# Patient Record
Sex: FEMALE | Race: WHITE | NOT HISPANIC OR LATINO | Employment: OTHER | URBAN - METROPOLITAN AREA
[De-identification: names, ages, dates, MRNs, and addresses within clinical notes are randomized per-mention and may not be internally consistent; named-entity substitution may affect disease eponyms.]

---

## 2017-02-10 ENCOUNTER — OFFICE VISIT (OUTPATIENT)
Dept: RHEUMATOLOGY | Facility: CLINIC | Age: 47
End: 2017-02-10
Payer: MEDICARE

## 2017-02-10 VITALS
SYSTOLIC BLOOD PRESSURE: 119 MMHG | BODY MASS INDEX: 29.8 KG/M2 | HEART RATE: 88 BPM | HEIGHT: 60 IN | DIASTOLIC BLOOD PRESSURE: 81 MMHG | WEIGHT: 151.81 LBS

## 2017-02-10 DIAGNOSIS — M79.7 FIBROMYALGIA: Primary | Chronic | ICD-10-CM

## 2017-02-10 DIAGNOSIS — Z79.891 LONG TERM CURRENT USE OF OPIATE ANALGESIC: ICD-10-CM

## 2017-02-10 PROCEDURE — 99213 OFFICE O/P EST LOW 20 MIN: CPT | Mod: S$PBB,,, | Performed by: INTERNAL MEDICINE

## 2017-02-10 PROCEDURE — 99999 PR PBB SHADOW E&M-EST. PATIENT-LVL III: CPT | Mod: PBBFAC,,, | Performed by: INTERNAL MEDICINE

## 2017-02-10 PROCEDURE — 99213 OFFICE O/P EST LOW 20 MIN: CPT | Mod: PBBFAC | Performed by: INTERNAL MEDICINE

## 2017-02-10 RX ORDER — MORPHINE SULFATE 60 MG/1
60 TABLET, FILM COATED, EXTENDED RELEASE ORAL 2 TIMES DAILY
Qty: 60 TABLET | Refills: 0 | Status: SHIPPED | OUTPATIENT
Start: 2017-03-10 | End: 2017-04-07 | Stop reason: SDUPTHER

## 2017-02-10 RX ORDER — OXYCODONE HYDROCHLORIDE 10 MG/1
10 TABLET ORAL EVERY 6 HOURS PRN
Qty: 90 TABLET | Refills: 0 | Status: SHIPPED | OUTPATIENT
Start: 2017-02-10 | End: 2017-02-10 | Stop reason: SDUPTHER

## 2017-02-10 RX ORDER — OXYCODONE HYDROCHLORIDE 10 MG/1
10 TABLET ORAL EVERY 6 HOURS PRN
Qty: 90 TABLET | Refills: 0 | Status: SHIPPED | OUTPATIENT
Start: 2017-03-10 | End: 2017-04-07 | Stop reason: SDUPTHER

## 2017-02-10 RX ORDER — MORPHINE SULFATE 60 MG/1
60 TABLET, FILM COATED, EXTENDED RELEASE ORAL 2 TIMES DAILY
Qty: 60 TABLET | Refills: 0 | Status: SHIPPED | OUTPATIENT
Start: 2017-02-10 | End: 2017-02-10 | Stop reason: SDUPTHER

## 2017-02-13 NOTE — PROGRESS NOTES
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 The vasculitis resolve with clearing of the hepatitis C. I continue to follow her for chronic pain syndrome. She remains on chronic narcotic therapy. She has not been calling in for her prescription early. She also has hypertension. She is now living in [place].  She has been taking her blood pressure medicines regularly.  She is scheduled to see a new primary care doctor on Monday.  She does complain of some fatigue.  Her aching is unchanged.  She has had no fever or rashes.  She has had no other new medical problems.    Physical examination was not performed, the entire time was counseling.    Assessment: Chronic pain syndrome    Plans: I refilled her prescription for MS Contin 60 mg and oxycodone 10 mg for the next 2 months.  I will see her in follow-up in 2 months.

## 2017-04-07 ENCOUNTER — LAB VISIT (OUTPATIENT)
Dept: LAB | Facility: HOSPITAL | Age: 47
End: 2017-04-07
Attending: INTERNAL MEDICINE
Payer: MEDICARE

## 2017-04-07 ENCOUNTER — OFFICE VISIT (OUTPATIENT)
Dept: RHEUMATOLOGY | Facility: CLINIC | Age: 47
End: 2017-04-07
Payer: MEDICARE

## 2017-04-07 VITALS
WEIGHT: 141.88 LBS | BODY MASS INDEX: 27.86 KG/M2 | HEART RATE: 91 BPM | SYSTOLIC BLOOD PRESSURE: 147 MMHG | HEIGHT: 60 IN | DIASTOLIC BLOOD PRESSURE: 98 MMHG

## 2017-04-07 DIAGNOSIS — M79.7 FIBROMYALGIA: Chronic | ICD-10-CM

## 2017-04-07 DIAGNOSIS — Z79.891 LONG TERM CURRENT USE OF OPIATE ANALGESIC: ICD-10-CM

## 2017-04-07 DIAGNOSIS — Z86.19 HISTORY OF HEPATITIS C: Primary | ICD-10-CM

## 2017-04-07 DIAGNOSIS — I15.9 SECONDARY HYPERTENSION: ICD-10-CM

## 2017-04-07 DIAGNOSIS — L03.116 CELLULITIS OF LEFT LOWER EXTREMITY: ICD-10-CM

## 2017-04-07 DIAGNOSIS — I10 HYPERTENSION, ESSENTIAL: Chronic | ICD-10-CM

## 2017-04-07 DIAGNOSIS — Z86.19 HISTORY OF HEPATITIS C: ICD-10-CM

## 2017-04-07 DIAGNOSIS — I77.6 VASCULITIS: Chronic | ICD-10-CM

## 2017-04-07 LAB
BILIRUB UR QL STRIP: NEGATIVE
CLARITY UR REFRACT.AUTO: ABNORMAL
COLOR UR AUTO: YELLOW
GLUCOSE UR QL STRIP: NEGATIVE
HGB UR QL STRIP: NEGATIVE
KETONES UR QL STRIP: NEGATIVE
LEUKOCYTE ESTERASE UR QL STRIP: NEGATIVE
NITRITE UR QL STRIP: NEGATIVE
PH UR STRIP: 6 [PH] (ref 5–8)
PROT UR QL STRIP: NEGATIVE
SP GR UR STRIP: 1.02 (ref 1–1.03)
URN SPEC COLLECT METH UR: ABNORMAL
UROBILINOGEN UR STRIP-ACNC: ABNORMAL EU/DL

## 2017-04-07 PROCEDURE — 99214 OFFICE O/P EST MOD 30 MIN: CPT | Mod: S$PBB,,, | Performed by: INTERNAL MEDICINE

## 2017-04-07 PROCEDURE — 99999 PR PBB SHADOW E&M-EST. PATIENT-LVL III: CPT | Mod: PBBFAC,,, | Performed by: INTERNAL MEDICINE

## 2017-04-07 PROCEDURE — 81003 URINALYSIS AUTO W/O SCOPE: CPT

## 2017-04-07 PROCEDURE — 99213 OFFICE O/P EST LOW 20 MIN: CPT | Mod: PBBFAC | Performed by: INTERNAL MEDICINE

## 2017-04-07 RX ORDER — CARVEDILOL 3.12 MG/1
3.12 TABLET ORAL 2 TIMES DAILY
Qty: 60 TABLET | Refills: 1 | Status: ON HOLD | OUTPATIENT
Start: 2017-04-07 | End: 2018-05-25 | Stop reason: HOSPADM

## 2017-04-07 RX ORDER — HYDROCHLOROTHIAZIDE 25 MG/1
25 TABLET ORAL DAILY
Qty: 30 TABLET | Refills: 1 | Status: ON HOLD | OUTPATIENT
Start: 2017-04-07 | End: 2018-05-25 | Stop reason: HOSPADM

## 2017-04-07 RX ORDER — AMITRIPTYLINE HYDROCHLORIDE 150 MG/1
TABLET ORAL
Qty: 30 TABLET | Refills: 1 | Status: SHIPPED | OUTPATIENT
Start: 2017-04-07 | End: 2017-07-20 | Stop reason: SDUPTHER

## 2017-04-07 RX ORDER — CIPROFLOXACIN 500 MG/1
500 TABLET ORAL EVERY 12 HOURS
Qty: 20 TABLET | Refills: 0 | Status: SHIPPED | OUTPATIENT
Start: 2017-04-07 | End: 2017-04-17

## 2017-04-07 RX ORDER — LISINOPRIL 40 MG/1
40 TABLET ORAL DAILY
Qty: 30 TABLET | Refills: 1 | Status: ON HOLD | OUTPATIENT
Start: 2017-04-07 | End: 2018-05-25 | Stop reason: HOSPADM

## 2017-04-07 RX ORDER — GABAPENTIN 600 MG/1
TABLET ORAL
Qty: 120 TABLET | Refills: 6 | Status: SHIPPED | OUTPATIENT
Start: 2017-04-07 | End: 2017-07-20 | Stop reason: SDUPTHER

## 2017-04-07 RX ORDER — FLUOXETINE HYDROCHLORIDE 40 MG/1
40 CAPSULE ORAL DAILY
Qty: 30 CAPSULE | Refills: 1 | Status: ON HOLD | OUTPATIENT
Start: 2017-04-07 | End: 2019-03-20 | Stop reason: HOSPADM

## 2017-04-07 RX ORDER — OXYCODONE HYDROCHLORIDE 10 MG/1
10 TABLET ORAL EVERY 6 HOURS PRN
Qty: 90 TABLET | Refills: 0 | Status: SHIPPED | OUTPATIENT
Start: 2017-04-07 | End: 2017-05-03 | Stop reason: SDUPTHER

## 2017-04-07 RX ORDER — MORPHINE SULFATE 60 MG/1
60 TABLET, FILM COATED, EXTENDED RELEASE ORAL 2 TIMES DAILY
Qty: 60 TABLET | Refills: 0 | Status: SHIPPED | OUTPATIENT
Start: 2017-04-07 | End: 2017-05-03 | Stop reason: SDUPTHER

## 2017-04-07 NOTE — MR AVS SNAPSHOT
[facility] - Rheumatology  [address]  [place] [address]  Phone: [phone]  Fax: [phone]                  Stephanie Trepagnier Barthelemy   2017 1:40 PM   Office Visit    Description:  Female : 1970   Provider:  William Delarosa MD   Department:  [facility] - Rheumatology           Reason for Visit     Fibromyalgia           Diagnoses this Visit        Comments    History of hepatitis C    -  Primary     Hypertension, essential                To Do List           Future Appointments        Provider Department Dept Phone    2017 2:45 PM LAB, SAME DAY [facility] [phone]    2017 3:00 PM LAB, SAME DAY [facility] [phone]    2017 2:00 PM William Delarosa MD [facility] Rheumatology [phone]      Goals (5 Years of Data)     None      Follow-Up and Disposition     Return in about 4 weeks (around 2017).       These Medications        Disp Refills Start End    amitriptyline (ELAVIL) 150 MG Tab 30 tablet 1 2017     TAKE 1 TABLET (150 MG TOTAL) BY MOUTH EVERY EVENING.    Pharmacy: [facility]/pharmacy #[id number] - [place] - [address] [address] Ph #: [phone]       carvedilol (COREG) 3.125 MG tablet 60 tablet 1 2017     Take 1 tablet (3.125 mg total) by mouth 2 (two) times daily. - Oral    Pharmacy: [facility]/pharmacy #[id number] - [place] - [address] [address] Ph #: [phone]       fluoxetine (PROZAC) 40 MG capsule 30 capsule 1 2017    Take 1 capsule (40 mg total) by mouth once daily. - Oral    Pharmacy: [facility]/pharmacy #[id number] [place] - [address] [address] [address] Ph #: [phone]       gabapentin (NEURONTIN) 600 MG tablet 120 tablet 6 2017     1 twice daily, 2 at bedtime    Pharmacy: [facility]/pharmacy #[id number] - [place] - [address] [address] Ph #: [phone]       hydrochlorothiazide (HYDRODIURIL) 25 MG tablet 30 tablet 1 2017     Take 1 tablet (25 mg total) by mouth once daily. - Oral    [id number] [address] [facility] Ph #: [phone]       lisinopril (PRINIVIL,ZESTRIL) 40 MG tablet 30 tablet 1 4/7/2017 4/7/2018    Take 1 tablet (40 mg total) by mouth once daily. - Oral    [facility] [address] [facility] Ph #: [phone]       morphine (MS CONTIN) 60 MG 12 hr tablet 60 tablet 0 4/7/2017 5/7/2017    Take 1 tablet (60 mg total) by mouth 2 (two) times daily. - Oral    [facility] [address] [facility] Ph #: [phone]       oxycodone (ROXICODONE) 10 mg Tab immediate release tablet 90 tablet 0 4/7/2017 5/7/2017    Take 1 tablet (10 mg total) by mouth every 6 (six) hours as needed for Pain. - Oral    [facility] [address] [facility] Ph #: [phone]       ciprofloxacin HCl (CIPRO) 500 MG tablet 20 tablet 0 4/7/2017 4/17/2017    Take 1 tablet (500 mg total) by mouth every 12 (twelve) hours. - Oral    [id number] [address] [facility] Ph #: [phone]         [facility]     [facility] Nurse Care Line - 24/7 Assistance  Unless otherwise directed by your provider, please contact [facility], our nurse care line that is available for 24/7 assistance.     Registered nurses in the [facility] provide: appointment scheduling, clinical advisement, health education, and other advisory services.  Call: [phone] (toll free)               Medications           Message regarding Medications     Verify the changes and/or additions to your medication regime listed below are the same as discussed with your clinician today.  If any of these changes or additions are incorrect, please notify your healthcare provider.        START taking these NEW medications        Refills    ciprofloxacin HCl (CIPRO) 500 MG tablet 0    Sig: Take 1 tablet (500 mg total) by mouth every 12 (twelve) hours.    Class: Print    Route: Oral      CHANGE how  you are taking these medications     Start Taking Instead of    fluoxetine (PROZAC) 40 MG capsule fluoxetine (PROZAC) 40 MG capsule    Dosage:  Take 1 capsule (40 mg total) by mouth once daily. Dosage:  TAKE ONE CAPSULE EVERY DAY    Reason for Change:  Reorder       STOP taking these medications     gabapentin (NEURONTIN) 300 MG capsule Take 1 capsule (300 mg total) by mouth 3 (three) times daily.           Verify that the below list of medications is an accurate representation of the medications you are currently taking.  If none reported, the list may be blank. If incorrect, please contact your healthcare provider. Carry this list with you in case of emergency.           Current Medications     amitriptyline (ELAVIL) 150 MG Tab TAKE 1 TABLET (150 MG TOTAL) BY MOUTH EVERY EVENING.    carvedilol (COREG) 3.125 MG tablet Take 1 tablet (3.125 mg total) by mouth 2 (two) times daily.    ciprofloxacin HCl (CIPRO) 500 MG tablet Take 1 tablet (500 mg total) by mouth every 12 (twelve) hours.    fluoxetine (PROZAC) 40 MG capsule Take 1 capsule (40 mg total) by mouth once daily.    gabapentin (NEURONTIN) 600 MG tablet 1 twice daily, 2 at bedtime    hydrochlorothiazide (HYDRODIURIL) 25 MG tablet Take 1 tablet (25 mg total) by mouth once daily.    lidocaine (LIDODERM) 5 %(700 mg/patch) Place 1 patch onto the skin once daily. Remove & Discard patch within 12 hours    lisinopril (PRINIVIL,ZESTRIL) 40 MG tablet Take 1 tablet (40 mg total) by mouth once daily.    morphine (MS CONTIN) 60 MG 12 hr tablet Take 1 tablet (60 mg total) by mouth 2 (two) times daily.    oxycodone (ROXICODONE) 10 mg Tab immediate release tablet Take 1 tablet (10 mg total) by mouth every 6 (six) hours as needed for Pain.    valacyclovir (VALTREX) 1000 MG tablet Take 1 tablet (1,000 mg total) by mouth 2 (two) times daily.           Clinical Reference Information           Your Vitals Were     BP Pulse Height Weight BMI    147/98 (BP Location: Right arm,  Patient Position: Sitting, BP Method: Automatic) 91 5' (1.524 m) 64.4 kg (141 lb 14.4 oz) 27.71 kg/m2      Blood Pressure          Most Recent Value    BP  (!)  147/98      Allergies as of 4/7/2017     No Known Allergies      Immunizations Administered on Date of Encounter - 4/7/2017     None      Orders Placed During Today's Visit     Future Labs/Procedures Expected by Expires    BIANCA  4/7/2017 4/7/2018    Anti-neutrophilic cytoplasmic antibody  4/7/2017 6/6/2018    C-reactive protein  4/7/2017 6/6/2018    CBC auto differential  4/7/2017 6/6/2018    Comprehensive metabolic panel  4/7/2017 6/6/2018    Cryoglobulin  4/7/2017 6/6/2018    Hepatitis C RNA, quantitative, PCR  4/7/2017 6/6/2018    Sedimentation rate, manual  4/7/2017 6/6/2018    Urinalysis  As directed 5/7/2017      [facility] Sign-Up     Activating your [facility] account is as easy as 1-2-3!     1) Visit [email or web address], select Sign Up Now, enter this activation code and your date of birth, then select Next.  [email or web address]  Expires: 5/22/2017  2:32 PM      2) Create a username and password to use when you visit [facility] in the future and select a security question in case you lose your password and select Next.    3) Enter your e-mail address and click Sign Up!    Additional Information  If you have questions, please e-mail [email or web address] or call [phone] to talk to our [facility] staff. Remember, [facility] is NOT to be used for urgent needs. For medical emergencies, dial 911.         Smoking Cessation     If you would like to quit smoking:   You may be eligible for free services if you are a [place] resident and started smoking cigarettes before September 1, 1988.  Call the Smoking Cessation Trust (SCT) toll free at (850) 617-5498 or (334) 850-7357.   Call 0-800-QUIT-NOW if you do not meet the above criteria.   Contact us via email: [email or web address]   View our website for more information: [email or web address]         Language Assistance Services     ATTENTION: Language assistance services are available, free of charge. Please call [phone].      ATENCIÓN: Si habla ines, tiene a brito disposición servicios gratuitos de asistencia lingüística. Llame al [phone].     CHÚ Ý: N?u b?n nói Ti?ng Vi?t, có các d?ch v? h? tr? ngôn ng? mi?n phí dành cho b?n. G?i s? [phone].         [facility] complies with applicable Federal civil rights laws and does not discriminate on the basis of race, color, national origin, age, disability, or sex.

## 2017-04-12 ENCOUNTER — TELEPHONE (OUTPATIENT)
Dept: RHEUMATOLOGY | Facility: CLINIC | Age: 47
End: 2017-04-12

## 2017-04-12 NOTE — PROGRESS NOTES
History of present illness: 46-year-old female with a history of vasculitis secondary to.  I continue to follow her for chronic pain syndrome.  She also has hypertension.  She states she has been taking her blood pressure medications but her blood pressure still elevated.  She has not established with a primary care doctor.  She has developed some sores on her legs.  This began one month ago.  She admits to picking at the sores.  She has had swelling in the left leg.  She has had some numbness in the leg.  She has had no fevers.  She has no drainage from the sores.  She also has some rash on the face and arms.  She has had oral ulcers.  She has had no cough or shortness of breath.  She has had no abdominal pain she has had no joint swelling.  She remains on gabapentin 3 times daily.  She had run out of her amitriptyline and Prozac.    Physical examination:  Skin: She has multiple small erythematous macules on the face, back, and arm.  She has open sores on the left leg due to excoriations.  There is some surrounding erythema.  ENT: Adequate tears and saliva.  No conjunctival injection or oral ulcers  Chest: Clear to auscultation and percussion  Cardiac: No murmurs, gallops, rubs  Abdomen: No organomegaly or masses.  No tenderness to palpation  Musculoskeletal: Full range of motion of all joints.  No synovitis.    Assessment:  1.  Cellulitis of the leg.  This may be self-inflicted  2.  I do need to rule out a recurrence of her hep C vasculitis  3.  Hypertension  4.  Chronic pain syndrome    Plans:  1.  Laboratory studies are obtained  2.  I placed her on Cipro 500 mg twice daily for 10 days  3.  I refilled her prior medications including her antihypertensive medications  4.  I will see her in follow-up in one month.  Answers for HPI/ROS submitted by the patient on 4/7/2017   fever: No  eye redness: Yes  swollen glands: No  headaches: Yes  shortness of breath: No  chest pain: No  trouble swallowing: No  heartburn:  No  diarrhea: No  constipation: Yes  unexpected weight change: Yes  genital sore: Yes  dysuria: No  During the last 3 days, have you had a skin rash?: Yes  tingling: Yes  Bruises or bleeds easily: Yes  cough: No

## 2017-04-25 RX ORDER — VALACYCLOVIR HYDROCHLORIDE 1 G/1
1000 TABLET, FILM COATED ORAL 2 TIMES DAILY
Qty: 2 TABLET | Refills: 1 | Status: ON HOLD | OUTPATIENT
Start: 2017-04-25 | End: 2018-05-25 | Stop reason: HOSPADM

## 2017-05-03 ENCOUNTER — OFFICE VISIT (OUTPATIENT)
Dept: RHEUMATOLOGY | Facility: CLINIC | Age: 47
End: 2017-05-03
Payer: MEDICARE

## 2017-05-03 VITALS
WEIGHT: 131.5 LBS | SYSTOLIC BLOOD PRESSURE: 178 MMHG | BODY MASS INDEX: 25.82 KG/M2 | HEIGHT: 60 IN | DIASTOLIC BLOOD PRESSURE: 113 MMHG | HEART RATE: 77 BPM

## 2017-05-03 DIAGNOSIS — L28.1 PRURIGO NODULARIS: ICD-10-CM

## 2017-05-03 DIAGNOSIS — M79.7 FIBROMYALGIA: Primary | Chronic | ICD-10-CM

## 2017-05-03 DIAGNOSIS — Z79.891 LONG TERM CURRENT USE OF OPIATE ANALGESIC: ICD-10-CM

## 2017-05-03 PROCEDURE — 99999 PR PBB SHADOW E&M-EST. PATIENT-LVL III: CPT | Mod: PBBFAC,,, | Performed by: INTERNAL MEDICINE

## 2017-05-03 PROCEDURE — 99213 OFFICE O/P EST LOW 20 MIN: CPT | Mod: S$PBB,,, | Performed by: INTERNAL MEDICINE

## 2017-05-03 PROCEDURE — 99213 OFFICE O/P EST LOW 20 MIN: CPT | Mod: PBBFAC | Performed by: INTERNAL MEDICINE

## 2017-05-03 RX ORDER — OXYCODONE HYDROCHLORIDE 10 MG/1
10 TABLET ORAL EVERY 6 HOURS PRN
Qty: 90 TABLET | Refills: 0 | Status: SHIPPED | OUTPATIENT
Start: 2017-05-03 | End: 2017-07-20

## 2017-05-03 RX ORDER — MORPHINE SULFATE 60 MG/1
60 TABLET, FILM COATED, EXTENDED RELEASE ORAL 2 TIMES DAILY
Qty: 60 TABLET | Refills: 0 | Status: SHIPPED | OUTPATIENT
Start: 2017-05-03 | End: 2017-07-20

## 2017-05-03 ASSESSMENT — ROUTINE ASSESSMENT OF PATIENT INDEX DATA (RAPID3)
AM STIFFNESS SCORE: 1, YES
MDHAQ FUNCTION SCORE: .8
TOTAL RAPID3 SCORE: 6.89
PATIENT GLOBAL ASSESSMENT SCORE: 9
PAIN SCORE: 9
FATIGUE SCORE: 9
PSYCHOLOGICAL DISTRESS SCORE: 6.6

## 2017-05-05 NOTE — PROGRESS NOTES
History of present illness: 46-year-old female I saw INITIALLY for vasculitis due to hepatitis C.  She had cleared the hepatitis C virus.  She has mild renal insufficiency and elevated blood pressure.  She has chronic pain.  She is moving back to the area.  She broke up with her boyfriend in [place].  I saw her one month ago because of increased swelling in her legs.  She did have cellulitis which I treated with Cipro.  I was concerned about a recurrence of her vasculitis but her laboratory studies were normal.  The lesion she had seen the be self-inflicted and she is been developing more lesions.  The cellulitis has improved.  She ran out of her pain medication.  She has not been taking her blood pressure medicines regularly.  She remains on amitriptyline and Prozac.    Physical examination:  Skin: She has multiple excoriations on her arms and legs.  None of them appear to be infected.    Assessment: She has no evidence of recurrent vasculitis.    Plans:  1.  I refilled her prescriptions for one more month  2.  I stressed to her the importance of having a primary care doctor  3.  I will see her in follow-up in one month.

## 2017-06-02 ENCOUNTER — HOSPITAL ENCOUNTER (EMERGENCY)
Facility: HOSPITAL | Age: 47
Discharge: PSYCHIATRIC OR SUBSTANCE USE FACILITY | End: 2017-06-03
Attending: FAMILY MEDICINE
Payer: MEDICARE

## 2017-06-02 DIAGNOSIS — F29 PSYCHOSIS, UNSPECIFIED PSYCHOSIS TYPE: Primary | ICD-10-CM

## 2017-06-02 DIAGNOSIS — F19.10 POLYSUBSTANCE ABUSE: ICD-10-CM

## 2017-06-02 LAB
ALBUMIN SERPL BCP-MCNC: 4.2 G/DL
ALP SERPL-CCNC: 98 IU/L
ALT SERPL W/O P-5'-P-CCNC: 25 IU/L
AMPHET+METHAMPHET UR QL: NEGATIVE
ANION GAP SERPL CALC-SCNC: 14 MMOL/L
APAP SERPL-MCNC: <10 UG/ML
AST SERPL-CCNC: 21 IU/L
BACTERIA #/AREA URNS AUTO: NORMAL /HPF
BARBITURATES UR QL SCN>200 NG/ML: NEGATIVE
BASOPHILS # BLD AUTO: 0.02 K/UL
BASOPHILS NFR BLD: 0.3 %
BENZODIAZ UR QL SCN>200 NG/ML: NEGATIVE
BILIRUB SERPL-MCNC: 0.5 MG/DL
BILIRUB UR QL STRIP: NEGATIVE
BUN SERPL-MCNC: 8 MG/DL
BZE UR QL SCN: NORMAL
CALCIUM SERPL-MCNC: 9.8 MG/DL
CANNABINOIDS UR QL SCN: NEGATIVE
CHLORIDE SERPL-SCNC: 103 MMOL/L
CLARITY UR REFRACT.AUTO: CLEAR
CO2 SERPL-SCNC: 28 MMOL/L
COLOR UR AUTO: ABNORMAL
CREAT SERPL-MCNC: 0.93 MG/DL
CREAT UR-MCNC: 48.8 MG/DL
DIFFERENTIAL METHOD: ABNORMAL
EOSINOPHIL # BLD AUTO: 0 K/UL
EOSINOPHIL NFR BLD: 0.5 %
ERYTHROCYTE [DISTWIDTH] IN BLOOD BY AUTOMATED COUNT: 14.7 %
EST. GFR  (AFRICAN AMERICAN): >60 ML/MIN/1.73 M^2
EST. GFR  (NON AFRICAN AMERICAN): >60 ML/MIN/1.73 M^2
ETHANOL SERPL-MCNC: <10 MG/DL
GLUCOSE SERPL-MCNC: 102 MG/DL
GLUCOSE UR QL STRIP: NEGATIVE
HCT VFR BLD AUTO: 46.2 %
HGB BLD-MCNC: 15.3 G/DL
HGB UR QL STRIP: NEGATIVE
KETONES UR QL STRIP: NEGATIVE
LEUKOCYTE ESTERASE UR QL STRIP: ABNORMAL
LYMPHOCYTES # BLD AUTO: 1 K/UL
LYMPHOCYTES NFR BLD: 17.3 %
MCH RBC QN AUTO: 29.4 PG
MCHC RBC AUTO-ENTMCNC: 33.1 %
MCV RBC AUTO: 89 FL
METHADONE UR QL SCN>300 NG/ML: NEGATIVE
MICROSCOPIC COMMENT: NORMAL
MONOCYTES # BLD AUTO: 0.4 K/UL
MONOCYTES NFR BLD: 6.4 %
NEUTROPHILS # BLD AUTO: 4.4 K/UL
NEUTROPHILS NFR BLD: 75.5 %
NITRITE UR QL STRIP: NEGATIVE
OPIATES UR QL SCN: NORMAL
PCP UR QL SCN>25 NG/ML: NEGATIVE
PH UR STRIP: 7 [PH] (ref 5–8)
PLATELET # BLD AUTO: 253 K/UL
PMV BLD AUTO: 11.4 FL
POTASSIUM SERPL-SCNC: 3.3 MMOL/L
PROT SERPL-MCNC: 8.5 G/DL
PROT UR QL STRIP: ABNORMAL
RBC # BLD AUTO: 5.2 M/UL
RBC #/AREA URNS AUTO: 4 /HPF (ref 0–4)
SODIUM SERPL-SCNC: 145 MMOL/L
SP GR UR STRIP: 1 (ref 1–1.03)
SQUAMOUS #/AREA URNS AUTO: 4 /HPF
TOXICOLOGY INFORMATION: NORMAL
URN SPEC COLLECT METH UR: ABNORMAL
UROBILINOGEN UR STRIP-ACNC: NEGATIVE EU/DL
WBC # BLD AUTO: 5.78 K/UL
WBC #/AREA URNS AUTO: 1 /HPF (ref 0–5)

## 2017-06-02 PROCEDURE — 63600175 PHARM REV CODE 636 W HCPCS: Performed by: FAMILY MEDICINE

## 2017-06-02 PROCEDURE — 96372 THER/PROPH/DIAG INJ SC/IM: CPT

## 2017-06-02 PROCEDURE — 80329 ANALGESICS NON-OPIOID 1 OR 2: CPT

## 2017-06-02 PROCEDURE — 85025 COMPLETE CBC W/AUTO DIFF WBC: CPT

## 2017-06-02 PROCEDURE — 81000 URINALYSIS NONAUTO W/SCOPE: CPT

## 2017-06-02 PROCEDURE — G0425 INPT/ED TELECONSULT30: HCPCS | Mod: GT,,, | Performed by: PSYCHIATRY & NEUROLOGY

## 2017-06-02 PROCEDURE — 80307 DRUG TEST PRSMV CHEM ANLYZR: CPT

## 2017-06-02 PROCEDURE — 80053 COMPREHEN METABOLIC PANEL: CPT

## 2017-06-02 PROCEDURE — 93005 ELECTROCARDIOGRAM TRACING: CPT

## 2017-06-02 PROCEDURE — 80320 DRUG SCREEN QUANTALCOHOLS: CPT

## 2017-06-02 PROCEDURE — 99285 EMERGENCY DEPT VISIT HI MDM: CPT | Mod: 25

## 2017-06-02 RX ORDER — LORAZEPAM 2 MG/ML
2 INJECTION INTRAMUSCULAR EVERY 4 HOURS PRN
Status: DISCONTINUED | OUTPATIENT
Start: 2017-06-02 | End: 2017-06-03 | Stop reason: HOSPADM

## 2017-06-02 RX ORDER — DIPHENHYDRAMINE HYDROCHLORIDE 50 MG/ML
50 INJECTION INTRAMUSCULAR; INTRAVENOUS EVERY 4 HOURS PRN
Status: DISCONTINUED | OUTPATIENT
Start: 2017-06-02 | End: 2017-06-03 | Stop reason: HOSPADM

## 2017-06-02 RX ORDER — HALOPERIDOL 5 MG/ML
5 INJECTION INTRAMUSCULAR EVERY 4 HOURS PRN
Status: DISCONTINUED | OUTPATIENT
Start: 2017-06-02 | End: 2017-06-03 | Stop reason: HOSPADM

## 2017-06-02 RX ADMIN — DIPHENHYDRAMINE HYDROCHLORIDE 50 MG: 50 INJECTION, SOLUTION INTRAMUSCULAR; INTRAVENOUS at 09:06

## 2017-06-02 RX ADMIN — HALOPERIDOL LACTATE 5 MG: 5 INJECTION, SOLUTION INTRAMUSCULAR at 09:06

## 2017-06-02 RX ADMIN — LORAZEPAM 2 MG: 2 INJECTION INTRAMUSCULAR; INTRAVENOUS at 09:06

## 2017-06-03 VITALS
HEIGHT: 60 IN | RESPIRATION RATE: 18 BRPM | OXYGEN SATURATION: 97 % | WEIGHT: 120 LBS | SYSTOLIC BLOOD PRESSURE: 125 MMHG | DIASTOLIC BLOOD PRESSURE: 80 MMHG | HEART RATE: 85 BPM | BODY MASS INDEX: 23.56 KG/M2 | TEMPERATURE: 98 F

## 2017-06-03 NOTE — ED NOTES
"Pt became agitated and restless. Sat up at top of bed with raised voice stating "I want my medicine, I want my medicine".  "

## 2017-06-03 NOTE — ED PROVIDER NOTES
Encounter Date: 6/2/2017       History     Chief Complaint   Patient presents with    Psychiatric Evaluation     Sister states that patient has been doing recreational drugs, hearing voices, and having violent outbursts.     Review of patient's allergies indicates:  No Known Allergies  46-year-old female presents with Sr. with chief complaint of psychosis.  Patient reports needs to go into the mental hospital as she is increasingly hearing voices more frequently.  Patient has had thoughts of hurting of killing herself as well as thoughts of harming others.  Sister reports the patient has been taking cocaine for the last 2 days and she has been increasingly more violent.  Does have a history of bipolar disorder as well as schizophrenia. Patient denies any chest pain or shortness of breath.          Past Medical History:   Diagnosis Date    ADHD (attention deficit hyperactivity disorder)     Anemia     Bipolar disorder     History of hepatitis C - s/p clearance of virus (HCV neg 6/2015) 9/26/2014    History of substance abuse     IV heroin - last use 2013 per pt    Hypertension     Opioid overdose 5/10/2016    Vasculitis      Past Surgical History:   Procedure Laterality Date    HYSTERECTOMY  3/16/2011    SALPINGOOPHORECTOMY Right 3/16/2011    TUBAL LIGATION      Vaginal cuff repair  04/22/2011     Family History   Problem Relation Age of Onset    Diabetes Maternal Grandmother     Cancer Maternal Grandmother      Social History   Substance Use Topics    Smoking status: Current Every Day Smoker     Packs/day: 1.00     Years: 26.00    Smokeless tobacco: Not on file    Alcohol use No     Review of Systems   Psychiatric/Behavioral: Positive for agitation, hallucinations and suicidal ideas.   All other systems reviewed and are negative.      Physical Exam     Initial Vitals [06/02/17 2116]   BP Pulse Resp Temp SpO2   (!) 177/112 90 18 98.7 °F (37.1 °C) 97 %     Physical Exam    Nursing note and vitals  reviewed.  Constitutional: She appears well-developed.   HENT:   Head: Normocephalic.   Eyes: Pupils are equal, round, and reactive to light.   Neck: Normal range of motion. Neck supple.   Cardiovascular: Normal rate and normal heart sounds.   Pulmonary/Chest: Breath sounds normal.   Abdominal: Soft.   Neurological: She is alert.   Skin: Skin is warm. Capillary refill takes less than 2 seconds.   Psychiatric: Her affect is inappropriate. She is withdrawn. She expresses impulsivity and inappropriate judgment. She exhibits a depressed mood. She expresses homicidal and suicidal ideation.         ED Course   Procedures  Labs Reviewed   CBC W/ AUTO DIFFERENTIAL - Abnormal; Notable for the following:        Result Value    RDW 14.7 (*)     Gran% 75.5 (*)     Lymph% 17.3 (*)     All other components within normal limits   COMPREHENSIVE METABOLIC PANEL - Abnormal; Notable for the following:     Potassium 3.3 (*)     Total Protein 8.5 (*)     All other components within normal limits   URINALYSIS - Abnormal; Notable for the following:     Protein, UA Trace (*)     Leukocytes, UA 1+ (*)     All other components within normal limits   DRUG SCREEN PANEL, URINE EMERGENCY   ALCOHOL,MEDICAL (ETHANOL)   ACETAMINOPHEN LEVEL   URINALYSIS MICROSCOPIC                           patient medically clear for psychiatric referral     ED Course     Clinical Impression:   The primary encounter diagnosis was Psychosis, unspecified psychosis type. A diagnosis of Polysubstance abuse was also pertinent to this visit.          Rolo Song MD  06/02/17 8613

## 2017-06-03 NOTE — ED NOTES
Pt has been accepted at [facility] in [place] ram[facility] [place]. Accepting MD is Dr. Mcclure, call report to [phone].

## 2017-06-03 NOTE — ED NOTES
Tele psych in progress. Pt remained calm and cooperative. Slow to respond to questions. Flat affect.

## 2017-06-03 NOTE — CONSULTS
"Tele-Consultation to Emergency Department from Psychiatry    Please see previous notes:    Patient agreeable to consultation via telepsychiatry.    Consultation started: 6/2/2017 at 10:30pm  The chief complaint leading to psychiatric consultation is: "I need to get back on my medication."   This consultation was requested by Dr. Rolo Song, the Emergency Department attending physician.  The location of the consulting psychiatrist is [facility].  The patient location is [facility].  The patient arrived at the ED at: not known    Also present with the patient at the time of the consultation: nursing staff    Patient Identification:  Stephanie Trepagnier Barthelemy is a 46 y.o. female.    Patient information was obtained from patient.  Patient presented voluntarily to the Emergency Department by private vehicle.    History of Present Illness:  The patient is a 46 year old female who presents to the emergency room. Of note, this is a notably difficult interview. She is not answering many questions, speaking in a low voice, not engaging and is notably uncooperative. It requires the assistance of nursing staff to help answer questions as the patient wont sit up and won't speak up loud enough to be audible by this interviewer. Also, again, she is notably not engaging in the interview. The patient states that she is in the hospital to get on medication. She states that she needs medication for Bipolar Disorder, ADHD and Fibromyalgia. She then states later that she tried to kill herself by cutting her lower legs. Of note, nursing staff examined her legs and found no cuts to her legs. Nursing staff report that the patient was brought to the ER by her sister after the patient was reportedly displaying "bizarre behavior" following using cocaine, with bizarre behavior supposedly being that she was talking to herself. The sister noted to staff that the patient hadn't been on medications for at least 2 " weeks. When asked to the patient about cocaine usage and the last day/time of usage she admitted to having used cocaine today wasn't able to give information on how much/often she is using cocaine. Interview ultimately had to be terminated as the patient is not cooperative.  Psychiatric History:   Hospitalization: per history presented at time of consult, she has been hospitalized on inpatient unit previously  Medication Trials: yes, however meds are not known as she is not cooperative  Suicide Attempts: per collateral she has made potential suicide attempts/gestures or at least voiced SI in the past   Violence: not known  Depression: unable to assess  Asya: unable to assess  AH's: unable to assess  Delusions: unable to assess    Review of Systems:  Unable to assess as she is uncooperative    Past Medical History:   Past Medical History:   Diagnosis Date    ADHD (attention deficit hyperactivity disorder)     Anemia     Bipolar disorder     History of hepatitis C - s/p clearance of virus (HCV neg 6/2015) 9/26/2014    History of substance abuse     IV heroin - last use 2013 per pt    Hypertension     Opioid overdose 5/10/2016    Vasculitis         Seizures: not known  Head trauma/l.o.c.: not known  Wish to become pregnant[if female of childbearing age]: not known    Allergies: not known  Review of patient's allergies indicates:  No Known Allergies    Medications in ER:   Medications   haloperidol lactate injection 5 mg (5 mg Intramuscular Given by Other 6/2/17 4051)   lorazepam injection 2 mg (2 mg Intramuscular Given by Other 6/2/17 6609)   diphenhydrAMINE injection 50 mg (50 mg Intramuscular Given by Other 6/2/17 0979)       Medications at home: not known    Substance Abuse History:   Alchohol: not known  Drug: cocaine, opiates     Legal History:   Past charges/incarcerations: not known  Pending charges: not known    Family Psychiatric History: not known    Social History:   History of Physical/Sexual  Abuse: not known  Education: not known    Employment/Disability: not known   Financial: not known  Relationship Status/Sexual Orientation: not known   Children: not known   Housing Status: not known  [employer]: not known   History: not known   Recreational Activities: not known  Access to Gun: not known     Current Evaluation:     Constitutional  Vitals:  Vitals:    06/02/17 2116   BP: (!) 177/112   Pulse: 90   Resp: 18   Temp: 98.7 °F (37.1 °C)   TempSrc: Oral   SpO2: 97%   Weight: 54.4 kg (120 lb)   Height: 5' (1.524 m)      General:  unremarkable, age appropriate     Musculoskeletal  Muscle Strength/Tone:   moving arms normally   Gait & Station:   sitting on stretcher     Psychiatric  Level of Consciousness: alert  Orientation: oriented to person, place and time  Grooming: in hospital gown  Psychomotor Behavior: no agitation  Speech: low in volume, poverty of speech  Language: uses words appropriately  Mood: unable to fully assess as she is not cooperative  Affect: blunt  Thought Process: appears to be intact grossly  Associations: unable to fully assess as she is not cooperative  Thought Content: reportedly she voiced SI earlier, unclear if that is current now  Memory: unable to assess  Attention: poor  Fund of Knowledge: unable to assess  Insight: poor  Judgement: poor    Relevant Elements of Neurological Exam: no abnormality of posture noted    Assessment - Diagnosis - Goals:     Diagnosis/Impression: The patient is a 46 year old female who presents to the ER reportedly due to her sister who noticed a change in behavior following cocaine use. Currently, the patient is not cooperative likely secondary to the fact that she is having effects from the cocaine usage. She has already been placed under a PEC, but she is notably intoxicated at this time. It would likely be beneficial, if she has not been placed to a psychiatric hospital tomorrow 6/3/2017, that a second consult be placed to re-evaluate because  after she ryan up more information may be able to be gathered with the possibility of her being able to go home and have outpatient follow up. Unable to determine that now as she is so notably uncooperative with the interview. It is not uncommon for patient's who are intoxicated from substances to have a different presentation after being able to sober up.  If placement to inpatient unit happens, BP would need to be under better control as inpatient psychiatric hospital may have hesitation accepting a patient with a notably elevated BP as the patient's last reading. Would also try to refrain from using any prn agitation medications in order to be able to have the patient be more alert in case a second consult tomorrow needs to be placed such that better history can be gathered.     Rec: 1. Patient has already been placed under a PEC, however if she is not placed tonight to inpatient psychiatric hospital following better control over blood pressure, would consider a second psychiatric consult on 6/3/2017 in order to be able to gather mor information and help determine if the patient can be managed outpatient.   2. Need to address blood pressure   3. No medication recommendations at this time as patient is intoxicated, uncooperative and it is not clear what needs to be medicated  4. Would refrain from using prn medication as much as possible in the event that a second consult on 6/3/2017 is needed  5. If prn medications are needed, would NOT use combination of IM Haldol/Ativan/Benadryl. Upon interview she doesn't appear grossly psychotic and if agitation occurs and it is felt that chemical restraints are warranted, would recommend IM Ativan only     Time with patient: 10 minutes     Laboratory Data:   Labs Reviewed   CBC W/ AUTO DIFFERENTIAL - Abnormal; Notable for the following:        Result Value    RDW 14.7 (*)     Gran% 75.5 (*)     Lymph% 17.3 (*)     All other components within normal limits   COMPREHENSIVE  METABOLIC PANEL - Abnormal; Notable for the following:     Potassium 3.3 (*)     Total Protein 8.5 (*)     All other components within normal limits   URINALYSIS - Abnormal; Notable for the following:     Protein, UA Trace (*)     Leukocytes, UA 1+ (*)     All other components within normal limits   DRUG SCREEN PANEL, URINE EMERGENCY   ALCOHOL,MEDICAL (ETHANOL)   ACETAMINOPHEN LEVEL   URINALYSIS MICROSCOPIC         Consulting clinician was informed of the encounter and consult note.    Consultation ended: 6/2/2017 at 11:09pm

## 2017-06-03 NOTE — ED NOTES
Patient stated to call mother and let her know where she was being transferred to. Mother called and notified.

## 2017-06-03 NOTE — ED NOTES
PEC packet faxed to: [facility], [place], [place], [place], [facility], [place], [place], [facility], [facility], [facility], [facility], [facility] [place], [place], [place], [place], [place], [place] , [place], [place], [facility], [place], [place], [place], [place], [place], [facility], [place], [facility], [facility], [facility], [place], [place].

## 2017-07-15 ENCOUNTER — NURSE TRIAGE (OUTPATIENT)
Dept: ADMINISTRATIVE | Facility: CLINIC | Age: 47
End: 2017-07-15

## 2017-07-15 NOTE — TELEPHONE ENCOUNTER
Reason for Disposition   Unable to complete triage due to phone connection issues    Protocols used: ST NO CONTACT OR DUPLICATE CONTACT CALL-A-AH    Phone picked up - no response on phone x2 at 332pm

## 2017-07-20 ENCOUNTER — OFFICE VISIT (OUTPATIENT)
Dept: RHEUMATOLOGY | Facility: CLINIC | Age: 47
End: 2017-07-20
Payer: MEDICARE

## 2017-07-20 VITALS
DIASTOLIC BLOOD PRESSURE: 82 MMHG | SYSTOLIC BLOOD PRESSURE: 123 MMHG | HEART RATE: 73 BPM | WEIGHT: 129.88 LBS | BODY MASS INDEX: 25.5 KG/M2 | HEIGHT: 60 IN

## 2017-07-20 DIAGNOSIS — Z79.891 LONG TERM CURRENT USE OF OPIATE ANALGESIC: ICD-10-CM

## 2017-07-20 DIAGNOSIS — I15.9 SECONDARY HYPERTENSION: ICD-10-CM

## 2017-07-20 DIAGNOSIS — F19.10 INTRAVENOUS DRUG ABUSE: ICD-10-CM

## 2017-07-20 DIAGNOSIS — M79.7 FIBROMYALGIA: Primary | Chronic | ICD-10-CM

## 2017-07-20 DIAGNOSIS — Z86.19 HISTORY OF HEPATITIS C: ICD-10-CM

## 2017-07-20 PROCEDURE — 99213 OFFICE O/P EST LOW 20 MIN: CPT | Mod: PBBFAC | Performed by: INTERNAL MEDICINE

## 2017-07-20 PROCEDURE — 99999 PR PBB SHADOW E&M-EST. PATIENT-LVL III: CPT | Mod: PBBFAC,,, | Performed by: INTERNAL MEDICINE

## 2017-07-20 PROCEDURE — 99213 OFFICE O/P EST LOW 20 MIN: CPT | Mod: S$PBB,,, | Performed by: INTERNAL MEDICINE

## 2017-07-20 RX ORDER — GABAPENTIN 600 MG/1
1200 TABLET ORAL 3 TIMES DAILY
Qty: 180 TABLET | Refills: 2 | Status: ON HOLD | OUTPATIENT
Start: 2017-07-20 | End: 2018-05-25 | Stop reason: HOSPADM

## 2017-07-20 RX ORDER — OLANZAPINE 20 MG/1
20 TABLET ORAL NIGHTLY
Refills: 0 | Status: ON HOLD | COMMUNITY
Start: 2017-06-15 | End: 2018-05-25 | Stop reason: HOSPADM

## 2017-07-20 RX ORDER — BUSPIRONE HYDROCHLORIDE 10 MG/1
10 TABLET ORAL 2 TIMES DAILY
Refills: 0 | Status: ON HOLD | COMMUNITY
Start: 2017-06-15 | End: 2018-05-25 | Stop reason: HOSPADM

## 2017-07-20 RX ORDER — AMITRIPTYLINE HYDROCHLORIDE 150 MG/1
TABLET ORAL
Qty: 30 TABLET | Refills: 1 | Status: ON HOLD | OUTPATIENT
Start: 2017-07-20 | End: 2018-05-25 | Stop reason: HOSPADM

## 2017-07-20 RX ORDER — SULINDAC 200 MG/1
200 TABLET ORAL 2 TIMES DAILY
Qty: 60 TABLET | Refills: 5 | Status: SHIPPED | OUTPATIENT
Start: 2017-07-20 | End: 2017-08-19

## 2017-07-23 NOTE — PROGRESS NOTES
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  She cleared the hepatitis C virus.  I continue to follow her for chronic pain.  She is been on chronic narcotic therapy.  In June she was hospitalized for psychosis.  She has a history of bipolar disease.  She was hospitalized for 33 weeks.  She is scheduled to follow-up with [facility].  She has not established with a primary care doctor but has been taking her blood pressure medicines.  She has not had her narcotic prescription filled recently.  I am unaware as to whether she got it during her hospitalization.  She apparently had a few pills squirreling away which she continues to take on occasion.  She still complains of similar pain.  She denies other recent medical problems.    Physical examination was not performed, the entire time was counseling.    Assessment:  1.  Bipolar disease  2.  Chronic pain syndrome  3.  Hypertension    Plans: Since she has not had a prescriptions for narcotics filled recently I would prefer to just discontinue narcotic medications.  I did increase her gabapentin to 1200 mg 3 times daily.  I refilled her prescription for amitriptyline.  I placed her on sulindac 200 mg twice daily.  I do need to review her hospital records.  I will see her in follow-up in 3 months.

## 2017-07-27 ENCOUNTER — HOSPITAL ENCOUNTER (EMERGENCY)
Facility: HOSPITAL | Age: 47
Discharge: HOME | End: 2017-07-27
Attending: FAMILY MEDICINE
Payer: MEDICARE

## 2017-07-27 VITALS
HEART RATE: 80 BPM | RESPIRATION RATE: 15 BRPM | HEIGHT: 60 IN | DIASTOLIC BLOOD PRESSURE: 80 MMHG | SYSTOLIC BLOOD PRESSURE: 145 MMHG | WEIGHT: 125 LBS | OXYGEN SATURATION: 98 % | BODY MASS INDEX: 24.54 KG/M2 | TEMPERATURE: 98 F

## 2017-07-27 DIAGNOSIS — R05.9 COUGH: ICD-10-CM

## 2017-07-27 DIAGNOSIS — J01.00 ACUTE NON-RECURRENT MAXILLARY SINUSITIS: ICD-10-CM

## 2017-07-27 DIAGNOSIS — G89.29 OTHER CHRONIC PAIN: Primary | ICD-10-CM

## 2017-07-27 PROCEDURE — 63600175 PHARM REV CODE 636 W HCPCS: Performed by: FAMILY MEDICINE

## 2017-07-27 PROCEDURE — 96372 THER/PROPH/DIAG INJ SC/IM: CPT

## 2017-07-27 PROCEDURE — 99283 EMERGENCY DEPT VISIT LOW MDM: CPT | Mod: 25

## 2017-07-27 RX ORDER — OXYCODONE AND ACETAMINOPHEN 5; 325 MG/1; MG/1
1 TABLET ORAL EVERY 4 HOURS PRN
Qty: 12 TABLET | Refills: 0 | Status: ON HOLD | OUTPATIENT
Start: 2017-07-27 | End: 2019-03-20 | Stop reason: HOSPADM

## 2017-07-27 RX ORDER — AMOXICILLIN AND CLAVULANATE POTASSIUM 875; 125 MG/1; MG/1
1 TABLET, FILM COATED ORAL 2 TIMES DAILY
Qty: 14 TABLET | Refills: 0 | Status: SHIPPED | OUTPATIENT
Start: 2017-07-27 | End: 2018-05-23 | Stop reason: ALTCHOICE

## 2017-07-27 RX ORDER — HYDROCODONE BITARTRATE AND ACETAMINOPHEN 5; 325 MG/1; MG/1
1 TABLET ORAL EVERY 4 HOURS PRN
Qty: 18 TABLET | Refills: 0 | Status: SHIPPED | OUTPATIENT
Start: 2017-07-27 | End: 2017-07-27 | Stop reason: CLARIF

## 2017-07-27 RX ORDER — KETOROLAC TROMETHAMINE 30 MG/ML
60 INJECTION, SOLUTION INTRAMUSCULAR; INTRAVENOUS
Status: COMPLETED | OUTPATIENT
Start: 2017-07-27 | End: 2017-07-27

## 2017-07-27 RX ADMIN — KETOROLAC TROMETHAMINE 60 MG: 60 INJECTION, SOLUTION INTRAMUSCULAR at 08:07

## 2017-07-27 NOTE — ED PROVIDER NOTES
Encounter Date: 7/27/2017       History     Chief Complaint   Patient presents with    Leg Pain     I have leg pain in  both of them. I have fibromyalgia and I saw my doctor about a week ago and my legs started hurting after that. They just took me off morphine/oxycodone. I feel like my legs are swelling. I am nauseated too,      46-year-old female presents with chief complaint of bilateral leg pain.  Patient  has a history of vasculitis and fibromyalgia and is currently having leg pain below her knees.  States this is the same pain she's had multiple times in the past.   recently admitted to the psychiatric hospital at which time saw her rheumatologist rheumatologist DC'd her Percocet which she had been on for quite some time.   recently increased the gabapentin but does not feel like it is helping with her chronic pain.  States that she's also been coughing up some whitish sputum over the course of the last few days.   does have some pressure beneath her eyes but denies any fever or chills.          Review of patient's allergies indicates:  No Known Allergies  Past Medical History:   Diagnosis Date    ADHD (attention deficit hyperactivity disorder)     Anemia     Bipolar disorder     History of hepatitis C - s/p clearance of virus (HCV neg 6/2015) 9/26/2014    History of substance abuse     IV heroin - last use 2013 per pt    Hypertension     Opioid overdose 5/10/2016    Vasculitis      Past Surgical History:   Procedure Laterality Date    HYSTERECTOMY  3/16/2011    SALPINGOOPHORECTOMY Right 3/16/2011    TUBAL LIGATION      Vaginal cuff repair  04/22/2011     Family History   Problem Relation Age of Onset    Diabetes Maternal Grandmother     Cancer Maternal Grandmother      Social History   Substance Use Topics    Smoking status: Current Every Day Smoker     Packs/day: 1.00     Years: 26.00    Smokeless tobacco: Never Used    Alcohol use No     Review of Systems    Constitutional: Negative for chills and fever.   Respiratory: Positive for cough. Negative for shortness of breath.    All other systems reviewed and are negative.      Physical Exam     Initial Vitals [07/27/17 0810]   BP Pulse Resp Temp SpO2   (!) 152/106 (!) 115 20 98.4 °F (36.9 °C) 97 %      MAP       121.33         Physical Exam    Nursing note and vitals reviewed.  Constitutional: She appears well-developed and well-nourished.   HENT:   Head: Normocephalic and atraumatic.   Eyes: Conjunctivae and EOM are normal. Pupils are equal, round, and reactive to light.   Neck: Normal range of motion. Neck supple.   Cardiovascular: Normal rate, regular rhythm and normal heart sounds.   Pulmonary/Chest: Breath sounds normal.   Abdominal: Soft. Bowel sounds are normal.   Musculoskeletal: Normal range of motion. She exhibits no tenderness.   Neurological: She is alert and oriented to person, place, and time.   Skin: Skin is warm. Capillary refill takes less than 2 seconds.   Psychiatric: She has a normal mood and affect. Her behavior is normal.         ED Course   Procedures  Labs Reviewed - No data to display                            ED Course     Clinical Impression:   The primary encounter diagnosis was Other chronic pain. Diagnoses of Cough and Acute non-recurrent maxillary sinusitis were also pertinent to this visit.                           Rolo Song MD  07/27/17 5728

## 2018-02-12 RX ORDER — VALACYCLOVIR HYDROCHLORIDE 1 G/1
TABLET, FILM COATED ORAL
Qty: 60 TABLET | Refills: 0 | OUTPATIENT
Start: 2018-02-12

## 2018-02-14 RX ORDER — VALACYCLOVIR HYDROCHLORIDE 1 G/1
1000 TABLET, FILM COATED ORAL 2 TIMES DAILY
Qty: 2 TABLET | Refills: 1 | OUTPATIENT
Start: 2018-02-14

## 2018-03-01 NOTE — ED NOTES
3/6/2018    Al Sale  1949  [id number]    Discussion and Plan    Patient is seen for discussion of pathology results revealing adenocarcinoma of the prostate  At this time I spent approximately 45 minutes discussing both a diagnosis of prostate cancer and treatment options  Specific treatments such as active surveillance, brachytherapy, external beam radiation therapy, and radical prostatectomy including open and robotic techniques were reviewed  Risks, benefits, and long-term disease outcomes were explained  CT scan shows no evidence of metastatic disease  Patient has opted to proceed with the DaVinci prostatectomy  Operative risks including bleeding, infection, bowel or vascular injury, urinary incontinence, impotence, urethral stricture, disease recurrence, and need for secondary procedures discussed with patient and wife  Informed consent obtained  1  Prostate cancer [facility])  - Case request operating room: PROSTATECTOMY RADICAL W ROBOTICS; Standing  - Case request operating room: PROSTATECTOMY RADICAL W ROBOTICS    Assessment      Patient Active Problem List   Diagnosis    Prostate cancer [facility])       History of Present Illness    Cristy Bui is a 80 y o  male seen today in regards to a history of persistently elevated PSA of 12 2  PSA in 2013 was 2 3  Denies any recent systemic illness  No known family history of prostate cancer  He offers no urinary complaints other than enough air flow with otherwise complete bladder emptying sensation  Denies prior prostatitis, UTI, or hematuria  His primary physician had sent him for an MRI of the prostate which also confirms the presence of a suspicious area in the right peripheral zone  He recently underwent prostate biopsies demonstrating Min 7 cancer  Staging CT shows no evidence of metastatic disease      Urinary Symptom Assessment    AUA SYMPTOM SCORE    Flowsheet Row Most Recent Value   AUA SYMPTOM SCORE   How often have you had a sensation of not Rossana Camilo RN notified to provide more notes regarding pt current status. Reminded to ask MD to medically clear pt so placement for pt is not prolonged.    emptying your bladder completely after you finished urinating? 0   How often have you had to urinate again less than two hours after you finished urinating? 0   How often have you found you stopped and started again several times when you urinate?  0   How often have you found it difficult to postpone urination? 0   How often have you had a weak urinary stream?  0   How often have you had to push or strain to begin urination? 0   How many times did you most typically get up to urinate from the time you went to bed at night until the time you got up in the morning?  0   Quality of Life: If you were to spend the rest of your life with your urinary condition just the way it is now, how would you feel about that?  0   AUA SYMPTOM SCORE  0          Past Medical History  Past Medical History:   Diagnosis Date    Basal cell carcinoma        Past Social History  Past Surgical History:   Procedure Laterality Date    INGUINAL HERNIA REPAIR      PROSTATE BIOPSY  02/01/2018    Dr Yaz Ta        Past Family History  Family History   Problem Relation Age of Onset    No Known Problems Mother     Cancer Brother        Past Social history  Social History     Social History    Marital status: /Civil Union     Spouse name: N/A    Number of children: N/A    Years of education: N/A     Occupational History    Not on file       Social History Main Topics    Smoking status: Never Smoker    Smokeless tobacco: Never Used    Alcohol use Yes      Comment: social     Drug use: No    Sexual activity: Not on file     Other Topics Concern    Not on file     Social History Narrative    No narrative on file       Current Medications  Current Outpatient Prescriptions   Medication Sig Dispense Refill    aspirin (ASPIRIN LOW DOSE) 81 MG tablet Take 1 tablet by mouth daily      calcium citrate-vitamin D (CITRACAL+D) 315-200 MG-UNIT per tablet Take by mouth      clotrimazole-betamethasone (LOTRISONE) 1-0 05 % cream Apply 1 application topically      fexofenadine (ALLEGRA) 180 MG tablet Take 1 tablet by mouth daily      levETIRAcetam (KEPPRA) 1000 MG tablet Take 1 tablet by mouth 2 (two) times a day      lisinopril (ZESTRIL) 5 mg tablet Take 1 tablet by mouth daily      Melatonin 5 MG TABS Take 5 mg by mouth      Pediatric Multivitamins-Fl (MULTIVITAMINS/FL PO) Take 1 capsule by mouth daily      simvastatin (ZOCOR) 40 mg tablet Take 1 tablet by mouth      ciprofloxacin (CIPRO) 500 mg tablet Take 1 tablet by mouth daily       No current facility-administered medications for this visit  Allergies  Allergies   Allergen Reactions    Molds & Smuts      Other reaction(s): Respiratory Distress  Congestion, sore throat, coughing  Past Medical History, Social History, Family History, medications and allergies were reviewed  Review of Systems  Review of Systems   Constitutional: Negative  HENT: Negative  Eyes: Negative  Respiratory: Negative  Cardiovascular: Negative  Gastrointestinal: Negative  Endocrine: Negative  Genitourinary: Negative for difficulty urinating and hematuria  Musculoskeletal: Negative  Skin: Negative  Neurological: Negative  Hematological: Negative  Psychiatric/Behavioral: Negative  Vitals  Vitals:    03/06/18 1020   BP: 122/86   Pulse: 62   Weight: 86 3 kg (190 lb 3 2 oz)   Height: 6' 1" (1 854 m)         Physical Exam    Physical Exam   Constitutional: He is oriented to person, place, and time  He appears well-developed and well-nourished  HENT:   Head: Normocephalic and atraumatic  Eyes: Pupils are equal, round, and reactive to light  Neck: Normal range of motion  Cardiovascular: Normal rate, regular rhythm and normal heart sounds  Pulmonary/Chest: Effort normal and breath sounds normal  No accessory muscle usage  No respiratory distress  Abdominal: Soft  Normal appearance and bowel sounds are normal  There is no tenderness     Genitourinary: Rectum normal and penis normal  No penile tenderness  Genitourinary Comments: Right-sided prostate nodule  Musculoskeletal: Normal range of motion  Neurological: He is alert and oriented to person, place, and time  Skin: Skin is warm, dry and intact  Psychiatric: He has a normal mood and affect  His speech is normal  Cognition and memory are normal    Nursing note and vitals reviewed  Results    No results found for: PSA  Lab Results   Component Value Date    GLUCOSE 96 02/19/2015    CALCIUM 8 7 02/19/2015     02/19/2015    K 4 0 02/19/2015    CO2 29 02/19/2015     02/19/2015    BUN 16 02/19/2015    CREATININE 0 84 02/19/2015     Lab Results   Component Value Date    WBC 3 79 (L) 02/19/2015    HGB 14 6 02/19/2015    HCT 42 7 02/19/2015    MCV 92 02/19/2015     02/19/2015       No results found for this or any previous visit (from the past 1 hour(s)) ]    Case Report   Surgical Pathology Report                         Case: [id number]                                    Authorizing Provider: Kaity Blackman MD             Collected:           02/01/2018 1300               Ordering Location:     [facility]        Received:            02/01/2018 1300                                      [facility]                                                             Pathologist:           Brandy Samuels MD                                                           Specimens:   A) - Prostate, JUAN DANIEL                                                                                   B) - Prostate, LCZ                                                                                   C) - Prostate, RCZ                                                                                   D) - Prostate, RPZ                                                                         Final Diagnosis   A  Prostate, JUAN DANIEL, core needle biopsies:             - Benign prostate glands               - No malignancy is identified, supported on a prostate triple stain, performed on slide A2 with an appropriate control      B  Prostate, LCZ, core needle biopsy:             - Rare atypical prostate glands (less than 5% of the core biopsy), suspicious for prostatic adenocarcinoma, Min score 3 + 3 = 6, Prognostic Grade I              - Focal loss of staining for basal cell markers (p63 and ) is identified, and positive luminal staining for p504s is noted, (prostate triple stain, performed with an appropriate control)      C  Prostate, RCZ, core needle biopsy:             - Prostatic adenocarcinoma, Min score 3 + 4 = 7, Prognostic Grade Grade II, involving approximately 30% of 1 of 2 core biopsies              - Less than 10% [place] grade 4 prostatic adenocarcinoma             - High-grade PIN              - Loss of staining for basal cell markers (p63 and ) is identified, and positive luminal staining for p504s is noted, (prostate triple stain, performed with an appropriate control)      D  Prostate, RPZ, core needle biopsies:             - Prostatic adenocarcinoma, Min score 3 + 4 = 7, Prognostic Grade Group II, involving 2 of 4 core biopsies (80%, 75%)  - Approximately 30% [place] grade 4 prostatic adenocarcinoma is identified on each core               - Prostatic adenocarcinoma, [place] score 3 + 3 = 6, Prognostic Grade Group I, involving less than 5% of 1 of 4 core biopsies              - Loss of staining for basal cell markers (p63 and ) is identified, and positive luminal staining for p504s is noted, (prostate triple stain, performed with an appropriate control)      Note: Unstained slides from block D2 are suggested if additional studies are indicated (e g , Prolaris)      Interpretation performed at , [address]          Electronically signed by Lorin Nunez MD on 2/6/2018 at  2:20 PM      CT ABDOMEN AND PELVIS WITH AND WITHOUT IV CONTRAST     INDICATION:  Recently diagnosed prostate adenocarcinoma      COMPARISON:  None      TECHNIQUE: CT of the kidneys was performed without intravenous contrast   Dynamic postcontrast CT evaluation of the abdomen and pelvis was performed in both nephrographic and delayed phases after the administration of intravenous contrast   Axial,   sagittal, and coronal 2D reformatted images were created from the source data and submitted for interpretation       Radiation dose length product (DLP) for this visit:  941 mGy-cm   This examination, like all CT scans performed in the [facility], was performed utilizing techniques to minimize radiation dose exposure, including the use of iterative   reconstruction and automated exposure control      IV Contrast:  100 mL of iohexol (OMNIPAQUE)  350 Multi-dose  Enteric Contrast:  Enteric contrast was not administered      FINDINGS:     ABDOMEN     LOWER CHEST:  No clinically significant abnormality identified in the visualized lower chest      LIVER/BILIARY TREE:  Unremarkable      GALLBLADDER:  No calcified gallstones  No pericholecystic inflammatory change      SPLEEN:  Unremarkable      PANCREAS:  Unremarkable      KIDNEYS/URETERS: Simple-appearing subcentimeter bilateral renal cysts  No hydronephrosis or perinephric collection      ADRENAL GLANDS:  Unremarkable      STOMACH, BOWEL AND APPENDIX:  Unremarkable      ABDOMINOPELVIC CAVITY:  No ascites  No free intraperitoneal air   No lymphadenopathy      VESSELS:  Unremarkable for patient's age      PELVIS     REPRODUCTIVE ORGANS:  Prostamegaly      URINARY BLADDER: Mild circumferential wall thickening likely on the basis of outlet obstruction given the prostamegaly      APPENDIX: No findings to suggest appendicitis      ABDOMINAL WALL/INGUINAL REGIONS:  Small fat-containing right inguinal hernia      OSSEOUS STRUCTURES:  No acute fracture or destructive osseous lesion      IMPRESSION:     No signs of metastatic disease in this patient with recently diagnosed prostate adenocarcinoma      Circumferential bladder wall thickening likely on the basis of outlet obstruction in this patient with prostamegaly         Workstation performed: [id number]      Imaging     CT abdomen pelvis w wo contrast (Order #[id number]) on 2/21/2018 - Imaging Information   Result History

## 2018-05-23 ENCOUNTER — HOSPITAL ENCOUNTER (INPATIENT)
Facility: HOSPITAL | Age: 48
LOS: 2 days | Discharge: PSYCHIATRIC OR SUBSTANCE USE FACILITY | DRG: 683 | End: 2018-05-25
Attending: EMERGENCY MEDICINE | Admitting: HOSPITALIST
Payer: MEDICARE

## 2018-05-23 DIAGNOSIS — N17.9 AKI (ACUTE KIDNEY INJURY): Primary | ICD-10-CM

## 2018-05-23 DIAGNOSIS — E86.0 DEHYDRATION: ICD-10-CM

## 2018-05-23 DIAGNOSIS — F31.63 BIPOLAR DISORDER, CURRENT EPISODE MIXED, SEVERE, WITHOUT PSYCHOTIC FEATURES: Chronic | ICD-10-CM

## 2018-05-23 DIAGNOSIS — N17.9 ACUTE KIDNEY INJURY: ICD-10-CM

## 2018-05-23 DIAGNOSIS — R53.1 GENERALIZED WEAKNESS: ICD-10-CM

## 2018-05-23 DIAGNOSIS — I95.9 HYPOTENSION: ICD-10-CM

## 2018-05-23 DIAGNOSIS — F90.2 ATTENTION DEFICIT HYPERACTIVITY DISORDER (ADHD), COMBINED TYPE: ICD-10-CM

## 2018-05-23 PROBLEM — E86.9 VOLUME DEPLETION: Status: ACTIVE | Noted: 2018-05-23

## 2018-05-23 PROBLEM — F15.10 METHAMPHETAMINE ABUSE: Status: ACTIVE | Noted: 2018-05-23

## 2018-05-23 LAB
ALBUMIN SERPL BCP-MCNC: 4.2 G/DL
ALP SERPL-CCNC: 110 U/L
ALT SERPL W/O P-5'-P-CCNC: 28 U/L
AMPHET+METHAMPHET UR QL: NORMAL
ANION GAP SERPL CALC-SCNC: 17 MMOL/L
APAP SERPL-MCNC: <10 UG/ML
AST SERPL-CCNC: 25 U/L
BACTERIA #/AREA URNS AUTO: ABNORMAL /HPF
BARBITURATES UR QL SCN>200 NG/ML: NEGATIVE
BASOPHILS # BLD AUTO: 0.03 K/UL
BASOPHILS NFR BLD: 0.4 %
BENZODIAZ UR QL SCN>200 NG/ML: NEGATIVE
BILIRUB SERPL-MCNC: 0.6 MG/DL
BILIRUB UR QL STRIP: NEGATIVE
BUN SERPL-MCNC: 50 MG/DL
BZE UR QL SCN: NORMAL
CALCIUM SERPL-MCNC: 9.3 MG/DL
CANNABINOIDS UR QL SCN: NEGATIVE
CHLORIDE SERPL-SCNC: 103 MMOL/L
CK SERPL-CCNC: 143 U/L
CLARITY UR REFRACT.AUTO: ABNORMAL
CO2 SERPL-SCNC: 22 MMOL/L
COLOR UR AUTO: ABNORMAL
CREAT SERPL-MCNC: 5.17 MG/DL
CREAT UR-MCNC: 285.4 MG/DL
DIFFERENTIAL METHOD: ABNORMAL
EOSINOPHIL # BLD AUTO: 0.1 K/UL
EOSINOPHIL NFR BLD: 1.4 %
ERYTHROCYTE [DISTWIDTH] IN BLOOD BY AUTOMATED COUNT: 15.6 %
EST. GFR  (AFRICAN AMERICAN): 10.7 ML/MIN/1.73 M^2
EST. GFR  (NON AFRICAN AMERICAN): 9.3 ML/MIN/1.73 M^2
ETHANOL SERPL-MCNC: <10 MG/DL
GLUCOSE SERPL-MCNC: 78 MG/DL
GLUCOSE UR QL STRIP: NEGATIVE
GRAN CASTS UR QL COMP ASSIST: 1 /LPF
HCT VFR BLD AUTO: 40.2 %
HGB BLD-MCNC: 13.1 G/DL
HGB UR QL STRIP: NEGATIVE
HYALINE CASTS UR QL AUTO: 8 /LPF
KETONES UR QL STRIP: ABNORMAL
LACTATE SERPL-SCNC: 0.7 MMOL/L
LEUKOCYTE ESTERASE UR QL STRIP: NEGATIVE
LYMPHOCYTES # BLD AUTO: 1.5 K/UL
LYMPHOCYTES NFR BLD: 17.8 %
MCH RBC QN AUTO: 28.7 PG
MCHC RBC AUTO-ENTMCNC: 32.6 G/DL
MCV RBC AUTO: 88 FL
METHADONE UR QL SCN>300 NG/ML: NEGATIVE
MICROSCOPIC COMMENT: ABNORMAL
MONOCYTES # BLD AUTO: 0.7 K/UL
MONOCYTES NFR BLD: 8.2 %
NEUTROPHILS # BLD AUTO: 6.1 K/UL
NEUTROPHILS NFR BLD: 72.1 %
NITRITE UR QL STRIP: NEGATIVE
OPIATES UR QL SCN: NEGATIVE
PCP UR QL SCN>25 NG/ML: NEGATIVE
PH UR STRIP: 5 [PH] (ref 5–8)
PLATELET # BLD AUTO: 202 K/UL
PMV BLD AUTO: 11.1 FL
POTASSIUM SERPL-SCNC: 3.4 MMOL/L
PROT SERPL-MCNC: 7.7 G/DL
PROT UR QL STRIP: ABNORMAL
RBC # BLD AUTO: 4.56 M/UL
RBC #/AREA URNS AUTO: 3 /HPF (ref 0–4)
SODIUM SERPL-SCNC: 142 MMOL/L
SP GR UR STRIP: 1.02 (ref 1–1.03)
TOXICOLOGY INFORMATION: NORMAL
URN SPEC COLLECT METH UR: ABNORMAL
UROBILINOGEN UR STRIP-ACNC: NEGATIVE EU/DL
WAXY CASTS UR QL COMP ASSIST: 2 /LPF
WBC # BLD AUTO: 8.42 K/UL
WBC #/AREA URNS AUTO: 6 /HPF (ref 0–5)

## 2018-05-23 PROCEDURE — G0425 INPT/ED TELECONSULT30: HCPCS | Mod: GT,,, | Performed by: PSYCHIATRY & NEUROLOGY

## 2018-05-23 PROCEDURE — 11000001 HC ACUTE MED/SURG PRIVATE ROOM

## 2018-05-23 PROCEDURE — 83605 ASSAY OF LACTIC ACID: CPT

## 2018-05-23 PROCEDURE — 99285 EMERGENCY DEPT VISIT HI MDM: CPT | Mod: 25

## 2018-05-23 PROCEDURE — 81000 URINALYSIS NONAUTO W/SCOPE: CPT

## 2018-05-23 PROCEDURE — 96360 HYDRATION IV INFUSION INIT: CPT

## 2018-05-23 PROCEDURE — 80329 ANALGESICS NON-OPIOID 1 OR 2: CPT

## 2018-05-23 PROCEDURE — 80307 DRUG TEST PRSMV CHEM ANLYZR: CPT

## 2018-05-23 PROCEDURE — 82550 ASSAY OF CK (CPK): CPT

## 2018-05-23 PROCEDURE — 85025 COMPLETE CBC W/AUTO DIFF WBC: CPT

## 2018-05-23 PROCEDURE — 80053 COMPREHEN METABOLIC PANEL: CPT

## 2018-05-23 PROCEDURE — 25000003 PHARM REV CODE 250: Performed by: PHYSICIAN ASSISTANT

## 2018-05-23 PROCEDURE — 93005 ELECTROCARDIOGRAM TRACING: CPT

## 2018-05-23 PROCEDURE — 96361 HYDRATE IV INFUSION ADD-ON: CPT

## 2018-05-23 PROCEDURE — 93010 ELECTROCARDIOGRAM REPORT: CPT | Mod: ,,, | Performed by: INTERNAL MEDICINE

## 2018-05-23 PROCEDURE — 87040 BLOOD CULTURE FOR BACTERIA: CPT | Mod: 59

## 2018-05-23 PROCEDURE — 80320 DRUG SCREEN QUANTALCOHOLS: CPT

## 2018-05-23 RX ORDER — SODIUM CHLORIDE 9 MG/ML
1000 INJECTION, SOLUTION INTRAVENOUS
Status: COMPLETED | OUTPATIENT
Start: 2018-05-23 | End: 2018-05-23

## 2018-05-23 RX ORDER — SODIUM CHLORIDE 0.9 % (FLUSH) 0.9 %
5 SYRINGE (ML) INJECTION
Status: DISCONTINUED | OUTPATIENT
Start: 2018-05-23 | End: 2018-05-25 | Stop reason: HOSPADM

## 2018-05-23 RX ORDER — ONDANSETRON 8 MG/1
8 TABLET, ORALLY DISINTEGRATING ORAL EVERY 8 HOURS PRN
Status: DISCONTINUED | OUTPATIENT
Start: 2018-05-23 | End: 2018-05-25 | Stop reason: HOSPADM

## 2018-05-23 RX ORDER — ACETAMINOPHEN 325 MG/1
650 TABLET ORAL EVERY 6 HOURS PRN
Status: DISCONTINUED | OUTPATIENT
Start: 2018-05-23 | End: 2018-05-25 | Stop reason: HOSPADM

## 2018-05-23 RX ADMIN — SODIUM CHLORIDE 1000 ML: 0.9 INJECTION, SOLUTION INTRAVENOUS at 06:05

## 2018-05-23 RX ADMIN — SODIUM CHLORIDE 1000 ML: 900 INJECTION, SOLUTION INTRAVENOUS at 05:05

## 2018-05-23 RX ADMIN — SODIUM CHLORIDE 1000 ML: 0.9 INJECTION, SOLUTION INTRAVENOUS at 04:05

## 2018-05-23 NOTE — CONSULTS
"Tele-Consultation to Emergency Department from Psychiatry    Please see previous notes:    Patient agreeable to consultation via telepsychiatry.    Consultation started: 5/23/2018 at  4: 45 PM  The chief complaint leading to psychiatric consultation is: Hearing voices, depressed, agitated and acting erratically"   This consultation was requested by Marcy Hanna PA-C, the Emergency Department attending physician.  The location of the consulting psychiatrist is Home.  The patient location is [facility].  The patient arrived at the ED at:  unknown    Also present with the patient at the time of the consultation: ER Nurse    Patient Identification:  Stephanie Trepagnier Barthelemy is a 47 y.o. female.    Patient information was obtained from patient, past medical records and  ER staff.  Patient presented involuntarily to the Emergency Department EMS    History of Present Illness:    This is a 47 y o C F who was brought into ER by EMS with c/o hearing voices,labile mood , and manic behavior.    Upon evaluation: She reports feeling " alright " and then says" I have to go and see my grand-father and tell him something , I think he is going die , I will be regretted if I don't go and talk to him, the voices were telling me he is going to die , I don't know what it is , I think I had a seizure, I was feeling like short of breath and soaked with sweat , may be " God " was telling me to go and see my grand- father before it's too late." Admits to feeling depressed and says " I have been really depressed and tired lately, says I do everything to please everybody , I cannot talk to my sister what I am going through, will be losing our house , I have to protect our house. my BF doesn't care about it". I work hard to please everybody to the point I get exhausted. I think I am going through menopause."  She admits to using some crystal meth and cocaine 3 nights ago and having a spiritual experience.Says " I think " "something spiritual happened other day". Was talking fast ,at times hard to interrupt.  Says she went to [facility] for 45 days ,says that's was not my fault and they took my house away  when I was in halfway.  Says she has been diagnosed with ADHD and Depression and takes meds for that , says she has been taking them regularly . Takes Prozac, doesn't remember the names of other meds. Says I have taken Adderall  and Klonopin in the past.  Denies to SI or HI ,and VH , says she is not sleeping or eating well.     Review of patient's allergies indicates:  No Known Allergies       Past Medical History:   Diagnosis Date    ADHD (attention deficit hyperactivity disorder)      Anemia      Psychiatric History:   Hospitalization: Yes, last year  Medication Trials: Yes  Suicide Attempts: no  Violence: No  Depression: Yes  Asya: Yes  AH's: Yes  Delusions: Yes    Review of Systems:  Negative except Pain    Past Medical History:   Past Medical History:   Diagnosis Date    ADHD (attention deficit hyperactivity disorder)     Anemia     Bipolar disorder     History of hepatitis C - s/p clearance of virus (HCV neg 6/2015) 9/26/2014    History of substance abuse     IV heroin - last use 2013 per pt    Hypertension     Opioid overdose 5/10/2016    Vasculitis         Seizures: No  Head trauma/l.o.c.: No  Wish to become pregnant[if female of childbearing age]: No    Allergies: NKA  Review of patient's allergies indicates:  No Known Allergies    Medications in ER:   Medications   sodium chloride 0.9% bolus 1,000 mL (0 mLs Intravenous Stopped 5/23/18 1707)   0.9%  NaCl infusion (1,000 mLs Intravenous New Bag 5/23/18 1700)       Medications at home: Prozac and some other meds    Substance Abuse History:   Alchohol: Denies  Drug: Yes Meth , was dependent on opiates in the past     Legal History:   Past charges/incarcerations: Yes  Pending charges: No    Family Psychiatric History: Unknown    Social History:   History of Physical/Sexual " "Abuse: NA  Education: HS    Employment/Disability: Disabled   Financial: Ok  Relationship Status/Sexual Orientation: Has a BF   Children: Two , son and daughter   Housing Status: Lives with her son and BF  Episcopalian: NA   History: No   Recreational Activities: Lost interest  Access to Gun: Denies     Current Evaluation:     Constitutional  Vitals:  Vitals:    05/23/18 1603 05/23/18 1606 05/23/18 1610 05/23/18 1632   BP: (!) 77/41 (!) 77/52 (!) 81/49 (!) 83/53   Pulse: 86  80 77   Resp: 20  16 (!) 23   Temp: 97.7 °F (36.5 °C)      TempSrc: Oral      SpO2: 96%  97% 97%   Weight: 59 kg (130 lb)      Height: 5' 4" (1.626 m)       05/23/18 1647 05/23/18 1702   BP: (!) 85/50 (!) 87/53   Pulse: 77 78   Resp: 19 (!) 24   Temp:     TempSrc:     SpO2: 98% 97%   Weight:     Height:        General:  unremarkable, age appropriate     Musculoskeletal  Muscle Strength/Tone:   moving arms normally   Gait & Station:   sitting on stretcher     Psychiatric  Level of Consciousness: alert  Orientation: oriented to person, place and time  Grooming: in hospital gown  Psychomotor Behavior: no agitation but looked anxious and confused  Speech: Incr in rate, rhythm and normal volume  Language: uses words appropriately  Mood: " depressed"  Affect: Restricted  Thought Process: Disorg  Associations: Loose  Thought Content: + AH, no VH, no SI or HI, ?paranoia  Memory:  Some Impairment  Attention: intact to interview  Fund of Knowledge: appears adequate  Insight: Poor  Judgement: Impaired    Relevant Elements of Neurological Exam: no abnormality of posture noted    Assessment - Diagnosis - Goals:     Diagnosis/Impression: MDD REC vs Bipolar I d/o MRE Mixed with Psychosis,ADHD    Rec: PEC due to GD as patient is unable to verbalize any clear plan for self-care, needs to be hospitalized in Inpatient Psych Unit for stabilization.    Time with patient: 20 minutes    More than 50% of the time was spent counseling/coordinating care    Laboratory " Data:   Labs Reviewed   CBC W/ AUTO DIFFERENTIAL - Abnormal; Notable for the following:        Result Value    RDW 15.6 (*)     Lymph% 17.8 (*)     All other components within normal limits   COMPREHENSIVE METABOLIC PANEL - Abnormal; Notable for the following:     Potassium 3.4 (*)     CO2 22 (*)     BUN, Bld 50 (*)     Creatinine 5.17 (*)     Anion Gap 17 (*)     eGFR if  10.7 (*)     eGFR if non  9.3 (*)     All other components within normal limits   ALCOHOL,MEDICAL (ETHANOL)   ACETAMINOPHEN LEVEL   URINALYSIS   DRUG SCREEN PANEL, URINE EMERGENCY   ALCOHOL,MEDICAL (ETHANOL)   ACETAMINOPHEN LEVEL     Thanks Marcy Hanna PA-C for the consult.    Consulting clinician was informed of the encounter and consult note.    Consultation ended: 5/23/2018 at 5: 30 PM

## 2018-05-23 NOTE — ED PROVIDER NOTES
"Encounter Date: 5/23/2018       History     Chief Complaint   Patient presents with    Psychiatric Evaluation     Pt to ED via EMS with report of pt having "hallucinations" , hearing voices, "manic behavior", pt states "I think the other day I had a seizure, I remember it."  EMS reports pt home with hot temperature inside and pt was sweating on their arrival.       Pt is a 46yo F presenting to ED today brought in by EMS with c/o "hallucinations, hearing voices, and manic behavior" per EMS, per the family. Pt admits to "doing some crystal meth and cocaine 3 nights ago and having a spiritual experience." Pt reports at the time she was also SOB, currently resolved. Pt currently denies any SI, HI, AH or VH. Pt reports her AC is out at her home currently and she is "feeling hot and the upper part of my stomach hurts." Pt denies any N/V/D or fevers. Pt denies any etoh use. Pt admits to smoking 1ppd tobacco. Pt denies any other physical complaints at this time.      The history is provided by the patient and the EMS personnel.     Review of patient's allergies indicates:  No Known Allergies  Past Medical History:   Diagnosis Date    ADHD (attention deficit hyperactivity disorder)     Anemia     Bipolar disorder     History of hepatitis C - s/p clearance of virus (HCV neg 6/2015) 9/26/2014    History of substance abuse     IV heroin - last use 2013 per pt    Hypertension     Opioid overdose 5/10/2016    Vasculitis      Past Surgical History:   Procedure Laterality Date    HYSTERECTOMY  3/16/2011    SALPINGOOPHORECTOMY Right 3/16/2011    TUBAL LIGATION      Vaginal cuff repair  04/22/2011     Family History   Problem Relation Age of Onset    Diabetes Maternal Grandmother     Cancer Maternal Grandmother      Social History   Substance Use Topics    Smoking status: Current Every Day Smoker     Packs/day: 1.00     Years: 26.00    Smokeless tobacco: Never Used    Alcohol use No     Review of Systems "   Constitutional: Negative for chills, diaphoresis, fatigue and fever.   HENT: Negative for congestion, sinus pain, sore throat and trouble swallowing.    Eyes: Negative for pain and visual disturbance.   Respiratory: Negative for cough, chest tightness, shortness of breath and wheezing.    Cardiovascular: Negative for chest pain, palpitations and leg swelling.   Gastrointestinal: Negative for abdominal pain, diarrhea, nausea and vomiting.   Endocrine: Negative.    Genitourinary: Negative.    Musculoskeletal: Negative for back pain, myalgias and neck pain.   Skin: Negative for rash and wound.   Allergic/Immunologic: Negative.    Neurological: Negative for dizziness, tremors, weakness, light-headedness, numbness and headaches.   Hematological: Negative.    Psychiatric/Behavioral: Positive for behavioral problems and hallucinations. Negative for suicidal ideas.       Physical Exam     Initial Vitals [05/23/18 1603]   BP Pulse Resp Temp SpO2   (!) 77/41 86 20 97.7 °F (36.5 °C) 96 %      MAP       53         Physical Exam    Nursing note and vitals reviewed.  Constitutional: She appears well-developed and well-nourished. She is not diaphoretic.   Pt is a 46yo F laying in bed appearing slightly fatigued, although in NAD, nontoxic, AAOx4, calm and cooperative, breathing comfortably on room air.   HENT:   Head: Normocephalic and atraumatic.   Right Ear: External ear normal.   Left Ear: External ear normal.   Nose: Nose normal.   Mouth/Throat: Uvula is midline and oropharynx is clear and moist. Abnormal dentition. No oropharyngeal exudate.   Eyes: EOM are normal. Pupils are equal, round, and reactive to light.   Neck: Normal range of motion. Neck supple.   Cardiovascular: Normal rate, regular rhythm, normal heart sounds and intact distal pulses.   Hypotensive   Pulmonary/Chest: Effort normal and breath sounds normal. No accessory muscle usage. No tachypnea. No respiratory distress. She has no decreased breath sounds. She  has no wheezes. She has no rhonchi. She has no rales. She exhibits no tenderness.   Abdominal: Soft. Normal appearance and bowel sounds are normal. She exhibits no distension and no mass. There is no tenderness. There is no rigidity, no rebound, no guarding and no CVA tenderness.   Musculoskeletal: Normal range of motion. She exhibits no edema or tenderness.   Neurological: She is alert and oriented to person, place, and time. She has normal strength. She displays no tremor. No sensory deficit. She exhibits normal muscle tone. She displays no seizure activity. Coordination and gait normal. GCS eye subscore is 4. GCS verbal subscore is 5. GCS motor subscore is 6.   Skin: Skin is warm and dry. Capillary refill takes less than 2 seconds. No rash noted. No erythema.   Psychiatric: She has a normal mood and affect. Her speech is normal and behavior is normal. She is not actively hallucinating. She expresses no homicidal and no suicidal ideation.   Pt calm and cooperative. No apparent hallucinations or SI/HI present currently.         ED Course   Procedures  Labs Reviewed   CBC W/ AUTO DIFFERENTIAL - Abnormal; Notable for the following:        Result Value    RDW 15.6 (*)     Lymph% 17.8 (*)     All other components within normal limits   COMPREHENSIVE METABOLIC PANEL - Abnormal; Notable for the following:     Potassium 3.4 (*)     CO2 22 (*)     BUN, Bld 50 (*)     Creatinine 5.17 (*)     Anion Gap 17 (*)     eGFR if  10.7 (*)     eGFR if non  9.3 (*)     All other components within normal limits   ALCOHOL,MEDICAL (ETHANOL)   ACETAMINOPHEN LEVEL   URINALYSIS   DRUG SCREEN PANEL, URINE EMERGENCY   ALCOHOL,MEDICAL (ETHANOL)   ACETAMINOPHEN LEVEL     EKG Readings: (Independently Interpreted)   ECG done at 16:44, NSR, rate  75, , , no stemi present.          Medical Decision Making:   Initial Assessment:   Pt is a 48yo F presenting to ED today brought in by EMS with c/o  ""hallucinations, hearing voices, and manic behavior" per EMS, per the family. Pt admits to "doing some crystal meth and cocaine 3 nights ago and having a spiritual experience." Pt reports at the time she was also SOB, currently resolved. Pt currently denies any SI, HI, AH or VH. Pt reports her AC is out at her home currently and she is "feeling hot and the upper part of my stomach hurts." Pt denies any N/V/D or fevers. Pt denies any etoh use. Pt admits to smoking 1ppd tobacco. Pt denies any other physical complaints at this time.  Differential Diagnosis:   Psych eval  Clinical Tests:   Lab Tests: Ordered and Reviewed  Radiological Study: Ordered and Reviewed  Medical Tests: Ordered and Reviewed  Other:   I have discussed this case with another health care provider.       <> Summary of the Discussion: The patient will be admitted to [facility] for observation, rehydration, and reevaluation.                      Clinical Impression:   The primary encounter diagnosis was ARABELLA (acute kidney injury). Diagnoses of Generalized weakness, Dehydration, Hypotension, and Acute kidney injury were also pertinent to this visit.    Disposition:   Disposition: Admitted  Condition: Shubham Mccollum MD  05/23/18 1946    "

## 2018-05-23 NOTE — ED NOTES
Red slip on rubber shoes, pair of socks, white towel, blue jeans, shirt, bra. Crown royal bag of medication bottles.... Lisinopril 40mg, hydroxyzine alyse 50mg, sulindac 200mg, carvedilol 3.125mg, carvedilol 3.125mg, amitriptyline HCL 150mg, buspirone HCL 10mg, hydrochlorothiazide 25mg, fluoxetine hcl, 40mg.

## 2018-05-23 NOTE — ED NOTES
[facility] reports Dr Brooks will call back for tele-psych consult.

## 2018-05-24 PROBLEM — F14.10 COCAINE ABUSE: Status: ACTIVE | Noted: 2018-05-24

## 2018-05-24 LAB
ALBUMIN SERPL BCP-MCNC: 3 G/DL
ALBUMIN SERPL BCP-MCNC: 3.1 G/DL
ALP SERPL-CCNC: 91 U/L
ALT SERPL W/O P-5'-P-CCNC: 15 U/L
ANION GAP SERPL CALC-SCNC: 7 MMOL/L
ANION GAP SERPL CALC-SCNC: 8 MMOL/L
AST SERPL-CCNC: 18 U/L
BILIRUB SERPL-MCNC: 0.2 MG/DL
BUN SERPL-MCNC: 28 MG/DL
BUN SERPL-MCNC: 39 MG/DL
CALCIUM SERPL-MCNC: 8.5 MG/DL
CALCIUM SERPL-MCNC: 8.6 MG/DL
CHLORIDE SERPL-SCNC: 109 MMOL/L
CHLORIDE SERPL-SCNC: 109 MMOL/L
CO2 SERPL-SCNC: 23 MMOL/L
CO2 SERPL-SCNC: 25 MMOL/L
CREAT SERPL-MCNC: 1.6 MG/DL
CREAT SERPL-MCNC: 2.5 MG/DL
EST. GFR  (AFRICAN AMERICAN): 26 ML/MIN/1.73 M^2
EST. GFR  (AFRICAN AMERICAN): 44 ML/MIN/1.73 M^2
EST. GFR  (NON AFRICAN AMERICAN): 22 ML/MIN/1.73 M^2
EST. GFR  (NON AFRICAN AMERICAN): 38 ML/MIN/1.73 M^2
GLUCOSE SERPL-MCNC: 101 MG/DL
GLUCOSE SERPL-MCNC: 148 MG/DL
INR PPP: 1
LACTATE SERPL-SCNC: 0.8 MMOL/L
PHOSPHATE SERPL-MCNC: 3.5 MG/DL
POTASSIUM SERPL-SCNC: 3.5 MMOL/L
POTASSIUM SERPL-SCNC: 4.1 MMOL/L
PROT SERPL-MCNC: 6.4 G/DL
PROTHROMBIN TIME: 10.1 SEC
SODIUM SERPL-SCNC: 140 MMOL/L
SODIUM SERPL-SCNC: 141 MMOL/L

## 2018-05-24 PROCEDURE — 25000003 PHARM REV CODE 250: Performed by: NURSE PRACTITIONER

## 2018-05-24 PROCEDURE — 83605 ASSAY OF LACTIC ACID: CPT

## 2018-05-24 PROCEDURE — 11000001 HC ACUTE MED/SURG PRIVATE ROOM

## 2018-05-24 PROCEDURE — 25000003 PHARM REV CODE 250: Performed by: HOSPITALIST

## 2018-05-24 PROCEDURE — 63600175 PHARM REV CODE 636 W HCPCS: Performed by: HOSPITALIST

## 2018-05-24 PROCEDURE — 85610 PROTHROMBIN TIME: CPT

## 2018-05-24 PROCEDURE — 94761 N-INVAS EAR/PLS OXIMETRY MLT: CPT

## 2018-05-24 PROCEDURE — 80053 COMPREHEN METABOLIC PANEL: CPT

## 2018-05-24 PROCEDURE — 80069 RENAL FUNCTION PANEL: CPT

## 2018-05-24 PROCEDURE — 25000003 PHARM REV CODE 250: Performed by: PSYCHIATRY & NEUROLOGY

## 2018-05-24 PROCEDURE — 36415 COLL VENOUS BLD VENIPUNCTURE: CPT

## 2018-05-24 RX ORDER — CARBAMAZEPINE 100 MG/1
200 TABLET, CHEWABLE ORAL 2 TIMES DAILY WITH MEALS
Status: DISCONTINUED | OUTPATIENT
Start: 2018-05-24 | End: 2018-05-25 | Stop reason: HOSPADM

## 2018-05-24 RX ORDER — PALIPERIDONE 3 MG/1
3 TABLET, EXTENDED RELEASE ORAL DAILY
Status: DISCONTINUED | OUTPATIENT
Start: 2018-05-24 | End: 2018-05-25 | Stop reason: HOSPADM

## 2018-05-24 RX ORDER — HYDROXYZINE HYDROCHLORIDE 50 MG/1
50 TABLET, FILM COATED ORAL DAILY
Status: ON HOLD | COMMUNITY
End: 2018-05-25 | Stop reason: HOSPADM

## 2018-05-24 RX ORDER — SODIUM CHLORIDE 9 MG/ML
1000 INJECTION, SOLUTION INTRAVENOUS CONTINUOUS
Status: ACTIVE | OUTPATIENT
Start: 2018-05-24 | End: 2018-05-24

## 2018-05-24 RX ORDER — SULINDAC 200 MG/1
200 TABLET ORAL 2 TIMES DAILY
Status: ON HOLD | COMMUNITY
End: 2018-05-25 | Stop reason: HOSPADM

## 2018-05-24 RX ORDER — AMOXICILLIN 250 MG
2 CAPSULE ORAL 2 TIMES DAILY PRN
Status: DISCONTINUED | OUTPATIENT
Start: 2018-05-24 | End: 2018-05-25 | Stop reason: HOSPADM

## 2018-05-24 RX ORDER — RAMELTEON 8 MG/1
8 TABLET ORAL NIGHTLY PRN
Status: DISCONTINUED | OUTPATIENT
Start: 2018-05-24 | End: 2018-05-25 | Stop reason: HOSPADM

## 2018-05-24 RX ORDER — MORPHINE SULFATE 4 MG/ML
2 INJECTION, SOLUTION INTRAMUSCULAR; INTRAVENOUS EVERY 4 HOURS PRN
Status: DISCONTINUED | OUTPATIENT
Start: 2018-05-24 | End: 2018-05-25 | Stop reason: HOSPADM

## 2018-05-24 RX ORDER — FLUOXETINE HYDROCHLORIDE 20 MG/1
40 CAPSULE ORAL DAILY
Status: DISCONTINUED | OUTPATIENT
Start: 2018-05-25 | End: 2018-05-25 | Stop reason: HOSPADM

## 2018-05-24 RX ORDER — SODIUM CHLORIDE 9 MG/ML
1000 INJECTION, SOLUTION INTRAVENOUS CONTINUOUS
Status: DISCONTINUED | OUTPATIENT
Start: 2018-05-24 | End: 2018-05-24

## 2018-05-24 RX ADMIN — MORPHINE SULFATE 2 MG: 4 INJECTION INTRAVENOUS at 02:05

## 2018-05-24 RX ADMIN — PALIPERIDONE 3 MG: 3 TABLET, EXTENDED RELEASE ORAL at 07:05

## 2018-05-24 RX ADMIN — CARBAMAZEPINE 200 MG: 100 TABLET, CHEWABLE ORAL at 07:05

## 2018-05-24 RX ADMIN — RAMELTEON 8 MG: 8 TABLET, FILM COATED ORAL at 09:05

## 2018-05-24 RX ADMIN — STANDARDIZED SENNA CONCENTRATE AND DOCUSATE SODIUM 2 TABLET: 8.6; 5 TABLET, FILM COATED ORAL at 09:05

## 2018-05-24 RX ADMIN — SODIUM CHLORIDE 1000 ML: 0.9 INJECTION, SOLUTION INTRAVENOUS at 01:05

## 2018-05-24 RX ADMIN — SODIUM CHLORIDE 1000 ML: 0.9 INJECTION, SOLUTION INTRAVENOUS at 02:05

## 2018-05-24 NOTE — SUBJECTIVE & OBJECTIVE
Past Medical History:   Diagnosis Date    ADHD (attention deficit hyperactivity disorder)     Anemia     Bipolar disorder     History of hepatitis C - s/p clearance of virus (HCV neg 6/2015) 9/26/2014    History of substance abuse     IV heroin - last use 2013 per pt    Hypertension     Opioid overdose 5/10/2016    Vasculitis        Past Surgical History:   Procedure Laterality Date    HYSTERECTOMY  3/16/2011    SALPINGOOPHORECTOMY Right 3/16/2011    TUBAL LIGATION      Vaginal cuff repair  04/22/2011       Review of patient's allergies indicates:  No Known Allergies    No current facility-administered medications on file prior to encounter.      Current Outpatient Prescriptions on File Prior to Encounter   Medication Sig    amitriptyline (ELAVIL) 150 MG Tab TAKE 1 TABLET (150 MG TOTAL) BY MOUTH EVERY EVENING.    busPIRone (BUSPAR) 10 MG tablet Take 10 mg by mouth 2 (two) times daily.    carvedilol (COREG) 3.125 MG tablet Take 1 tablet (3.125 mg total) by mouth 2 (two) times daily.    fluoxetine (PROZAC) 40 MG capsule Take 1 capsule (40 mg total) by mouth once daily.    gabapentin (NEURONTIN) 600 MG tablet Take 2 tablets (1,200 mg total) by mouth 3 (three) times daily.    hydrochlorothiazide (HYDRODIURIL) 25 MG tablet Take 1 tablet (25 mg total) by mouth once daily.    lidocaine (LIDODERM) 5 %(700 mg/patch) Place 1 patch onto the skin once daily. Remove & Discard patch within 12 hours    lisinopril (PRINIVIL,ZESTRIL) 40 MG tablet Take 1 tablet (40 mg total) by mouth once daily.    olanzapine (ZYPREXA) 20 MG tablet Take 20 mg by mouth every evening.    oxycodone-acetaminophen (PERCOCET) 5-325 mg per tablet Take 1 tablet by mouth every 4 (four) hours as needed for Pain.    valacyclovir (VALTREX) 1000 MG tablet Take 1 tablet (1,000 mg total) by mouth 2 (two) times daily.     Family History     Problem Relation (Age of Onset)    Cancer Maternal Grandmother    Diabetes Maternal Grandmother         Social History Main Topics    Smoking status: Current Every Day Smoker     Packs/day: 1.00     Years: 26.00    Smokeless tobacco: Never Used    Alcohol use No    Drug use: Yes     Types: Heroin, Cocaine, Methamphetamines      Comment: herroine/ cocaine last used 3 months ago    Sexual activity: Yes     Partners: Male     Review of Systems   Reason unable to perform ROS: still half asleep.   HENT: Negative for trouble swallowing and voice change.    Respiratory: Negative for cough and shortness of breath.    Gastrointestinal: Negative for nausea and vomiting.     Objective:     Vital Signs (Most Recent):  Temp: 97.9 °F (36.6 °C) (05/23/18 2328)  Pulse: 80 (05/24/18 0000)  Resp: 16 (05/23/18 2328)  BP: (!) 108/58 (05/23/18 2328)  SpO2: 97 % (05/23/18 2328) Vital Signs (24h Range):  Temp:  [97.6 °F (36.4 °C)-98.1 °F (36.7 °C)] 97.9 °F (36.6 °C)  Pulse:  [76-86] 80  Resp:  [16-31] 16  SpO2:  [96 %-100 %] 97 %  BP: ()/(41-61) 108/58     Weight: 64.1 kg (141 lb 5 oz)  Body mass index is 27.6 kg/m².    Physical Exam   Constitutional: She is oriented to person, place, and time. She appears well-developed. She appears lethargic. She is cooperative.   HENT:   Head: Normocephalic and atraumatic.   Eyes: Conjunctivae are normal. No scleral icterus.   Neck: Neck supple. No tracheal deviation present.   Cardiovascular: Normal rate, regular rhythm and normal heart sounds.    Pulmonary/Chest: Effort normal and breath sounds normal. No respiratory distress.   Abdominal: Soft. She exhibits no distension. There is no tenderness. There is no guarding.   Musculoskeletal: She exhibits no edema or tenderness.   Neurological: She is oriented to person, place, and time. She appears lethargic.   Skin: Skin is warm and dry.   Psychiatric: She has a normal mood and affect.   Nursing note and vitals reviewed.          Significant Labs: All pertinent labs within the past 24 hours have been reviewed.    Significant Imaging: I have  reviewed all pertinent imaging results/findings within the past 24 hours.   X-Ray Chest 1 View 5/23/18: FINDINGS:  The cardiomediastinal structures are stable.  Lungs are clear.  There are no aggressive appearing osseous lesions.

## 2018-05-24 NOTE — NURSING
Pt's medicines were inventoried by Bri DAVID and  Jj.  contacted [facility] to place meds in hospital safe.  Personal items left in room consists of a shirt, bra, flip flops, a pair of jeans.  There was no phone or any other contraband found in personal belongings.      -Vadim arrived vis cab/uber and wanted to spend the night.  Dr Cooley wrote nursing communication to allow  to sleep in room as long as he had no personal items in room with him and pt.     Tele  NSR, 70 to 80, no alarms    Bed in lowest position, wheels locked, call light with in reach, Bed alarm set, non skid sock and ID band worn.

## 2018-05-24 NOTE — H&P
"[facility] Hospital Medicine  History & Physical    Patient Name: Stephanie Trepagnier Barthelemy  MRN: [id number]  Admission Date: 5/23/2018  Attending Physician: True Cramer MD  Primary Care Provider: Primary Doctor No         Patient information was obtained from patient, spouse/SO, past medical records and ER records.     Subjective:     Principal Problem:Acute kidney injury    Chief Complaint:   Chief Complaint   Patient presents with    Psychiatric Evaluation     Pt to ED via EMS with report of pt having "hallucinations" , hearing voices, "manic behavior", pt states "I think the other day I had a seizure, I remember it."  EMS reports pt home with hot temperature inside and pt was sweating on their arrival.          HPI: Stephanie Trepagnier Barthelemy is a 47 y.o. 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  She lives in [place], [place].  Her rheumatologist is Dr. William Delarosa.   She was taken to [facility] on 5/23/18 by EMS with complaint of hearing voices, labile mood, and manic behavior.  She had taken crystal methamphetamine and cocaine 3 night prior.  Her blood pressure was 77/41.  Labs showed a BUN of 50 an creatinine 5.17.  Urinalysis showed 1+ protein, 8 hyaline casts/lpf, 1 granular cast/lpf, and 2 waxy casts/lpf.  Urine toxicology was positive for cocaine and amphetamines, and negative for opiates.  She takes hydrochlorothiazide, lisinopril, and carvedilol for her hypertension and had been taking her medications.  She was urinating normally prior to presentation.  She was given 3 liters of normal saline.  She was " admitted to [facility].    Upon evaluation, she was awakened from a deep sleep and not able to recall all her medications.    Past Medical History:   Diagnosis Date    ADHD (attention deficit hyperactivity disorder)     Anemia     Bipolar disorder     History of hepatitis C - s/p clearance of virus (HCV neg 6/2015) 9/26/2014    History of substance abuse     IV heroin - last use 2013 per pt    Hypertension     Opioid overdose 5/10/2016    Vasculitis        Past Surgical History:   Procedure Laterality Date    HYSTERECTOMY  3/16/2011    SALPINGOOPHORECTOMY Right 3/16/2011    TUBAL LIGATION      Vaginal cuff repair  04/22/2011       Review of patient's allergies indicates:  No Known Allergies    No current facility-administered medications on file prior to encounter.      Current Outpatient Prescriptions on File Prior to Encounter   Medication Sig    amitriptyline (ELAVIL) 150 MG Tab TAKE 1 TABLET (150 MG TOTAL) BY MOUTH EVERY EVENING.    busPIRone (BUSPAR) 10 MG tablet Take 10 mg by mouth 2 (two) times daily.    carvedilol (COREG) 3.125 MG tablet Take 1 tablet (3.125 mg total) by mouth 2 (two) times daily.    fluoxetine (PROZAC) 40 MG capsule Take 1 capsule (40 mg total) by mouth once daily.    gabapentin (NEURONTIN) 600 MG tablet Take 2 tablets (1,200 mg total) by mouth 3 (three) times daily.    hydrochlorothiazide (HYDRODIURIL) 25 MG tablet Take 1 tablet (25 mg total) by mouth once daily.    lidocaine (LIDODERM) 5 %(700 mg/patch) Place 1 patch onto the skin once daily. Remove & Discard patch within 12 hours    lisinopril (PRINIVIL,ZESTRIL) 40 MG tablet Take 1 tablet (40 mg total) by mouth once daily.    olanzapine (ZYPREXA) 20 MG tablet Take 20 mg by mouth every evening.    oxycodone-acetaminophen (PERCOCET) 5-325 mg per tablet Take 1 tablet by mouth every 4 (four) hours as needed for Pain.    valacyclovir (VALTREX) 1000 MG tablet Take 1  tablet (1,000 mg total) by mouth 2 (two) times daily.     Family History     Problem Relation (Age of Onset)    Cancer Maternal Grandmother    Diabetes Maternal Grandmother        Social History Main Topics    Smoking status: Current Every Day Smoker     Packs/day: 1.00     Years: 26.00    Smokeless tobacco: Never Used    Alcohol use No    Drug use: Yes     Types: Heroin, Cocaine, Methamphetamines      Comment: herroine/ cocaine last used 3 months ago    Sexual activity: Yes     Partners: Male     Review of Systems   Reason unable to perform ROS: still half asleep.   HENT: Negative for trouble swallowing and voice change.    Respiratory: Negative for cough and shortness of breath.    Gastrointestinal: Negative for nausea and vomiting.     Objective:     Vital Signs (Most Recent):  Temp: 97.9 °F (36.6 °C) (05/23/18 2328)  Pulse: 80 (05/24/18 0000)  Resp: 16 (05/23/18 2328)  BP: (!) 108/58 (05/23/18 2328)  SpO2: 97 % (05/23/18 2328) Vital Signs (24h Range):  Temp:  [97.6 °F (36.4 °C)-98.1 °F (36.7 °C)] 97.9 °F (36.6 °C)  Pulse:  [76-86] 80  Resp:  [16-31] 16  SpO2:  [96 %-100 %] 97 %  BP: ()/(41-61) 108/58     Weight: 64.1 kg (141 lb 5 oz)  Body mass index is 27.6 kg/m².    Physical Exam   Constitutional: She is oriented to person, place, and time. She appears well-developed. She appears lethargic. She is cooperative.   HENT:   Head: Normocephalic and atraumatic.   Eyes: Conjunctivae are normal. No scleral icterus.   Neck: Neck supple. No tracheal deviation present.   Cardiovascular: Normal rate, regular rhythm and normal heart sounds.    Pulmonary/Chest: Effort normal and breath sounds normal. No respiratory distress.   Abdominal: Soft. She exhibits no distension. There is no tenderness. There is no guarding.   Musculoskeletal: She exhibits no edema or tenderness.   Neurological: She is oriented to person, place, and time. She appears lethargic.   Skin: Skin is warm and dry.   Psychiatric: She has a normal  mood and affect.   Nursing note and vitals reviewed.          Significant Labs: All pertinent labs within the past 24 hours have been reviewed.    Significant Imaging: I have reviewed all pertinent imaging results/findings within the past 24 hours.   X-Ray Chest 1 View 5/23/18: FINDINGS:  The cardiomediastinal structures are stable.  Lungs are clear.  There are no aggressive appearing osseous lesions.    Assessment/Plan:     * Acute kidney injury    Volume depletion  Methamphetamine abuse  Cocaine abuse  No rhabdomyolysis.  Hold antihypertensive medications.  Also prescribed sulindac.  Not sure if she was taking NSAIDs but possible, due to her chronic pain.  Give IV saline at 150 mL/hr.  If renal function is improving, continue holding antihypertensive medications and clear her medically for psychiatric treatment.            Bipolar disorder    Prescribed fluoxetine, buspirone, olanzapine.  Unclear what her current medications are.  Consult Psychiatry.          Fibromyalgia    Insomnia  Prescribed gabapentin and amitriptyline.  Unclear what her current medications are.          Essential hypertension    Takes hydrochlorothiazide, lisinopril, and carvedilol.  Hold all.            VTE Risk Mitigation         Ordered     IP VTE HIGH RISK PATIENT  Once      05/23/18 8613     Place MARCIA hose  Until discontinued      05/23/18 2328             Phillip Cooley MD  Department of Hospital Medicine   [facility]

## 2018-05-24 NOTE — ASSESSMENT & PLAN NOTE
Volume depletion  Methamphetamine abuse  Cocaine abuse  No rhabdomyolysis.  Hold antihypertensive medications.  Also prescribed sulindac.  Not sure if she was taking NSAIDs but possible, due to her chronic pain.  Give IV saline at 150 mL/hr.  If renal function is improving, continue holding antihypertensive medications and clear her medically for psychiatric treatment.

## 2018-05-24 NOTE — HPI
Stephanie Trepagnier Barthelemy is a 47 y.o. 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  She lives in [place], [place].  Her rheumatologist is Dr. William Delarosa.   She was taken to [facility] on 5/23/18 by EMS with complaint of hearing voices, labile mood, and manic behavior.  She had taken crystal methamphetamine and cocaine 3 night prior.  Her blood pressure was 77/41.  Labs showed a BUN of 50 an creatinine 5.17.  Urinalysis showed 1+ protein, 8 hyaline casts/lpf, 1 granular cast/lpf, and 2 waxy casts/lpf.  Urine toxicology was positive for cocaine and amphetamines, and negative for opiates.  She takes hydrochlorothiazide, lisinopril, and carvedilol for her hypertension and had been taking her medications.  She was urinating normally prior to presentation.  She was given 3 liters of normal saline.  She was admitted to [facility].    Upon evaluation, she was awakened from a deep sleep and not able to recall all her medications.

## 2018-05-24 NOTE — ED NOTES
Patient to be admitted to [place]. Patient made aware. Physician spoke to admitting physician.

## 2018-05-24 NOTE — PLAN OF CARE
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  She had taken crystal methamphetamine and cocaine 3 night prior.Urine toxicology was positive for cocaine and amphetamines, and negative for opiates.        Pt reports she lives with her S.O (Nadeem Patel) and her children (15 and 19 years old) @ [address] [address] ( Address on facesheet is her father's address for mailing ) .Reports her mother is caring for her 15 year old while she is in hospital. Pt informed Tn she last used drugs 2 weeks ago (cocaine and crystal meth) and also stated she has been to rehab in past and needs help again.     Psych consult pending.  Tn to follow up on recommendations.     Unable to schedule PCP follow up as Tn was informed they do not take appts as it is a walk in clinic.       05/24/18 1001   Discharge Assessment   Assessment Type Discharge Planning Assessment   Confirmed/corrected address and phone number on facesheet? Yes   Assessment information obtained from? Patient   Expected Length of Stay (days) 2   Communicated expected length of stay with patient/caregiver yes   Prior to hospitilization cognitive status: Alert/Oriented   Prior to hospitalization functional status: Independent   Current cognitive status: Alert/Oriented   Current Functional Status: Independent   Lives With significant other;child(clifton), dependent  (15 year old and 19 year old)   Able to Return to Prior Arrangements yes   Is patient able to  care for self after discharge? Yes   Who are your caregiver(s) and their phone number(s)? Kiya Waite (mother) [phone]   Patient's perception of discharge disposition home or selfcare   Readmission Within The Last 30 Days no previous admission in last 30 days   Patient currently being followed by outpatient case management? No   Patient currently receives any other outside agency services? No   Equipment Currently Used at Home none   Do you have any problems affording any of your prescribed medications? No   Is the patient taking medications as prescribed? yes   Does the patient have transportation home? Yes  (S.O.)   Transportation Available family or friend will provide;car   Does the patient receive services at the [facility]? No   Discharge Plan A Psychiatric hospital   Discharge Plan B Home with family   Patient/Family In Agreement With Plan yes

## 2018-05-24 NOTE — NURSING
Pt is complaining about pain on the RUQ of the abdomen informed Doctor. Plan of care reviewed. Pt is PEC has a one on one sitter. Safety measures maintained. Bed is in the lowest position and locked. Call bell is within reach. Instructed pt to call for assistance. Pt verbalized understanding. Will continue to monitor.

## 2018-05-24 NOTE — PLAN OF CARE
Feeling better today, but is complaining of RUQ/flank pain. The pain comes and goes and is not consistent in relation to food or other factors. LFTs are WNL. Renal function is improving. Will get RUQ US in the AM to assess the gallbladder. Stop IV fluids. Under CEC now. Psychiatry to see. Will likely be medically cleared in AM.     True Cramer MD, [facility]  Staff Hospitalist  Pager: [phone]  Spectralink: [phone]

## 2018-05-24 NOTE — ASSESSMENT & PLAN NOTE
Prescribed fluoxetine, buspirone, olanzapine.  Unclear what her current medications are.  Consult Psychiatry.

## 2018-05-24 NOTE — CONSULTS
Will transfer this patient to [facility] when medically stable.  Will initiate Invega and Tegretol Bipolar and psychotic sympotams

## 2018-05-24 NOTE — ED NOTES
Physician aware of recent hypotenive BP reading of 64/42. Patient asymptomatic and receiving IV hydration. No further interventions ordered at this time. Will continue to monitor. Patient lying on side at time. Re-read appears more accurate to trending BP's. Last reading 102/54 was taken with patient on back and sitting up

## 2018-05-25 VITALS
DIASTOLIC BLOOD PRESSURE: 75 MMHG | TEMPERATURE: 98 F | RESPIRATION RATE: 20 BRPM | SYSTOLIC BLOOD PRESSURE: 134 MMHG | OXYGEN SATURATION: 97 % | HEIGHT: 60 IN | HEART RATE: 70 BPM | WEIGHT: 141.31 LBS | BODY MASS INDEX: 27.74 KG/M2

## 2018-05-25 PROBLEM — E87.6 HYPOKALEMIA: Status: ACTIVE | Noted: 2018-05-25

## 2018-05-25 PROBLEM — N17.9 ACUTE KIDNEY INJURY: Status: RESOLVED | Noted: 2018-05-23 | Resolved: 2018-05-25

## 2018-05-25 PROBLEM — E86.9 VOLUME DEPLETION: Status: RESOLVED | Noted: 2018-05-23 | Resolved: 2018-05-25

## 2018-05-25 LAB
ANION GAP SERPL CALC-SCNC: 6 MMOL/L
BUN SERPL-MCNC: 19 MG/DL
CALCIUM SERPL-MCNC: 9.2 MG/DL
CHLORIDE SERPL-SCNC: 105 MMOL/L
CO2 SERPL-SCNC: 28 MMOL/L
CREAT SERPL-MCNC: 1 MG/DL
EST. GFR  (AFRICAN AMERICAN): >60 ML/MIN/1.73 M^2
EST. GFR  (NON AFRICAN AMERICAN): >60 ML/MIN/1.73 M^2
GLUCOSE SERPL-MCNC: 90 MG/DL
POTASSIUM SERPL-SCNC: 3.3 MMOL/L
SODIUM SERPL-SCNC: 139 MMOL/L

## 2018-05-25 PROCEDURE — 94761 N-INVAS EAR/PLS OXIMETRY MLT: CPT

## 2018-05-25 PROCEDURE — 25000003 PHARM REV CODE 250: Performed by: PSYCHIATRY & NEUROLOGY

## 2018-05-25 PROCEDURE — 80048 BASIC METABOLIC PNL TOTAL CA: CPT

## 2018-05-25 PROCEDURE — 36415 COLL VENOUS BLD VENIPUNCTURE: CPT

## 2018-05-25 PROCEDURE — 25000003 PHARM REV CODE 250: Performed by: HOSPITALIST

## 2018-05-25 RX ORDER — PALIPERIDONE 3 MG/1
3 TABLET, EXTENDED RELEASE ORAL DAILY
Start: 2018-05-25 | End: 2018-12-21

## 2018-05-25 RX ORDER — ACETAMINOPHEN 325 MG/1
650 TABLET ORAL EVERY 6 HOURS PRN
Refills: 0 | Status: ON HOLD | COMMUNITY
Start: 2018-05-25 | End: 2019-03-20 | Stop reason: HOSPADM

## 2018-05-25 RX ORDER — AMOXICILLIN 250 MG
2 CAPSULE ORAL 2 TIMES DAILY PRN
Status: ON HOLD | COMMUNITY
Start: 2018-05-25 | End: 2019-04-20 | Stop reason: HOSPADM

## 2018-05-25 RX ORDER — POTASSIUM CHLORIDE 20 MEQ/1
40 TABLET, EXTENDED RELEASE ORAL ONCE
Status: COMPLETED | OUTPATIENT
Start: 2018-05-25 | End: 2018-05-25

## 2018-05-25 RX ORDER — CARBAMAZEPINE 100 MG/1
200 TABLET, CHEWABLE ORAL 2 TIMES DAILY WITH MEALS
Status: ON HOLD
Start: 2018-05-25 | End: 2019-03-20 | Stop reason: HOSPADM

## 2018-05-25 RX ADMIN — POTASSIUM CHLORIDE 40 MEQ: 20 TABLET, EXTENDED RELEASE ORAL at 09:05

## 2018-05-25 RX ADMIN — CARBAMAZEPINE 200 MG: 100 TABLET, CHEWABLE ORAL at 09:05

## 2018-05-25 RX ADMIN — PALIPERIDONE 3 MG: 3 TABLET, EXTENDED RELEASE ORAL at 10:05

## 2018-05-25 RX ADMIN — FLUOXETINE 40 MG: 20 CAPSULE ORAL at 09:05

## 2018-05-25 NOTE — PLAN OF CARE
[facility]    FACILITY TRANSFER ORDERS      Patient Name: Stephanie Trepagnier Barthelemy  YOB: 1970    PCP: Pepe Elizabeth MD   PCP Address: [address] [place] [id number]  PCP Phone Number: [phone]  PCP Fax: [phone]    Encounter Date: 05/25/2018    Admit to: [facility]    Vital Signs:  Routine    Diagnoses:   Active Hospital Problems    Diagnosis  POA    Hypokalemia [E87.6]  No    Cocaine abuse [F14.10]  Yes    Methamphetamine abuse [F15.10]  Yes    Bipolar disorder [F31.9]  Yes     Chronic    Insomnia [G47.00]  Yes     Chronic    Fibromyalgia [M79.7]  Yes     Chronic    Essential hypertension [I10]  Yes     Chronic      Resolved Hospital Problems    Diagnosis Date Resolved POA    *Acute kidney injury [N17.9] 05/25/2018 Yes    Volume depletion [E86.9] 05/25/2018 Yes       Allergies:Review of patient's allergies indicates:  No Known Allergies      Discharge Procedure Orders  Diet Adult Regular   Order Specific Question Answer Comments   Additional restrictions: Low Chol/Sat Fat      Vital signs per facility protocol     Intake and output per facility protocol     Notify Physician   Order Specific Question Answer Comments   Temperature (F) greater than 101    Systolic Blood Pressure SBP greater than or equal to 160    Systolic Blood Pressure SBP less than or equal to 90    Diastolic Blood Pressure DBP greater than or equal to 110    Diastolic Blood Pressure DBP less than or equal to 60    Pulse greater than or equal to 120    Pulse less than or equal to 50    Respirations Rate RR greater than or equal to 30    Respirations Rate RR less than or equal to 6    Urine output less than 60 over 2 hours   SPO2% less than 90      Full code       Activities: Activity as tolerated    Medications: Review discharge medications with patient and family and provide education.      Current Discharge Medication List      START taking these medications    Details    acetaminophen (TYLENOL) 325 MG tablet Take 2 tablets (650 mg total) by mouth every 6 (six) hours as needed for Pain.  Refills: 0      carBAMazepine (TEGRETOL) 100 mg chewable tablet Take 2 tablets (200 mg total) by mouth 2 (two) times daily with meals.      paliperidone (INVEGA) 3 MG TR24 Take 1 tablet (3 mg total) by mouth once daily.      senna-docusate 8.6-50 mg (PERICOLACE) 8.6-50 mg per tablet Take 2 tablets by mouth 2 (two) times daily as needed for Constipation.         CONTINUE these medications which have NOT CHANGED    Details   fluoxetine (PROZAC) 40 MG capsule Take 1 capsule (40 mg total) by mouth once daily.  Qty: 30 capsule, Refills: 1      oxycodone-acetaminophen (PERCOCET) 5-325 mg per tablet Take 1 tablet by mouth every 4 (four) hours as needed for Pain.  Qty: 12 tablet, Refills: 0         STOP taking these medications       hydrOXYzine (ATARAX) 50 MG tablet Comments:   Reason for Stopping:         sulindac (CLINORIL) 200 MG Tab Comments:   Reason for Stopping:         amitriptyline (ELAVIL) 150 MG Tab Comments:   Reason for Stopping:         busPIRone (BUSPAR) 10 MG tablet Comments:   Reason for Stopping:         carvedilol (COREG) 3.125 MG tablet Comments:   Reason for Stopping:         gabapentin (NEURONTIN) 600 MG tablet Comments:   Reason for Stopping:         hydrochlorothiazide (HYDRODIURIL) 25 MG tablet Comments:   Reason for Stopping:         lidocaine (LIDODERM) 5 %(700 mg/patch) Comments:   Reason for Stopping:         lisinopril (PRINIVIL,ZESTRIL) 40 MG tablet Comments:   Reason for Stopping:         olanzapine (ZYPREXA) 20 MG tablet Comments:   Reason for Stopping:         valacyclovir (VALTREX) 1000 MG tablet Comments:   Reason for Stopping:                  True Cramer MD  05/25/2018
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
## 2018-05-25 NOTE — NURSING
Report given to JOANN Rodriguez at [facility]. Transportation set up to arrive within the hour. IV removed, no bleeding noted. Tele monitor removed and returned. Discharge instructions reviewed with patient. Patient verbalizes clear understanding of all materials.

## 2018-05-25 NOTE — PLAN OF CARE
Problem: Patient Care Overview  Goal: Plan of Care Review  Outcome: Ongoing (interventions implemented as appropriate)  Plan of care reviewed with patient. Patient verbalized complete understanding. Fall precautions maintained. Bed in lowest position, locked, call light within reach and bed alarm is on. Side rails up x's 2 with slip resistant socks on. Patient given prn med for insomnia. Patient placed npo at midnight. Nurse instructed patient to notify staff for any assistance and the patient verbalized complete understanding. Patient on telemetry throughout shift with no ectopy noted. Will continue to monitor.

## 2018-05-25 NOTE — DISCHARGE INSTRUCTIONS
Kidney Injury, Acute, Discharge Instructions for (English) View Edit Remove  Carbamazepine tablets (English) View Edit Remove  Paliperidone oral tablets, extend release (English) View Edit Remove

## 2018-05-25 NOTE — NURSING
Patient discharged with security and [employer]. All patient belongings given to [employer] employee Lyla. Patient belongings included one bag of clothing and one bag with 9 prescription bottles. Patient wearing paper scrubs and have no belongings in her possession.

## 2018-05-25 NOTE — PLAN OF CARE
Update 11:20 - TN given ok by Jaz to arrange transport and call report to [facility], TN notified [place] [phone] ok to call report now.  TN arranging [employer] PEC/[facility] Transport Trip# [id number] at this time.  TN notified patient and rahul Fierro who is room with patient.    TN sent packet to [facility], patient is ready for DC medically, Jaz at [facility] stated there is a female bed and will let me know when to arrange for transport/report.       05/25/18 1012   Final Note   Assessment Type Final Discharge Note   Discharge Disposition Psych   Hospital Follow Up  Appt(s) scheduled? No   Right Care Referral Info   Post Acute Recommendation Other

## 2018-05-26 NOTE — HOSPITAL COURSE
Started on IV fluids and her renal function returned to baseline quickly. Her mental status improved as well. She was seen by Dr. Reyes who started her on Invega and Tegretol. She complained of RUQ/flank pain. US did not show any gallstones or biliary/hepatic issues. 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 She will transfer to [facility] for continued care.

## 2018-05-26 NOTE — DISCHARGE SUMMARY
[facility] Hospital Medicine  Discharge Summary      Patient Name: Stephanie Trepagnier Barthelemy  MRN: [id number]  Admission Date: 5/23/2018  Hospital Length of Stay: 2 days  Discharge Date and Time: 5/25/2018 12:23 PM  Attending Physician: True Cramer MD   Discharging Provider: True Cramer MD  Primary Care Provider: Pepe Elizabeth MD      HPI:   Stephanie Trepagnier Barthelemy is a 47 y.o. 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  She lives in [place], [place].  Her rheumatologist is Dr. William Delarosa.   She was taken to [facility] on 5/23/18 by EMS with complaint of hearing voices, labile mood, and manic behavior.  She had taken crystal methamphetamine and cocaine 3 night prior.  Her blood pressure was 77/41.  Labs showed a BUN of 50 an creatinine 5.17.  Urinalysis showed 1+ protein, 8 hyaline casts/lpf, 1 granular cast/lpf, and 2 waxy casts/lpf.  Urine toxicology was positive for cocaine and amphetamines, and negative for opiates.  She takes hydrochlorothiazide, lisinopril, and carvedilol for her hypertension and had been taking her medications.  She was urinating normally prior to presentation.  She was given 3 liters of normal saline.  [facility].    Upon evaluation, she was awakened from a deep sleep and not able to recall all her medications.    * No surgery found *      Hospital Course:   Started on IV fluids and her renal function returned to baseline quickly. Her mental status improved as well. She was seen by Dr. Reyes who started her on  Invega and Tegretol. She complained of RUQ/flank pain. US did not show any gallstones or biliary/hepatic issues. 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 She will transfer to [facility] for continued care.      Consults:   Consults         Status Ordering Provider     Inpatient consult to Telemedicine - Psych  Once     Provider:  Shirley Brooks MD    Completed TERESE ZAMUDIO          No new Assessment & Plan notes have been filed under this hospital service since the last note was generated.  Service: Hospital Medicine    Final Active Diagnoses:    Diagnosis Date Noted POA    Hypokalemia [E87.6] 05/25/2018 No    Cocaine abuse [F14.10] 05/24/2018 Yes    Methamphetamine abuse [F15.10] 05/23/2018 Yes    Bipolar disorder [F31.9] 11/21/2014 Yes     Chronic    Insomnia [G47.00] 11/21/2014 Yes     Chronic    Fibromyalgia [M79.7] 11/10/2014 Yes     Chronic    Essential hypertension [I10] 09/24/2014 Yes     Chronic      Problems Resolved During this Admission:    Diagnosis Date Noted Date Resolved POA    PRINCIPAL PROBLEM:  Acute kidney injury [N17.9] 05/23/2018 05/25/2018 Yes    Volume depletion [E86.9] 05/23/2018 05/25/2018 Yes       Discharged Condition: good    Disposition: Psychiatric Hospital wit*    Follow Up:  Follow-up Information     Pepe Elizabeth MD. Schedule an appointment as soon as possible for a visit in 4 weeks.    Specialty:  Family Medicine  Contact information:  [address] [place] [id number] [phone]                 Patient Instructions:     Diet Adult Regular   Order Specific Question Answer Comments   Additional restrictions: Low Chol/Sat Fat      Vital signs per facility protocol     Intake and output per facility protocol     Notify Physician   Order Specific Question Answer Comments   Temperature (F) greater than 101    Systolic Blood Pressure SBP greater than or equal to 160    Systolic Blood Pressure SBP less  than or equal to 90    Diastolic Blood Pressure DBP greater than or equal to 110    Diastolic Blood Pressure DBP less than or equal to 60    Pulse greater than or equal to 120    Pulse less than or equal to 50    Respirations Rate RR greater than or equal to 30    Respirations Rate RR less than or equal to 6    Urine output less than 60 over 2 hours   SPO2% less than 90      Full code         Significant Diagnostic Studies: Labs:   CMP   Recent Labs  Lab 05/24/18  0447 05/24/18  1451 05/25/18  0807    140 139   K 3.5 4.1 3.3*    109 105   CO2 25 23 28    148* 90   BUN 39* 28* 19   CREATININE 2.5* 1.6* 1.0   CALCIUM 8.5* 8.6* 9.2   PROT  --  6.4  --    ALBUMIN 3.0* 3.1*  --    BILITOT  --  0.2  --    ALKPHOS  --  91  --    AST  --  18  --    ALT  --  15  --    ANIONGAP 7* 8 6*   ESTGFRAFRICA 26* 44* >60   EGFRNONAA 22* 38* >60    and CBC No results for input(s): WBC, HGB, HCT, PLT in the last 48 hours.  Radiology:   Imaging Results          X-Ray Chest 1 View (Final result)  Result time 05/23/18 19:12:18    Final result by Maryann Dowell MD (05/23/18 19:12:18)                 Impression:      No significant cardiopulmonary abnormalities.      Electronically signed by: Maryann Dowell MD  Date:    05/23/2018  Time:    19:12             Narrative:    EXAMINATION:  XR CHEST 1 VIEW    CLINICAL HISTORY:  Hypotension, unspecified    TECHNIQUE:  Single frontal view of the chest was performed.    COMPARISON:  July 2017    FINDINGS:  The cardiomediastinal structures are stable.  Lungs are clear.  There are no aggressive appearing osseous lesions.                                  Pending Diagnostic Studies:     None         Medications:  Reconciled Home Medications:      Medication List      START taking these medications    acetaminophen 325 MG tablet  Commonly known as:  TYLENOL  Take 2 tablets (650 mg total) by mouth every 6 (six) hours as needed for Pain.     carBAMazepine 100 mg chewable  tablet  Commonly known as:  TEGRETOL  Take 2 tablets (200 mg total) by mouth 2 (two) times daily with meals.     paliperidone 3 MG Tr24  Commonly known as:  INVEGA  Take 1 tablet (3 mg total) by mouth once daily.     senna-docusate 8.6-50 mg 8.6-50 mg per tablet  Commonly known as:  PERICOLACE  Take 2 tablets by mouth 2 (two) times daily as needed for Constipation.        CONTINUE taking these medications    FLUoxetine 40 MG capsule  Commonly known as:  PROZAC  Take 1 capsule (40 mg total) by mouth once daily.     oxyCODONE-acetaminophen 5-325 mg per tablet  Commonly known as:  PERCOCET  Take 1 tablet by mouth every 4 (four) hours as needed for Pain.        STOP taking these medications    amitriptyline 150 MG Tab  Commonly known as:  ELAVIL     busPIRone 10 MG tablet  Commonly known as:  BUSPAR     carvedilol 3.125 MG tablet  Commonly known as:  COREG     gabapentin 600 MG tablet  Commonly known as:  NEURONTIN     hydroCHLOROthiazide 25 MG tablet  Commonly known as:  HYDRODIURIL     hydrOXYzine 50 MG tablet  Commonly known as:  ATARAX     lidocaine 5 %  Commonly known as:  LIDODERM     lisinopril 40 MG tablet  Commonly known as:  PRINIVIL,ZESTRIL     OLANZapine 20 MG tablet  Commonly known as:  ZyPREXA     sulindac 200 MG Tab  Commonly known as:  CLINORIL     valACYclovir 1000 MG tablet  Commonly known as:  VALTREX            Indwelling Lines/Drains at time of discharge:   Lines/Drains/Airways          No matching active lines, drains, or airways          Time spent on the discharge of patient: 40 minutes  Patient was seen and examined on the date of discharge and determined to be suitable for discharge.         True Cramer MD  Department of Hospital Medicine  [facility]

## 2018-05-28 LAB
BACTERIA BLD CULT: NORMAL
BACTERIA BLD CULT: NORMAL

## 2018-07-28 ENCOUNTER — HOSPITAL ENCOUNTER (EMERGENCY)
Facility: HOSPITAL | Age: 48
Discharge: PSYCHIATRIC OR SUBSTANCE USE FACILITY | End: 2018-07-29
Attending: EMERGENCY MEDICINE
Payer: MEDICARE

## 2018-07-28 DIAGNOSIS — R45.851 SUICIDAL IDEATION: Primary | ICD-10-CM

## 2018-07-28 DIAGNOSIS — F31.63 BIPOLAR DISORDER, CURRENT EPISODE MIXED, SEVERE, WITHOUT PSYCHOTIC FEATURES: Chronic | ICD-10-CM

## 2018-07-28 DIAGNOSIS — F15.20 METHAMPHETAMINE USE DISORDER, SEVERE: ICD-10-CM

## 2018-07-28 LAB
ALBUMIN SERPL BCP-MCNC: 4.4 G/DL
ALP SERPL-CCNC: 121 U/L
ALT SERPL W/O P-5'-P-CCNC: 23 U/L
AMPHET+METHAMPHET UR QL: NORMAL
ANION GAP SERPL CALC-SCNC: 15 MMOL/L
APAP SERPL-MCNC: <10 UG/ML
AST SERPL-CCNC: 28 U/L
B-HCG UR QL: NEGATIVE
BARBITURATES UR QL SCN>200 NG/ML: NEGATIVE
BASOPHILS # BLD AUTO: 0.02 K/UL
BASOPHILS NFR BLD: 0.2 %
BENZODIAZ UR QL SCN>200 NG/ML: NEGATIVE
BILIRUB SERPL-MCNC: 0.5 MG/DL
BILIRUB UR QL STRIP: NEGATIVE
BUN SERPL-MCNC: 12 MG/DL
BZE UR QL SCN: NEGATIVE
CALCIUM SERPL-MCNC: 10.1 MG/DL
CANNABINOIDS UR QL SCN: NEGATIVE
CHLORIDE SERPL-SCNC: 97 MMOL/L
CLARITY UR REFRACT.AUTO: CLEAR
CO2 SERPL-SCNC: 23 MMOL/L
COLOR UR AUTO: NORMAL
CREAT SERPL-MCNC: 1.15 MG/DL
CREAT UR-MCNC: 59.1 MG/DL
DIFFERENTIAL METHOD: ABNORMAL
EOSINOPHIL # BLD AUTO: 0.2 K/UL
EOSINOPHIL NFR BLD: 2.3 %
ERYTHROCYTE [DISTWIDTH] IN BLOOD BY AUTOMATED COUNT: 14.1 %
EST. GFR  (AFRICAN AMERICAN): >60 ML/MIN/1.73 M^2
EST. GFR  (NON AFRICAN AMERICAN): 57.2 ML/MIN/1.73 M^2
ETHANOL SERPL-MCNC: <10 MG/DL
GLUCOSE SERPL-MCNC: 143 MG/DL
GLUCOSE UR QL STRIP: NEGATIVE
HCT VFR BLD AUTO: 42.1 %
HGB BLD-MCNC: 14.2 G/DL
HGB UR QL STRIP: NEGATIVE
KETONES UR QL STRIP: NEGATIVE
LEUKOCYTE ESTERASE UR QL STRIP: NEGATIVE
LYMPHOCYTES # BLD AUTO: 1.7 K/UL
LYMPHOCYTES NFR BLD: 16.7 %
MCH RBC QN AUTO: 29.4 PG
MCHC RBC AUTO-ENTMCNC: 33.7 G/DL
MCV RBC AUTO: 87 FL
METHADONE UR QL SCN>300 NG/ML: NEGATIVE
MONOCYTES # BLD AUTO: 0.7 K/UL
MONOCYTES NFR BLD: 6.9 %
NEUTROPHILS # BLD AUTO: 7.4 K/UL
NEUTROPHILS NFR BLD: 73.8 %
NITRITE UR QL STRIP: NEGATIVE
OPIATES UR QL SCN: NEGATIVE
PCP UR QL SCN>25 NG/ML: NEGATIVE
PH UR STRIP: 8 [PH] (ref 5–8)
PLATELET # BLD AUTO: 251 K/UL
PMV BLD AUTO: 11.3 FL
POTASSIUM SERPL-SCNC: 3 MMOL/L
PROT SERPL-MCNC: 8.4 G/DL
PROT UR QL STRIP: NEGATIVE
RBC # BLD AUTO: 4.83 M/UL
SALICYLATES SERPL-MCNC: <5 MG/DL
SODIUM SERPL-SCNC: 135 MMOL/L
SP GR UR STRIP: 1.01 (ref 1–1.03)
TOXICOLOGY INFORMATION: NORMAL
URN SPEC COLLECT METH UR: NORMAL
UROBILINOGEN UR STRIP-ACNC: NEGATIVE EU/DL
WBC # BLD AUTO: 9.98 K/UL

## 2018-07-28 PROCEDURE — 99285 EMERGENCY DEPT VISIT HI MDM: CPT

## 2018-07-28 PROCEDURE — G0425 INPT/ED TELECONSULT30: HCPCS | Mod: GT,,, | Performed by: PSYCHIATRY & NEUROLOGY

## 2018-07-28 PROCEDURE — 80307 DRUG TEST PRSMV CHEM ANLYZR: CPT

## 2018-07-28 PROCEDURE — 85025 COMPLETE CBC W/AUTO DIFF WBC: CPT

## 2018-07-28 PROCEDURE — 81003 URINALYSIS AUTO W/O SCOPE: CPT | Mod: 59

## 2018-07-28 PROCEDURE — 80053 COMPREHEN METABOLIC PANEL: CPT

## 2018-07-28 PROCEDURE — 80329 ANALGESICS NON-OPIOID 1 OR 2: CPT

## 2018-07-28 PROCEDURE — 80320 DRUG SCREEN QUANTALCOHOLS: CPT

## 2018-07-28 PROCEDURE — 81025 URINE PREGNANCY TEST: CPT

## 2018-07-28 NOTE — ED NOTES
"Pt states snorted "a 30 bag" worth of crystal meth today.  Pt noted shaking in stretcher, eyes open looking at staff, states "I' have seizures" , pt then starts with rapid respirations, instructed pt to stop shaking and to take slow deep breaths, in through nose and out through mouth, pt states "I do have seizures if it's not a seizure then what is it?"       "

## 2018-07-28 NOTE — ED NOTES
"Pt telling staff, "I just wanted to see my ."  Then pt starts stating "I just want to kill myself, I don't want to live like this, I want to go to detox, I want to kill myself."  MD notified.   "

## 2018-07-28 NOTE — ED NOTES
"Pt continually stating she "wants detox treatment", "wants to kill herself", and she "needs to see her  on Tuesday".  Explained to the pt we are trying to help her.  "

## 2018-07-28 NOTE — ED NOTES
Pt's belongings placed in a bag and secured in the white cabinet:  1 pair black eyeglasses  1 cigarette lighter  1 belly ring  1 white bra  1 pr black tennis shoes  1 pr blue jeans  1 yellow tshirt  1 black/white purse

## 2018-07-28 NOTE — ED NOTES
Pt to room 11.  Pt transported via EMS.  Pt was living with a friend due to losing her home and states she was kicked out.  Neighbor reported pt knocking on her door.  Pt picked up by local police dept and transported by Jolly to ED.  Pt states she snorted methamphetamine 30' prior to arrival.  Pt stated numerous times she is SI.  Pt is AAO x 3.

## 2018-07-29 VITALS
OXYGEN SATURATION: 98 % | BODY MASS INDEX: 27.48 KG/M2 | HEART RATE: 95 BPM | RESPIRATION RATE: 20 BRPM | SYSTOLIC BLOOD PRESSURE: 135 MMHG | HEIGHT: 60 IN | WEIGHT: 140 LBS | TEMPERATURE: 97 F | DIASTOLIC BLOOD PRESSURE: 85 MMHG

## 2018-07-29 PROCEDURE — 25000003 PHARM REV CODE 250: Performed by: EMERGENCY MEDICINE

## 2018-07-29 RX ORDER — TRAZODONE HYDROCHLORIDE 50 MG/1
50 TABLET ORAL
Status: COMPLETED | OUTPATIENT
Start: 2018-07-29 | End: 2018-07-29

## 2018-07-29 RX ADMIN — TRAZODONE HYDROCHLORIDE 50 MG: 50 TABLET ORAL at 12:07

## 2018-07-29 NOTE — ED NOTES
Second call to [facility] @ [phone], spoke to Angely & she states that Dr. Mccollum has entered a medical clearance. We will actively seek placement.

## 2018-07-29 NOTE — ED NOTES
Called Terence Skaggs [phone].  Left message for a return call to give pt report.  Also called [phone] (the number provided by the Transfer Center) and left a message for a return call.

## 2018-07-29 NOTE — ED NOTES
Pt awake and alert.  Pt given medication to help her sleep.  Pt calm and cooperative at this time.
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
## 2018-07-29 NOTE — ED NOTES
Admit packet faxed to the following facilities:   [facility], [facility], [facility], [facility], [facility], [facility], [facility], [facility], [facility], [facility], [facility], [facility], [facility], [facility], [facility], [facility] and  [facility].

## 2018-07-29 NOTE — ED NOTES
Admit packet faxed to the following facilities: [facility], [facility], [facility], and [facility].

## 2018-07-29 NOTE — ED NOTES
Report received from Nery FERNÁNDEZ RN. Patient resting quietly. In no acute distress. Calm at this time.

## 2018-07-29 NOTE — ED PROVIDER NOTES
Encounter Date: 7/28/2018       History     Chief Complaint   Patient presents with    Addiction Problem    Anxiety     The history is provided by the patient.   Mental Health Problem   The primary symptoms include dysphoric mood. The current episode started today. This is a recurrent problem.   The onset of the illness is precipitated by stressful event, emotional stress and drug abuse. The degree of incapacity that she is experiencing as a consequence of her illness is moderate. Sequelae of the illness include an inability to work, harmed interpersonal relations, an inability to care for self and homelessness. Additional symptoms of the illness include anhedonia, agitation, feelings of worthlessness, distractible and poor judgment. She admits to suicidal ideas. She does not have a plan to commit suicide. She contemplates harming herself. She has not already injured self. She does not contemplate injuring another person. She has not already  injured another person. Risk factors that are present for mental illness include a history of mental illness.     Review of patient's allergies indicates:  No Known Allergies  Past Medical History:   Diagnosis Date    ADHD (attention deficit hyperactivity disorder)     Anemia     Bipolar disorder     History of hepatitis C - s/p clearance of virus (HCV neg 6/2015) 9/26/2014    History of substance abuse     IV heroin - last use 2013 per pt    Hypertension     Opioid overdose 5/10/2016    Vasculitis      Past Surgical History:   Procedure Laterality Date    HYSTERECTOMY  3/16/2011    SALPINGOOPHORECTOMY Right 3/16/2011    TUBAL LIGATION      Vaginal cuff repair  04/22/2011     Family History   Problem Relation Age of Onset    Diabetes Maternal Grandmother     Cancer Maternal Grandmother      Social History   Substance Use Topics    Smoking status: Current Every Day Smoker     Packs/day: 1.00     Years: 26.00    Smokeless tobacco: Never Used    Alcohol use No      Review of Systems   Psychiatric/Behavioral: Positive for agitation and dysphoric mood.   All other systems reviewed and are negative.      Physical Exam     Initial Vitals [07/28/18 1814]   BP Pulse Resp Temp SpO2   (!) 127/98 (!) 120 20 97 °F (36.1 °C) 99 %      MAP       --         Physical Exam    Nursing note and vitals reviewed.  Constitutional: She appears well-developed and well-nourished.   HENT:   Head: Normocephalic and atraumatic.   Eyes: Conjunctivae and EOM are normal.   Neck: Normal range of motion. Neck supple.   Cardiovascular: Regular rhythm and normal heart sounds.   Pulmonary/Chest: Breath sounds normal. She has no wheezes. She has no rhonchi. She has no rales.   Abdominal: Soft. There is no tenderness. There is no rebound and no guarding.   Musculoskeletal: Normal range of motion.   Neurological: She is alert and oriented to person, place, and time.   Skin: Skin is warm and dry. Capillary refill takes less than 2 seconds.   Psychiatric: Her behavior is normal. Judgment normal. Her affect is labile and inappropriate. Her speech is tangential. Thought content is delusional. Thought content is not paranoid. Cognition and memory are normal. She expresses suicidal ideation. She expresses no homicidal ideation. She expresses no suicidal plans and no homicidal plans.         ED Course   Procedures  Labs Reviewed   CBC W/ AUTO DIFFERENTIAL - Abnormal; Notable for the following:        Result Value    Gran% 73.8 (*)     Lymph% 16.7 (*)     All other components within normal limits   COMPREHENSIVE METABOLIC PANEL - Abnormal; Notable for the following:     Sodium 135 (*)     Potassium 3.0 (*)     Glucose 143 (*)     eGFR if non  57.2 (*)     All other components within normal limits   SALICYLATE LEVEL - Abnormal; Notable for the following:     Salicylate Lvl <5.0 (*)     All other components within normal limits   URINALYSIS   DRUG SCREEN PANEL, URINE EMERGENCY   ALCOHOL,MEDICAL  (ETHANOL)   ACETAMINOPHEN LEVEL   PREGNANCY TEST, URINE RAPID          Imaging Results    None          Medical Decision Making:   Clinical Tests:   Lab Tests: Ordered and Reviewed  ED Management:  Because the patient is having suicidal ideations I will execute a PEC.    Additional MDM:   Psych: A Physician Emergency Certificate (PEC) was done in the ED for: Suicidal and Gravely Disabled. The patient has been medically cleared. Outcome: The patient was approved for transfer to another facility.                    Clinical Impression:   The primary encounter diagnosis was Suicidal ideation. Diagnoses of Bipolar disorder, current episode mixed, severe, without psychotic features and Methamphetamine use disorder, severe were also pertinent to this visit.      Disposition:   Disposition: Transferred  Condition: Stable                        Cordelia Mccollum MD  07/28/18 5080       Cordelia Mccollum MD  07/28/18 2255

## 2018-07-29 NOTE — ED NOTES
Patient accepted at [facility] by Dr. Sellers arranged by Anika. Report to be called to [phone].

## 2018-12-20 ENCOUNTER — TELEPHONE (OUTPATIENT)
Dept: FAMILY MEDICINE | Facility: CLINIC | Age: 48
End: 2018-12-20

## 2018-12-20 NOTE — TELEPHONE ENCOUNTER
Hansa Coughlin Staff   Caller: Ronni /[phone] (Today, 11:49 AM)             Patient wants you to a see his girlfriend today and she has medicaid insurance. She has anxiety attacks. Ronni Samson who lives right down the street. His sister is Cary Samson          I spoke with the pt boyfriend    The pt is not sure if medicare or medicaid is primary  I advised him that we do not except new medicaid primary patients.  He will find out what is primary and rtn call    I advised dr contreras does not have any thing available today  I did advise pt to rtn call soon bc no appt has been promised to him and I only have one available with dr tong  At this point

## 2018-12-21 ENCOUNTER — OFFICE VISIT (OUTPATIENT)
Dept: FAMILY MEDICINE | Facility: CLINIC | Age: 48
End: 2018-12-21
Payer: COMMERCIAL

## 2018-12-21 VITALS
HEART RATE: 98 BPM | DIASTOLIC BLOOD PRESSURE: 100 MMHG | TEMPERATURE: 98 F | HEIGHT: 60 IN | BODY MASS INDEX: 28.09 KG/M2 | OXYGEN SATURATION: 97 % | SYSTOLIC BLOOD PRESSURE: 140 MMHG | WEIGHT: 143.06 LBS

## 2018-12-21 DIAGNOSIS — F31.12 BIPOLAR 1 DISORDER WITH MODERATE MANIA: ICD-10-CM

## 2018-12-21 DIAGNOSIS — I10 BENIGN HYPERTENSION: ICD-10-CM

## 2018-12-21 DIAGNOSIS — E78.5 HYPERLIPIDEMIA, UNSPECIFIED HYPERLIPIDEMIA TYPE: ICD-10-CM

## 2018-12-21 DIAGNOSIS — Z12.39 BREAST CANCER SCREENING: ICD-10-CM

## 2018-12-21 DIAGNOSIS — F31.63 BIPOLAR DISORDER, CURRENT EPISODE MIXED, SEVERE, WITHOUT PSYCHOTIC FEATURES: Primary | Chronic | ICD-10-CM

## 2018-12-21 PROCEDURE — 99203 OFFICE O/P NEW LOW 30 MIN: CPT | Mod: S$GLB,,, | Performed by: FAMILY MEDICINE

## 2018-12-21 RX ORDER — HYDROXYZINE HYDROCHLORIDE 25 MG/1
25 TABLET, FILM COATED ORAL 3 TIMES DAILY
Qty: 90 TABLET | Refills: 0 | Status: SHIPPED | OUTPATIENT
Start: 2018-12-21 | End: 2019-01-19 | Stop reason: SDUPTHER

## 2018-12-21 RX ORDER — ATORVASTATIN CALCIUM 10 MG/1
10 TABLET, FILM COATED ORAL DAILY
Qty: 90 TABLET | Refills: 3 | Status: ON HOLD
Start: 2018-12-21 | End: 2019-03-20 | Stop reason: HOSPADM

## 2018-12-21 RX ORDER — LORAZEPAM 0.5 MG/1
0.5 TABLET ORAL EVERY 6 HOURS PRN
Status: ON HOLD
Start: 2018-12-21 | End: 2019-03-20 | Stop reason: HOSPADM

## 2018-12-21 RX ORDER — PHENOL/SODIUM PHENOLATE
20 AEROSOL, SPRAY (ML) MUCOUS MEMBRANE DAILY
Qty: 90 EACH | Refills: 3 | Status: ON HOLD
Start: 2018-12-21 | End: 2019-08-15 | Stop reason: HOSPADM

## 2018-12-21 RX ORDER — PALIPERIDONE 9 MG/1
9 TABLET, EXTENDED RELEASE ORAL DAILY
Qty: 30 TABLET | Refills: 0 | Status: ON HOLD | OUTPATIENT
Start: 2018-12-21 | End: 2019-03-14

## 2018-12-21 RX ORDER — HYDROCHLOROTHIAZIDE 25 MG/1
25 TABLET ORAL DAILY
Qty: 30 TABLET | Refills: 3 | Status: ON HOLD
Start: 2018-12-21 | End: 2019-03-20 | Stop reason: HOSPADM

## 2018-12-21 RX ORDER — BENZTROPINE MESYLATE 0.5 MG/1
0.5 TABLET ORAL 2 TIMES DAILY
Qty: 60 TABLET | Refills: 0 | Status: ON HOLD
Start: 2018-12-21 | End: 2019-03-20 | Stop reason: HOSPADM

## 2018-12-21 RX ORDER — CARVEDILOL 3.12 MG/1
3.12 TABLET ORAL 2 TIMES DAILY WITH MEALS
Qty: 60 TABLET | Refills: 3 | Status: ON HOLD
Start: 2018-12-21 | End: 2019-03-20 | Stop reason: HOSPADM
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
## 2019-01-19 RX ORDER — HYDROXYZINE HYDROCHLORIDE 25 MG/1
TABLET, FILM COATED ORAL
Qty: 90 TABLET | Refills: 0 | Status: SHIPPED | OUTPATIENT
Start: 2019-01-19 | End: 2019-01-24 | Stop reason: SDUPTHER

## 2019-01-24 ENCOUNTER — HOSPITAL ENCOUNTER (EMERGENCY)
Facility: HOSPITAL | Age: 49
Discharge: HOME | End: 2019-01-24
Attending: FAMILY MEDICINE
Payer: COMMERCIAL

## 2019-01-24 VITALS
TEMPERATURE: 98 F | WEIGHT: 140 LBS | HEIGHT: 60 IN | HEART RATE: 87 BPM | DIASTOLIC BLOOD PRESSURE: 117 MMHG | RESPIRATION RATE: 22 BRPM | SYSTOLIC BLOOD PRESSURE: 182 MMHG | OXYGEN SATURATION: 100 % | BODY MASS INDEX: 27.48 KG/M2

## 2019-01-24 DIAGNOSIS — F43.0 ANXIETY AS ACUTE REACTION TO EXCEPTIONAL STRESS: Primary | ICD-10-CM

## 2019-01-24 DIAGNOSIS — F41.1 ANXIETY AS ACUTE REACTION TO EXCEPTIONAL STRESS: Primary | ICD-10-CM

## 2019-01-24 PROCEDURE — 99283 EMERGENCY DEPT VISIT LOW MDM: CPT | Mod: ER

## 2019-01-24 RX ORDER — HYDROXYZINE HYDROCHLORIDE 25 MG/1
50 TABLET, FILM COATED ORAL EVERY 6 HOURS PRN
Qty: 30 TABLET | Refills: 0 | Status: ON HOLD | OUTPATIENT
Start: 2019-01-24 | End: 2019-03-20 | Stop reason: HOSPADM

## 2019-01-25 NOTE — ED PROVIDER NOTES
"    This SmartLink is deprecated. Use AVSMEDLIST instead to display the medication list for a patient. eMERGENCY dEPARTMENT eNCOUnter    CHIEF COMPLAINT    Chief Complaint   Patient presents with    Panic Attack     pt reports anxiety attack due to finding out that her 16 year old daughter is pregnant and may need to come live with her and her boyfriend last night. Pt reports "we were trying to figure it out today and I had a panic attack." +Hx of anxiety.       HPI    Stephanie T Barthelemy is a 48 y.o. female who presents to the ED with panic attack.  States she just found out last night her 16-year-old daughter is pregnant.  Her daughter lives with her mother but is now wanting to move in with her and her boyfriend.  She states that her house is too small and they are just not set up to take care of a new baby.  She further states that she is under stress because she needs to get her teeth fixed before February before her insurance changes.  She further states she getting ready to go into a rehab facility because her  has encouraged her to do that.  States she is on probation because she had Xanax and her purse without a prescription.  States she does have a history of anxiety. States she has just recently started having panic attacks.    CURRENT MEDICATIONS    No current facility-administered medications on file prior to encounter.      Current Outpatient Medications on File Prior to Encounter   Medication Sig Dispense Refill    acetaminophen (TYLENOL) 325 MG tablet Take 2 tablets (650 mg total) by mouth every 6 (six) hours as needed for Pain.  0    atorvastatin (LIPITOR) 10 MG tablet Take 1 tablet (10 mg total) by mouth once daily. 90 tablet 3    benztropine (COGENTIN) 0.5 MG tablet Take 1 tablet (0.5 mg total) by mouth 2 (two) times daily. 60 tablet 0    carBAMazepine (TEGRETOL) 100 mg chewable tablet Take 2 tablets (200 mg total) by mouth 2 (two) times daily with meals.      carvedilol " (COREG) 3.125 MG tablet Take 1 tablet (3.125 mg total) by mouth 2 (two) times daily with meals. 60 tablet 3    fluoxetine (PROZAC) 40 MG capsule Take 1 capsule (40 mg total) by mouth once daily. 30 capsule 1    hydroCHLOROthiazide (HYDRODIURIL) 25 MG tablet Take 1 tablet (25 mg total) by mouth once daily. 30 tablet 3    LORazepam (ATIVAN) 0.5 MG tablet Take 1 tablet (0.5 mg total) by mouth every 6 (six) hours as needed for Anxiety.      omeprazole 20 mg TbEC Take 20 mg by mouth once daily. 90 each 3    oxycodone-acetaminophen (PERCOCET) 5-325 mg per tablet Take 1 tablet by mouth every 4 (four) hours as needed for Pain. 12 tablet 0    paliperidone (INVEGA) 9 MG TR24 Take 1 tablet (9 mg total) by mouth once daily. 30 tablet 0    senna-docusate 8.6-50 mg (PERICOLACE) 8.6-50 mg per tablet Take 2 tablets by mouth 2 (two) times daily as needed for Constipation.      [DISCONTINUED] hydrOXYzine HCl (ATARAX) 25 MG tablet TAKE 1 TABLET BY MOUTH THREE TIMES A DAY 90 tablet 0         ALLERGIES    Review of patient's allergies indicates:  No Known Allergies    PAST MEDICAL HISTORY  Past Medical History:   Diagnosis Date    ADHD (attention deficit hyperactivity disorder)     Anemia     Anxiety     Bipolar disorder     History of hepatitis C - s/p clearance of virus (HCV neg 6/2015) 9/26/2014    History of substance abuse     IV heroin - last use 2013 per pt    Hypertension     Opioid overdose 5/10/2016    Vasculitis        SURGICAL HISTORY    Past Surgical History:   Procedure Laterality Date    BIOPSY, MASS, SPINE, LUMBAR N/A 12/10/2012    Performed by Hood Hussein MD at [facility] OR [facility]    HYSTERECTOMY  3/16/2011    SALPINGOOPHORECTOMY Right 3/16/2011    TUBAL LIGATION      Vaginal cuff repair  04/22/2011       SOCIAL HISTORY    Social History     Socioeconomic History    Marital status: Single     Spouse name: None    Number of children: None    Years of education: None    Highest education level:  None   Social Needs    Financial resource strain: None    Food insecurity - worry: None    Food insecurity - inability: None    Transportation needs - medical: None    Transportation needs - non-medical: None   Occupational History    None   Tobacco Use    Smoking status: Current Every Day Smoker     Packs/day: 1.00     Years: 26.00     Pack years: 26.00    Smokeless tobacco: Never Used   Substance and Sexual Activity    Alcohol use: No    Drug use: Yes     Types: Heroin, Cocaine, Methamphetamines     Comment: herroine/ cocaine last used 3 months ago    Sexual activity: Yes     Partners: Male   Other Topics Concern    None   Social History Narrative    None       FAMILY HISTORY    Family History   Problem Relation Age of Onset    Diabetes Maternal Grandmother     Cancer Maternal Grandmother        REVIEW OF SYSTEMS   ROS  Constitutional:  No fever, chills, weight loss or weakness.   Eyes:  No  Photophobia, blurred vision or discharge.   HENT:  No ear pain, nasal congestion or sore throat..  Respiratory:  No cough, reports shortness of breath, no wheezing.   Cardiovascular:  No chest pain,  palpitations or swelling.   GI:  No abdominal pain, nausea, vomiting, or diarrhea.  : No dysuria, frequency   Musculoskeletal:  No back pain or neck pain.   Skin:  No reported rashes or infected lesions.   Neurologic:  No reported headache, focal weakness or sensory changes.   Psychiatric:  No reported depression, suicidal ideation or homicidal ideation.  Reports anxiety due to exceptional stress.  All Systems otherwise negative except as noted in the History of Present Illness.        PHYSICAL EXAM    Reviewed Triage Note  VITAL SIGNS: BP (!) 182/117   Pulse 87   Temp 98 °F (36.7 °C) (Oral)   Resp (!) 22   Ht 5' (1.524 m)   Wt 63.5 kg (140 lb)   SpO2 100%   BMI 27.34 kg/m²    Vitals:    01/24/19 1814   BP: (!) 182/117   Pulse: 87   Resp: (!) 22   Temp: 98 °F (36.7 °C)       Physical Exam  Nursing Notes and  Vital Signs Reviewed  Constitutional:  Well-developed, well-nourished, afebrile nontoxic-appearing 48-year-old female in NAD.  HENT:  Normocephalic, atraumatic. Bilateral external EACs normal,  Nose normal, no rhinorrhea. Mouth mucus membranes P & M.   Eyes:  PERRL EOMI. Conjunctiva normal without discharge.   Neck: Normal range of motion. No midline tenderness or vertebral step-off. No stridor. No meningismus. No lymphadenopathy.   Respiratory:  Normal breath sounds bilaterally.  No respiratory distress, retractions, or conversational dyspnea. No wheezing. No rhonchi. No rales.   Cardiovascular:  Normal heart rate. Normal rhythm. No pitting lower extremity edema.   GI:  Abdomen soft, non-distended, non-tender. Normal bowel sounds.  Musculoskeletal:  No gross deformity or limited range of motion of all major joints.  Integument:  Warm and dry. No rash. No petechiae  Neurologic:   Alert and Interactive. MAEW. Gait steady.   Psychiatric:  Extremely anxious, ringing hands talking very fast.        LABS  Pertinent labs reviewed. (See chart for details)           RADIOLOGY    Imaging Results    None         PROCEDURES    Procedures      EKG         ED COURSE & MEDICAL DECISION MAKING    Pertinent & Imaging studies reviewed. (See chart for details and specific orders.)  48-year-old female with panic attack.  History of anxiety. No wheezing or difficulty breathing.  No signs of distress. No homicidal suicidal ideation.  Prescription for hydroxyzine.  Advised follow up PCP.  Return if worsening or concerns.    Medications - No data to display        FINAL IMPRESSION    1. Anxiety as acute reaction to exceptional stress        Differential Diagnosis:  Suicidal ideation, homicidal ideation, acute psychosis.    Patient advised to follow-up with PCP for re-check and BP re-check.                   Sondra Ackerman NP  01/24/19 1911

## 2019-03-13 ENCOUNTER — HOSPITAL ENCOUNTER (EMERGENCY)
Facility: HOSPITAL | Age: 49
Discharge: PSYCHIATRIC OR SUBSTANCE USE FACILITY | End: 2019-03-14
Attending: FAMILY MEDICINE
Payer: MEDICARE

## 2019-03-13 DIAGNOSIS — R45.89 SUICIDAL BEHAVIOR WITHOUT ATTEMPTED SELF-INJURY: ICD-10-CM

## 2019-03-13 DIAGNOSIS — I10 ESSENTIAL HYPERTENSION: ICD-10-CM

## 2019-03-13 DIAGNOSIS — F31.63 BIPOLAR DISORDER, CURRENT EPISODE MIXED, SEVERE, WITHOUT PSYCHOTIC FEATURES: Primary | Chronic | ICD-10-CM

## 2019-03-13 LAB
ALBUMIN SERPL BCP-MCNC: 4.3 G/DL
ALP SERPL-CCNC: 109 U/L
ALT SERPL W/O P-5'-P-CCNC: 17 U/L
AMPHET+METHAMPHET UR QL: ABNORMAL
ANION GAP SERPL CALC-SCNC: 7 MMOL/L
APAP SERPL-MCNC: <10 UG/ML
AST SERPL-CCNC: 29 U/L
B-HCG UR QL: NEGATIVE
BACTERIA #/AREA URNS AUTO: NORMAL /HPF
BARBITURATES UR QL SCN>200 NG/ML: NEGATIVE
BASOPHILS # BLD AUTO: 0.03 K/UL
BASOPHILS NFR BLD: 0.4 %
BENZODIAZ UR QL SCN>200 NG/ML: ABNORMAL
BILIRUB SERPL-MCNC: 0.3 MG/DL
BILIRUB UR QL STRIP: NEGATIVE
BUN SERPL-MCNC: 14 MG/DL
BZE UR QL SCN: NEGATIVE
CALCIUM SERPL-MCNC: 9.7 MG/DL
CANNABINOIDS UR QL SCN: ABNORMAL
CAOX CRY UR QL COMP ASSIST: NORMAL
CHLORIDE SERPL-SCNC: 102 MMOL/L
CLARITY UR REFRACT.AUTO: ABNORMAL
CO2 SERPL-SCNC: 29 MMOL/L
COLOR UR AUTO: ABNORMAL
CREAT SERPL-MCNC: 1.08 MG/DL
CREAT UR-MCNC: >346.5 MG/DL
DIFFERENTIAL METHOD: ABNORMAL
EOSINOPHIL # BLD AUTO: 0.6 K/UL
EOSINOPHIL NFR BLD: 7.8 %
ERYTHROCYTE [DISTWIDTH] IN BLOOD BY AUTOMATED COUNT: 13.7 %
EST. GFR  (AFRICAN AMERICAN): >60 ML/MIN/1.73 M^2
EST. GFR  (NON AFRICAN AMERICAN): >60 ML/MIN/1.73 M^2
ETHANOL SERPL-MCNC: <10 MG/DL
GLUCOSE SERPL-MCNC: 114 MG/DL
GLUCOSE UR QL STRIP: NEGATIVE
HCT VFR BLD AUTO: 43.3 %
HGB BLD-MCNC: 13.8 G/DL
HGB UR QL STRIP: NEGATIVE
HYALINE CASTS UR QL AUTO: 1 /LPF
KETONES UR QL STRIP: ABNORMAL
LEUKOCYTE ESTERASE UR QL STRIP: ABNORMAL
LYMPHOCYTES # BLD AUTO: 1.3 K/UL
LYMPHOCYTES NFR BLD: 16.2 %
MCH RBC QN AUTO: 29.2 PG
MCHC RBC AUTO-ENTMCNC: 31.9 G/DL
MCV RBC AUTO: 92 FL
METHADONE UR QL SCN>300 NG/ML: NEGATIVE
MICROSCOPIC COMMENT: NORMAL
MONOCYTES # BLD AUTO: 0.7 K/UL
MONOCYTES NFR BLD: 9 %
NEUTROPHILS # BLD AUTO: 5.4 K/UL
NEUTROPHILS NFR BLD: 66.5 %
NITRITE UR QL STRIP: NEGATIVE
OPIATES UR QL SCN: NEGATIVE
PCP UR QL SCN>25 NG/ML: NEGATIVE
PH UR STRIP: 5 [PH] (ref 5–8)
PLATELET # BLD AUTO: 198 K/UL
PMV BLD AUTO: 11.4 FL
POTASSIUM SERPL-SCNC: 3.2 MMOL/L
PROT SERPL-MCNC: 7.7 G/DL
PROT UR QL STRIP: ABNORMAL
RBC # BLD AUTO: 4.72 M/UL
RBC #/AREA URNS AUTO: 0 /HPF (ref 0–4)
SODIUM SERPL-SCNC: 138 MMOL/L
SP GR UR STRIP: 1.02 (ref 1–1.03)
TOXICOLOGY INFORMATION: ABNORMAL
URN SPEC COLLECT METH UR: ABNORMAL
UROBILINOGEN UR STRIP-ACNC: 1 EU/DL
WBC # BLD AUTO: 8.11 K/UL
WBC #/AREA URNS AUTO: 2 /HPF (ref 0–5)

## 2019-03-13 PROCEDURE — 85025 COMPLETE CBC W/AUTO DIFF WBC: CPT | Mod: ER

## 2019-03-13 PROCEDURE — 99284 EMERGENCY DEPT VISIT MOD MDM: CPT | Mod: 25,ER

## 2019-03-13 PROCEDURE — 96372 THER/PROPH/DIAG INJ SC/IM: CPT | Mod: ER

## 2019-03-13 PROCEDURE — 63600175 PHARM REV CODE 636 W HCPCS: Mod: ER | Performed by: FAMILY MEDICINE

## 2019-03-13 PROCEDURE — 81025 URINE PREGNANCY TEST: CPT | Mod: ER

## 2019-03-13 PROCEDURE — 80329 ANALGESICS NON-OPIOID 1 OR 2: CPT | Mod: ER

## 2019-03-13 PROCEDURE — 80053 COMPREHEN METABOLIC PANEL: CPT | Mod: ER

## 2019-03-13 PROCEDURE — 80320 DRUG SCREEN QUANTALCOHOLS: CPT | Mod: ER

## 2019-03-13 PROCEDURE — 25000003 PHARM REV CODE 250: Mod: ER | Performed by: FAMILY MEDICINE

## 2019-03-13 PROCEDURE — 81000 URINALYSIS NONAUTO W/SCOPE: CPT | Mod: 59,ER

## 2019-03-13 PROCEDURE — 80307 DRUG TEST PRSMV CHEM ANLYZR: CPT | Mod: ER

## 2019-03-13 RX ORDER — POTASSIUM CHLORIDE 20 MEQ/1
40 TABLET, EXTENDED RELEASE ORAL
Status: COMPLETED | OUTPATIENT
Start: 2019-03-13 | End: 2019-03-13

## 2019-03-13 RX ORDER — CLONIDINE HYDROCHLORIDE 0.1 MG/1
0.1 TABLET ORAL
Status: COMPLETED | OUTPATIENT
Start: 2019-03-13 | End: 2019-03-13

## 2019-03-13 RX ORDER — DIPHENHYDRAMINE HCL 50 MG
50 CAPSULE ORAL
Status: COMPLETED | OUTPATIENT
Start: 2019-03-13 | End: 2019-03-13

## 2019-03-13 RX ORDER — ZIPRASIDONE MESYLATE 20 MG/ML
20 INJECTION, POWDER, LYOPHILIZED, FOR SOLUTION INTRAMUSCULAR
Status: COMPLETED | OUTPATIENT
Start: 2019-03-13 | End: 2019-03-13

## 2019-03-13 RX ADMIN — CLONIDINE HYDROCHLORIDE 0.1 MG: 0.1 TABLET ORAL at 08:03

## 2019-03-13 RX ADMIN — POTASSIUM CHLORIDE 40 MEQ: 20 TABLET, EXTENDED RELEASE ORAL at 05:03

## 2019-03-13 RX ADMIN — ZIPRASIDONE MESYLATE 20 MG: 20 INJECTION, POWDER, LYOPHILIZED, FOR SOLUTION INTRAMUSCULAR at 11:03

## 2019-03-13 RX ADMIN — DIPHENHYDRAMINE HYDROCHLORIDE 50 MG: 50 CAPSULE ORAL at 06:03
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
## 2019-03-13 NOTE — PSYCH
Pt refused to cooperate with telepsych psychiatric consult.  Pt looking down rocking herself; not responding to questions.  Contacted attending physician who reports that she has been paranoid and possible responding to internal stimuli.   Recommended PEC and inpatient placement.  Please re consult  If patient becomes cooperative.

## 2019-03-14 VITALS
SYSTOLIC BLOOD PRESSURE: 124 MMHG | RESPIRATION RATE: 20 BRPM | HEIGHT: 60 IN | TEMPERATURE: 99 F | HEART RATE: 95 BPM | DIASTOLIC BLOOD PRESSURE: 75 MMHG | BODY MASS INDEX: 25.52 KG/M2 | OXYGEN SATURATION: 97 % | WEIGHT: 130 LBS

## 2019-03-14 PROBLEM — F32.A DEPRESSION: Status: ACTIVE | Noted: 2019-03-14

## 2019-03-14 PROCEDURE — 63600175 PHARM REV CODE 636 W HCPCS: Mod: ER | Performed by: FAMILY MEDICINE

## 2019-03-14 RX ORDER — HYDRALAZINE HYDROCHLORIDE 20 MG/ML
10 INJECTION INTRAMUSCULAR; INTRAVENOUS
Status: COMPLETED | OUTPATIENT
Start: 2019-03-14 | End: 2019-03-14

## 2019-03-14 RX ADMIN — HYDRALAZINE HYDROCHLORIDE 10 MG: 20 INJECTION INTRAMUSCULAR; INTRAVENOUS at 01:03

## 2019-03-20 PROBLEM — F15.950 AMPHETAMINE AND PSYCHOSTIMULANT-INDUCED PSYCHOSIS WITH DELUSIONS: Status: ACTIVE | Noted: 2019-03-20

## 2019-04-02 ENCOUNTER — HOSPITAL ENCOUNTER (EMERGENCY)
Facility: HOSPITAL | Age: 49
Discharge: PSYCHIATRIC OR SUBSTANCE USE FACILITY | End: 2019-04-03
Attending: EMERGENCY MEDICINE
Payer: MEDICARE

## 2019-04-02 DIAGNOSIS — F31.62 BIPOLAR 1 DISORDER, MIXED, MODERATE: ICD-10-CM

## 2019-04-02 DIAGNOSIS — F12.10 CANNABIS ABUSE: ICD-10-CM

## 2019-04-02 DIAGNOSIS — F15.10 METHAMPHETAMINE ABUSE: ICD-10-CM

## 2019-04-02 DIAGNOSIS — N39.0 URINARY TRACT INFECTION WITHOUT HEMATURIA, SITE UNSPECIFIED: ICD-10-CM

## 2019-04-02 DIAGNOSIS — E87.6 HYPOKALEMIA: ICD-10-CM

## 2019-04-02 DIAGNOSIS — F19.10 SUBSTANCE ABUSE: ICD-10-CM

## 2019-04-02 DIAGNOSIS — F14.10 COCAINE ABUSE: ICD-10-CM

## 2019-04-02 DIAGNOSIS — F33.3 SEVERE EPISODE OF RECURRENT MAJOR DEPRESSIVE DISORDER, WITH PSYCHOTIC FEATURES: Primary | ICD-10-CM

## 2019-04-02 PROCEDURE — 99283 EMERGENCY DEPT VISIT LOW MDM: CPT | Mod: ER

## 2019-04-03 VITALS
DIASTOLIC BLOOD PRESSURE: 102 MMHG | BODY MASS INDEX: 25.52 KG/M2 | HEART RATE: 94 BPM | HEIGHT: 60 IN | OXYGEN SATURATION: 99 % | TEMPERATURE: 98 F | WEIGHT: 130 LBS | RESPIRATION RATE: 20 BRPM | SYSTOLIC BLOOD PRESSURE: 182 MMHG

## 2019-04-03 PROBLEM — G40.909 SEIZURE DISORDER: Status: ACTIVE | Noted: 2019-04-03

## 2019-04-03 PROBLEM — F19.10 SUBSTANCE ABUSE: Status: ACTIVE | Noted: 2019-04-03

## 2019-04-03 PROBLEM — N39.0 UTI (URINARY TRACT INFECTION): Status: ACTIVE | Noted: 2019-04-03

## 2019-04-03 PROBLEM — F32.9 MAJOR DEPRESSION: Status: ACTIVE | Noted: 2019-04-03

## 2019-04-03 LAB
ALBUMIN SERPL BCP-MCNC: 4.2 G/DL (ref 3.5–5.2)
ALP SERPL-CCNC: 112 U/L (ref 38–126)
ALT SERPL W/O P-5'-P-CCNC: 15 U/L (ref 10–44)
AMPHET+METHAMPHET UR QL: NORMAL
ANION GAP SERPL CALC-SCNC: 6 MMOL/L (ref 8–16)
APAP SERPL-MCNC: <10 UG/ML (ref 10–20)
AST SERPL-CCNC: 24 U/L (ref 15–46)
BACTERIA #/AREA URNS AUTO: ABNORMAL /HPF
BARBITURATES UR QL SCN>200 NG/ML: NEGATIVE
BASOPHILS # BLD AUTO: 0.03 K/UL (ref 0–0.2)
BASOPHILS NFR BLD: 0.4 % (ref 0–1.9)
BENZODIAZ UR QL SCN>200 NG/ML: NEGATIVE
BILIRUB SERPL-MCNC: 0.3 MG/DL (ref 0.1–1)
BILIRUB UR QL STRIP: NEGATIVE
BUN SERPL-MCNC: 9 MG/DL (ref 7–17)
BZE UR QL SCN: NEGATIVE
CALCIUM SERPL-MCNC: 9.8 MG/DL (ref 8.7–10.5)
CANNABINOIDS UR QL SCN: NORMAL
CHLORIDE SERPL-SCNC: 104 MMOL/L (ref 95–110)
CLARITY UR REFRACT.AUTO: ABNORMAL
CO2 SERPL-SCNC: 28 MMOL/L (ref 23–29)
COLOR UR AUTO: ABNORMAL
CREAT SERPL-MCNC: 0.94 MG/DL (ref 0.5–1.4)
CREAT UR-MCNC: 86.2 MG/DL (ref 15–325)
DIFFERENTIAL METHOD: ABNORMAL
EOSINOPHIL # BLD AUTO: 0.3 K/UL (ref 0–0.5)
EOSINOPHIL NFR BLD: 4 % (ref 0–8)
ERYTHROCYTE [DISTWIDTH] IN BLOOD BY AUTOMATED COUNT: 13.8 % (ref 11.5–14.5)
EST. GFR  (AFRICAN AMERICAN): >60 ML/MIN/1.73 M^2
EST. GFR  (NON AFRICAN AMERICAN): >60 ML/MIN/1.73 M^2
ETHANOL SERPL-MCNC: <10 MG/DL
GLUCOSE SERPL-MCNC: 94 MG/DL (ref 70–110)
GLUCOSE UR QL STRIP: NEGATIVE
HCT VFR BLD AUTO: 42.3 % (ref 37–48.5)
HGB BLD-MCNC: 13.4 G/DL (ref 12–16)
HGB UR QL STRIP: ABNORMAL
HYALINE CASTS UR QL AUTO: 0 /LPF
KETONES UR QL STRIP: NEGATIVE
LEUKOCYTE ESTERASE UR QL STRIP: ABNORMAL
LYMPHOCYTES # BLD AUTO: 1.7 K/UL (ref 1–4.8)
LYMPHOCYTES NFR BLD: 22.8 % (ref 18–48)
MCH RBC QN AUTO: 29.5 PG (ref 27–31)
MCHC RBC AUTO-ENTMCNC: 31.7 G/DL (ref 32–36)
MCV RBC AUTO: 93 FL (ref 82–98)
METHADONE UR QL SCN>300 NG/ML: NEGATIVE
MICROSCOPIC COMMENT: ABNORMAL
MONOCYTES # BLD AUTO: 0.6 K/UL (ref 0.3–1)
MONOCYTES NFR BLD: 8.7 % (ref 4–15)
NEUTROPHILS # BLD AUTO: 4.7 K/UL (ref 1.8–7.7)
NEUTROPHILS NFR BLD: 64 % (ref 38–73)
NITRITE UR QL STRIP: POSITIVE
OPIATES UR QL SCN: NEGATIVE
PCP UR QL SCN>25 NG/ML: NEGATIVE
PH UR STRIP: 7 [PH] (ref 5–8)
PLATELET # BLD AUTO: 180 K/UL (ref 150–350)
PMV BLD AUTO: 11.8 FL (ref 9.2–12.9)
POTASSIUM SERPL-SCNC: 3 MMOL/L (ref 3.5–5.1)
PROT SERPL-MCNC: 7.5 G/DL (ref 6–8.4)
PROT UR QL STRIP: ABNORMAL
RBC # BLD AUTO: 4.55 M/UL (ref 4–5.4)
RBC #/AREA URNS AUTO: 3 /HPF (ref 0–4)
SODIUM SERPL-SCNC: 138 MMOL/L (ref 136–145)
SP GR UR STRIP: 1.01 (ref 1–1.03)
SQUAMOUS #/AREA URNS AUTO: ABNORMAL /HPF
TOXICOLOGY INFORMATION: NORMAL
URN SPEC COLLECT METH UR: ABNORMAL
UROBILINOGEN UR STRIP-ACNC: NEGATIVE EU/DL
WBC # BLD AUTO: 7.33 K/UL (ref 3.9–12.7)
WBC #/AREA URNS AUTO: 7 /HPF (ref 0–5)

## 2019-04-03 PROCEDURE — 80320 DRUG SCREEN QUANTALCOHOLS: CPT | Mod: ER

## 2019-04-03 PROCEDURE — 85025 COMPLETE CBC W/AUTO DIFF WBC: CPT | Mod: ER

## 2019-04-03 PROCEDURE — G0425 INPT/ED TELECONSULT30: HCPCS | Mod: GT,,, | Performed by: PSYCHIATRY & NEUROLOGY

## 2019-04-03 PROCEDURE — G0425 PR INPT TELEHEALTH CONSULT 30M: ICD-10-PCS | Mod: GT,,, | Performed by: PSYCHIATRY & NEUROLOGY

## 2019-04-03 PROCEDURE — 25000003 PHARM REV CODE 250: Mod: ER | Performed by: EMERGENCY MEDICINE

## 2019-04-03 PROCEDURE — 80307 DRUG TEST PRSMV CHEM ANLYZR: CPT | Mod: ER

## 2019-04-03 PROCEDURE — 80329 ANALGESICS NON-OPIOID 1 OR 2: CPT | Mod: ER

## 2019-04-03 PROCEDURE — 80053 COMPREHEN METABOLIC PANEL: CPT | Mod: ER

## 2019-04-03 PROCEDURE — 81000 URINALYSIS NONAUTO W/SCOPE: CPT | Mod: 59,ER

## 2019-04-03 RX ORDER — POTASSIUM CHLORIDE 20 MEQ/1
40 TABLET, EXTENDED RELEASE ORAL
Status: COMPLETED | OUTPATIENT
Start: 2019-04-03 | End: 2019-04-03

## 2019-04-03 RX ORDER — CIPROFLOXACIN 500 MG/1
500 TABLET ORAL
Status: COMPLETED | OUTPATIENT
Start: 2019-04-03 | End: 2019-04-03

## 2019-04-03 RX ADMIN — POTASSIUM CHLORIDE 40 MEQ: 20 TABLET, EXTENDED RELEASE ORAL at 04:04

## 2019-04-03 RX ADMIN — CIPROFLOXACIN HYDROCHLORIDE 500 MG: 500 TABLET, FILM COATED ORAL at 04:04

## 2019-04-03 NOTE — ED NOTES
Called in Psychiatric Telemedicine to [facility].    morphine, zofran

## 2019-04-03 NOTE — ED NOTES
Per Dr. Brooks, pt okay to be discharged home due to denial of SI/HI. Recommendation given by psychiatrist  for zyprexa 10 mg PO and outpatient follow up. Dr. Jones to be notified.

## 2019-04-03 NOTE — ED NOTES
Faxed the corrected PEC to [facility] at [facility], patient has a bed under Dr. Grimes. ED will need to call [facility] to give report and talk with intake, no further action need by [facility].

## 2019-04-03 NOTE — ED PROVIDER NOTES
Encounter Date: 4/2/2019       History     Chief Complaint   Patient presents with    Mental Health Problem     pt hears voices and delusional thoughts, worse tonight.       04/02/2019  11:43 PM    Chief Complaint:  Depressed and wants to kill self.    The patient is a 48 y.o. female presenting with depression and suicidal thoughts.  Patient also complaining of hearing voices and seeing things are in therapy.  Patient reports she is worse tonight.  Patient has had these problems in the past.  Patient also abuses amphetamines.      Patient has a past medical history of ADHD (attention deficit hyperactivity disorder), Anemia, Anxiety, Bipolar disorder, History of hepatitis C - s/p clearance of virus (HCV neg 6/2015) (9/26/2014), History of substance abuse, Hypertension, Opioid overdose (5/10/2016), and Vasculitis.  Patient has a past surgical history that includes Tubal ligation; Salpingoophorectomy (Right, 3/16/2011); Hysterectomy (3/16/2011); and Vaginal cuff repair (04/22/2011).        Review of patient's allergies indicates:  No Known Allergies  Past Medical History:   Diagnosis Date    ADHD (attention deficit hyperactivity disorder)     Anemia     Anxiety     Bipolar disorder     History of hepatitis C - s/p clearance of virus (HCV neg 6/2015) 9/26/2014    History of substance abuse     IV heroin - last use 2013 per pt    Hypertension     Opioid overdose 5/10/2016    Vasculitis      Past Surgical History:   Procedure Laterality Date    BIOPSY, MASS, SPINE, LUMBAR N/A 12/10/2012    Performed by [facility]    HYSTERECTOMY  3/16/2011    SALPINGOOPHORECTOMY Right 3/16/2011    TUBAL LIGATION      Vaginal cuff repair  04/22/2011     Family History   Problem Relation Age of Onset    Diabetes Maternal Grandmother     Cancer Maternal Grandmother      Social History     Tobacco Use    Smoking status: Current Every Day Smoker     Packs/day: 1.00     Years: 26.00     Pack years:  26.00    Smokeless tobacco: Never Used   Substance Use Topics    Alcohol use: No    Drug use: Yes     Types: Heroin, Cocaine, Methamphetamines, Marijuana     Comment: herroine/ cocaine last used 3 months ago     Review of Systems   All other systems reviewed and are negative.      Physical Exam     Initial Vitals   BP Pulse Resp Temp SpO2   04/02/19 2341 04/02/19 2339 04/02/19 2339 04/02/19 2339 04/02/19 2339   (!) 167/91 90 20 98.2 °F (36.8 °C) 100 %      MAP       --                Physical Exam    Nursing note and vitals reviewed.  Constitutional: She appears well-developed. She appears distressed.   Patient in moderate distress and crying.  Patient reports being very depressed   HENT:   Head: Normocephalic and atraumatic.   Mouth/Throat: Oropharynx is clear and moist.   Eyes: Conjunctivae and EOM are normal. Pupils are equal, round, and reactive to light.   Neck: Normal range of motion. Neck supple.   Cardiovascular: Normal rate, regular rhythm and normal heart sounds.   Pulmonary/Chest: Breath sounds normal.   Abdominal: Soft. Bowel sounds are normal.   Musculoskeletal: Normal range of motion.   Neurological: She is alert. GCS score is 15. GCS eye subscore is 4. GCS verbal subscore is 5. GCS motor subscore is 6.   Skin: Skin is warm. Capillary refill takes 2 to 3 seconds.   Psychiatric:   Cries on exam.  Positive depression         ED Course   Procedures  Labs Reviewed - No data to display       Imaging Results    None             Additional MDM:   Psych: Evaluation: Physician Emergency Certificate (PEC) was done prior to arrival in the ED. A Physician Emergency Certificate (PEC) was done in the ED for: Suicidal and Gravely Disabled. The patient has been medically cleared.                patient is medically clear for psychiatric transfer     Clinical Impression:       ICD-10-CM ICD-9-CM   1. Severe episode of recurrent major depressive disorder, with psychotic features F33.3 296.34   2. Bipolar 1 disorder,  mixed, moderate F31.62 296.62   3. Cocaine abuse F14.10 305.60   4. Methamphetamine abuse F15.10 305.70   5. Cannabis abuse F12.10 305.20   6. Substance abuse F19.10 305.90   7. Hypokalemia E87.6 276.8   8. Urinary tract infection without hematuria, site unspecified N39.0 599.0                                Deion Jones MD  04/03/19 0404

## 2019-04-03 NOTE — CONSULTS
"Tele-Consultation to Emergency Department from Psychiatry    Please see previous notes:    Patient agreeable to consultation via telepsychiatry.    Consultation started: 4/3/2019 at 1 AM  The chief complaint leading to psychiatric consultation is: " Hearing voices "  This consultation was requested by Deion Bray MD the Emergency Department attending physician.  The location of the consulting psychiatrist is  [place].  The patient location is  [facility] [place] .  The patient arrived at the ED at: 12:30 AM    Also present with the patient at the time of the consultation:  ER Nurse    Patient Identification:  Stephanie T Barthelemy is a 48 y.o. female.    Patient information was obtained from patient, past medical records and  ER Phsician.  Patient presented voluntarily to the Emergency Department by private vehicle.    History of Present Illness:  This is a 48 years Old CF with a possible history of Bipolar D/O and ADD and extensive drug abuse including Meth, Cocaine, Heroin,and THC who brought self into ER for hearing voices . Says  I am not doing well bec of voices, I hear voices all the time and I want these voices go away, has been using drugs extensively , used Meth today , admits to feeling depressed and anxious but denies to SI, intent or any active plans . Says my anxiety and depression have gotten worse lately bec my BF loves my sister more than me and it hurts badly.I am always jealous to my sister. Denies to HI or Paranoia . I know he wanted to help me , I want to quit doing drugs but I don't know where I can get help. They wanted to get me arrested , I was going crazy, I didn't know what to do so I came here .  I will go to skilled nursing if you discharge me. Recently discharged from Inpatient unit , says she has been taking her meds regularly.     Psychiatric History:   Hospitalization: Yes  Medication Trials: Yes  Suicide Attempts: no  Violence: No  Depression: Yes  Asya: Yes  AH's: Yes  Delusions: " "Yes    Review of Systems:  Negative except Pain    Past Medical History:   Past Medical History:   Diagnosis Date    ADHD (attention deficit hyperactivity disorder)     Anemia     Anxiety     Bipolar disorder     History of hepatitis C - s/p clearance of virus (HCV neg 6/2015) 9/26/2014    History of substance abuse     IV heroin - last use 2013 per pt    Hypertension     Opioid overdose 5/10/2016    Vasculitis         Seizures: Denies  Head trauma/l.o.c.: Denies  Wish to become pregnant[if female of childbearing age]: NA    Allergies: Review of patient's allergies indicates:  No Known Allergies    Medications in ER: Medications - No data to display    Medications at home: Prozac ,Wellbutrin,Tregetrol, and Vistaril     Substance Abuse History:   Alchohol: Denies  Drug: Yes Meth , Cocaine , Heroin and THC     Legal History:   Past charges/incarcerations: Yes  Pending charges: No     Family Psychiatric History: Unknown     Social History:   History of Physical/Sexual Abuse: NA  Education: HS    Employment/Disability: Disabled   Financial: Ok  Relationship Status/Sexual Orientation: Has a BF   Children: Two , son and daughter   Housing Status: Lives with her son and BF  Mormonism: NA   History: No   Recreational Activities: Lost interest  Access to Gun: Denies     Current Evaluation:     Constitutional  Vitals:  Vitals:    04/02/19 2339 04/02/19 2341   BP:  (!) 167/91   Pulse: 90    Resp: 20    Temp: 98.2 °F (36.8 °C)    TempSrc: Oral    SpO2: 100%    Weight:  59 kg (130 lb)   Height:  5' (1.524 m)      General:  unremarkable, age appropriate     Musculoskeletal  Muscle Strength/Tone:   moving arms normally   Gait & Station:   sitting on stretcher     Psychiatric  Level of Consciousness: alert  Orientation: oriented to person, place and time  Grooming: in hospital gown  Psychomotor Behavior: no agitation   Speech: Incr in rate, rhythm and normal volume  Language: uses words appropriately  Mood: " " "depressed"  Affect: Restricted  Thought Process: Circumstantial  Associations: Intact  Thought Content: + AH, no VH, no SI or HI, ?paranoia  Memory:  Inatct  Attention: intact to interview  Fund of Knowledge: appears adequate  Insight: Poor  Judgement: Impaired    Relevant Elements of Neurological Exam: no abnormality of posture noted    Assessment - Diagnosis - Goals:     Diagnosis/Impression: Bipolar I d/o , Stimulant Use disorder ,Cannbis Use Disorder, Opiate Use Disorder    Rec: No PEC at this time as she is not threat to self or others  , start her on Zyprexa 10 mg po QHS for psychosis, cont. Other meds , can be discharged home in care of her BF or shelter , strongly recommend to enroll self into Inpatient Drug Rehab program , f/u with her psychiatrist in 2 weeks.     Time with patient: 20 minutes    More than 50% of the time was spent counseling/coordinating care    Laboratory Data:   Labs Reviewed   CBC W/ AUTO DIFFERENTIAL - Abnormal; Notable for the following components:       Result Value    MCHC 31.7 (*)     All other components within normal limits   COMPREHENSIVE METABOLIC PANEL - Abnormal; Notable for the following components:    Potassium 3.0 (*)     Anion Gap 6 (*)     All other components within normal limits   URINALYSIS, REFLEX TO URINE CULTURE - Abnormal; Notable for the following components:    Appearance, UA Hazy (*)     Protein, UA 1+ (*)     Occult Blood UA Trace (*)     Nitrite, UA Positive (*)     Leukocytes, UA 2+ (*)     All other components within normal limits    Narrative:     Preferred Collection Type->Urine, Clean Catch   URINALYSIS MICROSCOPIC - Abnormal; Notable for the following components:    WBC, UA 7 (*)     Bacteria, UA Many (*)     All other components within normal limits    Narrative:     Preferred Collection Type->Urine, Clean Catch   DRUG SCREEN PANEL, URINE EMERGENCY    Narrative:     Preferred Collection Type->Urine, Clean Catch   ALCOHOL,MEDICAL (ETHANOL) "   ACETAMINOPHEN LEVEL     Thanks Dr.Charles Bray for the consult.    Consulting clinician was informed of the encounter and consult note.    Consultation ended: 4/3/2019 3: 50 AM

## 2019-04-20 PROBLEM — F15.959 STIMULANT-INDUCED PSYCHOTIC DISORDER: Status: ACTIVE | Noted: 2019-04-20

## 2019-07-25 ENCOUNTER — HOSPITAL ENCOUNTER (EMERGENCY)
Facility: HOSPITAL | Age: 49
Discharge: PSYCHIATRIC OR SUBSTANCE USE FACILITY | End: 2019-07-25
Attending: EMERGENCY MEDICINE
Payer: MEDICARE

## 2019-07-25 VITALS
HEIGHT: 60 IN | HEART RATE: 84 BPM | TEMPERATURE: 99 F | RESPIRATION RATE: 18 BRPM | OXYGEN SATURATION: 99 % | BODY MASS INDEX: 23.83 KG/M2 | DIASTOLIC BLOOD PRESSURE: 78 MMHG | WEIGHT: 121.38 LBS | SYSTOLIC BLOOD PRESSURE: 109 MMHG

## 2019-07-25 DIAGNOSIS — F23 ACUTE PSYCHOSIS: Primary | ICD-10-CM

## 2019-07-25 PROBLEM — F29 PSYCHOSIS: Status: ACTIVE | Noted: 2019-07-25

## 2019-07-25 LAB
ALBUMIN SERPL BCP-MCNC: 4.1 G/DL (ref 3.5–5.2)
ALP SERPL-CCNC: 125 U/L (ref 38–126)
ALT SERPL W/O P-5'-P-CCNC: 18 U/L (ref 10–44)
AMORPH CRY UR QL COMP ASSIST: ABNORMAL
AMPHET+METHAMPHET UR QL: NORMAL
ANION GAP SERPL CALC-SCNC: 7 MMOL/L (ref 8–16)
APAP SERPL-MCNC: <10 UG/ML (ref 10–20)
AST SERPL-CCNC: 23 U/L (ref 15–46)
BACTERIA #/AREA URNS AUTO: ABNORMAL /HPF
BARBITURATES UR QL SCN>200 NG/ML: NEGATIVE
BASOPHILS # BLD AUTO: 0.03 K/UL (ref 0–0.2)
BASOPHILS NFR BLD: 0.7 % (ref 0–1.9)
BENZODIAZ UR QL SCN>200 NG/ML: NEGATIVE
BILIRUB SERPL-MCNC: 0.4 MG/DL (ref 0.1–1)
BILIRUB UR QL STRIP: NEGATIVE
BUN SERPL-MCNC: 11 MG/DL (ref 7–17)
BZE UR QL SCN: NEGATIVE
CALCIUM SERPL-MCNC: 9.8 MG/DL (ref 8.7–10.5)
CANNABINOIDS UR QL SCN: NORMAL
CHLORIDE SERPL-SCNC: 100 MMOL/L (ref 95–110)
CLARITY UR REFRACT.AUTO: CLEAR
CO2 SERPL-SCNC: 33 MMOL/L (ref 23–29)
COLOR UR AUTO: ABNORMAL
CREAT SERPL-MCNC: 0.97 MG/DL (ref 0.5–1.4)
CREAT UR-MCNC: 306.4 MG/DL (ref 15–325)
DIFFERENTIAL METHOD: ABNORMAL
EOSINOPHIL # BLD AUTO: 0.4 K/UL (ref 0–0.5)
EOSINOPHIL NFR BLD: 8.6 % (ref 0–8)
ERYTHROCYTE [DISTWIDTH] IN BLOOD BY AUTOMATED COUNT: 13.2 % (ref 11.5–14.5)
EST. GFR  (AFRICAN AMERICAN): >60 ML/MIN/1.73 M^2
EST. GFR  (NON AFRICAN AMERICAN): >60 ML/MIN/1.73 M^2
ETHANOL SERPL-MCNC: <10 MG/DL
GLUCOSE SERPL-MCNC: 102 MG/DL (ref 70–110)
GLUCOSE UR QL STRIP: NEGATIVE
HCT VFR BLD AUTO: 38.3 % (ref 37–48.5)
HGB BLD-MCNC: 12.4 G/DL (ref 12–16)
HGB UR QL STRIP: NEGATIVE
HYALINE CASTS UR QL AUTO: 0 /LPF
KETONES UR QL STRIP: NEGATIVE
LEUKOCYTE ESTERASE UR QL STRIP: ABNORMAL
LYMPHOCYTES # BLD AUTO: 1.2 K/UL (ref 1–4.8)
LYMPHOCYTES NFR BLD: 28.1 % (ref 18–48)
MCH RBC QN AUTO: 30.2 PG (ref 27–31)
MCHC RBC AUTO-ENTMCNC: 32.4 G/DL (ref 32–36)
MCV RBC AUTO: 93 FL (ref 82–98)
METHADONE UR QL SCN>300 NG/ML: NEGATIVE
MICROSCOPIC COMMENT: ABNORMAL
MONOCYTES # BLD AUTO: 0.5 K/UL (ref 0.3–1)
MONOCYTES NFR BLD: 11.2 % (ref 4–15)
NEUTROPHILS # BLD AUTO: 2.2 K/UL (ref 1.8–7.7)
NEUTROPHILS NFR BLD: 51.4 % (ref 38–73)
NITRITE UR QL STRIP: NEGATIVE
OPIATES UR QL SCN: NEGATIVE
PCP UR QL SCN>25 NG/ML: NEGATIVE
PH UR STRIP: 6 [PH] (ref 5–8)
PLATELET # BLD AUTO: 150 K/UL (ref 150–350)
PMV BLD AUTO: 10.9 FL (ref 9.2–12.9)
POTASSIUM SERPL-SCNC: 3.1 MMOL/L (ref 3.5–5.1)
PROT SERPL-MCNC: 7.7 G/DL (ref 6–8.4)
PROT UR QL STRIP: ABNORMAL
RBC # BLD AUTO: 4.1 M/UL (ref 4–5.4)
RBC #/AREA URNS AUTO: 3 /HPF (ref 0–4)
SODIUM SERPL-SCNC: 140 MMOL/L (ref 136–145)
SP GR UR STRIP: 1.02 (ref 1–1.03)
SQUAMOUS #/AREA URNS AUTO: ABNORMAL /HPF
TOXICOLOGY INFORMATION: NORMAL
URN SPEC COLLECT METH UR: ABNORMAL
UROBILINOGEN UR STRIP-ACNC: NEGATIVE EU/DL
VALPROATE SERPL-MCNC: <10 UG/ML (ref 50–100)
WBC # BLD AUTO: 4.3 K/UL (ref 3.9–12.7)
WBC #/AREA URNS AUTO: 7 /HPF (ref 0–5)

## 2019-07-25 PROCEDURE — 85025 COMPLETE CBC W/AUTO DIFF WBC: CPT | Mod: ER

## 2019-07-25 PROCEDURE — 81000 URINALYSIS NONAUTO W/SCOPE: CPT | Mod: ER,59

## 2019-07-25 PROCEDURE — 80329 ANALGESICS NON-OPIOID 1 OR 2: CPT | Mod: ER

## 2019-07-25 PROCEDURE — 81003 URINALYSIS AUTO W/O SCOPE: CPT | Mod: ER

## 2019-07-25 PROCEDURE — 25000003 PHARM REV CODE 250: Mod: ER | Performed by: EMERGENCY MEDICINE

## 2019-07-25 PROCEDURE — 25000003 PHARM REV CODE 250: Mod: ER | Performed by: SURGERY

## 2019-07-25 PROCEDURE — 99285 EMERGENCY DEPT VISIT HI MDM: CPT | Mod: ER

## 2019-07-25 PROCEDURE — 80164 ASSAY DIPROPYLACETIC ACD TOT: CPT | Mod: ER

## 2019-07-25 PROCEDURE — 80307 DRUG TEST PRSMV CHEM ANLYZR: CPT | Mod: ER

## 2019-07-25 PROCEDURE — 80320 DRUG SCREEN QUANTALCOHOLS: CPT | Mod: ER

## 2019-07-25 PROCEDURE — 80053 COMPREHEN METABOLIC PANEL: CPT | Mod: ER

## 2019-07-25 RX ORDER — CARBAMAZEPINE 200 MG/1
200 TABLET, EXTENDED RELEASE ORAL
Status: COMPLETED | OUTPATIENT
Start: 2019-07-25 | End: 2019-07-25

## 2019-07-25 RX ORDER — POTASSIUM CHLORIDE 20 MEQ/1
40 TABLET, EXTENDED RELEASE ORAL
Status: COMPLETED | OUTPATIENT
Start: 2019-07-25 | End: 2019-07-25

## 2019-07-25 RX ORDER — CEPHALEXIN 500 MG/1
500 CAPSULE ORAL EVERY 8 HOURS
Qty: 15 CAPSULE | Refills: 0 | Status: ON HOLD | OUTPATIENT
Start: 2019-07-25 | End: 2019-07-31 | Stop reason: HOSPADM

## 2019-07-25 RX ORDER — RISPERIDONE 1 MG/1
1 TABLET ORAL
Status: COMPLETED | OUTPATIENT
Start: 2019-07-25 | End: 2019-07-25

## 2019-07-25 RX ORDER — CEPHALEXIN 500 MG/1
CAPSULE ORAL
Status: DISCONTINUED
Start: 2019-07-25 | End: 2019-07-25 | Stop reason: HOSPADM

## 2019-07-25 RX ORDER — CARBAMAZEPINE 200 MG/1
200 TABLET, EXTENDED RELEASE ORAL 2 TIMES DAILY
Status: DISCONTINUED | OUTPATIENT
Start: 2019-07-25 | End: 2019-07-25 | Stop reason: HOSPADM

## 2019-07-25 RX ORDER — LISINOPRIL 20 MG/1
20 TABLET ORAL
Status: COMPLETED | OUTPATIENT
Start: 2019-07-25 | End: 2019-07-25

## 2019-07-25 RX ORDER — CARBAMAZEPINE 200 MG/1
200 TABLET, EXTENDED RELEASE ORAL 2 TIMES DAILY
Status: DISCONTINUED | OUTPATIENT
Start: 2019-07-25 | End: 2019-07-25

## 2019-07-25 RX ORDER — CEPHALEXIN 500 MG/1
500 CAPSULE ORAL
Status: COMPLETED | OUTPATIENT
Start: 2019-07-25 | End: 2019-07-25

## 2019-07-25 RX ORDER — HYDROCHLOROTHIAZIDE 25 MG/1
25 TABLET ORAL
Status: COMPLETED | OUTPATIENT
Start: 2019-07-25 | End: 2019-07-25

## 2019-07-25 RX ADMIN — CEPHALEXIN 500 MG: 500 CAPSULE ORAL at 08:07

## 2019-07-25 RX ADMIN — LISINOPRIL 20 MG: 20 TABLET ORAL at 06:07

## 2019-07-25 RX ADMIN — HYDROCHLOROTHIAZIDE 25 MG: 25 TABLET ORAL at 06:07

## 2019-07-25 RX ADMIN — CARBAMAZEPINE 200 MG: 200 TABLET, EXTENDED RELEASE ORAL at 06:07

## 2019-07-25 RX ADMIN — POTASSIUM CHLORIDE 40 MEQ: 20 TABLET, EXTENDED RELEASE ORAL at 06:07

## 2019-07-25 RX ADMIN — RISPERIDONE 1 MG: 1 TABLET ORAL at 06:07

## 2019-07-25 NOTE — ED PROVIDER NOTES
Encounter Date: 7/25/2019       History     Chief Complaint   Patient presents with    Psychiatric Evaluation     Patient arrived to the ED via EMS, EMS reports that patient was digging in a trash on the side of the road and called the police. EMS reports that patient has a hx of drug abuse and an extensive psych history. EMS also reports that patient is having a conversation with someone that is not there. Patient denies SI or HI.     The history is provided by the patient and the EMS personnel.   Mental Health Problem   The primary symptoms include hallucinations and bizarre behavior. This is a recurrent problem.   The onset of the illness is precipitated by alcohol abuse and drug abuse. The degree of incapacity that she is experiencing as a consequence of her illness is moderate. Sequelae of the illness include an inability to work, harmed interpersonal relations, homelessness and an inability to care for self. Additional symptoms of the illness include insomnia, agitation, feelings of worthlessness, flight of ideas, distractible and poor judgment. She does not admit to suicidal ideas. She does not have a plan to commit suicide. She does not contemplate harming herself. She has not already injured self. She does not contemplate injuring another person. She has not already  injured another person. Risk factors that are present for mental illness include a history of mental illness.     Review of patient's allergies indicates:  No Known Allergies  Past Medical History:   Diagnosis Date    ADHD (attention deficit hyperactivity disorder)     Anemia     Anxiety     Bipolar disorder     History of hepatitis C - s/p clearance of virus (HCV neg 6/2015) 9/26/2014    History of psychiatric hospitalization     History of substance abuse     IV heroin - last use 2013 per pt    Hx of psychiatric care     Hypertension     Opioid overdose 5/10/2016    Psychiatric problem     Seizure disorder 4/3/2019    Therapy      Vasculitis      Past Surgical History:   Procedure Laterality Date    BIOPSY, MASS, SPINE, LUMBAR N/A 12/10/2012    [facility]    HYSTERECTOMY  3/16/2011    SALPINGOOPHORECTOMY Right 3/16/2011    TUBAL LIGATION      Vaginal cuff repair  04/22/2011     Family History   Problem Relation Age of Onset    Diabetes Maternal Grandmother     Cancer Maternal Grandmother      Social History     Tobacco Use    Smoking status: Current Every Day Smoker     Packs/day: 1.00     Years: 30.00     Pack years: 30.00     Start date: 4/15/1993    Smokeless tobacco: Never Used   Substance Use Topics    Alcohol use: No    Drug use: Yes     Types: Heroin, Cocaine, Methamphetamines, Marijuana     Comment: herroine/ cocaine last used 3 months ago     Review of Systems   Psychiatric/Behavioral: Positive for agitation, behavioral problems, confusion and hallucinations. The patient has insomnia.    All other systems reviewed and are negative.      Physical Exam     Initial Vitals [07/25/19 0418]   BP Pulse Resp Temp SpO2   (!) 148/106 83 18 97.5 °F (36.4 °C) 98 %      MAP       --         Physical Exam    Nursing note and vitals reviewed.  Constitutional: She appears well-developed and well-nourished.   HENT:   Head: Normocephalic and atraumatic.   Eyes: Conjunctivae and EOM are normal.   Neck: Normal range of motion. Neck supple.   Cardiovascular: Normal rate, regular rhythm and normal heart sounds.   Pulmonary/Chest: Breath sounds normal. No respiratory distress. She has no wheezes. She has no rhonchi. She has no rales.   Abdominal: Soft. She exhibits no distension. There is no tenderness. There is no rebound and no guarding.   Musculoskeletal: Normal range of motion.   Neurological: She is alert and oriented to person, place, and time. GCS score is 15. GCS eye subscore is 4. GCS verbal subscore is 5. GCS motor subscore is 6.   Skin: Skin is warm and dry. Capillary refill takes less than  2 seconds.   Psychiatric: Her affect is angry, labile and inappropriate. Her speech is delayed and tangential. She is agitated, aggressive, withdrawn and actively hallucinating. Thought content is paranoid and delusional. She expresses impulsivity and inappropriate judgment. She exhibits a depressed mood. She is noncommunicative. She is inattentive.         ED Course   Procedures  Labs Reviewed   CBC W/ AUTO DIFFERENTIAL - Abnormal; Notable for the following components:       Result Value    Eosinophil% 8.6 (*)     All other components within normal limits   COMPREHENSIVE METABOLIC PANEL - Abnormal; Notable for the following components:    Potassium 3.1 (*)     CO2 33 (*)     Anion Gap 7 (*)     All other components within normal limits   URINALYSIS, REFLEX TO URINE CULTURE - Abnormal; Notable for the following components:    Protein, UA 1+ (*)     Leukocytes, UA 1+ (*)     All other components within normal limits    Narrative:     Preferred Collection Type->Urine, Clean Catch   VALPROIC ACID - Abnormal; Notable for the following components:    Valproic Acid Level <10.0 (*)     All other components within normal limits   URINALYSIS MICROSCOPIC - Abnormal; Notable for the following components:    WBC, UA 7 (*)     Bacteria Few (*)     All other components within normal limits    Narrative:     Preferred Collection Type->Urine, Clean Catch   DRUG SCREEN PANEL, URINE EMERGENCY    Narrative:     Preferred Collection Type->Urine, Clean Catch   ALCOHOL,MEDICAL (ETHANOL)   ACETAMINOPHEN LEVEL          Imaging Results    None          Medical Decision Making:   ED Management:  Patient has low potassium of 3.1 and mild UTI which can be addressed in the ED.  She is medically cleared for psychiatric placement                      Clinical Impression:       ICD-10-CM ICD-9-CM   1. Acute psychosis F23 298.9                                MICHAEL Alexandre III, MD  07/25/19 2720

## 2019-07-25 NOTE — ED NOTES
"Patient Personal Belongings:    1 pack of menthol [place] cigarettes  [employer] mastercard Debit card  Gray colored ring that has "[employer]" engraved  2 Bic lighters  Brown ring with clear stones  Belly button ring round with clear stones  Brown necklace with black beads   Reading glasses  One pair of socks  One pair of jeans  One black bra  One black tank top  One pink t-shirt  One pair of shoes  One black hair tie    All personal belongings put in a patient belongings bag and placed in white cabinet.  "

## 2019-07-25 NOTE — ED NOTES
Patient aox4, sitting on stretcher in high fowlers position, in no acute distress, sitter outside of patient's door in view of patient. Will continue to monitor.

## 2019-07-25 NOTE — ED NOTES
"Spouse (Ronni Cramer [phone]), contacted nurse to give information regarding pt. Mr. Cramer states, "She [Ms. Barthelemy] was released from retirement on the 9th and was not given her medication while incarcerated. She didn't have medication until 5 days ago when she saw he [mental health] doctor and all her stuff [medication] was filled.". Pt has been on medication for 5 days but he reports, "they have not started working yet". He also states when not on medication pt commonly hears voices and talks back but is not a danger to herself or others.  reports pt exited home while he was in the shower and was on the scene with the pt when the police and ems arrived and was willing to take pt into his care.  states the EMS workers were going to let her leave but noticed her talking to someone who was not there and wanted her evaluated. He understands this is "not normal behavior" but states, "I understand she looks crazy but she's back on her medicine and will be okay again once it starts to work." Spouse offered to bring medication to ER. He was informed of need to medically assess pt by ED physician and will be contacted with additional information.   "

## 2019-07-25 NOTE — ED NOTES
Patient sitting in high fowlers position on stretcher, patient is tearful and is talking to herself. Patient does not make eye contact.

## 2019-08-07 ENCOUNTER — HOSPITAL ENCOUNTER (EMERGENCY)
Facility: HOSPITAL | Age: 49
Discharge: PSYCHIATRIC OR SUBSTANCE USE FACILITY | End: 2019-08-07
Attending: SURGERY
Payer: MEDICARE

## 2019-08-07 VITALS
SYSTOLIC BLOOD PRESSURE: 109 MMHG | OXYGEN SATURATION: 97 % | HEIGHT: 60 IN | TEMPERATURE: 99 F | HEART RATE: 87 BPM | BODY MASS INDEX: 27.29 KG/M2 | RESPIRATION RATE: 16 BRPM | DIASTOLIC BLOOD PRESSURE: 75 MMHG | WEIGHT: 139 LBS

## 2019-08-07 DIAGNOSIS — N30.01 ACUTE CYSTITIS WITH HEMATURIA: Primary | ICD-10-CM

## 2019-08-07 DIAGNOSIS — F23 ACUTE PSYCHOSIS: ICD-10-CM

## 2019-08-07 PROBLEM — F29 PSYCHOSIS: Status: ACTIVE | Noted: 2019-08-07

## 2019-08-07 LAB
ALBUMIN SERPL BCP-MCNC: 4.3 G/DL (ref 3.5–5.2)
ALP SERPL-CCNC: 116 U/L (ref 38–126)
ALT SERPL W/O P-5'-P-CCNC: 19 U/L (ref 10–44)
AMPHET+METHAMPHET UR QL: NORMAL
ANION GAP SERPL CALC-SCNC: 7 MMOL/L (ref 8–16)
APAP SERPL-MCNC: <10 UG/ML (ref 10–20)
AST SERPL-CCNC: 29 U/L (ref 15–46)
BACTERIA #/AREA URNS AUTO: ABNORMAL /HPF
BARBITURATES UR QL SCN>200 NG/ML: NEGATIVE
BASOPHILS # BLD AUTO: 0.03 K/UL (ref 0–0.2)
BASOPHILS NFR BLD: 0.4 % (ref 0–1.9)
BENZODIAZ UR QL SCN>200 NG/ML: NEGATIVE
BILIRUB SERPL-MCNC: 0.5 MG/DL (ref 0.1–1)
BILIRUB UR QL STRIP: NEGATIVE
BUN SERPL-MCNC: 31 MG/DL (ref 7–17)
BZE UR QL SCN: NEGATIVE
CALCIUM SERPL-MCNC: 10.4 MG/DL (ref 8.7–10.5)
CANNABINOIDS UR QL SCN: NORMAL
CHLORIDE SERPL-SCNC: 104 MMOL/L (ref 95–110)
CLARITY UR REFRACT.AUTO: ABNORMAL
CO2 SERPL-SCNC: 29 MMOL/L (ref 23–29)
COLOR UR AUTO: YELLOW
CREAT SERPL-MCNC: 1.14 MG/DL (ref 0.5–1.4)
CREAT UR-MCNC: 148.4 MG/DL (ref 15–325)
DIFFERENTIAL METHOD: NORMAL
EOSINOPHIL # BLD AUTO: 0.3 K/UL (ref 0–0.5)
EOSINOPHIL NFR BLD: 4 % (ref 0–8)
ERYTHROCYTE [DISTWIDTH] IN BLOOD BY AUTOMATED COUNT: 13.4 % (ref 11.5–14.5)
EST. GFR  (AFRICAN AMERICAN): >60 ML/MIN/1.73 M^2
EST. GFR  (NON AFRICAN AMERICAN): 57.4 ML/MIN/1.73 M^2
ETHANOL SERPL-MCNC: <10 MG/DL
GLUCOSE SERPL-MCNC: 100 MG/DL (ref 70–110)
GLUCOSE UR QL STRIP: NEGATIVE
HCT VFR BLD AUTO: 41 % (ref 37–48.5)
HGB BLD-MCNC: 13.1 G/DL (ref 12–16)
HGB UR QL STRIP: ABNORMAL
HYALINE CASTS UR QL AUTO: 0 /LPF
KETONES UR QL STRIP: NEGATIVE
LEUKOCYTE ESTERASE UR QL STRIP: ABNORMAL
LYMPHOCYTES # BLD AUTO: 1.6 K/UL (ref 1–4.8)
LYMPHOCYTES NFR BLD: 19.7 % (ref 18–48)
MCH RBC QN AUTO: 30.2 PG (ref 27–31)
MCHC RBC AUTO-ENTMCNC: 32 G/DL (ref 32–36)
MCV RBC AUTO: 95 FL (ref 82–98)
METHADONE UR QL SCN>300 NG/ML: NEGATIVE
MICROSCOPIC COMMENT: ABNORMAL
MONOCYTES # BLD AUTO: 0.6 K/UL (ref 0.3–1)
MONOCYTES NFR BLD: 8 % (ref 4–15)
NEUTROPHILS # BLD AUTO: 5.4 K/UL (ref 1.8–7.7)
NEUTROPHILS NFR BLD: 67.9 % (ref 38–73)
NITRITE UR QL STRIP: NEGATIVE
OPIATES UR QL SCN: NEGATIVE
PCP UR QL SCN>25 NG/ML: NEGATIVE
PH UR STRIP: 7 [PH] (ref 5–8)
PLATELET # BLD AUTO: 203 K/UL (ref 150–350)
PMV BLD AUTO: 11.1 FL (ref 9.2–12.9)
POTASSIUM SERPL-SCNC: 3.7 MMOL/L (ref 3.5–5.1)
PROT SERPL-MCNC: 7.8 G/DL (ref 6–8.4)
PROT UR QL STRIP: ABNORMAL
RBC # BLD AUTO: 4.34 M/UL (ref 4–5.4)
RBC #/AREA URNS AUTO: 4 /HPF (ref 0–4)
SODIUM SERPL-SCNC: 140 MMOL/L (ref 136–145)
SP GR UR STRIP: 1.01 (ref 1–1.03)
TOXICOLOGY INFORMATION: NORMAL
URN SPEC COLLECT METH UR: ABNORMAL
UROBILINOGEN UR STRIP-ACNC: NEGATIVE EU/DL
WBC # BLD AUTO: 8.02 K/UL (ref 3.9–12.7)
WBC #/AREA URNS AUTO: >100 /HPF (ref 0–5)
WBC CLUMPS UR QL AUTO: ABNORMAL

## 2019-08-07 PROCEDURE — 99284 EMERGENCY DEPT VISIT MOD MDM: CPT | Mod: 25,ER

## 2019-08-07 PROCEDURE — 87086 URINE CULTURE/COLONY COUNT: CPT | Mod: ER

## 2019-08-07 PROCEDURE — G0425 PR INPT TELEHEALTH CONSULT 30M: ICD-10-PCS | Mod: GT,,, | Performed by: NURSE PRACTITIONER

## 2019-08-07 PROCEDURE — 96372 THER/PROPH/DIAG INJ SC/IM: CPT | Mod: ER

## 2019-08-07 PROCEDURE — 80320 DRUG SCREEN QUANTALCOHOLS: CPT | Mod: ER

## 2019-08-07 PROCEDURE — 80307 DRUG TEST PRSMV CHEM ANLYZR: CPT | Mod: ER

## 2019-08-07 PROCEDURE — 81000 URINALYSIS NONAUTO W/SCOPE: CPT | Mod: 59,ER

## 2019-08-07 PROCEDURE — 85025 COMPLETE CBC W/AUTO DIFF WBC: CPT | Mod: ER

## 2019-08-07 PROCEDURE — 25000003 PHARM REV CODE 250: Mod: ER | Performed by: SURGERY

## 2019-08-07 PROCEDURE — G0425 INPT/ED TELECONSULT30: HCPCS | Mod: GT,,, | Performed by: NURSE PRACTITIONER

## 2019-08-07 PROCEDURE — 63600175 PHARM REV CODE 636 W HCPCS: Mod: ER | Performed by: SURGERY

## 2019-08-07 PROCEDURE — 80053 COMPREHEN METABOLIC PANEL: CPT | Mod: ER

## 2019-08-07 PROCEDURE — 80329 ANALGESICS NON-OPIOID 1 OR 2: CPT | Mod: ER

## 2019-08-07 RX ORDER — CEFTRIAXONE 1 G/1
1 INJECTION, POWDER, FOR SOLUTION INTRAMUSCULAR; INTRAVENOUS
Status: COMPLETED | OUTPATIENT
Start: 2019-08-07 | End: 2019-08-07

## 2019-08-07 RX ORDER — LORAZEPAM 1 MG/1
2 TABLET ORAL
Status: COMPLETED | OUTPATIENT
Start: 2019-08-07 | End: 2019-08-07

## 2019-08-07 RX ORDER — CARBAMAZEPINE 200 MG/1
200 TABLET, EXTENDED RELEASE ORAL 2 TIMES DAILY
Status: DISCONTINUED | OUTPATIENT
Start: 2019-08-07 | End: 2019-08-07 | Stop reason: HOSPADM

## 2019-08-07 RX ORDER — CARBAMAZEPINE 200 MG/1
200 TABLET ORAL 2 TIMES DAILY
Qty: 60 TABLET | Refills: 0 | Status: ON HOLD | OUTPATIENT
Start: 2019-08-07 | End: 2019-08-15 | Stop reason: SDUPTHER

## 2019-08-07 RX ORDER — NITROFURANTOIN 25; 75 MG/1; MG/1
100 CAPSULE ORAL 2 TIMES DAILY
Qty: 14 CAPSULE | Refills: 0 | Status: ON HOLD | OUTPATIENT
Start: 2019-08-07 | End: 2019-08-15 | Stop reason: HOSPADM

## 2019-08-07 RX ADMIN — LORAZEPAM 2 MG: 1 TABLET ORAL at 11:08

## 2019-08-07 RX ADMIN — CEFTRIAXONE SODIUM 1 G: 1 INJECTION, POWDER, FOR SOLUTION INTRAMUSCULAR; INTRAVENOUS at 04:08

## 2019-08-07 RX ADMIN — CARBAMAZEPINE 200 MG: 200 TABLET, EXTENDED RELEASE ORAL at 01:08

## 2019-08-07 NOTE — CONSULTS
"[facility]  Psychiatry  Telepsychiatry Consult Note    Please see previous notes:    Patient agreeable to consultation via telepsychiatry.    Tele-Consultation from Psychiatry started: 8/7/2019 at 1015  The chief complaint leading to psychiatric consultation is: psychosis  This consultation was requested by Dr. Alexandre, the Emergency Department attending physician.  The location of the consulting psychiatrist is [address] [place] [address].  The patient location is  [facility] EMERGENCY DEPARTMENT   The patient arrived at the ED at: 0951  Also present with the patient at the time of the consultation: alone    Patient Identification:   Stephanie T Barthelemy is a 48 y.o. female.    Patient information was obtained from patient and past medical records.  Patient presented involuntarily to the Emergency Department by ambulance where the patient received see Ambulance Run Sheet prior to arrival.    Inpatient consult to Psychiatry  Consult performed by: Erich Guevara III, NP  Consult ordered by: MICHAEL Alexandre III, MD  Reason for consult: psychosis  Assessment/Recommendations: Once medically cleared seek involuntary inpatient psychiatric admission for stabilization of acute psychiatric symptoms.  PEC because pt is gravely disabled.     Psych Meds: continue scheduled meds    PRN Zyprexa 5 mg q 6h PO/IM for non redirectable agitation; do not combine or administer within one hour of giving a benzodiazepine.      Labs: Obtain drug screen        Subjective:     History of Present Illness:    This is a 48 year old female  with past hx of Bipolar D/O and ADD and extensive drug abuse including Meth, Cocaine, Heroin,and THC who brought self into ER by EMS due to bizarre behavior. Pt was wandering streets while talking to herself.  Pt's thought processes appear paranoid and disorganized during interview.  Pt focused on somatic complaints and requesting to talk to her rheumatologist.  Pt stated, "I'm not " "crazy and don't need to talk to you".  Pt appear agitated by questions and is poor historian.  Refused consent to contact family for collateral history.  Labs and drug screen pending. Last inpatient hospitalization was at [facility] on 7/25/19 for psychosis.      Psychiatric History:   Previous Psychiatric Hospitalizations: Yes , multiple, most recent at [facility]  Previous Medication Trials: Yes , see past records  Previous Suicide Attempts: no   History of Violence: denies  History of Depression: yes per hx  History of Asya: yes  History of Auditory/Visual Hallucination yes  History of Delusions: yes  Outpatient psychiatrist (current & past): noncompliant    Substance Abuse History:  Tobacco:Yes  Alcohol: denies  Illicit Substances:Yes, cociane, meth, heroin, THC    Legal History: Past charges/incarcerations: Yes     Family Psychiatric History: unkown    Social History:  History of Physical/Sexual Abuse: NA  Education: HS    Employment/Disability: Disabled   Financial: Ok  Relationship Status/Sexual Orientation: Has a BF   Children: Two , son and daughter   Housing Status: Lives with her son and   Restorationism: NA   History: No   Recreational Activities: Lost interest  Access to Gun: Denies     Psychiatric Mental Status Exam:  Arousal: alert  Sensorium/Orientation: oriented to person, place  Behavior/Cooperation: reluctant to participate, uncooperative   Speech: normal tone, normal rate, normal pitch, normal volume  Language: grossly intact  Mood: " irritable "   Affect: irritable and angry  Thought Process: tangential, illogical  Thought Content: paranoid  Auditory hallucinations: NO  Visual hallucinations: NO  Paranoia: YES:      Suicidal ideation: NO  Homicidal ideation: NO  Attention/Concentration: distracted  Memory: unable to complete  Insight: poor awareness of illness  Judgment: impaired due to psychosis      Past Medical History:   Past Medical History:   Diagnosis Date    ADHD (attention " deficit hyperactivity disorder)     Anemia     Anxiety     Bipolar disorder     History of hepatitis C - s/p clearance of virus (HCV neg 6/2015) 9/26/2014    History of psychiatric hospitalization     History of substance abuse     IV heroin - last use 2013 per pt    Hx of psychiatric care     Hypertension     Opioid overdose 5/10/2016    Psychiatric problem     Seizure disorder 4/3/2019    Therapy     Vasculitis       Laboratory Data:   Labs Reviewed   COMPREHENSIVE METABOLIC PANEL - Abnormal; Notable for the following components:       Result Value    BUN, Bld 31 (*)     Anion Gap 7 (*)     eGFR if non  57.4 (*)     All other components within normal limits   CBC W/ AUTO DIFFERENTIAL   ALCOHOL,MEDICAL (ETHANOL)   ACETAMINOPHEN LEVEL   URINALYSIS, REFLEX TO URINE CULTURE   DRUG SCREEN PANEL, URINE EMERGENCY       Neurological History:  Seizures: No  Head trauma: No    Allergies:   Review of patient's allergies indicates:  No Known Allergies    Medications in ER: Medications - No data to display    Medications at home:   · Zyprexa 10 mg po q hs  · Tegretol 200 mg po BID  · Neurontin 300 mg po TID  · Vistaril 50 mg po q hs  · Lipitor 10 mg po daily  · Lisinopril 20 mg po daily  · HCTZ 25 mg po daily    No new subjective & objective note has been filed under this hospital service since the last note was generated.      Assessment - Diagnosis - Goals:     Diagnosis/Impression: Acute psychosis  Hx of Bipolar 1 disorder  Hx of polysubstance abuse    Rec: Once medically cleared seek involuntary inpatient psychiatric admission for stabilization of acute psychiatric symptoms.  PEC because pt is gravely disabled.     Psych Meds: continue scheduled meds    PRN Zyprexa 5 mg q 6h PO/IM for non redirectable agitation; do not combine or administer within one hour of giving a benzodiazepine.      Labs: Obtain drug screen    Time with patient: 30 minutes      More than 50% of the time was spent  counseling/coordinating care    Consulting clinician was informed of the encounter and consult note.    Consultation ended: 8/7/2019 at 1100    Erich Guevara III, NP   Psychiatry  [facility]

## 2019-08-07 NOTE — ED TRIAGE NOTES
Pt presents to ED via EMS after found in parking lot rambling and talking to herself. States she is trying to walk to a town over to see her grandfather. Pt states she lives with her fiance but does not say where. She is constantly talking about her leg pain and how she cannot walk because it hurts so bad. She does not answer questions appropriately and is tearful and agitated when asking her. Denies suicidal ideations. Pt is disheveled, unclean/unkept. Pt recently here on PEC and transferred to physiatric facility. States she did not get any of her medications filled and does not tell me which ones.

## 2019-08-07 NOTE — ED PROVIDER NOTES
"Encounter Date: 8/7/2019       History     Chief Complaint   Patient presents with    Psychiatric Evaluation     pt found in random parking lot, ramblding and talking to her "grandmother" who was not there. Pt recently discharged from [facility] but states she couldnt get her medicines. states she was walking to [place]     Patient was seen in the ED on 07/25 after being picked up on the street after wandering around going through the garbage and was committed to a psychiatric hospital.  She was discharged on August 1st.  She is noncompliant with medication .  Today  she was picked up on the street by EMS after wandering around talking to nobody.  She is delusional.  She has a history of seizure disorder substance abuse psychiatric hospitalizations bipolar disease ADHD anxiety hep C    The history is provided by the patient.     Review of patient's allergies indicates:  No Known Allergies  Past Medical History:   Diagnosis Date    ADHD (attention deficit hyperactivity disorder)     Anemia     Anxiety     Bipolar disorder     History of hepatitis C - s/p clearance of virus (HCV neg 6/2015) 9/26/2014    History of psychiatric hospitalization     History of substance abuse     IV heroin - last use 2013 per pt    Hx of psychiatric care     Hypertension     Opioid overdose 5/10/2016    Psychiatric problem     Seizure disorder 4/3/2019    Therapy     Vasculitis      Past Surgical History:   Procedure Laterality Date    BIOPSY, MASS, SPINE, LUMBAR N/A 12/10/2012    Performed by Hood [facility] at [facility] OR [address] [place] [address]    HYSTERECTOMY  3/16/2011    SALPINGOOPHORECTOMY Right 3/16/2011    TUBAL LIGATION      Vaginal cuff repair  04/22/2011     Family History   Problem Relation Age of Onset    Diabetes Maternal Grandmother     Cancer Maternal Grandmother      Social History     Tobacco Use    Smoking status: Current Every Day Smoker     Packs/day: 1.00     Years: 30.00     Pack years: 30.00     Start date: " 4/15/1993    Smokeless tobacco: Never Used   Substance Use Topics    Alcohol use: No    Drug use: Yes     Types: Heroin, Cocaine, Methamphetamines, Marijuana     Comment: herroine/ cocaine last used 3 months ago     Review of Systems   Constitutional: Negative.    HENT: Negative.    Eyes: Negative.    Respiratory: Negative.    Cardiovascular: Negative.    Gastrointestinal: Negative.    Endocrine: Negative.    Genitourinary: Negative.    Musculoskeletal: Positive for myalgias.   Skin: Negative.    Allergic/Immunologic: Negative.    Neurological: Negative.    Hematological: Negative.    Psychiatric/Behavioral: Negative.        Physical Exam     Initial Vitals [08/07/19 0959]   BP Pulse Resp Temp SpO2   128/83 91 18 97.6 °F (36.4 °C) 95 %      MAP       --         Physical Exam    Nursing note and vitals reviewed.  Constitutional: She appears well-developed and well-nourished.   Neck: Normal range of motion.   Cardiovascular: Normal rate and intact distal pulses.   No evidence of any arterial ischemia or venous insufficiency of lower extremities   Pulmonary/Chest: Breath sounds normal.   Abdominal: Soft.   Neurological: She is alert and oriented to person, place, and time. GCS score is 15. GCS eye subscore is 4. GCS verbal subscore is 5. GCS motor subscore is 6.   Skin: Skin is warm and dry.   Psychiatric: Her affect is inappropriate. Her speech is tangential. She is agitated and actively hallucinating. Thought content is delusional. Cognition and memory are impaired. She expresses impulsivity. She is inattentive.         ED Course   Procedures  Labs Reviewed   COMPREHENSIVE METABOLIC PANEL - Abnormal; Notable for the following components:       Result Value    BUN, Bld 31 (*)     Anion Gap 7 (*)     eGFR if non  57.4 (*)     All other components within normal limits   URINALYSIS, REFLEX TO URINE CULTURE - Abnormal; Notable for the following components:    Appearance, UA Cloudy (*)     Protein, UA 1+  (*)     Occult Blood UA 2+ (*)     Leukocytes, UA 3+ (*)     All other components within normal limits    Narrative:     Preferred Collection Type->Urine, Clean Catch   URINALYSIS MICROSCOPIC - Abnormal; Notable for the following components:    WBC, UA >100 (*)     WBC Clumps, UA Many (*)     Bacteria Moderate (*)     All other components within normal limits    Narrative:     Preferred Collection Type->Urine, Clean Catch   CULTURE, URINE   CBC W/ AUTO DIFFERENTIAL   DRUG SCREEN PANEL, URINE EMERGENCY    Narrative:     Preferred Collection Type->Urine, Clean Catch   ALCOHOL,MEDICAL (ETHANOL)   ACETAMINOPHEN LEVEL          Imaging Results    None          Medical Decision Making:   Initial Assessment:   Acute psychosis.  History of substance abuse  ED Management:  Recommend PEC.  The psychiatrist on-call also recommends PEC.  Patient medically cleared for placement                      Clinical Impression:       ICD-10-CM ICD-9-CM   1. Acute cystitis with hematuria N30.01 595.0   2. Acute psychosis F23 298.9                                MICHAEL Alexandre III, MD  08/07/19 1439       MICHAEL Alexandre III, MD  08/07/19 0261

## 2019-08-07 NOTE — ED NOTES
"Pt encouraged to produce urine sample. States "I dont have to go right now" Pt continues to talk to herself and keeps saying "I dont need a mental institute"  "

## 2019-08-08 LAB
BACTERIA UR CULT: NORMAL
BACTERIA UR CULT: NORMAL

## 2019-10-15 ENCOUNTER — HOSPITAL ENCOUNTER (EMERGENCY)
Facility: HOSPITAL | Age: 49
Discharge: PSYCHIATRIC OR SUBSTANCE USE FACILITY | End: 2019-10-15
Attending: FAMILY MEDICINE
Payer: MEDICARE

## 2019-10-15 VITALS
TEMPERATURE: 99 F | RESPIRATION RATE: 20 BRPM | HEART RATE: 106 BPM | DIASTOLIC BLOOD PRESSURE: 89 MMHG | OXYGEN SATURATION: 97 % | SYSTOLIC BLOOD PRESSURE: 159 MMHG | WEIGHT: 135 LBS | BODY MASS INDEX: 26.37 KG/M2

## 2019-10-15 DIAGNOSIS — E87.6 HYPOKALEMIA: ICD-10-CM

## 2019-10-15 DIAGNOSIS — F29 PSYCHOSIS, UNSPECIFIED PSYCHOSIS TYPE: Primary | ICD-10-CM

## 2019-10-15 DIAGNOSIS — F19.950 SUBSTANCE-INDUCED PSYCHOTIC DISORDER WITH DELUSIONS: ICD-10-CM

## 2019-10-15 DIAGNOSIS — N39.0 URINARY TRACT INFECTION WITHOUT HEMATURIA, SITE UNSPECIFIED: ICD-10-CM

## 2019-10-15 DIAGNOSIS — F15.20 METHAMPHETAMINE ADDICTION: ICD-10-CM

## 2019-10-15 PROBLEM — F31.9 BIPOLAR 1 DISORDER, DEPRESSED: Status: ACTIVE | Noted: 2019-10-15

## 2019-10-15 LAB
ALBUMIN SERPL BCP-MCNC: 3.7 G/DL (ref 3.5–5.2)
ALP SERPL-CCNC: 105 U/L (ref 38–126)
ALT SERPL W/O P-5'-P-CCNC: 20 U/L (ref 10–44)
AMPHET+METHAMPHET UR QL: NORMAL
ANION GAP SERPL CALC-SCNC: 10 MMOL/L (ref 8–16)
ANION GAP SERPL CALC-SCNC: 3 MMOL/L (ref 8–16)
APAP SERPL-MCNC: <10 UG/ML (ref 10–20)
AST SERPL-CCNC: 28 U/L (ref 15–46)
B-HCG UR QL: NEGATIVE
BACTERIA #/AREA URNS AUTO: ABNORMAL /HPF
BARBITURATES UR QL SCN>200 NG/ML: NEGATIVE
BASOPHILS # BLD AUTO: 0.01 K/UL (ref 0–0.2)
BASOPHILS NFR BLD: 0.2 % (ref 0–1.9)
BENZODIAZ UR QL SCN>200 NG/ML: NEGATIVE
BILIRUB SERPL-MCNC: 0.4 MG/DL (ref 0.1–1)
BILIRUB UR QL STRIP: NEGATIVE
BUN SERPL-MCNC: 11 MG/DL (ref 7–17)
BUN SERPL-MCNC: 14 MG/DL (ref 7–17)
BZE UR QL SCN: NEGATIVE
CALCIUM SERPL-MCNC: 8.8 MG/DL (ref 8.7–10.5)
CALCIUM SERPL-MCNC: 9.4 MG/DL (ref 8.7–10.5)
CANNABINOIDS UR QL SCN: NORMAL
CHLORIDE SERPL-SCNC: 103 MMOL/L (ref 95–110)
CHLORIDE SERPL-SCNC: 106 MMOL/L (ref 95–110)
CLARITY UR REFRACT.AUTO: ABNORMAL
CO2 SERPL-SCNC: 29 MMOL/L (ref 23–29)
CO2 SERPL-SCNC: 30 MMOL/L (ref 23–29)
COLOR UR AUTO: YELLOW
CREAT SERPL-MCNC: 0.92 MG/DL (ref 0.5–1.4)
CREAT SERPL-MCNC: 0.92 MG/DL (ref 0.5–1.4)
CREAT UR-MCNC: 220.9 MG/DL (ref 15–325)
DIFFERENTIAL METHOD: ABNORMAL
EOSINOPHIL # BLD AUTO: 0.2 K/UL (ref 0–0.5)
EOSINOPHIL NFR BLD: 3.5 % (ref 0–8)
ERYTHROCYTE [DISTWIDTH] IN BLOOD BY AUTOMATED COUNT: 14.2 % (ref 11.5–14.5)
EST. GFR  (AFRICAN AMERICAN): >60 ML/MIN/1.73 M^2
EST. GFR  (AFRICAN AMERICAN): >60 ML/MIN/1.73 M^2
EST. GFR  (NON AFRICAN AMERICAN): >60 ML/MIN/1.73 M^2
EST. GFR  (NON AFRICAN AMERICAN): >60 ML/MIN/1.73 M^2
ETHANOL SERPL-MCNC: <10 MG/DL
GLUCOSE SERPL-MCNC: 125 MG/DL (ref 70–110)
GLUCOSE SERPL-MCNC: 131 MG/DL (ref 70–110)
GLUCOSE UR QL STRIP: NEGATIVE
HCT VFR BLD AUTO: 38.3 % (ref 37–48.5)
HGB BLD-MCNC: 12.3 G/DL (ref 12–16)
HGB UR QL STRIP: ABNORMAL
HYALINE CASTS UR QL AUTO: 0 /LPF
KETONES UR QL STRIP: ABNORMAL
LEUKOCYTE ESTERASE UR QL STRIP: ABNORMAL
LYMPHOCYTES # BLD AUTO: 0.7 K/UL (ref 1–4.8)
LYMPHOCYTES NFR BLD: 15.1 % (ref 18–48)
MCH RBC QN AUTO: 29.9 PG (ref 27–31)
MCHC RBC AUTO-ENTMCNC: 32.1 G/DL (ref 32–36)
MCV RBC AUTO: 93 FL (ref 82–98)
METHADONE UR QL SCN>300 NG/ML: NEGATIVE
MICROSCOPIC COMMENT: ABNORMAL
MONOCYTES # BLD AUTO: 0.6 K/UL (ref 0.3–1)
MONOCYTES NFR BLD: 14.2 % (ref 4–15)
NEUTROPHILS # BLD AUTO: 2.9 K/UL (ref 1.8–7.7)
NEUTROPHILS NFR BLD: 67 % (ref 38–73)
NITRITE UR QL STRIP: POSITIVE
OPIATES UR QL SCN: NEGATIVE
PCP UR QL SCN>25 NG/ML: NEGATIVE
PH UR STRIP: 7 [PH] (ref 5–8)
PLATELET # BLD AUTO: 156 K/UL (ref 150–350)
PMV BLD AUTO: 11 FL (ref 9.2–12.9)
POTASSIUM SERPL-SCNC: 2.4 MMOL/L (ref 3.5–5.1)
POTASSIUM SERPL-SCNC: 3.6 MMOL/L (ref 3.5–5.1)
PROT SERPL-MCNC: 6.8 G/DL (ref 6–8.4)
PROT UR QL STRIP: ABNORMAL
RBC # BLD AUTO: 4.12 M/UL (ref 4–5.4)
RBC #/AREA URNS AUTO: 10 /HPF (ref 0–4)
SODIUM SERPL-SCNC: 139 MMOL/L (ref 136–145)
SODIUM SERPL-SCNC: 142 MMOL/L (ref 136–145)
SP GR UR STRIP: 1.01 (ref 1–1.03)
TOXICOLOGY INFORMATION: NORMAL
URN SPEC COLLECT METH UR: ABNORMAL
UROBILINOGEN UR STRIP-ACNC: NEGATIVE EU/DL
WBC # BLD AUTO: 4.3 K/UL (ref 3.9–12.7)
WBC #/AREA URNS AUTO: 60 /HPF (ref 0–5)

## 2019-10-15 PROCEDURE — 80053 COMPREHEN METABOLIC PANEL: CPT | Mod: ER

## 2019-10-15 PROCEDURE — 81000 URINALYSIS NONAUTO W/SCOPE: CPT | Mod: 59,ER

## 2019-10-15 PROCEDURE — 87186 SC STD MICRODIL/AGAR DIL: CPT | Mod: ER

## 2019-10-15 PROCEDURE — 80307 DRUG TEST PRSMV CHEM ANLYZR: CPT | Mod: ER

## 2019-10-15 PROCEDURE — 87086 URINE CULTURE/COLONY COUNT: CPT | Mod: ER

## 2019-10-15 PROCEDURE — 85025 COMPLETE CBC W/AUTO DIFF WBC: CPT | Mod: ER

## 2019-10-15 PROCEDURE — 80320 DRUG SCREEN QUANTALCOHOLS: CPT | Mod: ER

## 2019-10-15 PROCEDURE — 25000003 PHARM REV CODE 250: Mod: ER | Performed by: FAMILY MEDICINE

## 2019-10-15 PROCEDURE — 81025 URINE PREGNANCY TEST: CPT | Mod: ER

## 2019-10-15 PROCEDURE — G0425 PR INPT TELEHEALTH CONSULT 30M: ICD-10-PCS | Mod: GT,,, | Performed by: NURSE PRACTITIONER

## 2019-10-15 PROCEDURE — 96365 THER/PROPH/DIAG IV INF INIT: CPT | Mod: ER

## 2019-10-15 PROCEDURE — 80329 ANALGESICS NON-OPIOID 1 OR 2: CPT | Mod: ER

## 2019-10-15 PROCEDURE — 87077 CULTURE AEROBIC IDENTIFY: CPT | Mod: ER

## 2019-10-15 PROCEDURE — 96366 THER/PROPH/DIAG IV INF ADDON: CPT | Mod: ER

## 2019-10-15 PROCEDURE — 80048 BASIC METABOLIC PNL TOTAL CA: CPT | Mod: ER

## 2019-10-15 PROCEDURE — 63600175 PHARM REV CODE 636 W HCPCS: Mod: ER | Performed by: FAMILY MEDICINE

## 2019-10-15 PROCEDURE — 96372 THER/PROPH/DIAG INJ SC/IM: CPT | Mod: ER,59

## 2019-10-15 PROCEDURE — 87088 URINE BACTERIA CULTURE: CPT | Mod: ER

## 2019-10-15 PROCEDURE — G0425 INPT/ED TELECONSULT30: HCPCS | Mod: GT,,, | Performed by: NURSE PRACTITIONER

## 2019-10-15 PROCEDURE — 99284 EMERGENCY DEPT VISIT MOD MDM: CPT | Mod: 25,ER

## 2019-10-15 RX ORDER — POTASSIUM CHLORIDE 29.8 MG/ML
40 INJECTION INTRAVENOUS ONCE
Status: DISCONTINUED | OUTPATIENT
Start: 2019-10-15 | End: 2019-10-15

## 2019-10-15 RX ORDER — POTASSIUM CHLORIDE 20 MEQ/15ML
40 SOLUTION ORAL
Status: DISCONTINUED | OUTPATIENT
Start: 2019-10-15 | End: 2019-10-15

## 2019-10-15 RX ORDER — POTASSIUM CHLORIDE 20 MEQ/1
20 TABLET, EXTENDED RELEASE ORAL
Status: DISCONTINUED | OUTPATIENT
Start: 2019-10-15 | End: 2019-10-15

## 2019-10-15 RX ORDER — CEFTRIAXONE 1 G/1
1 INJECTION, POWDER, FOR SOLUTION INTRAMUSCULAR; INTRAVENOUS
Status: COMPLETED | OUTPATIENT
Start: 2019-10-15 | End: 2019-10-15

## 2019-10-15 RX ORDER — POTASSIUM CHLORIDE 20 MEQ/1
40 TABLET, EXTENDED RELEASE ORAL
Status: COMPLETED | OUTPATIENT
Start: 2019-10-15 | End: 2019-10-15

## 2019-10-15 RX ORDER — POTASSIUM CHLORIDE 7.45 MG/ML
40 INJECTION INTRAVENOUS
Status: COMPLETED | OUTPATIENT
Start: 2019-10-15 | End: 2019-10-15

## 2019-10-15 RX ADMIN — POTASSIUM CHLORIDE 10 MEQ: 7.46 INJECTION, SOLUTION INTRAVENOUS at 12:10

## 2019-10-15 RX ADMIN — POTASSIUM CHLORIDE 40 MEQ: 1500 TABLET, EXTENDED RELEASE ORAL at 03:10

## 2019-10-15 RX ADMIN — POTASSIUM CHLORIDE 10 MEQ: 7.46 INJECTION, SOLUTION INTRAVENOUS at 02:10

## 2019-10-15 RX ADMIN — SODIUM CHLORIDE 1000 ML: 0.9 INJECTION, SOLUTION INTRAVENOUS at 10:10

## 2019-10-15 RX ADMIN — POTASSIUM CHLORIDE 10 MEQ: 7.46 INJECTION, SOLUTION INTRAVENOUS at 11:10

## 2019-10-15 RX ADMIN — POTASSIUM CHLORIDE 10 MEQ: 7.46 INJECTION, SOLUTION INTRAVENOUS at 03:10

## 2019-10-15 RX ADMIN — CEFTRIAXONE SODIUM 1 G: 1 INJECTION, POWDER, FOR SOLUTION INTRAMUSCULAR; INTRAVENOUS at 02:10

## 2019-10-15 NOTE — ED NOTES
Pt asked to speak to the nurse, she is requesting to speak to Dr. Smith. Informed her that I am not aware of who that is, she asked is she was at [facility], I reoriented pt to where she is currently, and she said okay.

## 2019-10-15 NOTE — ED NOTES
Patient belongings:    2 silver rings  1 jossue gold ring  1 turtle bractlet  1 heart necklace    Security  1 $20 bill  1 [employer] bank card (name on card Kiya Waite)  1 signature  money aguirre (gold)       Clothing    1 pair of purple sock  Nike slippers  Pink glitter tennis shoes  Tan bra  Maroon muscle shirt  Khaki pants

## 2019-10-15 NOTE — ED PROVIDER NOTES
Encounter Date: 10/15/2019       History     Chief Complaint   Patient presents with    Hallucinations     Pt to ED per EMS with c/o visual and auditory hallucinations, reports + methamphetine use this am.  States pt has not been sleeping in past 3 days per report.  Pt noted with eyes closed on arrival.  EMS reports pt denies HI/SI     48-year-old female brought to ER by EMS.  Patient has been doing meth for last 3 days and did not sleep.  She has been hyperactive with visual and auditory hallucinations.  Previous history of multiple drug abuse.  Patient has been drowsy and falling asleep on arrival to ED.    The history is provided by the EMS personnel.     Review of patient's allergies indicates:  No Known Allergies  Past Medical History:   Diagnosis Date    ADHD (attention deficit hyperactivity disorder)     Anemia     Anxiety     Bipolar disorder     History of hepatitis C - s/p clearance of virus (HCV neg 6/2015) 9/26/2014    History of psychiatric hospitalization     History of substance abuse     IV heroin - last use 2013 per pt    Hx of psychiatric care     Hypertension     Opioid overdose 5/10/2016    Psychiatric problem     Seizure disorder 4/3/2019    Therapy     Vasculitis      Past Surgical History:   Procedure Laterality Date    HYSTERECTOMY  3/16/2011    SALPINGOOPHORECTOMY Right 3/16/2011    TUBAL LIGATION      Vaginal cuff repair  04/22/2011     Family History   Problem Relation Age of Onset    Diabetes Maternal Grandmother     Cancer Maternal Grandmother      Social History     Tobacco Use    Smoking status: Current Every Day Smoker     Packs/day: 1.00     Years: 30.00     Pack years: 30.00     Start date: 4/15/1993    Smokeless tobacco: Never Used   Substance Use Topics    Alcohol use: No    Drug use: Yes     Types: Heroin, Cocaine, Methamphetamines, Marijuana     Comment: herroine/ cocaine last used 3 months ago     Review of Systems   Unable to perform ROS: Mental status  change       Physical Exam     Initial Vitals [10/15/19 0900]   BP Pulse Resp Temp SpO2   132/73 95 18 98.5 °F (36.9 °C) 100 %      MAP       --         Physical Exam    Nursing note and vitals reviewed.  Constitutional: Vital signs are normal. She appears well-developed and well-nourished. She appears lethargic. She is sleeping. No distress.   HENT:   Head: Normocephalic and atraumatic.   Nose: Nose normal.   Mouth/Throat: Oropharynx is clear and moist.   Eyes: Conjunctivae and lids are normal.   Neck: Trachea normal, normal range of motion and full passive range of motion without pain. Neck supple. Normal range of motion present. No neck rigidity.   Cardiovascular: Normal rate, regular rhythm, S1 normal, S2 normal, normal heart sounds, intact distal pulses and normal pulses.   Pulmonary/Chest: Breath sounds normal. No respiratory distress. She has no wheezes. She has no rhonchi. She has no rales. She exhibits no tenderness.   Abdominal: Soft. Normal appearance and bowel sounds are normal. She exhibits no distension. There is no tenderness.   Musculoskeletal: Normal range of motion. She exhibits no edema.   Lymphadenopathy:     She has no cervical adenopathy.   Neurological: She has normal strength and normal reflexes. She appears lethargic. No cranial nerve deficit or sensory deficit. GCS eye subscore is 4. GCS verbal subscore is 5. GCS motor subscore is 6.   Skin: Skin is warm and intact. Capillary refill takes less than 2 seconds. No abrasion, no bruising and no rash noted.   Psychiatric: Her speech is normal. She is not actively hallucinating. Cognition and memory are normal. She is attentive.         ED Course   Procedures  Labs Reviewed   CBC W/ AUTO DIFFERENTIAL   COMPREHENSIVE METABOLIC PANEL   URINALYSIS, REFLEX TO URINE CULTURE   DRUG SCREEN PANEL, URINE EMERGENCY   ALCOHOL,MEDICAL (ETHANOL)   ACETAMINOPHEN LEVEL          Imaging Results    None          Medical Decision Making:   Initial Assessment:    48-year-old with multiple drug abuse and per night behavior is gravely disabled.  Patient did not sleep for the last 3 days and suddenly slipped in the ER.  Differential Diagnosis:   Bipolar, multiple drug abuse, suicidal, gravely disabled,  Paranoid, delusional,  Clinical Tests:   Lab Tests: Ordered and Reviewed  Radiological Study: Ordered and Reviewed  Medical Tests: Ordered and Reviewed  ED Management:  CT of head is negative for acute events.  Mild UTI treated with Rocephin  Hypokalemia started on potassium IV infusion as patient is drowsy and sleepy unable to tolerate anything p.o..  At 3:30 p.m. Patient more awake.  Oral potassium ordered and tele psychiatry consult.  Tele psychiatric consult recommended for PEC.  Patient is be PEC'd- for psychiatric inpatient treatment.  Medically cleared for inpatient admission.                      Clinical Impression:       ICD-10-CM ICD-9-CM   1. Psychosis, unspecified psychosis type F29 298.9   2. Substance-induced psychotic disorder with delusions F19.950 292.11   3. Methamphetamine addiction F15.20 304.40   4. Hypokalemia E87.6 276.8   5. Urinary tract infection without hematuria, site unspecified N39.0 599.0         Disposition:   Disposition: Transferred  Condition: Serious                        Max Foy MD  10/17/19 0156

## 2019-10-15 NOTE — ED NOTES
[facility] called stated pt has been accepted to [facility], informed C that pt is pending a K Level and [facility] wants an updated level prior to accepting.

## 2019-10-15 NOTE — ED NOTES
Patient changed into blue paper scrubs, in and out cath performed with the assistance of sitter and tech.

## 2019-10-15 NOTE — ED NOTES
Friend called to check on patient ([phone])--wanted to let us know she took two adderall, ambien, and prozac this am

## 2019-10-15 NOTE — ED NOTES
Pt is awake and talking, she finished her microwave meal. Potassium continues to infuse with NS. Sitter at bedside. Will continue to monitor.

## 2019-10-15 NOTE — ED NOTES
Javier from [facility] called stated as soon as we get an upated K level call her back result and she do not see a problem with accepting her.
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
## 2019-10-16 PROBLEM — S41.112A LACERATION OF LEFT UPPER EXTREMITY: Status: ACTIVE | Noted: 2019-10-16

## 2019-10-16 PROBLEM — E78.5 HLD (HYPERLIPIDEMIA): Status: ACTIVE | Noted: 2019-10-16

## 2019-10-16 NOTE — ED NOTES
Pt is awake and have scartted thoughts, stated she spoke to her  on the phone and he is suppose to come visit her before she leaves for [facility].

## 2019-10-17 LAB — BACTERIA UR CULT: ABNORMAL

## 2019-11-19 ENCOUNTER — HOSPITAL ENCOUNTER (EMERGENCY)
Facility: HOSPITAL | Age: 49
Discharge: PSYCHIATRIC OR SUBSTANCE USE FACILITY | End: 2019-11-20
Attending: EMERGENCY MEDICINE
Payer: MEDICARE

## 2019-11-19 DIAGNOSIS — R45.88 NON-SUICIDAL SELF HARM: ICD-10-CM

## 2019-11-19 DIAGNOSIS — F23 ACUTE PSYCHOSIS: Primary | ICD-10-CM

## 2019-11-19 LAB
ALBUMIN SERPL BCP-MCNC: 4.3 G/DL (ref 3.5–5.2)
ALP SERPL-CCNC: 138 U/L (ref 38–126)
ALT SERPL W/O P-5'-P-CCNC: 17 U/L (ref 10–44)
ANION GAP SERPL CALC-SCNC: 10 MMOL/L (ref 8–16)
APAP SERPL-MCNC: <10 UG/ML (ref 10–20)
AST SERPL-CCNC: 25 U/L (ref 15–46)
B-HCG UR QL: NEGATIVE
BACTERIA #/AREA URNS AUTO: ABNORMAL /HPF
BASOPHILS # BLD AUTO: 0.02 K/UL (ref 0–0.2)
BASOPHILS NFR BLD: 0.3 % (ref 0–1.9)
BILIRUB SERPL-MCNC: 0.4 MG/DL (ref 0.1–1)
BILIRUB UR QL STRIP: NEGATIVE
BUN SERPL-MCNC: 14 MG/DL (ref 7–17)
CALCIUM SERPL-MCNC: 10 MG/DL (ref 8.7–10.5)
CHLORIDE SERPL-SCNC: 105 MMOL/L (ref 95–110)
CLARITY UR REFRACT.AUTO: ABNORMAL
CO2 SERPL-SCNC: 27 MMOL/L (ref 23–29)
COLOR UR AUTO: ABNORMAL
CREAT SERPL-MCNC: 0.9 MG/DL (ref 0.5–1.4)
DIFFERENTIAL METHOD: ABNORMAL
EOSINOPHIL # BLD AUTO: 0.3 K/UL (ref 0–0.5)
EOSINOPHIL NFR BLD: 5 % (ref 0–8)
ERYTHROCYTE [DISTWIDTH] IN BLOOD BY AUTOMATED COUNT: 14 % (ref 11.5–14.5)
EST. GFR  (AFRICAN AMERICAN): >60 ML/MIN/1.73 M^2
EST. GFR  (NON AFRICAN AMERICAN): >60 ML/MIN/1.73 M^2
ETHANOL SERPL-MCNC: <10 MG/DL
GLUCOSE SERPL-MCNC: 88 MG/DL (ref 70–110)
GLUCOSE UR QL STRIP: NEGATIVE
HCT VFR BLD AUTO: 42.3 % (ref 37–48.5)
HGB BLD-MCNC: 13 G/DL (ref 12–16)
HGB UR QL STRIP: ABNORMAL
HYALINE CASTS UR QL AUTO: 0 /LPF
KETONES UR QL STRIP: NEGATIVE
LEUKOCYTE ESTERASE UR QL STRIP: ABNORMAL
LYMPHOCYTES # BLD AUTO: 1.6 K/UL (ref 1–4.8)
LYMPHOCYTES NFR BLD: 25.2 % (ref 18–48)
MCH RBC QN AUTO: 29.8 PG (ref 27–31)
MCHC RBC AUTO-ENTMCNC: 30.7 G/DL (ref 32–36)
MCV RBC AUTO: 97 FL (ref 82–98)
MICROSCOPIC COMMENT: ABNORMAL
MONOCYTES # BLD AUTO: 0.5 K/UL (ref 0.3–1)
MONOCYTES NFR BLD: 8.4 % (ref 4–15)
NEUTROPHILS # BLD AUTO: 3.9 K/UL (ref 1.8–7.7)
NEUTROPHILS NFR BLD: 61.1 % (ref 38–73)
NITRITE UR QL STRIP: NEGATIVE
PH UR STRIP: 6 [PH] (ref 5–8)
PLATELET # BLD AUTO: 161 K/UL (ref 150–350)
PMV BLD AUTO: 11.5 FL (ref 9.2–12.9)
POTASSIUM SERPL-SCNC: 3.6 MMOL/L (ref 3.5–5.1)
PROT SERPL-MCNC: 7.8 G/DL (ref 6–8.4)
PROT UR QL STRIP: ABNORMAL
RBC # BLD AUTO: 4.36 M/UL (ref 4–5.4)
RBC #/AREA URNS AUTO: 3 /HPF (ref 0–4)
SODIUM SERPL-SCNC: 142 MMOL/L (ref 136–145)
SP GR UR STRIP: 1.01 (ref 1–1.03)
URN SPEC COLLECT METH UR: ABNORMAL
UROBILINOGEN UR STRIP-ACNC: NEGATIVE EU/DL
WBC # BLD AUTO: 6.42 K/UL (ref 3.9–12.7)
WBC #/AREA URNS AUTO: 70 /HPF (ref 0–5)
WBC CLUMPS UR QL AUTO: ABNORMAL

## 2019-11-19 PROCEDURE — 80329 ANALGESICS NON-OPIOID 1 OR 2: CPT | Mod: ER

## 2019-11-19 PROCEDURE — 87086 URINE CULTURE/COLONY COUNT: CPT | Mod: ER

## 2019-11-19 PROCEDURE — 80320 DRUG SCREEN QUANTALCOHOLS: CPT | Mod: ER

## 2019-11-19 PROCEDURE — 80307 DRUG TEST PRSMV CHEM ANLYZR: CPT | Mod: ER

## 2019-11-19 PROCEDURE — 90715 TDAP VACCINE 7 YRS/> IM: CPT | Mod: ER | Performed by: EMERGENCY MEDICINE

## 2019-11-19 PROCEDURE — 81025 URINE PREGNANCY TEST: CPT | Mod: ER

## 2019-11-19 PROCEDURE — 99284 EMERGENCY DEPT VISIT MOD MDM: CPT | Mod: 25,ER

## 2019-11-19 PROCEDURE — 25000003 PHARM REV CODE 250: Mod: ER | Performed by: EMERGENCY MEDICINE

## 2019-11-19 PROCEDURE — 85025 COMPLETE CBC W/AUTO DIFF WBC: CPT | Mod: ER

## 2019-11-19 PROCEDURE — 90471 IMMUNIZATION ADMIN: CPT | Mod: ER | Performed by: EMERGENCY MEDICINE

## 2019-11-19 PROCEDURE — 87088 URINE BACTERIA CULTURE: CPT | Mod: ER

## 2019-11-19 PROCEDURE — 81000 URINALYSIS NONAUTO W/SCOPE: CPT | Mod: 59,ER

## 2019-11-19 PROCEDURE — 80053 COMPREHEN METABOLIC PANEL: CPT | Mod: ER

## 2019-11-19 PROCEDURE — 99285 EMERGENCY DEPT VISIT HI MDM: CPT | Mod: 25,ER

## 2019-11-19 PROCEDURE — 63600175 PHARM REV CODE 636 W HCPCS: Mod: ER | Performed by: EMERGENCY MEDICINE

## 2019-11-19 PROCEDURE — 12002 RPR S/N/AX/GEN/TRNK2.6-7.5CM: CPT | Mod: ER

## 2019-11-19 RX ORDER — NITROFURANTOIN 25; 75 MG/1; MG/1
100 CAPSULE ORAL
Status: COMPLETED | OUTPATIENT
Start: 2019-11-20 | End: 2019-11-20

## 2019-11-19 RX ORDER — LIDOCAINE HYDROCHLORIDE 10 MG/ML
10 INJECTION, SOLUTION EPIDURAL; INFILTRATION; INTRACAUDAL; PERINEURAL
Status: COMPLETED | OUTPATIENT
Start: 2019-11-19 | End: 2019-11-19

## 2019-11-19 RX ADMIN — LIDOCAINE HYDROCHLORIDE 100 MG: 10 INJECTION, SOLUTION EPIDURAL; INFILTRATION; INTRACAUDAL; PERINEURAL at 09:11

## 2019-11-19 RX ADMIN — CLOSTRIDIUM TETANI TOXOID ANTIGEN (FORMALDEHYDE INACTIVATED), CORYNEBACTERIUM DIPHTHERIAE TOXOID ANTIGEN (FORMALDEHYDE INACTIVATED), BORDETELLA PERTUSSIS TOXOID ANTIGEN (GLUTARALDEHYDE INACTIVATED), BORDETELLA PERTUSSIS FILAMENTOUS HEMAGGLUTININ ANTIGEN (FORMALDEHYDE INACTIVATED), BORDETELLA PERTUSSIS PERTACTIN ANTIGEN, AND BORDETELLA PERTUSSIS FIMBRIAE 2/3 ANTIGEN 0.5 ML: 5; 2; 2.5; 5; 3; 5 INJECTION, SUSPENSION INTRAMUSCULAR at 09:11

## 2019-11-20 VITALS
RESPIRATION RATE: 18 BRPM | BODY MASS INDEX: 25.3 KG/M2 | DIASTOLIC BLOOD PRESSURE: 80 MMHG | HEART RATE: 84 BPM | HEIGHT: 61 IN | SYSTOLIC BLOOD PRESSURE: 125 MMHG | TEMPERATURE: 98 F | OXYGEN SATURATION: 99 % | WEIGHT: 134 LBS

## 2019-11-20 LAB
AMPHET+METHAMPHET UR QL: NORMAL
BARBITURATES UR QL SCN>200 NG/ML: NEGATIVE
BENZODIAZ UR QL SCN>200 NG/ML: NEGATIVE
BZE UR QL SCN: NEGATIVE
CANNABINOIDS UR QL SCN: NEGATIVE
CREAT UR-MCNC: 81.5 MG/DL (ref 15–325)
METHADONE UR QL SCN>300 NG/ML: NEGATIVE
OPIATES UR QL SCN: NEGATIVE
PCP UR QL SCN>25 NG/ML: NEGATIVE
TOXICOLOGY INFORMATION: NORMAL

## 2019-11-20 PROCEDURE — 25000003 PHARM REV CODE 250: Mod: ER | Performed by: EMERGENCY MEDICINE

## 2019-11-20 RX ADMIN — NITROFURANTOIN MONOHYDRATE/MACROCRYSTALLINE 100 MG: 25; 75 CAPSULE ORAL at 12:11

## 2019-11-20 NOTE — ED NOTES
Pt changed into paper scrubs, sitting in full view of nurses station with curtain open for patient safety.  Pt belongings catalogued as listed below.  Pt not cooperative with answering questions but otherwise calm and quiet, NAD noted, respirations even/unlabored.  Pt has one wound to left forearm that is approx. 4 cm long with several other hesitation marks, no active bleeding noted.  Dr. Mccollum at bedside to evaluate patient.

## 2019-11-20 NOTE — ED NOTES
Patient lying on stretcher with eyes closed, breathing even and non-labored, siderails up x2, bed in lowest/locked position.  at bedside in view of patient. Will continue to monitor.

## 2019-11-20 NOTE — ED NOTES
Patient lying on stretcher with eyes closed, breathing even and non-labored, side rails up x2, bed in lowest/locked position.  at bedside. Will continue to monitor.

## 2019-11-20 NOTE — ED PROVIDER NOTES
Encounter Date: 11/19/2019       History     Chief Complaint   Patient presents with    Psychiatric Evaluation     Pt [facility] with suicide attempts, has laceration to left forearm and several other hesitation marks.  Pt uncooperative in triage, will not answer any questions other than to say that she has methamphetamine induced psychosis and needs to have her medications re-evaluated     The history is provided by the patient.   Mental Health Problem   The primary symptoms include dysphoric mood and bizarre behavior. The current episode started today. This is a recurrent problem.   The onset of the illness is precipitated by drug abuse. The degree of incapacity that she is experiencing as a consequence of her illness is moderate. Sequelae of the illness include an inability to work, harmed interpersonal relations and an inability to care for self. Additional symptoms of the illness include anhedonia, agitation, psychomotor retardation, feelings of worthlessness and poor judgment. She does not admit to suicidal ideas. She does not have a plan to commit suicide. She does not contemplate harming herself. She has not already injured self. She does not contemplate injuring another person. She has not already  injured another person. Risk factors that are present for mental illness include a history of mental illness.     Review of patient's allergies indicates:  No Known Allergies  Past Medical History:   Diagnosis Date    ADHD (attention deficit hyperactivity disorder)     Anemia     Anxiety     Bipolar disorder     History of hepatitis C - s/p clearance of virus (HCV neg 6/2015) 9/26/2014    History of psychiatric hospitalization     History of substance abuse     IV heroin - last use 2013 per pt    Hx of psychiatric care     Hypertension     Opioid overdose 5/10/2016    Psychiatric problem     Seizure disorder 4/3/2019    Therapy     Vasculitis      Past Surgical History:   Procedure Laterality  Date    HYSTERECTOMY  3/16/2011    SALPINGOOPHORECTOMY Right 3/16/2011    TUBAL LIGATION      Vaginal cuff repair  04/22/2011     Family History   Problem Relation Age of Onset    Diabetes Maternal Grandmother     Cancer Maternal Grandmother      Social History     Tobacco Use    Smoking status: Current Every Day Smoker     Packs/day: 1.00     Years: 30.00     Pack years: 30.00     Start date: 4/15/1993    Smokeless tobacco: Never Used   Substance Use Topics    Alcohol use: No    Drug use: Yes     Types: Heroin, Cocaine, Methamphetamines, Marijuana     Comment: herroine/ cocaine last used 3 months ago     Review of Systems   Psychiatric/Behavioral: Positive for agitation, behavioral problems, dysphoric mood and self-injury.       Physical Exam     Initial Vitals [11/19/19 2049]   BP Pulse Resp Temp SpO2   (!) 159/93 90 20 97.6 °F (36.4 °C) 100 %      MAP       --         Physical Exam    Nursing note and vitals reviewed.  Constitutional: She appears well-developed and well-nourished.   HENT:   Head: Normocephalic and atraumatic.   Eyes: Conjunctivae and EOM are normal.   Neck: Normal range of motion. Neck supple.   Cardiovascular: Normal rate, regular rhythm and normal heart sounds.   Pulmonary/Chest: Breath sounds normal. No respiratory distress. She has no wheezes. She has no rhonchi. She has no rales.   Abdominal: Soft. She exhibits no distension. There is no tenderness. There is no rebound and no guarding.   Musculoskeletal: Normal range of motion.   Neurological: She is alert and oriented to person, place, and time. GCS score is 15. GCS eye subscore is 4. GCS verbal subscore is 5. GCS motor subscore is 6.   Skin: Skin is warm and dry. Capillary refill takes less than 2 seconds.   Psychiatric: Her affect is angry and inappropriate. Her speech is delayed. She is agitated, slowed and withdrawn. Thought content is paranoid. She expresses impulsivity and inappropriate judgment. She exhibits a depressed  mood. She is noncommunicative. She is inattentive.         ED Course   Lac Repair  Date/Time: 11/19/2019 9:38 PM  Performed by: Cordelia Mccollum MD  Authorized by: Cordelia Mcclolum MD   Consent Done: Emergent Situation  Body area: upper extremity  Laceration length: 4.2 cm  Foreign bodies: no foreign bodies  Tendon involvement: none  Nerve involvement: none  Vascular damage: no  Anesthesia: local infiltration    Anesthesia:  Local Anesthetic: lidocaine 1% without epinephrine  Anesthetic total: 6 mL  Patient sedated: no  Preparation: Patient was prepped and draped in the usual sterile fashion.  Irrigation solution: saline  Amount of cleaning: standard  Debridement: none  Degree of undermining: none  Skin closure: 4-0 nylon  Technique: running  Approximation: close  Approximation difficulty: simple  Dressing: adhesive bandage and antibiotic ointment  Patient tolerance: Patient tolerated the procedure well with no immediate complications        Labs Reviewed   CBC W/ AUTO DIFFERENTIAL - Abnormal; Notable for the following components:       Result Value    Mean Corpuscular Hemoglobin Conc 30.7 (*)     All other components within normal limits   COMPREHENSIVE METABOLIC PANEL - Abnormal; Notable for the following components:    Alkaline Phosphatase 138 (*)     All other components within normal limits   ALCOHOL,MEDICAL (ETHANOL)   ACETAMINOPHEN LEVEL   URINALYSIS, REFLEX TO URINE CULTURE   DRUG SCREEN PANEL, URINE EMERGENCY   PREGNANCY TEST, URINE RAPID          Imaging Results    None             Additional MDM:   Psych: A Physician Emergency Certificate (PEC) was done in the ED for: Gravely Disabled. The patient has been medically cleared. Outcome: The patient was approved for transfer to another facility.                               Clinical Impression:       ICD-10-CM ICD-9-CM   1. Acute psychosis F23 298.9   2. Non-suicidal self harm R45.89 V49.89                             Cordelia Mccollum MD  11/20/19 0040       Cordelia PANG  MD Chun  11/20/19 0041

## 2019-11-20 NOTE — ED NOTES
Patient has been accepted to [facility] by Dr. Lou, the phone number for report is [phone].

## 2019-11-20 NOTE — ED NOTES
Pt belongings are as follows:    1 pair plastic black rimmed glasses  1 red and green plastic bracelet  1 black hair tie  1 plastic and elastic purple and green bracelet  1 silver colored ring with clear colored stoned  1 pair silver earrings with clear stones  1 jossue colored ring with clear stones  1 silvered colored ring with pink stone  1 silver colored necklace with clear stones  1 pair black high top sneakers  1 pair white socks (on patient)  1 pair pink colored underwear (on patient)  1 pink bra (on patient)  1 black colored long style jog bra  1 gray sweater  1 pair gray and white jog pants  1 silver colored key chain with silver medallion and 1 silver key    Prescription Bottles:  1 bottle of Gabapentin 300 mg capsules  2 bottles of Gabapentin 400 mg capsules  2 bottles of Lisinopril 20 mg tablets  2 bottles of Carbamazepine 100 mg tablets  1 bottle of Olanzapine 15 mg tablets  1 bottle of Olanzapine 10 mg tablets  1 bottle of Clonidine 0.1 mg tablets   1 bottle of Venlafaxine 150 mg capsules  1 bottle of Mirtazapine 30 mg tablets  1 bottle of Trazodone 50 mg tablets  1 tube of Mupirocin Ointment    1 black bag  1 ID badge/wallet with $3.00 cash inside, 2 [place] [employer] Cards, [employer] debit Mastercard  1 LG cell phone  2 cell phone chargers  2 lighters  1 small flathead screwdriver  Various papers inside black bag

## 2019-11-20 NOTE — ED NOTES
Patient provided with a Power-iesha, jello cup, pudding cup, Lean Cuisine Meal and eric crackers.

## 2019-11-20 NOTE — ED NOTES
Patient lying on stretcher with eyes closed, breathing even and non-labored, patient in no acute distress, siderails up x2, bed locked and in lowest position. , Florencio at bedside with patient. Will continue to monitor.

## 2019-11-22 LAB — BACTERIA UR CULT: ABNORMAL

## 2020-01-20 ENCOUNTER — HOSPITAL ENCOUNTER (EMERGENCY)
Facility: HOSPITAL | Age: 50
Discharge: HOME | End: 2020-01-20
Attending: EMERGENCY MEDICINE
Payer: MEDICARE

## 2020-01-20 VITALS
TEMPERATURE: 98 F | HEIGHT: 60 IN | SYSTOLIC BLOOD PRESSURE: 130 MMHG | WEIGHT: 132 LBS | DIASTOLIC BLOOD PRESSURE: 86 MMHG | RESPIRATION RATE: 22 BRPM | HEART RATE: 75 BPM | OXYGEN SATURATION: 98 % | BODY MASS INDEX: 25.91 KG/M2

## 2020-01-20 DIAGNOSIS — F41.0 ANXIETY ATTACK: Primary | ICD-10-CM

## 2020-01-20 PROCEDURE — 99283 EMERGENCY DEPT VISIT LOW MDM: CPT | Mod: ER

## 2020-01-20 PROCEDURE — 25000003 PHARM REV CODE 250: Mod: ER | Performed by: EMERGENCY MEDICINE

## 2020-01-20 RX ORDER — FLUOXETINE HYDROCHLORIDE 20 MG/1
20 CAPSULE ORAL DAILY
Status: ON HOLD | COMMUNITY
End: 2020-04-01 | Stop reason: HOSPADM

## 2020-01-20 RX ORDER — LORAZEPAM 1 MG/1
1 TABLET ORAL
Status: COMPLETED | OUTPATIENT
Start: 2020-01-20 | End: 2020-01-20

## 2020-01-20 RX ADMIN — LORAZEPAM 1 MG: 1 TABLET ORAL at 12:01

## 2020-01-20 NOTE — ED NOTES
Patient resting quietly with eyes closed in bed at this this time. Vitals stable. Respirations even and unlabored. Patient no longer shaking legs. Will continue to monitor patient.

## 2020-01-21 NOTE — ED PROVIDER NOTES
Encounter Date: 1/20/2020       History     Chief Complaint   Patient presents with    Anxiety     PT started having panic attack 1 hr PTA. Denies chest pain. Unable to stop shaking legs and complaints of back pain after panic attack started. Patient actively crying and shaking legs at this time time. Denies SOB.      Patient currently presents with concern regarding a suspected anxiety reaction.  This began earlier this evening.  Patient describes an overwhelming sense of nervousness and restlessness.  She has a history of anxiety as well as BPD in addition to a history of polysubstance abuse.  Patient is taking Zyprexa, Prozac, and Remeron.  Patient denies suicidal thoughts or recent substance abuse.        Review of patient's allergies indicates:  No Known Allergies  Past Medical History:   Diagnosis Date    ADHD (attention deficit hyperactivity disorder)     Anemia     Anxiety     Bipolar disorder     History of hepatitis C - s/p clearance of virus (HCV neg 6/2015) 9/26/2014    History of psychiatric hospitalization     History of substance abuse     IV heroin - last use 2013 per pt    Hx of psychiatric care     Hypertension     Opioid overdose 5/10/2016    Psychiatric problem     Seizure disorder 4/3/2019    Therapy     Vasculitis      Past Surgical History:   Procedure Laterality Date    HYSTERECTOMY  3/16/2011    SALPINGOOPHORECTOMY Right 3/16/2011    TUBAL LIGATION      Vaginal cuff repair  04/22/2011     Family History   Problem Relation Age of Onset    Diabetes Maternal Grandmother     Cancer Maternal Grandmother      Social History     Tobacco Use    Smoking status: Current Every Day Smoker     Packs/day: 1.00     Years: 30.00     Pack years: 30.00     Start date: 4/15/1993    Smokeless tobacco: Never Used   Substance Use Topics    Alcohol use: No    Drug use: Yes     Types: Heroin, Cocaine, Methamphetamines, Marijuana     Comment: herroine/ cocaine last used 3 months ago      Review of Systems   Constitutional: Negative for chills and fever.   HENT: Negative for congestion and rhinorrhea.    Respiratory: Negative for cough, chest tightness, shortness of breath and wheezing.    Cardiovascular: Negative for chest pain, palpitations and leg swelling.   Gastrointestinal: Negative for abdominal pain, constipation, diarrhea, nausea and vomiting.   Genitourinary: Negative for dysuria, frequency, urgency, vaginal bleeding and vaginal discharge.   Skin: Negative for color change and rash.   Allergic/Immunologic: Negative for immunocompromised state.   Neurological: Negative for dizziness, weakness and numbness.   Hematological: Negative for adenopathy. Does not bruise/bleed easily.   Psychiatric/Behavioral: Negative for confusion, hallucinations and suicidal ideas. The patient is nervous/anxious.    All other systems reviewed and are negative.      Physical Exam     Initial Vitals [01/20/20 0009]   BP Pulse Resp Temp SpO2   (!) 160/116 (!) 114 (!) 22 97.6 °F (36.4 °C) 99 %      MAP       --         Vitals:    01/20/20 0009 01/20/20 0131   BP: (!) 160/116 130/86   Pulse: (!) 114 75   Resp: (!) 22    Temp: 97.6 °F (36.4 °C)    TempSrc: Oral    SpO2: 99% 98%   Weight: 59.9 kg (132 lb)    Height: 5' (1.524 m)          Physical Exam    Nursing note and vitals reviewed.  Constitutional: She appears well-developed and well-nourished. She is not diaphoretic. No distress.   HENT:   Head: Normocephalic and atraumatic.   Right Ear: External ear normal.   Left Ear: External ear normal.   Nose: Nose normal.   Mouth/Throat: Oropharynx is clear and moist.   Eyes: Conjunctivae and EOM are normal. Pupils are equal, round, and reactive to light. No scleral icterus.   Neck: Neck supple. No tracheal deviation present. No JVD present.   Cardiovascular: Normal rate, regular rhythm, normal heart sounds and intact distal pulses. Exam reveals no gallop and no friction rub.    No murmur heard.  Pulmonary/Chest: Breath  sounds normal. No respiratory distress. She has no wheezes. She has no rhonchi. She has no rales.   Abdominal: Soft. Bowel sounds are normal. She exhibits no distension. There is no tenderness.   Musculoskeletal: Normal range of motion. She exhibits no edema.   Neurological: She is alert and oriented to person, place, and time. She has normal strength. No cranial nerve deficit. GCS score is 15. GCS eye subscore is 4. GCS verbal subscore is 5. GCS motor subscore is 6.   Skin: Skin is warm and dry. No rash noted.   Psychiatric: Judgment normal. Her mood appears anxious. Her speech is rapid and/or pressured. She is not actively hallucinating. Cognition and memory are normal. She expresses no homicidal and no suicidal ideation. She is attentive.       ED Course   Procedures  Labs Reviewed - No data to display       Imaging Results    None          Medical Decision Making:   ED Management:  All findings were reviewed with the patient/family in detail.  On reassessment she is markedly improved and resting comfortably.  I see no indication of an emergent process beyond that addressed during our encounter but have duly counseled the patient/family regarding the need for prompt follow-up as well as the indications that should prompt immediate return to the emergency room should new or worrisome developments occur.  The patient has additionally been provided with printed information regarding diagnosis as well as instructions regarding follow up and any medications intended to manage the patient's aforementioned conditions.  The patient/family communicates understanding of all this information and all remaining questions and concerns were addressed at this time.                                       Clinical Impression:       ICD-10-CM ICD-9-CM   1. Anxiety attack F41.0 300.01                             Elmer Brower MD  01/21/20 0909

## 2020-03-25 ENCOUNTER — HOSPITAL ENCOUNTER (EMERGENCY)
Facility: HOSPITAL | Age: 50
Discharge: PSYCHIATRIC OR SUBSTANCE USE FACILITY | End: 2020-03-26
Attending: EMERGENCY MEDICINE
Payer: MEDICARE

## 2020-03-25 DIAGNOSIS — R45.851 SUICIDAL IDEATION: Primary | ICD-10-CM

## 2020-03-25 DIAGNOSIS — R94.31 PROLONGED Q-T INTERVAL ON ECG: ICD-10-CM

## 2020-03-25 DIAGNOSIS — F19.11 HISTORY OF SUBSTANCE ABUSE: ICD-10-CM

## 2020-03-25 DIAGNOSIS — Z00.8 ENCOUNTER FOR PSYCHOLOGICAL EVALUATION: ICD-10-CM

## 2020-03-25 DIAGNOSIS — F31.9 BIPOLAR AFFECTIVE DISORDER, REMISSION STATUS UNSPECIFIED: ICD-10-CM

## 2020-03-25 LAB
ALBUMIN SERPL BCP-MCNC: 4.1 G/DL (ref 3.5–5.2)
ALP SERPL-CCNC: 104 U/L (ref 38–126)
ALT SERPL W/O P-5'-P-CCNC: 11 U/L (ref 10–44)
AMPHET+METHAMPHET UR QL: NORMAL
ANION GAP SERPL CALC-SCNC: 7 MMOL/L (ref 8–16)
APAP SERPL-MCNC: <10 UG/ML (ref 10–20)
AST SERPL-CCNC: 17 U/L (ref 15–46)
B-HCG UR QL: NEGATIVE
BARBITURATES UR QL SCN>200 NG/ML: NEGATIVE
BASOPHILS # BLD AUTO: 0.04 K/UL (ref 0–0.2)
BASOPHILS NFR BLD: 0.6 % (ref 0–1.9)
BENZODIAZ UR QL SCN>200 NG/ML: NEGATIVE
BILIRUB SERPL-MCNC: 0.3 MG/DL (ref 0.1–1)
BUN SERPL-MCNC: 14 MG/DL (ref 7–17)
BZE UR QL SCN: NEGATIVE
CALCIUM SERPL-MCNC: 9.4 MG/DL (ref 8.7–10.5)
CANNABINOIDS UR QL SCN: NEGATIVE
CHLORIDE SERPL-SCNC: 105 MMOL/L (ref 95–110)
CO2 SERPL-SCNC: 28 MMOL/L (ref 23–29)
CREAT SERPL-MCNC: 1.11 MG/DL (ref 0.5–1.4)
CREAT UR-MCNC: 139.4 MG/DL (ref 15–325)
DIFFERENTIAL METHOD: ABNORMAL
EOSINOPHIL # BLD AUTO: 0.2 K/UL (ref 0–0.5)
EOSINOPHIL NFR BLD: 2.7 % (ref 0–8)
ERYTHROCYTE [DISTWIDTH] IN BLOOD BY AUTOMATED COUNT: 13.8 % (ref 11.5–14.5)
EST. GFR  (AFRICAN AMERICAN): >60 ML/MIN/1.73 M^2
EST. GFR  (NON AFRICAN AMERICAN): 58.9 ML/MIN/1.73 M^2
ETHANOL SERPL-MCNC: <10 MG/DL
GLUCOSE SERPL-MCNC: 101 MG/DL (ref 70–110)
HCT VFR BLD AUTO: 42.5 % (ref 37–48.5)
HGB BLD-MCNC: 13.5 G/DL (ref 12–16)
IMM GRANULOCYTES # BLD AUTO: 0.01 K/UL (ref 0–0.04)
IMM GRANULOCYTES NFR BLD AUTO: 0.2 % (ref 0–0.5)
LYMPHOCYTES # BLD AUTO: 2.1 K/UL (ref 1–4.8)
LYMPHOCYTES NFR BLD: 31.2 % (ref 18–48)
MCH RBC QN AUTO: 28.9 PG (ref 27–31)
MCHC RBC AUTO-ENTMCNC: 31.8 G/DL (ref 32–36)
MCV RBC AUTO: 91 FL (ref 82–98)
METHADONE UR QL SCN>300 NG/ML: NEGATIVE
MONOCYTES # BLD AUTO: 0.6 K/UL (ref 0.3–1)
MONOCYTES NFR BLD: 8.4 % (ref 4–15)
NEUTROPHILS # BLD AUTO: 3.8 K/UL (ref 1.8–7.7)
NEUTROPHILS NFR BLD: 56.9 % (ref 38–73)
NRBC BLD-RTO: 0 /100 WBC
OPIATES UR QL SCN: NEGATIVE
PCP UR QL SCN>25 NG/ML: NEGATIVE
PLATELET # BLD AUTO: 202 K/UL (ref 150–350)
PMV BLD AUTO: 10.8 FL (ref 9.2–12.9)
POTASSIUM SERPL-SCNC: 3 MMOL/L (ref 3.5–5.1)
PROT SERPL-MCNC: 7.3 G/DL (ref 6–8.4)
RBC # BLD AUTO: 4.67 M/UL (ref 4–5.4)
SALICYLATES SERPL-MCNC: <5 MG/DL (ref 15–30)
SODIUM SERPL-SCNC: 140 MMOL/L (ref 136–145)
TOXICOLOGY INFORMATION: NORMAL
TSH SERPL DL<=0.005 MIU/L-ACNC: 1.86 UIU/ML (ref 0.4–4)
WBC # BLD AUTO: 6.64 K/UL (ref 3.9–12.7)

## 2020-03-25 PROCEDURE — 93005 ELECTROCARDIOGRAM TRACING: CPT | Mod: ER

## 2020-03-25 PROCEDURE — 80307 DRUG TEST PRSMV CHEM ANLYZR: CPT | Mod: ER

## 2020-03-25 PROCEDURE — 80053 COMPREHEN METABOLIC PANEL: CPT | Mod: ER

## 2020-03-25 PROCEDURE — 80320 DRUG SCREEN QUANTALCOHOLS: CPT | Mod: ER

## 2020-03-25 PROCEDURE — 81025 URINE PREGNANCY TEST: CPT | Mod: ER

## 2020-03-25 PROCEDURE — 25000003 PHARM REV CODE 250: Mod: ER | Performed by: EMERGENCY MEDICINE

## 2020-03-25 PROCEDURE — 80329 ANALGESICS NON-OPIOID 1 OR 2: CPT | Mod: ER

## 2020-03-25 PROCEDURE — 93010 EKG 12-LEAD: ICD-10-PCS | Mod: ,,, | Performed by: INTERNAL MEDICINE

## 2020-03-25 PROCEDURE — 93010 ELECTROCARDIOGRAM REPORT: CPT | Mod: ,,, | Performed by: INTERNAL MEDICINE

## 2020-03-25 PROCEDURE — 85025 COMPLETE CBC W/AUTO DIFF WBC: CPT | Mod: ER

## 2020-03-25 PROCEDURE — 84443 ASSAY THYROID STIM HORMONE: CPT | Mod: ER

## 2020-03-25 PROCEDURE — 99285 EMERGENCY DEPT VISIT HI MDM: CPT | Mod: 25,ER

## 2020-03-25 RX ORDER — CLONIDINE HYDROCHLORIDE 0.1 MG/1
0.1 TABLET ORAL
Status: COMPLETED | OUTPATIENT
Start: 2020-03-25 | End: 2020-03-25

## 2020-03-25 RX ORDER — LISINOPRIL 20 MG/1
20 TABLET ORAL
Status: COMPLETED | OUTPATIENT
Start: 2020-03-25 | End: 2020-03-25

## 2020-03-25 RX ORDER — POTASSIUM CHLORIDE 20 MEQ/1
40 TABLET, EXTENDED RELEASE ORAL
Status: COMPLETED | OUTPATIENT
Start: 2020-03-25 | End: 2020-03-25

## 2020-03-25 RX ADMIN — CLONIDINE HYDROCHLORIDE 0.1 MG: 0.1 TABLET ORAL at 10:03

## 2020-03-25 RX ADMIN — POTASSIUM CHLORIDE 40 MEQ: 1500 TABLET, EXTENDED RELEASE ORAL at 11:03

## 2020-03-25 RX ADMIN — LISINOPRIL 20 MG: 20 TABLET ORAL at 10:03

## 2020-03-26 VITALS
HEART RATE: 78 BPM | OXYGEN SATURATION: 97 % | HEIGHT: 60 IN | TEMPERATURE: 98 F | SYSTOLIC BLOOD PRESSURE: 148 MMHG | DIASTOLIC BLOOD PRESSURE: 96 MMHG | BODY MASS INDEX: 24.54 KG/M2 | WEIGHT: 125 LBS | RESPIRATION RATE: 16 BRPM

## 2020-03-26 NOTE — ED NOTES
Pt us lying on stretcher c respirations even, unlabored c NADN.   Security at bedside for 1:1 observation

## 2020-03-26 NOTE — ED NOTES
PT BELONGINGS INCLUDE:    -1 PAIR GREY SHOES  -1 BLACK/WHITE BRA  -1 PAIR WHITE SOCKS  -1 PAIR BLACK PANTS  -1 BLACK SHIRT  -1 BAG OF JEWELRY  -1 TAN BEIGE  -1 MAKEUP BAG  -GREEN HEADPHONES/BLACK WIRE  -1 BIBLE  -DEODORANT  -BODY SPRAY  -BRUSH  -1 MEDICINE BAG WITH: OLANZAPINE, HYDROXYZINE, ATOMOXETINE  -1 PACK CIGARETTES    PT WAS SEARCHED BY SECURITY. PT PACED IN PAPER SCRUBS. BELONGINGS PLACED IN BAG AND STORED IN CABINET

## 2020-03-26 NOTE — ED NOTES
"Per Fernanda, "Patient accepted by Neelam at [facility] ([address] [place] [address]) for the service of Dr. Vogel. Report to be called to [phone].  [facility] will arrange transport."  "

## 2020-03-26 NOTE — ED PROVIDER NOTES
"Encounter Date: 3/25/2020       History     Chief Complaint   Patient presents with    Psychiatric Evaluation     48 y/o F to er with c/o "I want to kill myself. I have been depressed. I have been out of my medications since I was discharged from [facility] 1 month ago. I just want to get help."     Patient currently presents via POV with concern regarding suicidal ideation.  This was noted to onset over the past few days though this is a chronically recurring problem related to BPD with depression.  Patient does have a history of prior suicide attempt by OD.  Patient has previously been placed in inpatient psychiatric care.  Patient reports a history of substance abuse.  Patient denies a history of alcohol abuse.  Auditory persecutory hallucinations are not reported.  Patient has not been reported to be violent.          Review of patient's allergies indicates:  No Known Allergies  Past Medical History:   Diagnosis Date    ADHD (attention deficit hyperactivity disorder)     Anemia     Anxiety     Bipolar disorder     History of hepatitis C - s/p clearance of virus (HCV neg 6/2015) 9/26/2014    History of psychiatric hospitalization     History of substance abuse     IV heroin - last use 2013 per pt    Hx of psychiatric care     Hypertension     Opioid overdose 5/10/2016    Psychiatric problem     Seizure disorder 4/3/2019    Therapy     Vasculitis      Past Surgical History:   Procedure Laterality Date    HYSTERECTOMY  3/16/2011    SALPINGOOPHORECTOMY Right 3/16/2011    TUBAL LIGATION      Vaginal cuff repair  04/22/2011     Family History   Problem Relation Age of Onset    Diabetes Maternal Grandmother     Cancer Maternal Grandmother      Social History     Tobacco Use    Smoking status: Current Every Day Smoker     Packs/day: 1.00     Years: 30.00     Pack years: 30.00     Start date: 4/15/1993    Smokeless tobacco: Never Used   Substance Use Topics    Alcohol use: No    Drug use: Yes     " Types: Heroin, Cocaine, Methamphetamines, Marijuana     Comment: herroine/ cocaine last used 3 months ago     Review of Systems   Constitutional: Negative for chills and fever.   HENT: Negative for congestion and rhinorrhea.    Respiratory: Negative for cough, chest tightness, shortness of breath and wheezing.    Cardiovascular: Negative for chest pain, palpitations and leg swelling.   Gastrointestinal: Negative for abdominal pain, constipation, diarrhea, nausea and vomiting.   Genitourinary: Negative for dysuria, frequency, urgency, vaginal bleeding and vaginal discharge.   Skin: Negative for color change and rash.   Allergic/Immunologic: Negative for immunocompromised state.   Neurological: Negative for dizziness, weakness and numbness.   Hematological: Negative for adenopathy. Does not bruise/bleed easily.   Psychiatric/Behavioral: Positive for dysphoric mood, hallucinations, sleep disturbance and suicidal ideas. The patient is nervous/anxious.    All other systems reviewed and are negative.    Physical Exam     Initial Vitals [03/25/20 2131]   BP Pulse Resp Temp SpO2   (!) 185/121 108 18 98.7 °F (37.1 °C) 98 %      MAP       --           Physical Exam    Nursing note and vitals reviewed.  Constitutional: She appears well-developed and well-nourished. She is not diaphoretic. No distress.   HENT:   Head: Normocephalic and atraumatic.   Right Ear: External ear normal.   Left Ear: External ear normal.   Nose: Nose normal.   Mouth/Throat: Oropharynx is clear and moist.   Eyes: Conjunctivae and EOM are normal. Pupils are equal, round, and reactive to light. No scleral icterus.   Neck: Neck supple. No tracheal deviation present. No JVD present.   Cardiovascular: Normal rate, regular rhythm, normal heart sounds and intact distal pulses. Exam reveals no gallop and no friction rub.    No murmur heard.  Pulmonary/Chest: Breath sounds normal. No respiratory distress. She has no wheezes. She has no rhonchi. She has no rales.    Abdominal: Soft. Bowel sounds are normal. She exhibits no distension. There is no tenderness.   Musculoskeletal: Normal range of motion. She exhibits no edema.   Neurological: She is alert and oriented to person, place, and time. She has normal strength. No cranial nerve deficit or sensory deficit.   Skin: Skin is warm and dry. No rash noted.   Psychiatric: Her mood appears anxious. Her speech is delayed. She is withdrawn. She is not actively hallucinating. Cognition and memory are normal. She expresses impulsivity. She exhibits a depressed mood. She expresses suicidal ideation. She expresses no homicidal ideation. She is attentive.         ED Course   Procedures  Labs Reviewed   CBC W/ AUTO DIFFERENTIAL - Abnormal; Notable for the following components:       Result Value    Mean Corpuscular Hemoglobin Conc 31.8 (*)     All other components within normal limits   COMPREHENSIVE METABOLIC PANEL - Abnormal; Notable for the following components:    Potassium 3.0 (*)     Anion Gap 7 (*)     eGFR if non  58.9 (*)     All other components within normal limits   SALICYLATE LEVEL - Abnormal; Notable for the following components:    Salicylate Lvl <5.0 (*)     All other components within normal limits   ACETAMINOPHEN LEVEL   DRUG SCREEN PANEL, URINE EMERGENCY   ALCOHOL,MEDICAL (ETHANOL)   TSH   PREGNANCY TEST, URINE RAPID     EKG Readings: (Independently Interpreted)   Initial Reading: No STEMI. Rhythm: Normal Sinus Rhythm. Heart Rate: 90. Ectopy: No Ectopy. Conduction: Normal. Axis: Normal. Other Findings: Prolonged QT Interval.       Imaging Results    None          Medical Decision Making:   ED Management:  All historical, clinical, and laboratory findings were reviewed with the patient/family in detail along with the indications for transfer to an inpatient psychiatric services as we do not have those services available at this facility.  All remaining questions and concerns were addressed at that time  and the patient/family communicates understanding and agrees to proceed accordingly.      At this point the patient has been medically stabilized for transfer to inpatient psychiatry.   Elmer Brower MD                                   Clinical Impression:       ICD-10-CM ICD-9-CM   1. Suicidal ideation R45.851 V62.84   2. Encounter for psychological evaluation Z00.8 V72.85   3. Bipolar affective disorder, remission status unspecified F31.9 296.80   4. History of substance abuse F19.11 305.93             ED Disposition Condition    Transfer to Psych Facility         ED Prescriptions     None        Follow-up Information    None                                    Elmer Brwoer MD  03/26/20 0009       Elmer Brower MD  03/26/20 0009

## 2020-03-26 NOTE — ED NOTES
Report received from KAVEH Escobar RN.   Assumed care of pt.   Pt us lying on stretcher c respirations even, unlabored c NADN.

## 2020-04-14 ENCOUNTER — HOSPITAL ENCOUNTER (EMERGENCY)
Facility: HOSPITAL | Age: 50
Discharge: PSYCHIATRIC OR SUBSTANCE USE FACILITY | End: 2020-04-14
Attending: EMERGENCY MEDICINE
Payer: MEDICARE

## 2020-04-14 VITALS
TEMPERATURE: 97 F | BODY MASS INDEX: 21.6 KG/M2 | HEART RATE: 83 BPM | SYSTOLIC BLOOD PRESSURE: 119 MMHG | DIASTOLIC BLOOD PRESSURE: 79 MMHG | RESPIRATION RATE: 20 BRPM | HEIGHT: 60 IN | OXYGEN SATURATION: 98 % | WEIGHT: 110 LBS

## 2020-04-14 DIAGNOSIS — F31.9 BIPOLAR AFFECTIVE DISORDER, REMISSION STATUS UNSPECIFIED: ICD-10-CM

## 2020-04-14 DIAGNOSIS — F19.10 SUBSTANCE ABUSE: ICD-10-CM

## 2020-04-14 DIAGNOSIS — Z00.8 MEDICAL CLEARANCE FOR PSYCHIATRIC ADMISSION: ICD-10-CM

## 2020-04-14 DIAGNOSIS — R45.1 AGITATION: Primary | ICD-10-CM

## 2020-04-14 LAB
ALBUMIN SERPL BCP-MCNC: 4.2 G/DL (ref 3.5–5.2)
ALP SERPL-CCNC: 103 U/L (ref 38–126)
ALT SERPL W/O P-5'-P-CCNC: 17 U/L (ref 10–44)
AMPHET+METHAMPHET UR QL: NORMAL
ANION GAP SERPL CALC-SCNC: 10 MMOL/L (ref 8–16)
AST SERPL-CCNC: 23 U/L (ref 15–46)
B-HCG UR QL: NEGATIVE
BACTERIA #/AREA URNS AUTO: ABNORMAL /HPF
BARBITURATES UR QL SCN>200 NG/ML: NEGATIVE
BASOPHILS # BLD AUTO: 0.02 K/UL (ref 0–0.2)
BASOPHILS NFR BLD: 0.3 % (ref 0–1.9)
BENZODIAZ UR QL SCN>200 NG/ML: NEGATIVE
BILIRUB SERPL-MCNC: 0.2 MG/DL (ref 0.1–1)
BILIRUB UR QL STRIP: NEGATIVE
BUN SERPL-MCNC: 27 MG/DL (ref 7–17)
BZE UR QL SCN: NEGATIVE
CALCIUM SERPL-MCNC: 10 MG/DL (ref 8.7–10.5)
CANNABINOIDS UR QL SCN: NEGATIVE
CHLORIDE SERPL-SCNC: 106 MMOL/L (ref 95–110)
CLARITY UR REFRACT.AUTO: ABNORMAL
CO2 SERPL-SCNC: 25 MMOL/L (ref 23–29)
COLOR UR AUTO: ABNORMAL
CREAT SERPL-MCNC: 1 MG/DL (ref 0.5–1.4)
CREAT UR-MCNC: 311.2 MG/DL (ref 15–325)
DIFFERENTIAL METHOD: ABNORMAL
EOSINOPHIL # BLD AUTO: 0.4 K/UL (ref 0–0.5)
EOSINOPHIL NFR BLD: 5.6 % (ref 0–8)
ERYTHROCYTE [DISTWIDTH] IN BLOOD BY AUTOMATED COUNT: 13.7 % (ref 11.5–14.5)
EST. GFR  (AFRICAN AMERICAN): >60 ML/MIN/1.73 M^2
EST. GFR  (NON AFRICAN AMERICAN): >60 ML/MIN/1.73 M^2
ETHANOL SERPL-MCNC: <10 MG/DL
GLUCOSE SERPL-MCNC: 105 MG/DL (ref 70–110)
GLUCOSE UR QL STRIP: NEGATIVE
HCT VFR BLD AUTO: 42.6 % (ref 37–48.5)
HGB BLD-MCNC: 13.4 G/DL (ref 12–16)
HGB UR QL STRIP: NEGATIVE
HYALINE CASTS UR QL AUTO: 0 /LPF
IMM GRANULOCYTES # BLD AUTO: 0.02 K/UL (ref 0–0.04)
IMM GRANULOCYTES NFR BLD AUTO: 0.3 % (ref 0–0.5)
KETONES UR QL STRIP: ABNORMAL
LEUKOCYTE ESTERASE UR QL STRIP: ABNORMAL
LYMPHOCYTES # BLD AUTO: 0.9 K/UL (ref 1–4.8)
LYMPHOCYTES NFR BLD: 14.6 % (ref 18–48)
MCH RBC QN AUTO: 29 PG (ref 27–31)
MCHC RBC AUTO-ENTMCNC: 31.5 G/DL (ref 32–36)
MCV RBC AUTO: 92 FL (ref 82–98)
METHADONE UR QL SCN>300 NG/ML: NEGATIVE
MICROSCOPIC COMMENT: ABNORMAL
MONOCYTES # BLD AUTO: 0.5 K/UL (ref 0.3–1)
MONOCYTES NFR BLD: 7.1 % (ref 4–15)
NEUTROPHILS # BLD AUTO: 4.7 K/UL (ref 1.8–7.7)
NEUTROPHILS NFR BLD: 72.1 % (ref 38–73)
NITRITE UR QL STRIP: NEGATIVE
NRBC BLD-RTO: 0 /100 WBC
OPIATES UR QL SCN: NEGATIVE
PCP UR QL SCN>25 NG/ML: NEGATIVE
PH UR STRIP: 5 [PH] (ref 5–8)
PLATELET # BLD AUTO: 201 K/UL (ref 150–350)
PMV BLD AUTO: 11.8 FL (ref 9.2–12.9)
POTASSIUM SERPL-SCNC: 3.2 MMOL/L (ref 3.5–5.1)
PROT SERPL-MCNC: 7.6 G/DL (ref 6–8.4)
PROT UR QL STRIP: ABNORMAL
RBC # BLD AUTO: 4.62 M/UL (ref 4–5.4)
RBC #/AREA URNS AUTO: 0 /HPF (ref 0–4)
SODIUM SERPL-SCNC: 141 MMOL/L (ref 136–145)
SP GR UR STRIP: 1.02 (ref 1–1.03)
TOXICOLOGY INFORMATION: NORMAL
URN SPEC COLLECT METH UR: ABNORMAL
UROBILINOGEN UR STRIP-ACNC: 1 EU/DL
WBC # BLD AUTO: 6.46 K/UL (ref 3.9–12.7)
WBC #/AREA URNS AUTO: 1 /HPF (ref 0–5)

## 2020-04-14 PROCEDURE — 80053 COMPREHEN METABOLIC PANEL: CPT | Mod: ER

## 2020-04-14 PROCEDURE — 81025 URINE PREGNANCY TEST: CPT | Mod: ER

## 2020-04-14 PROCEDURE — 80307 DRUG TEST PRSMV CHEM ANLYZR: CPT | Mod: ER

## 2020-04-14 PROCEDURE — 99285 EMERGENCY DEPT VISIT HI MDM: CPT | Mod: 25,ER

## 2020-04-14 PROCEDURE — 93005 ELECTROCARDIOGRAM TRACING: CPT | Mod: ER

## 2020-04-14 PROCEDURE — 85025 COMPLETE CBC W/AUTO DIFF WBC: CPT | Mod: ER

## 2020-04-14 PROCEDURE — 25000003 PHARM REV CODE 250: Mod: ER | Performed by: EMERGENCY MEDICINE

## 2020-04-14 PROCEDURE — 93010 ELECTROCARDIOGRAM REPORT: CPT | Mod: ,,, | Performed by: INTERNAL MEDICINE

## 2020-04-14 PROCEDURE — 81000 URINALYSIS NONAUTO W/SCOPE: CPT | Mod: ER,59

## 2020-04-14 PROCEDURE — 93010 EKG 12-LEAD: ICD-10-PCS | Mod: ,,, | Performed by: INTERNAL MEDICINE

## 2020-04-14 PROCEDURE — 80320 DRUG SCREEN QUANTALCOHOLS: CPT | Mod: ER

## 2020-04-14 RX ORDER — POTASSIUM CHLORIDE 20 MEQ/1
40 TABLET, EXTENDED RELEASE ORAL
Status: COMPLETED | OUTPATIENT
Start: 2020-04-14 | End: 2020-04-14

## 2020-04-14 RX ADMIN — POTASSIUM CHLORIDE 40 MEQ: 1500 TABLET, EXTENDED RELEASE ORAL at 06:04
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
## 2020-04-14 NOTE — ED NOTES
JOANN Mahan  spoke with Gabe at  [facility]. Pt to be transferred to facility. Awaiting transport.

## 2020-04-14 NOTE — ED NOTES
Pt medication bottles:  Olanzapine 5mg   Bupropion HCL XL 150mg   Olanzapine 5mg   Fluoxetine 40mg   Gabapentin 300mg   Lisinopril 40mg   Hydroxyzine Neelam 25mg   Atomoxetine 40mg  1 pair of glasses

## 2020-04-15 PROBLEM — R82.90 ABNORMAL URINALYSIS: Status: ACTIVE | Noted: 2020-04-15

## 2020-04-15 NOTE — ED NOTES
"Per Jennifer, "  : Jennifer Martínez   Patient accepted by Gabe at [facility] ([address] [facility], [place]) for the service of Dr. Murray.     Report to be called to [phone]."       "

## 2020-04-15 NOTE — ED NOTES
Assumed care of pt.    Pt lying quietly watching tv. Respirations even, unlabored c SYLVIA.   Waiting on SPD

## 2020-07-04 ENCOUNTER — HOSPITAL ENCOUNTER (EMERGENCY)
Facility: HOSPITAL | Age: 50
Discharge: PSYCHIATRIC OR SUBSTANCE USE FACILITY | End: 2020-07-04
Attending: EMERGENCY MEDICINE
Payer: COMMERCIAL

## 2020-07-04 VITALS
TEMPERATURE: 98 F | OXYGEN SATURATION: 100 % | RESPIRATION RATE: 20 BRPM | SYSTOLIC BLOOD PRESSURE: 153 MMHG | DIASTOLIC BLOOD PRESSURE: 82 MMHG | HEART RATE: 82 BPM

## 2020-07-04 DIAGNOSIS — F23 ACUTE PSYCHOSIS: Primary | ICD-10-CM

## 2020-07-04 DIAGNOSIS — F15.10 METHAMPHETAMINE ABUSE: ICD-10-CM

## 2020-07-04 DIAGNOSIS — F31.2 BIPOLAR AFFECTIVE DISORDER, CURRENTLY MANIC, SEVERE, WITH PSYCHOTIC FEATURES: ICD-10-CM

## 2020-07-04 PROBLEM — Z13.9 ENCOUNTER FOR MEDICAL SCREENING EXAMINATION: Status: ACTIVE | Noted: 2020-07-04

## 2020-07-04 LAB
ALBUMIN SERPL BCP-MCNC: 3.7 G/DL (ref 3.5–5.2)
ALP SERPL-CCNC: 99 U/L (ref 38–126)
ALT SERPL W/O P-5'-P-CCNC: 16 U/L (ref 10–44)
AMPHET+METHAMPHET UR QL: NORMAL
ANION GAP SERPL CALC-SCNC: 5 MMOL/L (ref 8–16)
APAP SERPL-MCNC: <10 UG/ML (ref 10–20)
AST SERPL-CCNC: 29 U/L (ref 15–46)
B-HCG UR QL: NEGATIVE
BARBITURATES UR QL SCN>200 NG/ML: NEGATIVE
BASOPHILS # BLD AUTO: 0.04 K/UL (ref 0–0.2)
BASOPHILS NFR BLD: 0.8 % (ref 0–1.9)
BENZODIAZ UR QL SCN>200 NG/ML: NEGATIVE
BILIRUB SERPL-MCNC: 0.3 MG/DL (ref 0.1–1)
BUN SERPL-MCNC: 12 MG/DL (ref 7–17)
BZE UR QL SCN: NEGATIVE
CALCIUM SERPL-MCNC: 9.3 MG/DL (ref 8.7–10.5)
CANNABINOIDS UR QL SCN: NORMAL
CHLORIDE SERPL-SCNC: 109 MMOL/L (ref 95–110)
CO2 SERPL-SCNC: 31 MMOL/L (ref 23–29)
CREAT SERPL-MCNC: 0.87 MG/DL (ref 0.5–1.4)
CREAT UR-MCNC: 52.8 MG/DL (ref 15–325)
DIFFERENTIAL METHOD: ABNORMAL
EOSINOPHIL # BLD AUTO: 0.4 K/UL (ref 0–0.5)
EOSINOPHIL NFR BLD: 8.3 % (ref 0–8)
ERYTHROCYTE [DISTWIDTH] IN BLOOD BY AUTOMATED COUNT: 13.5 % (ref 11.5–14.5)
EST. GFR  (AFRICAN AMERICAN): >60 ML/MIN/1.73 M^2
EST. GFR  (NON AFRICAN AMERICAN): >60 ML/MIN/1.73 M^2
ETHANOL SERPL-MCNC: <10 MG/DL
GLUCOSE SERPL-MCNC: 109 MG/DL (ref 70–110)
HCT VFR BLD AUTO: 38.4 % (ref 37–48.5)
HGB BLD-MCNC: 12 G/DL (ref 12–16)
IMM GRANULOCYTES # BLD AUTO: 0.01 K/UL (ref 0–0.04)
IMM GRANULOCYTES NFR BLD AUTO: 0.2 % (ref 0–0.5)
LYMPHOCYTES # BLD AUTO: 1 K/UL (ref 1–4.8)
LYMPHOCYTES NFR BLD: 20.3 % (ref 18–48)
MCH RBC QN AUTO: 29.1 PG (ref 27–31)
MCHC RBC AUTO-ENTMCNC: 31.3 G/DL (ref 32–36)
MCV RBC AUTO: 93 FL (ref 82–98)
METHADONE UR QL SCN>300 NG/ML: NEGATIVE
MONOCYTES # BLD AUTO: 0.6 K/UL (ref 0.3–1)
MONOCYTES NFR BLD: 11.2 % (ref 4–15)
NEUTROPHILS # BLD AUTO: 3 K/UL (ref 1.8–7.7)
NEUTROPHILS NFR BLD: 59.2 % (ref 38–73)
NRBC BLD-RTO: 0 /100 WBC
OPIATES UR QL SCN: NEGATIVE
PCP UR QL SCN>25 NG/ML: NEGATIVE
PLATELET # BLD AUTO: 190 K/UL (ref 150–350)
PMV BLD AUTO: 10.7 FL (ref 9.2–12.9)
POTASSIUM SERPL-SCNC: 3.2 MMOL/L (ref 3.5–5.1)
PROT SERPL-MCNC: 6.8 G/DL (ref 6–8.4)
RBC # BLD AUTO: 4.12 M/UL (ref 4–5.4)
SALICYLATES SERPL-MCNC: <5 MG/DL (ref 15–30)
SARS-COV-2 RDRP RESP QL NAA+PROBE: NEGATIVE
SODIUM SERPL-SCNC: 145 MMOL/L (ref 136–145)
TOXICOLOGY INFORMATION: NORMAL
TSH SERPL DL<=0.005 MIU/L-ACNC: 0.88 UIU/ML (ref 0.4–4)
WBC # BLD AUTO: 5.08 K/UL (ref 3.9–12.7)

## 2020-07-04 PROCEDURE — 80329 ANALGESICS NON-OPIOID 1 OR 2: CPT | Mod: ER

## 2020-07-04 PROCEDURE — U0002 COVID-19 LAB TEST NON-CDC: HCPCS | Mod: ER

## 2020-07-04 PROCEDURE — 25000003 PHARM REV CODE 250: Mod: ER | Performed by: EMERGENCY MEDICINE

## 2020-07-04 PROCEDURE — 93005 ELECTROCARDIOGRAM TRACING: CPT | Mod: ER

## 2020-07-04 PROCEDURE — 81025 URINE PREGNANCY TEST: CPT | Mod: ER

## 2020-07-04 PROCEDURE — 85025 COMPLETE CBC W/AUTO DIFF WBC: CPT | Mod: ER

## 2020-07-04 PROCEDURE — S0166 INJ OLANZAPINE 2.5MG: HCPCS | Mod: ER | Performed by: EMERGENCY MEDICINE

## 2020-07-04 PROCEDURE — 84443 ASSAY THYROID STIM HORMONE: CPT | Mod: ER

## 2020-07-04 PROCEDURE — 96372 THER/PROPH/DIAG INJ SC/IM: CPT | Mod: ER

## 2020-07-04 PROCEDURE — 80307 DRUG TEST PRSMV CHEM ANLYZR: CPT | Mod: ER

## 2020-07-04 PROCEDURE — 93010 ELECTROCARDIOGRAM REPORT: CPT | Mod: ,,, | Performed by: INTERNAL MEDICINE

## 2020-07-04 PROCEDURE — 93010 EKG 12-LEAD: ICD-10-PCS | Mod: ,,, | Performed by: INTERNAL MEDICINE

## 2020-07-04 PROCEDURE — 80053 COMPREHEN METABOLIC PANEL: CPT | Mod: ER

## 2020-07-04 PROCEDURE — 80320 DRUG SCREEN QUANTALCOHOLS: CPT | Mod: ER

## 2020-07-04 PROCEDURE — 99285 EMERGENCY DEPT VISIT HI MDM: CPT | Mod: 25,ER

## 2020-07-04 RX ORDER — OLANZAPINE 10 MG/2ML
10 INJECTION, POWDER, FOR SOLUTION INTRAMUSCULAR
Status: COMPLETED | OUTPATIENT
Start: 2020-07-04 | End: 2020-07-04

## 2020-07-04 RX ADMIN — OLANZAPINE 10 MG: 10 INJECTION, POWDER, LYOPHILIZED, FOR SOLUTION INTRAMUSCULAR at 02:07

## 2020-07-04 NOTE — ED NOTES
Personal Belongings:  - White stone gem ring set in silver color metal  - Turquoise stone bracelet set in bronze metal  - Yellow and silver bracelet   - 2 neclaces on leather ropes  - 2 hairbands  - Beige shorts  - Pink shirt  - gray tennis shoes  - black cap  - Brown purse  - Open pack Ant cigarettes  - Blue prayer book  - Blue box  - Folded black wallet  - Pieces of paper     All personal belongings placed in 2 personal belongings bags  Belongings wanded by  and reviewed also  Placed in PEC cabinet

## 2020-07-04 NOTE — ED NOTES
"Speaking with Merlene  Pt accepted at [facility].  "Information is in chart" - but informed to hold calling report at this time.   Facility cannot take report until COVID test is resulted.   Intake Merlene will let me know when I can call report.   "

## 2020-07-04 NOTE — ED NOTES
Per Regina, report can be called to facility and transport is being called. Primary nurse notified.

## 2020-07-04 NOTE — ED NOTES
Report called to JOANN Mcgrath at [facility].  Questions and Concerns addressed.  Explained that we are awaiting transports.  Receiving RN verbalized understanding.  Notified I will call of there is anything to update.
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
## 2020-07-04 NOTE — ED NOTES
Notified [facility] has a negative COVID test.

## 2020-07-04 NOTE — ED NOTES
Pt able to verbalize that she understands she is in a hospital but pt has no recollections as to why she is here or how she got here.   Pt does not remember her significant other calling or why.  Updated pt on why I was reported she was here. Pt did not admit or deny her compliance with home medications.  Pt turned over and closed eyes.  Will continue to monitor.

## 2020-07-04 NOTE — ED NOTES
Pt was ambulatory to bathroom without assistance. Pt able to perform her personal ADL.  Pt ambulatory back to room when completed.  Pt MAEW and with NADN at this time.  Stretcher is low and locked; SR are raised x 2.   Instruct pt to call with problems, needs, or concerns. Explained to pt all she needs to do is notify sitter.  Pt verbalized understanding.  Will continue to monitor.
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
## 2020-07-10 PROBLEM — N39.0 UTI (URINARY TRACT INFECTION): Status: RESOLVED | Noted: 2019-04-03 | Resolved: 2020-07-10

## 2020-07-10 PROBLEM — F29 PSYCHOSIS: Status: RESOLVED | Noted: 2019-08-07 | Resolved: 2020-07-10

## 2020-07-10 PROBLEM — F32.9 MAJOR DEPRESSION: Status: RESOLVED | Noted: 2019-04-03 | Resolved: 2020-07-10

## 2020-07-10 PROBLEM — F15.950 AMPHETAMINE AND PSYCHOSTIMULANT-INDUCED PSYCHOSIS WITH DELUSIONS: Status: RESOLVED | Noted: 2019-03-20 | Resolved: 2020-07-10

## 2020-07-10 PROBLEM — F19.950 SUBSTANCE-INDUCED PSYCHOTIC DISORDER WITH DELUSIONS: Status: RESOLVED | Noted: 2019-04-20 | Resolved: 2020-07-10

## 2020-07-10 PROBLEM — F23 ACUTE PSYCHOSIS: Status: RESOLVED | Noted: 2019-07-25 | Resolved: 2020-07-10

## 2020-08-06 ENCOUNTER — HOSPITAL ENCOUNTER (EMERGENCY)
Facility: HOSPITAL | Age: 50
Discharge: PSYCHIATRIC OR SUBSTANCE USE FACILITY | End: 2020-08-06
Attending: EMERGENCY MEDICINE
Payer: COMMERCIAL

## 2020-08-06 VITALS
HEART RATE: 78 BPM | DIASTOLIC BLOOD PRESSURE: 97 MMHG | WEIGHT: 123 LBS | OXYGEN SATURATION: 100 % | RESPIRATION RATE: 18 BRPM | TEMPERATURE: 99 F | HEIGHT: 60 IN | SYSTOLIC BLOOD PRESSURE: 143 MMHG | BODY MASS INDEX: 24.15 KG/M2

## 2020-08-06 DIAGNOSIS — Z00.8 MEDICAL CLEARANCE FOR PSYCHIATRIC ADMISSION: ICD-10-CM

## 2020-08-06 DIAGNOSIS — I10 ESSENTIAL HYPERTENSION: ICD-10-CM

## 2020-08-06 DIAGNOSIS — F29 PSYCHOSIS, UNSPECIFIED PSYCHOSIS TYPE: Primary | ICD-10-CM

## 2020-08-06 LAB
ALBUMIN SERPL BCP-MCNC: 3.9 G/DL (ref 3.5–5.2)
ALP SERPL-CCNC: 110 U/L (ref 38–126)
ALT SERPL W/O P-5'-P-CCNC: 12 U/L (ref 10–44)
AMPHET+METHAMPHET UR QL: NORMAL
ANION GAP SERPL CALC-SCNC: 5 MMOL/L (ref 8–16)
APAP SERPL-MCNC: <10 UG/ML (ref 10–20)
AST SERPL-CCNC: 21 U/L (ref 15–46)
B-HCG UR QL: NEGATIVE
BACTERIA #/AREA URNS AUTO: NORMAL /HPF
BARBITURATES UR QL SCN>200 NG/ML: NEGATIVE
BASOPHILS # BLD AUTO: 0.02 K/UL (ref 0–0.2)
BASOPHILS NFR BLD: 0.4 % (ref 0–1.9)
BENZODIAZ UR QL SCN>200 NG/ML: NEGATIVE
BILIRUB SERPL-MCNC: 0.4 MG/DL (ref 0.1–1)
BILIRUB UR QL STRIP: NEGATIVE
BUN SERPL-MCNC: 12 MG/DL (ref 7–17)
BZE UR QL SCN: NEGATIVE
CALCIUM SERPL-MCNC: 9.7 MG/DL (ref 8.7–10.5)
CANNABINOIDS UR QL SCN: NORMAL
CAOX CRY UR QL COMP ASSIST: NORMAL
CHLORIDE SERPL-SCNC: 107 MMOL/L (ref 95–110)
CK SERPL-CCNC: 40 U/L (ref 55–170)
CLARITY UR REFRACT.AUTO: CLEAR
CO2 SERPL-SCNC: 30 MMOL/L (ref 23–29)
COLOR UR AUTO: ABNORMAL
CREAT SERPL-MCNC: 1.02 MG/DL (ref 0.5–1.4)
CREAT UR-MCNC: 250.5 MG/DL (ref 15–325)
DIFFERENTIAL METHOD: ABNORMAL
EOSINOPHIL # BLD AUTO: 0.2 K/UL (ref 0–0.5)
EOSINOPHIL NFR BLD: 4 % (ref 0–8)
ERYTHROCYTE [DISTWIDTH] IN BLOOD BY AUTOMATED COUNT: 12.8 % (ref 11.5–14.5)
EST. GFR  (AFRICAN AMERICAN): >60 ML/MIN/1.73 M^2
EST. GFR  (NON AFRICAN AMERICAN): >60 ML/MIN/1.73 M^2
ETHANOL SERPL-MCNC: <10 MG/DL
GLUCOSE SERPL-MCNC: 100 MG/DL (ref 70–110)
GLUCOSE UR QL STRIP: NEGATIVE
HCT VFR BLD AUTO: 38.5 % (ref 37–48.5)
HGB BLD-MCNC: 12.2 G/DL (ref 12–16)
HGB UR QL STRIP: NEGATIVE
IMM GRANULOCYTES # BLD AUTO: 0.01 K/UL (ref 0–0.04)
IMM GRANULOCYTES NFR BLD AUTO: 0.2 % (ref 0–0.5)
KETONES UR QL STRIP: ABNORMAL
LEUKOCYTE ESTERASE UR QL STRIP: ABNORMAL
LYMPHOCYTES # BLD AUTO: 1.1 K/UL (ref 1–4.8)
LYMPHOCYTES NFR BLD: 23.8 % (ref 18–48)
MCH RBC QN AUTO: 29.1 PG (ref 27–31)
MCHC RBC AUTO-ENTMCNC: 31.7 G/DL (ref 32–36)
MCV RBC AUTO: 92 FL (ref 82–98)
METHADONE UR QL SCN>300 NG/ML: NEGATIVE
MICROSCOPIC COMMENT: NORMAL
MONOCYTES # BLD AUTO: 0.4 K/UL (ref 0.3–1)
MONOCYTES NFR BLD: 8.2 % (ref 4–15)
NEUTROPHILS # BLD AUTO: 3 K/UL (ref 1.8–7.7)
NEUTROPHILS NFR BLD: 63.4 % (ref 38–73)
NITRITE UR QL STRIP: NEGATIVE
NRBC BLD-RTO: 0 /100 WBC
OPIATES UR QL SCN: NEGATIVE
PCP UR QL SCN>25 NG/ML: NEGATIVE
PH UR STRIP: 6 [PH] (ref 5–8)
PLATELET # BLD AUTO: 198 K/UL (ref 150–350)
PMV BLD AUTO: 10.8 FL (ref 9.2–12.9)
POTASSIUM SERPL-SCNC: 3.3 MMOL/L (ref 3.5–5.1)
PROT SERPL-MCNC: 7.5 G/DL (ref 6–8.4)
PROT UR QL STRIP: ABNORMAL
RBC # BLD AUTO: 4.19 M/UL (ref 4–5.4)
SALICYLATES SERPL-MCNC: <5 MG/DL (ref 15–30)
SARS-COV-2 RDRP RESP QL NAA+PROBE: NEGATIVE
SODIUM SERPL-SCNC: 142 MMOL/L (ref 136–145)
SP GR UR STRIP: 1.02 (ref 1–1.03)
TOXICOLOGY INFORMATION: NORMAL
URN SPEC COLLECT METH UR: ABNORMAL
UROBILINOGEN UR STRIP-ACNC: NEGATIVE EU/DL
WBC # BLD AUTO: 4.74 K/UL (ref 3.9–12.7)
WBC #/AREA URNS AUTO: 4 /HPF (ref 0–5)

## 2020-08-06 PROCEDURE — 93010 EKG 12-LEAD: ICD-10-PCS | Mod: ,,, | Performed by: INTERNAL MEDICINE

## 2020-08-06 PROCEDURE — 82550 ASSAY OF CK (CPK): CPT | Mod: ER

## 2020-08-06 PROCEDURE — 81000 URINALYSIS NONAUTO W/SCOPE: CPT | Mod: ER,59

## 2020-08-06 PROCEDURE — 85025 COMPLETE CBC W/AUTO DIFF WBC: CPT | Mod: ER

## 2020-08-06 PROCEDURE — 25000003 PHARM REV CODE 250: Mod: ER | Performed by: EMERGENCY MEDICINE

## 2020-08-06 PROCEDURE — 80329 ANALGESICS NON-OPIOID 1 OR 2: CPT | Mod: ER

## 2020-08-06 PROCEDURE — 80053 COMPREHEN METABOLIC PANEL: CPT | Mod: ER

## 2020-08-06 PROCEDURE — 93010 ELECTROCARDIOGRAM REPORT: CPT | Mod: ,,, | Performed by: INTERNAL MEDICINE

## 2020-08-06 PROCEDURE — 93005 ELECTROCARDIOGRAM TRACING: CPT | Mod: ER

## 2020-08-06 PROCEDURE — U0002 COVID-19 LAB TEST NON-CDC: HCPCS | Mod: ER

## 2020-08-06 PROCEDURE — 80320 DRUG SCREEN QUANTALCOHOLS: CPT | Mod: ER

## 2020-08-06 PROCEDURE — 81025 URINE PREGNANCY TEST: CPT | Mod: ER

## 2020-08-06 PROCEDURE — 80307 DRUG TEST PRSMV CHEM ANLYZR: CPT | Mod: ER

## 2020-08-06 PROCEDURE — 99285 EMERGENCY DEPT VISIT HI MDM: CPT | Mod: 25,ER

## 2020-08-06 RX ORDER — OLANZAPINE 5 MG/1
5 TABLET, ORALLY DISINTEGRATING ORAL
Status: COMPLETED | OUTPATIENT
Start: 2020-08-06 | End: 2020-08-06

## 2020-08-06 RX ORDER — LABETALOL HYDROCHLORIDE 5 MG/ML
20 INJECTION, SOLUTION INTRAVENOUS
Status: DISCONTINUED | OUTPATIENT
Start: 2020-08-06 | End: 2020-08-06

## 2020-08-06 RX ORDER — LISINOPRIL 20 MG/1
40 TABLET ORAL
Status: DISCONTINUED | OUTPATIENT
Start: 2020-08-06 | End: 2020-08-06

## 2020-08-06 RX ORDER — AMLODIPINE BESYLATE 5 MG/1
10 TABLET ORAL
Status: DISCONTINUED | OUTPATIENT
Start: 2020-08-06 | End: 2020-08-06

## 2020-08-06 RX ADMIN — OLANZAPINE 5 MG: 5 TABLET, ORALLY DISINTEGRATING ORAL at 09:08

## 2020-08-06 NOTE — ED NOTES
Ronni Samson (boyfriend)- [phone]  Pt states it is ok to given information.     Zee Bernabe RN  08/06/20 9680

## 2020-08-06 NOTE — ED PROVIDER NOTES
"Encounter Date: 8/6/2020       History     Chief Complaint   Patient presents with    Psychiatric Evaluation     Pt with c/o feeling like she is having a psychotic episode. Pt unable to describe symptoms she is experiencing. Pt reports snorting meth recently. Pt states " I need to go to [facility] or [facility]."      Stephanie T Barthelemy is a 49 y.o. female who  has a past medical history of ADHD (attention deficit hyperactivity disorder), Anemia, Anxiety, Bipolar disorder, History of hepatitis C - s/p clearance of virus (HCV neg 6/2015) (9/26/2014), History of psychiatric hospitalization, History of substance abuse, psychiatric care, Hypertension, Opioid overdose (5/10/2016), Psychiatric problem, Seizure disorder (4/3/2019), Substance abuse, Therapy, and Vasculitis.    She presents today for psychiatric evaluation. She reports using methamphetamine and "feeling like I am having a psychotic episode".  She is noted to be talking to herself on exam.  She denies suicidal homicidal ideation.  She denies new pain numbness weakness vomiting or diarrhea.  She reports her hearing voices that are are "outside my head".  She denies visual hallucinations.        Review of patient's allergies indicates:  No Known Allergies  Past Medical History:   Diagnosis Date    ADHD (attention deficit hyperactivity disorder)     Anemia     Anxiety     Bipolar disorder     History of hepatitis C - s/p clearance of virus (HCV neg 6/2015) 9/26/2014    History of psychiatric hospitalization     History of substance abuse     IV heroin - last use 2013 per pt    Hx of psychiatric care     Hypertension     Opioid overdose 5/10/2016    Psychiatric problem     Seizure disorder 4/3/2019    Substance abuse     Therapy     Vasculitis      Past Surgical History:   Procedure Laterality Date    HYSTERECTOMY  3/16/2011    SALPINGOOPHORECTOMY Right 3/16/2011    TUBAL LIGATION      Vaginal cuff repair  04/22/2011     Family History "   Problem Relation Age of Onset    Diabetes Maternal Grandmother     Cancer Maternal Grandmother      Social History     Tobacco Use    Smoking status: Current Every Day Smoker     Packs/day: 1.00     Years: 30.00     Pack years: 30.00     Start date: 4/15/1993    Smokeless tobacco: Never Used   Substance Use Topics    Alcohol use: No    Drug use: Yes     Types: Heroin, Cocaine, Methamphetamines, Marijuana     Comment: used Meth yesterday     Review of Systems   Constitutional: Negative for chills and fever.   HENT: Negative for sore throat.    Respiratory: Negative for cough and shortness of breath.    Cardiovascular: Negative for chest pain.   Gastrointestinal: Negative for nausea and vomiting.   Genitourinary: Negative for dysuria, frequency and urgency.   Musculoskeletal: Negative for back pain, neck pain and neck stiffness.   Skin: Negative for rash and wound.   Neurological: Negative for syncope and weakness.   Hematological: Does not bruise/bleed easily.   Psychiatric/Behavioral: Positive for agitation and hallucinations. Negative for behavioral problems and confusion. The patient is nervous/anxious.        Physical Exam     Initial Vitals [08/06/20 1706]   BP Pulse Resp Temp SpO2   (!) 153/96 (!) 112 18 98.4 °F (36.9 °C) 97 %      MAP       --         Physical Exam    Constitutional:  Non-toxic appearance. No distress.   Appears older than stated age  Disorganized   HENT:   Head: Normocephalic and atraumatic.   Poor dentition no evidence of acute infection   Eyes: Conjunctivae and EOM are normal. Pupils are equal, round, and reactive to light. Right eye exhibits no nystagmus. Left eye exhibits no nystagmus.   Neck: Neck supple.   Cardiovascular: Regular rhythm, S1 normal and S2 normal.   No murmur heard.  Pulmonary/Chest: Breath sounds normal. No respiratory distress. She has no wheezes. She has no rales.   Abdominal: Soft. She exhibits no distension. There is no abdominal tenderness.    Musculoskeletal:      Comments: No deformity, moving all extremities.   Neurological: She is alert. No cranial nerve deficit.   Skin: Skin is warm. No rash noted.   Psychiatric:   Patient talking to herself, clearly responding to internal stimuli, rocking back and forth and agitated.         ED Course   Procedures  Labs Reviewed   CBC W/ AUTO DIFFERENTIAL - Abnormal; Notable for the following components:       Result Value    Mean Corpuscular Hemoglobin Conc 31.7 (*)     All other components within normal limits   COMPREHENSIVE METABOLIC PANEL - Abnormal; Notable for the following components:    Potassium 3.3 (*)     CO2 30 (*)     Anion Gap 5 (*)     All other components within normal limits   URINALYSIS, REFLEX TO URINE CULTURE - Abnormal; Notable for the following components:    Protein, UA Trace (*)     Ketones, UA 1+ (*)     Leukocytes, UA 1+ (*)     All other components within normal limits    Narrative:     Specimen Source->Urine   SALICYLATE LEVEL - Abnormal; Notable for the following components:    Salicylate Lvl <5.0 (*)     All other components within normal limits   CK - Abnormal; Notable for the following components:    CPK 40 (*)     All other components within normal limits   DRUG SCREEN PANEL, URINE EMERGENCY    Narrative:     Specimen Source->Urine   ALCOHOL,MEDICAL (ETHANOL)   ACETAMINOPHEN LEVEL   SARS-COV-2 RNA AMPLIFICATION, QUAL   PREGNANCY TEST, URINE RAPID    Narrative:     Specimen Source->Urine   URINALYSIS MICROSCOPIC    Narrative:     Specimen Source->Urine     EKG Readings: (Independently Interpreted)   Initial Reading: No STEMI. Rhythm: Sinus Tachycardia. Ectopy: No Ectopy.   107       Imaging Results    None          Medical Decision Making:   Differential Diagnosis:   Differential Diagnosis includes, but is not limited to:  Decompensated psychiatric disease (schizophrenia, bipolar disorder, major depression), excited delirium, medication noncompliance, substance abuse/withdrawal,  intentional drug overdose, medication toxicity, APAP/ASA overdose, acute stress reaction, personality disorder, malingering, metabolic derangement\                     ED Course as of Aug 07 0948   Thu Aug 06, 2020   1823 Patient with history of methamphetamine use and psychosis presented today with responding to internal stimuli and bizarre behavior.  I feel she is having auditory hallucinations at this time period and needs pec criteria for grave disability.  Will obtain labs for medical clearance and reassess.  Care signed out to oncoming physician with disposition planned for transfer when medically cleared.    [RN]   2048 Patient medically clear for psychiatric transfer    [MB]      ED Course User Index  [MB] Reji Laird MD  [RN] Sean Aparicio Jr., MD                Clinical Impression:       ICD-10-CM ICD-9-CM   1. Medical clearance for psychiatric admission  Z00.8 V70.8       Portions of this note were dictated using voice recognition software and may contain dictation related errors in spelling/grammar/syntax not found on text review                             Sean Aparicio Jr., MD  08/06/20 1824       Sean Aparicio Jr., MD  08/06/20 1826       Sean Aparicio Jr., MD  08/07/20 0949

## 2020-08-07 PROBLEM — F29 PSYCHOSIS: Status: ACTIVE | Noted: 2020-08-07

## 2020-08-07 NOTE — ED NOTES
Belongings placed in bag and stored.    Pants  Shoes  Socks  Shirt  Bra    $61 and debit cards secured with Security.     Zee Bernabe RN  08/06/20 3145

## 2020-08-07 NOTE — ED NOTES
Received a call from Erika at the transfer center. Pt has been accepted by Dr. Lemons at [facility].      Dot Ovalles RN  08/06/20 6680

## 2020-08-07 NOTE — ED NOTES
Pt is PEC'd. PEC given to registration to be scanned into the chart.      Dot Ovalles RN  08/06/20 191

## 2020-08-07 NOTE — ED NOTES
Pt provided with meal. Pt sitting up eating.   Risk sitter at bedside for pt safety.      Lisette Sales RN  08/06/20 0828

## 2020-08-07 NOTE — ED NOTES
Pt ambulated to the restroom to obtain a urine specimen. Gait is steady.      Dot Ovalles RN  08/06/20 1947

## 2020-08-07 NOTE — ED NOTES
Pts macie updated on pt placement at [facility]      Lisette Sales RN  08/06/20 6372

## 2020-08-07 NOTE — ED NOTES
[facility], [address] arrived to transport patient to [facility].      Dot Ovalles RN  08/06/20 6492

## 2020-08-07 NOTE — ED NOTES
Pt lying on stretcher with eyes closed, breathing even and non-labored, pt in no acute distress. Pt easily arousable. 1:1 sitter outside of patient's door in view of patient. Will continue to monitor.      Dot Ovalles RN  08/06/20 5246

## 2020-08-07 NOTE — ED NOTES
Pt is lying on stretcher with eyes closed, breathing is even and non-labored. 1:1 sitter outside of patient's door in view of patient. Will continue to monitor.      Dot Ovalles RN  08/06/20 1965

## 2020-08-07 NOTE — ED NOTES
Notified Dr. Laird of patient's recent pressure of 162/101. No new orders received at this time.      Dot Ovalles RN  08/06/20 4582

## 2020-08-07 NOTE — ED NOTES
Report received from JOANN Koch. Assumed care of this patient.      Dot Ovalles RN  08/06/20 7793

## 2020-08-14 PROBLEM — F29 PSYCHOSIS: Status: RESOLVED | Noted: 2020-08-07 | Resolved: 2020-08-14

## 2020-08-31 NOTE — ED NOTES
8/31/20 @ 1415  Attempted to call pt, busy signal on phone number listed.  Call to pt emergency contact, Kiya Waite, notified that pt personal belongings were left at ED, states will have someone come to  bag of pt belongings when they are available.

## 2020-09-14 ENCOUNTER — HOSPITAL ENCOUNTER (EMERGENCY)
Facility: HOSPITAL | Age: 50
Discharge: PSYCHIATRIC OR SUBSTANCE USE FACILITY | End: 2020-09-14
Attending: EMERGENCY MEDICINE
Payer: COMMERCIAL

## 2020-09-14 VITALS
HEART RATE: 69 BPM | HEIGHT: 62 IN | WEIGHT: 120 LBS | DIASTOLIC BLOOD PRESSURE: 67 MMHG | TEMPERATURE: 98 F | RESPIRATION RATE: 20 BRPM | OXYGEN SATURATION: 98 % | BODY MASS INDEX: 22.08 KG/M2 | SYSTOLIC BLOOD PRESSURE: 159 MMHG

## 2020-09-14 DIAGNOSIS — F19.10 POLYSUBSTANCE ABUSE: ICD-10-CM

## 2020-09-14 DIAGNOSIS — F29 PSYCHOSIS, UNSPECIFIED PSYCHOSIS TYPE: Primary | ICD-10-CM

## 2020-09-14 DIAGNOSIS — Z00.8 MEDICAL CLEARANCE FOR PSYCHIATRIC ADMISSION: ICD-10-CM

## 2020-09-14 LAB
ALBUMIN SERPL BCP-MCNC: 3.6 G/DL (ref 3.5–5.2)
ALP SERPL-CCNC: 125 U/L (ref 55–135)
ALT SERPL W/O P-5'-P-CCNC: 18 U/L (ref 10–44)
AMORPH CRY URNS QL MICRO: ABNORMAL
AMPHET+METHAMPHET UR QL: NORMAL
ANION GAP SERPL CALC-SCNC: 6 MMOL/L (ref 8–16)
APAP SERPL-MCNC: <3 UG/ML (ref 10–20)
AST SERPL-CCNC: 26 U/L (ref 10–40)
B-HCG UR QL: NEGATIVE
BACTERIA #/AREA URNS HPF: ABNORMAL /HPF
BARBITURATES UR QL SCN>200 NG/ML: NEGATIVE
BASOPHILS # BLD AUTO: 0.03 K/UL (ref 0–0.2)
BASOPHILS NFR BLD: 0.5 % (ref 0–1.9)
BENZODIAZ UR QL SCN>200 NG/ML: NEGATIVE
BILIRUB SERPL-MCNC: 0.4 MG/DL (ref 0.1–1)
BILIRUB UR QL STRIP: NEGATIVE
BUN SERPL-MCNC: 12 MG/DL (ref 6–20)
BZE UR QL SCN: NEGATIVE
CALCIUM SERPL-MCNC: 9.3 MG/DL (ref 8.7–10.5)
CANNABINOIDS UR QL SCN: NEGATIVE
CHLORIDE SERPL-SCNC: 109 MMOL/L (ref 95–110)
CLARITY UR: ABNORMAL
CO2 SERPL-SCNC: 31 MMOL/L (ref 23–29)
COLOR UR: YELLOW
CREAT SERPL-MCNC: 1 MG/DL (ref 0.5–1.4)
CREAT UR-MCNC: 27.6 MG/DL (ref 15–325)
DIFFERENTIAL METHOD: ABNORMAL
EOSINOPHIL # BLD AUTO: 0.3 K/UL (ref 0–0.5)
EOSINOPHIL NFR BLD: 4.7 % (ref 0–8)
ERYTHROCYTE [DISTWIDTH] IN BLOOD BY AUTOMATED COUNT: 13.9 % (ref 11.5–14.5)
EST. GFR  (AFRICAN AMERICAN): >60 ML/MIN/1.73 M^2
EST. GFR  (NON AFRICAN AMERICAN): >60 ML/MIN/1.73 M^2
ETHANOL SERPL-MCNC: <10 MG/DL
GLUCOSE SERPL-MCNC: 95 MG/DL (ref 70–110)
GLUCOSE UR QL STRIP: NEGATIVE
HCT VFR BLD AUTO: 37.2 % (ref 37–48.5)
HGB BLD-MCNC: 11.8 G/DL (ref 12–16)
HGB UR QL STRIP: NEGATIVE
IMM GRANULOCYTES # BLD AUTO: 0.01 K/UL (ref 0–0.04)
IMM GRANULOCYTES NFR BLD AUTO: 0.2 % (ref 0–0.5)
KETONES UR QL STRIP: ABNORMAL
LEUKOCYTE ESTERASE UR QL STRIP: ABNORMAL
LYMPHOCYTES # BLD AUTO: 0.7 K/UL (ref 1–4.8)
LYMPHOCYTES NFR BLD: 11.6 % (ref 18–48)
MCH RBC QN AUTO: 29.1 PG (ref 27–31)
MCHC RBC AUTO-ENTMCNC: 31.7 G/DL (ref 32–36)
MCV RBC AUTO: 92 FL (ref 82–98)
METHADONE UR QL SCN>300 NG/ML: NEGATIVE
MICROSCOPIC COMMENT: ABNORMAL
MONOCYTES # BLD AUTO: 0.5 K/UL (ref 0.3–1)
MONOCYTES NFR BLD: 8.2 % (ref 4–15)
NEUTROPHILS # BLD AUTO: 4.8 K/UL (ref 1.8–7.7)
NEUTROPHILS NFR BLD: 74.8 % (ref 38–73)
NITRITE UR QL STRIP: NEGATIVE
NRBC BLD-RTO: 0 /100 WBC
OPIATES UR QL SCN: NEGATIVE
PCP UR QL SCN>25 NG/ML: NEGATIVE
PH UR STRIP: 8 [PH] (ref 5–8)
PLATELET # BLD AUTO: 150 K/UL (ref 150–350)
PMV BLD AUTO: 11.8 FL (ref 9.2–12.9)
POTASSIUM SERPL-SCNC: 3.2 MMOL/L (ref 3.5–5.1)
PROT SERPL-MCNC: 7 G/DL (ref 6–8.4)
PROT UR QL STRIP: NEGATIVE
RBC # BLD AUTO: 4.06 M/UL (ref 4–5.4)
RBC #/AREA URNS HPF: 1 /HPF (ref 0–4)
SARS-COV-2 RDRP RESP QL NAA+PROBE: NEGATIVE
SODIUM SERPL-SCNC: 146 MMOL/L (ref 136–145)
SP GR UR STRIP: 1.02 (ref 1–1.03)
SQUAMOUS #/AREA URNS HPF: 1 /HPF
TOXICOLOGY INFORMATION: NORMAL
TSH SERPL DL<=0.005 MIU/L-ACNC: 0.7 UIU/ML (ref 0.4–4)
URN SPEC COLLECT METH UR: ABNORMAL
UROBILINOGEN UR STRIP-ACNC: NEGATIVE EU/DL
WBC # BLD AUTO: 6.36 K/UL (ref 3.9–12.7)
WBC #/AREA URNS HPF: 2 /HPF (ref 0–5)

## 2020-09-14 PROCEDURE — 80329 ANALGESICS NON-OPIOID 1 OR 2: CPT

## 2020-09-14 PROCEDURE — G0425 INPT/ED TELECONSULT30: HCPCS | Mod: 95,,, | Performed by: PSYCHIATRY & NEUROLOGY

## 2020-09-14 PROCEDURE — 25000003 PHARM REV CODE 250: Performed by: EMERGENCY MEDICINE

## 2020-09-14 PROCEDURE — 99285 EMERGENCY DEPT VISIT HI MDM: CPT | Mod: 25

## 2020-09-14 PROCEDURE — 81025 URINE PREGNANCY TEST: CPT

## 2020-09-14 PROCEDURE — 63600175 PHARM REV CODE 636 W HCPCS: Performed by: EMERGENCY MEDICINE

## 2020-09-14 PROCEDURE — 80307 DRUG TEST PRSMV CHEM ANLYZR: CPT

## 2020-09-14 PROCEDURE — U0002 COVID-19 LAB TEST NON-CDC: HCPCS

## 2020-09-14 PROCEDURE — 80320 DRUG SCREEN QUANTALCOHOLS: CPT

## 2020-09-14 PROCEDURE — 80053 COMPREHEN METABOLIC PANEL: CPT

## 2020-09-14 PROCEDURE — 85025 COMPLETE CBC W/AUTO DIFF WBC: CPT

## 2020-09-14 PROCEDURE — 84443 ASSAY THYROID STIM HORMONE: CPT

## 2020-09-14 PROCEDURE — G0425 PR INPT TELEHEALTH CONSULT 30M: ICD-10-PCS | Mod: 95,,, | Performed by: PSYCHIATRY & NEUROLOGY

## 2020-09-14 PROCEDURE — 96372 THER/PROPH/DIAG INJ SC/IM: CPT

## 2020-09-14 PROCEDURE — 81000 URINALYSIS NONAUTO W/SCOPE: CPT | Mod: 59

## 2020-09-14 RX ORDER — HYDRALAZINE HYDROCHLORIDE 25 MG/1
25 TABLET, FILM COATED ORAL
Status: COMPLETED | OUTPATIENT
Start: 2020-09-14 | End: 2020-09-14

## 2020-09-14 RX ORDER — METOPROLOL TARTRATE 25 MG/1
25 TABLET, FILM COATED ORAL
Status: COMPLETED | OUTPATIENT
Start: 2020-09-14 | End: 2020-09-14

## 2020-09-14 RX ORDER — LISINOPRIL 10 MG/1
10 TABLET ORAL
Status: COMPLETED | OUTPATIENT
Start: 2020-09-14 | End: 2020-09-14

## 2020-09-14 RX ORDER — AMLODIPINE BESYLATE 5 MG/1
5 TABLET ORAL
Status: COMPLETED | OUTPATIENT
Start: 2020-09-14 | End: 2020-09-14

## 2020-09-14 RX ADMIN — LISINOPRIL 10 MG: 10 TABLET ORAL at 07:09

## 2020-09-14 RX ADMIN — AMLODIPINE BESYLATE 5 MG: 5 TABLET ORAL at 07:09

## 2020-09-14 RX ADMIN — LORAZEPAM 1 MG: 2 INJECTION INTRAMUSCULAR; INTRAVENOUS at 03:09

## 2020-09-14 RX ADMIN — HYDRALAZINE HYDROCHLORIDE 25 MG: 25 TABLET, FILM COATED ORAL at 07:09

## 2020-09-14 RX ADMIN — METOPROLOL TARTRATE 25 MG: 25 TABLET ORAL at 03:09
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
## 2020-09-14 NOTE — ED NOTES
Present to ED via EMS with bizarre behavior. EMS reports pt entered someone else home to take a shower and began walking around banging on door. Pt rambling with flight of ideas. Denies SI. No acute distress.

## 2020-09-15 NOTE — ED NOTES
Pt transferred to behavioral facility via SPD. SPD  given 1 bag of pt belongings, original PEC and informed pt is a flight risk.
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
## 2020-09-15 NOTE — ED NOTES
Pt currently in bed, respirations even and unlabored. Pt is asleep. NAD noted. Risk sitter at bedside.

## 2020-09-25 ENCOUNTER — HOSPITAL ENCOUNTER (EMERGENCY)
Facility: HOSPITAL | Age: 50
Discharge: HOME | End: 2020-09-25
Attending: EMERGENCY MEDICINE
Payer: COMMERCIAL

## 2020-09-25 VITALS
SYSTOLIC BLOOD PRESSURE: 175 MMHG | HEART RATE: 100 BPM | BODY MASS INDEX: 21.9 KG/M2 | HEIGHT: 62 IN | TEMPERATURE: 98 F | OXYGEN SATURATION: 98 % | DIASTOLIC BLOOD PRESSURE: 104 MMHG | RESPIRATION RATE: 16 BRPM | WEIGHT: 119 LBS

## 2020-09-25 DIAGNOSIS — R03.0 ELEVATED BLOOD PRESSURE READING: ICD-10-CM

## 2020-09-25 DIAGNOSIS — M79.671 RIGHT FOOT PAIN: Primary | ICD-10-CM

## 2020-09-25 PROCEDURE — 25000003 PHARM REV CODE 250: Mod: ER | Performed by: EMERGENCY MEDICINE

## 2020-09-25 PROCEDURE — 99283 EMERGENCY DEPT VISIT LOW MDM: CPT | Mod: 25,ER

## 2020-09-25 RX ORDER — IBUPROFEN 600 MG/1
600 TABLET ORAL
Status: COMPLETED | OUTPATIENT
Start: 2020-09-25 | End: 2020-09-25

## 2020-09-25 RX ADMIN — IBUPROFEN 600 MG: 600 TABLET, FILM COATED ORAL at 10:09

## 2020-09-26 NOTE — DISCHARGE INSTRUCTIONS
For pain:  Take Tylenol 650 mg every 4-6 hours. Also take either Aleve 220 mg every 12 hours OR ibuprofen 600-800 mg every 6 hours.    Your blood pressure was elevated today. Long-term uncontrolled high blood pressure can be very dangerous and lead to serious illnesses such as heart disease and stroke.  It is imperative that you arrange follow-up with your primary care physician because adjustments of your medications might be needed to get your blood pressure under control.
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
## 2020-09-26 NOTE — ED NOTES
Re-check done on patient's BP. /104.   Pt reports that she takes Lisinopril for BP.  Pt states she is compliant with her medication and took it this morning.

## 2020-10-01 ENCOUNTER — HOSPITAL ENCOUNTER (EMERGENCY)
Facility: HOSPITAL | Age: 50
Discharge: HOME | End: 2020-10-01
Attending: EMERGENCY MEDICINE
Payer: COMMERCIAL

## 2020-10-01 VITALS
RESPIRATION RATE: 18 BRPM | TEMPERATURE: 98 F | OXYGEN SATURATION: 99 % | SYSTOLIC BLOOD PRESSURE: 180 MMHG | HEART RATE: 98 BPM | HEIGHT: 62 IN | BODY MASS INDEX: 21.9 KG/M2 | DIASTOLIC BLOOD PRESSURE: 99 MMHG | WEIGHT: 119 LBS

## 2020-10-01 DIAGNOSIS — I10 HYPERTENSION, UNSPECIFIED TYPE: ICD-10-CM

## 2020-10-01 DIAGNOSIS — S61.209A AVULSION OF SKIN OF FINGER, INITIAL ENCOUNTER: Primary | ICD-10-CM

## 2020-10-01 PROCEDURE — 25000003 PHARM REV CODE 250: Mod: ER | Performed by: EMERGENCY MEDICINE

## 2020-10-01 PROCEDURE — 99283 EMERGENCY DEPT VISIT LOW MDM: CPT | Mod: ER

## 2020-10-01 RX ORDER — AMLODIPINE BESYLATE 5 MG/1
5 TABLET ORAL
Status: COMPLETED | OUTPATIENT
Start: 2020-10-01 | End: 2020-10-01

## 2020-10-01 RX ORDER — LISINOPRIL 20 MG/1
40 TABLET ORAL
Status: COMPLETED | OUTPATIENT
Start: 2020-10-01 | End: 2020-10-01

## 2020-10-01 RX ADMIN — LISINOPRIL 40 MG: 20 TABLET ORAL at 01:10

## 2020-10-01 RX ADMIN — AMLODIPINE BESYLATE 5 MG: 5 TABLET ORAL at 01:10

## 2020-10-01 NOTE — ED PROVIDER NOTES
Chief Complaint: finger bleeding due to hitting a window     History of Present Illness:    Stephanie T Barthelemy 49 y.o. with a  has a past medical history of ADHD (attention deficit hyperactivity disorder), Anemia, Anxiety, Bipolar disorder, History of hepatitis C - s/p clearance of virus (HCV neg 6/2015) (9/26/2014), History of psychiatric hospitalization, History of substance abuse, psychiatric care, Hypertension, Opioid overdose (5/10/2016), Psychiatric problem, Seizure disorder (4/3/2019), Substance abuse, Therapy, and Vasculitis. who presents to the emergency department today with a complaint of finger bleeding due to hitting a window. She reports that she hit a window today to get out of her house. She then had bleeding to the finger. She has no pain anywhere else. She has a psychiatric history. She reports that she just discharged from [facility] and has follow up scheduled with outpatient psychiatry. She is not feeling suicidal, homicidal at this time. She does report not getting much sleep lately due to current stressors.     ROS    Constitutional: No fever, no chills.  Eyes: No discharge. No pain.  HENT: No nasal drainage. No ear ache. No sore throat.  Cardiovascular: No chest pain, no palpitations.  Respiratory: No cough, no shortness of breath.  Gastrointestinal: No abdominal pain, no vomiting. No diarrhea.  Genitourinary: No hematuria, dysuria, urgency.  Musculoskeletal: No back pain.   Skin: No rashes, no lesions.  Neurological: No headache, no focal weakness.    Otherwise remaining ROS negative     The history is provided by the patient      ALLERGIES REVIEWED  MEDICATIONS REVIEWED  PMH/PSH/SOC/FH REVIEWED       Past Medical History:   Diagnosis Date    ADHD (attention deficit hyperactivity disorder)     Anemia     Anxiety     Bipolar disorder     History of hepatitis C - s/p clearance of virus (HCV neg 6/2015) 9/26/2014    History of psychiatric hospitalization     History of  "substance abuse     IV heroin - last use 2013 per pt    Hx of psychiatric care     Hypertension     Opioid overdose 5/10/2016    Psychiatric problem     Seizure disorder 4/3/2019    Substance abuse     Therapy     Vasculitis          Past Surgical History:   Procedure Laterality Date    HYSTERECTOMY  3/16/2011    SALPINGOOPHORECTOMY Right 3/16/2011    TUBAL LIGATION      Vaginal cuff repair  04/22/2011         Social History     Tobacco Use    Smoking status: Current Every Day Smoker     Packs/day: 1.00     Years: 30.00     Pack years: 30.00     Start date: 4/15/1993    Smokeless tobacco: Never Used   Substance Use Topics    Alcohol use: No    Drug use: Yes     Types: Heroin, Cocaine, Methamphetamines, Marijuana     Comment: used Meth yesterday       Family History   Problem Relation Age of Onset    Diabetes Maternal Grandmother     Cancer Maternal Grandmother        Nursing/Ancillary staff note reviewed.  VS reviewed         Physical Exam     BP (!) 180/99 (BP Location: Left arm, Patient Position: Sitting)   Pulse 98   Temp 98.1 °F (36.7 °C) (Oral)   Resp 18   Ht 5' 2" (1.575 m)   Wt 54 kg (119 lb)   LMP 10/09/2019 (Within Weeks)   SpO2 99%   Breastfeeding No   BMI 21.77 kg/m²     Physical Exam    General Appearance: The patient is alert, has no immediate need for airway protection and no signs of toxicity. No acute distress. Lying in bed but able to sit up without difficulty.   HEENT: Eyes: Pupils equal and round no pallor or injection. Extra ocular movements intact. No drainage.       Mouth: Mucous membranes are moist. Oropharynx clear.   Neck:Neck is supple non-tender. No lymphadenopathy. No stridor.   Respiratory: There are no retractions, lungs are clear to auscultation. No wheezing, no crackles. Chest wall nontender to palpation.   Cardiovascular: Regular rate and rhythm. No murmurs, rubs or gallops.  Gastrointestinal:  Abdomen is soft and non-tender, no masses, bowel sounds normal. " No guarding, no rebound.  No pulsatile mass.   Neurological: Alert and oriented x 4. CN II-XII grossly intact. No focal weakness. Strength intact 5/5 bilaterally in upper and lower extremities.   Skin: Warm and dry, no rashes. There is a small avulsion of skin to to the dorsal aspect L middle finger. No active bleeding.   Musculoskeletal: Extremities are non-tender, non-swollen and have full range of motion. Back NTTP along the midline.     DIFFERENTIAL DIAGNOSIS: After history and physical exam a differential diagnosis was considered, but was not limited to, skins avulsion, laceration, fracture, dislocation        ED Course                 Medical Decision Making:   History:   I obtained history from:  The patient  Old Medical Records: I decided to obtain old medical records.   Reviewed and summarized the old medical record and it showed multiple visits to various EDs for psychiatric issues. Visit 9/23/20 for foot pain. She has known HTN which is often elevated     ED Management:   Stephanie T Barthelemy 49 y.o. female who presents to the ED today for evaluation after hitting a window. She has a small skin avulsion. No decreased ROM. No need for repair of the very small skin avulsion this will heal on its own. She has a psychiatric history but at this time is denying SI, HI. She does not appear to be gravely disabled here, she was just discharged from a psychiatric facility. She has appropriate outpt followup. She is appropriate for outpy follow up. I have discussed wound care with her appropriate wound care. The pt is comfortable with this plan and comfortable going home at this time. After taking into careful account the historical factors and physical exam findings of the patient's presentation today, in conjunction with the empirical and objective data obtained on ED workup, no acute emergent medical condition requiring admission has been identified. The patient appears to be low risk for an emergent medical  condition and I feel it is safe and appropriate at this time for the patient to be discharged to follow-up as detailed in their discharge instructions for reevaluation and possible continued outpatient workup and management. Regardless, an unremarkable evaluation in the ED does not preclude the development or presence of a serious or life threatening condition. As such, patient was instructed to return immediately for any worsening or change in current symptoms. Precautions for return discussed at length.  Discharge and follow-up instructions discussed with the patient who expressed understanding and willingness to comply with my recommendations.    Voice recognition software utilized in this note.              Impression      The primary encounter diagnosis was Avulsion of skin of finger, initial encounter. A diagnosis of Hypertension, unspecified type was also pertinent to this visit.                  Follow-up Information     Schedule an appointment as soon as possible for a visit  with Pepe Elizabeth MD.    Specialty: Family Medicine  Contact information:  [address] [place] [id number] [phone]                            Karol Narvaez MD  10/02/20 [id number]

## 2020-10-01 NOTE — DISCHARGE INSTRUCTIONS
Keep the area clean and dry.  Follow-up primary care physician in 2 days for wound recheck.  Watch for signs of infection including any worsening redness, pus, warmth, or fever.  If you notice these things please follow up with your primary care physician immediately return to emergency department.  Take medications as prescribed.  Refer to the additional materials provided for further information including when to return to the emergency department.

## 2020-10-02 PROBLEM — I16.1 HYPERTENSIVE EMERGENCY: Status: ACTIVE | Noted: 2020-10-02

## 2020-10-03 PROBLEM — I16.1 HYPERTENSIVE EMERGENCY: Status: RESOLVED | Noted: 2020-10-02 | Resolved: 2020-10-03

## 2020-10-03 PROBLEM — F29 PSYCHOSIS: Status: ACTIVE | Noted: 2020-10-03

## 2020-10-07 PROBLEM — F29 PSYCHOSIS: Status: RESOLVED | Noted: 2020-10-03 | Resolved: 2020-10-07

## 2020-10-14 ENCOUNTER — HOSPITAL ENCOUNTER (EMERGENCY)
Facility: HOSPITAL | Age: 50
Discharge: PSYCHIATRIC OR SUBSTANCE USE FACILITY | End: 2020-10-14
Attending: EMERGENCY MEDICINE
Payer: COMMERCIAL

## 2020-10-14 VITALS
WEIGHT: 119 LBS | RESPIRATION RATE: 18 BRPM | DIASTOLIC BLOOD PRESSURE: 83 MMHG | BODY MASS INDEX: 21.77 KG/M2 | SYSTOLIC BLOOD PRESSURE: 135 MMHG | OXYGEN SATURATION: 99 % | TEMPERATURE: 98 F | HEART RATE: 72 BPM

## 2020-10-14 DIAGNOSIS — Z00.8 MEDICAL CLEARANCE FOR PSYCHIATRIC ADMISSION: ICD-10-CM

## 2020-10-14 DIAGNOSIS — F19.10 POLYSUBSTANCE ABUSE: ICD-10-CM

## 2020-10-14 DIAGNOSIS — Z86.59 HISTORY OF BIPOLAR DISORDER: ICD-10-CM

## 2020-10-14 DIAGNOSIS — F23 ACUTE PSYCHOSIS: Primary | ICD-10-CM

## 2020-10-14 PROBLEM — F29 PSYCHOSIS: Status: ACTIVE | Noted: 2020-10-14

## 2020-10-14 LAB
ALBUMIN SERPL BCP-MCNC: 3.7 G/DL (ref 3.5–5.2)
ALP SERPL-CCNC: 114 U/L (ref 55–135)
ALT SERPL W/O P-5'-P-CCNC: 13 U/L (ref 10–44)
AMPHET+METHAMPHET UR QL: NORMAL
ANION GAP SERPL CALC-SCNC: 10 MMOL/L (ref 8–16)
APAP SERPL-MCNC: <3 UG/ML (ref 10–20)
AST SERPL-CCNC: 20 U/L (ref 10–40)
B-HCG UR QL: NEGATIVE
BARBITURATES UR QL SCN>200 NG/ML: NEGATIVE
BASOPHILS # BLD AUTO: 0.02 K/UL (ref 0–0.2)
BASOPHILS NFR BLD: 0.4 % (ref 0–1.9)
BENZODIAZ UR QL SCN>200 NG/ML: NEGATIVE
BILIRUB SERPL-MCNC: 0.3 MG/DL (ref 0.1–1)
BILIRUB UR QL STRIP: NEGATIVE
BUN SERPL-MCNC: 9 MG/DL (ref 6–20)
BZE UR QL SCN: NEGATIVE
CALCIUM SERPL-MCNC: 9.6 MG/DL (ref 8.7–10.5)
CANNABINOIDS UR QL SCN: NEGATIVE
CHLORIDE SERPL-SCNC: 104 MMOL/L (ref 95–110)
CLARITY UR: ABNORMAL
CO2 SERPL-SCNC: 27 MMOL/L (ref 23–29)
COLOR UR: YELLOW
CREAT SERPL-MCNC: 0.9 MG/DL (ref 0.5–1.4)
CREAT UR-MCNC: 173 MG/DL (ref 15–325)
CTP QC/QA: YES
DIFFERENTIAL METHOD: ABNORMAL
EOSINOPHIL # BLD AUTO: 0.1 K/UL (ref 0–0.5)
EOSINOPHIL NFR BLD: 2.4 % (ref 0–8)
ERYTHROCYTE [DISTWIDTH] IN BLOOD BY AUTOMATED COUNT: 13.6 % (ref 11.5–14.5)
EST. GFR  (AFRICAN AMERICAN): >60 ML/MIN/1.73 M^2
EST. GFR  (NON AFRICAN AMERICAN): >60 ML/MIN/1.73 M^2
ETHANOL SERPL-MCNC: <10 MG/DL
GLUCOSE SERPL-MCNC: 99 MG/DL (ref 70–110)
GLUCOSE UR QL STRIP: NEGATIVE
HCT VFR BLD AUTO: 40.9 % (ref 37–48.5)
HGB BLD-MCNC: 13.1 G/DL (ref 12–16)
HGB UR QL STRIP: NEGATIVE
IMM GRANULOCYTES # BLD AUTO: 0.01 K/UL (ref 0–0.04)
IMM GRANULOCYTES NFR BLD AUTO: 0.2 % (ref 0–0.5)
KETONES UR QL STRIP: NEGATIVE
LEUKOCYTE ESTERASE UR QL STRIP: NEGATIVE
LYMPHOCYTES # BLD AUTO: 0.8 K/UL (ref 1–4.8)
LYMPHOCYTES NFR BLD: 16.7 % (ref 18–48)
MCH RBC QN AUTO: 28.7 PG (ref 27–31)
MCHC RBC AUTO-ENTMCNC: 32 G/DL (ref 32–36)
MCV RBC AUTO: 90 FL (ref 82–98)
METHADONE UR QL SCN>300 NG/ML: NEGATIVE
MONOCYTES # BLD AUTO: 0.3 K/UL (ref 0.3–1)
MONOCYTES NFR BLD: 7.4 % (ref 4–15)
NEUTROPHILS # BLD AUTO: 3.4 K/UL (ref 1.8–7.7)
NEUTROPHILS NFR BLD: 72.9 % (ref 38–73)
NITRITE UR QL STRIP: NEGATIVE
NRBC BLD-RTO: 0 /100 WBC
OPIATES UR QL SCN: NEGATIVE
PCP UR QL SCN>25 NG/ML: NEGATIVE
PH UR STRIP: 7 [PH] (ref 5–8)
PLATELET # BLD AUTO: 172 K/UL (ref 150–350)
PMV BLD AUTO: 11.2 FL (ref 9.2–12.9)
POTASSIUM SERPL-SCNC: 3.3 MMOL/L (ref 3.5–5.1)
PROT SERPL-MCNC: 7.4 G/DL (ref 6–8.4)
PROT UR QL STRIP: ABNORMAL
RBC # BLD AUTO: 4.56 M/UL (ref 4–5.4)
SARS-COV-2 RDRP RESP QL NAA+PROBE: NEGATIVE
SODIUM SERPL-SCNC: 141 MMOL/L (ref 136–145)
SP GR UR STRIP: 1.01 (ref 1–1.03)
TOXICOLOGY INFORMATION: NORMAL
TSH SERPL DL<=0.005 MIU/L-ACNC: 1.35 UIU/ML (ref 0.4–4)
URN SPEC COLLECT METH UR: ABNORMAL
UROBILINOGEN UR STRIP-ACNC: NEGATIVE EU/DL
WBC # BLD AUTO: 4.6 K/UL (ref 3.9–12.7)

## 2020-10-14 PROCEDURE — U0002 COVID-19 LAB TEST NON-CDC: HCPCS

## 2020-10-14 PROCEDURE — 99285 EMERGENCY DEPT VISIT HI MDM: CPT | Mod: 25

## 2020-10-14 PROCEDURE — 80307 DRUG TEST PRSMV CHEM ANLYZR: CPT

## 2020-10-14 PROCEDURE — 84443 ASSAY THYROID STIM HORMONE: CPT

## 2020-10-14 PROCEDURE — 81025 URINE PREGNANCY TEST: CPT | Performed by: EMERGENCY MEDICINE

## 2020-10-14 PROCEDURE — 80320 DRUG SCREEN QUANTALCOHOLS: CPT

## 2020-10-14 PROCEDURE — 81003 URINALYSIS AUTO W/O SCOPE: CPT | Mod: 59

## 2020-10-14 PROCEDURE — 96372 THER/PROPH/DIAG INJ SC/IM: CPT

## 2020-10-14 PROCEDURE — 80053 COMPREHEN METABOLIC PANEL: CPT

## 2020-10-14 PROCEDURE — 63600175 PHARM REV CODE 636 W HCPCS: Performed by: EMERGENCY MEDICINE

## 2020-10-14 PROCEDURE — 85025 COMPLETE CBC W/AUTO DIFF WBC: CPT

## 2020-10-14 PROCEDURE — 93005 ELECTROCARDIOGRAM TRACING: CPT

## 2020-10-14 PROCEDURE — 80329 ANALGESICS NON-OPIOID 1 OR 2: CPT

## 2020-10-14 RX ORDER — DIPHENHYDRAMINE HYDROCHLORIDE 50 MG/ML
50 INJECTION INTRAMUSCULAR; INTRAVENOUS
Status: COMPLETED | OUTPATIENT
Start: 2020-10-14 | End: 2020-10-14

## 2020-10-14 RX ORDER — HALOPERIDOL 5 MG/ML
5 INJECTION INTRAMUSCULAR
Status: COMPLETED | OUTPATIENT
Start: 2020-10-14 | End: 2020-10-14

## 2020-10-14 RX ADMIN — DIPHENHYDRAMINE HYDROCHLORIDE 50 MG: 50 INJECTION, SOLUTION INTRAMUSCULAR; INTRAVENOUS at 09:10

## 2020-10-14 RX ADMIN — HALOPERIDOL LACTATE 5 MG: 5 INJECTION, SOLUTION INTRAMUSCULAR at 09:10

## 2020-10-14 RX ADMIN — LORAZEPAM 2 MG: 2 INJECTION INTRAMUSCULAR; INTRAVENOUS at 09:10

## 2020-10-14 NOTE — ED NOTES
Pt resting comfortably in bed, ED tech at bedside. Chest rise and fall noted, pt in no acute distress.
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
## 2020-11-09 PROBLEM — Z13.9 ENCOUNTER FOR MEDICAL SCREENING EXAMINATION: Status: RESOLVED | Noted: 2020-07-04 | Resolved: 2020-11-09

## 2020-11-16 ENCOUNTER — HOSPITAL ENCOUNTER (EMERGENCY)
Facility: HOSPITAL | Age: 50
Discharge: HOME | End: 2020-11-16
Attending: EMERGENCY MEDICINE
Payer: COMMERCIAL

## 2020-11-16 VITALS
HEART RATE: 78 BPM | WEIGHT: 125 LBS | TEMPERATURE: 98 F | SYSTOLIC BLOOD PRESSURE: 177 MMHG | BODY MASS INDEX: 24.54 KG/M2 | RESPIRATION RATE: 16 BRPM | HEIGHT: 60 IN | OXYGEN SATURATION: 98 % | DIASTOLIC BLOOD PRESSURE: 99 MMHG

## 2020-11-16 DIAGNOSIS — F32.A DEPRESSION, UNSPECIFIED DEPRESSION TYPE: Primary | ICD-10-CM

## 2020-11-16 LAB
ALBUMIN SERPL BCP-MCNC: 3.6 G/DL (ref 3.5–5.2)
ALP SERPL-CCNC: 134 U/L (ref 55–135)
ALT SERPL W/O P-5'-P-CCNC: 16 U/L (ref 10–44)
AMPHET+METHAMPHET UR QL: NORMAL
ANION GAP SERPL CALC-SCNC: 10 MMOL/L (ref 8–16)
APAP SERPL-MCNC: <3 UG/ML (ref 10–20)
AST SERPL-CCNC: 20 U/L (ref 10–40)
BACTERIA #/AREA URNS HPF: ABNORMAL /HPF
BARBITURATES UR QL SCN>200 NG/ML: NEGATIVE
BASOPHILS # BLD AUTO: 0.05 K/UL (ref 0–0.2)
BASOPHILS NFR BLD: 0.5 % (ref 0–1.9)
BENZODIAZ UR QL SCN>200 NG/ML: NEGATIVE
BILIRUB SERPL-MCNC: 0.3 MG/DL (ref 0.1–1)
BILIRUB UR QL STRIP: NEGATIVE
BUN SERPL-MCNC: 12 MG/DL (ref 6–20)
BZE UR QL SCN: NEGATIVE
CALCIUM SERPL-MCNC: 9.6 MG/DL (ref 8.7–10.5)
CANNABINOIDS UR QL SCN: NEGATIVE
CHLORIDE SERPL-SCNC: 107 MMOL/L (ref 95–110)
CLARITY UR: ABNORMAL
CO2 SERPL-SCNC: 27 MMOL/L (ref 23–29)
COLOR UR: YELLOW
CREAT SERPL-MCNC: 0.9 MG/DL (ref 0.5–1.4)
CREAT UR-MCNC: 24.6 MG/DL (ref 15–325)
DIFFERENTIAL METHOD: ABNORMAL
EOSINOPHIL # BLD AUTO: 0.6 K/UL (ref 0–0.5)
EOSINOPHIL NFR BLD: 5.8 % (ref 0–8)
ERYTHROCYTE [DISTWIDTH] IN BLOOD BY AUTOMATED COUNT: 14.4 % (ref 11.5–14.5)
EST. GFR  (AFRICAN AMERICAN): >60 ML/MIN/1.73 M^2
EST. GFR  (NON AFRICAN AMERICAN): >60 ML/MIN/1.73 M^2
ETHANOL SERPL-MCNC: <10 MG/DL
GLUCOSE SERPL-MCNC: 110 MG/DL (ref 70–110)
GLUCOSE UR QL STRIP: NEGATIVE
HCT VFR BLD AUTO: 39 % (ref 37–48.5)
HGB BLD-MCNC: 12.2 G/DL (ref 12–16)
HGB UR QL STRIP: NEGATIVE
IMM GRANULOCYTES # BLD AUTO: 0.03 K/UL (ref 0–0.04)
IMM GRANULOCYTES NFR BLD AUTO: 0.3 % (ref 0–0.5)
KETONES UR QL STRIP: NEGATIVE
LEUKOCYTE ESTERASE UR QL STRIP: ABNORMAL
LYMPHOCYTES # BLD AUTO: 1.3 K/UL (ref 1–4.8)
LYMPHOCYTES NFR BLD: 13.6 % (ref 18–48)
MCH RBC QN AUTO: 29 PG (ref 27–31)
MCHC RBC AUTO-ENTMCNC: 31.3 G/DL (ref 32–36)
MCV RBC AUTO: 93 FL (ref 82–98)
METHADONE UR QL SCN>300 NG/ML: NEGATIVE
MICROSCOPIC COMMENT: ABNORMAL
MONOCYTES # BLD AUTO: 0.5 K/UL (ref 0.3–1)
MONOCYTES NFR BLD: 5.1 % (ref 4–15)
NEUTROPHILS # BLD AUTO: 7.1 K/UL (ref 1.8–7.7)
NEUTROPHILS NFR BLD: 74.7 % (ref 38–73)
NITRITE UR QL STRIP: NEGATIVE
NRBC BLD-RTO: 0 /100 WBC
OPIATES UR QL SCN: NEGATIVE
PCP UR QL SCN>25 NG/ML: NEGATIVE
PH UR STRIP: 5 [PH] (ref 5–8)
PLATELET # BLD AUTO: 190 K/UL (ref 150–350)
PMV BLD AUTO: 11 FL (ref 9.2–12.9)
POTASSIUM SERPL-SCNC: 3.8 MMOL/L (ref 3.5–5.1)
PROT SERPL-MCNC: 7.3 G/DL (ref 6–8.4)
PROT UR QL STRIP: NEGATIVE
RBC # BLD AUTO: 4.21 M/UL (ref 4–5.4)
SARS-COV-2 RDRP RESP QL NAA+PROBE: NEGATIVE
SODIUM SERPL-SCNC: 144 MMOL/L (ref 136–145)
SP GR UR STRIP: 1.01 (ref 1–1.03)
SQUAMOUS #/AREA URNS HPF: 26 /HPF
TOXICOLOGY INFORMATION: NORMAL
TSH SERPL DL<=0.005 MIU/L-ACNC: 0.65 UIU/ML (ref 0.4–4)
URN SPEC COLLECT METH UR: ABNORMAL
UROBILINOGEN UR STRIP-ACNC: NEGATIVE EU/DL
WBC # BLD AUTO: 9.54 K/UL (ref 3.9–12.7)
WBC #/AREA URNS HPF: 6 /HPF (ref 0–5)

## 2020-11-16 PROCEDURE — 85025 COMPLETE CBC W/AUTO DIFF WBC: CPT

## 2020-11-16 PROCEDURE — 90833 PR PSYCHOTHERAPY W/PATIENT W/E&M, 30 MIN (ADD ON): ICD-10-PCS | Mod: ,,, | Performed by: PSYCHIATRY & NEUROLOGY

## 2020-11-16 PROCEDURE — 80329 ANALGESICS NON-OPIOID 1 OR 2: CPT

## 2020-11-16 PROCEDURE — 99285 EMERGENCY DEPT VISIT HI MDM: CPT

## 2020-11-16 PROCEDURE — 80307 DRUG TEST PRSMV CHEM ANLYZR: CPT

## 2020-11-16 PROCEDURE — U0002 COVID-19 LAB TEST NON-CDC: HCPCS

## 2020-11-16 PROCEDURE — 84443 ASSAY THYROID STIM HORMONE: CPT

## 2020-11-16 PROCEDURE — 80320 DRUG SCREEN QUANTALCOHOLS: CPT

## 2020-11-16 PROCEDURE — 80053 COMPREHEN METABOLIC PANEL: CPT

## 2020-11-16 PROCEDURE — 99215 PR OFFICE/OUTPT VISIT, EST, LEVL V, 40-54 MIN: ICD-10-PCS | Mod: ,,, | Performed by: PSYCHIATRY & NEUROLOGY

## 2020-11-16 PROCEDURE — 81000 URINALYSIS NONAUTO W/SCOPE: CPT | Mod: 59

## 2020-11-16 PROCEDURE — 90833 PSYTX W PT W E/M 30 MIN: CPT | Mod: ,,, | Performed by: PSYCHIATRY & NEUROLOGY

## 2020-11-16 PROCEDURE — 25000003 PHARM REV CODE 250: Performed by: EMERGENCY MEDICINE

## 2020-11-16 PROCEDURE — 99215 OFFICE O/P EST HI 40 MIN: CPT | Mod: ,,, | Performed by: PSYCHIATRY & NEUROLOGY

## 2020-11-16 RX ORDER — AMLODIPINE BESYLATE 5 MG/1
5 TABLET ORAL
Status: COMPLETED | OUTPATIENT
Start: 2020-11-16 | End: 2020-11-16

## 2020-11-16 RX ADMIN — AMLODIPINE BESYLATE 5 MG: 5 TABLET ORAL at 10:11

## 2020-11-16 NOTE — ED NOTES
Pt in no apparent distress. Pt requests to go to [facility]. Pt reports no SI/HI. Pt states she wants to get some help. Pt endorses hx of psych. 1 to 1 sitter maintained. Will continue to monitor.

## 2020-11-16 NOTE — ED NOTES
Pt leaving facility with transport company. Pt in no apparent distress. Pt calm and cooperative. All belongings given to transport. Transport has pt PEC form.

## 2020-11-16 NOTE — ED NOTES
Review of patient's allergies indicates:  No Known Allergies     Patient has verified the spelling of the name and  on armband.   APPEARANCE: Patient is alert, calm, oriented x 4. Pt very withdrawn at this time. Pt avoiding eye contact.   SKIN: Skin is normal for race, warm, and dry. Normal skin turgor and mucous membranes moist.  CARDIAC: Normal rate and rhythm, no murmur heard.   RESPIRATORY:Normal rate and effort. Breath sounds clear bilaterally throughout chest. Respirations are equal and unlabored.    GASTRO: Bowel sounds normal, abdomen is soft, no tenderness, and no abdominal distention.  MUSCLE: Full ROM. No bony tenderness or soft tissue tenderness. No obvious deformity.  PERIPHERAL VASCULAR: peripheral pulses present. Normal cap refill. No edema. Warm to touch.  NEURO: 5/5 strength major flexors/extensors bilaterally. Sensory intact to light touch bilaterally. [place] coma scale: eyes open spontaneously-4, oriented & converses-5, obeys commands-6. No neurological abnormalities.   MENTAL STATUS: awake, alert and aware of environment. Pt denies any SI/HI.  : Voids without complication

## 2020-11-16 NOTE — ED NOTES
Security at the bedside for patient search. Two bags of belongs that include shirt, sweater, bra, leggings, shoes, socks, medication bag, purse, jewelry, and 4 dollars have been tied and placed in secured patient closet.

## 2020-11-16 NOTE — ED PROVIDER NOTES
"Encounter Date: 11/16/2020    SCRIBE #1 NOTE: I, Fermin Mratinez, am scribing for, and in the presence of, Karol Narvaez MD.     I, Dr. Karol Narvaez MD, personally performed the services described in this documentation. All medical record entries made by the scribe were at my direction and in my presence.  I have reviewed the chart and agree that the record reflects my personal performance and is accurate and complete. Karol Narvaez MD.    History     Chief Complaint   Patient presents with    Psychiatric Evaluation     Patient presents to the ED with reports of being "really depressed and down in the dumps". Patient is requesting "to go to [facility] for psych care. Denies any suicidal or homicidal ideations. Denies any auditory or visual hallucinations.      CHIEF COMPLAINT: Medical clearance    HISTORY OF PRESENT ILLNESS: Stephanie T Barthelemy who is a 49 y.o. presents to the emergency department today with complaint of medical clearance for admission to [facility]. Patient says she wants to go to [facility] to get her medications adjusted as lately she has been having anxiety attacks and feeling more depressed. She says she has been compliant with most of her medications but is not sure she can live normally without adjusting her medications. She denies any AVH, SI, or HI. Patient is followed by Psychiatry, Dr. Reyes.    ALLERGIES REVIEWED  MEDICATIONS REVIEWED  PMH/PSH/SOC/FH REVIEWED     The history is provided by the patient.    Nursing/Ancillary staff note reviewed.      The history is provided by the patient.     Review of patient's allergies indicates:  No Known Allergies  Past Medical History:   Diagnosis Date    ADHD (attention deficit hyperactivity disorder)     Anemia     Anxiety     Bipolar disorder     History of hepatitis C - s/p clearance of virus (HCV neg 6/2015) 9/26/2014    History of psychiatric hospitalization     History of substance abuse     IV heroin - last use 2013 " per pt    Hx of psychiatric care     Hypertension     Opioid overdose 5/10/2016    Psychiatric problem     Seizure disorder 4/3/2019    Substance abuse     Therapy     Vasculitis      Past Surgical History:   Procedure Laterality Date    HYSTERECTOMY  3/16/2011    SALPINGOOPHORECTOMY Right 3/16/2011    TUBAL LIGATION      Vaginal cuff repair  04/22/2011     Family History   Problem Relation Age of Onset    Diabetes Maternal Grandmother     Cancer Maternal Grandmother      Social History     Tobacco Use    Smoking status: Current Every Day Smoker     Packs/day: 1.00     Years: 30.00     Pack years: 30.00     Start date: 4/15/1993    Smokeless tobacco: Never Used   Substance Use Topics    Alcohol use: No    Drug use: Yes     Types: Heroin, Cocaine, Methamphetamines, Marijuana     Comment: used Meth yesterday     Review of Systems   Constitutional: Negative for activity change, appetite change, chills, diaphoresis and fever.   HENT: Negative for congestion, drooling, ear pain, mouth sores, rhinorrhea, sinus pain, sore throat and trouble swallowing.    Eyes: Negative for pain and discharge.   Respiratory: Negative for cough, chest tightness, shortness of breath, wheezing and stridor.    Cardiovascular: Negative for chest pain, palpitations and leg swelling.   Gastrointestinal: Negative for abdominal distention, abdominal pain, blood in stool, constipation, diarrhea, nausea and vomiting.   Genitourinary: Negative for difficulty urinating, dysuria, flank pain, frequency, hematuria and urgency.   Musculoskeletal: Negative for arthralgias, back pain and myalgias.   Skin: Negative for pallor, rash and wound.   Neurological: Negative for dizziness, syncope, weakness, light-headedness and numbness.   Psychiatric/Behavioral: Negative for hallucinations. The patient is nervous/anxious.         + increased depression   All other systems reviewed and are negative.      Physical Exam     Initial Vitals [11/16/20  0623]   BP Pulse Resp Temp SpO2   (!) 181/116 83 20 97.7 °F (36.5 °C) 100 %      MAP       --         Physical Exam    Nursing note and vitals reviewed.  Constitutional: She appears well-developed and well-nourished.   HENT:   Head: Normocephalic and atraumatic.   Right Ear: External ear normal.   Left Ear: External ear normal.   Nose: Nose normal.   Mouth/Throat: Mucous membranes are dry.   Eyes: Conjunctivae and EOM are normal. Pupils are equal, round, and reactive to light. No scleral icterus.   Neck: Normal range of motion. Neck supple. No JVD present.   Cardiovascular: Normal rate, regular rhythm, normal heart sounds and intact distal pulses. Exam reveals no gallop and no friction rub.    No murmur heard.  Pulmonary/Chest: Breath sounds normal. No stridor. No respiratory distress. She has no wheezes. She exhibits no tenderness.   Abdominal: Soft. Bowel sounds are normal. She exhibits no distension and no mass. There is no abdominal tenderness. There is no rebound and no guarding.   Musculoskeletal: Normal range of motion. No tenderness or edema.      Comments: Back is nontender to palpation.    Neurological: She is alert and oriented to person, place, and time. She has normal strength. No cranial nerve deficit.   Skin: Skin is warm and dry. Capillary refill takes less than 2 seconds. No rash noted. No pallor.   Psychiatric: Her speech is delayed. She is slowed. Thought content is not paranoid and not delusional. She exhibits a depressed mood. She expresses no homicidal and no suicidal ideation. She expresses no suicidal plans and no homicidal plans.         ED Course   Procedures       I independently reviewed the labs and it showed the following abnormalities:    Labs Reviewed   CBC W/ AUTO DIFFERENTIAL - Abnormal; Notable for the following components:       Result Value    MCHC 31.3 (*)     Eos # 0.6 (*)     Gran % 74.7 (*)     Lymph % 13.6 (*)     All other components within normal limits   URINALYSIS,  REFLEX TO URINE CULTURE - Abnormal; Notable for the following components:    Appearance, UA Hazy (*)     Leukocytes, UA 1+ (*)     All other components within normal limits    Narrative:     Specimen Source->Urine   ACETAMINOPHEN LEVEL - Abnormal; Notable for the following components:    Acetaminophen (Tylenol), Serum <3.0 (*)     All other components within normal limits   URINALYSIS MICROSCOPIC - Abnormal; Notable for the following components:    WBC, UA 6 (*)     Bacteria Few (*)     All other components within normal limits    Narrative:     Specimen Source->Urine   COMPREHENSIVE METABOLIC PANEL   TSH   DRUG SCREEN PANEL, URINE EMERGENCY    Narrative:     Specimen Source->Urine   ALCOHOL,MEDICAL (ETHANOL)   SARS-COV-2 RNA AMPLIFICATION, QUAL              Medical Decision Making:   History:   Old Medical Records: I decided to obtain old medical records.  Old Records Summarized: other records.       <> Summary of Records: Pt has been seen multiple times for depression, acute psychosis, drug addiction.   Initial Assessment:   This is a 50 yo female who presents to the ED for help with her depression and panic attacks, feels like they are interfereing with her ADLs, denies to me SI/HI but did mention SI to triage. She appears gravely disabled and without help will be unable to perform her ADLs and decompensate further, will medically clear her and place her under [facility].   Differential Diagnosis:   Differential Diagnosis includes, but is not limited to:  Decompensated psychiatric disease (schizophrenia, bipolar disorder, major depression), excited delirium, medication noncompliance, substance abuse/withdrawal, intentional drug overdose, medication toxicity, APAP/ASA overdose, acute stress reaction, personality disorder, malingering, metabolic derangement    Clinical Tests:   Lab Tests: Reviewed and Ordered  ED Management:  This is a 50 yo female with an extensive psyc and drug history who presents to the ED with  decompensation, increasing depression and panic attacks while taking her meds appropriately. She is medically clear for psyc placement at this time.                    ED Course as of Nov 16 0832   Mon Nov 16, 2020   0756 Patient is comfortable.    [JA]   0805 However with 25 Squam epithelial cells, likely a bad catch, pt has no urinary symptoms. NO need to treat    Leukocytes, UA(!): 1+ [JA]   0832 Ate breakfast. She is comfortable.     [JA]      ED Course User Index  [JA] Karol Narvaez MD       Medically cleared for psychiatry placement: 11/16/2020  8:07 AM                Clinical Impression:     ICD-10-CM ICD-9-CM   1. Depression, unspecified depression type  F32.9 311                          ED Disposition Condition    Transfer to Psych Facility         ED Prescriptions     None        Follow-up Information    None                                      Karol Narvaez MD  11/16/20 0832

## 2020-11-16 NOTE — ED NOTES
Pt became very tearful and reporting that she wants to go home. Pt refusing to get back in bed. Security at bedside helping staff direct patient back to bed.

## 2020-11-16 NOTE — ED NOTES
Per [facility], pt has been accepted [facility]. [facility] will set up transport.

## 2020-11-16 NOTE — ED NOTES
Report given to Roc[facility] at [facility]. She reports to give pt her bp medications prior to transport.
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
## 2020-11-16 NOTE — ED TRIAGE NOTES
"Patient presents to the ED with reports of being "really depressed and down in the dumps". Patient is requesting "to go to [facility] for psych care. Denies any suicidal or homicidal ideations. Denies any auditory or visual hallucinations.     Review of patient's allergies indicates:  No Known Allergies     Patient has verified the spelling of the name and  on armband.   APPEARANCE: Patient is alert, oriented x 4, and appears anxious.  SKIN: Skin is normal for race, warm, and dry. Normal skin turgor and mucous membranes moist.  CARDIAC: Normal rate and no murmur heard.   RESPIRATORY:Normal rate and effort. Breath sounds clear bilaterally throughout chest. Respirations are equal and unlabored.    GASTRO: Bowel sounds normal, abdomen is soft, no tenderness, and no abdominal distention.  MUSCLE: Full ROM. No bony tenderness or soft tissue tenderness. No obvious deformity.  MENTAL STATUS: awake, alert and aware of environment. +Depressed.   "

## 2020-12-11 ENCOUNTER — HOSPITAL ENCOUNTER (EMERGENCY)
Facility: HOSPITAL | Age: 50
Discharge: PSYCHIATRIC OR SUBSTANCE USE FACILITY | End: 2020-12-11
Attending: EMERGENCY MEDICINE
Payer: COMMERCIAL

## 2020-12-11 VITALS
OXYGEN SATURATION: 97 % | SYSTOLIC BLOOD PRESSURE: 139 MMHG | TEMPERATURE: 99 F | HEART RATE: 97 BPM | RESPIRATION RATE: 20 BRPM | DIASTOLIC BLOOD PRESSURE: 73 MMHG

## 2020-12-11 DIAGNOSIS — Z00.8 MEDICAL CLEARANCE FOR PSYCHIATRIC ADMISSION: ICD-10-CM

## 2020-12-11 LAB
ALBUMIN SERPL BCP-MCNC: 4 G/DL (ref 3.5–5.2)
ALP SERPL-CCNC: 124 U/L (ref 38–126)
ALT SERPL W/O P-5'-P-CCNC: 13 U/L (ref 10–44)
AMPHET+METHAMPHET UR QL: NORMAL
ANION GAP SERPL CALC-SCNC: 6 MMOL/L (ref 8–16)
APAP SERPL-MCNC: <10 UG/ML (ref 10–20)
AST SERPL-CCNC: 27 U/L (ref 15–46)
BARBITURATES UR QL SCN>200 NG/ML: NEGATIVE
BASOPHILS # BLD AUTO: 0.04 K/UL (ref 0–0.2)
BASOPHILS NFR BLD: 0.4 % (ref 0–1.9)
BENZODIAZ UR QL SCN>200 NG/ML: NEGATIVE
BILIRUB SERPL-MCNC: 0.6 MG/DL (ref 0.1–1)
BILIRUB UR QL STRIP: NEGATIVE
BZE UR QL SCN: NEGATIVE
CALCIUM SERPL-MCNC: 9.4 MG/DL (ref 8.7–10.5)
CANNABINOIDS UR QL SCN: NEGATIVE
CHLORIDE SERPL-SCNC: 106 MMOL/L (ref 95–110)
CLARITY UR REFRACT.AUTO: CLEAR
CO2 SERPL-SCNC: 30 MMOL/L (ref 23–29)
COLOR UR AUTO: YELLOW
CREAT SERPL-MCNC: 0.81 MG/DL (ref 0.5–1.4)
CREAT UR-MCNC: 50.6 MG/DL (ref 15–325)
DIFFERENTIAL METHOD: ABNORMAL
EOSINOPHIL # BLD AUTO: 0.3 K/UL (ref 0–0.5)
EOSINOPHIL NFR BLD: 2.5 % (ref 0–8)
ERYTHROCYTE [DISTWIDTH] IN BLOOD BY AUTOMATED COUNT: 13.8 % (ref 11.5–14.5)
EST. GFR  (AFRICAN AMERICAN): >60 ML/MIN/1.73 M^2
EST. GFR  (NON AFRICAN AMERICAN): >60 ML/MIN/1.73 M^2
ETHANOL SERPL-MCNC: <10 MG/DL
GLUCOSE SERPL-MCNC: 109 MG/DL (ref 70–110)
GLUCOSE UR QL STRIP: NEGATIVE
HCT VFR BLD AUTO: 39.3 % (ref 37–48.5)
HGB BLD-MCNC: 12.5 G/DL (ref 12–16)
HGB UR QL STRIP: NEGATIVE
IMM GRANULOCYTES # BLD AUTO: 0.02 K/UL (ref 0–0.04)
IMM GRANULOCYTES NFR BLD AUTO: 0.2 % (ref 0–0.5)
KETONES UR QL STRIP: ABNORMAL
LEUKOCYTE ESTERASE UR QL STRIP: NEGATIVE
LYMPHOCYTES # BLD AUTO: 1.3 K/UL (ref 1–4.8)
LYMPHOCYTES NFR BLD: 12.7 % (ref 18–48)
MCH RBC QN AUTO: 29.1 PG (ref 27–31)
MCHC RBC AUTO-ENTMCNC: 31.8 G/DL (ref 32–36)
MCV RBC AUTO: 91 FL (ref 82–98)
METHADONE UR QL SCN>300 NG/ML: NEGATIVE
MONOCYTES # BLD AUTO: 0.8 K/UL (ref 0.3–1)
MONOCYTES NFR BLD: 7.9 % (ref 4–15)
NEUTROPHILS # BLD AUTO: 7.5 K/UL (ref 1.8–7.7)
NEUTROPHILS NFR BLD: 76.3 % (ref 38–73)
NITRITE UR QL STRIP: NEGATIVE
NRBC BLD-RTO: 0 /100 WBC
OPIATES UR QL SCN: NEGATIVE
PCP UR QL SCN>25 NG/ML: NEGATIVE
PH UR STRIP: 7 [PH] (ref 5–8)
PLATELET # BLD AUTO: 224 K/UL (ref 150–350)
PMV BLD AUTO: 10.2 FL (ref 9.2–12.9)
POTASSIUM SERPL-SCNC: 3.2 MMOL/L (ref 3.5–5.1)
PROT SERPL-MCNC: 7.5 G/DL (ref 6–8.4)
PROT UR QL STRIP: NEGATIVE
RBC # BLD AUTO: 4.3 M/UL (ref 4–5.4)
SALICYLATES SERPL-MCNC: <5 MG/DL (ref 15–30)
SARS-COV-2 RDRP RESP QL NAA+PROBE: NEGATIVE
SODIUM SERPL-SCNC: 142 MMOL/L (ref 136–145)
SP GR UR STRIP: 1.01 (ref 1–1.03)
TOXICOLOGY INFORMATION: NORMAL
URN SPEC COLLECT METH UR: ABNORMAL
UROBILINOGEN UR STRIP-ACNC: NEGATIVE EU/DL
UUN UR-MCNC: 13 MG/DL (ref 7–17)
WBC # BLD AUTO: 9.87 K/UL (ref 3.9–12.7)

## 2020-12-11 PROCEDURE — 63600175 PHARM REV CODE 636 W HCPCS: Mod: ER | Performed by: EMERGENCY MEDICINE

## 2020-12-11 PROCEDURE — 85025 COMPLETE CBC W/AUTO DIFF WBC: CPT | Mod: ER

## 2020-12-11 PROCEDURE — 80329 ANALGESICS NON-OPIOID 1 OR 2: CPT | Mod: ER

## 2020-12-11 PROCEDURE — 80307 DRUG TEST PRSMV CHEM ANLYZR: CPT | Mod: ER

## 2020-12-11 PROCEDURE — 25000003 PHARM REV CODE 250: Mod: ER | Performed by: EMERGENCY MEDICINE

## 2020-12-11 PROCEDURE — 81003 URINALYSIS AUTO W/O SCOPE: CPT | Mod: ER,59

## 2020-12-11 PROCEDURE — 93010 EKG 12-LEAD: ICD-10-PCS | Mod: ,,, | Performed by: INTERNAL MEDICINE

## 2020-12-11 PROCEDURE — 96372 THER/PROPH/DIAG INJ SC/IM: CPT | Mod: ER

## 2020-12-11 PROCEDURE — 93005 ELECTROCARDIOGRAM TRACING: CPT | Mod: ER

## 2020-12-11 PROCEDURE — 93010 ELECTROCARDIOGRAM REPORT: CPT | Mod: ,,, | Performed by: INTERNAL MEDICINE

## 2020-12-11 PROCEDURE — 99285 EMERGENCY DEPT VISIT HI MDM: CPT | Mod: 25,ER

## 2020-12-11 PROCEDURE — 80320 DRUG SCREEN QUANTALCOHOLS: CPT | Mod: ER

## 2020-12-11 PROCEDURE — U0002 COVID-19 LAB TEST NON-CDC: HCPCS | Mod: ER

## 2020-12-11 PROCEDURE — 80053 COMPREHEN METABOLIC PANEL: CPT | Mod: ER

## 2020-12-11 RX ORDER — QUETIAPINE FUMARATE 25 MG/1
50 TABLET, FILM COATED ORAL
Status: COMPLETED | OUTPATIENT
Start: 2020-12-11 | End: 2020-12-11

## 2020-12-11 RX ORDER — DIPHENHYDRAMINE HYDROCHLORIDE 50 MG/ML
50 INJECTION INTRAMUSCULAR; INTRAVENOUS
Status: COMPLETED | OUTPATIENT
Start: 2020-12-11 | End: 2020-12-11

## 2020-12-11 RX ORDER — LISINOPRIL 10 MG/1
10 TABLET ORAL DAILY
Status: ON HOLD | COMMUNITY
End: 2020-12-28 | Stop reason: HOSPADM

## 2020-12-11 RX ADMIN — LORAZEPAM 2 MG: 2 INJECTION INTRAMUSCULAR; INTRAVENOUS at 05:12

## 2020-12-11 RX ADMIN — DIPHENHYDRAMINE HYDROCHLORIDE 50 MG: 50 INJECTION, SOLUTION INTRAMUSCULAR; INTRAVENOUS at 05:12

## 2020-12-11 RX ADMIN — POTASSIUM BICARBONATE 50 MEQ: 978 TABLET, EFFERVESCENT ORAL at 06:12

## 2020-12-11 RX ADMIN — QUETIAPINE FUMARATE 50 MG: 25 TABLET ORAL at 05:12

## 2020-12-11 NOTE — ED NOTES
Received call from CPT that pt was accepted to return to [facility]. CPT to arrange transport.

## 2020-12-11 NOTE — ED NOTES
Pt assisted to bathroom by ED sitter to obtain urine for testing. Pt voided, but did not urinate in the cup. Made MD aware. Will attempt to obtain urine at a later time.

## 2020-12-11 NOTE — PROGRESS NOTES
I accepted handoff from Dr. Aparicio for f/u of pending labs. Labs benign, patient medically clear for transfer to the accepting psychiatric facility.

## 2020-12-11 NOTE — ED PROVIDER NOTES
"COVID Statement  The [place] of Health and Human Services and [place] Wiley, Governor of the [facility], have declared a State of Public Health Emergency due to the spread of a novel coronavirus and disease (COVID-19).  There is no currently accepted treatment except conservative measures and respiratory support if appropriate.  This has lead to significant resource capacity and potential delays in care.    Encounter Date: 12/11/2020       History     Chief Complaint   Patient presents with    Addiction Problem     "I'm trying to get into [facility]. I need to get into rehab." +Meth use. Denies SI/HI - states that she needs treatment for drug use.     Patient reports feeling depressed and requesting rehabilitation for methamphetamine use. She denies pain headache fever cough or numbness today. She states she has been feeling bad after getting into arguments with her , most recently today She reports this is not interfering with her activities of daily living. She denies SI/HI or or auditory or visual hallucinations. She reports taking her psychiatric medications. She was admitted at [facility] [place], [facility] by [facility] last month.         Review of patient's allergies indicates:  No Known Allergies  Past Medical History:   Diagnosis Date    ADHD (attention deficit hyperactivity disorder)     Anemia     Anxiety     Bipolar disorder     History of hepatitis C - s/p clearance of virus (HCV neg 6/2015) 9/26/2014    History of psychiatric hospitalization     History of substance abuse     IV heroin - last use 2013 per pt    Hx of psychiatric care     Hypertension     Opioid overdose 5/10/2016    Psychiatric problem     Seizure disorder 4/3/2019    Substance abuse     Therapy     Vasculitis      Past Surgical History:   Procedure Laterality Date    HYSTERECTOMY  3/16/2011    SALPINGOOPHORECTOMY Right 3/16/2011    TUBAL LIGATION      Vaginal cuff repair  04/22/2011     Family History   Problem Relation Age " of Onset    Diabetes Maternal Grandmother     Cancer Maternal Grandmother      Social History     Tobacco Use    Smoking status: Current Every Day Smoker     Packs/day: 1.00     Years: 30.00     Pack years: 30.00     Start date: 4/15/1993    Smokeless tobacco: Never Used   Substance Use Topics    Alcohol use: No    Drug use: Yes     Types: Heroin, Cocaine, Methamphetamines, Marijuana     Comment: used Meth yesterday     Review of Systems   Constitutional: Negative for chills and fever.   HENT: Negative for sore throat.    Respiratory: Negative for cough and shortness of breath.    Cardiovascular: Negative for chest pain.   Gastrointestinal: Negative for nausea and vomiting.   Genitourinary: Negative for dysuria, frequency and urgency.   Musculoskeletal: Negative for back pain, neck pain and neck stiffness.   Skin: Negative for rash and wound.   Neurological: Negative for syncope and weakness.   Hematological: Does not bruise/bleed easily.   Psychiatric/Behavioral: Positive for dysphoric mood. Negative for agitation, behavioral problems and confusion.       Physical Exam     Initial Vitals [12/11/20 0407]   BP Pulse Resp Temp SpO2   (!) 166/100 101 20 98.6 °F (37 °C) 100 %      MAP       --         Physical Exam    Constitutional:  Non-toxic appearance. No distress.   HENT:   Head: Normocephalic and atraumatic.   Eyes: Conjunctivae and EOM are normal. Pupils are equal, round, and reactive to light. Right eye exhibits no nystagmus. Left eye exhibits no nystagmus.   Neck: Neck supple.   Cardiovascular: Regular rhythm, S1 normal and S2 normal.   No murmur heard.  Pulmonary/Chest: Breath sounds normal. No respiratory distress. She has no wheezes. She has no rales.   Abdominal: Soft. She exhibits no distension. There is no abdominal tenderness.   Neurological: She is alert. No cranial nerve deficit or sensory deficit.   ambulatory   Skin: Skin is warm. No rash noted.   Psychiatric: Her speech is not delayed and not  slurred. She is not slowed and not withdrawn. She does not exhibit a depressed mood.         ED Course   Procedures  Labs Reviewed   CBC W/ AUTO DIFFERENTIAL - Abnormal; Notable for the following components:       Result Value    MCHC 31.8 (*)     Gran % 76.3 (*)     Lymph % 12.7 (*)     All other components within normal limits   COMPREHENSIVE METABOLIC PANEL - Abnormal; Notable for the following components:    Potassium 3.2 (*)     CO2 30 (*)     Anion Gap 6 (*)     All other components within normal limits   URINALYSIS, REFLEX TO URINE CULTURE - Abnormal; Notable for the following components:    Ketones, UA 1+ (*)     All other components within normal limits    Narrative:     Specimen Source->Urine   SALICYLATE LEVEL - Abnormal; Notable for the following components:    Salicylate Lvl <5.0 (*)     All other components within normal limits   DRUG SCREEN PANEL, URINE EMERGENCY    Narrative:     Specimen Source->Urine   ALCOHOL,MEDICAL (ETHANOL)   ACETAMINOPHEN LEVEL   SARS-COV-2 RNA AMPLIFICATION, QUAL     EKG Readings: (Independently Interpreted)   Initial Reading: No STEMI. Rhythm: Sinus Arrhythmia. Heart Rate: 81. Ectopy: No Ectopy.     ECG Results          EKG 12-lead (Final result)  Result time 12/11/20 15:28:59    Final result by Interface, Lab In [facility] (12/11/20 15:28:59)                 Narrative:    Test Reason : Z00.8,    Vent. Rate : 081 BPM     Atrial Rate : 081 BPM     P-R Int : 146 ms          QRS Dur : 098 ms      QT Int : 392 ms       P-R-T Axes : 075 072 068 degrees     QTc Int : 455 ms    Normal sinus rhythm with sinus arrhythmia  Minimal voltage criteria for LVH, may be normal variant  Borderline Abnormal ECG  When compared with ECG of 14-OCT-2020 10:15,  No significant change was found  Confirmed by Mae Wagner MD ([id number]) on 12/11/2020 3:28:44 PM    Referred By: [email or web address]   SELF           Confirmed By:Mae Wagner MD                            Imaging Results    None          Medical Decision  Making:   Differential Diagnosis:   Differential Diagnosis includes, but is not limited to:  Decompensated psychiatric disease (schizophrenia, bipolar disorder, major depression), excited delirium, medication noncompliance, substance abuse/withdrawal, intentional drug overdose, medication toxicity, APAP/ASA overdose, acute stress reaction, personality disorder, malingering, metabolic derangement                     ED Course as of Dec 12 1108   Fri Dec 11, 2020   0439 Patient responding to internal stimuli, carrying full on conversation with herself as if someone else is in the room. Reports people have placed items in her bag to incriminate her. Will PEC for grave disability and medically clear.    [RN]   0558 Patient signed out to Dr. Turner at 0600 with UA/UDS pending for medical clearance.     [RN]      ED Course User Index  [RN] Sean Aparicio Jr., MD            Clinical Impression:     ICD-10-CM ICD-9-CM   1. Medical clearance for psychiatric admission  Z00.8 V70.8                          ED Disposition Condition    Transfer to Psych Facility         ED Prescriptions     None        Follow-up Information    None                                      Sean Aparicio Jr., MD  12/12/20 1103

## 2020-12-11 NOTE — ED NOTES
Eileen's transport here for transfer to [facility] 2 personnel arrived with Syd.

## 2020-12-11 NOTE — ED NOTES
Pt eyes closed resting comfortably. Resp even nonlabored. Sitter at doorway observing pt. Security Randolph aware pt is PEC'd. Will continue to monitor pt.

## 2020-12-11 NOTE — ED NOTES
Pt AAOx3, resp even nonlabored, cooperative. Pt aware transport here to bring her to [facility] in [facility].Pt states she does not want anyone contacted regarding her transfer to [facility]. Pt had 2 belongings bags given to [facility]'s personnel.

## 2021-01-27 ENCOUNTER — HOSPITAL ENCOUNTER (EMERGENCY)
Facility: HOSPITAL | Age: 51
Discharge: PSYCHIATRIC OR SUBSTANCE USE FACILITY | End: 2021-01-28
Attending: EMERGENCY MEDICINE
Payer: COMMERCIAL

## 2021-01-27 DIAGNOSIS — R46.2 BIZARRE BEHAVIOR: ICD-10-CM

## 2021-01-27 DIAGNOSIS — Z00.8 ENCOUNTER FOR PSYCHOLOGICAL EVALUATION: Primary | ICD-10-CM

## 2021-01-27 DIAGNOSIS — F15.10 METHAMPHETAMINE ABUSE: ICD-10-CM

## 2021-01-27 DIAGNOSIS — F31.12 BIPOLAR AFFECTIVE DISORDER, CURRENTLY MANIC, MODERATE: ICD-10-CM

## 2021-01-27 LAB
ALBUMIN SERPL BCP-MCNC: 4.3 G/DL (ref 3.5–5.2)
ALP SERPL-CCNC: 106 U/L (ref 38–126)
ALT SERPL W/O P-5'-P-CCNC: 17 U/L (ref 10–44)
ANION GAP SERPL CALC-SCNC: 8 MMOL/L (ref 8–16)
APAP SERPL-MCNC: <10 UG/ML (ref 10–20)
AST SERPL-CCNC: 25 U/L (ref 15–46)
BASOPHILS # BLD AUTO: 0.03 K/UL (ref 0–0.2)
BASOPHILS NFR BLD: 0.6 % (ref 0–1.9)
BILIRUB SERPL-MCNC: 0.4 MG/DL (ref 0.1–1)
CALCIUM SERPL-MCNC: 10 MG/DL (ref 8.7–10.5)
CHLORIDE SERPL-SCNC: 104 MMOL/L (ref 95–110)
CO2 SERPL-SCNC: 32 MMOL/L (ref 23–29)
CREAT SERPL-MCNC: 0.86 MG/DL (ref 0.5–1.4)
DIFFERENTIAL METHOD: ABNORMAL
EOSINOPHIL # BLD AUTO: 0.2 K/UL (ref 0–0.5)
EOSINOPHIL NFR BLD: 3.5 % (ref 0–8)
ERYTHROCYTE [DISTWIDTH] IN BLOOD BY AUTOMATED COUNT: 13.3 % (ref 11.5–14.5)
EST. GFR  (AFRICAN AMERICAN): >60 ML/MIN/1.73 M^2
EST. GFR  (NON AFRICAN AMERICAN): >60 ML/MIN/1.73 M^2
ETHANOL SERPL-MCNC: <10 MG/DL
GLUCOSE SERPL-MCNC: 117 MG/DL (ref 70–110)
HCT VFR BLD AUTO: 43.1 % (ref 37–48.5)
HGB BLD-MCNC: 13.6 G/DL (ref 12–16)
IMM GRANULOCYTES # BLD AUTO: 0.01 K/UL (ref 0–0.04)
IMM GRANULOCYTES NFR BLD AUTO: 0.2 % (ref 0–0.5)
LYMPHOCYTES # BLD AUTO: 1.2 K/UL (ref 1–4.8)
LYMPHOCYTES NFR BLD: 23.3 % (ref 18–48)
MCH RBC QN AUTO: 29.2 PG (ref 27–31)
MCHC RBC AUTO-ENTMCNC: 31.6 G/DL (ref 32–36)
MCV RBC AUTO: 93 FL (ref 82–98)
MONOCYTES # BLD AUTO: 0.5 K/UL (ref 0.3–1)
MONOCYTES NFR BLD: 9.4 % (ref 4–15)
NEUTROPHILS # BLD AUTO: 3.3 K/UL (ref 1.8–7.7)
NEUTROPHILS NFR BLD: 63 % (ref 38–73)
NRBC BLD-RTO: 0 /100 WBC
PLATELET # BLD AUTO: 180 K/UL (ref 150–350)
PMV BLD AUTO: 11.4 FL (ref 9.2–12.9)
POTASSIUM SERPL-SCNC: 3.2 MMOL/L (ref 3.5–5.1)
PROT SERPL-MCNC: 8.1 G/DL (ref 6–8.4)
RBC # BLD AUTO: 4.66 M/UL (ref 4–5.4)
SALICYLATES SERPL-MCNC: <5 MG/DL (ref 15–30)
SARS-COV-2 RDRP RESP QL NAA+PROBE: NEGATIVE
SODIUM SERPL-SCNC: 144 MMOL/L (ref 136–145)
UUN UR-MCNC: 10 MG/DL (ref 7–17)
WBC # BLD AUTO: 5.2 K/UL (ref 3.9–12.7)

## 2021-01-27 PROCEDURE — 85025 COMPLETE CBC W/AUTO DIFF WBC: CPT | Mod: ER

## 2021-01-27 PROCEDURE — 81025 URINE PREGNANCY TEST: CPT | Mod: ER

## 2021-01-27 PROCEDURE — 82077 ASSAY SPEC XCP UR&BREATH IA: CPT | Mod: ER

## 2021-01-27 PROCEDURE — 80179 DRUG ASSAY SALICYLATE: CPT | Mod: ER

## 2021-01-27 PROCEDURE — 80307 DRUG TEST PRSMV CHEM ANLYZR: CPT | Mod: ER

## 2021-01-27 PROCEDURE — 93010 EKG 12-LEAD: ICD-10-PCS | Mod: ,,, | Performed by: INTERNAL MEDICINE

## 2021-01-27 PROCEDURE — 80053 COMPREHEN METABOLIC PANEL: CPT | Mod: ER

## 2021-01-27 PROCEDURE — 93005 ELECTROCARDIOGRAM TRACING: CPT | Mod: ER

## 2021-01-27 PROCEDURE — 99285 EMERGENCY DEPT VISIT HI MDM: CPT | Mod: 25,ER

## 2021-01-27 PROCEDURE — 80143 DRUG ASSAY ACETAMINOPHEN: CPT | Mod: ER

## 2021-01-27 PROCEDURE — 84443 ASSAY THYROID STIM HORMONE: CPT | Mod: ER

## 2021-01-27 PROCEDURE — U0002 COVID-19 LAB TEST NON-CDC: HCPCS | Mod: ER

## 2021-01-27 PROCEDURE — 93010 ELECTROCARDIOGRAM REPORT: CPT | Mod: ,,, | Performed by: INTERNAL MEDICINE

## 2021-01-28 VITALS
TEMPERATURE: 98 F | HEART RATE: 95 BPM | HEIGHT: 64 IN | OXYGEN SATURATION: 98 % | SYSTOLIC BLOOD PRESSURE: 124 MMHG | DIASTOLIC BLOOD PRESSURE: 82 MMHG | RESPIRATION RATE: 20 BRPM | BODY MASS INDEX: 20.49 KG/M2 | WEIGHT: 120 LBS

## 2021-01-28 LAB
AMPHET+METHAMPHET UR QL: NORMAL
B-HCG UR QL: NEGATIVE
BARBITURATES UR QL SCN>200 NG/ML: NEGATIVE
BENZODIAZ UR QL SCN>200 NG/ML: NEGATIVE
BZE UR QL SCN: NEGATIVE
CANNABINOIDS UR QL SCN: NEGATIVE
CREAT UR-MCNC: 25.1 MG/DL (ref 15–325)
METHADONE UR QL SCN>300 NG/ML: NEGATIVE
OPIATES UR QL SCN: NEGATIVE
PCP UR QL SCN>25 NG/ML: NEGATIVE
TOXICOLOGY INFORMATION: NORMAL
TSH SERPL DL<=0.005 MIU/L-ACNC: 1.65 UIU/ML (ref 0.4–4)

## 2021-02-11 ENCOUNTER — HOSPITAL ENCOUNTER (EMERGENCY)
Facility: HOSPITAL | Age: 51
Discharge: PSYCHIATRIC OR SUBSTANCE USE FACILITY | End: 2021-02-11
Attending: FAMILY MEDICINE
Payer: COMMERCIAL

## 2021-02-11 VITALS
BODY MASS INDEX: 22.2 KG/M2 | RESPIRATION RATE: 20 BRPM | DIASTOLIC BLOOD PRESSURE: 102 MMHG | HEIGHT: 64 IN | SYSTOLIC BLOOD PRESSURE: 152 MMHG | OXYGEN SATURATION: 98 % | WEIGHT: 130 LBS | HEART RATE: 89 BPM | TEMPERATURE: 99 F

## 2021-02-11 DIAGNOSIS — R44.3 HALLUCINATIONS: ICD-10-CM

## 2021-02-11 DIAGNOSIS — F22 DELUSIONS: ICD-10-CM

## 2021-02-11 DIAGNOSIS — F23 ACUTE PSYCHOSIS: Primary | ICD-10-CM

## 2021-02-11 LAB
ALBUMIN SERPL BCP-MCNC: 3.8 G/DL (ref 3.5–5.2)
ALP SERPL-CCNC: 91 U/L (ref 38–126)
ALT SERPL W/O P-5'-P-CCNC: 19 U/L (ref 10–44)
AMPHET+METHAMPHET UR QL: NORMAL
ANION GAP SERPL CALC-SCNC: 7 MMOL/L (ref 8–16)
APAP SERPL-MCNC: <10 UG/ML (ref 10–20)
AST SERPL-CCNC: 29 U/L (ref 15–46)
BACTERIA #/AREA URNS AUTO: NORMAL /HPF
BARBITURATES UR QL SCN>200 NG/ML: NEGATIVE
BASOPHILS # BLD AUTO: 0.03 K/UL (ref 0–0.2)
BASOPHILS NFR BLD: 0.6 % (ref 0–1.9)
BENZODIAZ UR QL SCN>200 NG/ML: NEGATIVE
BILIRUB SERPL-MCNC: 0.5 MG/DL (ref 0.1–1)
BILIRUB UR QL STRIP: NEGATIVE
BZE UR QL SCN: NEGATIVE
CALCIUM SERPL-MCNC: 9.5 MG/DL (ref 8.7–10.5)
CANNABINOIDS UR QL SCN: NEGATIVE
CHLORIDE SERPL-SCNC: 106 MMOL/L (ref 95–110)
CLARITY UR REFRACT.AUTO: CLEAR
CO2 SERPL-SCNC: 29 MMOL/L (ref 23–29)
COLOR UR AUTO: YELLOW
CREAT SERPL-MCNC: 0.76 MG/DL (ref 0.5–1.4)
CREAT UR-MCNC: 39.1 MG/DL (ref 15–325)
DIFFERENTIAL METHOD: ABNORMAL
EOSINOPHIL # BLD AUTO: 0.2 K/UL (ref 0–0.5)
EOSINOPHIL NFR BLD: 4.3 % (ref 0–8)
ERYTHROCYTE [DISTWIDTH] IN BLOOD BY AUTOMATED COUNT: 13.2 % (ref 11.5–14.5)
EST. GFR  (AFRICAN AMERICAN): >60 ML/MIN/1.73 M^2
EST. GFR  (NON AFRICAN AMERICAN): >60 ML/MIN/1.73 M^2
ETHANOL SERPL-MCNC: <10 MG/DL
GLUCOSE SERPL-MCNC: 99 MG/DL (ref 70–110)
GLUCOSE UR QL STRIP: NEGATIVE
HCT VFR BLD AUTO: 36.5 % (ref 37–48.5)
HGB BLD-MCNC: 11.7 G/DL (ref 12–16)
HGB UR QL STRIP: NEGATIVE
IMM GRANULOCYTES # BLD AUTO: 0.01 K/UL (ref 0–0.04)
IMM GRANULOCYTES NFR BLD AUTO: 0.2 % (ref 0–0.5)
KETONES UR QL STRIP: NEGATIVE
LEUKOCYTE ESTERASE UR QL STRIP: ABNORMAL
LYMPHOCYTES # BLD AUTO: 1 K/UL (ref 1–4.8)
LYMPHOCYTES NFR BLD: 19.2 % (ref 18–48)
MCH RBC QN AUTO: 29.3 PG (ref 27–31)
MCHC RBC AUTO-ENTMCNC: 32.1 G/DL (ref 32–36)
MCV RBC AUTO: 91 FL (ref 82–98)
METHADONE UR QL SCN>300 NG/ML: NEGATIVE
MICROSCOPIC COMMENT: NORMAL
MONOCYTES # BLD AUTO: 0.5 K/UL (ref 0.3–1)
MONOCYTES NFR BLD: 9.1 % (ref 4–15)
NEUTROPHILS # BLD AUTO: 3.4 K/UL (ref 1.8–7.7)
NEUTROPHILS NFR BLD: 66.6 % (ref 38–73)
NITRITE UR QL STRIP: NEGATIVE
NRBC BLD-RTO: 0 /100 WBC
OPIATES UR QL SCN: NEGATIVE
PCP UR QL SCN>25 NG/ML: NEGATIVE
PH UR STRIP: 7 [PH] (ref 5–8)
PLATELET # BLD AUTO: 175 K/UL (ref 150–350)
PMV BLD AUTO: 11.2 FL (ref 9.2–12.9)
POTASSIUM SERPL-SCNC: 3.6 MMOL/L (ref 3.5–5.1)
PROT SERPL-MCNC: 7 G/DL (ref 6–8.4)
PROT UR QL STRIP: NEGATIVE
RBC # BLD AUTO: 4 M/UL (ref 4–5.4)
RBC #/AREA URNS AUTO: 0 /HPF (ref 0–4)
SARS-COV-2 RDRP RESP QL NAA+PROBE: NEGATIVE
SODIUM SERPL-SCNC: 142 MMOL/L (ref 136–145)
SP GR UR STRIP: 1.01 (ref 1–1.03)
TOXICOLOGY INFORMATION: NORMAL
URN SPEC COLLECT METH UR: ABNORMAL
UROBILINOGEN UR STRIP-ACNC: NEGATIVE EU/DL
UUN UR-MCNC: 12 MG/DL (ref 7–17)
WBC # BLD AUTO: 5.06 K/UL (ref 3.9–12.7)
WBC #/AREA URNS AUTO: 1 /HPF (ref 0–5)
WBC CLUMPS UR QL AUTO: NORMAL

## 2021-02-11 PROCEDURE — 99285 EMERGENCY DEPT VISIT HI MDM: CPT | Mod: ER

## 2021-02-11 PROCEDURE — 80143 DRUG ASSAY ACETAMINOPHEN: CPT | Mod: ER

## 2021-02-11 PROCEDURE — 80053 COMPREHEN METABOLIC PANEL: CPT | Mod: ER

## 2021-02-11 PROCEDURE — 80307 DRUG TEST PRSMV CHEM ANLYZR: CPT | Mod: ER

## 2021-02-11 PROCEDURE — 82077 ASSAY SPEC XCP UR&BREATH IA: CPT | Mod: ER

## 2021-02-11 PROCEDURE — 25000003 PHARM REV CODE 250: Mod: ER | Performed by: FAMILY MEDICINE

## 2021-02-11 PROCEDURE — U0002 COVID-19 LAB TEST NON-CDC: HCPCS | Mod: ER

## 2021-02-11 PROCEDURE — 81000 URINALYSIS NONAUTO W/SCOPE: CPT | Mod: ER,59

## 2021-02-11 PROCEDURE — 85025 COMPLETE CBC W/AUTO DIFF WBC: CPT | Mod: ER

## 2021-02-11 RX ORDER — LORAZEPAM 1 MG/1
1 TABLET ORAL
Status: COMPLETED | OUTPATIENT
Start: 2021-02-11 | End: 2021-02-11

## 2021-02-11 RX ADMIN — LORAZEPAM 1 MG: 1 TABLET ORAL at 01:02

## 2021-03-03 ENCOUNTER — HOSPITAL ENCOUNTER (EMERGENCY)
Facility: HOSPITAL | Age: 51
Discharge: PSYCHIATRIC OR SUBSTANCE USE FACILITY | End: 2021-03-04
Attending: EMERGENCY MEDICINE
Payer: COMMERCIAL

## 2021-03-03 DIAGNOSIS — F29 PSYCHOSIS, UNSPECIFIED PSYCHOSIS TYPE: Primary | ICD-10-CM

## 2021-03-03 LAB
ALBUMIN SERPL BCP-MCNC: 4.5 G/DL (ref 3.5–5.2)
ALP SERPL-CCNC: 111 U/L (ref 38–126)
ALT SERPL W/O P-5'-P-CCNC: 18 U/L (ref 10–44)
AMPHET+METHAMPHET UR QL: NORMAL
ANION GAP SERPL CALC-SCNC: 9 MMOL/L (ref 8–16)
APAP SERPL-MCNC: <10 UG/ML (ref 10–20)
AST SERPL-CCNC: 28 U/L (ref 15–46)
BARBITURATES UR QL SCN>200 NG/ML: NEGATIVE
BASOPHILS # BLD AUTO: 0.03 K/UL (ref 0–0.2)
BASOPHILS NFR BLD: 0.5 % (ref 0–1.9)
BENZODIAZ UR QL SCN>200 NG/ML: NEGATIVE
BILIRUB SERPL-MCNC: 0.5 MG/DL (ref 0.1–1)
BILIRUB UR QL STRIP: NEGATIVE
BZE UR QL SCN: NEGATIVE
CALCIUM SERPL-MCNC: 10.2 MG/DL (ref 8.7–10.5)
CANNABINOIDS UR QL SCN: NEGATIVE
CHLORIDE SERPL-SCNC: 104 MMOL/L (ref 95–110)
CLARITY UR REFRACT.AUTO: CLEAR
CO2 SERPL-SCNC: 30 MMOL/L (ref 23–29)
COLOR UR AUTO: YELLOW
CREAT SERPL-MCNC: 0.78 MG/DL (ref 0.5–1.4)
CREAT UR-MCNC: 87.7 MG/DL (ref 15–325)
DIFFERENTIAL METHOD: ABNORMAL
EOSINOPHIL # BLD AUTO: 0.1 K/UL (ref 0–0.5)
EOSINOPHIL NFR BLD: 2 % (ref 0–8)
ERYTHROCYTE [DISTWIDTH] IN BLOOD BY AUTOMATED COUNT: 13.3 % (ref 11.5–14.5)
EST. GFR  (AFRICAN AMERICAN): >60 ML/MIN/1.73 M^2
EST. GFR  (NON AFRICAN AMERICAN): >60 ML/MIN/1.73 M^2
ETHANOL SERPL-MCNC: <10 MG/DL
EXTRA BLUE TOP RAINBOW: NORMAL
EXTRA RED TOP RAINBOW: NORMAL
GLUCOSE SERPL-MCNC: 95 MG/DL (ref 70–110)
GLUCOSE UR QL STRIP: NEGATIVE
HCT VFR BLD AUTO: 41.1 % (ref 37–48.5)
HGB BLD-MCNC: 13.1 G/DL (ref 12–16)
HGB UR QL STRIP: ABNORMAL
IMM GRANULOCYTES # BLD AUTO: 0.01 K/UL (ref 0–0.04)
IMM GRANULOCYTES NFR BLD AUTO: 0.2 % (ref 0–0.5)
KETONES UR QL STRIP: ABNORMAL
LEUKOCYTE ESTERASE UR QL STRIP: NEGATIVE
LYMPHOCYTES # BLD AUTO: 1.3 K/UL (ref 1–4.8)
LYMPHOCYTES NFR BLD: 23.9 % (ref 18–48)
MCH RBC QN AUTO: 28.9 PG (ref 27–31)
MCHC RBC AUTO-ENTMCNC: 31.9 G/DL (ref 32–36)
MCV RBC AUTO: 91 FL (ref 82–98)
METHADONE UR QL SCN>300 NG/ML: NEGATIVE
MONOCYTES # BLD AUTO: 0.5 K/UL (ref 0.3–1)
MONOCYTES NFR BLD: 8.7 % (ref 4–15)
NEUTROPHILS # BLD AUTO: 3.6 K/UL (ref 1.8–7.7)
NEUTROPHILS NFR BLD: 64.7 % (ref 38–73)
NITRITE UR QL STRIP: NEGATIVE
NRBC BLD-RTO: 0 /100 WBC
OPIATES UR QL SCN: NEGATIVE
PCP UR QL SCN>25 NG/ML: NEGATIVE
PH UR STRIP: 7 [PH] (ref 5–8)
PLATELET # BLD AUTO: 219 K/UL (ref 150–350)
PMV BLD AUTO: 10.7 FL (ref 9.2–12.9)
POTASSIUM SERPL-SCNC: 3.3 MMOL/L (ref 3.5–5.1)
PROT SERPL-MCNC: 8 G/DL (ref 6–8.4)
PROT UR QL STRIP: ABNORMAL
RBC # BLD AUTO: 4.53 M/UL (ref 4–5.4)
SARS-COV-2 RDRP RESP QL NAA+PROBE: NEGATIVE
SODIUM SERPL-SCNC: 143 MMOL/L (ref 136–145)
SP GR UR STRIP: 1.01 (ref 1–1.03)
TOXICOLOGY INFORMATION: NORMAL
URN SPEC COLLECT METH UR: ABNORMAL
UROBILINOGEN UR STRIP-ACNC: NEGATIVE EU/DL
UUN UR-MCNC: 14 MG/DL (ref 7–17)
WBC # BLD AUTO: 5.53 K/UL (ref 3.9–12.7)

## 2021-03-03 PROCEDURE — 99285 EMERGENCY DEPT VISIT HI MDM: CPT | Mod: 25,ER

## 2021-03-03 PROCEDURE — 96372 THER/PROPH/DIAG INJ SC/IM: CPT | Mod: 59,ER

## 2021-03-03 PROCEDURE — 80053 COMPREHEN METABOLIC PANEL: CPT | Mod: ER | Performed by: EMERGENCY MEDICINE

## 2021-03-03 PROCEDURE — 63600175 PHARM REV CODE 636 W HCPCS: Mod: ER | Performed by: EMERGENCY MEDICINE

## 2021-03-03 PROCEDURE — U0002 COVID-19 LAB TEST NON-CDC: HCPCS | Mod: ER | Performed by: EMERGENCY MEDICINE

## 2021-03-03 PROCEDURE — 85025 COMPLETE CBC W/AUTO DIFF WBC: CPT | Mod: ER | Performed by: EMERGENCY MEDICINE

## 2021-03-03 PROCEDURE — 80307 DRUG TEST PRSMV CHEM ANLYZR: CPT | Mod: ER | Performed by: EMERGENCY MEDICINE

## 2021-03-03 PROCEDURE — 80143 DRUG ASSAY ACETAMINOPHEN: CPT | Mod: ER | Performed by: EMERGENCY MEDICINE

## 2021-03-03 PROCEDURE — 82077 ASSAY SPEC XCP UR&BREATH IA: CPT | Mod: ER | Performed by: EMERGENCY MEDICINE

## 2021-03-03 PROCEDURE — 81003 URINALYSIS AUTO W/O SCOPE: CPT | Mod: 59,ER | Performed by: EMERGENCY MEDICINE

## 2021-03-03 RX ORDER — ZIPRASIDONE MESYLATE 20 MG/ML
20 INJECTION, POWDER, LYOPHILIZED, FOR SOLUTION INTRAMUSCULAR
Status: COMPLETED | OUTPATIENT
Start: 2021-03-03 | End: 2021-03-03

## 2021-03-03 RX ADMIN — ZIPRASIDONE MESYLATE 20 MG: 20 INJECTION, POWDER, LYOPHILIZED, FOR SOLUTION INTRAMUSCULAR at 04:03

## 2021-03-04 VITALS
RESPIRATION RATE: 20 BRPM | HEIGHT: 62 IN | OXYGEN SATURATION: 98 % | BODY MASS INDEX: 22.08 KG/M2 | SYSTOLIC BLOOD PRESSURE: 131 MMHG | TEMPERATURE: 98 F | HEART RATE: 86 BPM | DIASTOLIC BLOOD PRESSURE: 81 MMHG | WEIGHT: 120 LBS

## 2021-04-02 ENCOUNTER — NURSE TRIAGE (OUTPATIENT)
Dept: ADMINISTRATIVE | Facility: CLINIC | Age: 51
End: 2021-04-02

## 2021-04-09 ENCOUNTER — OUTSIDE PLACE OF SERVICE (OUTPATIENT)
Dept: CARDIOLOGY | Facility: CLINIC | Age: 51
End: 2021-04-09
Payer: COMMERCIAL

## 2021-04-09 PROCEDURE — 93010 PR ELECTROCARDIOGRAM REPORT: ICD-10-PCS | Mod: ,,, | Performed by: INTERNAL MEDICINE

## 2021-04-09 PROCEDURE — 93010 ELECTROCARDIOGRAM REPORT: CPT | Mod: ,,, | Performed by: INTERNAL MEDICINE

## 2021-05-10 ENCOUNTER — PATIENT OUTREACH (OUTPATIENT)
Dept: ADMINISTRATIVE | Facility: CLINIC | Age: 51
End: 2021-05-10

## 2021-06-01 ENCOUNTER — HOSPITAL ENCOUNTER (EMERGENCY)
Facility: HOSPITAL | Age: 51
Discharge: HOME | End: 2021-06-01
Attending: EMERGENCY MEDICINE
Payer: COMMERCIAL

## 2021-06-01 VITALS
DIASTOLIC BLOOD PRESSURE: 95 MMHG | OXYGEN SATURATION: 100 % | SYSTOLIC BLOOD PRESSURE: 136 MMHG | HEART RATE: 82 BPM | TEMPERATURE: 98 F | WEIGHT: 120 LBS | HEIGHT: 60 IN | BODY MASS INDEX: 23.56 KG/M2 | RESPIRATION RATE: 16 BRPM

## 2021-06-01 DIAGNOSIS — F15.10 AMPHETAMINE ABUSE: Primary | ICD-10-CM

## 2021-06-01 DIAGNOSIS — F19.951 DRUG INDUCED HALLUCINATIONS: ICD-10-CM

## 2021-06-01 LAB
AMPHET+METHAMPHET UR QL: NORMAL
BARBITURATES UR QL SCN>200 NG/ML: NEGATIVE
BENZODIAZ UR QL SCN>200 NG/ML: NEGATIVE
BZE UR QL SCN: NEGATIVE
CANNABINOIDS UR QL SCN: NORMAL
CREAT UR-MCNC: 88.3 MG/DL (ref 15–325)
METHADONE UR QL SCN>300 NG/ML: NEGATIVE
OPIATES UR QL SCN: NEGATIVE
PCP UR QL SCN>25 NG/ML: NEGATIVE
TOXICOLOGY INFORMATION: NORMAL

## 2021-06-01 PROCEDURE — 99283 EMERGENCY DEPT VISIT LOW MDM: CPT | Mod: 25,ER

## 2021-06-01 PROCEDURE — 25000003 PHARM REV CODE 250: Mod: ER | Performed by: EMERGENCY MEDICINE

## 2021-06-01 PROCEDURE — 80307 DRUG TEST PRSMV CHEM ANLYZR: CPT | Mod: ER | Performed by: EMERGENCY MEDICINE

## 2021-06-01 RX ORDER — OLANZAPINE 5 MG/1
10 TABLET, ORALLY DISINTEGRATING ORAL
Status: COMPLETED | OUTPATIENT
Start: 2021-06-01 | End: 2021-06-01

## 2021-06-01 RX ADMIN — OLANZAPINE 10 MG: 5 TABLET, ORALLY DISINTEGRATING ORAL at 09:06

## 2021-08-29 ENCOUNTER — HOSPITAL ENCOUNTER (OUTPATIENT)
Facility: HOSPITAL | Age: 51
Discharge: PSYCHIATRIC OR SUBSTANCE USE FACILITY | DRG: 885 | End: 2021-09-04
Attending: EMERGENCY MEDICINE | Admitting: EMERGENCY MEDICINE
Payer: COMMERCIAL

## 2021-08-29 DIAGNOSIS — F48.9 MENTAL AND BEHAVIORAL PROBLEM: ICD-10-CM

## 2021-08-29 DIAGNOSIS — F69 MENTAL AND BEHAVIORAL PROBLEM: ICD-10-CM

## 2021-08-29 DIAGNOSIS — U07.1 COVID-19 VIRUS INFECTION: ICD-10-CM

## 2021-08-29 PROCEDURE — 27000207 HC ISOLATION

## 2021-08-29 PROCEDURE — 11000001 HC ACUTE MED/SURG PRIVATE ROOM

## 2021-08-29 PROCEDURE — G0378 HOSPITAL OBSERVATION PER HR: HCPCS

## 2021-08-29 PROCEDURE — G0379 DIRECT REFER HOSPITAL OBSERV: HCPCS

## 2021-08-29 PROCEDURE — 20000000 HC ICU ROOM

## 2021-08-29 PROCEDURE — 25000003 PHARM REV CODE 250

## 2021-08-29 RX ORDER — LOPERAMIDE HYDROCHLORIDE 2 MG/1
2 CAPSULE ORAL
Status: DISCONTINUED | OUTPATIENT
Start: 2021-08-29 | End: 2021-09-04 | Stop reason: HOSPADM

## 2021-08-29 RX ORDER — QUETIAPINE FUMARATE 25 MG/1
50 TABLET, FILM COATED ORAL NIGHTLY
Status: DISCONTINUED | OUTPATIENT
Start: 2021-08-29 | End: 2021-08-29

## 2021-08-29 RX ORDER — OLANZAPINE 10 MG/2ML
10 INJECTION, POWDER, FOR SOLUTION INTRAMUSCULAR EVERY 8 HOURS PRN
Status: DISCONTINUED | OUTPATIENT
Start: 2021-08-29 | End: 2021-09-04 | Stop reason: HOSPADM

## 2021-08-29 RX ORDER — HYDROXYZINE PAMOATE 25 MG/1
50 CAPSULE ORAL EVERY 6 HOURS PRN
Status: DISCONTINUED | OUTPATIENT
Start: 2021-08-29 | End: 2021-09-04 | Stop reason: HOSPADM

## 2021-08-29 RX ORDER — CLONAZEPAM 0.5 MG/1
1 TABLET ORAL 2 TIMES DAILY
Status: DISCONTINUED | OUTPATIENT
Start: 2021-08-29 | End: 2021-08-30

## 2021-08-29 RX ORDER — MUPIROCIN 20 MG/G
OINTMENT TOPICAL 2 TIMES DAILY
Status: ACTIVE | OUTPATIENT
Start: 2021-08-29 | End: 2021-09-01

## 2021-08-29 RX ORDER — OLANZAPINE 2.5 MG/1
10 TABLET ORAL EVERY 8 HOURS PRN
Status: DISCONTINUED | OUTPATIENT
Start: 2021-08-29 | End: 2021-08-29 | Stop reason: SDUPTHER

## 2021-08-29 RX ORDER — OLANZAPINE 10 MG/1
10 TABLET ORAL EVERY 8 HOURS PRN
Status: DISCONTINUED | OUTPATIENT
Start: 2021-08-29 | End: 2021-09-04 | Stop reason: HOSPADM

## 2021-08-29 RX ORDER — ASCORBIC ACID 500 MG
500 TABLET ORAL DAILY
Status: DISCONTINUED | OUTPATIENT
Start: 2021-08-30 | End: 2021-09-04 | Stop reason: HOSPADM

## 2021-08-29 RX ORDER — OXCARBAZEPINE 150 MG/1
150 TABLET, FILM COATED ORAL 4 TIMES DAILY
Status: DISCONTINUED | OUTPATIENT
Start: 2021-08-29 | End: 2021-08-30

## 2021-08-29 RX ORDER — TALC
6 POWDER (GRAM) TOPICAL NIGHTLY PRN
Status: DISCONTINUED | OUTPATIENT
Start: 2021-08-29 | End: 2021-09-04 | Stop reason: HOSPADM

## 2021-08-29 RX ORDER — DOCUSATE SODIUM 100 MG/1
100 CAPSULE, LIQUID FILLED ORAL DAILY PRN
Status: DISCONTINUED | OUTPATIENT
Start: 2021-08-29 | End: 2021-09-04 | Stop reason: HOSPADM

## 2021-08-29 RX ORDER — LISINOPRIL 10 MG/1
10 TABLET ORAL DAILY
Status: DISCONTINUED | OUTPATIENT
Start: 2021-08-30 | End: 2021-09-04 | Stop reason: HOSPADM

## 2021-08-29 RX ORDER — QUETIAPINE FUMARATE 100 MG/1
100 TABLET, FILM COATED ORAL NIGHTLY
Status: DISCONTINUED | OUTPATIENT
Start: 2021-08-29 | End: 2021-08-30

## 2021-08-29 RX ORDER — IBUPROFEN 600 MG/1
600 TABLET ORAL EVERY 6 HOURS PRN
Status: DISCONTINUED | OUTPATIENT
Start: 2021-08-29 | End: 2021-09-04 | Stop reason: HOSPADM

## 2021-08-29 RX ORDER — IBUPROFEN 200 MG
1 TABLET ORAL DAILY
Status: DISCONTINUED | OUTPATIENT
Start: 2021-08-30 | End: 2021-09-04 | Stop reason: HOSPADM

## 2021-08-29 RX ORDER — AMLODIPINE BESYLATE 5 MG/1
5 TABLET ORAL DAILY
Status: DISCONTINUED | OUTPATIENT
Start: 2021-08-30 | End: 2021-09-04 | Stop reason: HOSPADM

## 2021-08-29 RX ORDER — CHOLECALCIFEROL (VITAMIN D3) 10 MCG
800 TABLET ORAL DAILY
Status: DISCONTINUED | OUTPATIENT
Start: 2021-08-30 | End: 2021-09-04 | Stop reason: HOSPADM

## 2021-08-29 RX ORDER — ZINC SULFATE 50(220)MG
220 CAPSULE ORAL DAILY
Status: DISCONTINUED | OUTPATIENT
Start: 2021-08-30 | End: 2021-09-04 | Stop reason: HOSPADM

## 2021-08-29 RX ORDER — MAG HYDROX/ALUMINUM HYD/SIMETH 200-200-20
30 SUSPENSION, ORAL (FINAL DOSE FORM) ORAL EVERY 6 HOURS PRN
Status: DISCONTINUED | OUTPATIENT
Start: 2021-08-29 | End: 2021-09-04 | Stop reason: HOSPADM

## 2021-08-29 RX ADMIN — HYDROXYZINE PAMOATE 50 MG: 25 CAPSULE ORAL at 03:08

## 2021-08-29 RX ADMIN — OXCARBAZEPINE 150 MG: 150 TABLET, FILM COATED ORAL at 06:08

## 2021-08-30 PROCEDURE — 96374 THER/PROPH/DIAG INJ IV PUSH: CPT | Performed by: EMERGENCY MEDICINE

## 2021-08-30 PROCEDURE — 25000003 PHARM REV CODE 250: Performed by: EMERGENCY MEDICINE

## 2021-08-30 PROCEDURE — S4991 NICOTINE PATCH NONLEGEND: HCPCS

## 2021-08-30 PROCEDURE — 11000001 HC ACUTE MED/SURG PRIVATE ROOM

## 2021-08-30 PROCEDURE — 96376 TX/PRO/DX INJ SAME DRUG ADON: CPT | Performed by: EMERGENCY MEDICINE

## 2021-08-30 PROCEDURE — 25000003 PHARM REV CODE 250

## 2021-08-30 PROCEDURE — 63600175 PHARM REV CODE 636 W HCPCS: Performed by: EMERGENCY MEDICINE

## 2021-08-30 PROCEDURE — G0378 HOSPITAL OBSERVATION PER HR: HCPCS

## 2021-08-30 PROCEDURE — 99233 SBSQ HOSP IP/OBS HIGH 50: CPT | Mod: GC,,,

## 2021-08-30 PROCEDURE — 99233 PR SUBSEQUENT HOSPITAL CARE,LEVL III: ICD-10-PCS | Mod: GC,,,

## 2021-08-30 RX ORDER — CLONAZEPAM 0.5 MG/1
0.5 TABLET ORAL 2 TIMES DAILY
Status: DISCONTINUED | OUTPATIENT
Start: 2021-08-30 | End: 2021-08-31

## 2021-08-30 RX ORDER — FOLIC ACID 1 MG/1
1 TABLET ORAL DAILY
Status: DISCONTINUED | OUTPATIENT
Start: 2021-08-30 | End: 2021-09-04 | Stop reason: HOSPADM

## 2021-08-30 RX ORDER — QUETIAPINE FUMARATE 100 MG/1
200 TABLET, FILM COATED ORAL NIGHTLY
Status: DISCONTINUED | OUTPATIENT
Start: 2021-08-30 | End: 2021-09-04 | Stop reason: HOSPADM

## 2021-08-30 RX ORDER — OXCARBAZEPINE 150 MG/1
300 TABLET, FILM COATED ORAL 3 TIMES DAILY
Status: DISCONTINUED | OUTPATIENT
Start: 2021-08-30 | End: 2021-09-01

## 2021-08-30 RX ORDER — THIAMINE HYDROCHLORIDE 100 MG/ML
500 INJECTION, SOLUTION INTRAMUSCULAR; INTRAVENOUS 3 TIMES DAILY
Status: DISCONTINUED | OUTPATIENT
Start: 2021-08-30 | End: 2021-08-30

## 2021-08-30 RX ADMIN — CLONAZEPAM 0.5 MG: 0.5 TABLET ORAL at 09:08

## 2021-08-30 RX ADMIN — OXCARBAZEPINE 150 MG: 150 TABLET, FILM COATED ORAL at 08:08

## 2021-08-30 RX ADMIN — ZINC SULFATE 220 MG (50 MG) CAPSULE 220 MG: CAPSULE at 08:08

## 2021-08-30 RX ADMIN — AMLODIPINE BESYLATE 5 MG: 5 TABLET ORAL at 08:08

## 2021-08-30 RX ADMIN — FOLIC ACID 1 MG: 1 TABLET ORAL at 09:08

## 2021-08-30 RX ADMIN — Medication 800 UNITS: at 08:08

## 2021-08-30 RX ADMIN — HYDROXYZINE PAMOATE 50 MG: 25 CAPSULE ORAL at 09:08

## 2021-08-30 RX ADMIN — LISINOPRIL 10 MG: 10 TABLET ORAL at 08:08

## 2021-08-30 RX ADMIN — MUPIROCIN: 20 OINTMENT TOPICAL at 08:08

## 2021-08-30 RX ADMIN — OXYCODONE HYDROCHLORIDE AND ACETAMINOPHEN 500 MG: 500 TABLET ORAL at 08:08

## 2021-08-30 RX ADMIN — OXCARBAZEPINE 300 MG: 150 TABLET, FILM COATED ORAL at 03:08

## 2021-08-30 RX ADMIN — CLONAZEPAM 1 MG: 0.5 TABLET ORAL at 08:08

## 2021-08-30 RX ADMIN — THIAMINE HYDROCHLORIDE 500 MG: 100 INJECTION, SOLUTION INTRAMUSCULAR; INTRAVENOUS at 09:08

## 2021-08-30 RX ADMIN — OXCARBAZEPINE 300 MG: 150 TABLET, FILM COATED ORAL at 09:08

## 2021-08-30 RX ADMIN — QUETIAPINE FUMARATE 200 MG: 100 TABLET ORAL at 09:08

## 2021-08-30 RX ADMIN — Medication 1 PATCH: at 08:08

## 2021-08-30 RX ADMIN — MUPIROCIN: 20 OINTMENT TOPICAL at 09:08

## 2021-08-30 RX ADMIN — THIAMINE HYDROCHLORIDE 500 MG: 100 INJECTION, SOLUTION INTRAMUSCULAR; INTRAVENOUS at 11:08

## 2021-08-31 PROCEDURE — 96365 THER/PROPH/DIAG IV INF INIT: CPT | Performed by: EMERGENCY MEDICINE

## 2021-08-31 PROCEDURE — G0378 HOSPITAL OBSERVATION PER HR: HCPCS

## 2021-08-31 PROCEDURE — 99233 PR SUBSEQUENT HOSPITAL CARE,LEVL III: ICD-10-PCS | Mod: GC,,,

## 2021-08-31 PROCEDURE — 99233 SBSQ HOSP IP/OBS HIGH 50: CPT | Mod: GC,,,

## 2021-08-31 PROCEDURE — 25000003 PHARM REV CODE 250

## 2021-08-31 PROCEDURE — 25000003 PHARM REV CODE 250: Performed by: EMERGENCY MEDICINE

## 2021-08-31 PROCEDURE — S4991 NICOTINE PATCH NONLEGEND: HCPCS

## 2021-08-31 PROCEDURE — 11000001 HC ACUTE MED/SURG PRIVATE ROOM

## 2021-08-31 PROCEDURE — 63600175 PHARM REV CODE 636 W HCPCS: Performed by: EMERGENCY MEDICINE

## 2021-08-31 RX ORDER — CLONAZEPAM 0.5 MG/1
0.5 TABLET ORAL 2 TIMES DAILY
Status: COMPLETED | OUTPATIENT
Start: 2021-08-31 | End: 2021-09-01

## 2021-08-31 RX ADMIN — FOLIC ACID 1 MG: 1 TABLET ORAL at 09:08

## 2021-08-31 RX ADMIN — OXYCODONE HYDROCHLORIDE AND ACETAMINOPHEN 500 MG: 500 TABLET ORAL at 09:08

## 2021-08-31 RX ADMIN — MUPIROCIN: 20 OINTMENT TOPICAL at 08:08

## 2021-08-31 RX ADMIN — QUETIAPINE FUMARATE 200 MG: 100 TABLET ORAL at 08:08

## 2021-08-31 RX ADMIN — THIAMINE HYDROCHLORIDE 500 MG: 100 INJECTION, SOLUTION INTRAMUSCULAR; INTRAVENOUS at 09:08

## 2021-08-31 RX ADMIN — THIAMINE HYDROCHLORIDE 500 MG: 100 INJECTION, SOLUTION INTRAMUSCULAR; INTRAVENOUS at 10:08

## 2021-08-31 RX ADMIN — OXCARBAZEPINE 300 MG: 150 TABLET, FILM COATED ORAL at 02:08

## 2021-08-31 RX ADMIN — MUPIROCIN: 20 OINTMENT TOPICAL at 09:08

## 2021-08-31 RX ADMIN — CLONAZEPAM 0.5 MG: 0.5 TABLET ORAL at 09:08

## 2021-08-31 RX ADMIN — CLONAZEPAM 0.5 MG: 0.5 TABLET ORAL at 08:08

## 2021-08-31 RX ADMIN — OLANZAPINE 10 MG: 10 TABLET, FILM COATED ORAL at 08:08

## 2021-08-31 RX ADMIN — AMLODIPINE BESYLATE 5 MG: 5 TABLET ORAL at 09:08

## 2021-08-31 RX ADMIN — ZINC SULFATE 220 MG (50 MG) CAPSULE 220 MG: CAPSULE at 09:08

## 2021-08-31 RX ADMIN — Medication 1 PATCH: at 09:08

## 2021-08-31 RX ADMIN — LISINOPRIL 10 MG: 10 TABLET ORAL at 09:08

## 2021-08-31 RX ADMIN — HYDROXYZINE PAMOATE 50 MG: 25 CAPSULE ORAL at 08:08

## 2021-08-31 RX ADMIN — Medication 6 MG: at 08:08

## 2021-08-31 RX ADMIN — THIAMINE HYDROCHLORIDE 500 MG: 100 INJECTION, SOLUTION INTRAMUSCULAR; INTRAVENOUS at 03:08

## 2021-08-31 RX ADMIN — OXCARBAZEPINE 300 MG: 150 TABLET, FILM COATED ORAL at 09:08

## 2021-08-31 RX ADMIN — OXCARBAZEPINE 300 MG: 150 TABLET, FILM COATED ORAL at 08:08

## 2021-08-31 RX ADMIN — IBUPROFEN 600 MG: 600 TABLET ORAL at 08:08

## 2021-08-31 RX ADMIN — Medication 800 UNITS: at 09:08

## 2021-09-01 LAB
CHOLEST SERPL-MCNC: 190 MG/DL (ref 120–199)
CHOLEST/HDLC SERPL: 5 {RATIO} (ref 2–5)
HDLC SERPL-MCNC: 38 MG/DL (ref 40–75)
HDLC SERPL: 20 % (ref 20–50)
LDLC SERPL CALC-MCNC: 128 MG/DL (ref 63–159)
NONHDLC SERPL-MCNC: 152 MG/DL
RPR SER QL: NORMAL
TRIGL SERPL-MCNC: 120 MG/DL (ref 30–150)

## 2021-09-01 PROCEDURE — 11000001 HC ACUTE MED/SURG PRIVATE ROOM

## 2021-09-01 PROCEDURE — 96366 THER/PROPH/DIAG IV INF ADDON: CPT | Performed by: EMERGENCY MEDICINE

## 2021-09-01 PROCEDURE — S4991 NICOTINE PATCH NONLEGEND: HCPCS

## 2021-09-01 PROCEDURE — G0378 HOSPITAL OBSERVATION PER HR: HCPCS

## 2021-09-01 PROCEDURE — 99233 SBSQ HOSP IP/OBS HIGH 50: CPT | Mod: GC,,,

## 2021-09-01 PROCEDURE — 80061 LIPID PANEL: CPT

## 2021-09-01 PROCEDURE — 99233 PR SUBSEQUENT HOSPITAL CARE,LEVL III: ICD-10-PCS | Mod: GC,,,

## 2021-09-01 PROCEDURE — 87389 HIV-1 AG W/HIV-1&-2 AB AG IA: CPT

## 2021-09-01 PROCEDURE — 63600175 PHARM REV CODE 636 W HCPCS: Performed by: EMERGENCY MEDICINE

## 2021-09-01 PROCEDURE — 36415 COLL VENOUS BLD VENIPUNCTURE: CPT

## 2021-09-01 PROCEDURE — 25000003 PHARM REV CODE 250

## 2021-09-01 PROCEDURE — 86592 SYPHILIS TEST NON-TREP QUAL: CPT

## 2021-09-01 PROCEDURE — 25000003 PHARM REV CODE 250: Performed by: EMERGENCY MEDICINE

## 2021-09-01 PROCEDURE — 80074 ACUTE HEPATITIS PANEL: CPT

## 2021-09-01 RX ORDER — OXCARBAZEPINE 150 MG/1
600 TABLET, FILM COATED ORAL 2 TIMES DAILY
Status: DISCONTINUED | OUTPATIENT
Start: 2021-09-01 | End: 2021-09-02

## 2021-09-01 RX ADMIN — OLANZAPINE 10 MG: 10 TABLET, FILM COATED ORAL at 08:09

## 2021-09-01 RX ADMIN — THIAMINE HYDROCHLORIDE 500 MG: 100 INJECTION, SOLUTION INTRAMUSCULAR; INTRAVENOUS at 10:09

## 2021-09-01 RX ADMIN — FOLIC ACID 1 MG: 1 TABLET ORAL at 09:09

## 2021-09-01 RX ADMIN — CLONAZEPAM 0.5 MG: 0.5 TABLET ORAL at 09:09

## 2021-09-01 RX ADMIN — HYDROXYZINE PAMOATE 50 MG: 25 CAPSULE ORAL at 05:09

## 2021-09-01 RX ADMIN — AMLODIPINE BESYLATE 5 MG: 5 TABLET ORAL at 09:09

## 2021-09-01 RX ADMIN — Medication 800 UNITS: at 09:09

## 2021-09-01 RX ADMIN — MUPIROCIN: 20 OINTMENT TOPICAL at 09:09

## 2021-09-01 RX ADMIN — Medication 1 PATCH: at 09:09

## 2021-09-01 RX ADMIN — ZINC SULFATE 220 MG (50 MG) CAPSULE 220 MG: CAPSULE at 09:09

## 2021-09-01 RX ADMIN — THIAMINE HYDROCHLORIDE 500 MG: 100 INJECTION, SOLUTION INTRAMUSCULAR; INTRAVENOUS at 03:09

## 2021-09-01 RX ADMIN — LISINOPRIL 10 MG: 10 TABLET ORAL at 09:09

## 2021-09-01 RX ADMIN — OXYCODONE HYDROCHLORIDE AND ACETAMINOPHEN 500 MG: 500 TABLET ORAL at 09:09

## 2021-09-01 RX ADMIN — QUETIAPINE FUMARATE 200 MG: 100 TABLET ORAL at 08:09

## 2021-09-01 RX ADMIN — OXCARBAZEPINE 300 MG: 150 TABLET, FILM COATED ORAL at 09:09

## 2021-09-01 RX ADMIN — Medication 6 MG: at 08:09

## 2021-09-01 RX ADMIN — OXCARBAZEPINE 600 MG: 150 TABLET, FILM COATED ORAL at 08:09

## 2021-09-02 LAB
ALBUMIN SERPL BCP-MCNC: 2.8 G/DL (ref 3.5–5.2)
ALP SERPL-CCNC: 102 U/L (ref 55–135)
ALT SERPL W/O P-5'-P-CCNC: 14 U/L (ref 10–44)
ANION GAP SERPL CALC-SCNC: 8 MMOL/L (ref 8–16)
AST SERPL-CCNC: 13 U/L (ref 10–40)
BILIRUB SERPL-MCNC: 0.2 MG/DL (ref 0.1–1)
BUN SERPL-MCNC: 24 MG/DL (ref 6–20)
CALCIUM SERPL-MCNC: 9.4 MG/DL (ref 8.7–10.5)
CHLORIDE SERPL-SCNC: 104 MMOL/L (ref 95–110)
CO2 SERPL-SCNC: 27 MMOL/L (ref 23–29)
CREAT SERPL-MCNC: 1 MG/DL (ref 0.5–1.4)
EST. GFR  (AFRICAN AMERICAN): >60 ML/MIN/1.73 M^2
EST. GFR  (NON AFRICAN AMERICAN): >60 ML/MIN/1.73 M^2
GLUCOSE SERPL-MCNC: 98 MG/DL (ref 70–110)
HAV IGM SERPL QL IA: NEGATIVE
HBV CORE IGM SERPL QL IA: NEGATIVE
HBV SURFACE AG SERPL QL IA: NEGATIVE
HCV AB SERPL QL IA: POSITIVE
HIV 1+2 AB+HIV1 P24 AG SERPL QL IA: NEGATIVE
POTASSIUM SERPL-SCNC: 4.1 MMOL/L (ref 3.5–5.1)
PROT SERPL-MCNC: 7 G/DL (ref 6–8.4)
SODIUM SERPL-SCNC: 139 MMOL/L (ref 136–145)

## 2021-09-02 PROCEDURE — 25000003 PHARM REV CODE 250

## 2021-09-02 PROCEDURE — 63600175 PHARM REV CODE 636 W HCPCS: Performed by: EMERGENCY MEDICINE

## 2021-09-02 PROCEDURE — 99233 SBSQ HOSP IP/OBS HIGH 50: CPT | Mod: GC,,,

## 2021-09-02 PROCEDURE — 96366 THER/PROPH/DIAG IV INF ADDON: CPT | Performed by: EMERGENCY MEDICINE

## 2021-09-02 PROCEDURE — 36415 COLL VENOUS BLD VENIPUNCTURE: CPT

## 2021-09-02 PROCEDURE — G0378 HOSPITAL OBSERVATION PER HR: HCPCS

## 2021-09-02 PROCEDURE — 96376 TX/PRO/DX INJ SAME DRUG ADON: CPT | Performed by: EMERGENCY MEDICINE

## 2021-09-02 PROCEDURE — 25000003 PHARM REV CODE 250: Performed by: EMERGENCY MEDICINE

## 2021-09-02 PROCEDURE — 11000001 HC ACUTE MED/SURG PRIVATE ROOM

## 2021-09-02 PROCEDURE — 99233 PR SUBSEQUENT HOSPITAL CARE,LEVL III: ICD-10-PCS | Mod: GC,,,

## 2021-09-02 PROCEDURE — 80053 COMPREHEN METABOLIC PANEL: CPT

## 2021-09-02 RX ORDER — OXCARBAZEPINE 150 MG/1
600 TABLET, FILM COATED ORAL 2 TIMES DAILY
Status: COMPLETED | OUTPATIENT
Start: 2021-09-02 | End: 2021-09-02

## 2021-09-02 RX ORDER — THIAMINE HCL 100 MG
100 TABLET ORAL DAILY
Status: DISCONTINUED | OUTPATIENT
Start: 2021-09-02 | End: 2021-09-02

## 2021-09-02 RX ORDER — OXCARBAZEPINE 150 MG/1
1200 TABLET, FILM COATED ORAL NIGHTLY
Status: DISCONTINUED | OUTPATIENT
Start: 2021-09-03 | End: 2021-09-04 | Stop reason: HOSPADM

## 2021-09-02 RX ADMIN — ZINC SULFATE 220 MG (50 MG) CAPSULE 220 MG: CAPSULE at 08:09

## 2021-09-02 RX ADMIN — Medication 800 UNITS: at 08:09

## 2021-09-02 RX ADMIN — FOLIC ACID 1 MG: 1 TABLET ORAL at 08:09

## 2021-09-02 RX ADMIN — Medication 6 MG: at 08:09

## 2021-09-02 RX ADMIN — OXCARBAZEPINE 600 MG: 150 TABLET, FILM COATED ORAL at 08:09

## 2021-09-02 RX ADMIN — HYDROXYZINE PAMOATE 50 MG: 25 CAPSULE ORAL at 08:09

## 2021-09-02 RX ADMIN — LISINOPRIL 10 MG: 10 TABLET ORAL at 08:09

## 2021-09-02 RX ADMIN — AMLODIPINE BESYLATE 5 MG: 5 TABLET ORAL at 08:09

## 2021-09-02 RX ADMIN — OXYCODONE HYDROCHLORIDE AND ACETAMINOPHEN 500 MG: 500 TABLET ORAL at 08:09

## 2021-09-02 RX ADMIN — THIAMINE HYDROCHLORIDE 500 MG: 100 INJECTION, SOLUTION INTRAMUSCULAR; INTRAVENOUS at 09:09

## 2021-09-02 RX ADMIN — QUETIAPINE FUMARATE 200 MG: 100 TABLET ORAL at 08:09

## 2021-09-03 PROCEDURE — 25000003 PHARM REV CODE 250: Performed by: PSYCHIATRY & NEUROLOGY

## 2021-09-03 PROCEDURE — G0378 HOSPITAL OBSERVATION PER HR: HCPCS

## 2021-09-03 PROCEDURE — 99233 PR SUBSEQUENT HOSPITAL CARE,LEVL III: ICD-10-PCS | Mod: ,,, | Performed by: PSYCHIATRY & NEUROLOGY

## 2021-09-03 PROCEDURE — 99233 SBSQ HOSP IP/OBS HIGH 50: CPT | Mod: ,,, | Performed by: PSYCHIATRY & NEUROLOGY

## 2021-09-03 PROCEDURE — 11000001 HC ACUTE MED/SURG PRIVATE ROOM

## 2021-09-03 PROCEDURE — 25000003 PHARM REV CODE 250

## 2021-09-03 PROCEDURE — 90833 PSYTX W PT W E/M 30 MIN: CPT | Mod: ,,, | Performed by: PSYCHIATRY & NEUROLOGY

## 2021-09-03 PROCEDURE — 90833 PR PSYCHOTHERAPY W/PATIENT W/E&M, 30 MIN (ADD ON): ICD-10-PCS | Mod: ,,, | Performed by: PSYCHIATRY & NEUROLOGY

## 2021-09-03 RX ORDER — CHOLECALCIFEROL (VITAMIN D3) 10 MCG
800 TABLET ORAL DAILY
Status: CANCELLED | OUTPATIENT
Start: 2021-09-04

## 2021-09-03 RX ORDER — FOLIC ACID 1 MG/1
1 TABLET ORAL DAILY
Status: CANCELLED | OUTPATIENT
Start: 2021-09-04

## 2021-09-03 RX ORDER — QUETIAPINE FUMARATE 100 MG/1
200 TABLET, FILM COATED ORAL NIGHTLY
Status: CANCELLED | OUTPATIENT
Start: 2021-09-04

## 2021-09-03 RX ORDER — OLANZAPINE 10 MG/1
10 TABLET ORAL EVERY 8 HOURS PRN
Status: CANCELLED | OUTPATIENT
Start: 2021-09-03

## 2021-09-03 RX ORDER — ASCORBIC ACID 500 MG
500 TABLET ORAL DAILY
Status: CANCELLED | OUTPATIENT
Start: 2021-09-04

## 2021-09-03 RX ORDER — IBUPROFEN 600 MG/1
600 TABLET ORAL EVERY 6 HOURS PRN
Status: CANCELLED | OUTPATIENT
Start: 2021-09-03

## 2021-09-03 RX ORDER — LISINOPRIL 10 MG/1
10 TABLET ORAL DAILY
Status: CANCELLED | OUTPATIENT
Start: 2021-09-04

## 2021-09-03 RX ORDER — IBUPROFEN 200 MG
1 TABLET ORAL DAILY
Status: CANCELLED | OUTPATIENT
Start: 2021-09-04

## 2021-09-03 RX ORDER — AMLODIPINE BESYLATE 5 MG/1
5 TABLET ORAL DAILY
Status: CANCELLED | OUTPATIENT
Start: 2021-09-04

## 2021-09-03 RX ORDER — TALC
6 POWDER (GRAM) TOPICAL NIGHTLY PRN
Status: CANCELLED | OUTPATIENT
Start: 2021-09-03

## 2021-09-03 RX ORDER — OXCARBAZEPINE 300 MG/1
1200 TABLET, FILM COATED ORAL NIGHTLY
Status: CANCELLED | OUTPATIENT
Start: 2021-09-04

## 2021-09-03 RX ORDER — HYDROXYZINE PAMOATE 25 MG/1
50 CAPSULE ORAL EVERY 6 HOURS PRN
Status: CANCELLED | OUTPATIENT
Start: 2021-09-03

## 2021-09-03 RX ORDER — ZINC SULFATE 50(220)MG
220 CAPSULE ORAL DAILY
Status: CANCELLED | OUTPATIENT
Start: 2021-09-04

## 2021-09-03 RX ORDER — OLANZAPINE 10 MG/2ML
10 INJECTION, POWDER, FOR SOLUTION INTRAMUSCULAR EVERY 8 HOURS PRN
Status: CANCELLED | OUTPATIENT
Start: 2021-09-03

## 2021-09-03 RX ORDER — DOCUSATE SODIUM 100 MG/1
100 CAPSULE, LIQUID FILLED ORAL DAILY PRN
Status: CANCELLED | OUTPATIENT
Start: 2021-09-03

## 2021-09-03 RX ORDER — MAG HYDROX/ALUMINUM HYD/SIMETH 200-200-20
30 SUSPENSION, ORAL (FINAL DOSE FORM) ORAL EVERY 6 HOURS PRN
Status: CANCELLED | OUTPATIENT
Start: 2021-09-03

## 2021-09-03 RX ORDER — LOPERAMIDE HYDROCHLORIDE 2 MG/1
2 CAPSULE ORAL
Status: CANCELLED | OUTPATIENT
Start: 2021-09-03

## 2021-09-03 RX ORDER — CHLORPROMAZINE HYDROCHLORIDE 25 MG/1
50 TABLET, FILM COATED ORAL ONCE
Status: COMPLETED | OUTPATIENT
Start: 2021-09-03 | End: 2021-09-03

## 2021-09-03 RX ADMIN — DOCUSATE SODIUM 100 MG: 100 CAPSULE, LIQUID FILLED ORAL at 08:09

## 2021-09-03 RX ADMIN — AMLODIPINE BESYLATE 5 MG: 5 TABLET ORAL at 08:09

## 2021-09-03 RX ADMIN — Medication 800 UNITS: at 08:09

## 2021-09-03 RX ADMIN — OXCARBAZEPINE 1200 MG: 150 TABLET, FILM COATED ORAL at 08:09

## 2021-09-03 RX ADMIN — CHLORPROMAZINE HYDROCHLORIDE 50 MG: 25 TABLET, SUGAR COATED ORAL at 03:09

## 2021-09-03 RX ADMIN — Medication 6 MG: at 08:09

## 2021-09-03 RX ADMIN — QUETIAPINE FUMARATE 200 MG: 100 TABLET ORAL at 08:09

## 2021-09-03 RX ADMIN — HYDROXYZINE PAMOATE 50 MG: 25 CAPSULE ORAL at 08:09

## 2021-09-03 RX ADMIN — FOLIC ACID 1 MG: 1 TABLET ORAL at 08:09

## 2021-09-03 RX ADMIN — ZINC SULFATE 220 MG (50 MG) CAPSULE 220 MG: CAPSULE at 08:09

## 2021-09-03 RX ADMIN — LISINOPRIL 10 MG: 10 TABLET ORAL at 08:09

## 2021-09-03 RX ADMIN — OXYCODONE HYDROCHLORIDE AND ACETAMINOPHEN 500 MG: 500 TABLET ORAL at 08:09

## 2021-09-04 VITALS
RESPIRATION RATE: 16 BRPM | HEART RATE: 97 BPM | SYSTOLIC BLOOD PRESSURE: 113 MMHG | DIASTOLIC BLOOD PRESSURE: 69 MMHG | HEIGHT: 60 IN | TEMPERATURE: 98 F | WEIGHT: 119.94 LBS | BODY MASS INDEX: 23.55 KG/M2 | OXYGEN SATURATION: 97 %

## 2021-09-04 PROCEDURE — 25000003 PHARM REV CODE 250: Performed by: PSYCHIATRY & NEUROLOGY

## 2021-09-04 PROCEDURE — G0378 HOSPITAL OBSERVATION PER HR: HCPCS

## 2021-09-04 PROCEDURE — 25000003 PHARM REV CODE 250

## 2021-09-04 RX ORDER — LORAZEPAM 1 MG/1
2 TABLET ORAL ONCE
Status: COMPLETED | OUTPATIENT
Start: 2021-09-04 | End: 2021-09-04

## 2021-09-04 RX ADMIN — ZINC SULFATE 220 MG (50 MG) CAPSULE 220 MG: CAPSULE at 08:09

## 2021-09-04 RX ADMIN — AMLODIPINE BESYLATE 5 MG: 5 TABLET ORAL at 08:09

## 2021-09-04 RX ADMIN — Medication 800 UNITS: at 08:09

## 2021-09-04 RX ADMIN — OXYCODONE HYDROCHLORIDE AND ACETAMINOPHEN 500 MG: 500 TABLET ORAL at 08:09

## 2021-09-04 RX ADMIN — FOLIC ACID 1 MG: 1 TABLET ORAL at 08:09

## 2021-09-04 RX ADMIN — HYDROXYZINE PAMOATE 50 MG: 25 CAPSULE ORAL at 08:09

## 2021-09-04 RX ADMIN — LORAZEPAM 2 MG: 1 TABLET ORAL at 01:09

## 2021-09-04 RX ADMIN — LISINOPRIL 10 MG: 10 TABLET ORAL at 08:09

## 2022-01-28 ENCOUNTER — HOSPITAL ENCOUNTER (OUTPATIENT)
Dept: TELEMEDICINE | Facility: OTHER | Age: 52
Discharge: HOME | End: 2022-01-28
Payer: COMMERCIAL

## 2022-01-28 PROCEDURE — G0425 PR INPT TELEHEALTH CONSULT 30M: ICD-10-PCS | Mod: GT,,, | Performed by: PSYCHIATRY & NEUROLOGY

## 2022-01-28 PROCEDURE — G0425 INPT/ED TELECONSULT30: HCPCS | Mod: GT,,, | Performed by: PSYCHIATRY & NEUROLOGY

## 2022-01-28 NOTE — CONSULTS
"[facility]  Psychiatry  Telepsychiatry Consult Note    Please see previous notes:    Patient agreeable to consultation via telepsychiatry.    Tele-Consultation from Psychiatry started: 1/28/2022 at 11:58 AM  The chief complaint leading to psychiatric consultation is: depression  This consultation was requested by Dr Lopez, the Emergency Department attending physician.  The location of the consulting psychiatrist is [place].  The patient location is [facility] ED [facility] TRANSF[facility]   The patient arrived at the ED at: 1/28/22    Also present with the patient at the time of the consultation: mother with pt's permission    Patient Identification:   Stephanie T Barthelemy is a 51 y.o. female.    Patient information was obtained from patient, parent and past medical records.  Patient presented voluntarily to the Emergency Department by private vehicle.    Consults  Subjective:     History of Present Illness:  Pt is a 52 y/o female with PMH as below and past psychiatric h/o Methamphetamine Use Disorder, severe, Unspecified Mood and Psychotic disorders, Bipolar disorder, Depression with Psychotic Features; Exacerbated by Substance (Psychostimulant) Abuse, Unspecified Neurocognitive disorder (uncelar if strictly from chronic substance abuse), anxiety per chart who presents to ED for depression. Chart reviewed, extensive psychiatric hx related to substance abuse, current encounter outside EMR-no notes or labs available for review. Pt says "I do have bipolar, schizophrenia, ADHD" and has been looking for "years" to get back on the same medication regimen she feels works for her "Vyvanse, adderall, lisinopril, HCTZ, prozac, trileptal, klonopin." Says she has been feeling " really depressed, down on myself" and wants to start working as an , says she just took a licensing exam online. Asked about SI says "sometimes" no intent or plan, no HI, asked about AVH says she sometimes " "talks to herself. Mother has also noted pt talking to herself, no other safety concerns voiced at this time. Depression ROS + decreased sleep. Also endorses anxiety. Bipolar ROS negative. Last meth use yesterday per ED MD, pt says uses crystal meth regularly but "not all the time". Expressed interest in NA meetings, mother prefers residential treatment as she says pt does not have stable housing. Pt reports she has medicare now so may have more resources available to her. Last inpatient psych tx was September 2021, no outpatient treatment, ran out of meds. No other complaints at this time. Interested in resuming Seroquel and Trileptal, were meds upon most recent inpt discharge.     Per chart review with updates where applicable:  Psychiatric History:   Previous Psychiatric Hospitalizations: yes many, typically 2/2 amphetamine induced psychosis; most recent September 2021  Previous Medication Trials: yes  Previous Suicide Attempts: yes, distantly  History of Violence: no  History of Depression: yes   History of Asya: yes  History of Auditory/Visual Hallucination: yes  History of Delusions: yes  Outpatient psychiatrist (current & past): Yes past    Substance Abuse History:  Tobacco:Yes  Alcohol: No  Illicit Substances:Yes THC, crystal meth  Detox/Rehab: Yes    Legal History: Past charges/incarcerations: Yes     Family Psychiatric History: unknown    Social History:  Developmental/Childhood:unknown  *Education:High School Diploma  Employment Status/Finances:disabled   Relationship Status/Sexual Orientation:   Children: 0  Housing Status: living with friend    history:  NO  Access to gun: NO  Pentecostal:not assessed  Recreational activities:aesthetics    Psychiatric Mental Status Exam:  Arousal: alert  Sensorium/Orientation: oriented to grossly intact  Behavior/Cooperation: normal, cooperative   Speech: normal tone, normal rate, normal pitch, normal volume  Language: grossly intact  Mood: " depressed " "   Affect: constricted  Thought Process: normal and logical  Thought Content:   Auditory hallucinations: NO  Visual hallucinations: NO  Paranoia: NO  Delusions:  NO  Suicidal ideation: NO  Homicidal ideation: NO  Attention/Concentration:  intact  Memory:    Recent:  Intact   Remote: Intact  Fund of Knowledge: Vocabulary appropriate    Abstract reasoning: similarities were abstract  Insight: has awareness of illness  Judgment: behavior is adequate to circumstances      Past Medical History:   Past Medical History:   Diagnosis Date    ADHD (attention deficit hyperactivity disorder)     Anemia     Anxiety     Bipolar disorder     History of hepatitis C - s/p clearance of virus (HCV neg 6/2015) 9/26/2014    History of psychiatric hospitalization     History of substance abuse     IV heroin - last use 2013 per pt    Hx of psychiatric care     Hypertension     Opioid overdose 5/10/2016    Psychiatric problem     Psychosis     Seizure disorder 4/3/2019    Sleep difficulties     Substance abuse     Therapy     Vasculitis       Laboratory Data: Labs Reviewed - No data to display    Neurological History:  Seizures: Yes  Head trauma: No    Allergies:   Review of patient's allergies indicates:  No Known Allergies    Medications in ER: Medications - No data to display    Medications at home: none    Assessment - Diagnosis - Goals:       IMPRESSION:   Unspecified Mood Disorder  Methamphetamine Use Disorder    RECOMMENDATIONS:     DISPOSITION: Once medically cleared;    Pt may be discharged home with next of kin with outpt psychiatric follow up/ rehab. ED please provide resources & they were instructed to f/u within 1-2 weeks. Discussed safety concerns and precautions. Also informed pt to return to ED for any worsening of psychiatric symptoms or any SI/HI/AVH.    PSYCHIATRIC MEDICATIONS  Scheduled-resume Trileptal for mood stabilization, seizures: start 600 mg qHS x1 week then increase to 1200 mg qHS  · Resume  Seroquel: start 50 mg qHS x 3 days then increase to 100 mg qHS    LEGAL   Pt currently does not meet PEC criteria nor benefit from from involuntary inpatient psychiatric admission.      OTHER   Follow up recommendations listed below      Time with patient, mother, chart: 45      More than 50% of the time was spent counseling/coordinating care    Consulting clinician was informed of the encounter and consult note.    Consultation ended: 1/28/2022 at 12:40 PM      Abimbola Saez MD   Psychiatry  [facility]  --------------------------------------------resources following (note above not to be shared with pt/family due to risk) -------------------------                                                                                                                                                                                                                                                                                                        ------------------------------------------        MENTAL HEALTH/ADDICTIVE DISORDERS  REFERRAL RECOMMENDATIONS    I. AA ([phone]) [email or web address] or NA ([phone])    II. [facility] [phone], option 2    III. Other Places for Outpatient Addictive Disorders and Mental Health Treatment in [facility]:    [facility] [phone]  [facility] ([place], [place], [place]; accepts Medicaid, commercial insurance) [phone]    [facility] ([place]) [phone], [address] [phone]; Crisis [phone] - Call for options A-E:    ? [facility] [place], [address] [place] [address]    ? [facility], [address] [place] [address]    ? [facility] [place], [address] [place] [address],    ? [facility]  [facility], 2nd floor [address] [place] [address]    ? [facility] [place], [address] , [place] [address]    ? [facility], [facility] [address], [place] [address]    [facility], [address] [place] [address], [phone]    [facility], [place] [phone]    [facility] (Mental & General), [address] [facility], [phone]    [facility] (Mental & General Health, not only HIV+, 3 [place] locations) [phone]    [facility], [facility] [facility], [address], [phone]     [facility], [address] [place] [address], [phone], [phone]    Behavioral Health Group (Methadone Maintenance)    ? [facility] [place] [address], [phone]    ? Arianna Jones [place], [place] [id number] [phone]    IV. Addiction and Mental Health Treatment in Other [facility]:    [facility], [address] [facility] [place], [phone]    [facility], [phone], [address] [facility], [address], [phone]    [facility]. [address] [facility], [phone]    [facility] [place], [facility] [facility], [phone]    [facility] [place]    ? [facility], [address] [facility], [phone] ([facility])    ? [facility], [address] [facility], [phone] ([facility])    ? [facility], [address], [place], [phone] ([place], [place], and [facility])    ? [facility], [address], [place], [phone] ([facility])    [facility] Hotline [phone], [phone]    ? [facility] [place], [address] [facility]    ? [facility], [address], [place]    ? [facility], [address], [place]    ? [facility], [address] [facility] [place]    ? [facility], [address], [place]    [facility], [address], [place] [phone]    [facility], [address] [facility] [place] [phone]    [facility], [address] [facility] [phone]    [facility], [address] [facility], [phone]    V. Residential Addictive Disorders Treatment (call every day until you get in):    [facility] [address] [facility], [phone]    [facility] (men only) [address] [facility], [phone]    [facility], [address] [place] [address], mens program [phone], womens program [phone]    [facility], [address] [facility], [phone]    [facility] (Female only) [address] [facility], [phone]    [facility] (IOP, residential, low cost, [place]Caid) [address], [place], [phone]    [facility] (Men only, [phone]), [address] [facility], [place]    [facility] (Pregnant/women with or without children, [phone])    [facility] (Women only), [address] [facility], [phone]    [facility] (men only)[facility] [phone]    VI. Inpatient Rehabs (out of area)    [facility], [place], [place], [phone]     [facility], [phone]    [facility] [place], [phone]    [place], [place] ([phone])    [facility] ([place]) [phone]    VII. In case of suicidal thinking, return to ED and/or call the COPE LINE [phone] / [phone]

## 2022-05-04 ENCOUNTER — HOSPITAL ENCOUNTER (EMERGENCY)
Facility: HOSPITAL | Age: 52
Discharge: PSYCHIATRIC OR SUBSTANCE USE FACILITY | End: 2022-05-04
Attending: EMERGENCY MEDICINE
Payer: MEDICARE

## 2022-05-04 VITALS
OXYGEN SATURATION: 97 % | SYSTOLIC BLOOD PRESSURE: 144 MMHG | DIASTOLIC BLOOD PRESSURE: 103 MMHG | HEART RATE: 82 BPM | TEMPERATURE: 98 F | RESPIRATION RATE: 18 BRPM

## 2022-05-04 DIAGNOSIS — F29 PSYCHOSIS, UNSPECIFIED PSYCHOSIS TYPE: Primary | ICD-10-CM

## 2022-05-04 LAB
ALBUMIN SERPL BCP-MCNC: 3.5 G/DL (ref 3.5–5.2)
ALP SERPL-CCNC: 106 U/L (ref 55–135)
ALT SERPL W/O P-5'-P-CCNC: 52 U/L (ref 10–44)
AMPHET+METHAMPHET UR QL: ABNORMAL
ANION GAP SERPL CALC-SCNC: 12 MMOL/L (ref 8–16)
APAP SERPL-MCNC: <3 UG/ML (ref 10–20)
AST SERPL-CCNC: 54 U/L (ref 10–40)
BARBITURATES UR QL SCN>200 NG/ML: NEGATIVE
BASOPHILS # BLD AUTO: 0.04 K/UL (ref 0–0.2)
BASOPHILS NFR BLD: 0.7 % (ref 0–1.9)
BENZODIAZ UR QL SCN>200 NG/ML: NEGATIVE
BILIRUB SERPL-MCNC: 0.5 MG/DL (ref 0.1–1)
BILIRUB UR QL STRIP: NEGATIVE
BUN SERPL-MCNC: 15 MG/DL (ref 6–20)
BZE UR QL SCN: NEGATIVE
CALCIUM SERPL-MCNC: 9.4 MG/DL (ref 8.7–10.5)
CANNABINOIDS UR QL SCN: NEGATIVE
CHLORIDE SERPL-SCNC: 110 MMOL/L (ref 95–110)
CLARITY UR: CLEAR
CO2 SERPL-SCNC: 23 MMOL/L (ref 23–29)
COLOR UR: YELLOW
CREAT SERPL-MCNC: 0.8 MG/DL (ref 0.5–1.4)
CREAT UR-MCNC: 55.5 MG/DL (ref 15–325)
DIFFERENTIAL METHOD: ABNORMAL
EOSINOPHIL # BLD AUTO: 0.2 K/UL (ref 0–0.5)
EOSINOPHIL NFR BLD: 3 % (ref 0–8)
ERYTHROCYTE [DISTWIDTH] IN BLOOD BY AUTOMATED COUNT: 14.5 % (ref 11.5–14.5)
EST. GFR  (AFRICAN AMERICAN): >60 ML/MIN/1.73 M^2
EST. GFR  (NON AFRICAN AMERICAN): >60 ML/MIN/1.73 M^2
ETHANOL SERPL-MCNC: <10 MG/DL
GLUCOSE SERPL-MCNC: 87 MG/DL (ref 70–110)
GLUCOSE UR QL STRIP: NEGATIVE
HCT VFR BLD AUTO: 44.5 % (ref 37–48.5)
HGB BLD-MCNC: 13.7 G/DL (ref 12–16)
HGB UR QL STRIP: NEGATIVE
IMM GRANULOCYTES # BLD AUTO: 0.01 K/UL (ref 0–0.04)
IMM GRANULOCYTES NFR BLD AUTO: 0.2 % (ref 0–0.5)
KETONES UR QL STRIP: NEGATIVE
LEUKOCYTE ESTERASE UR QL STRIP: ABNORMAL
LYMPHOCYTES # BLD AUTO: 1.6 K/UL (ref 1–4.8)
LYMPHOCYTES NFR BLD: 27.9 % (ref 18–48)
MCH RBC QN AUTO: 28.7 PG (ref 27–31)
MCHC RBC AUTO-ENTMCNC: 30.8 G/DL (ref 32–36)
MCV RBC AUTO: 93 FL (ref 82–98)
METHADONE UR QL SCN>300 NG/ML: NEGATIVE
MICROSCOPIC COMMENT: NORMAL
MONOCYTES # BLD AUTO: 0.4 K/UL (ref 0.3–1)
MONOCYTES NFR BLD: 7.2 % (ref 4–15)
NEUTROPHILS # BLD AUTO: 3.5 K/UL (ref 1.8–7.7)
NEUTROPHILS NFR BLD: 61 % (ref 38–73)
NITRITE UR QL STRIP: NEGATIVE
NRBC BLD-RTO: 0 /100 WBC
OPIATES UR QL SCN: NEGATIVE
PCP UR QL SCN>25 NG/ML: NEGATIVE
PH UR STRIP: 6 [PH] (ref 5–8)
PLATELET # BLD AUTO: 177 K/UL (ref 150–450)
PMV BLD AUTO: 11.2 FL (ref 9.2–12.9)
POTASSIUM SERPL-SCNC: 3.3 MMOL/L (ref 3.5–5.1)
PROT SERPL-MCNC: 7 G/DL (ref 6–8.4)
PROT UR QL STRIP: NEGATIVE
RBC # BLD AUTO: 4.78 M/UL (ref 4–5.4)
SARS-COV-2 RDRP RESP QL NAA+PROBE: NEGATIVE
SODIUM SERPL-SCNC: 145 MMOL/L (ref 136–145)
SP GR UR STRIP: 1.01 (ref 1–1.03)
SQUAMOUS #/AREA URNS HPF: 1 /HPF
TOXICOLOGY INFORMATION: ABNORMAL
TSH SERPL DL<=0.005 MIU/L-ACNC: 1.98 UIU/ML (ref 0.4–4)
URN SPEC COLLECT METH UR: ABNORMAL
UROBILINOGEN UR STRIP-ACNC: NEGATIVE EU/DL
WBC # BLD AUTO: 5.69 K/UL (ref 3.9–12.7)
WBC #/AREA URNS HPF: 2 /HPF (ref 0–5)

## 2022-05-04 PROCEDURE — 80053 COMPREHEN METABOLIC PANEL: CPT | Performed by: EMERGENCY MEDICINE

## 2022-05-04 PROCEDURE — U0002 COVID-19 LAB TEST NON-CDC: HCPCS | Performed by: EMERGENCY MEDICINE

## 2022-05-04 PROCEDURE — 82077 ASSAY SPEC XCP UR&BREATH IA: CPT | Performed by: EMERGENCY MEDICINE

## 2022-05-04 PROCEDURE — 80143 DRUG ASSAY ACETAMINOPHEN: CPT | Performed by: EMERGENCY MEDICINE

## 2022-05-04 PROCEDURE — 85025 COMPLETE CBC W/AUTO DIFF WBC: CPT | Performed by: EMERGENCY MEDICINE

## 2022-05-04 PROCEDURE — 84443 ASSAY THYROID STIM HORMONE: CPT | Performed by: EMERGENCY MEDICINE

## 2022-05-04 PROCEDURE — 99285 EMERGENCY DEPT VISIT HI MDM: CPT | Mod: 25

## 2022-05-04 PROCEDURE — 80307 DRUG TEST PRSMV CHEM ANLYZR: CPT | Performed by: EMERGENCY MEDICINE

## 2022-05-04 PROCEDURE — 81000 URINALYSIS NONAUTO W/SCOPE: CPT | Mod: 59 | Performed by: EMERGENCY MEDICINE

## 2022-05-04 PROCEDURE — 25000003 PHARM REV CODE 250: Performed by: EMERGENCY MEDICINE

## 2022-05-04 RX ORDER — HYDRALAZINE HYDROCHLORIDE 20 MG/ML
10 INJECTION INTRAMUSCULAR; INTRAVENOUS
Status: DISCONTINUED | OUTPATIENT
Start: 2022-05-04 | End: 2022-05-04 | Stop reason: HOSPADM

## 2022-05-04 RX ADMIN — POTASSIUM BICARBONATE 25 MEQ: 978 TABLET, EFFERVESCENT ORAL at 04:05

## 2022-05-04 NOTE — ED PROVIDER NOTES
Encounter Date: 5/4/2022       History     Chief Complaint   Patient presents with    Altered Mental Status     EMT states pt's family member brought pt to [facility] to be admitted for hallucinations.  EMT states [facility] called ambulance to have pt transported here because pt was lethargic and blood pressure was high.  Also states pt has hx of substance of abuse.  EMT gave pt Narcan, but states it did not help. Pt lethargic. Awakes with verbal stimuli.     51-year-old female with history of substance abuse including recent methamphetamine use, psychiatric disorders including bipolar, anxiety, hypertension presents for altered mental status.  Family noted erratic bizarre behavior and took the patient to a psychiatric facility which requested the patient be transferred to the ED 1st for medical clearance.  Patient is somnolent on arrival and does not participate in interview or exam.    The history is provided by the EMS personnel and medical records.     Review of patient's allergies indicates:  No Known Allergies  Past Medical History:   Diagnosis Date    ADHD (attention deficit hyperactivity disorder)     Anemia     Anxiety     Bipolar disorder     History of hepatitis C - s/p clearance of virus (HCV neg 6/2015) 9/26/2014    History of psychiatric hospitalization     History of substance abuse     IV heroin - last use 2013 per pt    Hx of psychiatric care     Hypertension     Opioid overdose 5/10/2016    Psychiatric problem     Psychosis     Seizure disorder 4/3/2019    Sleep difficulties     Substance abuse     Therapy     Vasculitis      Past Surgical History:   Procedure Laterality Date    HYSTERECTOMY  3/16/2011    SALPINGOOPHORECTOMY Right 3/16/2011    TUBAL LIGATION      Vaginal cuff repair  04/22/2011     Family History   Problem Relation Age of Onset    Diabetes Maternal Grandmother     Cancer Maternal Grandmother      Social History     Tobacco Use    Smoking status: Current  Every Day Smoker     Packs/day: 1.00     Years: 30.00     Pack years: 30.00     Start date: 4/15/1993    Smokeless tobacco: Never Used   Substance Use Topics    Alcohol use: No    Drug use: Yes     Types: Heroin, Cocaine, Methamphetamines, Marijuana     Review of Systems   Unable to perform ROS: Mental status change       Physical Exam     Initial Vitals [05/04/22 0047]   BP Pulse Resp Temp SpO2   (!) 170/122 84 18 98.7 °F (37.1 °C) 95 %      MAP       --         Physical Exam    Nursing note and vitals reviewed.  Constitutional: She appears well-developed and well-nourished. No distress.   HENT:   Head: Normocephalic and atraumatic.   Eyes: Conjunctivae and EOM are normal. Pupils are equal, round, and reactive to light.   Neck: Neck supple.   Normal range of motion.  Cardiovascular: Normal rate, regular rhythm and intact distal pulses.   Pulmonary/Chest: Breath sounds normal. No stridor. No respiratory distress.   Abdominal: Abdomen is soft. She exhibits no distension. There is no abdominal tenderness.   Musculoskeletal:         General: Normal range of motion.      Cervical back: Normal range of motion and neck supple.      Comments: Moving all extremities with grossly equal strength     Neurological:   CN 2-12 appear grossly intact.  Patient is arousable to verbal and physical stimuli however her speech is incoherent and she does not participate further   Skin: Skin is warm and dry.   Psychiatric: She has a normal mood and affect.         ED Course   Procedures  Labs Reviewed   CBC W/ AUTO DIFFERENTIAL - Abnormal; Notable for the following components:       Result Value    MCHC 30.8 (*)     All other components within normal limits   COMPREHENSIVE METABOLIC PANEL - Abnormal; Notable for the following components:    Potassium 3.3 (*)     AST 54 (*)     ALT 52 (*)     All other components within normal limits   URINALYSIS, REFLEX TO URINE CULTURE - Abnormal; Notable for the following components:    Leukocytes,  UA 1+ (*)     All other components within normal limits    Narrative:     Specimen Source->Urine   ACETAMINOPHEN LEVEL - Abnormal; Notable for the following components:    Acetaminophen (Tylenol), Serum <3.0 (*)     All other components within normal limits   TSH   ALCOHOL,MEDICAL (ETHANOL)   SARS-COV-2 RNA AMPLIFICATION, QUAL   URINALYSIS MICROSCOPIC    Narrative:     Specimen Source->Urine   DRUG SCREEN PANEL, URINE EMERGENCY          Imaging Results          CT Head Without Contrast (Final result)  Result time 05/04/22 02:51:36    Final result by Clarissa Gonzales MD (05/04/22 02:51:36)                 Impression:      No CT evidence of acute intracranial abnormality. Clinical correlation and further evaluation as warranted.      Electronically signed by: Clarissa Gonzales MD  Date:    05/04/2022  Time:    02:51             Narrative:    EXAMINATION:  CT HEAD WITHOUT CONTRAST    CLINICAL HISTORY:  Mental status change, unknown cause;    TECHNIQUE:  Low dose axial images were obtained through the head.  Coronal and sagittal reformations were also performed. Contrast was not administered.    COMPARISON:  Head CT 10/15/2019    FINDINGS:  There is no acute intracranial hemorrhage, hydrocephalus, midline shift or mass effect. Gray-white matter differentiation appears maintained. The basal cisterns are patent. The mastoid air cells and paranasal sinuses are clear of acute process. The visualized bones of the calvarium demonstrate no acute osseous abnormality.                              X-Rays:   Independently Interpreted Readings:   Other Readings:  Imaging Results          CT Head Without Contrast (Final result)  Result time 05/04/22 02:51:36    Final result by Clarissa Gonzales MD (05/04/22 02:51:36)                 Impression:      No CT evidence of acute intracranial abnormality. Clinical correlation and   further evaluation as warranted.      Electronically signed by: Clarissa Gonzales MD  Date:    05/04/2022  Time:    02:51              Narrative:    EXAMINATION:  CT HEAD WITHOUT CONTRAST    CLINICAL HISTORY:  Mental status change, unknown cause;    TECHNIQUE:  Low dose axial images were obtained through the head.  Coronal and   sagittal reformations were also performed. Contrast was not administered.    COMPARISON:  Head CT 10/15/2019    FINDINGS:  There is no acute intracranial hemorrhage, hydrocephalus, midline shift or   mass effect. Gray-white matter differentiation appears maintained. The   basal cisterns are patent. The mastoid air cells and paranasal sinuses are   clear of acute process. The visualized bones of the calvarium demonstrate   no acute osseous abnormality.                              Medications   hydrALAZINE injection 10 mg (10 mg Intravenous Not Given 5/4/22 0207)   potassium bicarbonate disintegrating tablet 25 mEq (25 mEq Oral Given 5/4/22 2400)     Medical Decision Making:   History:   Old Medical Records: I decided to obtain old medical records.  Initial Assessment:   Altered mental status, hypertension otherwise normal vitals, patient protecting airway, in no distress  Differential Diagnosis:   Psychosis, substance abuse, encephalopathy, delirium  Clinical Tests:   Lab Tests: Ordered and Reviewed  The following lab test(s) were unremarkable: CBC, CMP and Urinalysis  Medical Tests: Ordered and Reviewed          [unfilled]       Medically cleared for psychiatry placement: 5/4/2022  4:55 AM    Clinical Impression:   Final diagnoses:  [F29] Psychosis, unspecified psychosis type (Primary)          ED Disposition Condition    Transfer to Psych Facility         ED Prescriptions     None        Follow-up Information    None          Jose L Balderas DO  05/04/22 8942

## 2022-07-21 ENCOUNTER — HOSPITAL ENCOUNTER (INPATIENT)
Facility: HOSPITAL | Age: 52
LOS: 4 days | Discharge: HOME HEALTH | DRG: 291 | End: 2022-07-26
Attending: EMERGENCY MEDICINE | Admitting: HOSPITALIST
Payer: MEDICARE

## 2022-07-21 DIAGNOSIS — J90 PLEURAL EFFUSION: ICD-10-CM

## 2022-07-21 DIAGNOSIS — R06.02 SOB (SHORTNESS OF BREATH): ICD-10-CM

## 2022-07-21 DIAGNOSIS — I50.9 ACUTE HEART FAILURE: ICD-10-CM

## 2022-07-21 DIAGNOSIS — F19.951: ICD-10-CM

## 2022-07-21 DIAGNOSIS — I50.43 ACUTE ON CHRONIC COMBINED SYSTOLIC AND DIASTOLIC HEART FAILURE: ICD-10-CM

## 2022-07-21 DIAGNOSIS — R07.9 CHEST PAIN: ICD-10-CM

## 2022-07-21 DIAGNOSIS — I50.9 NEW ONSET OF CONGESTIVE HEART FAILURE: Primary | ICD-10-CM

## 2022-07-21 DIAGNOSIS — J18.9 PNEUMONIA OF LEFT LOWER LOBE DUE TO INFECTIOUS ORGANISM: ICD-10-CM

## 2022-07-21 LAB
ALBUMIN SERPL BCP-MCNC: 3.6 G/DL (ref 3.5–5.2)
ALP SERPL-CCNC: 168 U/L (ref 38–126)
ALT SERPL W/O P-5'-P-CCNC: 136 U/L (ref 10–44)
ANION GAP SERPL CALC-SCNC: 8 MMOL/L (ref 8–16)
AST SERPL-CCNC: 156 U/L (ref 15–46)
BACTERIA #/AREA URNS AUTO: NORMAL /HPF
BASOPHILS # BLD AUTO: 0.08 K/UL (ref 0–0.2)
BASOPHILS NFR BLD: 0.7 % (ref 0–1.9)
BILIRUB SERPL-MCNC: 0.7 MG/DL (ref 0.1–1)
BILIRUB UR QL STRIP: ABNORMAL
CALCIUM SERPL-MCNC: 9.1 MG/DL (ref 8.7–10.5)
CHLORIDE SERPL-SCNC: 103 MMOL/L (ref 95–110)
CK SERPL-CCNC: 131 U/L (ref 55–170)
CLARITY UR REFRACT.AUTO: CLEAR
CO2 SERPL-SCNC: 28 MMOL/L (ref 23–29)
COLOR UR AUTO: YELLOW
CREAT SERPL-MCNC: 0.86 MG/DL (ref 0.5–1.4)
DIFFERENTIAL METHOD: ABNORMAL
EOSINOPHIL # BLD AUTO: 0.1 K/UL (ref 0–0.5)
EOSINOPHIL NFR BLD: 0.9 % (ref 0–8)
ERYTHROCYTE [DISTWIDTH] IN BLOOD BY AUTOMATED COUNT: 15.2 % (ref 11.5–14.5)
EST. GFR  (AFRICAN AMERICAN): >60 ML/MIN/1.73 M^2
EST. GFR  (NON AFRICAN AMERICAN): >60 ML/MIN/1.73 M^2
GLUCOSE SERPL-MCNC: 116 MG/DL (ref 70–110)
GLUCOSE UR QL STRIP: NEGATIVE
HCT VFR BLD AUTO: 42.1 % (ref 37–48.5)
HGB BLD-MCNC: 13 G/DL (ref 12–16)
HGB UR QL STRIP: ABNORMAL
HYALINE CASTS UR QL AUTO: 0 /LPF
IMM GRANULOCYTES # BLD AUTO: 0.05 K/UL (ref 0–0.04)
IMM GRANULOCYTES NFR BLD AUTO: 0.5 % (ref 0–0.5)
INFLUENZA A, MOLECULAR: NEGATIVE
INFLUENZA B, MOLECULAR: NEGATIVE
KETONES UR QL STRIP: NEGATIVE
LEUKOCYTE ESTERASE UR QL STRIP: NEGATIVE
LYMPHOCYTES # BLD AUTO: 2.8 K/UL (ref 1–4.8)
LYMPHOCYTES NFR BLD: 25.8 % (ref 18–48)
MAGNESIUM SERPL-MCNC: 2 MG/DL (ref 1.6–2.6)
MCH RBC QN AUTO: 27.6 PG (ref 27–31)
MCHC RBC AUTO-ENTMCNC: 30.9 G/DL (ref 32–36)
MCV RBC AUTO: 89 FL (ref 82–98)
MICROSCOPIC COMMENT: NORMAL
MONOCYTES # BLD AUTO: 0.9 K/UL (ref 0.3–1)
MONOCYTES NFR BLD: 8.5 % (ref 4–15)
NEUTROPHILS # BLD AUTO: 7 K/UL (ref 1.8–7.7)
NEUTROPHILS NFR BLD: 63.6 % (ref 38–73)
NITRITE UR QL STRIP: NEGATIVE
NRBC BLD-RTO: 0 /100 WBC
NT-PROBNP SERPL-MCNC: ABNORMAL PG/ML (ref 5–450)
PH UR STRIP: 6 [PH] (ref 5–8)
PLATELET # BLD AUTO: 356 K/UL (ref 150–450)
PMV BLD AUTO: 11.9 FL (ref 9.2–12.9)
POTASSIUM SERPL-SCNC: 3.7 MMOL/L (ref 3.5–5.1)
PROT SERPL-MCNC: 7 G/DL (ref 6–8.4)
PROT UR QL STRIP: ABNORMAL
RBC # BLD AUTO: 4.71 M/UL (ref 4–5.4)
RBC #/AREA URNS AUTO: 0 /HPF (ref 0–4)
SARS-COV-2 RDRP RESP QL NAA+PROBE: NEGATIVE
SODIUM SERPL-SCNC: 139 MMOL/L (ref 136–145)
SP GR UR STRIP: >=1.03 (ref 1–1.03)
SPECIMEN SOURCE: NORMAL
TROPONIN I SERPL-MCNC: 0.08 NG/ML (ref 0.01–0.03)
TSH SERPL DL<=0.005 MIU/L-ACNC: 1.99 UIU/ML (ref 0.4–4)
URN SPEC COLLECT METH UR: ABNORMAL
UROBILINOGEN UR STRIP-ACNC: 1 EU/DL
UUN UR-MCNC: 17 MG/DL (ref 7–17)
WBC # BLD AUTO: 11.02 K/UL (ref 3.9–12.7)
WBC #/AREA URNS AUTO: 0 /HPF (ref 0–5)

## 2022-07-21 PROCEDURE — 87502 INFLUENZA DNA AMP PROBE: CPT | Mod: ER | Performed by: EMERGENCY MEDICINE

## 2022-07-21 PROCEDURE — 81000 URINALYSIS NONAUTO W/SCOPE: CPT | Mod: 59,ER | Performed by: EMERGENCY MEDICINE

## 2022-07-21 PROCEDURE — 84484 ASSAY OF TROPONIN QUANT: CPT | Mod: ER | Performed by: EMERGENCY MEDICINE

## 2022-07-21 PROCEDURE — 99292 CRITICAL CARE ADDL 30 MIN: CPT | Mod: 25,ER

## 2022-07-21 PROCEDURE — 84443 ASSAY THYROID STIM HORMONE: CPT | Mod: ER | Performed by: EMERGENCY MEDICINE

## 2022-07-21 PROCEDURE — 25000003 PHARM REV CODE 250: Mod: ER | Performed by: EMERGENCY MEDICINE

## 2022-07-21 PROCEDURE — 93005 ELECTROCARDIOGRAM TRACING: CPT | Mod: ER

## 2022-07-21 PROCEDURE — 93010 EKG 12-LEAD: ICD-10-PCS | Mod: ,,, | Performed by: INTERNAL MEDICINE

## 2022-07-21 PROCEDURE — 96374 THER/PROPH/DIAG INJ IV PUSH: CPT | Mod: ER

## 2022-07-21 PROCEDURE — 96361 HYDRATE IV INFUSION ADD-ON: CPT | Mod: ER

## 2022-07-21 PROCEDURE — 99900035 HC TECH TIME PER 15 MIN (STAT): Mod: ER

## 2022-07-21 PROCEDURE — 80307 DRUG TEST PRSMV CHEM ANLYZR: CPT | Mod: ER | Performed by: EMERGENCY MEDICINE

## 2022-07-21 PROCEDURE — 94760 N-INVAS EAR/PLS OXIMETRY 1: CPT | Mod: ER

## 2022-07-21 PROCEDURE — 93010 ELECTROCARDIOGRAM REPORT: CPT | Mod: 76,,, | Performed by: INTERNAL MEDICINE

## 2022-07-21 PROCEDURE — 27000221 HC OXYGEN, UP TO 24 HOURS: Mod: ER

## 2022-07-21 PROCEDURE — U0002 COVID-19 LAB TEST NON-CDC: HCPCS | Mod: ER | Performed by: EMERGENCY MEDICINE

## 2022-07-21 PROCEDURE — 99291 CRITICAL CARE FIRST HOUR: CPT | Mod: 25,ER

## 2022-07-21 PROCEDURE — 63600175 PHARM REV CODE 636 W HCPCS: Mod: ER | Performed by: EMERGENCY MEDICINE

## 2022-07-21 PROCEDURE — 83735 ASSAY OF MAGNESIUM: CPT | Mod: ER | Performed by: EMERGENCY MEDICINE

## 2022-07-21 PROCEDURE — 80053 COMPREHEN METABOLIC PANEL: CPT | Mod: ER | Performed by: EMERGENCY MEDICINE

## 2022-07-21 PROCEDURE — 85025 COMPLETE CBC W/AUTO DIFF WBC: CPT | Mod: ER | Performed by: EMERGENCY MEDICINE

## 2022-07-21 PROCEDURE — 83880 ASSAY OF NATRIURETIC PEPTIDE: CPT | Mod: ER | Performed by: EMERGENCY MEDICINE

## 2022-07-21 PROCEDURE — 82550 ASSAY OF CK (CPK): CPT | Mod: ER | Performed by: EMERGENCY MEDICINE

## 2022-07-21 RX ORDER — PROCHLORPERAZINE EDISYLATE 5 MG/ML
10 INJECTION INTRAMUSCULAR; INTRAVENOUS
Status: COMPLETED | OUTPATIENT
Start: 2022-07-21 | End: 2022-07-21

## 2022-07-21 RX ORDER — ASPIRIN 325 MG
325 TABLET, DELAYED RELEASE (ENTERIC COATED) ORAL
Status: COMPLETED | OUTPATIENT
Start: 2022-07-21 | End: 2022-07-21

## 2022-07-21 RX ADMIN — SODIUM CHLORIDE 1000 ML: 0.9 INJECTION, SOLUTION INTRAVENOUS at 10:07

## 2022-07-21 RX ADMIN — ASPIRIN 325 MG: 325 TABLET, COATED ORAL at 10:07

## 2022-07-21 RX ADMIN — PROCHLORPERAZINE EDISYLATE 10 MG: 5 INJECTION INTRAMUSCULAR; INTRAVENOUS at 10:07

## 2022-07-21 RX ADMIN — NITROGLYCERIN 1 INCH: 20 OINTMENT TOPICAL at 11:07

## 2022-07-22 ENCOUNTER — CLINICAL SUPPORT (OUTPATIENT)
Dept: SMOKING CESSATION | Facility: CLINIC | Age: 52
End: 2022-07-22
Payer: MEDICAID

## 2022-07-22 DIAGNOSIS — F17.210 CIGARETTE SMOKER: Primary | ICD-10-CM

## 2022-07-22 PROBLEM — R41.82 AMS (ALTERED MENTAL STATUS): Status: ACTIVE | Noted: 2022-07-22

## 2022-07-22 PROBLEM — J96.01 ACUTE RESPIRATORY FAILURE WITH HYPOXIA: Status: ACTIVE | Noted: 2022-07-22

## 2022-07-22 PROBLEM — I50.9 ACUTE HEART FAILURE: Status: ACTIVE | Noted: 2022-07-22

## 2022-07-22 PROBLEM — R79.89 ELEVATED TROPONIN: Status: ACTIVE | Noted: 2022-07-22

## 2022-07-22 LAB
ALLENS TEST: ABNORMAL
AMMONIA PLAS-SCNC: 33 UMOL/L (ref 10–50)
AMPHET+METHAMPHET UR QL: NEGATIVE
AMPHET+METHAMPHET UR QL: NORMAL
ANION GAP SERPL CALC-SCNC: 13 MMOL/L (ref 8–16)
AORTIC ROOT ANNULUS: 2.52 CM
AV INDEX (PROSTH): 0.74
AV MEAN GRADIENT: 3 MMHG
AV PEAK GRADIENT: 7 MMHG
AV VALVE AREA: 2.32 CM2
AV VELOCITY RATIO: 0.75
BARBITURATES UR QL SCN>200 NG/ML: NEGATIVE
BARBITURATES UR QL SCN>200 NG/ML: NEGATIVE
BASOPHILS # BLD AUTO: 0.04 K/UL (ref 0–0.2)
BASOPHILS NFR BLD: 0.4 % (ref 0–1.9)
BENZODIAZ UR QL SCN>200 NG/ML: NEGATIVE
BENZODIAZ UR QL SCN>200 NG/ML: NEGATIVE
BNP SERPL-MCNC: >4900 PG/ML (ref 0–99)
BSA FOR ECHO PROCEDURE: 1.56 M2
BUN SERPL-MCNC: 16 MG/DL (ref 6–20)
BZE UR QL SCN: NEGATIVE
BZE UR QL SCN: NEGATIVE
CALCIUM SERPL-MCNC: 9 MG/DL (ref 8.7–10.5)
CANNABINOIDS UR QL SCN: NEGATIVE
CANNABINOIDS UR QL SCN: NEGATIVE
CHLORIDE SERPL-SCNC: 105 MMOL/L (ref 95–110)
CHOLEST SERPL-MCNC: 162 MG/DL (ref 120–199)
CHOLEST/HDLC SERPL: 7.4 {RATIO} (ref 2–5)
CO2 SERPL-SCNC: 26 MMOL/L (ref 23–29)
CREAT SERPL-MCNC: 1 MG/DL (ref 0.5–1.4)
CREAT UR-MCNC: 188.2 MG/DL (ref 15–325)
CREAT UR-MCNC: <5 MG/DL (ref 15–325)
CV ECHO LV RWT: 0.47 CM
DELSYS: ABNORMAL
DIFFERENTIAL METHOD: ABNORMAL
DOP CALC AO PEAK VEL: 1.29 M/S
DOP CALC AO VTI: 18.21 CM
DOP CALC LVOT AREA: 3.1 CM2
DOP CALC LVOT DIAMETER: 2 CM
DOP CALC LVOT PEAK VEL: 0.97 M/S
DOP CALC LVOT STROKE VOLUME: 42.23 CM3
DOP CALC MV VTI: 21.66 CM
DOP CALCLVOT PEAK VEL VTI: 13.45 CM
E WAVE DECELERATION TIME: 85.51 MSEC
E/A RATIO: 2.73
ECHO LV POSTERIOR WALL: 1.23 CM (ref 0.6–1.1)
EJECTION FRACTION: 20 %
EOSINOPHIL # BLD AUTO: 0 K/UL (ref 0–0.5)
EOSINOPHIL NFR BLD: 0.1 % (ref 0–8)
ERYTHROCYTE [DISTWIDTH] IN BLOOD BY AUTOMATED COUNT: 15.9 % (ref 11.5–14.5)
EST. GFR  (AFRICAN AMERICAN): >60 ML/MIN/1.73 M^2
EST. GFR  (NON AFRICAN AMERICAN): >60 ML/MIN/1.73 M^2
ESTIMATED AVG GLUCOSE: 128 MG/DL (ref 68–131)
FRACTIONAL SHORTENING: 8 % (ref 28–44)
GLUCOSE SERPL-MCNC: 126 MG/DL (ref 70–110)
HBA1C MFR BLD: 6.1 % (ref 4–5.6)
HCO3 UR-SCNC: 29.5 MMOL/L (ref 24–28)
HCT VFR BLD AUTO: 41.4 % (ref 37–48.5)
HDLC SERPL-MCNC: 22 MG/DL (ref 40–75)
HDLC SERPL: 13.6 % (ref 20–50)
HGB BLD-MCNC: 13.2 G/DL (ref 12–16)
IMM GRANULOCYTES # BLD AUTO: 0.04 K/UL (ref 0–0.04)
IMM GRANULOCYTES NFR BLD AUTO: 0.4 % (ref 0–0.5)
INTERVENTRICULAR SEPTUM: 1.32 CM (ref 0.6–1.1)
LA MAJOR: 6.65 CM
LA WIDTH: 4.59 CM
LDLC SERPL CALC-MCNC: 114.2 MG/DL (ref 63–159)
LEFT ATRIUM SIZE: 4.24 CM
LEFT ATRIUM VOLUME INDEX MOD: 53.8 ML/M2
LEFT ATRIUM VOLUME MOD: 82.28 CM3
LEFT INTERNAL DIMENSION IN SYSTOLE: 4.8 CM (ref 2.1–4)
LEFT VENTRICLE DIASTOLIC VOLUME INDEX: 85.31 ML/M2
LEFT VENTRICLE DIASTOLIC VOLUME: 130.52 ML
LEFT VENTRICLE MASS INDEX: 178 G/M2
LEFT VENTRICLE SYSTOLIC VOLUME INDEX: 70.4 ML/M2
LEFT VENTRICLE SYSTOLIC VOLUME: 107.66 ML
LEFT VENTRICULAR INTERNAL DIMENSION IN DIASTOLE: 5.22 CM (ref 3.5–6)
LEFT VENTRICULAR MASS: 272.55 G
LYMPHOCYTES # BLD AUTO: 2 K/UL (ref 1–4.8)
LYMPHOCYTES NFR BLD: 21.5 % (ref 18–48)
MAGNESIUM SERPL-MCNC: 1.7 MG/DL (ref 1.6–2.6)
MCH RBC QN AUTO: 27.8 PG (ref 27–31)
MCHC RBC AUTO-ENTMCNC: 31.9 G/DL (ref 32–36)
MCV RBC AUTO: 87 FL (ref 82–98)
METHADONE UR QL SCN>300 NG/ML: NEGATIVE
METHADONE UR QL SCN>300 NG/ML: NEGATIVE
MONOCYTES # BLD AUTO: 0.9 K/UL (ref 0.3–1)
MONOCYTES NFR BLD: 9.2 % (ref 4–15)
MV MEAN GRADIENT: 1 MMHG
MV PEAK A VEL: 0.48 M/S
MV PEAK E VEL: 1.31 M/S
MV PEAK GRADIENT: 7 MMHG
MV STENOSIS PRESSURE HALF TIME: 29.44 MS
MV VALVE AREA BY CONTINUITY EQUATION: 1.95 CM2
MV VALVE AREA P 1/2 METHOD: 7.47 CM2
NEUTROPHILS # BLD AUTO: 6.4 K/UL (ref 1.8–7.7)
NEUTROPHILS NFR BLD: 68.4 % (ref 38–73)
NONHDLC SERPL-MCNC: 140 MG/DL
NRBC BLD-RTO: 0 /100 WBC
OPIATES UR QL SCN: NEGATIVE
OPIATES UR QL SCN: NEGATIVE
PCO2 BLDA: 49.1 MMHG (ref 35–45)
PCP UR QL SCN>25 NG/ML: NEGATIVE
PCP UR QL SCN>25 NG/ML: NEGATIVE
PH SMN: 7.39 [PH] (ref 7.35–7.45)
PISA MRMAX VEL: 0.06 M/S
PISA TR MAX VEL: 2.49 M/S
PLATELET # BLD AUTO: 323 K/UL (ref 150–450)
PMV BLD AUTO: 11.9 FL (ref 9.2–12.9)
PO2 BLDA: 87 MMHG (ref 80–100)
POC BE: 5 MMOL/L
POC SATURATED O2: 96 % (ref 95–100)
POC TCO2: 31 MMOL/L (ref 23–27)
POCT GLUCOSE: 115 MG/DL (ref 70–110)
POTASSIUM SERPL-SCNC: 3.6 MMOL/L (ref 3.5–5.1)
PV PEAK VELOCITY: 1.25 CM/S
RA MAJOR: 4.59 CM
RA PRESSURE: 8 MMHG
RA WIDTH: 3.48 CM
RBC # BLD AUTO: 4.74 M/UL (ref 4–5.4)
RIGHT VENTRICULAR END-DIASTOLIC DIMENSION: 4.1 CM
SAMPLE: ABNORMAL
SITE: ABNORMAL
SODIUM SERPL-SCNC: 144 MMOL/L (ref 136–145)
TOXICOLOGY INFORMATION: ABNORMAL
TOXICOLOGY INFORMATION: NORMAL
TR MAX PG: 25 MMHG
TRIGL SERPL-MCNC: 129 MG/DL (ref 30–150)
TROPONIN I SERPL DL<=0.01 NG/ML-MCNC: 0.08 NG/ML (ref 0–0.03)
TV REST PULMONARY ARTERY PRESSURE: 33 MMHG
WBC # BLD AUTO: 9.37 K/UL (ref 3.9–12.7)

## 2022-07-22 PROCEDURE — 83880 ASSAY OF NATRIURETIC PEPTIDE: CPT | Performed by: NURSE PRACTITIONER

## 2022-07-22 PROCEDURE — 36415 COLL VENOUS BLD VENIPUNCTURE: CPT | Performed by: NURSE PRACTITIONER

## 2022-07-22 PROCEDURE — 80048 BASIC METABOLIC PNL TOTAL CA: CPT | Performed by: NURSE PRACTITIONER

## 2022-07-22 PROCEDURE — 99406 PT REFUSED TOBACCO CESSATION: ICD-10-PCS | Mod: ,,,

## 2022-07-22 PROCEDURE — 99406 BEHAV CHNG SMOKING 3-10 MIN: CPT | Mod: ,,,

## 2022-07-22 PROCEDURE — 36415 COLL VENOUS BLD VENIPUNCTURE: CPT | Performed by: HOSPITALIST

## 2022-07-22 PROCEDURE — 83036 HEMOGLOBIN GLYCOSYLATED A1C: CPT | Performed by: NURSE PRACTITIONER

## 2022-07-22 PROCEDURE — 99900035 HC TECH TIME PER 15 MIN (STAT)

## 2022-07-22 PROCEDURE — 25000003 PHARM REV CODE 250: Performed by: NURSE PRACTITIONER

## 2022-07-22 PROCEDURE — 85025 COMPLETE CBC W/AUTO DIFF WBC: CPT | Performed by: NURSE PRACTITIONER

## 2022-07-22 PROCEDURE — 99223 1ST HOSP IP/OBS HIGH 75: CPT | Mod: 25,,, | Performed by: NURSE PRACTITIONER

## 2022-07-22 PROCEDURE — 80307 DRUG TEST PRSMV CHEM ANLYZR: CPT | Performed by: NURSE PRACTITIONER

## 2022-07-22 PROCEDURE — 84484 ASSAY OF TROPONIN QUANT: CPT | Performed by: NURSE PRACTITIONER

## 2022-07-22 PROCEDURE — 63600175 PHARM REV CODE 636 W HCPCS

## 2022-07-22 PROCEDURE — 25500020 PHARM REV CODE 255: Mod: ER | Performed by: EMERGENCY MEDICINE

## 2022-07-22 PROCEDURE — 11000001 HC ACUTE MED/SURG PRIVATE ROOM

## 2022-07-22 PROCEDURE — 80061 LIPID PANEL: CPT | Performed by: NURSE PRACTITIONER

## 2022-07-22 PROCEDURE — 94761 N-INVAS EAR/PLS OXIMETRY MLT: CPT

## 2022-07-22 PROCEDURE — 63600175 PHARM REV CODE 636 W HCPCS: Mod: ER | Performed by: EMERGENCY MEDICINE

## 2022-07-22 PROCEDURE — 27000221 HC OXYGEN, UP TO 24 HOURS

## 2022-07-22 PROCEDURE — 83735 ASSAY OF MAGNESIUM: CPT | Performed by: NURSE PRACTITIONER

## 2022-07-22 PROCEDURE — 25000003 PHARM REV CODE 250: Mod: ER | Performed by: EMERGENCY MEDICINE

## 2022-07-22 PROCEDURE — 96375 TX/PRO/DX INJ NEW DRUG ADDON: CPT | Mod: ER

## 2022-07-22 PROCEDURE — 99223 PR INITIAL HOSPITAL CARE,LEVL III: ICD-10-PCS | Mod: 25,,, | Performed by: NURSE PRACTITIONER

## 2022-07-22 PROCEDURE — 36600 WITHDRAWAL OF ARTERIAL BLOOD: CPT

## 2022-07-22 PROCEDURE — 82140 ASSAY OF AMMONIA: CPT | Performed by: HOSPITALIST

## 2022-07-22 PROCEDURE — 63600175 PHARM REV CODE 636 W HCPCS: Performed by: NURSE PRACTITIONER

## 2022-07-22 RX ORDER — GLUCAGON 1 MG
1 KIT INJECTION
Status: DISCONTINUED | OUTPATIENT
Start: 2022-07-22 | End: 2022-07-26 | Stop reason: HOSPADM

## 2022-07-22 RX ORDER — NALOXONE HCL 0.4 MG/ML
VIAL (ML) INJECTION
Status: COMPLETED
Start: 2022-07-22 | End: 2022-07-22

## 2022-07-22 RX ORDER — AMLODIPINE BESYLATE 5 MG/1
10 TABLET ORAL DAILY
Status: DISCONTINUED | OUTPATIENT
Start: 2022-07-22 | End: 2022-07-24

## 2022-07-22 RX ORDER — TALC
6 POWDER (GRAM) TOPICAL NIGHTLY PRN
Status: DISCONTINUED | OUTPATIENT
Start: 2022-07-22 | End: 2022-07-26 | Stop reason: HOSPADM

## 2022-07-22 RX ORDER — SODIUM CHLORIDE 0.9 % (FLUSH) 0.9 %
10 SYRINGE (ML) INJECTION
Status: DISCONTINUED | OUTPATIENT
Start: 2022-07-22 | End: 2022-07-26 | Stop reason: HOSPADM

## 2022-07-22 RX ORDER — ENOXAPARIN SODIUM 100 MG/ML
40 INJECTION SUBCUTANEOUS EVERY 24 HOURS
Status: DISCONTINUED | OUTPATIENT
Start: 2022-07-22 | End: 2022-07-26 | Stop reason: HOSPADM

## 2022-07-22 RX ORDER — SODIUM CHLORIDE 0.9 % (FLUSH) 0.9 %
3 SYRINGE (ML) INJECTION EVERY 12 HOURS PRN
Status: DISCONTINUED | OUTPATIENT
Start: 2022-07-22 | End: 2022-07-26 | Stop reason: HOSPADM

## 2022-07-22 RX ORDER — NALOXONE HCL 0.4 MG/ML
0.4 VIAL (ML) INJECTION ONCE
Status: COMPLETED | OUTPATIENT
Start: 2022-07-22 | End: 2022-07-22

## 2022-07-22 RX ORDER — ONDANSETRON 2 MG/ML
4 INJECTION INTRAMUSCULAR; INTRAVENOUS EVERY 8 HOURS PRN
Status: DISCONTINUED | OUTPATIENT
Start: 2022-07-22 | End: 2022-07-26 | Stop reason: HOSPADM

## 2022-07-22 RX ORDER — FUROSEMIDE 10 MG/ML
40 INJECTION INTRAMUSCULAR; INTRAVENOUS
Status: DISCONTINUED | OUTPATIENT
Start: 2022-07-22 | End: 2022-07-23

## 2022-07-22 RX ORDER — NALOXONE HCL 0.4 MG/ML
0.02 VIAL (ML) INJECTION
Status: DISCONTINUED | OUTPATIENT
Start: 2022-07-22 | End: 2022-07-26 | Stop reason: HOSPADM

## 2022-07-22 RX ORDER — IBUPROFEN 200 MG
24 TABLET ORAL
Status: DISCONTINUED | OUTPATIENT
Start: 2022-07-22 | End: 2022-07-26 | Stop reason: HOSPADM

## 2022-07-22 RX ORDER — FUROSEMIDE 10 MG/ML
40 INJECTION INTRAMUSCULAR; INTRAVENOUS
Status: COMPLETED | OUTPATIENT
Start: 2022-07-22 | End: 2022-07-22

## 2022-07-22 RX ORDER — METOPROLOL TARTRATE 1 MG/ML
5 INJECTION, SOLUTION INTRAVENOUS
Status: COMPLETED | OUTPATIENT
Start: 2022-07-22 | End: 2022-07-22

## 2022-07-22 RX ORDER — RISPERIDONE 1 MG/1
1 TABLET ORAL 2 TIMES DAILY
Status: ON HOLD | COMMUNITY
Start: 2022-05-19 | End: 2022-08-11 | Stop reason: HOSPADM

## 2022-07-22 RX ORDER — IBUPROFEN 200 MG
16 TABLET ORAL
Status: DISCONTINUED | OUTPATIENT
Start: 2022-07-22 | End: 2022-07-26 | Stop reason: HOSPADM

## 2022-07-22 RX ORDER — ACETAMINOPHEN 325 MG/1
650 TABLET ORAL EVERY 4 HOURS PRN
Status: DISCONTINUED | OUTPATIENT
Start: 2022-07-22 | End: 2022-07-26 | Stop reason: HOSPADM

## 2022-07-22 RX ADMIN — FUROSEMIDE 40 MG: 10 INJECTION, SOLUTION INTRAMUSCULAR; INTRAVENOUS at 06:07

## 2022-07-22 RX ADMIN — Medication 0.4 MG: at 05:07

## 2022-07-22 RX ADMIN — FUROSEMIDE 40 MG: 10 INJECTION, SOLUTION INTRAMUSCULAR; INTRAVENOUS at 05:07

## 2022-07-22 RX ADMIN — PIPERACILLIN AND TAZOBACTAM 4.5 G: 4; .5 INJECTION, POWDER, LYOPHILIZED, FOR SOLUTION INTRAVENOUS; PARENTERAL at 03:07

## 2022-07-22 RX ADMIN — NALXONE HYDROCHLORIDE 0.4 MG: 0.4 INJECTION INTRAMUSCULAR; INTRAVENOUS; SUBCUTANEOUS at 05:07

## 2022-07-22 RX ADMIN — IOHEXOL 100 ML: 350 INJECTION, SOLUTION INTRAVENOUS at 12:07

## 2022-07-22 RX ADMIN — PIPERACILLIN AND TAZOBACTAM 4.5 G: 4; .5 INJECTION, POWDER, LYOPHILIZED, FOR SOLUTION INTRAVENOUS; PARENTERAL at 12:07

## 2022-07-22 RX ADMIN — NALXONE HYDROCHLORIDE 0.02 MG: 0.4 INJECTION INTRAMUSCULAR; INTRAVENOUS; SUBCUTANEOUS at 06:07

## 2022-07-22 RX ADMIN — METOROPROLOL TARTRATE 5 MG: 5 INJECTION, SOLUTION INTRAVENOUS at 03:07

## 2022-07-22 RX ADMIN — FUROSEMIDE 40 MG: 10 INJECTION, SOLUTION INTRAMUSCULAR; INTRAVENOUS at 02:07

## 2022-07-22 RX ADMIN — PIPERACILLIN AND TAZOBACTAM 4.5 G: 4; .5 INJECTION, POWDER, LYOPHILIZED, FOR SOLUTION INTRAVENOUS; PARENTERAL at 06:07

## 2022-07-22 RX ADMIN — ENOXAPARIN SODIUM 40 MG: 100 INJECTION SUBCUTANEOUS at 04:07

## 2022-07-22 NOTE — SUBJECTIVE & OBJECTIVE
Past Medical History:   Diagnosis Date    ADHD (attention deficit hyperactivity disorder)     Anemia     Anxiety     Bipolar disorder     History of hepatitis C - s/p clearance of virus (HCV neg 6/2015) 9/26/2014    History of psychiatric hospitalization     History of substance abuse     IV heroin - last use 2013 per pt    Hx of psychiatric care     Hypertension     Opioid overdose 5/10/2016    Psychiatric problem     Psychosis     Seizure disorder 4/3/2019    Sleep difficulties     Substance abuse     Therapy     Vasculitis        Past Surgical History:   Procedure Laterality Date    HYSTERECTOMY  3/16/2011    SALPINGOOPHORECTOMY Right 3/16/2011    TUBAL LIGATION      Vaginal cuff repair  04/22/2011       Review of patient's allergies indicates:  No Known Allergies    No current facility-administered medications on file prior to encounter.     Current Outpatient Medications on File Prior to Encounter   Medication Sig    amLODIPine (NORVASC) 5 MG tablet Take 1 tablet (5 mg total) by mouth once daily.    benztropine (COGENTIN) 1 MG tablet Take 1 tablet (1 mg total) by mouth 2 (two) times daily.    FLUoxetine 20 MG capsule Take 1 capsule (20 mg total) by mouth once daily.    gabapentin (NEURONTIN) 300 MG capsule Take 1 capsule (300 mg total) by mouth 3 (three) times daily.    lisinopriL 10 MG tablet Take 1 tablet (10 mg total) by mouth once daily.    OXcarbazepine (TRILEPTAL) 600 MG Tab Take 2 tablets (1,200 mg total) by mouth every evening.    propranoloL (INDERAL) 10 MG tablet Take 1 tablet (10 mg total) by mouth 3 (three) times daily.    QUEtiapine (SEROQUEL) 100 MG Tab Take 1 tablet (100 mg total) by mouth every evening.    risperiDONE (RISPERDAL) 1 MG tablet Take 1 mg by mouth 2 (two) times daily.     Family History       Problem Relation (Age of Onset)    Cancer Maternal Grandmother    Diabetes Maternal Grandmother          Tobacco Use    Smoking status: Current Every Day Smoker     Packs/day: 1.00     Years:  30.00     Pack years: 30.00     Start date: 4/15/1993    Smokeless tobacco: Never Used   Substance and Sexual Activity    Alcohol use: No    Drug use: Yes     Types: Heroin, Cocaine, Methamphetamines, Marijuana    Sexual activity: Not Currently     Partners: Male, Female     Birth control/protection: Other-see comments     Comment: hysterectomy     Review of Systems   Unable to perform ROS: Patient unresponsive   Objective:     Vital Signs (Most Recent):  Temp: 97.7 °F (36.5 °C) (07/22/22 0531)  Pulse: 107 (07/22/22 0531)  Resp: 19 (07/22/22 0531)  BP: (!) 153/107 (07/22/22 0531)  SpO2: 100 % (07/22/22 0531)   Vital Signs (24h Range):  Temp:  [97.7 °F (36.5 °C)-97.9 °F (36.6 °C)] 97.7 °F (36.5 °C)  Pulse:  [] 107  Resp:  [5-44] 19  SpO2:  [85 %-100 %] 100 %  BP: (141-184)/() 153/107     Weight: 52.6 kg (116 lb)  Body mass index is 22.65 kg/m².    Physical Exam  Vitals and nursing note reviewed.   Constitutional:       Appearance: She is ill-appearing and toxic-appearing.   HENT:      Head: Normocephalic and atraumatic.      Nose: Nose normal.      Mouth/Throat:      Mouth: Mucous membranes are dry.   Eyes:      Pupils: Pupils are equal, round, and reactive to light.   Cardiovascular:      Rate and Rhythm: Regular rhythm. Tachycardia present.      Pulses: Normal pulses.      Heart sounds: Murmur heard.   Pulmonary:      Effort: Pulmonary effort is normal.      Breath sounds: Rales present.   Abdominal:      General: Bowel sounds are normal.      Palpations: Abdomen is soft.   Musculoskeletal:         General: Normal range of motion.      Cervical back: Normal range of motion.      Right lower leg: Edema present.      Left lower leg: Edema present.   Skin:     General: Skin is warm and dry.   Neurological:      Motor: Weakness present.      Comments: unresponsive   Psychiatric:      Comments: Unable to assess         CRANIAL NERVES     CN III, IV, VI   Pupils are equal, round, and reactive to light.      Significant Labs: All pertinent labs within the past 24 hours have been reviewed.    Significant Imaging: I have reviewed all pertinent imaging results/findings within the past 24 hours.

## 2022-07-22 NOTE — PROGRESS NOTES
Smoking cessation education note: Pt arousable but w/ altered mental state and unable to converse at this time. Smoking cessation education deferred until patient condition improves.

## 2022-07-22 NOTE — HPI
51 year old has PMHx of HTN, pneumonia, substance abuse (meth abuse), bipolar disorder, acute/resp HF, AMS, hep C 2014, seizure, tobacco use, opioid overdose with complaints of SOB/VARGAS x 2 days. Reports being out of BP medications. Patient lethargic since admission, was given Narcan x3. Chest x-ray with cardiomegaly and perihilar edema suggestive of CHF. CT mild L lower lob infiltrate. EKG , LVH, normal ST segments. Last CLARA 2014 with EF 60-65%, mild MR. Cardiology consulted for ADHF.

## 2022-07-22 NOTE — ED NOTES
Entered pts room to administer medications. Pt is standing next to bed, PIV removed by patient, and urine on the floor

## 2022-07-22 NOTE — ASSESSMENT & PLAN NOTE
In setting of acute heart failure and drug abuse  Denies chest pain  -echo pending  -cardiology consult  -trend troponin

## 2022-07-22 NOTE — PLAN OF CARE
SW met with pt at bedside to complete assessment. Pt is unable to be aroused easily. Pt remains in bed with eyes closed, minimally responsive. SW will complete assessment questions with emergency contact, pt mother Kiya.   Per pt mother, pt lives at [address] [place] [address]. Pt resides with family to include father and sister. Pt was taken to ED by sister for SOB. Kiya reports pt to be able to complete ADL's on own. Pt is reported to have a walker for DME due to not being able to walk long distances on own.        07/22/22 0667   Discharge Planning   Assessment Type Discharge Planning Brief Assessment   Support Systems Family members;Parent   Equipment Currently Used at Home walker, standard   Patient/Family Anticipates Transition to home   Patient/Family Anticipated Services at Transition none   DME Needed Upon Discharge  other (see comments)   Discharge Plan A Home with family     SW to schedule follow ups according to recs.     BIBIANA Perez Case Management  [phone]

## 2022-07-22 NOTE — ASSESSMENT & PLAN NOTE
Noncompliant with BP management  -start amlodipine 10mg daily  -cardiology consulted  -add BB based on echo results (hx of cocaine abuse)

## 2022-07-22 NOTE — ASSESSMENT & PLAN NOTE
Patient is obtunded on arrival, minimally responsive to sternal rub  -narcan x 1 now  -ABG pending  -accucheck  -vitals stable

## 2022-07-22 NOTE — CONSULTS
[facility] - Telemetry  Cardiology  Consult Note    Patient Name: Stephanie T Barthelemy  MRN: [id number]  Admission Date: 7/21/2022  Hospital Length of Stay: 0 days  Code Status: Full Code   Attending Provider: Almaz Lucio MD   Consulting Provider: Ashley Jama NP  Primary Care Physician: Elsie Horne DO  Principal Problem:Acute heart failure    Patient information was obtained from ER records.     [facility]  Consult performed by: Ashley Jama NP  Consult ordered by: Ольга Martin NP        Subjective:     Chief Complaint:  SOB x 2 days, out of BP meds     HPI:   51 year old has PMHx of HTN, pneumonia, substance abuse (meth abuse), bipolar disorder, acute/resp HF, AMS, hep C 2014, seizure, tobacco use, opioid overdose with complaints of SOB/VARGAS x 2 days. Reports being out of BP medications. Patient lethargic since admission, was given Narcan x3. Chest x-ray with cardiomegaly and perihilar edema suggestive of CHF. CT mild L lower lob infiltrate. EKG , LVH, normal ST segments. Last CLARA 2014 with EF 60-65%, mild MR. Cardiology consulted for ADHF.      Past Medical History:   Diagnosis Date    ADHD (attention deficit hyperactivity disorder)     Anemia     Anxiety     Bipolar disorder     History of hepatitis C - s/p clearance of virus (HCV neg 6/2015) 9/26/2014    History of psychiatric hospitalization     History of substance abuse     IV heroin - last use 2013 per pt    Hx of psychiatric care     Hypertension     Opioid overdose 5/10/2016    Psychiatric problem     Psychosis     Seizure disorder 4/3/2019    Sleep difficulties     Substance abuse     Therapy     Vasculitis        Past Surgical History:   Procedure Laterality Date    HYSTERECTOMY  3/16/2011    SALPINGOOPHORECTOMY Right 3/16/2011    TUBAL LIGATION      Vaginal cuff repair  04/22/2011       Review of patient's allergies indicates:  No Known Allergies    No current  facility-administered medications on file prior to encounter.     Current Outpatient Medications on File Prior to Encounter   Medication Sig    amLODIPine (NORVASC) 5 MG tablet Take 1 tablet (5 mg total) by mouth once daily.    benztropine (COGENTIN) 1 MG tablet Take 1 tablet (1 mg total) by mouth 2 (two) times daily.    FLUoxetine 20 MG capsule Take 1 capsule (20 mg total) by mouth once daily.    gabapentin (NEURONTIN) 300 MG capsule Take 1 capsule (300 mg total) by mouth 3 (three) times daily.    lisinopriL 10 MG tablet Take 1 tablet (10 mg total) by mouth once daily.    OXcarbazepine (TRILEPTAL) 600 MG Tab Take 2 tablets (1,200 mg total) by mouth every evening.    propranoloL (INDERAL) 10 MG tablet Take 1 tablet (10 mg total) by mouth 3 (three) times daily.    QUEtiapine (SEROQUEL) 100 MG Tab Take 1 tablet (100 mg total) by mouth every evening.    risperiDONE (RISPERDAL) 1 MG tablet Take 1 mg by mouth 2 (two) times daily.     Family History       Problem Relation (Age of Onset)    Cancer Maternal Grandmother    Diabetes Maternal Grandmother          Tobacco Use    Smoking status: Current Every Day Smoker     Packs/day: 1.00     Years: 30.00     Pack years: 30.00     Start date: 4/15/1993    Smokeless tobacco: Never Used   Substance and Sexual Activity    Alcohol use: No    Drug use: Yes     Types: Heroin, Cocaine, Methamphetamines, Marijuana    Sexual activity: Not Currently     Partners: Male, Female     Birth control/protection: Other-see comments     Comment: hysterectomy     Review of Systems   Unable to perform ROS: Psychiatric disorder   Objective:     Vital Signs (Most Recent):  Temp: 96.8 °F (36 °C) (07/22/22 1104)  Pulse: 107 (07/22/22 1104)  Resp: 18 (07/22/22 1104)  BP: 135/86 (07/22/22 1104)  SpO2: 100 % (07/22/22 1104) Vital Signs (24h Range):  Temp:  [96.8 °F (36 °C)-97.9 °F (36.6 °C)] 96.8 °F (36 °C)  Pulse:  [] 107  Resp:  [5-44] 18  SpO2:  [3 %-100 %] 100 %  BP:  (135-184)/() 135/86     Weight: 57.2 kg (126 lb)  Body mass index is 24.61 kg/m².    SpO2: 100 %  O2 Device (Oxygen Therapy): nasal cannula      Intake/Output Summary (Last 24 hours) at 7/22/2022 1205  Last data filed at 7/22/2022 0900  Gross per 24 hour   Intake 1100 ml   Output 2400 ml   Net -1300 ml       Lines/Drains/Airways       Peripheral Intravenous Line  Duration                  Peripheral IV - Single Lumen 07/22/22 0215 20 G Right Antecubital <1 day                    Physical Exam  Constitutional:       Comments: lethargic   HENT:      Head: Normocephalic.   Cardiovascular:      Rate and Rhythm: Normal rate and regular rhythm.      Pulses: Normal pulses.      Heart sounds: Normal heart sounds.   Pulmonary:      Effort: Pulmonary effort is normal.   Skin:     General: Skin is warm and dry.   Neurological:      Mental Status: She is alert.       Significant Labs: CMP   Recent Labs   Lab 07/21/22 2200 07/22/22 0718    144   K 3.7 3.6    105   CO2 28 26   * 126*   BUN 17 16   CREATININE 0.86 1.0   CALCIUM 9.1 9.0   PROT 7.0  --    ALBUMIN 3.6  --    BILITOT 0.7  --    ALKPHOS 168*  --    *  --    *  --    ANIONGAP 8 13   ESTGFRAFRICA >60.0 >60   EGFRNONAA >60.0 >60   , INR No results for input(s): INR, PROTIME in the last 48 hours., Lipid Panel   Recent Labs   Lab 07/22/22  0718   CHOL 162   HDL 22*   LDLCALC 114.2   TRIG 129   CHOLHDL 13.6*   , and Troponin   Recent Labs   Lab 07/21/22 2200 07/22/22 0718   TROPONINI 0.079* 0.080*       Significant Imaging: Echocardiogram: Transthoracic echo (TTE) complete ([facility] Only):   Results for orders placed or performed during the hospital encounter of 07/21/22   Echo   Result Value Ref Range    BSA 1.56 m2    TDI SEPTAL 0.08 m/s    LV LATERAL E/E' RATIO 14.56 m/s    LV SEPTAL E/E' RATIO 16.38 m/s    LA WIDTH 4.59 cm    TDI LATERAL 0.09 m/s    PV PEAK VELOCITY 1.25 cm/s    LVIDd 5.22 3.5 - 6.0 cm    IVS 1.32 (A) 0.6 - 1.1 cm     Posterior Wall 1.23 (A) 0.6 - 1.1 cm    Ao root annulus 2.52 cm    LVIDs 4.80 (A) 2.1 - 4.0 cm    FS 8 28 - 44 %    LV mass 272.55 g    LA size 4.24 cm    RVDD 2.55 cm    TAPSE 1.95 cm    Left Ventricle Relative Wall Thickness 0.47 cm    AV mean gradient 3 mmHg    AV valve area 2.09 cm2    AV Velocity Ratio 0.75     AV index (prosthetic) 0.74     MV mean gradient 1 mmHg    MV valve area p 1/2 method 7.47 cm2    MV valve area by continuity eq 1.76 cm2    E/A ratio 2.73     Mean e' 0.09 m/s    E wave deceleration time 85.51 msec    LVOT diameter 1.90 cm    LVOT area 2.8 cm2    LVOT peak curt 0.97 m/s    LVOT peak VTI 13.45 cm    Ao peak curt 1.29 m/s    Ao VTI 18.21 cm    Mr max curt 0.06 m/s    LVOT stroke volume 38.12 cm3    AV peak gradient 7 mmHg    MV peak gradient 7 mmHg    E/E' ratio 15.41 m/s    MV Peak E Curt 1.31 m/s    TR Max Curt 5.42 m/s    MV VTI 21.66 cm    MV stenosis pressure 1/2 time 29.44 ms    MV Peak A Curt 0.48 m/s    LV Systolic Volume 107.66 mL    LV Systolic Volume Index 70.4 mL/m2    LV Diastolic Volume 130.52 mL    LV Diastolic Volume Index 85.31 mL/m2    LV Mass Index 178 g/m2    RA Major Axis 4.59 cm    Left Atrium Major Axis 6.65 cm    Triscuspid Valve Regurgitation Peak Gradient 118 mmHg    LA Volume Index (Mod) 53.8 mL/m2    LA volume (mod) 82.28 cm3    RA Width 3.48 cm     Assessment and Plan:     * Acute heart failure  ProBNP 39919  EKG ST, LVH, normal ST segments  Chest x ray with cardiomegaly + perihilar edema suggestive of CHF  CLARA from 2014 with EF 60-65% , mild MR; repeat pending   Given acute decompensation, withdrawal HTN medications, and substance abuse, suspicious for some degree of DD, worsened valvular disease and/or decreased pump function to be expected on repeat echo  Continue Lasix IV 40mg BID; will monitor for SOB improvement when patient is stable; lethargic   K+ 3.7; replace as needed for goal >4  Resume lisinopril as needed for BP; creatinine stable       Elevated  troponin  Trop 0.079, repeat 0.080  EKG ST, LVH, normal ST segments  No CP  Chest x ray with cardiomegaly + perihilar edema suggestive of CHF  CT with mild left lower lob infiltrate  CLARA from 2014 with EF 60-65% , mild MR; repeat pending  Given the above, suspect mild increase trop demand related     Essential hypertension  SBP 130s currently  Continue amlodipine   Takes lisinopril at home; resume as needed  Creatinine stable           VTE Risk Mitigation (From admission, onward)         Ordered     enoxaparin injection 40 mg  Daily         07/22/22 0527     IP VTE HIGH RISK PATIENT  Once         07/22/22 0527     Place sequential compression device  Until discontinued         07/22/22 0527                Thank you for your consult. I will follow-up with patient. Please contact us if you have any additional questions.    Ashley Jama, NP  Cardiology   [facility] - Telemetry

## 2022-07-22 NOTE — SUBJECTIVE & OBJECTIVE
Past Medical History:   Diagnosis Date    ADHD (attention deficit hyperactivity disorder)     Anemia     Anxiety     Bipolar disorder     History of hepatitis C - s/p clearance of virus (HCV neg 6/2015) 9/26/2014    History of psychiatric hospitalization     History of substance abuse     IV heroin - last use 2013 per pt    Hx of psychiatric care     Hypertension     Opioid overdose 5/10/2016    Psychiatric problem     Psychosis     Seizure disorder 4/3/2019    Sleep difficulties     Substance abuse     Therapy     Vasculitis        Past Surgical History:   Procedure Laterality Date    HYSTERECTOMY  3/16/2011    SALPINGOOPHORECTOMY Right 3/16/2011    TUBAL LIGATION      Vaginal cuff repair  04/22/2011       Review of patient's allergies indicates:  No Known Allergies    No current facility-administered medications on file prior to encounter.     Current Outpatient Medications on File Prior to Encounter   Medication Sig    amLODIPine (NORVASC) 5 MG tablet Take 1 tablet (5 mg total) by mouth once daily.    benztropine (COGENTIN) 1 MG tablet Take 1 tablet (1 mg total) by mouth 2 (two) times daily.    FLUoxetine 20 MG capsule Take 1 capsule (20 mg total) by mouth once daily.    gabapentin (NEURONTIN) 300 MG capsule Take 1 capsule (300 mg total) by mouth 3 (three) times daily.    lisinopriL 10 MG tablet Take 1 tablet (10 mg total) by mouth once daily.    OXcarbazepine (TRILEPTAL) 600 MG Tab Take 2 tablets (1,200 mg total) by mouth every evening.    propranoloL (INDERAL) 10 MG tablet Take 1 tablet (10 mg total) by mouth 3 (three) times daily.    QUEtiapine (SEROQUEL) 100 MG Tab Take 1 tablet (100 mg total) by mouth every evening.    risperiDONE (RISPERDAL) 1 MG tablet Take 1 mg by mouth 2 (two) times daily.     Family History       Problem Relation (Age of Onset)    Cancer Maternal Grandmother    Diabetes Maternal Grandmother          Tobacco Use    Smoking status: Current Every Day Smoker     Packs/day: 1.00     Years:  30.00     Pack years: 30.00     Start date: 4/15/1993    Smokeless tobacco: Never Used   Substance and Sexual Activity    Alcohol use: No    Drug use: Yes     Types: Heroin, Cocaine, Methamphetamines, Marijuana    Sexual activity: Not Currently     Partners: Male, Female     Birth control/protection: Other-see comments     Comment: hysterectomy     Review of Systems   Unable to perform ROS: Psychiatric disorder   Objective:     Vital Signs (Most Recent):  Temp: 96.8 °F (36 °C) (07/22/22 1104)  Pulse: 107 (07/22/22 1104)  Resp: 18 (07/22/22 1104)  BP: 135/86 (07/22/22 1104)  SpO2: 100 % (07/22/22 1104) Vital Signs (24h Range):  Temp:  [96.8 °F (36 °C)-97.9 °F (36.6 °C)] 96.8 °F (36 °C)  Pulse:  [] 107  Resp:  [5-44] 18  SpO2:  [3 %-100 %] 100 %  BP: (135-184)/() 135/86     Weight: 57.2 kg (126 lb)  Body mass index is 24.61 kg/m².    SpO2: 100 %  O2 Device (Oxygen Therapy): nasal cannula      Intake/Output Summary (Last 24 hours) at 7/22/2022 1205  Last data filed at 7/22/2022 0900  Gross per 24 hour   Intake 1100 ml   Output 2400 ml   Net -1300 ml       Lines/Drains/Airways       Peripheral Intravenous Line  Duration                  Peripheral IV - Single Lumen 07/22/22 0215 20 G Right Antecubital <1 day                    Physical Exam  Constitutional:       Comments: lethargic   HENT:      Head: Normocephalic.   Cardiovascular:      Rate and Rhythm: Normal rate and regular rhythm.      Pulses: Normal pulses.      Heart sounds: Normal heart sounds.   Pulmonary:      Effort: Pulmonary effort is normal.   Skin:     General: Skin is warm and dry.   Neurological:      Mental Status: She is alert.       Significant Labs: CMP   Recent Labs   Lab 07/21/22  2200 07/22/22  0718    144   K 3.7 3.6    105   CO2 28 26   * 126*   BUN 17 16   CREATININE 0.86 1.0   CALCIUM 9.1 9.0   PROT 7.0  --    ALBUMIN 3.6  --    BILITOT 0.7  --    ALKPHOS 168*  --    *  --    *  --    ANIONGAP 8  13   ESTGFRAFRICA >60.0 >60   EGFRNONAA >60.0 >60   , INR No results for input(s): INR, PROTIME in the last 48 hours., Lipid Panel   Recent Labs   Lab 07/22/22  0718   CHOL 162   HDL 22*   LDLCALC 114.2   TRIG 129   CHOLHDL 13.6*   , and Troponin   Recent Labs   Lab 07/21/22  2200 07/22/22  0718   TROPONINI 0.079* 0.080*       Significant Imaging: Echocardiogram: Transthoracic echo (TTE) complete ([facility] Only):   Results for orders placed or performed during the hospital encounter of 07/21/22   Echo   Result Value Ref Range    BSA 1.56 m2    TDI SEPTAL 0.08 m/s    LV LATERAL E/E' RATIO 14.56 m/s    LV SEPTAL E/E' RATIO 16.38 m/s    LA WIDTH 4.59 cm    TDI LATERAL 0.09 m/s    PV PEAK VELOCITY 1.25 cm/s    LVIDd 5.22 3.5 - 6.0 cm    IVS 1.32 (A) 0.6 - 1.1 cm    Posterior Wall 1.23 (A) 0.6 - 1.1 cm    Ao root annulus 2.52 cm    LVIDs 4.80 (A) 2.1 - 4.0 cm    FS 8 28 - 44 %    LV mass 272.55 g    LA size 4.24 cm    RVDD 2.55 cm    TAPSE 1.95 cm    Left Ventricle Relative Wall Thickness 0.47 cm    AV mean gradient 3 mmHg    AV valve area 2.09 cm2    AV Velocity Ratio 0.75     AV index (prosthetic) 0.74     MV mean gradient 1 mmHg    MV valve area p 1/2 method 7.47 cm2    MV valve area by continuity eq 1.76 cm2    E/A ratio 2.73     Mean e' 0.09 m/s    E wave deceleration time 85.51 msec    LVOT diameter 1.90 cm    LVOT area 2.8 cm2    LVOT peak curt 0.97 m/s    LVOT peak VTI 13.45 cm    Ao peak curt 1.29 m/s    Ao VTI 18.21 cm    Mr max curt 0.06 m/s    LVOT stroke volume 38.12 cm3    AV peak gradient 7 mmHg    MV peak gradient 7 mmHg    E/E' ratio 15.41 m/s    MV Peak E Curt 1.31 m/s    TR Max Curt 5.42 m/s    MV VTI 21.66 cm    MV stenosis pressure 1/2 time 29.44 ms    MV Peak A Curt 0.48 m/s    LV Systolic Volume 107.66 mL    LV Systolic Volume Index 70.4 mL/m2    LV Diastolic Volume 130.52 mL    LV Diastolic Volume Index 85.31 mL/m2    LV Mass Index 178 g/m2    RA Major Axis 4.59 cm    Left Atrium Major Axis 6.65 cm     Triscuspid Valve Regurgitation Peak Gradient 118 mmHg    LA Volume Index (Mod) 53.8 mL/m2    LA volume (mod) 82.28 cm3    RA Width 3.48 cm
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
## 2022-07-22 NOTE — CONSULTS
"RD consulted, "For education on fluid and salt restriction."     Spoke with RN Jessica, pt minimally responsive. RD tired to speak with pt x2, pt mumbled, did not open eyes or show signs of attention. RD left handouts with key information highlighted at bedside. Will follow up next week.     Per H&P note: Pt with PMHx of drug abuse, tobacco use, psychiatric disorder, bipolar disorder, Hepatitis C, HTN, opioid overdose, seizure disorder. She presented with SOB x 2 days, worse with exertion. Also reports fatigue, orthopnea, and cough."       Please Re-consult as needed.    Thank you.   "
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
## 2022-07-22 NOTE — PROGRESS NOTES
[facility]           Pharmacy        Current Drug Shortage     Due to national backorder and [facility] is critically low on inventory of Dextrose 50% (D50) Syringes and Vials, pharmacy has automatically switched from D50% to D10% IVPB at the equivalent dose until resolution of the shortage per P&T approved protocol.               Jamia Hussein, PharmD  [phone]

## 2022-07-22 NOTE — ED NOTES
Patient climbing out of bed and pulling leads off. Uncooperative with care. Having to talk patient into getting back into bed. Informed her that she cannot walk around because she is a fall risk.

## 2022-07-22 NOTE — ASSESSMENT & PLAN NOTE
Hx of meth, cocaine, marijuana, heroin with IV drug use and Hep C  Unresponsive on arrival  Unclear if she took anything  -narcan x 1 dose  -consult psych as needed

## 2022-07-22 NOTE — ASSESSMENT & PLAN NOTE
Trop 0.079, repeat 0.080  EKG ST, LVH, normal ST segments  No CP  Chest x ray with cardiomegaly + perihilar edema suggestive of CHF  CT with mild left lower lob infiltrate  CLARA from 2014 with EF 60-65% , mild MR; repeat pending  Given the above, suspect mild increase trop demand related

## 2022-07-22 NOTE — ASSESSMENT & PLAN NOTE
ProBNP 37638  EKG ST, LVH, normal ST segments  Chest x ray with cardiomegaly + perihilar edema suggestive of CHF  CLARA from 2014 with EF 60-65% , mild MR; repeat pending   Given acute decompensation, withdrawal HTN medications, and substance abuse, suspicious for some degree of DD, worsened valvular disease and/or decreased pump function to be expected on repeat echo  Continue Lasix IV 40mg BID; will monitor for SOB improvement when patient is stable; lethargic   K+ 3.7; replace as needed for goal >4  Resume lisinopril as needed for BP; creatinine stable

## 2022-07-22 NOTE — ASSESSMENT & PLAN NOTE
Likely 2/2 uncontrolled hypertension and drug use   -cont lasix 40mg IV BID  -consult cardiology  -echo pending

## 2022-07-22 NOTE — ASSESSMENT & PLAN NOTE
-patient is unresponsive on assessment, will need to discuss home meds when able  -consult psych as needed

## 2022-07-22 NOTE — ASSESSMENT & PLAN NOTE
In setting of acute heart failure with volume overload  -cont supplemental O2 as needed, wean as able  -no PE on CTA  -ABG pending

## 2022-07-22 NOTE — ED NOTES
Pt cleaned of incontinence. Clean gown and new sheets provided  Diaper placed on pt.   New 20g PIV placed to R AC. Pt tolerated well

## 2022-07-22 NOTE — ASSESSMENT & PLAN NOTE
SBP 130s currently  Continue amlodipine   Takes lisinopril at home; resume as needed  Creatinine stable

## 2022-07-23 PROBLEM — I50.43 ACUTE ON CHRONIC COMBINED SYSTOLIC AND DIASTOLIC HEART FAILURE: Status: ACTIVE | Noted: 2022-07-22

## 2022-07-23 PROBLEM — G93.41 ENCEPHALOPATHY, METABOLIC: Status: ACTIVE | Noted: 2022-07-22

## 2022-07-23 LAB
ANION GAP SERPL CALC-SCNC: 14 MMOL/L (ref 8–16)
BASOPHILS # BLD AUTO: 0.05 K/UL (ref 0–0.2)
BASOPHILS NFR BLD: 0.6 % (ref 0–1.9)
BUN SERPL-MCNC: 18 MG/DL (ref 6–20)
CALCIUM SERPL-MCNC: 8.9 MG/DL (ref 8.7–10.5)
CHLORIDE SERPL-SCNC: 99 MMOL/L (ref 95–110)
CO2 SERPL-SCNC: 35 MMOL/L (ref 23–29)
CREAT SERPL-MCNC: 1.3 MG/DL (ref 0.5–1.4)
DIFFERENTIAL METHOD: ABNORMAL
EOSINOPHIL # BLD AUTO: 0.1 K/UL (ref 0–0.5)
EOSINOPHIL NFR BLD: 1.4 % (ref 0–8)
ERYTHROCYTE [DISTWIDTH] IN BLOOD BY AUTOMATED COUNT: 15.7 % (ref 11.5–14.5)
EST. GFR  (AFRICAN AMERICAN): 55 ML/MIN/1.73 M^2
EST. GFR  (NON AFRICAN AMERICAN): 48 ML/MIN/1.73 M^2
GLUCOSE SERPL-MCNC: 101 MG/DL (ref 70–110)
HCT VFR BLD AUTO: 40.5 % (ref 37–48.5)
HGB BLD-MCNC: 12.7 G/DL (ref 12–16)
IMM GRANULOCYTES # BLD AUTO: 0.02 K/UL (ref 0–0.04)
IMM GRANULOCYTES NFR BLD AUTO: 0.2 % (ref 0–0.5)
LYMPHOCYTES # BLD AUTO: 1.3 K/UL (ref 1–4.8)
LYMPHOCYTES NFR BLD: 15.1 % (ref 18–48)
MAGNESIUM SERPL-MCNC: 1.7 MG/DL (ref 1.6–2.6)
MCH RBC QN AUTO: 27.5 PG (ref 27–31)
MCHC RBC AUTO-ENTMCNC: 31.4 G/DL (ref 32–36)
MCV RBC AUTO: 88 FL (ref 82–98)
MONOCYTES # BLD AUTO: 0.7 K/UL (ref 0.3–1)
MONOCYTES NFR BLD: 8.6 % (ref 4–15)
NEUTROPHILS # BLD AUTO: 6.2 K/UL (ref 1.8–7.7)
NEUTROPHILS NFR BLD: 74.1 % (ref 38–73)
NRBC BLD-RTO: 0 /100 WBC
PLATELET # BLD AUTO: 244 K/UL (ref 150–450)
PMV BLD AUTO: 11.7 FL (ref 9.2–12.9)
POCT GLUCOSE: 121 MG/DL (ref 70–110)
POTASSIUM SERPL-SCNC: 3 MMOL/L (ref 3.5–5.1)
RBC # BLD AUTO: 4.61 M/UL (ref 4–5.4)
SODIUM SERPL-SCNC: 148 MMOL/L (ref 136–145)
WBC # BLD AUTO: 8.34 K/UL (ref 3.9–12.7)

## 2022-07-23 PROCEDURE — 63600175 PHARM REV CODE 636 W HCPCS: Performed by: HOSPITALIST

## 2022-07-23 PROCEDURE — 27000221 HC OXYGEN, UP TO 24 HOURS

## 2022-07-23 PROCEDURE — 36415 COLL VENOUS BLD VENIPUNCTURE: CPT | Performed by: NURSE PRACTITIONER

## 2022-07-23 PROCEDURE — 99233 SBSQ HOSP IP/OBS HIGH 50: CPT | Mod: ,,, | Performed by: INTERNAL MEDICINE

## 2022-07-23 PROCEDURE — 63600175 PHARM REV CODE 636 W HCPCS: Performed by: NURSE PRACTITIONER

## 2022-07-23 PROCEDURE — 83735 ASSAY OF MAGNESIUM: CPT | Performed by: NURSE PRACTITIONER

## 2022-07-23 PROCEDURE — 94761 N-INVAS EAR/PLS OXIMETRY MLT: CPT

## 2022-07-23 PROCEDURE — 99900035 HC TECH TIME PER 15 MIN (STAT)

## 2022-07-23 PROCEDURE — 25000003 PHARM REV CODE 250: Performed by: HOSPITALIST

## 2022-07-23 PROCEDURE — 80048 BASIC METABOLIC PNL TOTAL CA: CPT | Performed by: NURSE PRACTITIONER

## 2022-07-23 PROCEDURE — 85025 COMPLETE CBC W/AUTO DIFF WBC: CPT | Performed by: NURSE PRACTITIONER

## 2022-07-23 PROCEDURE — 25000003 PHARM REV CODE 250: Performed by: NURSE PRACTITIONER

## 2022-07-23 PROCEDURE — 99233 PR SUBSEQUENT HOSPITAL CARE,LEVL III: ICD-10-PCS | Mod: ,,, | Performed by: INTERNAL MEDICINE

## 2022-07-23 PROCEDURE — 11000001 HC ACUTE MED/SURG PRIVATE ROOM

## 2022-07-23 RX ORDER — MUPIROCIN 20 MG/G
OINTMENT TOPICAL 2 TIMES DAILY
Status: DISCONTINUED | OUTPATIENT
Start: 2022-07-23 | End: 2022-07-26 | Stop reason: HOSPADM

## 2022-07-23 RX ORDER — MAGNESIUM SULFATE 1 G/100ML
1 INJECTION INTRAVENOUS ONCE
Status: COMPLETED | OUTPATIENT
Start: 2022-07-23 | End: 2022-07-23

## 2022-07-23 RX ORDER — FUROSEMIDE 10 MG/ML
40 INJECTION INTRAMUSCULAR; INTRAVENOUS DAILY
Status: DISCONTINUED | OUTPATIENT
Start: 2022-07-24 | End: 2022-07-24

## 2022-07-23 RX ADMIN — MUPIROCIN: 20 OINTMENT TOPICAL at 09:07

## 2022-07-23 RX ADMIN — AMLODIPINE BESYLATE 10 MG: 5 TABLET ORAL at 09:07

## 2022-07-23 RX ADMIN — PIPERACILLIN AND TAZOBACTAM 4.5 G: 4; .5 INJECTION, POWDER, LYOPHILIZED, FOR SOLUTION INTRAVENOUS; PARENTERAL at 02:07

## 2022-07-23 RX ADMIN — MUPIROCIN: 20 OINTMENT TOPICAL at 12:07

## 2022-07-23 RX ADMIN — POTASSIUM BICARBONATE 40 MEQ: 391 TABLET, EFFERVESCENT ORAL at 12:07

## 2022-07-23 RX ADMIN — MAGNESIUM SULFATE 1 G: 1 INJECTION INTRAVENOUS at 01:07

## 2022-07-23 RX ADMIN — PIPERACILLIN AND TAZOBACTAM 4.5 G: 4; .5 INJECTION, POWDER, LYOPHILIZED, FOR SOLUTION INTRAVENOUS; PARENTERAL at 09:07

## 2022-07-23 RX ADMIN — ENOXAPARIN SODIUM 40 MG: 100 INJECTION SUBCUTANEOUS at 05:07

## 2022-07-23 RX ADMIN — POTASSIUM BICARBONATE 40 MEQ: 391 TABLET, EFFERVESCENT ORAL at 02:07

## 2022-07-23 RX ADMIN — FUROSEMIDE 40 MG: 10 INJECTION, SOLUTION INTRAMUSCULAR; INTRAVENOUS at 05:07

## 2022-07-23 RX ADMIN — PIPERACILLIN AND TAZOBACTAM 4.5 G: 4; .5 INJECTION, POWDER, LYOPHILIZED, FOR SOLUTION INTRAVENOUS; PARENTERAL at 06:07

## 2022-07-23 NOTE — PLAN OF CARE
"Safety maintained, bed alarm on and call bell in reach. Telemetry monitoring in progress NSR noted HR 98 at present.No respiratory distress noted. On O2L/NC SpO2 98%. Awake, agitated,complaining of thirst. No diet order noted. Message sent to JAISON Lozada NP via secure chat. Awaiting response.Bilateral soft wrist restraints on. Circulation checks wnl. Plan of care reviewed. No evidence of learning. Oriented to name and place. States "I am in [facility]" but does not know where.denies any pain or discomfort. Will continue to monitor Q 2 hr. Continue current plan of care.  "

## 2022-07-23 NOTE — PROGRESS NOTES
[facility] Medicine  Progress Note    Patient Name: Stephanie T Barthelemy  MRN: [id number]  Patient Class: IP- Inpatient   Admission Date: 7/21/2022  Length of Stay: 1 days  Attending Physician: Almaz Lucio MD  Primary Care Provider: Elsie Horne DO        Subjective:     Principal Problem:Acute on chronic combined systolic and diastolic heart failure        HPI:  Patient is a 50yo female with a pmh of drug abuse, tobacco use, psychiatric disorder, bipolar disorder, Hepatitis C, HTN, opioid overdose, seizure disorder. She presented with SOB x 2 days, worse with exertion. Also reports fatigue, orthopnea, and cough. Denies chest pain. No fevers. In the ED, she was found to have pulmonary edema and cardiomegaly on chest [place] 92390. Troponin 0.079. Chest CTA with no PE but with LLL infiltrate. She was given IV fluids, zosyn, lasix, ASA, and NGT paste. Urine was not measured due to patient urinating on floor.       Overview/Hospital Course:  Ms. Barthelemy presented with shortness of breath and confusion.  Admitted with acute respiratory failure due to volume overload secondary to new onset heart failure.  BNP greater than 4900 and exam with pulmonary edema and peripheral edema.  Long history of substance abuse, and altered mental status also due to likely substance abuse.  Workup with 2D echo done which showed EF of 20%, global hypokinesis and grade 3 diastolic dysfunction.  Cardiology consulted.  Diuresis in progress with improvement of volume status.      Interval History:  The patient is mentation is slowly improving, but did require continued restraints overnight due to intermittent agitation.  She currently reports that she feels thirsty, breathing easier.  She did state she took some meth few days ago.    Review of Systems   Constitutional:  Negative for chills and fever.   Respiratory:  Positive for shortness of breath.    Cardiovascular:  Positive for leg swelling.  Negative for chest pain.   Psychiatric/Behavioral:  Positive for confusion.    Objective:     Vital Signs (Most Recent):  Temp: 96.8 °F (36 °C) (07/23/22 0739)  Pulse: 106 (07/23/22 0824)  Resp: 17 (07/23/22 0739)  BP: 139/89 (07/23/22 0931)  SpO2: 100 % (07/23/22 0824) Vital Signs (24h Range):  Temp:  [96.3 °F (35.7 °C)-98 °F (36.7 °C)] 96.8 °F (36 °C)  Pulse:  [] 106  Resp:  [17-20] 17  SpO2:  [96 %-100 %] 100 %  BP: (131-139)/(77-91) 139/89     Weight: 57.2 kg (126 lb)  Body mass index is 24.61 kg/m².    Intake/Output Summary (Last 24 hours) at 7/23/2022 1112  Last data filed at 7/23/2022 0900  Gross per 24 hour   Intake 0 ml   Output 4150 ml   Net -4150 ml      Physical Exam  Vitals and nursing note reviewed.   Constitutional:       Appearance: She is ill-appearing. She is not toxic-appearing.      Comments: Appears older than stated age    HENT:      Head: Normocephalic and atraumatic.      Nose: Nose normal.      Mouth/Throat:      Mouth: Mucous membranes are dry.   Eyes:      Extraocular Movements: Extraocular movements intact.      Conjunctiva/sclera: Conjunctivae normal.   Cardiovascular:      Rate and Rhythm: Regular rhythm. Tachycardia present.      Pulses: Normal pulses.      Heart sounds: Murmur heard.   Pulmonary:      Effort: Pulmonary effort is normal. No respiratory distress.      Comments: Decreased breath sounds at bases along with crackles  Abdominal:      General: Bowel sounds are normal. There is no distension.      Palpations: Abdomen is soft.      Tenderness: There is no abdominal tenderness. There is no guarding or rebound.   Musculoskeletal:         General: Normal range of motion.      Cervical back: Normal range of motion.      Right lower leg: Edema present.      Left lower leg: Edema present.      Comments: 1+ edema to lower extremities   Skin:     General: Skin is warm and dry.      Capillary Refill: Capillary refill takes less than 2 seconds.   Neurological:      Mental Status:  She is alert.      Motor: Weakness present.      Comments: Awake and alert, oriented x2, able to maintain attention and answer simple questions and follow simple commands    Psychiatric:         Mood and Affect: Affect is labile.         Speech: Speech is delayed.         Behavior: Behavior is slowed.       Significant Labs: All pertinent labs within the past 24 hours have been reviewed.    Significant Imaging: I have reviewed all pertinent imaging results/findings within the past 24 hours.      Assessment/Plan:      * Acute on chronic combined systolic and diastolic heart failure  - Patient with known uncontrolled hypertension with noncompliance with medications and polysubstance abuse  - New onset CHF with BNP and physical exam consistent with volume overload  - 2D echo showed EF of 20% with global hypokinesis, and grade 3 diastolic dysfunction  - Diuresis in progress with IV Lasix 40 mg IV q.12  - Monitoring with strict I&Os and daily weights  - Cardiology following  - Currently-5.4 L does far  - Will deescalate diuresis to 40 mg IV daily    Acute respiratory failure with hypoxia  Volume overload  Pulmonary edema  Aspiration pneumonia  - In setting of acute heart failure with volume overload  - No PE on CTA, but with pulmonary edema and right lower lobe infiltrate  - Continue IV diuresis as discussed above  - Continue empiric Zosyn for pneumonia  - Will consider deescalation of antibiotics in the next 1-2 days  - Continue supplemental O2 as needed, wean as tolerated    Encephalopathy, metabolic  - Patient was obtunded on arrival, minimally responsive to sternal rub  - s/p narcan x 3 with no change, U tox negative  - Head CT unremarkable and she had a nonfocal exam  - Altered mentation secondary to likely hypoxia with concomitant drug abuse  - Patient does report taking meth prior to admission  - Mental status improving    Elevated troponin  - In setting of acute heart failure and drug abuse  - Denied chest  pain  - Troponins trended flat and she had no acute EKG changes  - Not consistent with ACS  - Cardiology following and workup with 2D echo done as discussed above  - Continue monitor on telemetry    Bipolar 1 disorder, depressed  - Patient was unresponsive on assessment  - Will discuss home meds when able    Substance abuse  - H/o meth, cocaine, marijuana, heroin with IV drug use and Hep C  - Patient reports recent meth use just prior to hospitalization  - Will further discuss substance abuse more detail after patient is able to maintain attention for longer periods of time and acute issues addressed    Hypokalemia  Hypernatremia  - Will repeat potassium level  - Decrease IV Lasix as discussed above  - Monitor repeat labs    Pneumonia  - Continue Zosyn for now    Essential hypertension  - Noncompliant with BP management  - Started amlodipine 10mg daily  - Cardiology consulted  - Will consider adding beta-blocker after discussion with Cardiology (hx of cocaine abuse)       VTE Risk Mitigation (From admission, onward)         Ordered     enoxaparin injection 40 mg  Daily         07/22/22 0527     IP VTE HIGH RISK PATIENT  Once         07/22/22 0527     Place sequential compression device  Until discontinued         07/22/22 0527                      Almaz Lucio MD  Department of Hospital Medicine   [facility] - Telemetry

## 2022-07-23 NOTE — SUBJECTIVE & OBJECTIVE
Interval History:  The patient is mentation is slowly improving, but did require continued restraints overnight due to intermittent agitation.  She currently reports that she feels thirsty, breathing easier.  She did state she took some meth few days ago.    Review of Systems   Constitutional:  Negative for chills and fever.   Respiratory:  Positive for shortness of breath.    Cardiovascular:  Positive for leg swelling. Negative for chest pain.   Psychiatric/Behavioral:  Positive for confusion.    Objective:     Vital Signs (Most Recent):  Temp: 96.8 °F (36 °C) (07/23/22 0739)  Pulse: 106 (07/23/22 0824)  Resp: 17 (07/23/22 0739)  BP: 139/89 (07/23/22 0931)  SpO2: 100 % (07/23/22 0824) Vital Signs (24h Range):  Temp:  [96.3 °F (35.7 °C)-98 °F (36.7 °C)] 96.8 °F (36 °C)  Pulse:  [] 106  Resp:  [17-20] 17  SpO2:  [96 %-100 %] 100 %  BP: (131-139)/(77-91) 139/89     Weight: 57.2 kg (126 lb)  Body mass index is 24.61 kg/m².    Intake/Output Summary (Last 24 hours) at 7/23/2022 1112  Last data filed at 7/23/2022 0900  Gross per 24 hour   Intake 0 ml   Output 4150 ml   Net -4150 ml      Physical Exam  Vitals and nursing note reviewed.   Constitutional:       Appearance: She is ill-appearing. She is not toxic-appearing.      Comments: Appears older than stated age    HENT:      Head: Normocephalic and atraumatic.      Nose: Nose normal.      Mouth/Throat:      Mouth: Mucous membranes are dry.   Eyes:      Extraocular Movements: Extraocular movements intact.      Conjunctiva/sclera: Conjunctivae normal.   Cardiovascular:      Rate and Rhythm: Regular rhythm. Tachycardia present.      Pulses: Normal pulses.      Heart sounds: Murmur heard.   Pulmonary:      Effort: Pulmonary effort is normal. No respiratory distress.      Comments: Decreased breath sounds at bases along with crackles  Abdominal:      General: Bowel sounds are normal. There is no distension.      Palpations: Abdomen is soft.      Tenderness: There is no  abdominal tenderness. There is no guarding or rebound.   Musculoskeletal:         General: Normal range of motion.      Cervical back: Normal range of motion.      Right lower leg: Edema present.      Left lower leg: Edema present.      Comments: 1+ edema to lower extremities   Skin:     General: Skin is warm and dry.      Capillary Refill: Capillary refill takes less than 2 seconds.   Neurological:      Mental Status: She is alert.      Motor: Weakness present.      Comments: Awake and alert, oriented x2, able to maintain attention and answer simple questions and follow simple commands    Psychiatric:         Mood and Affect: Affect is labile.         Speech: Speech is delayed.         Behavior: Behavior is slowed.       Significant Labs: All pertinent labs within the past 24 hours have been reviewed.    Significant Imaging: I have reviewed all pertinent imaging results/findings within the past 24 hours.

## 2022-07-23 NOTE — PROGRESS NOTES
[facility] - Telemetry  Cardiology  Progress Note    Patient Name: Stephanie T Barthelemy  MRN: [id number]  Admission Date: 7/21/2022  Hospital Length of Stay: 1 days  Code Status: Full Code   Attending Physician: Almaz Lucio MD   Primary Care Physician: Elsie Horne DO  Expected Discharge Date:   Principal Problem:Acute on chronic combined systolic and diastolic heart failure    Subjective:     Hospital Course:   7/23/22: Denies CP, SOB. Noted to be drowsy and agitated by nursing staff.        Review of Systems   Cardiovascular:  Negative for chest pain.   Respiratory:  Negative for shortness of breath.    Hematologic/Lymphatic: Negative for bleeding problem.   Objective:     Vital Signs (Most Recent):  Temp: 96.3 °F (35.7 °C) (07/23/22 1216)  Pulse: 99 (07/23/22 1229)  Resp: 18 (07/23/22 1216)  BP: 133/89 (07/23/22 1216)  SpO2: 100 % (07/23/22 1229) Vital Signs (24h Range):  Temp:  [96.3 °F (35.7 °C)-98 °F (36.7 °C)] 96.3 °F (35.7 °C)  Pulse:  [] 99  Resp:  [17-20] 18  SpO2:  [96 %-100 %] 100 %  BP: (131-139)/(77-91) 133/89     Weight: 57.2 kg (126 lb)  Body mass index is 24.61 kg/m².     SpO2: 100 %  O2 Device (Oxygen Therapy): nasal cannula      Intake/Output Summary (Last 24 hours) at 7/23/2022 1357  Last data filed at 7/23/2022 1332  Gross per 24 hour   Intake 300 ml   Output 4950 ml   Net -4650 ml       Lines/Drains/Airways       Peripheral Intravenous Line  Duration                  Peripheral IV - Single Lumen 07/22/22 0215 20 G Right Antecubital 1 day                    Physical Exam  Constitutional:       General: She is not in acute distress.     Appearance: She is well-developed. She is not diaphoretic.   HENT:      Head: Normocephalic.   Neck:      Vascular: No JVD.   Cardiovascular:      Rate and Rhythm: Normal rate and regular rhythm.      Heart sounds: No murmur heard.    No friction rub. No gallop.   Pulmonary:      Effort: Pulmonary effort is normal. No respiratory distress.      Breath  sounds: Normal breath sounds.   Abdominal:      Palpations: Abdomen is soft.      Tenderness: There is no abdominal tenderness.   Musculoskeletal:         General: No swelling.      Cervical back: Normal range of motion.   Skin:     General: Skin is warm.   Neurological:      Mental Status: She is alert.   Psychiatric:         Mood and Affect: Mood normal.       Significant Labs: CMP   Recent Labs   Lab 07/21/22 2200 07/22/22 0718 07/23/22  0338    144 148*   K 3.7 3.6 3.0*    105 99   CO2 28 26 35*   * 126* 101   BUN 17 16 18   CREATININE 0.86 1.0 1.3   CALCIUM 9.1 9.0 8.9   PROT 7.0  --   --    ALBUMIN 3.6  --   --    BILITOT 0.7  --   --    ALKPHOS 168*  --   --    *  --   --    *  --   --    ANIONGAP 8 13 14   ESTGFRAFRICA >60.0 >60 55*   EGFRNONAA >60.0 >60 48*   , CBC   Recent Labs   Lab 07/21/22 2200 07/22/22 0718 07/23/22  0338   WBC 11.02 9.37 8.34   HGB 13.0 13.2 12.7   HCT 42.1 41.4 40.5    323 244   , and Troponin   Recent Labs   Lab 07/21/22 2200 07/22/22 0718   TROPONINI 0.079* 0.080*         Assessment and Plan:     Acute on chronic combined systolic and diastolic heart failure  Secondary to medication non-compliance  Echo July 2022  · The left ventricle is mildly enlarged with concentric hypertrophy and severely decreased systolic function.  · The estimated ejection fraction is 20%.  · There is severe left ventricular global hypokinesis.  · Grade III left ventricular diastolic dysfunction.  · Mild right ventricular enlargement with mildly reduced right ventricular systolic function.  · Severe left atrial enlargement.  · Moderate aortic regurgitation.  · Severe mitral regurgitation.  · Mild tricuspid regurgitation.  · Moderate pulmonic regurgitation.  · Intermediate central venous pressure (8 mmHg).  · The estimated PA systolic pressure is 33 mmHg.  · Trivial pericardial effusion.     Furosemide IV 40mg  Maintain K>4, Mg>2  Recommend metoprolol succinate  and losartan or ACEi  Ischemic evaluation as OP  Concern re medication non-compliance     Elevated troponin  Trop 0.079, repeat 0.080  EKG ST, LVH, normal ST segments  No CP  Flat- likely demand ischemia     Essential hypertension  Amlodipine  Metoprolol succinate and lisinopril/losartan as noted above      VTE Risk Mitigation (From admission, onward)         Ordered     enoxaparin injection 40 mg  Daily         07/22/22 0527     IP VTE HIGH RISK PATIENT  Once         07/22/22 0527     Place sequential compression device  Until discontinued         07/22/22 0527                Mae Wagner MD  Cardiology  [facility] - Telemetry

## 2022-07-23 NOTE — ASSESSMENT & PLAN NOTE
Hypernatremia  - Will repeat potassium level  - Decrease IV Lasix as discussed above  - Monitor repeat labs

## 2022-07-23 NOTE — ASSESSMENT & PLAN NOTE
Volume overload  Pulmonary edema  Aspiration pneumonia  - In setting of acute heart failure with volume overload  - No PE on CTA, but with pulmonary edema and right lower lobe infiltrate  - Continue IV diuresis as discussed above  - Continue empiric Zosyn for pneumonia  - Will consider deescalation of antibiotics in the next 1-2 days  - Continue supplemental O2 as needed, wean as tolerated

## 2022-07-23 NOTE — HOSPITAL COURSE
7/23/2022 Denies CP, SOB. Noted to be drowsy and agitated by nursing staff.  7/25/2022 Admitted for ADHF with aggressive diuresis. Transitioned to oral Lasix with 1.6L out overnight negative 7.6L since admission. HR and BP stable. On GMDT

## 2022-07-23 NOTE — HOSPITAL COURSE
Ms. Barthelemy presented with shortness of breath and confusion.  Admitted with acute respiratory failure due to volume overload secondary to new onset heart failure.  BNP greater than 4900 and exam with pulmonary edema and peripheral edema.  Long history of substance abuse, and altered mental status also due to likely substance abuse.  Workup with 2D echo done which showed EF of 20%, global hypokinesis and grade 3 diastolic dysfunction.  Cardiology consulted.  Diuresis done and patient euvolumic and changed to PO lasix. Psych consulted for substance abuse and fluctuating alertness.

## 2022-07-23 NOTE — ASSESSMENT & PLAN NOTE
- Noncompliant with BP management  - Started amlodipine 10mg daily  - Cardiology consulted  - Will consider adding beta-blocker after discussion with Cardiology (hx of cocaine abuse)

## 2022-07-23 NOTE — ASSESSMENT & PLAN NOTE
- H/o meth, cocaine, marijuana, heroin with IV drug use and Hep C  - Patient reports recent meth use just prior to hospitalization  - Will further discuss substance abuse more detail after patient is able to maintain attention for longer periods of time and acute issues addressed

## 2022-07-23 NOTE — SUBJECTIVE & OBJECTIVE
Review of Systems   Cardiovascular:  Negative for chest pain.   Respiratory:  Negative for shortness of breath.    Hematologic/Lymphatic: Negative for bleeding problem.   Objective:     Vital Signs (Most Recent):  Temp: 96.3 °F (35.7 °C) (07/23/22 1216)  Pulse: 99 (07/23/22 1229)  Resp: 18 (07/23/22 1216)  BP: 133/89 (07/23/22 1216)  SpO2: 100 % (07/23/22 1229) Vital Signs (24h Range):  Temp:  [96.3 °F (35.7 °C)-98 °F (36.7 °C)] 96.3 °F (35.7 °C)  Pulse:  [] 99  Resp:  [17-20] 18  SpO2:  [96 %-100 %] 100 %  BP: (131-139)/(77-91) 133/89     Weight: 57.2 kg (126 lb)  Body mass index is 24.61 kg/m².     SpO2: 100 %  O2 Device (Oxygen Therapy): nasal cannula      Intake/Output Summary (Last 24 hours) at 7/23/2022 1357  Last data filed at 7/23/2022 1332  Gross per 24 hour   Intake 300 ml   Output 4950 ml   Net -4650 ml       Lines/Drains/Airways       Peripheral Intravenous Line  Duration                  Peripheral IV - Single Lumen 07/22/22 0215 20 G Right Antecubital 1 day                    Physical Exam  Constitutional:       General: She is not in acute distress.     Appearance: She is well-developed. She is not diaphoretic.   HENT:      Head: Normocephalic.   Neck:      Vascular: No JVD.   Cardiovascular:      Rate and Rhythm: Normal rate and regular rhythm.      Heart sounds: No murmur heard.    No friction rub. No gallop.   Pulmonary:      Effort: Pulmonary effort is normal. No respiratory distress.      Breath sounds: Normal breath sounds.   Abdominal:      Palpations: Abdomen is soft.      Tenderness: There is no abdominal tenderness.   Musculoskeletal:         General: No swelling.      Cervical back: Normal range of motion.   Skin:     General: Skin is warm.   Neurological:      Mental Status: She is alert.   Psychiatric:         Mood and Affect: Mood normal.       Significant Labs: CMP   Recent Labs   Lab 07/21/22  2200 07/22/22  0718 07/23/22  0338    144 148*   K 3.7 3.6 3.0*    105 99    CO2 28 26 35*   * 126* 101   BUN 17 16 18   CREATININE 0.86 1.0 1.3   CALCIUM 9.1 9.0 8.9   PROT 7.0  --   --    ALBUMIN 3.6  --   --    BILITOT 0.7  --   --    ALKPHOS 168*  --   --    *  --   --    *  --   --    ANIONGAP 8 13 14   ESTGFRAFRICA >60.0 >60 55*   EGFRNONAA >60.0 >60 48*   , CBC   Recent Labs   Lab 07/21/22 2200 07/22/22 0718 07/23/22  0338   WBC 11.02 9.37 8.34   HGB 13.0 13.2 12.7   HCT 42.1 41.4 40.5    323 244   , and Troponin   Recent Labs   Lab 07/21/22 2200 07/22/22 0718   TROPONINI 0.079* 0.080*

## 2022-07-23 NOTE — ASSESSMENT & PLAN NOTE
- Patient was obtunded on arrival, minimally responsive to sternal rub  - s/p narcan x 3 with no change, U tox negative  - Head CT unremarkable and she had a nonfocal exam  - Altered mentation secondary to likely hypoxia with concomitant drug abuse  - Patient does report taking meth prior to admission  - Mental status improving

## 2022-07-23 NOTE — ASSESSMENT & PLAN NOTE
- Patient with known uncontrolled hypertension with noncompliance with medications and polysubstance abuse  - New onset CHF with BNP and physical exam consistent with volume overload  - 2D echo showed EF of 20% with global hypokinesis, and grade 3 diastolic dysfunction  - Diuresis in progress with IV Lasix 40 mg IV q.12  - Monitoring with strict I&Os and daily weights  - Cardiology following  - Currently-5.4 L does far  - Will deescalate diuresis to 40 mg IV daily

## 2022-07-23 NOTE — ASSESSMENT & PLAN NOTE
- In setting of acute heart failure and drug abuse  - Denied chest pain  - Troponins trended flat and she had no acute EKG changes  - Not consistent with ACS  - Cardiology following and workup with 2D echo done as discussed above  - Continue monitor on telemetry

## 2022-07-24 LAB
ANION GAP SERPL CALC-SCNC: 11 MMOL/L (ref 8–16)
BASOPHILS # BLD AUTO: 0.05 K/UL (ref 0–0.2)
BASOPHILS NFR BLD: 0.6 % (ref 0–1.9)
BUN SERPL-MCNC: 17 MG/DL (ref 6–20)
CALCIUM SERPL-MCNC: 9 MG/DL (ref 8.7–10.5)
CHLORIDE SERPL-SCNC: 95 MMOL/L (ref 95–110)
CO2 SERPL-SCNC: 34 MMOL/L (ref 23–29)
CREAT SERPL-MCNC: 1.2 MG/DL (ref 0.5–1.4)
DIFFERENTIAL METHOD: ABNORMAL
EOSINOPHIL # BLD AUTO: 0.3 K/UL (ref 0–0.5)
EOSINOPHIL NFR BLD: 4.3 % (ref 0–8)
ERYTHROCYTE [DISTWIDTH] IN BLOOD BY AUTOMATED COUNT: 14.9 % (ref 11.5–14.5)
EST. GFR  (AFRICAN AMERICAN): >60 ML/MIN/1.73 M^2
EST. GFR  (NON AFRICAN AMERICAN): 53 ML/MIN/1.73 M^2
GLUCOSE SERPL-MCNC: 114 MG/DL (ref 70–110)
HCT VFR BLD AUTO: 41.7 % (ref 37–48.5)
HGB BLD-MCNC: 12.9 G/DL (ref 12–16)
IMM GRANULOCYTES # BLD AUTO: 0.02 K/UL (ref 0–0.04)
IMM GRANULOCYTES NFR BLD AUTO: 0.3 % (ref 0–0.5)
LYMPHOCYTES # BLD AUTO: 1.2 K/UL (ref 1–4.8)
LYMPHOCYTES NFR BLD: 15.2 % (ref 18–48)
MAGNESIUM SERPL-MCNC: 2.1 MG/DL (ref 1.6–2.6)
MCH RBC QN AUTO: 27.2 PG (ref 27–31)
MCHC RBC AUTO-ENTMCNC: 30.9 G/DL (ref 32–36)
MCV RBC AUTO: 88 FL (ref 82–98)
MONOCYTES # BLD AUTO: 0.8 K/UL (ref 0.3–1)
MONOCYTES NFR BLD: 10.3 % (ref 4–15)
NEUTROPHILS # BLD AUTO: 5.4 K/UL (ref 1.8–7.7)
NEUTROPHILS NFR BLD: 69.3 % (ref 38–73)
NRBC BLD-RTO: 0 /100 WBC
PLATELET # BLD AUTO: 239 K/UL (ref 150–450)
PMV BLD AUTO: 11.4 FL (ref 9.2–12.9)
POTASSIUM SERPL-SCNC: 3.4 MMOL/L (ref 3.5–5.1)
RBC # BLD AUTO: 4.75 M/UL (ref 4–5.4)
SODIUM SERPL-SCNC: 140 MMOL/L (ref 136–145)
WBC # BLD AUTO: 7.83 K/UL (ref 3.9–12.7)

## 2022-07-24 PROCEDURE — 63600175 PHARM REV CODE 636 W HCPCS: Performed by: NURSE PRACTITIONER

## 2022-07-24 PROCEDURE — 36415 COLL VENOUS BLD VENIPUNCTURE: CPT | Performed by: NURSE PRACTITIONER

## 2022-07-24 PROCEDURE — 85025 COMPLETE CBC W/AUTO DIFF WBC: CPT | Performed by: NURSE PRACTITIONER

## 2022-07-24 PROCEDURE — 99900035 HC TECH TIME PER 15 MIN (STAT)

## 2022-07-24 PROCEDURE — 83735 ASSAY OF MAGNESIUM: CPT | Performed by: NURSE PRACTITIONER

## 2022-07-24 PROCEDURE — 94761 N-INVAS EAR/PLS OXIMETRY MLT: CPT

## 2022-07-24 PROCEDURE — 25000003 PHARM REV CODE 250: Performed by: HOSPITALIST

## 2022-07-24 PROCEDURE — 63600175 PHARM REV CODE 636 W HCPCS: Performed by: HOSPITALIST

## 2022-07-24 PROCEDURE — 80048 BASIC METABOLIC PNL TOTAL CA: CPT | Performed by: NURSE PRACTITIONER

## 2022-07-24 PROCEDURE — 11000001 HC ACUTE MED/SURG PRIVATE ROOM

## 2022-07-24 PROCEDURE — 25000003 PHARM REV CODE 250: Performed by: NURSE PRACTITIONER

## 2022-07-24 RX ORDER — AMOXICILLIN AND CLAVULANATE POTASSIUM 875; 125 MG/1; MG/1
1 TABLET, FILM COATED ORAL EVERY 12 HOURS
Status: DISCONTINUED | OUTPATIENT
Start: 2022-07-24 | End: 2022-07-26 | Stop reason: HOSPADM

## 2022-07-24 RX ORDER — AMLODIPINE BESYLATE 2.5 MG/1
2.5 TABLET ORAL DAILY
Status: DISCONTINUED | OUTPATIENT
Start: 2022-07-24 | End: 2022-07-26

## 2022-07-24 RX ORDER — FUROSEMIDE 40 MG/1
40 TABLET ORAL DAILY
Status: DISCONTINUED | OUTPATIENT
Start: 2022-07-24 | End: 2022-07-26 | Stop reason: HOSPADM

## 2022-07-24 RX ORDER — LISINOPRIL 5 MG/1
10 TABLET ORAL DAILY
Status: DISCONTINUED | OUTPATIENT
Start: 2022-07-24 | End: 2022-07-26 | Stop reason: HOSPADM

## 2022-07-24 RX ADMIN — FUROSEMIDE 40 MG: 10 INJECTION, SOLUTION INTRAMUSCULAR; INTRAVENOUS at 09:07

## 2022-07-24 RX ADMIN — AMOXICILLIN AND CLAVULANATE POTASSIUM 1 TABLET: 875; 125 TABLET, FILM COATED ORAL at 09:07

## 2022-07-24 RX ADMIN — MUPIROCIN: 20 OINTMENT TOPICAL at 08:07

## 2022-07-24 RX ADMIN — AMOXICILLIN AND CLAVULANATE POTASSIUM 1 TABLET: 875; 125 TABLET, FILM COATED ORAL at 08:07

## 2022-07-24 RX ADMIN — POTASSIUM BICARBONATE 40 MEQ: 391 TABLET, EFFERVESCENT ORAL at 09:07

## 2022-07-24 RX ADMIN — METOPROLOL SUCCINATE 12.5 MG: 25 TABLET, EXTENDED RELEASE ORAL at 09:07

## 2022-07-24 RX ADMIN — ENOXAPARIN SODIUM 40 MG: 100 INJECTION SUBCUTANEOUS at 04:07

## 2022-07-24 RX ADMIN — AMLODIPINE BESYLATE 2.5 MG: 2.5 TABLET ORAL at 09:07

## 2022-07-24 RX ADMIN — MUPIROCIN: 20 OINTMENT TOPICAL at 09:07

## 2022-07-24 RX ADMIN — PIPERACILLIN AND TAZOBACTAM 4.5 G: 4; .5 INJECTION, POWDER, LYOPHILIZED, FOR SOLUTION INTRAVENOUS; PARENTERAL at 02:07

## 2022-07-24 RX ADMIN — LISINOPRIL 10 MG: 5 TABLET ORAL at 09:07

## 2022-07-24 NOTE — ASSESSMENT & PLAN NOTE
- Patient with known uncontrolled hypertension with noncompliance with medications and polysubstance abuse  - New onset CHF with BNP and physical exam consistent with volume overload  - 2D echo showed EF of 20% with global hypokinesis, and grade 3 diastolic dysfunction  - Diuresis was progress with IV Lasix 40 mg IV q.12  - Monitoring with strict I&Os and daily weights  - Cardiology following  - Currently-5.4 L does far, but last 24 hours of I/Os not recorded  - Deescalated diuresis to 40 mg IV daily on 7/23/2022  - Transition to PO lasix 40 mg daily today  - Restart lisinopril and add metoprolol, and decrease amlodipine dose today

## 2022-07-24 NOTE — ASSESSMENT & PLAN NOTE
- Noncompliant with BP management  - Started amlodipine 10mg daily  - Cardiology consulted  - Decreased amlodipine, added lisinopril, metoprolol today

## 2022-07-24 NOTE — ASSESSMENT & PLAN NOTE
Hypernatremia  - Will repeat potassium level  - Decreased diuresis as discussed above and Na improved  - Monitor repeat labs

## 2022-07-24 NOTE — ASSESSMENT & PLAN NOTE
Volume overload  Pulmonary edema  Aspiration pneumonia  - In setting of acute heart failure with volume overload  - No PE on CTA, but with pulmonary edema and right lower lobe infiltrate  - Continue diuresis as discussed above  - On empiric Zosyn for pneumonia  - Deescalation of antibiotics to PO Augmentin today  - Weaned off O2 to RA

## 2022-07-24 NOTE — PROGRESS NOTES
[facility] Medicine  Progress Note    Patient Name: Stephanie T Barthelemy  MRN: [id number]  Patient Class: IP- Inpatient   Admission Date: 7/21/2022  Length of Stay: 2 days  Attending Physician: Almaz Lucio MD  Primary Care Provider: Elsie Horne DO        Subjective:     Principal Problem:Acute on chronic combined systolic and diastolic heart failure        HPI:  Patient is a 50yo female with a pmh of drug abuse, tobacco use, psychiatric disorder, bipolar disorder, Hepatitis C, HTN, opioid overdose, seizure disorder. She presented with SOB x 2 days, worse with exertion. Also reports fatigue, orthopnea, and cough. Denies chest pain. No fevers. In the ED, she was found to have pulmonary edema and cardiomegaly on chest [place]RAY [place] 04[address]. Troponin 0.079. Chest CTA with no PE but with LLL infiltrate. She was given IV fluids, zosyn, lasix, ASA, and NGT paste. Urine was not measured due to patient urinating on floor.       Overview/Hospital Course:  Ms. Barthelemy presented with shortness of breath and confusion.  Admitted with acute respiratory failure due to volume overload secondary to new onset heart failure.  BNP greater than 4900 and exam with pulmonary edema and peripheral edema.  Long history of substance abuse, and altered mental status also due to likely substance abuse.  Workup with 2D echo done which showed EF of 20%, global hypokinesis and grade 3 diastolic dysfunction.  Cardiology consulted.  Diuresis in progress with improvement of volume status.      Interval History:  No acute events, still in restraints due to patient tried to get up. She denies SOB and swelling is better. She reportedly took meth few days ago. We discussed her extensive  home regimen of Congentin, Prozac, Neurontin, Trileptal, Seroquel, risperidone for which she takes intermittently given she does not like the way they make her feel.  Requesting Ativan or Klonopin to help with her  nerves/anxiety.    Review of Systems   Constitutional:  Negative for chills and fever.   Respiratory:  Negative for shortness of breath.    Cardiovascular:  Positive for leg swelling. Negative for chest pain.   Objective:     Vital Signs (Most Recent):  Temp: 97.5 °F (36.4 °C) (07/24/22 1128)  Pulse: 93 (07/24/22 1128)  Resp: 18 (07/24/22 1128)  BP: 111/77 (07/24/22 1128)  SpO2: 96 % (07/24/22 1128) Vital Signs (24h Range):  Temp:  [96.3 °F (35.7 °C)-98 °F (36.7 °C)] 97.5 °F (36.4 °C)  Pulse:  [87-99] 93  Resp:  [17-18] 18  SpO2:  [92 %-100 %] 96 %  BP: (110-133)/(67-89) 111/77     Weight: 57.2 kg (126 lb)  Body mass index is 24.61 kg/m².    Intake/Output Summary (Last 24 hours) at 7/24/2022 1131  Last data filed at 7/24/2022 0400  Gross per 24 hour   Intake 810 ml   Output 2220 ml   Net -1410 ml        Physical Exam  Vitals and nursing note reviewed.   Constitutional:       Appearance: She is ill-appearing. She is not toxic-appearing.      Comments: Appears older than stated age    HENT:      Head: Normocephalic and atraumatic.      Nose: Nose normal.      Mouth/Throat:      Mouth: Mucous membranes are dry.   Eyes:      Extraocular Movements: Extraocular movements intact.      Conjunctiva/sclera: Conjunctivae normal.   Cardiovascular:      Rate and Rhythm: Normal rate and regular rhythm.      Pulses: Normal pulses.      Heart sounds: Murmur heard.   Pulmonary:      Effort: Pulmonary effort is normal. No respiratory distress.      Comments: Decreased breath sounds at bases but much improved  Abdominal:      General: Bowel sounds are normal. There is no distension.      Palpations: Abdomen is soft.      Tenderness: There is no abdominal tenderness. There is no guarding or rebound.   Musculoskeletal:         General: Normal range of motion.      Cervical back: Normal range of motion.      Comments: Trace edema to lower extremities   Skin:     General: Skin is warm and dry.      Capillary Refill: Capillary refill takes  less than 2 seconds.   Neurological:      Mental Status: She is alert.      Motor: Weakness present.      Comments: Awake and alert, oriented x 2 (almost to year and month but not quite), able to maintain attention and answer simple questions and follow simple commands   Psychiatric:         Attention and Perception: Attention normal.         Mood and Affect: Affect is labile.         Speech: Speech is delayed.         Behavior: Behavior is slowed.       Significant Labs: All pertinent labs within the past 24 hours have been reviewed.    Significant Imaging: I have reviewed all pertinent imaging results/findings within the past 24 hours.      Assessment/Plan:      * Acute on chronic combined systolic and diastolic heart failure  - Patient with known uncontrolled hypertension with noncompliance with medications and polysubstance abuse  - New onset CHF with BNP and physical exam consistent with volume overload  - 2D echo showed EF of 20% with global hypokinesis, and grade 3 diastolic dysfunction  - Diuresis was progress with IV Lasix 40 mg IV q.12  - Monitoring with strict I&Os and daily weights  - Cardiology following  - Currently-5.4 L does far, but last 24 hours of I/Os not recorded  - Deescalated diuresis to 40 mg IV daily on 7/23/2022  - Transition to PO lasix 40 mg daily today  - Restart lisinopril and add metoprolol, and decrease amlodipine dose today    Acute respiratory failure with hypoxia  Volume overload  Pulmonary edema  Aspiration pneumonia  - In setting of acute heart failure with volume overload  - No PE on CTA, but with pulmonary edema and right lower lobe infiltrate  - Continue diuresis as discussed above  - On empiric Zosyn for pneumonia  - Deescalation of antibiotics to PO Augmentin today  - Weaned off O2 to RA    Encephalopathy, metabolic  - Patient was obtunded on arrival, minimally responsive to sternal rub  - s/p narcan x 3 with no change, U tox negative  - Head CT unremarkable and she had a  nonfocal exam  - Altered mentation secondary to likely hypoxia with concomitant drug abuse and polypharmacy  - Patient reported taking meth prior to admission and reviewed all of her psychiatric medications which she has been taking intermittently according to her and patient requesting ativan and klonopin specifically  - Mental status improving and likely almost at baseline  - Will consult Psych     Elevated troponin  - In setting of acute heart failure and drug abuse  - Denied chest pain  - Troponins trended flat and she had no acute EKG changes  - Not consistent with ACS  - Cardiology following and workup with 2D echo done as discussed above  - Continue monitor on telemetry    Bipolar 1 disorder, depressed  - Patient was unresponsive on assessment  - Discussed home meds with the patient, as discussed above    Substance abuse  - H/o meth, cocaine, marijuana, heroin with IV drug use and Hep C  - Patient reports recent meth use just prior to hospitalization  - Will further discuss substance abuse more detail after patient is able to maintain attention for longer periods of time and acute issues addressed    Hypokalemia  Hypernatremia  - Will repeat potassium level  - Decreased diuresis as discussed above and Na improved  - Monitor repeat labs    Pneumonia  - Stop Zosyn and transition to Augmentin    Essential hypertension  - Noncompliant with BP management  - Started amlodipine 10mg daily  - Cardiology consulted  - Decreased amlodipine, added lisinopril, metoprolol today      VTE Risk Mitigation (From admission, onward)         Ordered     enoxaparin injection 40 mg  Daily         07/22/22 0527     IP VTE HIGH RISK PATIENT  Once         07/22/22 0527     Place sequential compression device  Until discontinued         07/22/22 0527                      Almaz Lucio MD  Department of Hospital Medicine   [place] - Telemetry

## 2022-07-24 NOTE — SUBJECTIVE & OBJECTIVE
Interval History:  No acute events, still in restraints due to patient tried to get up. She denies SOB and swelling is better. She reportedly took meth few days ago. We discussed her extensive  home regimen of Congentin, Prozac, Neurontin, Trileptal, Seroquel, risperidone for which she takes intermittently given she does not like the way they make her feel.  Requesting Ativan or Klonopin to help with her nerves/anxiety.    Review of Systems   Constitutional:  Negative for chills and fever.   Respiratory:  Negative for shortness of breath.    Cardiovascular:  Positive for leg swelling. Negative for chest pain.   Objective:     Vital Signs (Most Recent):  Temp: 97.5 °F (36.4 °C) (07/24/22 1128)  Pulse: 93 (07/24/22 1128)  Resp: 18 (07/24/22 1128)  BP: 111/77 (07/24/22 1128)  SpO2: 96 % (07/24/22 1128) Vital Signs (24h Range):  Temp:  [96.3 °F (35.7 °C)-98 °F (36.7 °C)] 97.5 °F (36.4 °C)  Pulse:  [87-99] 93  Resp:  [17-18] 18  SpO2:  [92 %-100 %] 96 %  BP: (110-133)/(67-89) 111/77     Weight: 57.2 kg (126 lb)  Body mass index is 24.61 kg/m².    Intake/Output Summary (Last 24 hours) at 7/24/2022 1131  Last data filed at 7/24/2022 0400  Gross per 24 hour   Intake 810 ml   Output 2220 ml   Net -1410 ml        Physical Exam  Vitals and nursing note reviewed.   Constitutional:       Appearance: She is ill-appearing. She is not toxic-appearing.      Comments: Appears older than stated age    HENT:      Head: Normocephalic and atraumatic.      Nose: Nose normal.      Mouth/Throat:      Mouth: Mucous membranes are dry.   Eyes:      Extraocular Movements: Extraocular movements intact.      Conjunctiva/sclera: Conjunctivae normal.   Cardiovascular:      Rate and Rhythm: Normal rate and regular rhythm.      Pulses: Normal pulses.      Heart sounds: Murmur heard.   Pulmonary:      Effort: Pulmonary effort is normal. No respiratory distress.      Comments: Decreased breath sounds at bases but much improved  Abdominal:      General:  Bowel sounds are normal. There is no distension.      Palpations: Abdomen is soft.      Tenderness: There is no abdominal tenderness. There is no guarding or rebound.   Musculoskeletal:         General: Normal range of motion.      Cervical back: Normal range of motion.      Comments: Trace edema to lower extremities   Skin:     General: Skin is warm and dry.      Capillary Refill: Capillary refill takes less than 2 seconds.   Neurological:      Mental Status: She is alert.      Motor: Weakness present.      Comments: Awake and alert, oriented x 2 (almost to year and month but not quite), able to maintain attention and answer simple questions and follow simple commands   Psychiatric:         Attention and Perception: Attention normal.         Mood and Affect: Affect is labile.         Speech: Speech is delayed.         Behavior: Behavior is slowed.       Significant Labs: All pertinent labs within the past 24 hours have been reviewed.    Significant Imaging: I have reviewed all pertinent imaging results/findings within the past 24 hours.

## 2022-07-24 NOTE — ASSESSMENT & PLAN NOTE
- Patient was obtunded on arrival, minimally responsive to sternal rub  - s/p narcan x 3 with no change, U tox negative  - Head CT unremarkable and she had a nonfocal exam  - Altered mentation secondary to likely hypoxia with concomitant drug abuse and polypharmacy  - Patient reported taking meth prior to admission and reviewed all of her psychiatric medications which she has been taking intermittently according to her and patient requesting ativan and klonopin specifically  - Mental status improving and likely almost at baseline  - Will consult Psych

## 2022-07-24 NOTE — ASSESSMENT & PLAN NOTE
- Patient was unresponsive on assessment  - Discussed home meds with the patient, as discussed above

## 2022-07-25 ENCOUNTER — PATIENT OUTREACH (OUTPATIENT)
Dept: ADMINISTRATIVE | Facility: OTHER | Age: 52
End: 2022-07-25
Payer: MEDICARE

## 2022-07-25 ENCOUNTER — CLINICAL SUPPORT (OUTPATIENT)
Dept: SMOKING CESSATION | Facility: CLINIC | Age: 52
End: 2022-07-25
Payer: MEDICAID

## 2022-07-25 DIAGNOSIS — F17.210 CIGARETTE SMOKER: Primary | ICD-10-CM

## 2022-07-25 LAB
AMPHET+METHAMPHET UR QL: NEGATIVE
ANION GAP SERPL CALC-SCNC: 11 MMOL/L (ref 8–16)
BARBITURATES UR QL SCN>200 NG/ML: NEGATIVE
BENZODIAZ UR QL SCN>200 NG/ML: NEGATIVE
BUN SERPL-MCNC: 21 MG/DL (ref 6–20)
BZE UR QL SCN: NEGATIVE
CALCIUM SERPL-MCNC: 9.3 MG/DL (ref 8.7–10.5)
CANNABINOIDS UR QL SCN: NEGATIVE
CHLORIDE SERPL-SCNC: 97 MMOL/L (ref 95–110)
CO2 SERPL-SCNC: 31 MMOL/L (ref 23–29)
CREAT SERPL-MCNC: 1 MG/DL (ref 0.5–1.4)
CREAT UR-MCNC: 43 MG/DL (ref 15–325)
EST. GFR  (AFRICAN AMERICAN): >60 ML/MIN/1.73 M^2
EST. GFR  (NON AFRICAN AMERICAN): >60 ML/MIN/1.73 M^2
GLUCOSE SERPL-MCNC: 84 MG/DL (ref 70–110)
MAGNESIUM SERPL-MCNC: 2.4 MG/DL (ref 1.6–2.6)
METHADONE UR QL SCN>300 NG/ML: NEGATIVE
OPIATES UR QL SCN: NEGATIVE
PCP UR QL SCN>25 NG/ML: NEGATIVE
PHOSPHATE SERPL-MCNC: 3.4 MG/DL (ref 2.7–4.5)
POTASSIUM SERPL-SCNC: 3.9 MMOL/L (ref 3.5–5.1)
SODIUM SERPL-SCNC: 139 MMOL/L (ref 136–145)
TOXICOLOGY INFORMATION: NORMAL

## 2022-07-25 PROCEDURE — 97161 PT EVAL LOW COMPLEX 20 MIN: CPT

## 2022-07-25 PROCEDURE — 25000003 PHARM REV CODE 250: Performed by: HOSPITALIST

## 2022-07-25 PROCEDURE — 80307 DRUG TEST PRSMV CHEM ANLYZR: CPT | Performed by: HOSPITALIST

## 2022-07-25 PROCEDURE — 99223 1ST HOSP IP/OBS HIGH 75: CPT | Mod: ,,, | Performed by: PSYCHIATRY & NEUROLOGY

## 2022-07-25 PROCEDURE — 83735 ASSAY OF MAGNESIUM: CPT | Performed by: HOSPITALIST

## 2022-07-25 PROCEDURE — 99406 PT REFUSED TOBACCO CESSATION: ICD-10-PCS | Mod: ,,,

## 2022-07-25 PROCEDURE — 90833 PSYTX W PT W E/M 30 MIN: CPT | Mod: ,,, | Performed by: PSYCHIATRY & NEUROLOGY

## 2022-07-25 PROCEDURE — 99406 BEHAV CHNG SMOKING 3-10 MIN: CPT | Mod: ,,,

## 2022-07-25 PROCEDURE — 36415 COLL VENOUS BLD VENIPUNCTURE: CPT | Performed by: HOSPITALIST

## 2022-07-25 PROCEDURE — 80048 BASIC METABOLIC PNL TOTAL CA: CPT | Performed by: HOSPITALIST

## 2022-07-25 PROCEDURE — 84100 ASSAY OF PHOSPHORUS: CPT | Performed by: HOSPITALIST

## 2022-07-25 PROCEDURE — 97165 OT EVAL LOW COMPLEX 30 MIN: CPT

## 2022-07-25 PROCEDURE — 97116 GAIT TRAINING THERAPY: CPT

## 2022-07-25 PROCEDURE — 99900035 HC TECH TIME PER 15 MIN (STAT)

## 2022-07-25 PROCEDURE — 99233 SBSQ HOSP IP/OBS HIGH 50: CPT | Mod: ,,, | Performed by: NURSE PRACTITIONER

## 2022-07-25 PROCEDURE — 99223 PR INITIAL HOSPITAL CARE,LEVL III: ICD-10-PCS | Mod: ,,, | Performed by: PSYCHIATRY & NEUROLOGY

## 2022-07-25 PROCEDURE — 97535 SELF CARE MNGMENT TRAINING: CPT

## 2022-07-25 PROCEDURE — 63600175 PHARM REV CODE 636 W HCPCS: Performed by: NURSE PRACTITIONER

## 2022-07-25 PROCEDURE — 99233 SBSQ HOSP IP/OBS HIGH 50: CPT | Mod: ,,, | Performed by: INTERNAL MEDICINE

## 2022-07-25 PROCEDURE — 99233 PR SUBSEQUENT HOSPITAL CARE,LEVL III: ICD-10-PCS | Mod: ,,, | Performed by: NURSE PRACTITIONER

## 2022-07-25 PROCEDURE — 90833 PR PSYCHOTHERAPY W/PATIENT W/E&M, 30 MIN (ADD ON): ICD-10-PCS | Mod: ,,, | Performed by: PSYCHIATRY & NEUROLOGY

## 2022-07-25 PROCEDURE — 11000001 HC ACUTE MED/SURG PRIVATE ROOM

## 2022-07-25 PROCEDURE — 99233 PR SUBSEQUENT HOSPITAL CARE,LEVL III: ICD-10-PCS | Mod: ,,, | Performed by: INTERNAL MEDICINE

## 2022-07-25 RX ORDER — HYDROXYZINE PAMOATE 25 MG/1
25 CAPSULE ORAL EVERY 8 HOURS PRN
Status: DISCONTINUED | OUTPATIENT
Start: 2022-07-25 | End: 2022-07-26 | Stop reason: HOSPADM

## 2022-07-25 RX ORDER — OXCARBAZEPINE 150 MG/1
300 TABLET, FILM COATED ORAL 2 TIMES DAILY
Status: DISCONTINUED | OUTPATIENT
Start: 2022-07-25 | End: 2022-07-26 | Stop reason: HOSPADM

## 2022-07-25 RX ADMIN — METOPROLOL SUCCINATE 12.5 MG: 25 TABLET, EXTENDED RELEASE ORAL at 08:07

## 2022-07-25 RX ADMIN — MUPIROCIN: 20 OINTMENT TOPICAL at 08:07

## 2022-07-25 RX ADMIN — AMLODIPINE BESYLATE 2.5 MG: 2.5 TABLET ORAL at 08:07

## 2022-07-25 RX ADMIN — AMOXICILLIN AND CLAVULANATE POTASSIUM 1 TABLET: 875; 125 TABLET, FILM COATED ORAL at 08:07

## 2022-07-25 RX ADMIN — LISINOPRIL 10 MG: 5 TABLET ORAL at 08:07

## 2022-07-25 RX ADMIN — ENOXAPARIN SODIUM 40 MG: 100 INJECTION SUBCUTANEOUS at 04:07

## 2022-07-25 RX ADMIN — FUROSEMIDE 40 MG: 40 TABLET ORAL at 08:07

## 2022-07-25 NOTE — PROGRESS NOTES
[place] - Telemetry  Cardiology  Progress Note    Patient Name: Stephanie T Barthelemy  MRN: [id number]  Admission Date: 7/21/2022  Hospital Length of Stay: 3 days  Code Status: Full Code   Attending Physician: Almaz Lucio MD   Primary Care Physician: Elsie Horne DO  Expected Discharge Date:   Principal Problem:Acute on chronic combined systolic and diastolic heart failure    Subjective:     Hospital Course:   7/23/2022 Denies CP, SOB. Noted to be drowsy and agitated by nursing staff.  7/25/2022 Admitted for ADHF with aggressive diuresis. Transitioned to oral Lasix with 1.6L out overnight negative 7.6L since admission. HR and BP stable. On GMDT          Review of Systems   Constitutional: Negative for chills, decreased appetite, diaphoresis and fever.   Cardiovascular:  Positive for dyspnea on exertion. Negative for chest pain, claudication, cyanosis, irregular heartbeat, leg swelling, near-syncope, orthopnea, palpitations, paroxysmal nocturnal dyspnea and syncope.   Respiratory:  Negative for cough, hemoptysis, shortness of breath and wheezing.    Gastrointestinal:  Negative for bloating, abdominal pain, constipation, diarrhea, melena, nausea and vomiting.   Neurological:  Negative for dizziness and weakness.   Objective:     Vital Signs (Most Recent):  Temp: 96.2 °F (35.7 °C) (07/25/22 1201)  Pulse: 86 (07/25/22 1201)  Resp: 14 (07/25/22 1201)  BP: 108/76 (07/25/22 1201)  SpO2: 99 % (07/25/22 1201) Vital Signs (24h Range):  Temp:  [96 °F (35.6 °C)-96.9 °F (36.1 °C)] 96.2 °F (35.7 °C)  Pulse:  [76-89] 86  Resp:  [] 14  SpO2:  [94 %-99 %] 99 %  BP: (103-122)/(68-83) 108/76     Weight: 57.2 kg (126 lb)  Body mass index is 24.61 kg/m².     SpO2: 99 %  O2 Device (Oxygen Therapy): room air      Intake/Output Summary (Last 24 hours) at 7/25/2022 1356  Last data filed at 7/24/2022 1714  Gross per 24 hour   Intake 360 ml   Output 600 ml   Net -240 ml       Lines/Drains/Airways       Peripheral Intravenous Line   Duration                  Peripheral IV - Single Lumen 07/22/22 0215 20 G Right Antecubital 3 days                    Physical Exam  Constitutional:       General: She is not in acute distress.     Appearance: She is well-developed.   Cardiovascular:      Rate and Rhythm: Normal rate and regular rhythm.      Heart sounds: No murmur heard.    No gallop.   Pulmonary:      Effort: Pulmonary effort is normal. No respiratory distress.      Breath sounds: Normal breath sounds. No wheezing.   Abdominal:      General: Bowel sounds are normal. There is no distension.      Palpations: Abdomen is soft.      Tenderness: There is no abdominal tenderness.   Skin:     General: Skin is warm and dry.   Neurological:      Mental Status: She is alert and oriented to person, place, and time.       Significant Labs: BMP:   Recent Labs   Lab 07/24/22  0453 07/25/22  0701   * 84    139   K 3.4* 3.9   CL 95 97   CO2 34* 31*   BUN 17 21*   CREATININE 1.2 1.0   CALCIUM 9.0 9.3   MG 2.1 2.4   , CBC   Recent Labs   Lab 07/24/22 0453   WBC 7.83   HGB 12.9   HCT 41.7          Significant Imaging: Echocardiogram: Transthoracic echo (TTE) complete ([facility] Only):   Results for orders placed or performed during the hospital encounter of 07/21/22   Echo   Result Value Ref Range    BSA 1.56 m2    LA WIDTH 4.59 cm    PV PEAK VELOCITY 1.25 cm/s    LVIDd 5.22 3.5 - 6.0 cm    IVS 1.32 (A) 0.6 - 1.1 cm    Posterior Wall 1.23 (A) 0.6 - 1.1 cm    Ao root annulus 2.52 cm    LVIDs 4.80 (A) 2.1 - 4.0 cm    FS 8 28 - 44 %    LV mass 272.55 g    LA size 4.24 cm    RVDD 4.10 cm    Left Ventricle Relative Wall Thickness 0.47 cm    AV mean gradient 3 mmHg    AV valve area 2.32 cm2    AV Velocity Ratio 0.75     AV index (prosthetic) 0.74     MV mean gradient 1 mmHg    MV valve area p 1/2 method 7.47 cm2    MV valve area by continuity eq 1.95 cm2    E/A ratio 2.73     E wave deceleration time 85.51 msec    LVOT diameter 2.00 cm    LVOT area 3.1 cm2     LVOT peak curt 0.97 m/s    LVOT peak VTI 13.45 cm    Ao peak curt 1.29 m/s    Ao VTI 18.21 cm    Mr max curt 0.06 m/s    LVOT stroke volume 42.23 cm3    AV peak gradient 7 mmHg    MV peak gradient 7 mmHg    MV Peak E Curt 1.31 m/s    TR Max Curt 2.49 m/s    MV VTI 21.66 cm    MV stenosis pressure 1/2 time 29.44 ms    MV Peak A Curt 0.48 m/s    LV Systolic Volume 107.66 mL    LV Systolic Volume Index 70.4 mL/m2    LV Diastolic Volume 130.52 mL    LV Diastolic Volume Index 85.31 mL/m2    LV Mass Index 178 g/m2    RA Major Axis 4.59 cm    Left Atrium Major Axis 6.65 cm    Triscuspid Valve Regurgitation Peak Gradient 25 mmHg    LA Volume Index (Mod) 53.8 mL/m2    LA volume (mod) 82.28 cm3    RA Width 3.48 cm    Right Atrial Pressure (from IVC) 8 mmHg    EF 20 %    TV rest pulmonary artery pressure 33 mmHg    Narrative    · The left ventricle is mildly enlarged with concentric hypertrophy and   severely decreased systolic function.  · The estimated ejection fraction is 20%.  · There is severe left ventricular global hypokinesis.  · Grade III left ventricular diastolic dysfunction.  · Mild right ventricular enlargement with mildly reduced right ventricular   systolic function.  · Severe left atrial enlargement.  · Moderate aortic regurgitation.  · Severe mitral regurgitation.  · Mild tricuspid regurgitation.  · Moderate pulmonic regurgitation.  · Intermediate central venous pressure (8 mmHg).  · The estimated PA systolic pressure is 33 mmHg.  · Trivial pericardial effusion.        Assessment and Plan:     Brief HPI: Seen this morning on AM rounds with Dr. Wagner while resting in bed. Denies any chest pain or SOB. Discussed POC as detailed below- not certain full grasping of POC despite repeated attempts.       Acute on chronic combined systolic and diastolic heart failure  - admitted with ProBNP 32542; CXR  suggestive of CHF  - TTE this admission with EF 20%  - aggressively diuresed with IV Lasix; transitioned to oral Lasix  with 1.6L yesterday; negative 7.6L since admission  - appears better compensated; continue current regimen; decompensation felt to be related to combination of medication non compliance and recreational drug use  - needs follow up as an outpatient   - Ischemic work-up as OP     Elevated troponin  - troponin 0.079 with repeat 0.080  - aggressively diuresed with IV Lasix  - elevation likely related to demand etiology      Hypokalemia  - K+ 3.4  - replace with KDur for goal K+ >4.0     Essential hypertension  - SBP 100s-120s overnight   - continue Lisinopril, Toprol XL  - Consider DC amlodipine       VTE Risk Mitigation (From admission, onward)         Ordered     enoxaparin injection 40 mg  Daily         07/22/22 0527     IP VTE HIGH RISK PATIENT  Once         07/22/22 0527     Place sequential compression device  Until discontinued         07/22/22 0527                MICHAEL Chowdhury, ANP  Cardiology  [place] - Telemetry    I have seen the patient with Nurse Practitioner Barbara Waller. I have personally interviewed and examined the patient at bedside and agree with her assessment and plan as written above in the note.

## 2022-07-25 NOTE — ASSESSMENT & PLAN NOTE
- admitted with ProBNP 66996; CXR  suggestive of CHF  - TTE this admission with EF 20%  - aggressively diuresed with IV Lasix; transitioned to oral Lasix with 1.6L yesterday; negative 7.6L since admission  - appears better compensated; continue current regimen; decompensation felt to be related to combination of medication non compliance and recreational drug use  - needs follow up as an outpatient

## 2022-07-25 NOTE — ASSESSMENT & PLAN NOTE
- Stopped Zosyn and transitioned to Augmentin  - Respiratory status stable on RA   - Plan for 7 days of Augmentin

## 2022-07-25 NOTE — ASSESSMENT & PLAN NOTE
- Patient was unresponsive on assessment  - Discussed home meds with the patient, as discussed above  - Await Psych input

## 2022-07-25 NOTE — ASSESSMENT & PLAN NOTE
Volume overload  Pulmonary edema  Aspiration pneumonia  - In setting of acute heart failure with volume overload  - No PE on CTA, but with pulmonary edema and right lower lobe infiltrate  - Continue diuresis as discussed above  - On empiric Zosyn for pneumonia  - Deescalation of antibiotics to PO Augmentin on 7/24/2022, plan for 7 day course  - Weaned off O2 to RA and ambulatory O2 assessment done today with no need for O2  - Resolved

## 2022-07-25 NOTE — MEDICAL/APP STUDENT
"PSYCHIATRY INPATIENT CONSULT NOTE      7/25/2022 9:26 AM   Stephanie T Barthelemy   1970   [id number]           DATE OF ADMISSION: 7/21/2022  8:55 PM    SITE: [facility]    CURRENT LEGAL STATUS: Patient does not currently meet PEC/CEC criteria due to not currently being an imminent threat to self/others and not being gravely disabled 2/2 mental illness at this time.       HISTORY    Chief Complaint / Reason for Psychiatry Consult: Polysubstance abuse, Bipolar I Disorder      HPI   Stephanie T Barthelemy is a 51 y.o. female with a past medical history of heart failure, hep C, seizure disorder, and HTN and a past psychiatric history of polysubstance abuse, tobacco use, opioid overdose, unspecified psychosis, and bipolar disorder  currently being treated by their inpatient primary treatment team for a principal problem of SOB, fatigue, orthopnea and cough due to HF exacerbation.  Psychiatry was originally consulted as noted above.  The patient was seen and examined.  The chart was reviewed.  On examination today, the patient was a little groggy, but eventually answered questions vaguely. Patient became irritable at the end of the interview and demanded I leave and can come back to finish later. Patient became irritable when asked social history questions. Patient was well orientated to why she arrived at the  Hospital. When asked about why she needed to be in restraints, she said she wasn't sure. I asked if she was angry or confused and she said " I don't know probably". Patient states her mood has been up and down and now she is a bit anxious. Patient denies feeling confused now. When asked about current drug use, patient reported to me she just uses weed sometimes, but when asked by Dr. Hughes she admits to meth as of a few days ago. Patient states she uses meth to function at a normal level and would like to go back on adderall/vyvanse. Patient states she has difficulty staying asleep and seroquel has worked in " the past.  Patient asked if she'd have to be hooked up to an IV for the rest of her life. Patient  denies any current/recent passive/active SI/HI.  Patient denies any adverse effects to their current medication regimen or Trileptal and Seroquel. Patient states she does not want to be on Risperidone anymore because of the dystonia it causes, and states the Cogentin does not work fast enough to relieve symptoms.  Regarding current medical/physical complaints, she endorses SOB and anxiety.  Patient denies any other medical complaints at this time.  NAD was observed during the examination.        Collateral:        Psychiatric Review Of Systems - Currently, the patient is endorsing and/or denying the following:    Symptoms of Depression: diminished mood or loss of interest/anhedonia; irritability, diminished energy, change in sleep, change in appetite, diminished concentration or cognition or indecisiveness, PMA/R, excessive guilt or hopelessness or worthlessness, suicidal ideations    Sleep: initiation, maintenance, early morning awakening with inability to return to sleep    Suicidal/Homicidal ideations: active/passive ideations, organized plans, future intentions    Symptoms of psychosis: hallucinations, delusions, disorganized thinking, disorganized behavior or abnormal motor behavior, or negative symptoms (diminshed emotional expression, avolition, anhedonia, alogia, asociality     Symptoms of frederic or hypomania: elevated, expansive, or irritable mood with increased energy or activity; with inflated self-esteem or grandiosity, decreased need for sleep, increased rate of speech, FOI or racing thoughts, distractibility, increased goal directed activity or PMA, risky/disinhibited behavior    Symptoms of IVAN: excessive anxiety/worry/fear, more days than not, about numerous issues, difficult to control, with restlessness, fatigue, poor concentration, irritability, muscle tension, sleep disturbance; causes functionally  impairing distress     Symptoms of Panic Disorder: recurrent panic attacks, precipitated or un-precipitated, source of worry and/or behavioral changes secondary; with or without agoraphobia    Symptoms of PTSD: h/o trauma; re-experiencing/intrusive symptoms, avoidant behavior, negative alterations in cognition or mood, or hyperarousal symptoms; with or without dissociative symptoms     Symptoms of OCD: obsessions or compulsions     Symptoms of Eating Disorders: anorexia, bulimia or binging    Substance Use: intoxication, withdrawal, tolerance, used in larger amounts or duration than intended, unsuccessful attempts to limit or quit, increased time engaging in or seeking out, cravings or strong desire to use, failure to fulfill obligations, negative consequences in social/interpersonal/occupational,/recreational areas, use in dangerous situations, medical or psychological consequences           ROS  General ROS: negative for - chills, fatigue, fever or night sweats  Ophthalmic ROS: negative for - blurry vision, double vision or eye pain  ENT ROS: negative for - sinus pain, headaches, sore throat or visual changes  Allergy and Immunology ROS: negative for - hives, itchy/watery eyes or nasal congestion  Hematological and Lymphatic ROS: negative for - bleeding problems, bruising, jaundice or pallor  Endocrine ROS: negative for - galactorrhea, hot flashes, mood swings, palpitations or temperature intolerance  Respiratory ROS: negative for - cough, hemoptysis, shortness of breath, tachypnea or wheezing  Cardiovascular ROS: Pos for negative for - chest pain, dyspnea on exertion, loss of consciousness, palpitations, rapid heart rate or shortness of breath  Gastrointestinal ROS: negative for - appetite loss, nausea, abdominal pain, blood in stools, change in bowel habits, constipation or diarrhea  Genito-Urinary ROS: negative for - incontinence, nocturia or pelvic pain  Musculoskeletal ROS: negative for - joint stiffness,  joint swelling, joint pain or muscle pain   Neurological ROS: negative for - behavioral changes, confusion, dizziness, memory loss, numbness/tingling or seizures  Dermatological ROS: negative for dry skin, hair changes, pruritus or rash  Psychiatric ROS: see detailed psychiatric ROS above in history section       Past Psychiatric History:  Previous Medication Trials: yes , risperidone- dystonia, seroquel, trileptal   Previous Psychiatric Hospitalizations: yes   Previous Suicide Attempts: no   History of Violence: unknown  Outpatient Psychiatrist: no, recommended to see one in [place]    Social History: (unable to obtain parts due to agitation and request for end of interview)  Marital Status: unknown   Children: unknown   Employment Status/Info: on disability  Education: unknown  Special Ed: unknown  : no  Holiness: unknown  Housing Status: yes, lives w/ dad  Hobbies/Leisure time: unknown  History of phys/sexual abuse: unknown  Access to gun: no    Family Psychiatric History: unknown    Substance Abuse History:  Recreational Drugs: heroin, marijuana and methamphetamines, denies IV drug use, snort meth  Use of Alcohol: denied  Rehab History:yes, completed program, doesn't think she needs to go back  Tobacco Use:yes, 1 pack per day, interested in cessation  Use of Caffeine: denies use  Use of OTC: unkown  Legal consequences of chemical use: unknown, patient agitated when asked about legal history    Legal History:  Past Charges/Incarcerations:unknown,    Pending charges:unknown     Psychosocial Stressors: financial, health and drug and alcohol.   Functioning Relationships: good relationship with parents  Strengths AND Liabilities  Strength: Patient has positive support network., Liability: Patient is defensive., Liability: Patient has poor health., Liability: Patient has poor judgment, Liability: Patient has possible cognitive impairment.      PAST MEDICAL & SURGICAL HISTORY   Past Medical History:    Diagnosis Date    ADHD (attention deficit hyperactivity disorder)     Anemia     Anxiety     Bipolar disorder     History of hepatitis C - s/p clearance of virus (HCV neg 6/2015) 9/26/2014    History of psychiatric hospitalization     History of substance abuse     IV heroin - last use 2013 per pt    Hx of psychiatric care     Hypertension     Opioid overdose 5/10/2016    Psychiatric problem     Psychosis     Seizure disorder 4/3/2019    Sleep difficulties     Substance abuse     Therapy     Vasculitis      Past Surgical History:   Procedure Laterality Date    HYSTERECTOMY  3/16/2011    SALPINGOOPHORECTOMY Right 3/16/2011    TUBAL LIGATION      Vaginal cuff repair  04/22/2011       NEUROLOGIC HISTORY  Seizures: yes, on trileptal  Head trauma: unknown     FAMILY HISTORY   Family History   Problem Relation Age of Onset    Diabetes Maternal Grandmother     Cancer Maternal Grandmother        ALLERGIES   Review of patient's allergies indicates:  No Known Allergies    CURRENT MEDICATION REGIMEN   Home Meds:   Prior to Admission medications    Medication Sig Start Date End Date Taking? Authorizing Provider   amLODIPine (NORVASC) 5 MG tablet Take 1 tablet (5 mg total) by mouth once daily. 9/8/21 9/8/22  Petty Ibanez MD   benztropine (COGENTIN) 1 MG tablet Take 1 tablet (1 mg total) by mouth 2 (two) times daily. 5/19/22 6/18/22  Ayad Lemons MD   FLUoxetine 20 MG capsule Take 1 capsule (20 mg total) by mouth once daily. 5/19/22 6/18/22  Ayad Lemons MD   gabapentin (NEURONTIN) 300 MG capsule Take 1 capsule (300 mg total) by mouth 3 (three) times daily. 5/19/22 6/18/22  Ayad Lemons MD   lisinopriL 10 MG tablet Take 1 tablet (10 mg total) by mouth once daily. 9/8/21 9/8/22  Petty Ibanez MD   OXcarbazepine (TRILEPTAL) 600 MG Tab Take 2 tablets (1,200 mg total) by mouth every evening. 9/8/21 9/8/22  Petty Ibanez MD   propranoloL (INDERAL) 10 MG tablet Take 1 tablet (10 mg total) by  mouth 3 (three) times daily. 5/19/22 6/18/22  Ayad Lemons MD   QUEtiapine (SEROQUEL) 100 MG Tab Take 1 tablet (100 mg total) by mouth every evening. 5/19/22 5/19/23  Ayad Lemons MD   risperiDONE (RISPERDAL) 1 MG tablet Take 1 mg by mouth 2 (two) times daily. 5/19/22   Historical Provider       OTC Meds: ***    Scheduled Meds:    amLODIPine  2.5 mg Oral Daily    amoxicillin-clavulanate 875-125mg  1 tablet Oral Q12H    enoxaparin  40 mg Subcutaneous Daily    furosemide  40 mg Oral Daily    lisinopriL  10 mg Oral Daily    metoprolol succinate  12.5 mg Oral Daily    mupirocin   Nasal BID      PRN Meds: acetaminophen, dextrose 10%, dextrose 10%, glucagon (human recombinant), glucose, glucose, melatonin, naloxone, ondansetron, sodium chloride 0.9%, sodium chloride 0.9%   Psychotherapeutics (From admission, onward)            None          LABORATORY DATA   Recent Results (from the past 72 hour(s))   Echo    Collection Time: 07/22/22 10:07 AM   Result Value Ref Range    BSA 1.56 m2    LA WIDTH 4.59 cm    PV PEAK VELOCITY 1.25 cm/s    LVIDd 5.22 3.5 - 6.0 cm    IVS 1.32 (A) 0.6 - 1.1 cm    Posterior Wall 1.23 (A) 0.6 - 1.1 cm    Ao root annulus 2.52 cm    LVIDs 4.80 (A) 2.1 - 4.0 cm    FS 8 28 - 44 %    LV mass 272.55 g    LA size 4.24 cm    RVDD 4.10 cm    Left Ventricle Relative Wall Thickness 0.47 cm    AV mean gradient 3 mmHg    AV valve area 2.32 cm2    AV Velocity Ratio 0.75     AV index (prosthetic) 0.74     MV mean gradient 1 mmHg    MV valve area p 1/2 method 7.47 cm2    MV valve area by continuity eq 1.95 cm2    E/A ratio 2.73     E wave deceleration time 85.51 msec    LVOT diameter 2.00 cm    LVOT area 3.1 cm2    LVOT peak curt 0.97 m/s    LVOT peak VTI 13.45 cm    Ao peak curt 1.29 m/s    Ao VTI 18.21 cm    Mr max curt 0.06 m/s    LVOT stroke volume 42.23 cm3    AV peak gradient 7 mmHg    MV peak gradient 7 mmHg    MV Peak E Curt 1.31 m/s    TR Max Curt 2.49 m/s    MV VTI 21.66 cm    MV stenosis  pressure 1/2 time 29.44 ms    MV Peak A Curt 0.48 m/s    LV Systolic Volume 107.66 mL    LV Systolic Volume Index 70.4 mL/m2    LV Diastolic Volume 130.52 mL    LV Diastolic Volume Index 85.31 mL/m2    LV Mass Index 178 g/m2    RA Major Axis 4.59 cm    Left Atrium Major Axis 6.65 cm    Triscuspid Valve Regurgitation Peak Gradient 25 mmHg    LA Volume Index (Mod) 53.8 mL/m2    LA volume (mod) 82.28 cm3    RA Width 3.48 cm    Right Atrial Pressure (from IVC) 8 mmHg    EF 20 %    TV rest pulmonary artery pressure 33 mmHg   Ammonia    Collection Time: 07/22/22 10:53 AM   Result Value Ref Range    Ammonia 33 10 - 50 umol/L   Basic Metabolic Panel (BMP)    Collection Time: 07/23/22  3:38 AM   Result Value Ref Range    Sodium 148 (H) 136 - 145 mmol/L    Potassium 3.0 (L) 3.5 - 5.1 mmol/L    Chloride 99 95 - 110 mmol/L    CO2 35 (H) 23 - 29 mmol/L    Glucose 101 70 - 110 mg/dL    BUN 18 6 - 20 mg/dL    Creatinine 1.3 0.5 - 1.4 mg/dL    Calcium 8.9 8.7 - 10.5 mg/dL    Anion Gap 14 8 - 16 mmol/L    eGFR if African American 55 (A) >60 mL/min/1.73 m^2    eGFR if non African American 48 (A) >60 mL/min/1.73 m^2   CBC with Automated Differential    Collection Time: 07/23/22  3:38 AM   Result Value Ref Range    WBC 8.34 3.90 - 12.70 K/uL    RBC 4.61 4.00 - 5.40 M/uL    Hemoglobin 12.7 12.0 - 16.0 g/dL    Hematocrit 40.5 37.0 - 48.5 %    MCV 88 82 - 98 fL    MCH 27.5 27.0 - 31.0 pg    MCHC 31.4 (L) 32.0 - 36.0 g/dL    RDW 15.7 (H) 11.5 - 14.5 %    Platelets 244 150 - 450 K/uL    MPV 11.7 9.2 - 12.9 fL    Immature Granulocytes 0.2 0.0 - 0.5 %    Gran # (ANC) 6.2 1.8 - 7.7 K/uL    Immature Grans (Abs) 0.02 0.00 - 0.04 K/uL    Lymph # 1.3 1.0 - 4.8 K/uL    Mono # 0.7 0.3 - 1.0 K/uL    Eos # 0.1 0.0 - 0.5 K/uL    Baso # 0.05 0.00 - 0.20 K/uL    nRBC 0 0 /100 WBC    Gran % 74.1 (H) 38.0 - 73.0 %    Lymph % 15.1 (L) 18.0 - 48.0 %    Mono % 8.6 4.0 - 15.0 %    Eosinophil % 1.4 0.0 - 8.0 %    Basophil % 0.6 0.0 - 1.9 %    Differential Method  Automated    Magnesium    Collection Time: 07/23/22  3:38 AM   Result Value Ref Range    Magnesium 1.7 1.6 - 2.6 mg/dL   POCT glucose    Collection Time: 07/23/22  8:05 PM   Result Value Ref Range    POCT Glucose 121 (H) 70 - 110 mg/dL   Basic Metabolic Panel (BMP)    Collection Time: 07/24/22  4:53 AM   Result Value Ref Range    Sodium 140 136 - 145 mmol/L    Potassium 3.4 (L) 3.5 - 5.1 mmol/L    Chloride 95 95 - 110 mmol/L    CO2 34 (H) 23 - 29 mmol/L    Glucose 114 (H) 70 - 110 mg/dL    BUN 17 6 - 20 mg/dL    Creatinine 1.2 0.5 - 1.4 mg/dL    Calcium 9.0 8.7 - 10.5 mg/dL    Anion Gap 11 8 - 16 mmol/L    eGFR if African American >60 >60 mL/min/1.73 m^2    eGFR if non African American 53 (A) >60 mL/min/1.73 m^2   CBC with Automated Differential    Collection Time: 07/24/22  4:53 AM   Result Value Ref Range    WBC 7.83 3.90 - 12.70 K/uL    RBC 4.75 4.00 - 5.40 M/uL    Hemoglobin 12.9 12.0 - 16.0 g/dL    Hematocrit 41.7 37.0 - 48.5 %    MCV 88 82 - 98 fL    MCH 27.2 27.0 - 31.0 pg    MCHC 30.9 (L) 32.0 - 36.0 g/dL    RDW 14.9 (H) 11.5 - 14.5 %    Platelets 239 150 - 450 K/uL    MPV 11.4 9.2 - 12.9 fL    Immature Granulocytes 0.3 0.0 - 0.5 %    Gran # (ANC) 5.4 1.8 - 7.7 K/uL    Immature Grans (Abs) 0.02 0.00 - 0.04 K/uL    Lymph # 1.2 1.0 - 4.8 K/uL    Mono # 0.8 0.3 - 1.0 K/uL    Eos # 0.3 0.0 - 0.5 K/uL    Baso # 0.05 0.00 - 0.20 K/uL    nRBC 0 0 /100 WBC    Gran % 69.3 38.0 - 73.0 %    Lymph % 15.2 (L) 18.0 - 48.0 %    Mono % 10.3 4.0 - 15.0 %    Eosinophil % 4.3 0.0 - 8.0 %    Basophil % 0.6 0.0 - 1.9 %    Differential Method Automated    Magnesium    Collection Time: 07/24/22  4:53 AM   Result Value Ref Range    Magnesium 2.1 1.6 - 2.6 mg/dL   Magnesium    Collection Time: 07/25/22  7:01 AM   Result Value Ref Range    Magnesium 2.4 1.6 - 2.6 mg/dL   Basic Metabolic Panel    Collection Time: 07/25/22  7:01 AM   Result Value Ref Range    Sodium 139 136 - 145 mmol/L    Potassium 3.9 3.5 - 5.1 mmol/L    Chloride 97  95 - 110 mmol/L    CO2 31 (H) 23 - 29 mmol/L    Glucose 84 70 - 110 mg/dL    BUN 21 (H) 6 - 20 mg/dL    Creatinine 1.0 0.5 - 1.4 mg/dL    Calcium 9.3 8.7 - 10.5 mg/dL    Anion Gap 11 8 - 16 mmol/L    eGFR if African American >60 >60 mL/min/1.73 m^2    eGFR if non African American >60 >60 mL/min/1.73 m^2   Phosphorus    Collection Time: 07/25/22  7:01 AM   Result Value Ref Range    Phosphorus 3.4 2.7 - 4.5 mg/dL      Lab Results   Component Value Date    VALPROATE <10.0 (L) 07/25/2019    CBMZ 2.1 (L) 04/15/2019         EXAMINATION    VITALS   Vitals:    07/25/22 0042 07/25/22 0400 07/25/22 0512 07/25/22 0743   BP:   120/82 112/83   Patient Position:   Lying Lying   Pulse:  76 77 81   Resp:   18 15   Temp:   96.1 °F (35.6 °C) 96.9 °F (36.1 °C)   TempSrc:   Axillary Axillary   SpO2: 95%  96% 96%   Weight:       Height:            CONSTITUTIONAL  General Appearance: NAD, unremarkable, age appropriate, unremarkable, age appropriate, older than stated age, disheveled, thin & gaunt looking    MUSCULOSKELETAL  Muscle Strength and Tone: WNL  Abnormal Involuntary Movements: none observed   Gait and Station: unable to assess    PSYCHIATRIC   Behavior/Cooperation:  uncooperative, psychomotor agitation, eye contact normal  Speech:  normal tone, normal rate, normal pitch, normal volume, spontaneous, patient mumbled, but did not have dentures in  Language: grossly intact with spontaneous speech  Mood: anxious, depressed, irritable  Affect:  congruent with mood and appropriate to situation/content Normal  Associations:  intact; no FÉLIX  Thought Process: normal and logical, goal-directed  Thought Content: normal, no suicidality, no homicidality, delusions, or paranoia  Sensorium:  Awake/Delirium/Somnolence  Alert and Oriented: to grossly intact, person, place, situation, time/date, day of week, month of year  Memory: 3/3 immediate, ***/3 at 5 minutes - unable to test due to irritability   Recent:  Limited; able to report some recent  events w/ prompting   Remote:  Impaired / Limited / Intact; Named 0/4 past presidents   Attention/concentration: unable to test due to irritability  Similarities:  unable to test due to irritability  Abstract reasoning:   unable to test due to irritability  Insight: Poor  Judgment:  Poor    CAM ICU Delirium Assessment -  NEGATIVE      Is the patient aware of the biomedical complications associated with substance abuse and mental illness? yes    Does the patient have an Advance Directive for Mental Health treatment? no  (If yes, inform patient to bring copy.)      MEDICAL DECISION MAKING    ASSESSMENT      Polysubstance Abuse  Bipolar 1- depressed episode        RECOMMENDATIONS       - patient does not currently meet PEC/CEC criteria due to not being an imminent threat to self/others and not being gravely disabled 2/2 mental illness.     Bipolar 1- depressed episode  Oxcarbazepine 600mg - 2 tablets (1200mg) PO every evening   Quetiapine 200Mg tab PO every evening     Tobacco Use Disorder  Patient showed interest in smoking cessation  Patient educated on availability of smoking cessation nurse     Stimulant Use Disorder- Methamphetamine Type  Patient's last methamphetamine use was a few days ago   Patient has completed a rehab program in the past  Patient does not believe she needs to go back to rehab or IOP  Patient asked to be back on amphetamines  Patient recommended to see at minimum an outpatient psychiatrist when she's out of the hospital       - Please have CM/SW assist patient with outpatient mental health f/u for s/p discharge from this facility      - Patient was instructed to call 911 and return to the nearest ED if he/she begins feeling suicidal, homicidal, or gravely disabled      - Thank you for this consult ; will continue to follow patient         Time spent with patient and/or on unit managing/coordinating patient's care (excluding the time spent on psychotherapy): 70 minutes      More than 50% of the  time was spent counseling/coordinating care      STAFF:  Linda Brady, MS3  [facility]  7/25/2022

## 2022-07-25 NOTE — PROGRESS NOTES
Smoking cessation education follow-up: Pt slightly more lucid but still dozing off frequently during session. Will follow up at a later time.

## 2022-07-25 NOTE — PLAN OF CARE
Problem: Occupational Therapy  Goal: Occupational Therapy Goal  Description: Goals to be met by: 8/25/22      Patient will increase functional independence with ADLs by performing:    LE Dressing with Modified [place].  Grooming while standing with Modified [place].  Toileting from toilet with Modified [place] for hygiene and clothing management.   Supine to sit with Modified [place].  Step transfer with Modified [place]  Toilet transfer to toilet with Modified [place].  Upper extremity exercise program x10 reps per handout, with independence.    Outcome: Ongoing, Progressing     Pt would benefit from cont OT services in order to maximize functional independence. Recommendations ongoing at this time. Pt's limiting factor appears to be her mentation/cognition. Decreased command following 2/2 pt being anxious. Impaired balance also noted. Will progress pt as able. Pt will most likely require 24/7 supervision /assist.

## 2022-07-25 NOTE — PT/OT/SLP EVAL
"Occupational Therapy   Evaluation/Treatment     Name: Stephanie T Barthelemy  MRN: [id number]  Admitting Diagnosis:  Acute on chronic combined systolic and diastolic heart failure  Recent Surgery: * No surgery found *      Recommendations:     Discharge Recommendations:  (TBD, but will most likely require 24/7 supervision 2/2 impaired cognition)  Discharge Equipment Recommendations:  shower chair  Barriers to discharge:  None    Assessment:     Stephanie T Barthelemy is a 51 y.o. female with a medical diagnosis of Acute on chronic combined systolic and diastolic heart failure.  She presents with The primary encounter diagnosis was New onset of congestive heart failure. Diagnoses of SOB (shortness of breath), Pneumonia of left lower lobe due to infectious organism, Pleural effusion, Acute heart failure, Chest pain, and Acute on chronic combined systolic and diastolic heart failure were also pertinent to this visit.  . Performance deficits affecting function: weakness, gait instability, impaired balance, impaired endurance, impaired self care skills, impaired functional mobility, impaired cognition, decreased coordination, edema, decreased safety awareness.      Pt would benefit from cont OT services in order to maximize functional independence. Recommendations ongoing at this time. Pt's limiting factor appears to be her mentation/cognition. Decreased command following 2/2 pt being anxious. Impaired balance also noted. Will progress pt as able. Pt will most likely require 24/7 supervision /assist.     Rehab Prognosis: Good; patient would benefit from acute skilled OT services to address these deficits and reach maximum level of function.       Plan:     Patient to be seen 3 x/week to address the above listed problems via self-care/home management, therapeutic activities, therapeutic exercises  · Plan of Care Expires: 08/25/22  · Plan of Care Reviewed with: patient    Subjective     Chief Complaint: pt states, " I'm going " "a little crazy trying to figure everything out." Perseverating saying " nothing like this has ever happened before"   Patient/Family Comments/goals: none stated     Occupational Profile:  Living Environment: with father and sister in H, no steps to enter, t/s combo  Previous level of function: independent; does not use DME; does not drive; does not work; performs IADLs   Equipment Used at Home:  none  Assistance upon Discharge: reports father able to assist     Pain/Comfort:  · Pain Rating 1: 0/10    Patients cultural, spiritual, L[employer] day conflicts given the current situation:      Objective:     Communicated with: deon prior to session.   General Precautions: Standard, fall   Orthopedic Precautions:N/A   Braces: N/A      Occupational Performance:    Bed Mobility:    · Patient completed Scooting/Bridging with contact guard assistance  · Patient completed Supine to Sit with contact guard assistance  · Patient completed Sit to Supine with contact guard assistance    Functional Mobility/Transfers:  · Patient completed Sit <> Stand Transfer with contact guard assistance and minimum assistance  with  no assistive device   · Patient completed Toilet Transfer Step Transfer technique with minimum assistance with  no AD  · Functional Mobility: CGA- Min A for LOB     Activities of Daily Living:  · Grooming: contact guard assistance standing at sink   · Toileting: minimum assistance and moderate assistance clothing management     Cognitive/Visual Perceptual:  Pt with poor command following 2/2 anxiety   Perseverating over certain things while up and moving   Moving prior to therapist's instructions       Physical Exam:  Balance:    -       WFL seated; fair/fair - standing   Postural examination/scapula alignment:    -       Rounded shoulders  Edema:  Mild BLEs  Upper Extremity Range of Motion:   BUE WFL based on observed function   Upper Extremity Strength:  BUE WFL for pt's needs based on observed function     [facility] 6 " "Click ADL:  AMPAC Total Score: 15    Treatment & Education:  Pt perseverating over" nothing like this has ever happened before" ; and requesting to delay axs until she "can figure things out"   Requires maximal re-direction to axs at times and attempts to move prior to instructions   Pt initially with impaired balance when ambulating to bathroom and requires assist 2/2 LOB; cues for safety awareness when t/f to toilet   Assist required for clothing management during toileting   Stood at sink for G&H axs  Functional mobility in room and hallway; improvement noted in balance when in hallway   Education:    Patient left supine with all lines intact, call button in reach, bed alarm on and nsg notified    GOALS:   Multidisciplinary Problems     Occupational Therapy Goals        Problem: Occupational Therapy    Goal Priority Disciplines Outcome Interventions   Occupational Therapy Goal     OT, PT/OT Ongoing, Progressing    Description: Goals to be met by: 8/25/22      Patient will increase functional independence with ADLs by performing:    LE Dressing with Modified Joiner.  Grooming while standing with Modified Joiner.  Toileting from toilet with Modified Joiner for hygiene and clothing management.   Supine to sit with Modified Joiner.  Step transfer with Modified Joiner  Toilet transfer to toilet with Modified Joiner.  Upper extremity exercise program x10 reps per handout, with independence.                     History:     Past Medical History:   Diagnosis Date    ADHD (attention deficit hyperactivity disorder)     Anemia     Anxiety     Bipolar disorder     History of hepatitis C - s/p clearance of virus (HCV neg 6/2015) 9/26/2014    History of psychiatric hospitalization     History of substance abuse     IV heroin - last use 2013 per pt    Hx of psychiatric care     Hypertension     Opioid overdose 5/10/2016    Psychiatric problem     Psychosis     Seizure disorder " 4/3/2019    Sleep difficulties     Substance abuse     Therapy     Vasculitis        Past Surgical History:   Procedure Laterality Date    HYSTERECTOMY  3/16/2011    SALPINGOOPHORECTOMY Right 3/16/2011    TUBAL LIGATION      Vaginal cuff repair  04/22/2011       Time Tracking:     OT Date of Treatment: 07/25/22  OT Start Time: 1127  OT Stop Time: 1149  OT Total Time (min): 22 min    Billable Minutes:Evaluation 10  Self Care/Home Management 12    7/25/2022

## 2022-07-25 NOTE — ASSESSMENT & PLAN NOTE
- troponin 0.079 with repeat 0.080  - aggressively diuresed with IV Lasix  - elevation related to demand etiology

## 2022-07-25 NOTE — PROGRESS NOTES
[facility] Medicine  Progress Note    Patient Name: Stephanie T Barthelemy  MRN: [id number]  Patient Class: IP- Inpatient   Admission Date: 7/21/2022  Length of Stay: 3 days  Attending Physician: Almaz Lucio MD  Primary Care Provider: Elsie Horne DO        Subjective:     Principal Problem:Acute on chronic combined systolic and diastolic heart failure        HPI:  Patient is a 50yo female with a pmh of drug abuse, tobacco use, psychiatric disorder, bipolar disorder, Hepatitis C, HTN, opioid overdose, seizure disorder. She presented with SOB x 2 days, worse with exertion. Also reports fatigue, orthopnea, and cough. Denies chest pain. No fevers. In the ED, she was found to have pulmonary edema and cardiomegaly [place] chest [place] 6[address]. Troponin 0.079. Chest CTA with no PE but with LLL infiltrate. She was given IV fluids, zosyn, lasix, ASA, and NGT paste. Urine was not measured due to patient urinating on floor.       Overview/Hospital Course:  Ms. Barthelemy presented with shortness of breath and confusion.  Admitted with acute respiratory failure due to volume overload secondary to new onset heart failure.  BNP greater than 4900 and exam with pulmonary edema and peripheral edema.  Long history of substance abuse, and altered mental status also due to likely substance abuse.  Workup with 2D echo done which showed EF of 20%, global hypokinesis and grade 3 diastolic dysfunction.  Cardiology consulted.  Diuresis done and patient euvolumic and changed to PO lasix. Psych consulted for substance abuse and fluctuating alertness.       Interval History:  No acute events, she denies SOB and swelling is better. Reports feeling anxious, sleepy and tired. She has not gotten up out of bed. She was more alert in the morning, but now very sleepy.    Review of Systems   Constitutional:  Negative for chills and fever.   Respiratory:  Negative for shortness of breath.    Cardiovascular:  Negative  for chest pain and leg swelling.   Neurological:  Positive for weakness.   Objective:     Vital Signs (Most Recent):  Temp: 96.2 °F (35.7 °C) (07/25/22 1201)  Pulse: 86 (07/25/22 1201)  Resp: 14 (07/25/22 1201)  BP: 108/76 (07/25/22 1201)  SpO2: 99 % (07/25/22 1201) Vital Signs (24h Range):  Temp:  [96 °F (35.6 °C)-96.9 °F (36.1 °C)] 96.2 °F (35.7 °C)  Pulse:  [76-89] 86  Resp:  [] 14  SpO2:  [94 %-99 %] 99 %  BP: (103-122)/(68-83) 108/76     Weight: 57.2 kg (126 lb)  Body mass index is 24.61 kg/m².    Intake/Output Summary (Last 24 hours) at 7/25/2022 1454  Last data filed at 7/24/2022 1714  Gross per 24 hour   Intake 360 ml   Output 600 ml   Net -240 ml        Physical Exam  Vitals and nursing note reviewed.   Constitutional:       Appearance: She is ill-appearing. She is not toxic-appearing.      Comments: Appears older than stated age    HENT:      Head: Normocephalic and atraumatic.      Nose: Nose normal.   Eyes:      Extraocular Movements: Extraocular movements intact.      Conjunctiva/sclera: Conjunctivae normal.   Cardiovascular:      Rate and Rhythm: Normal rate and regular rhythm.      Pulses: Normal pulses.      Heart sounds: Murmur heard.   Pulmonary:      Effort: Pulmonary effort is normal. No respiratory distress.      Comments: Decreased breath sounds at bases now resolved  Abdominal:      General: Bowel sounds are normal. There is no distension.      Palpations: Abdomen is soft.      Tenderness: There is no abdominal tenderness. There is no guarding or rebound.   Musculoskeletal:         General: Normal range of motion.      Cervical back: Normal range of motion.      Comments: Trace edema to lower extremities   Skin:     General: Skin is warm and dry.      Capillary Refill: Capillary refill takes less than 2 seconds.   Neurological:      Mental Status: She is alert.      Motor: Weakness present.      Comments: Awake and alert, oriented x 3, able to maintain attention and answer simple  questions and follow simple commands this morning, and now more lethargic by the afternoon   Psychiatric:         Attention and Perception: Attention normal.         Mood and Affect: Affect is labile.         Speech: Speech is delayed.         Behavior: Behavior is slowed.       Significant Labs: All pertinent labs within the past 24 hours have been reviewed.    Significant Imaging: I have reviewed all pertinent imaging results/findings within the past 24 hours.      Assessment/Plan:      * Acute on chronic combined systolic and diastolic heart failure  - Patient with known uncontrolled hypertension with noncompliance with medications and polysubstance abuse  - New onset CHF with BNP and physical exam consistent with volume overload  - 2D echo showed EF of 20% with global hypokinesis, and grade 3 diastolic dysfunction  - Diuresis was progress with IV Lasix 40 mg IV q.12  - Monitoring with strict I&Os and daily weights  - Cardiology following  - Currently-5.4 L does far, but last 24 hours of I/Os not recorded  - Deescalated diuresis to 40 mg IV daily on 7/23/2022  - Transition to PO lasix 40 mg daily on 7/24/2022  - Restaredt lisinopril and added metoprolol, and decrease amlodipine dose on 7/24/2022  - Blood pressure well controlled and she is euvolumic  - Cardiology recommends outpatient ischemic evaluation    Acute respiratory failure with hypoxia  Volume overload  Pulmonary edema  Aspiration pneumonia  - In setting of acute heart failure with volume overload  - No PE on CTA, but with pulmonary edema and right lower lobe infiltrate  - Continue diuresis as discussed above  - On empiric Zosyn for pneumonia  - Deescalation of antibiotics to PO Augmentin on 7/24/2022, plan for 7 day course  - Weaned off O2 to RA and ambulatory O2 assessment done today with no need for O2  - Resolved     Encephalopathy, metabolic  - Patient was obtunded on arrival, minimally responsive to sternal rub  - s/p narcan x 3 with no change, U  tox negative  - Head CT unremarkable and she had a nonfocal exam  - Altered mentation secondary to likely hypoxia with concomitant drug abuse and polypharmacy  - Patient reported taking meth prior to admission and reviewed all of her psychiatric medications which she has been taking intermittently according to her and patient requesting ativan and klonopin specifically  - Mental status was improved this morning and likely almost at baseline; but by this afternoon patient more difficult to maintain attention, suspect possible ingestion of outside substance  - Will repeat Utox now  - Consulted Psych and she was seen, await their recommendations    Elevated troponin  - In setting of acute heart failure and drug abuse  - Denied chest pain  - Troponins trended flat and she had no acute EKG changes  - Not consistent with ACS  - Cardiology following and workup with 2D echo done as discussed above  - Continue monitor on telemetry  - Outpatient ischemic evaluation and follow up with Cardiology    Bipolar 1 disorder, depressed  - Patient was unresponsive on assessment  - Discussed home meds with the patient, as discussed above  - Await Psych input    Substance abuse  - H/o meth, cocaine, marijuana, heroin with IV drug use and Hep C  - Patient reports recent meth use just prior to hospitalization  - Will further discuss substance abuse more detail after patient is able to maintain attention for longer periods of time and acute issues addressed  - Psych consulted    Hypokalemia  Hypernatremia  - Corrected to normal  - Monitor with repeat labs    Physical deconditioning  - PT and OT consulted  - Patient unsteady and needs more time to work with them as discussed with therapy  - Will follow up on their recommendations    Pneumonia  - Stopped Zosyn and transitioned to Augmentin  - Respiratory status stable on RA   - Plan for 7 days of Augmentin    Essential hypertension  - Noncompliant with BP management  - Started amlodipine 10mg  daily  - Cardiology consulted  - Decreased amlodipine, added lisinopril, metoprolol on 7/24/2022  - Blood pressure well controlled on this regimen      VTE Risk Mitigation (From admission, onward)         Ordered     enoxaparin injection 40 mg  Daily         07/22/22 0527     IP VTE HIGH RISK PATIENT  Once         07/22/22 0527     Place sequential compression device  Until discontinued         07/22/22 0527                      Almaz Lucio MD  Department of Hospital Medicine   [place] - Telemetry

## 2022-07-25 NOTE — ASSESSMENT & PLAN NOTE
- In setting of acute heart failure and drug abuse  - Denied chest pain  - Troponins trended flat and she had no acute EKG changes  - Not consistent with ACS  - Cardiology following and workup with 2D echo done as discussed above  - Continue monitor on telemetry  - Outpatient ischemic evaluation and follow up with Cardiology

## 2022-07-25 NOTE — ASSESSMENT & PLAN NOTE
- Noncompliant with BP management  - Started amlodipine 10mg daily  - Cardiology consulted  - Decreased amlodipine, added lisinopril, metoprolol on 7/24/2022  - Blood pressure well controlled on this regimen

## 2022-07-25 NOTE — PROGRESS NOTES
07/25/2022 @ 11:24 AM- RSW attempted to meet with patient to complete SDOH and liaison assessment. RSW unable to complete initial visit due to pt sleeping and not responding to RSW attempt to wake pt up. RSW will follow up with patient at a later time.    07/26/2022 @ 11:45AM - RSW attempted to meet with patient for initial visit. RSW unable to meet with pt due to pt busy at this time. RSW will follow up with patient at a later time.      07/27/2022 @ 9:40 AM- Patient discharged from hospital before RSW was able to complete initial visit.  1. Discharge date: 7/26/2022  2. Discharge telephone number/address: [phone] / [phone]  3. Follow up Provider: Chad Guaman MD  4. Follow up Appointments: 7/29/2022 @ 10:00am  5. Home Health Agency and telephone number: [facility] [place]  6. DME ordered and name of : n/a  7. Assigned OPCM RN/SW: n/a  8. Report sent to follow up team (PCP/OPCM) via inbasket message: n/a  9. Community Resources arranged including agency name & contact information: n/a      FANY Johnston

## 2022-07-25 NOTE — ASSESSMENT & PLAN NOTE
- PT and OT consulted  - Patient unsteady and needs more time to work with them as discussed with therapy  - Will follow up on their recommendations

## 2022-07-25 NOTE — NURSING

## 2022-07-25 NOTE — ASSESSMENT & PLAN NOTE
- Patient was obtunded on arrival, minimally responsive to sternal rub  - s/p narcan x 3 with no change, U tox negative  - Head CT unremarkable and she had a nonfocal exam  - Altered mentation secondary to likely hypoxia with concomitant drug abuse and polypharmacy  - Patient reported taking meth prior to admission and reviewed all of her psychiatric medications which she has been taking intermittently according to her and patient requesting ativan and klonopin specifically  - Mental status was improved this morning and likely almost at baseline; but by this afternoon patient more difficult to maintain attention, suspect possible ingestion of outside substance  - Will repeat Utox now  - Consulted Psych and she was seen, await their recommendations
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
## 2022-07-25 NOTE — PROGRESS NOTES
[facility] - Telemetry  Cardiology  Progress Note    Patient Name: Stephanie T Barthelemy  MRN: [id number]  Admission Date: 7/21/2022  Hospital Length of Stay: 3 days  Code Status: Full Code   Attending Physician: Almaz Lucio MD   Primary Care Physician: Elsie Horne DO  Expected Discharge Date:   Principal Problem:Acute on chronic combined systolic and diastolic heart failure    Subjective:     Hospital Course:   7/23/2022 Denies CP, SOB. Noted to be drowsy and agitated by nursing staff.  7/25/2022 Admitted for ADHF with aggressive diuresis. Transitioned to oral Lasix with 1.6L out overnight negative 7.6L since admission. HR and BP stable. On GMDT          Review of Systems   Constitutional: Negative for chills, decreased appetite, diaphoresis and fever.   Cardiovascular:  Positive for dyspnea on exertion. Negative for chest pain, claudication, cyanosis, irregular heartbeat, leg swelling, near-syncope, orthopnea, palpitations, paroxysmal nocturnal dyspnea and syncope.   Respiratory:  Negative for cough, hemoptysis, shortness of breath and wheezing.    Gastrointestinal:  Negative for bloating, abdominal pain, constipation, diarrhea, melena, nausea and vomiting.   Neurological:  Negative for dizziness and weakness.   Objective:     Vital Signs (Most Recent):  Temp: 96.9 °F (36.1 °C) (07/25/22 0743)  Pulse: 81 (07/25/22 0743)  Resp: 15 (07/25/22 0743)  BP: 112/83 (07/25/22 0743)  SpO2: 96 % (07/25/22 0743) Vital Signs (24h Range):  Temp:  [96 °F (35.6 °C)-97.5 °F (36.4 °C)] 96.9 °F (36.1 °C)  Pulse:  [76-93] 81  Resp:  [] 15  SpO2:  [94 %-96 %] 96 %  BP: (103-122)/(68-83) 112/83     Weight: 57.2 kg (126 lb)  Body mass index is 24.61 kg/m².     SpO2: 96 %  O2 Device (Oxygen Therapy): room air      Intake/Output Summary (Last 24 hours) at 7/25/2022 0953  Last data filed at 7/24/2022 1714  Gross per 24 hour   Intake 600 ml   Output 1600 ml   Net -1000 ml       Lines/Drains/Airways       Peripheral Intravenous  Line  Duration                  Peripheral IV - Single Lumen 07/22/22 0215 20 G Right Antecubital 3 days                    Physical Exam  Constitutional:       General: She is not in acute distress.     Appearance: She is well-developed.   Cardiovascular:      Rate and Rhythm: Normal rate and regular rhythm.      Heart sounds: No murmur heard.    No gallop.   Pulmonary:      Effort: Pulmonary effort is normal. No respiratory distress.      Breath sounds: Normal breath sounds. No wheezing.   Abdominal:      General: Bowel sounds are normal. There is no distension.      Palpations: Abdomen is soft.      Tenderness: There is no abdominal tenderness.   Skin:     General: Skin is warm and dry.   Neurological:      Mental Status: She is alert and oriented to person, place, and time.       Significant Labs: BMP:   Recent Labs   Lab 07/24/22  0453 07/25/22  0701   * 84    139   K 3.4* 3.9   CL 95 97   CO2 34* 31*   BUN 17 21*   CREATININE 1.2 1.0   CALCIUM 9.0 9.3   MG 2.1 2.4   , CBC   Recent Labs   Lab 07/24/22  0453   WBC 7.83   HGB 12.9   HCT 41.7          Significant Imaging: Echocardiogram: Transthoracic echo (TTE) complete (Cupid Only):   Results for orders placed or performed during the hospital encounter of 07/21/22   Echo   Result Value Ref Range    BSA 1.56 m2    LA WIDTH 4.59 cm    PV PEAK VELOCITY 1.25 cm/s    LVIDd 5.22 3.5 - 6.0 cm    IVS 1.32 (A) 0.6 - 1.1 cm    Posterior Wall 1.23 (A) 0.6 - 1.1 cm    Ao root annulus 2.52 cm    LVIDs 4.80 (A) 2.1 - 4.0 cm    FS 8 28 - 44 %    LV mass 272.55 g    LA size 4.24 cm    RVDD 4.10 cm    Left Ventricle Relative Wall Thickness 0.47 cm    AV mean gradient 3 mmHg    AV valve area 2.32 cm2    AV Velocity Ratio 0.75     AV index (prosthetic) 0.74     MV mean gradient 1 mmHg    MV valve area p 1/2 method 7.47 cm2    MV valve area by continuity eq 1.95 cm2    E/A ratio 2.73     E wave deceleration time 85.51 msec    LVOT diameter 2.00 cm    LVOT area  3.1 cm2    LVOT peak curt 0.97 m/s    LVOT peak VTI 13.45 cm    Ao peak curt 1.29 m/s    Ao VTI 18.21 cm    Mr max curt 0.06 m/s    LVOT stroke volume 42.23 cm3    AV peak gradient 7 mmHg    MV peak gradient 7 mmHg    MV Peak E Curt 1.31 m/s    TR Max Curt 2.49 m/s    MV VTI 21.66 cm    MV stenosis pressure 1/2 time 29.44 ms    MV Peak A Curt 0.48 m/s    LV Systolic Volume 107.66 mL    LV Systolic Volume Index 70.4 mL/m2    LV Diastolic Volume 130.52 mL    LV Diastolic Volume Index 85.31 mL/m2    LV Mass Index 178 g/m2    RA Major Axis 4.59 cm    Left Atrium Major Axis 6.65 cm    Triscuspid Valve Regurgitation Peak Gradient 25 mmHg    LA Volume Index (Mod) 53.8 mL/m2    LA volume (mod) 82.28 cm3    RA Width 3.48 cm    Right Atrial Pressure (from IVC) 8 mmHg    EF 20 %    TV rest pulmonary artery pressure 33 mmHg    Narrative    · The left ventricle is mildly enlarged with concentric hypertrophy and   severely decreased systolic function.  · The estimated ejection fraction is 20%.  · There is severe left ventricular global hypokinesis.  · Grade III left ventricular diastolic dysfunction.  · Mild right ventricular enlargement with mildly reduced right ventricular   systolic function.  · Severe left atrial enlargement.  · Moderate aortic regurgitation.  · Severe mitral regurgitation.  · Mild tricuspid regurgitation.  · Moderate pulmonic regurgitation.  · Intermediate central venous pressure (8 mmHg).  · The estimated PA systolic pressure is 33 mmHg.  · Trivial pericardial effusion.        Assessment and Plan:     Brief HPI: Seen this morning on AM rounds with Dr. Wagner while resting in bed. Denies any chest pain or SOB. Discussed POC as detailed below- not certain full grasping of POC despite repeated attempts.      Acute on chronic combined systolic and diastolic heart failure  - admitted with ProBNP 30126; CXR  suggestive of CHF  - TTE this admission with EF 20%  - aggressively diuresed with IV Lasix; transitioned to oral  Lasix with 1.6L yesterday; negative 7.6L since admission  - appears better compensated; continue current regimen; decompensation felt to be related to combination of medication non compliance and recreational drug use  - needs follow up as an outpatient   - Ischemic work-up as OP    Elevated troponin  - troponin 0.079 with repeat 0.080  - aggressively diuresed with IV Lasix  - elevation likely related to demand etiology     Hypokalemia  - K+ 3.4  - replace with KDur for goal K+ >4.0    Essential hypertension  - SBP 100s-120s overnight   - continue Lisinopril, Toprol XL  - Consider DC amlodipine       VTE Risk Mitigation (From admission, onward)         Ordered     enoxaparin injection 40 mg  Daily         07/22/22 0527     IP VTE HIGH RISK PATIENT  Once         07/22/22 0527     Place sequential compression device  Until discontinued         07/22/22 0527                MICHAEL Chowdhury, ANP  Cardiology  [facility] - Telemetry    I have seen the patient with Nurse Practitioner Barbara Waller. I have personally interviewed and examined the patient at bedside and agree with her assessment and plan as written above in the note.

## 2022-07-25 NOTE — PLAN OF CARE
SW met with pt at bedside. Pt was visibly sleep. SW wrote contact information for any future questions or concerns.  NGUYỄN will continue to follow pt throughout her transitions of care and assist with any dc needs.        07/25/22 1244   Post-Acute Status   Post-Acute Authorization Other

## 2022-07-25 NOTE — PT/OT/SLP EVAL
"Physical Therapy Evaluation    Patient Name:  Stephanie T Barthelemy   MRN:  [id number]    Recommendations:     Discharge Recommendations:  other (see comments) (TBD - likely 24/7 supervision due to impaired cognition)   Discharge Equipment Recommendations: shower chair (AD for gait TBD pending progress)   Barriers to discharge: impaired cognition, fall risk    Assessment:     Stephanie T Barthelemy is a 51 y.o. female admitted with a medical diagnosis of Acute on chronic combined systolic and diastolic heart failure.  She presents with the following impairments/functional limitations:  weakness, gait instability, impaired balance, impaired endurance, impaired self care skills, impaired functional mobility, impaired cognition, decreased coordination, edema, decreased safety awareness . Pt ambulated ~220 ft with no AD and with CGA-Jesus Alberto 2/2 impaired balance. No overt LOB noted. Pt is limited by her impaired cognition, increased confusion, and impaired insight into current situation. Pt stating she is "just trying to figure things out" and "nothing like this has ever happened before." D/c recs TBD pending progress - pt will likely require 24/7 supervision/assist upon d/c home.     Rehab Prognosis: Good; patient would benefit from acute skilled PT services to address these deficits and reach maximum level of function.    Recent Surgery: * No surgery found *      Plan:     During this hospitalization, patient to be seen 3 x/week to address the identified rehab impairments via gait training, therapeutic activities, therapeutic exercises, neuromuscular re-education and progress toward the following goals:    · Plan of Care Expires:  08/25/22    Subjective     Chief Complaint: Confusion   Patient/Family Comments/goals: "I'm going a little crazy trying to figure everything out." Perseverating saying " nothing like this has ever happened before"   Pain/Comfort:  · Pain Rating 1: 0/10    Patients cultural, spiritual, Buddhism " conflicts given the current situation: no    Living Environment:  Pt lives with her father and sister in [facility], no steps to enter, t/s combo  Prior to admission, patients level of function was Independent with no AD needed, does not drive, does not work.  Equipment used at home: none.  DME owned (not currently used): none.  Upon discharge, patient will have assistance from father.    Objective:     Communicated with nsg prior to session.  Patient found HOB elevated with peripheral IV, telemetry  upon PT entry to room.    General Precautions: Standard, fall   Orthopedic Precautions:N/A   Braces: N/A  Respiratory Status: Room air    Exams:  · Cognitive Exam:  Patient with poor command following, increased anxiety, decreased safety awareness, and impulsive   · Postural Exam:  Patient presented with the following abnormalities:    · -       Rounded shoulders  · Sensation: denies numbness/tingling   · Skin Integrity/Edema:      · -       Skin integrity: Visible skin intact  · -       Edema: Mild BLE  · B LE ROM/MMT WFL based on observation of functional mobility    Functional Mobility:  · Bed Mobility:     · Scooting: contact guard assistance  · Supine to Sit: contact guard assistance  · Sit to Supine: contact guard assistance  · Transfers:     · Sit to Stand:  contact guard assistance and minimum assistance with no AD  · Toilet Transfer: minimum assistance with  no AD  using  Step Transfer  · Gait: Pt ambulated ~220 ft with no AD and with CGA-Jesus Alberto 2/2 impaired balance. No overt LOB noted. Pt with increased erica/speed and decreased safety with occasional trunk away and minor LOB needing Jesus Alberto to correct; VC's to decrease speed     Therapeutic Activities and Exercises:   Pt educated on role of PT/POC and overall safety.  Pt ambulated to restroom for toilet t/f. OT assisting with toilet t/f. G&H performed at sink with OT  Pt ambulated in garcia as reported above.  Pt with increased confusion throughout session.  SpO2 >96%  throughout session.  Pt returned supine.     AM-PAC 6 CLICK MOBILITY  Total Score:18     Patient left HOB elevated with all lines intact, call button in reach, bed alarm on and nsg notified.    GOALS:   Multidisciplinary Problems     Physical Therapy Goals        Problem: Physical Therapy    Goal Priority Disciplines Outcome Goal Variances Interventions   Physical Therapy Goal     PT, PT/OT Ongoing, Progressing     Description: Goals to be met by: 22     Patient will increase functional independence with mobility by performin. Bed to chair transfer with Modified [place] with or without an AD.  2. Gait  x 350 feet with Supervision with or without an AD.                     History:     Past Medical History:   Diagnosis Date    ADHD (attention deficit hyperactivity disorder)     Anemia     Anxiety     Bipolar disorder     History of hepatitis C - s/p clearance of virus (HCV neg 2015) 2014    History of psychiatric hospitalization     History of substance abuse     IV heroin - last use  per pt    Hx of psychiatric care     Hypertension     Opioid overdose 5/10/2016    Psychiatric problem     Psychosis     Seizure disorder 4/3/2019    Sleep difficulties     Substance abuse     Therapy     Vasculitis        Past Surgical History:   Procedure Laterality Date    HYSTERECTOMY  3/16/2011    SALPINGOOPHORECTOMY Right 3/16/2011    TUBAL LIGATION      Vaginal cuff repair  2011       Time Tracking:     PT Received On: 22  PT Start Time: 1124     PT Stop Time: 1149  PT Total Time (min): 25 min     Billable Minutes: Evaluation 10 and Gait Training 10 (cotx with OT)      2022

## 2022-07-25 NOTE — PROGRESS NOTES
Attempted to follow up with patient on education for fluid and salt restricted diet. Pt in deep sleep at visit. Will continue to follow up.

## 2022-07-25 NOTE — PLAN OF CARE
"  Problem: Physical Therapy  Goal: Physical Therapy Goal  Description: Goals to be met by: 22     Patient will increase functional independence with mobility by performin. Bed to chair transfer with Modified [place] with or without an AD.  2. Gait  x 350 feet with Supervision with or without an AD.    Outcome: Ongoing, Progressing     PT Eval completed, note to follow. Pt ambulated ~220 ft with no AD and with CGA-Jesus Alberto 2/2 impaired balance. No overt LOB noted. Pt is limited by her impaired cognition, increased confusion, and impaired insight into current situation. Pt stating she is "just trying to figure things out" and "nothing like this has ever happened before." D/c recs TBD pending progress - pt will likely require 24/7 supervision/assist upon d/c home.   "

## 2022-07-25 NOTE — SUBJECTIVE & OBJECTIVE
Interval History:  No acute events, she denies SOB and swelling is better. Reports feeling anxious, sleepy and tired. She has not gotten up out of bed. She was more alert in the morning, but now very sleepy.    Review of Systems   Constitutional:  Negative for chills and fever.   Respiratory:  Negative for shortness of breath.    Cardiovascular:  Negative for chest pain and leg swelling.   Neurological:  Positive for weakness.   Objective:     Vital Signs (Most Recent):  Temp: 96.2 °F (35.7 °C) (07/25/22 1201)  Pulse: 86 (07/25/22 1201)  Resp: 14 (07/25/22 1201)  BP: 108/76 (07/25/22 1201)  SpO2: 99 % (07/25/22 1201) Vital Signs (24h Range):  Temp:  [96 °F (35.6 °C)-96.9 °F (36.1 °C)] 96.2 °F (35.7 °C)  Pulse:  [76-89] 86  Resp:  [] 14  SpO2:  [94 %-99 %] 99 %  BP: (103-122)/(68-83) 108/76     Weight: 57.2 kg (126 lb)  Body mass index is 24.61 kg/m².    Intake/Output Summary (Last 24 hours) at 7/25/2022 1454  Last data filed at 7/24/2022 1714  Gross per 24 hour   Intake 360 ml   Output 600 ml   Net -240 ml        Physical Exam  Vitals and nursing note reviewed.   Constitutional:       Appearance: She is ill-appearing. She is not toxic-appearing.      Comments: Appears older than stated age    HENT:      Head: Normocephalic and atraumatic.      Nose: Nose normal.   Eyes:      Extraocular Movements: Extraocular movements intact.      Conjunctiva/sclera: Conjunctivae normal.   Cardiovascular:      Rate and Rhythm: Normal rate and regular rhythm.      Pulses: Normal pulses.      Heart sounds: Murmur heard.   Pulmonary:      Effort: Pulmonary effort is normal. No respiratory distress.      Comments: Decreased breath sounds at bases now resolved  Abdominal:      General: Bowel sounds are normal. There is no distension.      Palpations: Abdomen is soft.      Tenderness: There is no abdominal tenderness. There is no guarding or rebound.   Musculoskeletal:         General: Normal range of motion.      Cervical back:  Normal range of motion.      Comments: Trace edema to lower extremities   Skin:     General: Skin is warm and dry.      Capillary Refill: Capillary refill takes less than 2 seconds.   Neurological:      Mental Status: She is alert.      Motor: Weakness present.      Comments: Awake and alert, oriented x 3, able to maintain attention and answer simple questions and follow simple commands this morning, and now more lethargic by the afternoon   Psychiatric:         Attention and Perception: Attention normal.         Mood and Affect: Affect is labile.         Speech: Speech is delayed.         Behavior: Behavior is slowed.       Significant Labs: All pertinent labs within the past 24 hours have been reviewed.    Significant Imaging: I have reviewed all pertinent imaging results/findings within the past 24 hours.

## 2022-07-25 NOTE — ASSESSMENT & PLAN NOTE
- H/o meth, cocaine, marijuana, heroin with IV drug use and Hep C  - Patient reports recent meth use just prior to hospitalization  - Will further discuss substance abuse more detail after patient is able to maintain attention for longer periods of time and acute issues addressed  - Psych consulted

## 2022-07-25 NOTE — ASSESSMENT & PLAN NOTE
- admitted with ProBNP 90875; CXR  suggestive of CHF  - TTE this admission with EF 20%  - aggressively diuresed with IV Lasix; transitioned to oral Lasix with 1.6L yesterday; negative 7.6L since admission  - appears better compensated; continue current regimen; decompensation felt to be related to combination of medication non compliance and recreational drug use  - needs follow up as an outpatient

## 2022-07-25 NOTE — ASSESSMENT & PLAN NOTE
- Patient with known uncontrolled hypertension with noncompliance with medications and polysubstance abuse  - New onset CHF with BNP and physical exam consistent with volume overload  - 2D echo showed EF of 20% with global hypokinesis, and grade 3 diastolic dysfunction  - Diuresis was progress with IV Lasix 40 mg IV q.12  - Monitoring with strict I&Os and daily weights  - Cardiology following  - Currently-5.4 L does far, but last 24 hours of I/Os not recorded  - Deescalated diuresis to 40 mg IV daily on 7/23/2022  - Transition to PO lasix 40 mg daily on 7/24/2022  - Restaredt lisinopril and added metoprolol, and decrease amlodipine dose on 7/24/2022  - Blood pressure well controlled and she is euvolumic  - Cardiology recommends outpatient ischemic evaluation

## 2022-07-25 NOTE — SUBJECTIVE & OBJECTIVE
Review of Systems   Constitutional: Negative for chills, decreased appetite, diaphoresis and fever.   Cardiovascular:  Positive for dyspnea on exertion. Negative for chest pain, claudication, cyanosis, irregular heartbeat, leg swelling, near-syncope, orthopnea, palpitations, paroxysmal nocturnal dyspnea and syncope.   Respiratory:  Negative for cough, hemoptysis, shortness of breath and wheezing.    Gastrointestinal:  Negative for bloating, abdominal pain, constipation, diarrhea, melena, nausea and vomiting.   Neurological:  Negative for dizziness and weakness.   Objective:     Vital Signs (Most Recent):  Temp: 96.2 °F (35.7 °C) (07/25/22 1201)  Pulse: 86 (07/25/22 1201)  Resp: 14 (07/25/22 1201)  BP: 108/76 (07/25/22 1201)  SpO2: 99 % (07/25/22 1201) Vital Signs (24h Range):  Temp:  [96 °F (35.6 °C)-96.9 °F (36.1 °C)] 96.2 °F (35.7 °C)  Pulse:  [76-89] 86  Resp:  [] 14  SpO2:  [94 %-99 %] 99 %  BP: (103-122)/(68-83) 108/76     Weight: 57.2 kg (126 lb)  Body mass index is 24.61 kg/m².     SpO2: 99 %  O2 Device (Oxygen Therapy): room air      Intake/Output Summary (Last 24 hours) at 7/25/2022 1356  Last data filed at 7/24/2022 1714  Gross per 24 hour   Intake 360 ml   Output 600 ml   Net -240 ml       Lines/Drains/Airways       Peripheral Intravenous Line  Duration                  Peripheral IV - Single Lumen 07/22/22 0215 20 G Right Antecubital 3 days                    Physical Exam  Constitutional:       General: She is not in acute distress.     Appearance: She is well-developed.   Cardiovascular:      Rate and Rhythm: Normal rate and regular rhythm.      Heart sounds: No murmur heard.    No gallop.   Pulmonary:      Effort: Pulmonary effort is normal. No respiratory distress.      Breath sounds: Normal breath sounds. No wheezing.   Abdominal:      General: Bowel sounds are normal. There is no distension.      Palpations: Abdomen is soft.      Tenderness: There is no abdominal tenderness.   Skin:      General: Skin is warm and dry.   Neurological:      Mental Status: She is alert and oriented to person, place, and time.       Significant Labs: BMP:   Recent Labs   Lab 07/24/22  0453 07/25/22  0701   * 84    139   K 3.4* 3.9   CL 95 97   CO2 34* 31*   BUN 17 21*   CREATININE 1.2 1.0   CALCIUM 9.0 9.3   MG 2.1 2.4   , CBC   Recent Labs   Lab 07/24/22  0453   WBC 7.83   HGB 12.9   HCT 41.7          Significant Imaging: Echocardiogram: Transthoracic echo (TTE) complete (Cupid Only):   Results for orders placed or performed during the hospital encounter of 07/21/22   Echo   Result Value Ref Range    BSA 1.56 m2    LA WIDTH 4.59 cm    PV PEAK VELOCITY 1.25 cm/s    LVIDd 5.22 3.5 - 6.0 cm    IVS 1.32 (A) 0.6 - 1.1 cm    Posterior Wall 1.23 (A) 0.6 - 1.1 cm    Ao root annulus 2.52 cm    LVIDs 4.80 (A) 2.1 - 4.0 cm    FS 8 28 - 44 %    LV mass 272.55 g    LA size 4.24 cm    RVDD 4.10 cm    Left Ventricle Relative Wall Thickness 0.47 cm    AV mean gradient 3 mmHg    AV valve area 2.32 cm2    AV Velocity Ratio 0.75     AV index (prosthetic) 0.74     MV mean gradient 1 mmHg    MV valve area p 1/2 method 7.47 cm2    MV valve area by continuity eq 1.95 cm2    E/A ratio 2.73     E wave deceleration time 85.51 msec    LVOT diameter 2.00 cm    LVOT area 3.1 cm2    LVOT peak curt 0.97 m/s    LVOT peak VTI 13.45 cm    Ao peak curt 1.29 m/s    Ao VTI 18.21 cm    Mr max curt 0.06 m/s    LVOT stroke volume 42.23 cm3    AV peak gradient 7 mmHg    MV peak gradient 7 mmHg    MV Peak E Curt 1.31 m/s    TR Max Curt 2.49 m/s    MV VTI 21.66 cm    MV stenosis pressure 1/2 time 29.44 ms    MV Peak A Curt 0.48 m/s    LV Systolic Volume 107.66 mL    LV Systolic Volume Index 70.4 mL/m2    LV Diastolic Volume 130.52 mL    LV Diastolic Volume Index 85.31 mL/m2    LV Mass Index 178 g/m2    RA Major Axis 4.59 cm    Left Atrium Major Axis 6.65 cm    Triscuspid Valve Regurgitation Peak Gradient 25 mmHg    LA Volume Index (Mod) 53.8 mL/m2     LA volume (mod) 82.28 cm3    RA Width 3.48 cm    Right Atrial Pressure (from IVC) 8 mmHg    EF 20 %    TV rest pulmonary artery pressure 33 mmHg    Narrative    · The left ventricle is mildly enlarged with concentric hypertrophy and   severely decreased systolic function.  · The estimated ejection fraction is 20%.  · There is severe left ventricular global hypokinesis.  · Grade III left ventricular diastolic dysfunction.  · Mild right ventricular enlargement with mildly reduced right ventricular   systolic function.  · Severe left atrial enlargement.  · Moderate aortic regurgitation.  · Severe mitral regurgitation.  · Mild tricuspid regurgitation.  · Moderate pulmonic regurgitation.  · Intermediate central venous pressure (8 mmHg).  · The estimated PA systolic pressure is 33 mmHg.  · Trivial pericardial effusion.

## 2022-07-25 NOTE — SUBJECTIVE & OBJECTIVE
Review of Systems   Constitutional: Negative for chills, decreased appetite, diaphoresis and fever.   Cardiovascular:  Positive for dyspnea on exertion. Negative for chest pain, claudication, cyanosis, irregular heartbeat, leg swelling, near-syncope, orthopnea, palpitations, paroxysmal nocturnal dyspnea and syncope.   Respiratory:  Negative for cough, hemoptysis, shortness of breath and wheezing.    Gastrointestinal:  Negative for bloating, abdominal pain, constipation, diarrhea, melena, nausea and vomiting.   Neurological:  Negative for dizziness and weakness.   Objective:     Vital Signs (Most Recent):  Temp: 96.9 °F (36.1 °C) (07/25/22 0743)  Pulse: 81 (07/25/22 0743)  Resp: 15 (07/25/22 0743)  BP: 112/83 (07/25/22 0743)  SpO2: 96 % (07/25/22 0743) Vital Signs (24h Range):  Temp:  [96 °F (35.6 °C)-97.5 °F (36.4 °C)] 96.9 °F (36.1 °C)  Pulse:  [76-93] 81  Resp:  [] 15  SpO2:  [94 %-96 %] 96 %  BP: (103-122)/(68-83) 112/83     Weight: 57.2 kg (126 lb)  Body mass index is 24.61 kg/m².     SpO2: 96 %  O2 Device (Oxygen Therapy): room air      Intake/Output Summary (Last 24 hours) at 7/25/2022 0953  Last data filed at 7/24/2022 1714  Gross per 24 hour   Intake 600 ml   Output 1600 ml   Net -1000 ml       Lines/Drains/Airways       Peripheral Intravenous Line  Duration                  Peripheral IV - Single Lumen 07/22/22 0215 20 G Right Antecubital 3 days                    Physical Exam  Constitutional:       General: She is not in acute distress.     Appearance: She is well-developed.   Cardiovascular:      Rate and Rhythm: Normal rate and regular rhythm.      Heart sounds: No murmur heard.    No gallop.   Pulmonary:      Effort: Pulmonary effort is normal. No respiratory distress.      Breath sounds: Normal breath sounds. No wheezing.   Abdominal:      General: Bowel sounds are normal. There is no distension.      Palpations: Abdomen is soft.      Tenderness: There is no abdominal tenderness.   Skin:      General: Skin is warm and dry.   Neurological:      Mental Status: She is alert and oriented to person, place, and time.       Significant Labs: BMP:   Recent Labs   Lab 07/24/22  0453 07/25/22  0701   * 84    139   K 3.4* 3.9   CL 95 97   CO2 34* 31*   BUN 17 21*   CREATININE 1.2 1.0   CALCIUM 9.0 9.3   MG 2.1 2.4   , CBC   Recent Labs   Lab 07/24/22  0453   WBC 7.83   HGB 12.9   HCT 41.7          Significant Imaging: Echocardiogram: Transthoracic echo (TTE) complete (Cupid Only):   Results for orders placed or performed during the hospital encounter of 07/21/22   Echo   Result Value Ref Range    BSA 1.56 m2    LA WIDTH 4.59 cm    PV PEAK VELOCITY 1.25 cm/s    LVIDd 5.22 3.5 - 6.0 cm    IVS 1.32 (A) 0.6 - 1.1 cm    Posterior Wall 1.23 (A) 0.6 - 1.1 cm    Ao root annulus 2.52 cm    LVIDs 4.80 (A) 2.1 - 4.0 cm    FS 8 28 - 44 %    LV mass 272.55 g    LA size 4.24 cm    RVDD 4.10 cm    Left Ventricle Relative Wall Thickness 0.47 cm    AV mean gradient 3 mmHg    AV valve area 2.32 cm2    AV Velocity Ratio 0.75     AV index (prosthetic) 0.74     MV mean gradient 1 mmHg    MV valve area p 1/2 method 7.47 cm2    MV valve area by continuity eq 1.95 cm2    E/A ratio 2.73     E wave deceleration time 85.51 msec    LVOT diameter 2.00 cm    LVOT area 3.1 cm2    LVOT peak curt 0.97 m/s    LVOT peak VTI 13.45 cm    Ao peak curt 1.29 m/s    Ao VTI 18.21 cm    Mr max curt 0.06 m/s    LVOT stroke volume 42.23 cm3    AV peak gradient 7 mmHg    MV peak gradient 7 mmHg    MV Peak E Curt 1.31 m/s    TR Max Curt 2.49 m/s    MV VTI 21.66 cm    MV stenosis pressure 1/2 time 29.44 ms    MV Peak A Curt 0.48 m/s    LV Systolic Volume 107.66 mL    LV Systolic Volume Index 70.4 mL/m2    LV Diastolic Volume 130.52 mL    LV Diastolic Volume Index 85.31 mL/m2    LV Mass Index 178 g/m2    RA Major Axis 4.59 cm    Left Atrium Major Axis 6.65 cm    Triscuspid Valve Regurgitation Peak Gradient 25 mmHg    LA Volume Index (Mod) 53.8 mL/m2     LA volume (mod) 82.28 cm3    RA Width 3.48 cm    Right Atrial Pressure (from IVC) 8 mmHg    EF 20 %    TV rest pulmonary artery pressure 33 mmHg    Narrative    · The left ventricle is mildly enlarged with concentric hypertrophy and   severely decreased systolic function.  · The estimated ejection fraction is 20%.  · There is severe left ventricular global hypokinesis.  · Grade III left ventricular diastolic dysfunction.  · Mild right ventricular enlargement with mildly reduced right ventricular   systolic function.  · Severe left atrial enlargement.  · Moderate aortic regurgitation.  · Severe mitral regurgitation.  · Mild tricuspid regurgitation.  · Moderate pulmonic regurgitation.  · Intermediate central venous pressure (8 mmHg).  · The estimated PA systolic pressure is 33 mmHg.  · Trivial pericardial effusion.

## 2022-07-26 VITALS
HEART RATE: 84 BPM | HEIGHT: 60 IN | DIASTOLIC BLOOD PRESSURE: 85 MMHG | SYSTOLIC BLOOD PRESSURE: 121 MMHG | RESPIRATION RATE: 14 BRPM | OXYGEN SATURATION: 98 % | TEMPERATURE: 97 F | WEIGHT: 107.38 LBS | BODY MASS INDEX: 21.08 KG/M2

## 2022-07-26 LAB
ANION GAP SERPL CALC-SCNC: 11 MMOL/L (ref 8–16)
BUN SERPL-MCNC: 25 MG/DL (ref 6–20)
CALCIUM SERPL-MCNC: 9 MG/DL (ref 8.7–10.5)
CHLORIDE SERPL-SCNC: 100 MMOL/L (ref 95–110)
CO2 SERPL-SCNC: 28 MMOL/L (ref 23–29)
CREAT SERPL-MCNC: 1 MG/DL (ref 0.5–1.4)
EST. GFR  (AFRICAN AMERICAN): >60 ML/MIN/1.73 M^2
EST. GFR  (NON AFRICAN AMERICAN): >60 ML/MIN/1.73 M^2
GLUCOSE SERPL-MCNC: 87 MG/DL (ref 70–110)
MAGNESIUM SERPL-MCNC: 2.2 MG/DL (ref 1.6–2.6)
PHOSPHATE SERPL-MCNC: 3.4 MG/DL (ref 2.7–4.5)
POTASSIUM SERPL-SCNC: 3.8 MMOL/L (ref 3.5–5.1)
SODIUM SERPL-SCNC: 139 MMOL/L (ref 136–145)

## 2022-07-26 PROCEDURE — 80048 BASIC METABOLIC PNL TOTAL CA: CPT | Performed by: HOSPITALIST

## 2022-07-26 PROCEDURE — 94761 N-INVAS EAR/PLS OXIMETRY MLT: CPT

## 2022-07-26 PROCEDURE — 90833 PR PSYCHOTHERAPY W/PATIENT W/E&M, 30 MIN (ADD ON): ICD-10-PCS | Mod: ,,, | Performed by: PSYCHIATRY & NEUROLOGY

## 2022-07-26 PROCEDURE — 99900035 HC TECH TIME PER 15 MIN (STAT)

## 2022-07-26 PROCEDURE — 83735 ASSAY OF MAGNESIUM: CPT | Performed by: HOSPITALIST

## 2022-07-26 PROCEDURE — 25000003 PHARM REV CODE 250: Performed by: HOSPITALIST

## 2022-07-26 PROCEDURE — 84100 ASSAY OF PHOSPHORUS: CPT | Performed by: HOSPITALIST

## 2022-07-26 PROCEDURE — 99233 PR SUBSEQUENT HOSPITAL CARE,LEVL III: ICD-10-PCS | Mod: ,,, | Performed by: PSYCHIATRY & NEUROLOGY

## 2022-07-26 PROCEDURE — 90833 PSYTX W PT W E/M 30 MIN: CPT | Mod: ,,, | Performed by: PSYCHIATRY & NEUROLOGY

## 2022-07-26 PROCEDURE — 36415 COLL VENOUS BLD VENIPUNCTURE: CPT | Performed by: HOSPITALIST

## 2022-07-26 PROCEDURE — 99233 SBSQ HOSP IP/OBS HIGH 50: CPT | Mod: ,,, | Performed by: PSYCHIATRY & NEUROLOGY

## 2022-07-26 RX ORDER — HYDROXYZINE PAMOATE 25 MG/1
25 CAPSULE ORAL EVERY 8 HOURS PRN
Qty: 30 CAPSULE | Refills: 1 | Status: ON HOLD | OUTPATIENT
Start: 2022-07-26 | End: 2022-08-11 | Stop reason: HOSPADM

## 2022-07-26 RX ORDER — AMOXICILLIN AND CLAVULANATE POTASSIUM 875; 125 MG/1; MG/1
1 TABLET, FILM COATED ORAL EVERY 12 HOURS
Qty: 8 TABLET | Refills: 0 | Status: SHIPPED | OUTPATIENT
Start: 2022-07-26 | End: 2022-07-30

## 2022-07-26 RX ORDER — OXCARBAZEPINE 300 MG/1
300 TABLET, FILM COATED ORAL 2 TIMES DAILY
Qty: 60 TABLET | Refills: 11 | Status: ON HOLD | OUTPATIENT
Start: 2022-07-26 | End: 2022-12-14 | Stop reason: HOSPADM

## 2022-07-26 RX ORDER — FUROSEMIDE 40 MG/1
40 TABLET ORAL DAILY
Qty: 30 TABLET | Refills: 11 | Status: ON HOLD | OUTPATIENT
Start: 2022-07-26 | End: 2022-08-11 | Stop reason: HOSPADM

## 2022-07-26 RX ORDER — METOPROLOL SUCCINATE 25 MG/1
12.5 TABLET, EXTENDED RELEASE ORAL DAILY
Qty: 15 TABLET | Refills: 11 | Status: ON HOLD | OUTPATIENT
Start: 2022-07-26 | End: 2022-08-11 | Stop reason: HOSPADM

## 2022-07-26 RX ADMIN — MUPIROCIN: 20 OINTMENT TOPICAL at 09:07

## 2022-07-26 RX ADMIN — LISINOPRIL 10 MG: 5 TABLET ORAL at 09:07

## 2022-07-26 RX ADMIN — FUROSEMIDE 40 MG: 40 TABLET ORAL at 09:07

## 2022-07-26 RX ADMIN — OXCARBAZEPINE 300 MG: 150 TABLET, FILM COATED ORAL at 01:07

## 2022-07-26 RX ADMIN — AMOXICILLIN AND CLAVULANATE POTASSIUM 1 TABLET: 875; 125 TABLET, FILM COATED ORAL at 09:07

## 2022-07-26 RX ADMIN — OXCARBAZEPINE 300 MG: 150 TABLET, FILM COATED ORAL at 09:07

## 2022-07-26 RX ADMIN — METOPROLOL SUCCINATE 12.5 MG: 25 TABLET, EXTENDED RELEASE ORAL at 09:07

## 2022-07-26 NOTE — PLAN OF CARE
SW sent HH orders via [employer]. Pt will be contacted by HH agency to schedule first appointment time/date. SW spoke with pts sister, Betty [phone]. Pts sister reported that she will be at hospital between 11:30 am -12:00 pm. Pts sister declined shower chair. Pts sister reported that pt is residing at [address] [place] [address] [address] [address] [id number]. Patients sister completed patients choice form.     SW will continue to follow pt throughout her transitions of care and assist with any dc needs.     [employer] Alert: YES response from [facility] re: Referral [id number] for patient in [facility] [id number] A: Yes, willing to accept patient     Future Appointments   Date Time Provider Department Center   7/29/2022 10:00 AM Chad Guaman MD [facility]        07/26/22 0844   Final Note   Assessment Type Final Discharge Note   Anticipated Discharge Disposition [facility] Resources/Appts/Education Provided Appointments scheduled by Navigator/Coordinator   Post-Acute Status   Discharge Delays None known at this time

## 2022-07-26 NOTE — ASSESSMENT & PLAN NOTE
- Patient was unresponsive on assessment  - Discussed home meds with the patient, as discussed above  - Psych input- reviewed

## 2022-07-26 NOTE — PROGRESS NOTES
PSYCHIATRY INPATIENT PROGRESS NOTE  SUBSEQUENT HOSPITAL VISIT      7/26/2022 12:00 PM   Stephanie T Barthelemy   1970   [id number]           DATE OF ADMISSION: 7/21/2022  8:55 PM    SITE: [facility]    CURRENT LEGAL STATUS: Patient does not currently meet PEC criteria due to not currently being an imminent threat to self/others and not being gravely disabled 2/2 mental illness at this time.     HISTORY    Per Initial History from Primary Team:   Patient is a 50yo female with a pmh of drug abuse, tobacco use, psychiatric disorder, bipolar disorder, Hepatitis C, HTN, opioid overdose, seizure disorder. She presented with SOB x 2 days, worse with exertion. Also reports fatigue, orthopnea, and cough. Denies chest pain. No fevers. In the ED, she was found to have pulmonary edema and cardiomegaly on chest XRAY [place] BNP of 89453. Troponin 0.079. Chest CTA with no PE but with LLL infiltrate. She was given IV fluids, zosyn, lasix, ASA, and NGT paste. Urine was not measured due to patient urinating on floor.   Overview/Hospital Course from Primary Team:  Ms. Barthelemy presented with shortness of breath and confusion.  Admitted with acute respiratory failure due to volume overload secondary to new onset heart failure.  BNP greater than 4900 and exam with pulmonary edema and peripheral edema.  Long history of substance abuse, and altered mental status also due to likely substance abuse.  Workup with 2D echo done which showed EF of 20%, global hypokinesis and grade 3 diastolic dysfunction.  Cardiology consulted.  Diuresis done and patient euvolumic and changed to PO lasix. Psych consulted for substance abuse and fluctuating alertness.       Chief Complaint / Reason for Original Psychiatry Consult: Hx of Bipolar D/O and Substance Abuse        Subjective / Interval Psychiatric History Today (07/26/2022) (with psychiatric ROS below):    Stephanie T Barthelemy is a 51 y.o. female with a past medical history of heart  failure, HCV, seizure disorder, and HTN, and a past psychiatric history of Substance Induced Mood/Psychotic Disorder, Polysubstance Abuse (most notably methamphetamines), Bipolar Disorder, Anxiety, Depression, and ADHD, currently being treated by her inpatient primary treatment team for a principal problem of acute on chronic combined systolic and diastolic heart failure.  Psychiatry was originally consulted as noted above.  The patient was seen and examined again today.  The chart was reviewed again today.  On examination today, the patient was more alert and engaged.  She was up and walking around her room.  She was again oriented to person, place, city, state, month, year, and situation.  She was again CAM-ICU negative for delirium.  She endorses improvement in her mood, sleep, and anxiety when compared to yesterday (see detailed psych ROS below).  She endorses sleeping about 8 hours overnight and endorses a good appetite.  She denies AH, VH, TH, delusions, paranoia, or DIGFAST (frederic) s/s.  She denies any current/recent passive/active SI/HI.  Patient remains with poor insight into her methamphetamine use d/o.  She endorses doing with since restarting Trileptal yesterday (denies AEs).  She denies any current medical/physical complaints at this time.  NAD was observed during the examination.  Psychotherapy was again implemented as noted below with a focus on improving mood, anxiety, sleep, and polysubstance cessation / total sobriety.  See detailed psych ROS below.  See A/P below.          Psychiatric Review Of Systems - Currently, the patient is endorsing and/or denying the following:  (patient's endorsements are BOLDED below; if not BOLDED, then patient denied):     Endorses intermittent Symptoms of Depression: diminished mood, low motivation, loss of interest/anhedonia, irritability, diminished energy, change in sleep, change in appetite, diminished concentration or cognition or indecisiveness, PMA/R, excessive  guilt or hopelessness or worthlessness, suicidal ideations     Denies issues with Sleep: initiation, maintenance, early morning awakening with inability to return to sleep     Denies Suicidal/Homicidal ideations: active/passive ideations, organized plans, future intentions     Denies Symptoms of psychosis: hallucinations, delusions, disorganized thinking, disorganized behavior or abnormal motor behavior, or negative symptoms (diminshed emotional expression, avolition, anhedonia, alogia, asociality      Denies Symptoms of frederic or hypomania: elevated, expansive, or irritable mood with increased energy or activity; with inflated self-esteem or grandiosity, decreased need for sleep, increased rate of speech, FOI or racing thoughts, distractibility, increased goal directed activity or PMA, risky/disinhibited behavior     Endorses intermittent Symptoms of Anxiety: excessive anxiety/worry/fear, more days than not, about numerous issues, difficult to control, with restlessness, fatigue, poor concentration, irritability, muscle tension, sleep disturbance; causes functionally impairing distress      Denies Symptoms of Panic Disorder: recurrent panic attacks, precipitated or un-precipitated, source of worry and/or behavioral changes secondary; with or without agoraphobia     Denies Symptoms of PTSD: h/o trauma; re-experiencing/intrusive symptoms, avoidant behavior, negative alterations in cognition or mood, or hyperarousal symptoms; with or without dissociative symptoms      Denies Symptoms of OCD: obsessions or compulsions      Denies Symptoms of Eating Disorders: anorexia, bulimia or binging     Endorses Substance Use (methamphetamines): intoxication, withdrawal, tolerance, used in larger amounts or duration than intended, unsuccessful attempts to limit or quit, increased time engaging in or seeking out, cravings or strong desire to use, failure to fulfill obligations, negative consequences in  social/interpersonal/occupational,/recreational areas, use in dangerous situations, medical or psychological consequences        [facility] Toolkit ASQ Suicide Screening Tool:  1. In the past few weeks, have you wished you were dead? Denies   2. In the past few weeks, have you felt that you or your family would be better off if you were dead? Denies  3. In the past week, have you been having thoughts about killing yourself? Denies  4. Have you ever tried to kill yourself? Yes (remote hx)  5. Are you having thoughts of killing yourself right now? Denies      PSYCHOTHERAPY ADD-ON +[id number]   30 (16-37*) minutes     Time: 17 minutes  Participants: Met with patient     Therapeutic Intervention Type: behavior modifying psychotherapy, supportive psychotherapy  Why chosen therapy is appropriate versus another modality: relevant to diagnosis, patient responds to this modality, evidence based practice     Target symptoms: depression, anxiety, insomnia, and polysubstance abuse / dependence   Primary focus: improving mood, anxiety, sleep, and polysubstance cessation / total sobriety   Psychotherapeutic techniques: supportive and psychodynamic techniques; psycho-education; deep breathing exercises; CBT; problem solving techniques and managing life stressors     Outcome monitoring methods: self-report, observation     Patient's response to intervention:  The patient's response to intervention is accepting / limited / improving.       Progress toward goals:  The patient's progress toward goals is fair / limited / improving.            ROS:  General ROS: negative for - chills, fever or night sweats; positive for improving fatigue   Ophthalmic ROS: negative for - blurry vision, double vision or eye pain  ENT ROS: negative for - sinus pain, headaches, sore throat or visual changes  Allergy and Immunology ROS: negative for - hives, itchy/watery eyes or nasal congestion  Hematological and Lymphatic ROS: negative for - bleeding problems,  bruising, jaundice or pallor  Endocrine ROS: negative for - galactorrhea, hot flashes, mood swings, palpitations or temperature intolerance  Respiratory ROS: negative for - cough, hemoptysis, shortness of breath, tachypnea or wheezing  Cardiovascular ROS: negative for - chest pain, SOB, dyspnea on exertion, loss of consciousness, palpitations, or rapid heart rate  Gastrointestinal ROS: negative for - appetite loss, nausea, abdominal pain, blood in stools, change in bowel habits, constipation or diarrhea  Genito-Urinary ROS: negative for - incontinence, nocturia or pelvic pain  Musculoskeletal ROS: negative for - joint stiffness, joint swelling, joint pain or muscle pain   Neurological ROS: negative for - behavioral changes, confusion, dizziness, memory loss, numbness/tingling or seizures  Dermatological ROS: negative for dry skin, hair changes, pruritus or rash  Psychiatric ROS: see detailed psychiatric ROS above in subjective section       PAST MEDICAL & SURGICAL HISTORY   Past Medical History:   Diagnosis Date    ADHD (attention deficit hyperactivity disorder)     Anemia     Anxiety     Bipolar disorder     History of hepatitis C - s/p clearance of virus (HCV neg 6/2015) 9/26/2014    History of psychiatric hospitalization     History of substance abuse     IV heroin - last use 2013 per pt    Hx of psychiatric care     Hypertension     Opioid overdose 5/10/2016    Psychiatric problem     Psychosis     Seizure disorder 4/3/2019    Sleep difficulties     Substance abuse     Therapy     Vasculitis      Past Surgical History:   Procedure Laterality Date    HYSTERECTOMY  3/16/2011    SALPINGOOPHORECTOMY Right 3/16/2011    TUBAL LIGATION      Vaginal cuff repair  04/22/2011     NEUROLOGIC HISTORY  Seizures: yes   Head trauma: denies  CVA: denies     FAMILY HISTORY   Family History   Problem Relation Age of Onset    Diabetes Maternal Grandmother     Cancer Maternal Grandmother        ALLERGIES    Review of patient's allergies indicates:  No Known Allergies    CURRENT MEDICATION REGIMEN   Home Meds:   Prior to Admission medications    Medication Sig Start Date End Date Taking? Authorizing Provider   amoxicillin-clavulanate 875-125mg (AUGMENTIN) 875-125 mg per tablet Take 1 tablet by mouth every 12 (twelve) hours. for 4 days 7/26/22 7/30/22  Nelly Pandey MD   benztropine (COGENTIN) 1 MG tablet Take 1 tablet (1 mg total) by mouth 2 (two) times daily. 5/19/22 6/18/22  Ayad Lemons MD   furosemide (LASIX) 40 MG tablet Take 1 tablet (40 mg total) by mouth once daily. 7/26/22 7/26/23  Nelly Pandey MD   hydrOXYzine pamoate (VISTARIL) 25 MG Cap Take 1 capsule (25 mg total) by mouth every 8 (eight) hours as needed (insomnia, agitation). 7/26/22   Nelly Pandey MD   lisinopriL 10 MG tablet Take 1 tablet (10 mg total) by mouth once daily. 9/8/21 9/8/22  Petty Ibanez MD   metoprolol succinate (TOPROL-XL) 25 MG 24 hr tablet Take 0.5 tablets (12.5 mg total) by mouth once daily. 7/26/22 7/26/23  Nelly Pandey MD   OXcarbazepine (TRILEPTAL) 300 MG Tab Take 1 tablet (300 mg total) by mouth 2 (two) times daily. 7/26/22 7/26/23  Nelly Pandey MD   QUEtiapine (SEROQUEL) 100 MG Tab Take 1 tablet (100 mg total) by mouth every evening. 5/19/22 5/19/23  Ayad Lemons MD   risperiDONE (RISPERDAL) 1 MG tablet Take 1 mg by mouth 2 (two) times daily. 5/19/22   Historical Provider   amLODIPine (NORVASC) 5 MG tablet Take 1 tablet (5 mg total) by mouth once daily. 9/8/21 7/26/22  Petty Ibanez MD   FLUoxetine 20 MG capsule Take 1 capsule (20 mg total) by mouth once daily. 5/19/22 7/26/22  Ayad Lemons MD   gabapentin (NEURONTIN) 300 MG capsule Take 1 capsule (300 mg total) by mouth 3 (three) times daily. 5/19/22 7/26/22  Ayad Lemons MD   OXcarbazepine (TRILEPTAL) 600 MG Tab Take 2 tablets (1,200 mg total) by mouth every evening. 9/8/21 7/26/22  Petty  MD Shamar   propranoloL (INDERAL) 10 MG tablet Take 1 tablet (10 mg total) by mouth 3 (three) times daily. 5/19/22 7/26/22  Ayad Lemons MD       Scheduled Meds:    amoxicillin-clavulanate 875-125mg  1 tablet Oral Q12H    enoxaparin  40 mg Subcutaneous Daily    furosemide  40 mg Oral Daily    lisinopriL  10 mg Oral Daily    metoprolol succinate  12.5 mg Oral Daily    mupirocin   Nasal BID    OXcarbazepine  300 mg Oral BID      PRN Meds: acetaminophen, dextrose 10%, dextrose 10%, glucagon (human recombinant), glucose, glucose, hydrOXYzine pamoate, melatonin, naloxone, ondansetron, sodium chloride 0.9%, sodium chloride 0.9%   Psychotherapeutics (From admission, onward)            None          LABORATORY DATA   Recent Results (from the past 72 hour(s))   POCT glucose    Collection Time: 07/23/22  8:05 PM   Result Value Ref Range    POCT Glucose 121 (H) 70 - 110 mg/dL   Basic Metabolic Panel (BMP)    Collection Time: 07/24/22  4:53 AM   Result Value Ref Range    Sodium 140 136 - 145 mmol/L    Potassium 3.4 (L) 3.5 - 5.1 mmol/L    Chloride 95 95 - 110 mmol/L    CO2 34 (H) 23 - 29 mmol/L    Glucose 114 (H) 70 - 110 mg/dL    BUN 17 6 - 20 mg/dL    Creatinine 1.2 0.5 - 1.4 mg/dL    Calcium 9.0 8.7 - 10.5 mg/dL    Anion Gap 11 8 - 16 mmol/L    eGFR if African American >60 >60 mL/min/1.73 m^2    eGFR if non African American 53 (A) >60 mL/min/1.73 m^2   CBC with Automated Differential    Collection Time: 07/24/22  4:53 AM   Result Value Ref Range    WBC 7.83 3.90 - 12.70 K/uL    RBC 4.75 4.00 - 5.40 M/uL    Hemoglobin 12.9 12.0 - 16.0 g/dL    Hematocrit 41.7 37.0 - 48.5 %    MCV 88 82 - 98 fL    MCH 27.2 27.0 - 31.0 pg    MCHC 30.9 (L) 32.0 - 36.0 g/dL    RDW 14.9 (H) 11.5 - 14.5 %    Platelets 239 150 - 450 K/uL    MPV 11.4 9.2 - 12.9 fL    Immature Granulocytes 0.3 0.0 - 0.5 %    Gran # (ANC) 5.4 1.8 - 7.7 K/uL    Immature Grans (Abs) 0.02 0.00 - 0.04 K/uL    Lymph # 1.2 1.0 - 4.8 K/uL    Mono # 0.8 0.3 - 1.0 K/uL     Eos # 0.3 0.0 - 0.5 K/uL    Baso # 0.05 0.00 - 0.20 K/uL    nRBC 0 0 /100 WBC    Gran % 69.3 38.0 - 73.0 %    Lymph % 15.2 (L) 18.0 - 48.0 %    Mono % 10.3 4.0 - 15.0 %    Eosinophil % 4.3 0.0 - 8.0 %    Basophil % 0.6 0.0 - 1.9 %    Differential Method Automated    Magnesium    Collection Time: 07/24/22  4:53 AM   Result Value Ref Range    Magnesium 2.1 1.6 - 2.6 mg/dL   Magnesium    Collection Time: 07/25/22  7:01 AM   Result Value Ref Range    Magnesium 2.4 1.6 - 2.6 mg/dL   Basic Metabolic Panel    Collection Time: 07/25/22  7:01 AM   Result Value Ref Range    Sodium 139 136 - 145 mmol/L    Potassium 3.9 3.5 - 5.1 mmol/L    Chloride 97 95 - 110 mmol/L    CO2 31 (H) 23 - 29 mmol/L    Glucose 84 70 - 110 mg/dL    BUN 21 (H) 6 - 20 mg/dL    Creatinine 1.0 0.5 - 1.4 mg/dL    Calcium 9.3 8.7 - 10.5 mg/dL    Anion Gap 11 8 - 16 mmol/L    eGFR if African American >60 >60 mL/min/1.73 m^2    eGFR if non African American >60 >60 mL/min/1.73 m^2   Phosphorus    Collection Time: 07/25/22  7:01 AM   Result Value Ref Range    Phosphorus 3.4 2.7 - 4.5 mg/dL   Drug screen panel, in-house    Collection Time: 07/25/22  4:50 PM   Result Value Ref Range    Benzodiazepines Negative Negative    Methadone metabolites Negative Negative    Cocaine (Metab.) Negative Negative    Opiate Scrn, Ur Negative Negative    Barbiturate Screen, Ur Negative Negative    Amphetamine Screen, Ur Negative Negative    THC Negative Negative    Phencyclidine Negative Negative    Creatinine, Urine 43.0 15.0 - 325.0 mg/dL    Toxicology Information SEE COMMENT    Magnesium    Collection Time: 07/26/22  4:14 AM   Result Value Ref Range    Magnesium 2.2 1.6 - 2.6 mg/dL   Phosphorus    Collection Time: 07/26/22  4:14 AM   Result Value Ref Range    Phosphorus 3.4 2.7 - 4.5 mg/dL   Basic Metabolic Panel    Collection Time: 07/26/22  4:14 AM   Result Value Ref Range    Sodium 139 136 - 145 mmol/L    Potassium 3.8 3.5 - 5.1 mmol/L    Chloride 100 95 - 110 mmol/L  "   CO2 28 23 - 29 mmol/L    Glucose 87 70 - 110 mg/dL    BUN 25 (H) 6 - 20 mg/dL    Creatinine 1.0 0.5 - 1.4 mg/dL    Calcium 9.0 8.7 - 10.5 mg/dL    Anion Gap 11 8 - 16 mmol/L    eGFR if African American >60 >60 mL/min/1.73 m^2    eGFR if non African American >60 >60 mL/min/1.73 m^2      Lab Results   Component Value Date    VALPROATE <10.0 (L) 07/25/2019    CBMZ 2.1 (L) 04/15/2019         EXAMINATION    VITALS   Vitals:    07/26/22 0502 07/26/22 0734 07/26/22 0759 07/26/22 1127   BP: 104/74  121/85    Patient Position: Lying  Lying    Pulse: 79  84    Resp: 18  14    Temp: 96.1 °F (35.6 °C)  96.9 °F (36.1 °C)    TempSrc: Axillary  Axillary    SpO2: 98% 98% 97% 97%   Weight:       Height:          CONSTITUTIONAL  General Appearance: NAD, unremarkable, age appropriate, normal weight, standing next to bed, calm     MUSCULOSKELETAL  Muscle Strength and Tone: WNL   Abnormal Involuntary Movements: none observed  Gait and Station: WNL; non-ataxic      PSYCHIATRIC   Behavior/Cooperation:  friendly, cooperative, eye contact good   Speech:  normal tone, normal rate, normal pitch, normal volume, some abnormal speech 2/2 dentures   Language: grossly intact, able to name, able to repeat with spontaneous speech  Mood: "better"  Affect:  Full and Reactive   Associations: intact; no FÉLIX  Thought Process: Linear and Future-Oriented   Thought Content: denies SI, HI, AH, VH, TH, delusions, or paranoia (no RIS observed)  Sensorium:  Awake  Alert and Oriented: to person, place, situation, month of year, year  Memory: 3/3 immediate recall ; 1/3 recall at 5 minutes   Attention/concentration: Intact / Limited. Able to spell [id number] but NOT [id number].   Similarities: Attempted but unable to assess due to patient's lack of volition / participation   Abstract reasoning: Attempted but unable to assess due to patient's lack of volition / participation   Fund of Knowledge: Attempted but unable to assess due to patient's lack of volition / " participation   Insight: Intact  Judgment: Intact     CAM ICU Delirium Assessment - NEGATIVE     Is the patient aware of the biomedical complications associated with substance abuse and mental illness? yes           MEDICAL DECISION MAKING     ASSESSMENT         Bipolar Depression (improving)  Unspecified Anxiety Disorder (improving)  Unspecified Insomnia (improving)  Methamphetamine Use Disorder, Severe, Dependence   Polysubstance Abuse   (rule out SIMD)        RECOMMENDATIONS       - Patient does not currently meet PEC criteria due to not being an imminent threat to self/others and not being gravely disabled 2/2 mental illness.      - Continue Trileptal at 300 mg PO BID for Mood & Seizure Hx (discussed risks/benefits/alt vs no treatment with patient).     - Can use Vistaril 25 mg to 50 mg PO TID PRN for anxiety and/or insomnia (discussed risks/benefits/alt vs no treatment with patient).     - Psychotherapy was again performed with patient as noted above with a focus on improving mood, anxiety, sleep, and polysubstance cessation / total sobriety.      - Patient's most recent labs, imaging, and EKG were reviewed again today.       - Please have CM/SW assist patient with IOP vs outpatient mental health & addiction f/u for s/p discharge from this facility (patient denies interest in residential rehab options).     - Patient was instructed to call 911 and return to the nearest ED if she begins feeling suicidal, homicidal, or gravely disabled (for s/p this hospitalization).       - Thank you for this consult ; will continue to follow patient         Total time spent with patient and/or managing/coordinating patient's care today (excluding the time spent on psychotherapy): 40 minutes   Time spent on psychotherapy today (as noted above): 17 minutes   Total time for encounter today including psychotherapy: 57 minutes      More than 50% of the time was spent counseling/coordinating care.     Consulting clinician was  informed of the encounter and consult note.      STAFF:  Edis Hughes MD  [facility]  7/26/2022

## 2022-07-26 NOTE — MEDICAL/APP STUDENT
PSYCHIATRY INPATIENT PROGRESS NOTE  SUBSEQUENT HOSPITAL VISIT      7/26/2022 9:03 AM   Stephanie T Barthelemy   1970   [id number]           DATE OF ADMISSION: 7/21/2022  8:55 PM    SITE: [facility]    CURRENT LEGAL STATUS: Patient does not currently meet PEC/CEC criteria due to not currently being an imminent threat to self/others and not being gravely disabled 2/2 mental illness at this time.       HISTORY    Per Initial History from Primary Team:   Patient is a 52yo female with a pmh of drug abuse, tobacco use, psychiatric disorder, bipolar disorder, Hepatitis C, HTN, opioid overdose, seizure disorder. She presented with SOB x 2 days, worse with exertion. Also reports fatigue, orthopnea, and cough. Denies chest pain. No fevers. In the ED, she was found to have pulmonary edema and cardiomegaly on chest XRAY with a pro BNP of 55064. Troponin 0.079. Chest CTA with no PE but with LLL infiltrate. She was given IV fluids, zosyn, lasix, ASA, and NGT paste. Urine was not measured due to patient urinating on floor.   Overview/Hospital Course from Primary Team:  Ms. Barthelemy presented with shortness of breath and confusion.  Admitted with acute respiratory failure due to volume overload secondary to new onset heart failure.  BNP greater than 4900 and exam with pulmonary edema and peripheral edema.  Long history of substance abuse, and altered mental status also due to likely substance abuse.  Workup with 2D echo done which showed EF of 20%, global hypokinesis and grade 3 diastolic dysfunction. Diuresis in progress with improvement of volume status. She reportedly took meth few days ago. We discussed her extensive  home regimen of Congentin, Prozac, Neurontin, Trileptal, Seroquel, risperidone for which she takes intermittently given she does not like the way they make her feel.    Interval History from Primary Team on 07/24/2022:    Recommends home health due to:  Weakness/numbness causing balance and gait  disturbance due to Infection and Weakness/Debility making it taxing to leave home.  Requiring assistive device to leave home due to unsteady gait caused by  Infection and Weakness/Debility.   Home Health Aide:  Nursing Three times weekly, Physical Therapy Three times weekly, Occupational Therapy Three times weekly and Speech Language Pathology Three times weekly  Interval History from Cardiology on 7/25/2022:   Seen this morning on AM rounds with Dr. Wagner while resting in bed. Denies any chest pain or SOB. Discussed POC as detailed below- not certain full grasping of POC despite repeated attempts        Chief Complaint / Reason for Original Psychiatry Consult: Hx of Bipolar D/O and Substance Abuse     HPI (w/ psychiatric ROS):  Stephanie T Barthelemy is a 51 y.o. female with a past medical history of heart failure, HCV, seizure disorder, and HTN, and a past psychiatric history of Substance Induced Mood/Psychotic Disorder, Polysubstance Abuse (most notably methamphetamines), Bipolar Disorder, Anxiety, Depression, and ADHD, currently being treated by her inpatient primary treatment team for a principal problem of acute on chronic combined systolic and diastolic heart failure.  Psychiatry was originally consulted as noted above.  The patient was seen and examined.  The chart was reviewed.  On examination today, the patient was somnolent and minimally engaged, but she eventually answered some questions (often dismissive and unengaged).  She was oriented to person, place, city, state, month, year, and situation.  She was CAM-ICU negative for delirium.  She was vague and evasive when trying to discuss the timeline of symptomatology, but she did endorse symptoms of depression, anxiety, insomnia, and substance abuse as noted below in the detailed psych ROS.  She endorses poor sleep and OK appetite.  In addition to frequent methamphetamine abuse, she endorses intermittent cannabis abuse.  She denies AH, VH, TH, delusions,  "paranoia, or DIGFAST (frederic) s/s.  She denies any current/recent passive/active SI/HI.  She endorses discontinuing her most recent psychiatric medication regimen (from discharge from [facility] in May 2022) of Risperdal, Cogentin, Neurontin, Seroquel, Trileptal, and Prozac due to experiencing possible EPS / dystonia with Risperdal that wasn't improved with cogentin.  Patient has poor insight into her methamphetamine use d/o, and she perseverated on wanting to be put on Vyvanse or Adderall.  After discussing her prior regimens, it appears that a combination of Trileptal and Seroquel worked bed historically.  Regarding current medical/physical complaints, she endorses intermittent SOB and generalized fatigue / weakness.  Patient denies any other medical complaints at this time.  NAD was observed during the examination.  Psychotherapy was implemented as noted below with a focus on improving mood, anxiety, sleep, and polysubstance cessation / total sobriety.  See detailed psych ROS below.  See A/P below.         Interval History (7/26):   On examination today, patient was originally asleep and difficult to awaken. It is possible patient was trying to avoid having to speak with me, but nurse Stefany also endorses she sleeps all day and is confused when she wakes up.  The patient had spilled her entire breakfast on herself and had requested that I change her blanket and gown before she would speak with me. Patient would not answer questions regarding her mood and kept saying "I'm fine I dont know what's going on" and progressively got overwhelmed and agitated. When asked about her plans when she gets out of here and if she was considering an IOP per Dr. Hughes's recommendations she got agitated and angry and started saying words that did not make sense. Patient said she'd consider an outpatient psychiatrist The nurse said she got this agitated when she was with PT yesterday as well. Patient did not herself tell me she was anxious " today, but nurse endorses very high anxiety in general. Patient states she's been eating and sleeping well, even though she sleeps all day. Patient does not know who's house she is going to after she leaves the hospital. Was unable to do the cognitive portion of the MSE due to agitation and continuously asking me to leave. Patient denies SI/ HI, delusions, and VH/AH.      Collateral:  Per nurse, Stefany, patient's mom says she will not follow up with rehab, IOP, or out patient care and often leaves the house at all hours of the night.     ROS  General ROS: negative for - chills, fatigue, fever or night sweats  Ophthalmic ROS: negative for - blurry vision, double vision or eye pain  ENT ROS: negative for - sinus pain, headaches, sore throat or visual changes  Allergy and Immunology ROS: negative for - hives, itchy/watery eyes or nasal congestion  Hematological and Lymphatic ROS: negative for - bleeding problems, bruising, jaundice or pallor  Endocrine ROS: negative for - galactorrhea, hot flashes, mood swings, palpitations or temperature intolerance  Respiratory ROS: negative for - cough, hemoptysis, shortness of breath, tachypnea or wheezing  Cardiovascular ROS: negative for - chest pain, dyspnea on exertion, loss of consciousness, palpitations, rapid heart rate or shortness of breath  Gastrointestinal ROS: negative for - appetite loss, nausea, abdominal pain, blood in stools, change in bowel habits, constipation or diarrhea  Genito-Urinary ROS: negative for - incontinence, nocturia or pelvic pain  Musculoskeletal ROS: negative for - joint stiffness, joint swelling, joint pain or muscle pain   Neurological ROS: negative for - behavioral changes, confusion, dizziness, memory loss, numbness/tingling or seizures  Dermatological ROS: negative for dry skin, hair changes, pruritus or rash  Psychiatric ROS: see detailed psychiatric ROS above in history section       PAST MEDICAL & SURGICAL HISTORY   Past Medical History:    Diagnosis Date    ADHD (attention deficit hyperactivity disorder)     Anemia     Anxiety     Bipolar disorder     History of hepatitis C - s/p clearance of virus (HCV neg 6/2015) 9/26/2014    History of psychiatric hospitalization     History of substance abuse     IV heroin - last use 2013 per pt    Hx of psychiatric care     Hypertension     Opioid overdose 5/10/2016    Psychiatric problem     Psychosis     Seizure disorder 4/3/2019    Sleep difficulties     Substance abuse     Therapy     Vasculitis      Past Surgical History:   Procedure Laterality Date    HYSTERECTOMY  3/16/2011    SALPINGOOPHORECTOMY Right 3/16/2011    TUBAL LIGATION      Vaginal cuff repair  04/22/2011       FAMILY HISTORY   Family History   Problem Relation Age of Onset    Diabetes Maternal Grandmother     Cancer Maternal Grandmother        ALLERGIES   Review of patient's allergies indicates:  No Known Allergies    CURRENT MEDICATION REGIMEN   Home Meds:   Prior to Admission medications    Medication Sig Start Date End Date Taking? Authorizing Provider   amoxicillin-clavulanate 875-125mg (AUGMENTIN) 875-125 mg per tablet Take 1 tablet by mouth every 12 (twelve) hours. for 4 days 7/26/22 7/30/22  Nelly Pandey MD   benztropine (COGENTIN) 1 MG tablet Take 1 tablet (1 mg total) by mouth 2 (two) times daily. 5/19/22 6/18/22  Ayad Lemons MD   furosemide (LASIX) 40 MG tablet Take 1 tablet (40 mg total) by mouth once daily. 7/26/22 7/26/23  Nelly Pandey MD   hydrOXYzine pamoate (VISTARIL) 25 MG Cap Take 1 capsule (25 mg total) by mouth every 8 (eight) hours as needed (insomnia, agitation). 7/26/22   Nelly Pandey MD   lisinopriL 10 MG tablet Take 1 tablet (10 mg total) by mouth once daily. 9/8/21 9/8/22  Petty Ibanez MD   metoprolol succinate (TOPROL-XL) 25 MG 24 hr tablet Take 0.5 tablets (12.5 mg total) by mouth once daily. 7/26/22 7/26/23  Nelly Pandey MD    OXcarbazepine (TRILEPTAL) 300 MG Tab Take 1 tablet (300 mg total) by mouth 2 (two) times daily. 7/26/22 7/26/23  Nelly Pandey MD   QUEtiapine (SEROQUEL) 100 MG Tab Take 1 tablet (100 mg total) by mouth every evening. 5/19/22 5/19/23  Ayad Lemons MD   risperiDONE (RISPERDAL) 1 MG tablet Take 1 mg by mouth 2 (two) times daily. 5/19/22   Historical Provider   amLODIPine (NORVASC) 5 MG tablet Take 1 tablet (5 mg total) by mouth once daily. 9/8/21 7/26/22  Petty Ibanez MD   FLUoxetine 20 MG capsule Take 1 capsule (20 mg total) by mouth once daily. 5/19/22 7/26/22  Ayad Lemons MD   gabapentin (NEURONTIN) 300 MG capsule Take 1 capsule (300 mg total) by mouth 3 (three) times daily. 5/19/22 7/26/22  Ayad Lemons MD   OXcarbazepine (TRILEPTAL) 600 MG Tab Take 2 tablets (1,200 mg total) by mouth every evening. 9/8/21 7/26/22  Petty Ibanez MD   propranoloL (INDERAL) 10 MG tablet Take 1 tablet (10 mg total) by mouth 3 (three) times daily. 5/19/22 7/26/22  Ayad Lemons MD       OTC Meds: ***    Scheduled Meds:    amoxicillin-clavulanate 875-125mg  1 tablet Oral Q12H    enoxaparin  40 mg Subcutaneous Daily    furosemide  40 mg Oral Daily    lisinopriL  10 mg Oral Daily    metoprolol succinate  12.5 mg Oral Daily    mupirocin   Nasal BID    OXcarbazepine  300 mg Oral BID      PRN Meds: acetaminophen, dextrose 10%, dextrose 10%, glucagon (human recombinant), glucose, glucose, hydrOXYzine pamoate, melatonin, naloxone, ondansetron, sodium chloride 0.9%, sodium chloride 0.9%   Psychotherapeutics (From admission, onward)            None          LABORATORY DATA   Recent Results (from the past 72 hour(s))   POCT glucose    Collection Time: 07/23/22  8:05 PM   Result Value Ref Range    POCT Glucose 121 (H) 70 - 110 mg/dL   Basic Metabolic Panel (BMP)    Collection Time: 07/24/22  4:53 AM   Result Value Ref Range    Sodium 140 136 - 145 mmol/L    Potassium 3.4 (L) 3.5 - 5.1 mmol/L    Chloride 95  95 - 110 mmol/L    CO2 34 (H) 23 - 29 mmol/L    Glucose 114 (H) 70 - 110 mg/dL    BUN 17 6 - 20 mg/dL    Creatinine 1.2 0.5 - 1.4 mg/dL    Calcium 9.0 8.7 - 10.5 mg/dL    Anion Gap 11 8 - 16 mmol/L    eGFR if African American >60 >60 mL/min/1.73 m^2    eGFR if non African American 53 (A) >60 mL/min/1.73 m^2   CBC with Automated Differential    Collection Time: 07/24/22  4:53 AM   Result Value Ref Range    WBC 7.83 3.90 - 12.70 K/uL    RBC 4.75 4.00 - 5.40 M/uL    Hemoglobin 12.9 12.0 - 16.0 g/dL    Hematocrit 41.7 37.0 - 48.5 %    MCV 88 82 - 98 fL    MCH 27.2 27.0 - 31.0 pg    MCHC 30.9 (L) 32.0 - 36.0 g/dL    RDW 14.9 (H) 11.5 - 14.5 %    Platelets 239 150 - 450 K/uL    MPV 11.4 9.2 - 12.9 fL    Immature Granulocytes 0.3 0.0 - 0.5 %    Gran # (ANC) 5.4 1.8 - 7.7 K/uL    Immature Grans (Abs) 0.02 0.00 - 0.04 K/uL    Lymph # 1.2 1.0 - 4.8 K/uL    Mono # 0.8 0.3 - 1.0 K/uL    Eos # 0.3 0.0 - 0.5 K/uL    Baso # 0.05 0.00 - 0.20 K/uL    nRBC 0 0 /100 WBC    Gran % 69.3 38.0 - 73.0 %    Lymph % 15.2 (L) 18.0 - 48.0 %    Mono % 10.3 4.0 - 15.0 %    Eosinophil % 4.3 0.0 - 8.0 %    Basophil % 0.6 0.0 - 1.9 %    Differential Method Automated    Magnesium    Collection Time: 07/24/22  4:53 AM   Result Value Ref Range    Magnesium 2.1 1.6 - 2.6 mg/dL   Magnesium    Collection Time: 07/25/22  7:01 AM   Result Value Ref Range    Magnesium 2.4 1.6 - 2.6 mg/dL   Basic Metabolic Panel    Collection Time: 07/25/22  7:01 AM   Result Value Ref Range    Sodium 139 136 - 145 mmol/L    Potassium 3.9 3.5 - 5.1 mmol/L    Chloride 97 95 - 110 mmol/L    CO2 31 (H) 23 - 29 mmol/L    Glucose 84 70 - 110 mg/dL    BUN 21 (H) 6 - 20 mg/dL    Creatinine 1.0 0.5 - 1.4 mg/dL    Calcium 9.3 8.7 - 10.5 mg/dL    Anion Gap 11 8 - 16 mmol/L    eGFR if African American >60 >60 mL/min/1.73 m^2    eGFR if non African American >60 >60 mL/min/1.73 m^2   Phosphorus    Collection Time: 07/25/22  7:01 AM   Result Value Ref Range    Phosphorus 3.4 2.7 - 4.5  mg/dL   Drug screen panel, in-house    Collection Time: 07/25/22  4:50 PM   Result Value Ref Range    Benzodiazepines Negative Negative    Methadone metabolites Negative Negative    Cocaine (Metab.) Negative Negative    Opiate Scrn, Ur Negative Negative    Barbiturate Screen, Ur Negative Negative    Amphetamine Screen, Ur Negative Negative    THC Negative Negative    Phencyclidine Negative Negative    Creatinine, Urine 43.0 15.0 - 325.0 mg/dL    Toxicology Information SEE COMMENT    Magnesium    Collection Time: 07/26/22  4:14 AM   Result Value Ref Range    Magnesium 2.2 1.6 - 2.6 mg/dL   Phosphorus    Collection Time: 07/26/22  4:14 AM   Result Value Ref Range    Phosphorus 3.4 2.7 - 4.5 mg/dL   Basic Metabolic Panel    Collection Time: 07/26/22  4:14 AM   Result Value Ref Range    Sodium 139 136 - 145 mmol/L    Potassium 3.8 3.5 - 5.1 mmol/L    Chloride 100 95 - 110 mmol/L    CO2 28 23 - 29 mmol/L    Glucose 87 70 - 110 mg/dL    BUN 25 (H) 6 - 20 mg/dL    Creatinine 1.0 0.5 - 1.4 mg/dL    Calcium 9.0 8.7 - 10.5 mg/dL    Anion Gap 11 8 - 16 mmol/L    eGFR if African American >60 >60 mL/min/1.73 m^2    eGFR if non African American >60 >60 mL/min/1.73 m^2      Lab Results   Component Value Date    VALPROATE <10.0 (L) 07/25/2019    CBMZ 2.1 (L) 04/15/2019         EXAMINATION    VITALS   Vitals:    07/26/22 0445 07/26/22 0502 07/26/22 0734 07/26/22 0759   BP:  104/74  121/85   Patient Position:  Lying  Lying   Pulse:  79  84   Resp:  18  14   Temp:  96.1 °F (35.6 °C)  96.9 °F (36.1 °C)   TempSrc:  Axillary  Axillary   SpO2:  98% 98% 97%   Weight: 48.7 kg (107 lb 5.8 oz)      Height:            CONSTITUTIONAL  General Appearance: NAD, unremarkable, age appropriate, older than stated age, disheveled, lying in bed, thin & gaunt looking    MUSCULOSKELETAL  Muscle Strength and Tone: WNL    Abnormal Involuntary Movements: none observed   Gait and Station: not observed    PSYCHIATRIC   Behavior/Cooperation:  reluctant to  participate, uncooperative, hostile, psychomotor agitation, restless and fidgety , eye contact minimal  Speech:  normal tone, normal pitch, loud, pressured, spontaneous, rapid  Language: grossly intact with spontaneous speech  Mood: angry, anxious, irritable  Affect:  congruent with mood and appropriate to situation/content Grossly intact  Associations:  intact; no FÉLIX  Thought Process: racing,   Thought Content: normal, no suicidality, no homicidality, delusions, or paranoia  Sensorium:  Awake/Delirium/Somnolence  Alert and Oriented: to grossly intact  Memory: unable to obtain due to agitation   Attention/concentration: appropriate for age/education.  Similarities:unable to obtain due to agitation   Abstract reasoning:  unable to obtain due to agitation Intact  Insight:  Impaired   Judgment: Impaired     CAM ICU Delirium Assessment -NEGATIVE      MEDICAL DECISION MAKING    ASSESSMENT     Bipolar Depression   Unspecified Anxiety Disorder  Unspecified Insomnia  Methamphetamine Use Disorder, Severe, Dependence   Polysubstance Abuse   (rule out SIMD)     RECOMMENDATIONS       - patient does not currently meet PEC/CEC criteria due to not being an imminent threat to self/others and not being gravely disabled 2/2 mental illness.     - Begin / Restart Trileptal at 300 mg PO BID for Mood & Seizure Hx (discussed risks/benefits/alt vs no treatment with patient).     - Will hold off on restarting Seroquel at this time due to patient already appear notably somnolent (discussed risks/benefits/alt vs no treatment with patient).     - Can use Vistaril 25 mg to 50 mg PO TID PRN for anxiety and/or insomnia (discussed risks/benefits/alt vs no treatment with patient).        - Please have CM/SW assist patient with IOP vs outpatient mental health & addiction f/u for s/p discharge from this facility (patient denies interest in residential rehab options).     - Patient was instructed to call 911 and return to the nearest ED if she begins  feeling suicidal, homicidal, or gravely disabled (for s/p this hospitalization).       Time spent with patient and/or on unit managing/coordinating patient's care (excluding the time spent on psychotherapy): 35 minutes      More than 50% of the time was spent counseling/coordinating care      STAFF:  Linda Brady, MS3  [facility]  7/26/2022

## 2022-07-26 NOTE — PLAN OF CARE
Discharge orders noted. Additional clinical references attached. Patient's discharge instructions given by bedside RN and reviewed by this VN with patient's sister Betty via her cell phone. Education provided on home care instructions, new and previous medications, diagnosis, when to seek medical attention, and follow-up appointments. New medications delivered by pharmacy. Patient's sister verbalized understanding and teach back method was used. Patient's sister to provide ride/transportation home, and estimates about an hour or two to come  patient. All questions answered. Awaiting ride home. Floor nurse notified.

## 2022-07-26 NOTE — PLAN OF CARE
[place] - Telemetry      HOME HEALTH ORDERS  FACE TO FACE ENCOUNTER    Patient Name: Stephanie T Barthelemy  YOB: 1970    PCP: Elsie Horne DO   PCP Address: [address] [place] [address] / [place] [address]  PCP Phone Number: [phone]  PCP Fax: [phone]    Encounter Date: 7/21/22    Admit to Home Health    Diagnoses:  Active Hospital Problems    Diagnosis  POA    *Acute on chronic combined systolic and diastolic heart failure [I50.43]  Yes    Elevated troponin [R77.8]  Yes    Acute respiratory failure with hypoxia [J96.01]  Yes    Encephalopathy, metabolic [G93.41]  Yes    Bipolar 1 disorder, depressed [F31.9]  Yes    Substance abuse [F19.10]  Yes    Hypokalemia [E87.6]  Yes    Physical deconditioning [R53.81]  Yes    Pneumonia [J18.9]  Yes    Essential hypertension [I10]  Yes     Chronic      Resolved Hospital Problems   No resolved problems to display.       Follow Up Appointments:  No future appointments.    Allergies:Review of patient's allergies indicates:  No Known Allergies    Medications: Review discharge medications with patient and family and provide education.    Current Facility-Administered Medications   Medication Dose Route Frequency Provider Last Rate Last Admin    acetaminophen tablet 650 mg  650 mg Oral Q4H PRN Ольга Martin NP        amoxicillin-clavulanate 875-125mg per tablet 1 tablet  1 tablet Oral Q12H Almaz Lucio MD   1 tablet at 07/25/22 2049    dextrose 10% bolus 125 mL  12.5 g Intravenous PRN Almaz Lucio MD        dextrose 10% bolus 250 mL  25 g Intravenous PRN Almaz Lucio MD        enoxaparin injection 40 mg  40 mg Subcutaneous Daily Ольга Martin NP   40 mg at 07/25/22 1644    furosemide tablet 40 mg  40 mg Oral Daily Almaz Lucio MD   40 mg at 07/25/22 0844    glucagon (human recombinant) injection 1 mg  1 mg Intramuscular PRN Ольга Martin NP        glucose chewable tablet 16 g  16 g Oral PRN Ольга Martin  NP        glucose chewable tablet 24 g  24 g Oral PRN Ольга Martin NP        hydrOXYzine pamoate capsule 25 mg  25 mg Oral Q8H PRN Almaz Lucio MD        lisinopriL tablet 10 mg  10 mg Oral Daily Almaz Lucio MD   10 mg at 07/25/22 0844    melatonin tablet 6 mg  6 mg Oral Nightly PRN Ольга Martin NP        metoprolol succinate (TOPROL-XL) 24 hr split tablet 12.5 mg  12.5 mg Oral Daily Almaz Lucio MD   12.5 mg at 07/25/22 0844    mupirocin 2 % ointment   Nasal BID Almaz Lucio MD   Given at 07/25/22 2049    naloxone 0.4 mg/mL injection 0.02 mg  0.02 mg Intravenous PRN Ольга Martin NP   0.02 mg at 07/22/22 0600    ondansetron injection 4 mg  4 mg Intravenous Q8H PRN Ольга Martin NP        OXcarbazepine tablet 300 mg  300 mg Oral BID Almaz Lucio MD   300 mg at 07/26/22 0113    sodium chloride 0.9% flush 10 mL  10 mL Intravenous PRN Ольга Martin NP        sodium chloride 0.9% flush 3 mL  3 mL Intravenous Q12H PRN Ольга Martin NP         Current Discharge Medication List      START taking these medications    Details   amoxicillin-clavulanate 875-125mg (AUGMENTIN) 875-125 mg per tablet Take 1 tablet by mouth every 12 (twelve) hours. for 4 days  Qty: 8 tablet, Refills: 0      furosemide (LASIX) 40 MG tablet Take 1 tablet (40 mg total) by mouth once daily.  Qty: 30 tablet, Refills: 11      hydrOXYzine pamoate (VISTARIL) 25 MG Cap Take 1 capsule (25 mg total) by mouth every 8 (eight) hours as needed (insomnia, agitation).  Qty: 30 capsule, Refills: 1      metoprolol succinate (TOPROL-XL) 25 MG 24 hr tablet Take 0.5 tablets (12.5 mg total) by mouth once daily.  Qty: 15 tablet, Refills: 11    Comments: .         CONTINUE these medications which have CHANGED    Details   OXcarbazepine (TRILEPTAL) 300 MG Tab Take 1 tablet (300 mg total) by mouth 2 (two) times daily.  Qty: 60 tablet, Refills: 11         CONTINUE these medications which have NOT CHANGED     Details   benztropine (COGENTIN) 1 MG tablet Take 1 tablet (1 mg total) by mouth 2 (two) times daily.  Qty: 60 tablet, Refills: 0      lisinopriL 10 MG tablet Take 1 tablet (10 mg total) by mouth once daily.  Qty: 90 tablet, Refills: 2    Comments: .      QUEtiapine (SEROQUEL) 100 MG Tab Take 1 tablet (100 mg total) by mouth every evening.  Qty: 30 tablet, Refills: 11      risperiDONE (RISPERDAL) 1 MG tablet Take 1 mg by mouth 2 (two) times daily.      risperiDONE 90 mg sers Inject 90 mg into the skin every 28 days.  Qty: 1 each, Refills: 0         STOP taking these medications       amLODIPine (NORVASC) 5 MG tablet Comments:   Reason for Stopping:         FLUoxetine 20 MG capsule Comments:   Reason for Stopping:         gabapentin (NEURONTIN) 300 MG capsule Comments:   Reason for Stopping:         propranoloL (INDERAL) 10 MG tablet Comments:   Reason for Stopping:                 I have seen and examined this patient within the last 30 days. My clinical findings that support the need for the home health skilled services and home bound status are the following:no   Weakness/numbness causing balance and gait disturbance due to Infection and Weakness/Debility making it taxing to leave home.  Requiring assistive device to leave home due to unsteady gait caused by  Infection and Weakness/Debility.     Diet:   cardiac diet        Referrals/ Consults  Physical Therapy to evaluate and treat. Evaluate for home safety and equipment needs; Establish/upgrade home exercise program. Perform / instruct on therapeutic exercises, gait training, transfer training, and Range of Motion.  Occupational Therapy to evaluate and treat. Evaluate home environment for safety and equipment needs. Perform/Instruct on transfers, ADL training, ROM, and therapeutic exercises.  Speech Therapy  to evaluate and treat for  Cognition.    Activities:   activity as tolerated    Nursing:   Agency to admit patient within 24 hours of hospital discharge  unless specified on physician order or at patient request    SN to complete comprehensive assessment including routine vital signs. Instruct on disease process and s/s of complications to report to MD. Review/verify medication list sent home with the patient at time of discharge  and instruct patient/caregiver as needed. Frequency may be adjusted depending on start of care date.     Skilled nurse to perform up to 3 visits PRN for symptoms related to diagnosis    Notify MD if SBP > 160 or < 90; DBP > 90 or < 50; HR > 120 or < 50; Temp > 101; O2 < 88%; Other:       Ok to schedule additional visits based on staff availability and patient request on consecutive days within the home health episode.    When multiple disciplines ordered:    Start of Care occurs on Sunday - Wednesday schedule remaining discipline evaluations as ordered on separate consecutive days following the start of care.    Thursday SOC -schedule subsequent evaluations Friday and Monday the following week.     Friday - Saturday SOC - schedule subsequent discipline evaluations on consecutive days starting Monday of the following week.    For all post-discharge communication and subsequent orders please contact patient's primary care physician. If unable to reach primary care physician or do not receive response within 30 minutes, please contact  for clinical staff order clarification      Home Health Aide:  Nursing Three times weekly, Physical Therapy Three times weekly, Occupational Therapy Three times weekly and Speech Language Pathology Three times weekly        I certify that this patient is confined to her home and needs intermittent skilled nursing care, physical therapy, speech therapy and occupational therapy.

## 2022-07-26 NOTE — DISCHARGE SUMMARY
[facility] Medicine  Discharge Summary      Patient Name: Stephanie T Barthelemy  MRN: [id number]  Patient Class: IP- Inpatient  Admission Date: 7/21/2022  Hospital Length of Stay: 4 days  Discharge Date and Time: 7/26/2022  6:39 PM  Attending Physician: Nelly Pandey*   Discharging Provider: Nelly Pandey MD  Primary Care Provider: Elsie Horne DO      HPI:   Patient is a 50yo female with a pmh of drug abuse, tobacco use, psychiatric disorder, bipolar disorder, Hepatitis C, HTN, opioid overdose, seizure disorder. She presented with SOB x 2 days, worse with exertion. Also reports fatigue, orthopnea, and cough. Denies chest pain. No fevers. In the ED, she was found to have pulmonary edema and cardiomegaly [place] chest [place] 659[address]. Troponin 0.079. Chest CTA with no PE but with LLL infiltrate. She was given IV fluids, zosyn, lasix, ASA, and NGT paste. Urine was not measured due to patient urinating on floor.       * No surgery found *      Hospital Course:   Ms. Barthelemy presented with shortness of breath and confusion.  Admitted with acute respiratory failure due to volume overload secondary to new onset heart failure.  BNP greater than 4900 and exam with pulmonary edema and peripheral edema.  Long history of substance abuse, and altered mental status also due to likely substance abuse.  Workup with 2D echo done which showed EF of 20%, global hypokinesis and grade 3 diastolic dysfunction.  Cardiology consulted.  Diuresis done and patient euvolumic and changed to PO lasix. Psych consulted for substance abuse and fluctuating alertness.        Goals of Care Treatment Preferences:  Code Status: Full Code      Consults:   Consults (From admission, onward)          Status Ordering Provider     Inpatient consult to Psychiatry  Once        Provider:  (Not yet assigned)    KAITLYNN Eastman     Inpatient consult to Cardiology-[facility]  Once        Provider:  (Not  yet assigned)    Completed TIFFANIE MELÉNDEZ     Inpatient consult to Social Work/Case Management  Once        Provider:  (Not yet assigned)    Acknowledged TIFFANIE MELÉNDEZ     Inpatient consult to Registered Dietitian/Nutritionist  Once        Provider:  (Not yet assigned)    Completed TIFFANIE MELÉNDEZ            * Acute on chronic combined systolic and diastolic heart failure  - Patient with known uncontrolled hypertension with noncompliance with medications and polysubstance abuse  - New onset CHF with BNP and physical exam consistent with volume overload  - 2D echo showed EF of 20% with global hypokinesis, and grade 3 diastolic dysfunction  - Diuresis was progress with IV Lasix 40 mg IV q.12  - Monitoring with strict I&Os and daily weights  - Cardiology following  - Currently-5.4 L does far, but last 24 hours of I/Os not recorded  - Deescalated diuresis to 40 mg IV daily on 7/23/2022  - Transition to PO lasix 40 mg daily on 7/24/2022  - Restaredt lisinopril and added metoprolol, and decrease amlodipine dose on 7/24/2022  - Blood pressure well controlled and she is euvolumic  - Cardiology recommends outpatient ischemic evaluation    Encephalopathy, metabolic  - Patient was obtunded on arrival, minimally responsive to sternal rub  - s/p narcan x 3 with no change, U tox negative  - Head CT unremarkable and she had a nonfocal exam  - Altered mentation secondary to likely hypoxia with concomitant drug abuse and polypharmacy  - Patient reported taking meth prior to admission and reviewed all of her psychiatric medications which she has been taking intermittently according to her and patient requesting ativan and klonopin specifically  - Mental status was improved and likely almost at baseline; but by the afternoon, patient more difficult to maintain attention, suspect possible ingestion of outside substance  - repeat Utox  - Consulted Psych and she was seen- appreciates recommendations    Acute  respiratory failure with hypoxia  Volume overload  Pulmonary edema  Aspiration pneumonia  - In setting of acute heart failure with volume overload  - No PE on CTA, but with pulmonary edema and right lower lobe infiltrate  - Continue diuresis as discussed above  - On empiric Zosyn for pneumonia  - Deescalation of antibiotics to PO Augmentin on 7/24/2022, plan for 7 day course  - Weaned off O2 to RA and ambulatory O2 assessment done today with no need for O2  - Resolved     Elevated troponin  - In setting of acute heart failure and drug abuse  - Denied chest pain  - Troponins trended flat and she had no acute EKG changes  - Not consistent with ACS  - Cardiology following and workup with 2D echo done as discussed above  - Continue monitor on telemetry  - Outpatient ischemic evaluation and follow up with Cardiology    Bipolar 1 disorder, depressed  - Patient was unresponsive on assessment  - Discussed home meds with the patient, as discussed above  - Psych input- reviewed    Substance abuse  - H/o meth, cocaine, marijuana, heroin with IV drug use and Hep C  - Patient reports recent meth use just prior to hospitalization  - Will further discuss substance abuse more detail after patient is able to maintain attention for longer periods of time and acute issues addressed  - Psych consulted- appreciates rec's     Hypokalemia  Hypernatremia  - Corrected to normal  - Monitor with repeat labs    Physical deconditioning  - PT and OT consulted  - Patient unsteady and needs more time to work with them as discussed with therapy  - Will follow up on their recommendations    Pneumonia  - Stopped Zosyn and transitioned to Augmentin  - Respiratory status stable on RA   - Plan for 7 days of Augmentin    Essential hypertension  - Noncompliant with BP management  - Started amlodipine 10mg daily  - Cardiology consulted  - Decreased amlodipine, added lisinopril, metoprolol on 7/24/2022  - Blood pressure well controlled on this  regimen      Final Active Diagnoses:    Diagnosis Date Noted POA    PRINCIPAL PROBLEM:  Acute on chronic combined systolic and diastolic heart failure [I50.43] 07/22/2022 Yes    Elevated troponin [R77.8] 07/22/2022 Yes    Acute respiratory failure with hypoxia [J96.01] 07/22/2022 Yes    Encephalopathy, metabolic [G93.41] 07/22/2022 Yes    Bipolar 1 disorder, depressed [F31.9] 10/15/2019 Yes    Substance abuse [F19.10] 04/03/2019 Yes    Hypokalemia [E87.6] 05/25/2018 Yes    Physical deconditioning [R53.81] 10/31/2014 Yes    Pneumonia [J18.9] 09/28/2014 Yes    Essential hypertension [I10] 09/24/2014 Yes     Chronic      Problems Resolved During this Admission:       Discharged Condition: stable    Disposition: Home-Health Care Svc    Follow Up:   Follow-up Information       Home Health Follow up.    Why: Home Health agency will contact patient to schedule first appointment time/date.             [facility] Follow up.    Why: [facility] will contact patient of future follow up appointment.             Substance Abuse and Mental Health Services Administration (SAMHSA. Call.    Why: Substance Abuse and Mental Health Services Administration (SAMHSA): This is a resource for anyone struggling with a mental health or substance abuse disorder. 3-453-168-HELP (2611).  Contact information:  This is a resource for anyone struggling with a mental health or substance abuse disorder. 0-718-851-HELP (0838).                         Patient Instructions:      BATH/SHOWER CHAIR FOR HOME USE     Order Specific Question Answer Comments   Height: 5' (1.524 m)    Weight: 48.7 kg (107 lb 5.8 oz)    Does patient have medical equipment at home? none    Length of need (1-99 months): 99    Type: Without back      Activity as tolerated         Pending Diagnostic Studies:       None           Medications:  Reconciled Home Medications:      Medication List        START taking these medications      amoxicillin-clavulanate  875-125mg 875-125 mg per tablet  Commonly known as: AUGMENTIN  Take 1 tablet by mouth every 12 (twelve) hours. for 4 days     furosemide 40 MG tablet  Commonly known as: LASIX  Take 1 tablet (40 mg total) by mouth once daily.     hydrOXYzine pamoate 25 MG Cap  Commonly known as: VISTARIL  Take 1 capsule (25 mg total) by mouth every 8 (eight) hours as needed (insomnia, agitation).     metoprolol succinate 25 MG 24 hr tablet  Commonly known as: TOPROL-XL  Take 0.5 tablets (12.5 mg total) by mouth once daily.            CHANGE how you take these medications      OXcarbazepine 300 MG Tab  Commonly known as: TRILEPTAL  Take 1 tablet (300 mg total) by mouth 2 (two) times daily.  What changed:   medication strength  how much to take  when to take this            CONTINUE taking these medications      benztropine 1 MG tablet  Commonly known as: COGENTIN  Take 1 tablet (1 mg total) by mouth 2 (two) times daily.     lisinopriL 10 MG tablet  Take 1 tablet (10 mg total) by mouth once daily.     QUEtiapine 100 MG Tab  Commonly known as: SEROQUEL  Take 1 tablet (100 mg total) by mouth every evening.     * risperiDONE 1 MG tablet  Commonly known as: RISPERDAL  Take 1 mg by mouth 2 (two) times daily.     * risperiDONE 90 mg Sers  Inject 90 mg into the skin every 28 days.           * This list has 2 medication(s) that are the same as other medications prescribed for you. Read the directions carefully, and ask your doctor or other care provider to review them with you.                STOP taking these medications      amLODIPine 5 MG tablet  Commonly known as: NORVASC     FLUoxetine 20 MG capsule     gabapentin 300 MG capsule  Commonly known as: NEURONTIN     propranoloL 10 MG tablet  Commonly known as: INDERAL              Indwelling Lines/Drains at time of discharge:   Lines/Drains/Airways       None                   Time spent on the discharge of patient: 35 minutes         Nelly Pandey MD  Department of Hospital  [facility]

## 2022-07-26 NOTE — ASSESSMENT & PLAN NOTE
- Patient was obtunded on arrival, minimally responsive to sternal rub  - s/p narcan x 3 with no change, U tox negative  - Head CT unremarkable and she had a nonfocal exam  - Altered mentation secondary to likely hypoxia with concomitant drug abuse and polypharmacy  - Patient reported taking meth prior to admission and reviewed all of her psychiatric medications which she has been taking intermittently according to her and patient requesting ativan and klonopin specifically  - Mental status was improved and likely almost at baseline; but by the afternoon, patient more difficult to maintain attention, suspect possible ingestion of outside substance  - repeat Utox  - Consulted Psych and she was seen- appreciates recommendations

## 2022-07-26 NOTE — PLAN OF CARE
Problem: Adult Inpatient Plan of Care  Goal: Plan of Care Review  Outcome: Ongoing, Progressing   DC orders in place. AVS clinical references and details completed.

## 2022-07-26 NOTE — PROGRESS NOTES
[facility] Medicine  Progress Note    Patient Name: Stephanie T Barthelemy  MRN: [id number]  Patient Class: IP- Inpatient   Admission Date: 7/21/2022  Length of Stay: 4 days  Attending Physician: Nelly Pandey*  Primary Care Provider: Elsie Horne DO        Subjective:     Principal Problem:Acute on chronic combined systolic and diastolic heart failure        HPI:  Patient is a 52yo female with a pmh of drug abuse, tobacco use, psychiatric disorder, bipolar disorder, Hepatitis C, HTN, opioid overdose, seizure disorder. She presented with SOB x 2 days, worse with exertion. Also reports fatigue, orthopnea, and cough. Denies chest pain. No fevers. In the ED, she was found to have pulmonary edema and cardiomegaly on chest [place] 980[address]. Troponin 0.079. Chest CTA with no PE but with LLL infiltrate. She was given IV fluids, zosyn, lasix, ASA, and NGT paste. Urine was not measured due to patient urinating on floor.       Overview/Hospital Course:  Ms. Barthelemy presented with shortness of breath and confusion.  Admitted with acute respiratory failure due to volume overload secondary to new onset heart failure.  BNP greater than 4900 and exam with pulmonary edema and peripheral edema.  Long history of substance abuse, and altered mental status also due to likely substance abuse.  Workup with 2D echo done which showed EF of 20%, global hypokinesis and grade 3 diastolic dysfunction.  Cardiology consulted.  Diuresis done and patient euvolumic and changed to PO lasix. Psych consulted for substance abuse and fluctuating alertness.       No new subjective & objective note has been filed under this hospital service since the last note was generated.      Assessment/Plan:      * Acute on chronic combined systolic and diastolic heart failure  - Patient with known uncontrolled hypertension with noncompliance with medications and polysubstance abuse  - New onset CHF with BNP and  physical exam consistent with volume overload  - 2D echo showed EF of 20% with global hypokinesis, and grade 3 diastolic dysfunction  - Diuresis was progress with IV Lasix 40 mg IV q.12  - Monitoring with strict I&Os and daily weights  - Cardiology following  - Currently-5.4 L does far, but last 24 hours of I/Os not recorded  - Deescalated diuresis to 40 mg IV daily on 7/23/2022  - Transition to PO lasix 40 mg daily on 7/24/2022  - Restaredt lisinopril and added metoprolol, and decrease amlodipine dose on 7/24/2022  - Blood pressure well controlled and she is euvolumic  - Cardiology recommends outpatient ischemic evaluation    Encephalopathy, metabolic  - Patient was obtunded on arrival, minimally responsive to sternal rub  - s/p narcan x 3 with no change, U tox negative  - Head CT unremarkable and she had a nonfocal exam  - Altered mentation secondary to likely hypoxia with concomitant drug abuse and polypharmacy  - Patient reported taking meth prior to admission and reviewed all of her psychiatric medications which she has been taking intermittently according to her and patient requesting ativan and klonopin specifically  - Mental status was improved and likely almost at baseline; but by the afternoon, patient more difficult to maintain attention, suspect possible ingestion of outside substance  - repeat Utox  - Consulted Psych and she was seen- appreciates recommendations    Acute respiratory failure with hypoxia  Volume overload  Pulmonary edema  Aspiration pneumonia  - In setting of acute heart failure with volume overload  - No PE on CTA, but with pulmonary edema and right lower lobe infiltrate  - Continue diuresis as discussed above  - On empiric Zosyn for pneumonia  - Deescalation of antibiotics to PO Augmentin on 7/24/2022, plan for 7 day course  - Weaned off O2 to RA and ambulatory O2 assessment done today with no need for O2  - Resolved     Elevated troponin  - In setting of acute heart failure and drug  abuse  - Denied chest pain  - Troponins trended flat and she had no acute EKG changes  - Not consistent with ACS  - Cardiology following and workup with 2D echo done as discussed above  - Continue monitor on telemetry  - Outpatient ischemic evaluation and follow up with Cardiology    Bipolar 1 disorder, depressed  - Patient was unresponsive on assessment  - Discussed home meds with the patient, as discussed above  - Psych input- reviewed    Substance abuse  - H/o meth, cocaine, marijuana, heroin with IV drug use and Hep C  - Patient reports recent meth use just prior to hospitalization  - Will further discuss substance abuse more detail after patient is able to maintain attention for longer periods of time and acute issues addressed  - Psych consulted- appreciates rec's     Hypokalemia  Hypernatremia  - Corrected to normal  - Monitor with repeat labs    Physical deconditioning  - PT and OT consulted  - Patient unsteady and needs more time to work with them as discussed with therapy  - Will follow up on their recommendations    Pneumonia  - Stopped Zosyn and transitioned to Augmentin  - Respiratory status stable on RA   - Plan for 7 days of Augmentin    Essential hypertension  - Noncompliant with BP management  - Started amlodipine 10mg daily  - Cardiology consulted  - Decreased amlodipine, added lisinopril, metoprolol on 7/24/2022  - Blood pressure well controlled on this regimen      VTE Risk Mitigation (From admission, onward)         Ordered     enoxaparin injection 40 mg  Daily         07/22/22 0527     IP VTE HIGH RISK PATIENT  Once         07/22/22 0527     Place sequential compression device  Until discontinued         07/22/22 0527                Discharge Planning   AYSHA: 7/26/2022     Code Status: Full Code   Is the patient medically ready for discharge?:     Reason for patient still in hospital (select all that apply): Pending disposition  Discharge Plan A: Home with family   Discharge Delays: None known  at this time              Nelly Pandey MD  Department of [facility] Medicine   [place] [facility], [facility]

## 2022-07-26 NOTE — ASSESSMENT & PLAN NOTE
- H/o meth, cocaine, marijuana, heroin with IV drug use and Hep C  - Patient reports recent meth use just prior to hospitalization  - Will further discuss substance abuse more detail after patient is able to maintain attention for longer periods of time and acute issues addressed  - Psych consulted- appreciates rec's

## 2022-07-26 NOTE — NURSING
Waiting on sister to pick her up.IV removed and heart monitor removed and returned to monitor room. Will continue to monitor.

## 2022-07-26 NOTE — PROGRESS NOTES
[facility]                    Pharmacy       Discharge Medication Education    Patient ACCEPTED medication education. Pharmacy has provided education on the name, indication, and possible side effects of the medication(s) prescribed, using teach-back method.     The following medications have also been discussed, during this admission.        Medication List        START taking these medications      amoxicillin-clavulanate 875-125mg 875-125 mg per tablet  Commonly known as: AUGMENTIN  Take 1 tablet by mouth every 12 (twelve) hours. for 4 days     furosemide 40 MG tablet  Commonly known as: LASIX  Take 1 tablet (40 mg total) by mouth once daily.     hydrOXYzine pamoate 25 MG Cap  Commonly known as: VISTARIL  Take 1 capsule (25 mg total) by mouth every 8 (eight) hours as needed (insomnia, agitation).     metoprolol succinate 25 MG 24 hr tablet  Commonly known as: TOPROL-XL  Take 0.5 tablets (12.5 mg total) by mouth once daily.            CHANGE how you take these medications      OXcarbazepine 300 MG Tab  Commonly known as: TRILEPTAL  Take 1 tablet (300 mg total) by mouth 2 (two) times daily.  What changed:   medication strength  how much to take  when to take this            CONTINUE taking these medications      benztropine 1 MG tablet  Commonly known as: COGENTIN  Take 1 tablet (1 mg total) by mouth 2 (two) times daily.     lisinopriL 10 MG tablet  Take 1 tablet (10 mg total) by mouth once daily.     QUEtiapine 100 MG Tab  Commonly known as: SEROQUEL  Take 1 tablet (100 mg total) by mouth every evening.     * risperiDONE 1 MG tablet  Commonly known as: RISPERDAL     * risperiDONE 90 mg Sers  Inject 90 mg into the skin every 28 days.           * This list has 2 medication(s) that are the same as other medications prescribed for you. Read the directions carefully, and ask your doctor or other care provider to review them with you.                STOP taking these medications      amLODIPine 5 MG  tablet  Commonly known as: NORVASC     FLUoxetine 20 MG capsule     gabapentin 300 MG capsule  Commonly known as: NEURONTIN     propranoloL 10 MG tablet  Commonly known as: INDERAL               Where to Get Your Medications        These medications were sent to [facility] [address], [place] [id number]      Hours: Mon-Fri, 8a-5:30p Phone: [phone]   amoxicillin-clavulanate 875-125mg 875-125 mg per tablet  furosemide 40 MG tablet  hydrOXYzine pamoate 25 MG Cap  metoprolol succinate 25 MG 24 hr tablet  OXcarbazepine 300 MG Tab          Thank you  Dangelo Martinez, PharmD  [phone]

## 2022-07-26 NOTE — PROGRESS NOTES
07/26/22 1746   Nurse Notification   Bedside Nurse Notified? Yes   Name of Bedside Nurse Stefany   Nurse Notfication Method Secure Chat   Significant Events This Shift   Virtual Nurse - Description of Significant Event Per patient's sister, Betty, she just got her car started and is on her way to come get pt, approximately 30 mins away

## 2022-07-26 NOTE — PLAN OF CARE
07/26/22 1304   Significant Events This Shift   Virtual Nurse - Description of Significant Event (1200) VN attempted to call patient's sister Betty [phone] to review AVS, no answer, VN left voicemail. (1300) Second attempt and no answer. Bedside nurse notified

## 2022-07-27 ENCOUNTER — PATIENT OUTREACH (OUTPATIENT)
Dept: ADMINISTRATIVE | Facility: CLINIC | Age: 52
End: 2022-07-27
Payer: MEDICARE

## 2022-07-27 PROCEDURE — G0180 PR HOME HEALTH MD CERTIFICATION: ICD-10-PCS | Mod: ,,, | Performed by: FAMILY MEDICINE

## 2022-07-27 PROCEDURE — G0180 MD CERTIFICATION HHA PATIENT: HCPCS | Mod: ,,, | Performed by: FAMILY MEDICINE

## 2022-07-27 NOTE — PROGRESS NOTES
Also taking Prozac ( not sure of mg)  per outside MD    C3 nurse spoke with Stephanie T Barthelemy (Sister) for a TCC post hospital discharge follow up call. The patient has a scheduled HOSFU appointment with PRADEEP Guaman on 07/29/2022 @ 1000.

## 2022-08-03 ENCOUNTER — HOSPITAL ENCOUNTER (INPATIENT)
Facility: HOSPITAL | Age: 52
LOS: 8 days | Discharge: HOME HEALTH | DRG: 208 | End: 2022-08-11
Attending: EMERGENCY MEDICINE | Admitting: INTERNAL MEDICINE
Payer: MEDICARE

## 2022-08-03 DIAGNOSIS — A41.9 SEPSIS, DUE TO UNSPECIFIED ORGANISM, UNSPECIFIED WHETHER ACUTE ORGAN DYSFUNCTION PRESENT: ICD-10-CM

## 2022-08-03 DIAGNOSIS — J18.9 PNEUMONIA OF LEFT LOWER LOBE DUE TO INFECTIOUS ORGANISM: ICD-10-CM

## 2022-08-03 DIAGNOSIS — J96.01 ACUTE RESPIRATORY FAILURE WITH HYPOXIA: Primary | ICD-10-CM

## 2022-08-03 DIAGNOSIS — G93.40 ACUTE ENCEPHALOPATHY: ICD-10-CM

## 2022-08-03 DIAGNOSIS — I50.42 CHRONIC COMBINED SYSTOLIC AND DIASTOLIC HEART FAILURE: ICD-10-CM

## 2022-08-03 DIAGNOSIS — J96.01 ACUTE RESPIRATORY FAILURE WITH HYPOXIA AND HYPERCAPNIA: ICD-10-CM

## 2022-08-03 DIAGNOSIS — F15.10 AMPHETAMINE ABUSE: ICD-10-CM

## 2022-08-03 DIAGNOSIS — F29 PSYCHOSIS, UNSPECIFIED PSYCHOSIS TYPE: ICD-10-CM

## 2022-08-03 DIAGNOSIS — R45.1 AGITATION: ICD-10-CM

## 2022-08-03 DIAGNOSIS — R22.31 LOCALIZED SWELLING OF RIGHT UPPER EXTREMITY: ICD-10-CM

## 2022-08-03 DIAGNOSIS — F23 ACUTE PSYCHOSIS: ICD-10-CM

## 2022-08-03 DIAGNOSIS — Z00.8 MEDICAL CLEARANCE FOR PSYCHIATRIC ADMISSION: ICD-10-CM

## 2022-08-03 DIAGNOSIS — R94.31 QT PROLONGATION: ICD-10-CM

## 2022-08-03 DIAGNOSIS — U07.1 COVID-19: ICD-10-CM

## 2022-08-03 DIAGNOSIS — L60.2 OG (ONYCHOGRYPHOSIS): ICD-10-CM

## 2022-08-03 DIAGNOSIS — U07.1 COVID-19 VIRUS INFECTION: ICD-10-CM

## 2022-08-03 DIAGNOSIS — J96.02 ACUTE RESPIRATORY FAILURE WITH HYPOXIA AND HYPERCAPNIA: ICD-10-CM

## 2022-08-03 LAB
ALBUMIN SERPL BCP-MCNC: 3.4 G/DL (ref 3.5–5.2)
ALLENS TEST: ABNORMAL
ALP SERPL-CCNC: 130 U/L (ref 55–135)
ALT SERPL W/O P-5'-P-CCNC: 49 U/L (ref 10–44)
AMPHET+METHAMPHET UR QL: ABNORMAL
ANION GAP SERPL CALC-SCNC: 11 MMOL/L (ref 8–16)
APAP SERPL-MCNC: <3 UG/ML (ref 10–20)
AST SERPL-CCNC: 64 U/L (ref 10–40)
BACTERIA #/AREA URNS HPF: NORMAL /HPF
BARBITURATES UR QL SCN>200 NG/ML: NEGATIVE
BASOPHILS # BLD AUTO: 0.03 K/UL (ref 0–0.2)
BASOPHILS NFR BLD: 0.4 % (ref 0–1.9)
BENZODIAZ UR QL SCN>200 NG/ML: NEGATIVE
BILIRUB SERPL-MCNC: 0.6 MG/DL (ref 0.1–1)
BILIRUB UR QL STRIP: NEGATIVE
BNP SERPL-MCNC: 981 PG/ML (ref 0–99)
BUN SERPL-MCNC: 17 MG/DL (ref 6–20)
BZE UR QL SCN: NEGATIVE
CALCIUM SERPL-MCNC: 8.9 MG/DL (ref 8.7–10.5)
CANNABINOIDS UR QL SCN: NEGATIVE
CHLORIDE SERPL-SCNC: 104 MMOL/L (ref 95–110)
CK SERPL-CCNC: 85 U/L (ref 20–180)
CLARITY UR: CLEAR
CO2 SERPL-SCNC: 24 MMOL/L (ref 23–29)
COLOR UR: YELLOW
CREAT SERPL-MCNC: 0.9 MG/DL (ref 0.5–1.4)
CREAT UR-MCNC: 107.6 MG/DL (ref 15–325)
CRP SERPL-MCNC: 17.3 MG/L (ref 0–8.2)
D DIMER PPP IA.FEU-MCNC: 2.38 MG/L FEU
DELSYS: ABNORMAL
DIFFERENTIAL METHOD: ABNORMAL
EOSINOPHIL # BLD AUTO: 0 K/UL (ref 0–0.5)
EOSINOPHIL NFR BLD: 0.1 % (ref 0–8)
ERYTHROCYTE [DISTWIDTH] IN BLOOD BY AUTOMATED COUNT: 15.6 % (ref 11.5–14.5)
ERYTHROCYTE [SEDIMENTATION RATE] IN BLOOD BY WESTERGREN METHOD: 12 MM/H
ERYTHROCYTE [SEDIMENTATION RATE] IN BLOOD BY WESTERGREN METHOD: 20 MM/H
EST. GFR  (NO RACE VARIABLE): >60 ML/MIN/1.73 M^2
ETHANOL SERPL-MCNC: <10 MG/DL
FERRITIN SERPL-MCNC: 96 NG/ML (ref 20–300)
FIO2: 100
FIO2: 50
FIO2: 60
FLOW: 10
GLUCOSE SERPL-MCNC: 100 MG/DL (ref 70–110)
GLUCOSE UR QL STRIP: NEGATIVE
HCO3 UR-SCNC: 26.1 MMOL/L (ref 24–28)
HCO3 UR-SCNC: 26.6 MMOL/L (ref 24–28)
HCO3 UR-SCNC: 28.5 MMOL/L (ref 24–28)
HCT VFR BLD AUTO: 37.5 % (ref 37–48.5)
HCT VFR BLD CALC: 31 %PCV (ref 36–54)
HCT VFR BLD CALC: 35 %PCV (ref 36–54)
HGB BLD-MCNC: 11 G/DL
HGB BLD-MCNC: 11.5 G/DL (ref 12–16)
HGB BLD-MCNC: 12 G/DL
HGB UR QL STRIP: NEGATIVE
HYALINE CASTS #/AREA URNS LPF: 0 /LPF
IMM GRANULOCYTES # BLD AUTO: 0.02 K/UL (ref 0–0.04)
IMM GRANULOCYTES NFR BLD AUTO: 0.3 % (ref 0–0.5)
INFLUENZA A, MOLECULAR: NEGATIVE
INFLUENZA B, MOLECULAR: NEGATIVE
KETONES UR QL STRIP: NEGATIVE
LACTATE SERPL-SCNC: 1.4 MMOL/L (ref 0.5–2.2)
LEUKOCYTE ESTERASE UR QL STRIP: ABNORMAL
LYMPHOCYTES # BLD AUTO: 1 K/UL (ref 1–4.8)
LYMPHOCYTES NFR BLD: 13.6 % (ref 18–48)
MCH RBC QN AUTO: 27.4 PG (ref 27–31)
MCHC RBC AUTO-ENTMCNC: 30.7 G/DL (ref 32–36)
MCV RBC AUTO: 90 FL (ref 82–98)
METHADONE UR QL SCN>300 NG/ML: NEGATIVE
MICROSCOPIC COMMENT: NORMAL
MODE: ABNORMAL
MONOCYTES # BLD AUTO: 0.6 K/UL (ref 0.3–1)
MONOCYTES NFR BLD: 8 % (ref 4–15)
NEUTROPHILS # BLD AUTO: 5.7 K/UL (ref 1.8–7.7)
NEUTROPHILS NFR BLD: 77.6 % (ref 38–73)
NITRITE UR QL STRIP: NEGATIVE
NRBC BLD-RTO: 0 /100 WBC
OPIATES UR QL SCN: NEGATIVE
PCO2 BLDA: 41.6 MMHG (ref 35–45)
PCO2 BLDA: 50.2 MMHG (ref 35–45)
PCO2 BLDA: 59.2 MMHG (ref 35–45)
PCP UR QL SCN>25 NG/ML: NEGATIVE
PEEP: 5
PEEP: 5
PH SMN: 7.26 [PH] (ref 7.35–7.45)
PH SMN: 7.36 [PH] (ref 7.35–7.45)
PH SMN: 7.41 [PH] (ref 7.35–7.45)
PH UR STRIP: 6 [PH] (ref 5–8)
PLATELET # BLD AUTO: 214 K/UL (ref 150–450)
PMV BLD AUTO: 12.3 FL (ref 9.2–12.9)
PO2 BLDA: 100 MMHG (ref 80–100)
PO2 BLDA: 203 MMHG (ref 80–100)
PO2 BLDA: 37 MMHG (ref 40–60)
POC BE: 0 MMOL/L
POC BE: 1 MMOL/L
POC BE: 3 MMOL/L
POC SATURATED O2: 100 % (ref 95–100)
POC SATURATED O2: 68 % (ref 95–100)
POC SATURATED O2: 98 % (ref 95–100)
POC TCO2: 27 MMOL/L (ref 23–27)
POC TCO2: 28 MMOL/L (ref 23–27)
POC TCO2: 30 MMOL/L (ref 24–29)
POTASSIUM BLD-SCNC: 2.9 MMOL/L (ref 3.5–5.1)
POTASSIUM BLD-SCNC: 3.5 MMOL/L (ref 3.5–5.1)
POTASSIUM SERPL-SCNC: 3.9 MMOL/L (ref 3.5–5.1)
PROCALCITONIN SERPL IA-MCNC: 0.05 NG/ML
PROT SERPL-MCNC: 7.2 G/DL (ref 6–8.4)
PROT UR QL STRIP: ABNORMAL
RBC # BLD AUTO: 4.19 M/UL (ref 4–5.4)
RBC #/AREA URNS HPF: 1 /HPF (ref 0–4)
SAMPLE: ABNORMAL
SARS-COV-2 RDRP RESP QL NAA+PROBE: POSITIVE
SITE: ABNORMAL
SODIUM BLD-SCNC: 138 MMOL/L (ref 136–145)
SODIUM BLD-SCNC: 139 MMOL/L (ref 136–145)
SODIUM SERPL-SCNC: 139 MMOL/L (ref 136–145)
SP GR UR STRIP: 1.02 (ref 1–1.03)
SP02: 100
SP02: 100
SPECIMEN SOURCE: NORMAL
SQUAMOUS #/AREA URNS HPF: 5 /HPF
TOXICOLOGY INFORMATION: ABNORMAL
TROPONIN I SERPL DL<=0.01 NG/ML-MCNC: 0.03 NG/ML (ref 0–0.03)
TROPONIN I SERPL DL<=0.01 NG/ML-MCNC: 0.05 NG/ML (ref 0–0.03)
TSH SERPL DL<=0.005 MIU/L-ACNC: 0.78 UIU/ML (ref 0.4–4)
URN SPEC COLLECT METH UR: ABNORMAL
UROBILINOGEN UR STRIP-ACNC: NEGATIVE EU/DL
VT: 450
VT: 450
WBC # BLD AUTO: 7.29 K/UL (ref 3.9–12.7)
WBC #/AREA URNS HPF: 3 /HPF (ref 0–5)

## 2022-08-03 PROCEDURE — 96375 TX/PRO/DX INJ NEW DRUG ADDON: CPT

## 2022-08-03 PROCEDURE — 27000190 HC CPAP FULL FACE MASK W/VALVE

## 2022-08-03 PROCEDURE — 99900035 HC TECH TIME PER 15 MIN (STAT)

## 2022-08-03 PROCEDURE — U0002 COVID-19 LAB TEST NON-CDC: HCPCS | Performed by: EMERGENCY MEDICINE

## 2022-08-03 PROCEDURE — 84484 ASSAY OF TROPONIN QUANT: CPT | Performed by: EMERGENCY MEDICINE

## 2022-08-03 PROCEDURE — 87502 INFLUENZA DNA AMP PROBE: CPT | Performed by: EMERGENCY MEDICINE

## 2022-08-03 PROCEDURE — 36600 WITHDRAWAL OF ARTERIAL BLOOD: CPT

## 2022-08-03 PROCEDURE — 85025 COMPLETE CBC W/AUTO DIFF WBC: CPT | Performed by: EMERGENCY MEDICINE

## 2022-08-03 PROCEDURE — 27000207 HC ISOLATION

## 2022-08-03 PROCEDURE — 86140 C-REACTIVE PROTEIN: CPT | Performed by: EMERGENCY MEDICINE

## 2022-08-03 PROCEDURE — 96372 THER/PROPH/DIAG INJ SC/IM: CPT | Performed by: EMERGENCY MEDICINE

## 2022-08-03 PROCEDURE — 63600175 PHARM REV CODE 636 W HCPCS: Performed by: NURSE PRACTITIONER

## 2022-08-03 PROCEDURE — 93010 ELECTROCARDIOGRAM REPORT: CPT | Mod: 76,,, | Performed by: INTERNAL MEDICINE

## 2022-08-03 PROCEDURE — 80307 DRUG TEST PRSMV CHEM ANLYZR: CPT | Performed by: EMERGENCY MEDICINE

## 2022-08-03 PROCEDURE — 27100108

## 2022-08-03 PROCEDURE — 25000003 PHARM REV CODE 250: Performed by: NURSE PRACTITIONER

## 2022-08-03 PROCEDURE — 25000003 PHARM REV CODE 250: Performed by: EMERGENCY MEDICINE

## 2022-08-03 PROCEDURE — 25000242 PHARM REV CODE 250 ALT 637 W/ HCPCS

## 2022-08-03 PROCEDURE — 80143 DRUG ASSAY ACETAMINOPHEN: CPT | Performed by: EMERGENCY MEDICINE

## 2022-08-03 PROCEDURE — 83605 ASSAY OF LACTIC ACID: CPT | Performed by: EMERGENCY MEDICINE

## 2022-08-03 PROCEDURE — 93010 EKG 12-LEAD: ICD-10-PCS | Mod: 76,,, | Performed by: INTERNAL MEDICINE

## 2022-08-03 PROCEDURE — 93010 EKG 12-LEAD: ICD-10-PCS | Mod: ,,, | Performed by: INTERNAL MEDICINE

## 2022-08-03 PROCEDURE — 85379 FIBRIN DEGRADATION QUANT: CPT | Performed by: EMERGENCY MEDICINE

## 2022-08-03 PROCEDURE — 83880 ASSAY OF NATRIURETIC PEPTIDE: CPT | Performed by: EMERGENCY MEDICINE

## 2022-08-03 PROCEDURE — 87040 BLOOD CULTURE FOR BACTERIA: CPT | Performed by: EMERGENCY MEDICINE

## 2022-08-03 PROCEDURE — 93010 ELECTROCARDIOGRAM REPORT: CPT | Mod: ,,, | Performed by: INTERNAL MEDICINE

## 2022-08-03 PROCEDURE — 20000000 HC ICU ROOM

## 2022-08-03 PROCEDURE — 27200966 HC CLOSED SUCTION SYSTEM

## 2022-08-03 PROCEDURE — 82077 ASSAY SPEC XCP UR&BREATH IA: CPT | Performed by: EMERGENCY MEDICINE

## 2022-08-03 PROCEDURE — 84145 PROCALCITONIN (PCT): CPT | Performed by: EMERGENCY MEDICINE

## 2022-08-03 PROCEDURE — 99900026 HC AIRWAY MAINTENANCE (STAT)

## 2022-08-03 PROCEDURE — 93005 ELECTROCARDIOGRAM TRACING: CPT

## 2022-08-03 PROCEDURE — 94660 CPAP INITIATION&MGMT: CPT

## 2022-08-03 PROCEDURE — 80053 COMPREHEN METABOLIC PANEL: CPT | Performed by: EMERGENCY MEDICINE

## 2022-08-03 PROCEDURE — 84443 ASSAY THYROID STIM HORMONE: CPT | Performed by: EMERGENCY MEDICINE

## 2022-08-03 PROCEDURE — 82728 ASSAY OF FERRITIN: CPT | Performed by: EMERGENCY MEDICINE

## 2022-08-03 PROCEDURE — 99292 CRITICAL CARE ADDL 30 MIN: CPT

## 2022-08-03 PROCEDURE — 82803 BLOOD GASES ANY COMBINATION: CPT

## 2022-08-03 PROCEDURE — 96376 TX/PRO/DX INJ SAME DRUG ADON: CPT

## 2022-08-03 PROCEDURE — 82550 ASSAY OF CK (CPK): CPT | Performed by: EMERGENCY MEDICINE

## 2022-08-03 PROCEDURE — 94002 VENT MGMT INPAT INIT DAY: CPT

## 2022-08-03 PROCEDURE — 25000242 PHARM REV CODE 250 ALT 637 W/ HCPCS: Performed by: EMERGENCY MEDICINE

## 2022-08-03 PROCEDURE — 99291 CRITICAL CARE FIRST HOUR: CPT | Mod: 25

## 2022-08-03 PROCEDURE — 31500 INSERT EMERGENCY AIRWAY: CPT

## 2022-08-03 PROCEDURE — 63600175 PHARM REV CODE 636 W HCPCS: Performed by: EMERGENCY MEDICINE

## 2022-08-03 PROCEDURE — 96374 THER/PROPH/DIAG INJ IV PUSH: CPT

## 2022-08-03 PROCEDURE — 81000 URINALYSIS NONAUTO W/SCOPE: CPT | Mod: 59 | Performed by: EMERGENCY MEDICINE

## 2022-08-03 PROCEDURE — 94644 CONT INHLJ TX 1ST HOUR: CPT

## 2022-08-03 RX ORDER — IBUPROFEN 200 MG
24 TABLET ORAL
Status: DISCONTINUED | OUTPATIENT
Start: 2022-08-03 | End: 2022-08-11 | Stop reason: HOSPADM

## 2022-08-03 RX ORDER — LORAZEPAM 2 MG/ML
2 INJECTION INTRAMUSCULAR
Status: COMPLETED | OUTPATIENT
Start: 2022-08-03 | End: 2022-08-03

## 2022-08-03 RX ORDER — SODIUM CHLORIDE 9 MG/ML
INJECTION, SOLUTION INTRAVENOUS CONTINUOUS
Status: DISCONTINUED | OUTPATIENT
Start: 2022-08-03 | End: 2022-08-11 | Stop reason: HOSPADM

## 2022-08-03 RX ORDER — FAMOTIDINE 10 MG/ML
20 INJECTION INTRAVENOUS EVERY 12 HOURS
Status: DISCONTINUED | OUTPATIENT
Start: 2022-08-03 | End: 2022-08-08

## 2022-08-03 RX ORDER — ALBUTEROL SULFATE 2.5 MG/.5ML
SOLUTION RESPIRATORY (INHALATION)
Status: COMPLETED
Start: 2022-08-03 | End: 2022-08-03

## 2022-08-03 RX ORDER — IBUPROFEN 200 MG
16 TABLET ORAL
Status: DISCONTINUED | OUTPATIENT
Start: 2022-08-03 | End: 2022-08-11 | Stop reason: HOSPADM

## 2022-08-03 RX ORDER — HALOPERIDOL 5 MG/ML
10 INJECTION INTRAMUSCULAR
Status: COMPLETED | OUTPATIENT
Start: 2022-08-03 | End: 2022-08-03

## 2022-08-03 RX ORDER — FENTANYL CITRATE-0.9 % NACL/PF 10 MCG/ML
0-250 PLASTIC BAG, INJECTION (ML) INTRAVENOUS CONTINUOUS
Status: DISPENSED | OUTPATIENT
Start: 2022-08-03 | End: 2022-08-04

## 2022-08-03 RX ORDER — ALBUTEROL SULFATE 90 UG/1
2 AEROSOL, METERED RESPIRATORY (INHALATION) EVERY 6 HOURS
Status: DISCONTINUED | OUTPATIENT
Start: 2022-08-04 | End: 2022-08-10

## 2022-08-03 RX ORDER — ALBUTEROL SULFATE 2.5 MG/.5ML
15 SOLUTION RESPIRATORY (INHALATION) ONCE
Status: COMPLETED | OUTPATIENT
Start: 2022-08-03 | End: 2022-08-03

## 2022-08-03 RX ORDER — ASCORBIC ACID 500 MG
500 TABLET ORAL 2 TIMES DAILY
Status: DISCONTINUED | OUTPATIENT
Start: 2022-08-03 | End: 2022-08-11 | Stop reason: HOSPADM

## 2022-08-03 RX ORDER — SODIUM CHLORIDE 0.9 % (FLUSH) 0.9 %
10 SYRINGE (ML) INJECTION
Status: DISCONTINUED | OUTPATIENT
Start: 2022-08-03 | End: 2022-08-11 | Stop reason: HOSPADM

## 2022-08-03 RX ORDER — ENOXAPARIN SODIUM 100 MG/ML
1 INJECTION SUBCUTANEOUS 2 TIMES DAILY
Status: DISCONTINUED | OUTPATIENT
Start: 2022-08-03 | End: 2022-08-04

## 2022-08-03 RX ORDER — SUCCINYLCHOLINE CHLORIDE 20 MG/ML
80 INJECTION INTRAMUSCULAR; INTRAVENOUS
Status: COMPLETED | OUTPATIENT
Start: 2022-08-03 | End: 2022-08-03

## 2022-08-03 RX ORDER — DEXAMETHASONE SODIUM PHOSPHATE 4 MG/ML
6 INJECTION, SOLUTION INTRA-ARTICULAR; INTRALESIONAL; INTRAMUSCULAR; INTRAVENOUS; SOFT TISSUE
Status: COMPLETED | OUTPATIENT
Start: 2022-08-03 | End: 2022-08-03

## 2022-08-03 RX ORDER — VANCOMYCIN HCL IN 5 % DEXTROSE 1G/250ML
1000 PLASTIC BAG, INJECTION (ML) INTRAVENOUS
Status: DISCONTINUED | OUTPATIENT
Start: 2022-08-04 | End: 2022-08-05

## 2022-08-03 RX ORDER — IPRATROPIUM BROMIDE 0.5 MG/2.5ML
1 SOLUTION RESPIRATORY (INHALATION) ONCE
Status: COMPLETED | OUTPATIENT
Start: 2022-08-03 | End: 2022-08-03

## 2022-08-03 RX ORDER — ONDANSETRON 2 MG/ML
4 INJECTION INTRAMUSCULAR; INTRAVENOUS EVERY 8 HOURS PRN
Status: DISCONTINUED | OUTPATIENT
Start: 2022-08-03 | End: 2022-08-11 | Stop reason: HOSPADM

## 2022-08-03 RX ORDER — GLUCAGON 1 MG
1 KIT INJECTION
Status: DISCONTINUED | OUTPATIENT
Start: 2022-08-03 | End: 2022-08-11 | Stop reason: HOSPADM

## 2022-08-03 RX ORDER — ETOMIDATE 2 MG/ML
20 INJECTION INTRAVENOUS
Status: COMPLETED | OUTPATIENT
Start: 2022-08-03 | End: 2022-08-03

## 2022-08-03 RX ORDER — ACETAMINOPHEN 325 MG/1
650 TABLET ORAL
Status: COMPLETED | OUTPATIENT
Start: 2022-08-03 | End: 2022-08-03

## 2022-08-03 RX ADMIN — MIDAZOLAM 1 MG/HR: 5 INJECTION INTRAMUSCULAR; INTRAVENOUS at 07:08

## 2022-08-03 RX ADMIN — LORAZEPAM 0.5 MG: 2 INJECTION INTRAMUSCULAR; INTRAVENOUS at 05:08

## 2022-08-03 RX ADMIN — OXYCODONE HYDROCHLORIDE AND ACETAMINOPHEN 500 MG: 500 TABLET ORAL at 10:08

## 2022-08-03 RX ADMIN — ALBUTEROL SULFATE 15 MG: 2.5 SOLUTION RESPIRATORY (INHALATION) at 05:08

## 2022-08-03 RX ADMIN — HALOPERIDOL LACTATE 10 MG: 5 INJECTION, SOLUTION INTRAMUSCULAR at 06:08

## 2022-08-03 RX ADMIN — ACETAMINOPHEN 325 MG: 325 SUPPOSITORY RECTAL at 08:08

## 2022-08-03 RX ADMIN — ETOMIDATE 20 MG: 2 INJECTION INTRAVENOUS at 07:08

## 2022-08-03 RX ADMIN — DEXAMETHASONE SODIUM PHOSPHATE 6 MG: 4 INJECTION, SOLUTION INTRA-ARTICULAR; INTRALESIONAL; INTRAMUSCULAR; INTRAVENOUS; SOFT TISSUE at 06:08

## 2022-08-03 RX ADMIN — IPRATROPIUM BROMIDE 1 MG: 0.5 SOLUTION RESPIRATORY (INHALATION) at 05:08

## 2022-08-03 RX ADMIN — SUCCINYLCHOLINE CHLORIDE 80 MG: 20 INJECTION, SOLUTION INTRAMUSCULAR; INTRAVENOUS at 07:08

## 2022-08-03 RX ADMIN — LORAZEPAM 2 MG: 2 INJECTION INTRAMUSCULAR; INTRAVENOUS at 06:08

## 2022-08-03 RX ADMIN — VANCOMYCIN HYDROCHLORIDE 1250 MG: 1.25 INJECTION, POWDER, LYOPHILIZED, FOR SOLUTION INTRAVENOUS at 08:08

## 2022-08-03 RX ADMIN — Medication 25 MCG/HR: at 07:08

## 2022-08-03 RX ADMIN — FAMOTIDINE 20 MG: 10 INJECTION, SOLUTION INTRAVENOUS at 10:08

## 2022-08-03 RX ADMIN — ACETAMINOPHEN 650 MG: 325 TABLET ORAL at 06:08

## 2022-08-03 RX ADMIN — ENOXAPARIN SODIUM 50 MG: 100 INJECTION SUBCUTANEOUS at 10:08

## 2022-08-03 RX ADMIN — PIPERACILLIN AND TAZOBACTAM 4.5 G: 4; .5 INJECTION, POWDER, LYOPHILIZED, FOR SOLUTION INTRAVENOUS; PARENTERAL at 06:08

## 2022-08-03 NOTE — ED NOTES
Received patient from EMS in significant and obvious distress - patient was diagnosed with pneumonia a week ago and assigned home health - when home health nurse went to house today, she found patient in bathtub and obtunded - patient extremely anxious with respirations at 56 - hot to touch - patient grunting with most breaths - unable to hold still

## 2022-08-03 NOTE — ED PROVIDER NOTES
Encounter Date: 8/3/2022       History     Chief Complaint   Patient presents with    Altered Mental Status     Patient was recently discharged from facility for pneumonia and received Home health, home health found patient in tub disoriented and hard to arouse. EMS arrived O2 saturations on RA 87% placed on 4L increased to 91%, +cough +tachy      Stephanie T Barthelemy is a 51 y.o. female who  has a past medical history of ADHD (attention deficit hyperactivity disorder), Anemia, Anxiety, Bipolar disorder, History of hepatitis C - s/p clearance of virus (HCV neg 6/2015) (9/26/2014), History of psychiatric hospitalization, History of substance abuse, psychiatric care, Hypertension, Opioid overdose (5/10/2016), Psychiatric problem, Psychosis, Seizure disorder (4/3/2019), Sleep difficulties, Substance abuse, Therapy, and Vasculitis.    The patient presents to the ED due to altered mental status.   Patient presents via EMS from home.  She was found by home health in the bathtub unresponsive.    On arrival, patient requesting water.    She is a poor historian, and additional history obtained via chart review.     Patient does not use home oxygen.         Review of patient's allergies indicates:  No Known Allergies  Past Medical History:   Diagnosis Date    ADHD (attention deficit hyperactivity disorder)     Anemia     Anxiety     Bipolar disorder     History of hepatitis C - s/p clearance of virus (HCV neg 6/2015) 9/26/2014    History of psychiatric hospitalization     History of substance abuse     IV heroin - last use 2013 per pt    Hx of psychiatric care     Hypertension     Opioid overdose 5/10/2016    Psychiatric problem     Psychosis     Seizure disorder 4/3/2019    Sleep difficulties     Substance abuse     Therapy     Vasculitis      Past Surgical History:   Procedure Laterality Date    HYSTERECTOMY  3/16/2011    SALPINGOOPHORECTOMY Right 3/16/2011    TUBAL LIGATION      Vaginal cuff repair   04/22/2011     Family History   Problem Relation Age of Onset    Diabetes Maternal Grandmother     Cancer Maternal Grandmother      Social History     Tobacco Use    Smoking status: Current Every Day Smoker     Packs/day: 1.00     Years: 30.00     Pack years: 30.00     Start date: 4/15/1993    Smokeless tobacco: Never Used   Substance Use Topics    Alcohol use: No    Drug use: Yes     Types: Heroin, Cocaine, Methamphetamines, Marijuana     Review of Systems   Unable to perform ROS: Mental status change       Physical Exam     Initial Vitals [08/03/22 1701]   BP Pulse Resp Temp SpO2   (!) 166/92 (!) 145 (!) 26 98.8 °F (37.1 °C) (!) 91 %      MAP       --         Physical Exam    Constitutional: She is not diaphoretic. She appears distressed.           ED Course   Intubation    Date/Time: 8/3/2022 7:10 PM  Location procedure was performed: [facility] EMERGENCY DEPARTMENT  Performed by: Lucho Blue MD  Authorized by: Lucho Blue MD   Consent Done: Emergent Situation  Indications: respiratory distress and  airway protection  Intubation method: direct  Patient status: paralyzed (RSI)  Preoxygenation: bag valve mask  Sedatives: etomidate  Paralytic: succinylcholine  Laryngoscope size: Mac 4  Tube size: 7.5 mm  Tube type: cuffed  Number of attempts: 1  Cricoid pressure: yes  Cords visualized: yes  Post-procedure assessment: chest rise and CO2 detector  Breath sounds: rales/crackles (symmetric)  Cuff inflated: yes  ETT to lip: 22 cm  Tube secured with: ETT aguirre  Chest x-ray interpreted by me and radiologist.  Chest x-ray findings: endotracheal tube too high (findings relayed to admitting team)  Patient tolerance: Patient tolerated the procedure well with no immediate complications  Complications: No        Labs Reviewed   CBC W/ AUTO DIFFERENTIAL - Abnormal; Notable for the following components:       Result Value    Hemoglobin 11.5 (*)     MCHC 30.7 (*)     RDW 15.6 (*)     Gran % 77.6 (*)     Lymph % 13.6 (*)      All other components within normal limits   COMPREHENSIVE METABOLIC PANEL - Abnormal; Notable for the following components:    Albumin 3.4 (*)     AST 64 (*)     ALT 49 (*)     All other components within normal limits   URINALYSIS, REFLEX TO URINE CULTURE - Abnormal; Notable for the following components:    Protein, UA 1+ (*)     Leukocytes, UA Trace (*)     All other components within normal limits    Narrative:     Specimen Source->Urine   DRUG SCREEN PANEL, URINE EMERGENCY - Abnormal; Notable for the following components:    Amphetamine Screen, Ur Presumptive Positive (*)     All other components within normal limits    Narrative:     Specimen Source->Urine   ACETAMINOPHEN LEVEL - Abnormal; Notable for the following components:    Acetaminophen (Tylenol), Serum <3.0 (*)     All other components within normal limits   SARS-COV-2 RNA AMPLIFICATION, QUAL - Abnormal; Notable for the following components:    SARS-CoV-2 RNA, Amplification, Qual Positive (*)     All other components within normal limits   TROPONIN I - Abnormal; Notable for the following components:    Troponin I 0.034 (*)     All other components within normal limits   B-TYPE NATRIURETIC PEPTIDE - Abnormal; Notable for the following components:     (*)     All other components within normal limits   C-REACTIVE PROTEIN - Abnormal; Notable for the following components:    CRP 17.3 (*)     All other components within normal limits   D DIMER, QUANTITATIVE - Abnormal; Notable for the following components:    D-Dimer 2.38 (*)     All other components within normal limits   TROPONIN I - Abnormal; Notable for the following components:    Troponin I 0.052 (*)     All other components within normal limits   ISTAT PROCEDURE - Abnormal; Notable for the following components:    POC PCO2 50.2 (*)     POC PO2 37 (*)     POC HCO3 28.5 (*)     POC SATURATED O2 68 (*)     POC TCO2 30 (*)     POC Hematocrit 35 (*)     All other components within normal limits    ISTAT PROCEDURE - Abnormal; Notable for the following components:    POC PH 7.261 (*)     POC PCO2 59.2 (*)     POC PO2 203 (*)     POC TCO2 28 (*)     All other components within normal limits   INFLUENZA A & B BY MOLECULAR   CULTURE, BLOOD   CULTURE, BLOOD   TSH   ALCOHOL,MEDICAL (ETHANOL)   LACTIC ACID, PLASMA   FERRITIN   CK   PROCALCITONIN   URINALYSIS MICROSCOPIC    Narrative:     Specimen Source->Urine     EKG Readings: (Independently Interpreted)   Initial Reading: No STEMI. Previous EKG: Compared with most recent EKG Rhythm: Sinus Tachycardia.   Sinus tach, rate 131, significant artifact, LVH with nonspecific ST changes, no obvious STEMI.  Compared with prior from 07/2022, rate has increased, nonspecific ST changes are more pronounced.   Other EKG Interpretations:     Repeat EKG:  Sinus tach, rate 138, LVH, nonspecific ST changes, no ST elevations or other signs of ischemia, prolonged QTC.  Compared to prior, rate has increased.    Repeat EKG:  Sinus tach, rate 131, nonspecific ST changes, no ST elevations or other signs of ischemia, normal intervals.  Compared to prior, QT prolongation has improved.       Imaging Results          XR Non-Rad Performed NG/Gastric Tube Check (Final result)  Result time 08/03/22 20:13:17   Procedure changed from X-Ray Abdomen AP 1 View (KUB)     Final result by Trung Wahl MD (08/03/22 20:13:17)                 Impression:      As above      Electronically signed by: Trung Wahl MD  Date:    08/03/2022  Time:    20:13             Narrative:    EXAMINATION:  XR NON-RADIOLOGIST PERFORMED NG/GASTRIC TUBE CHECK    CLINICAL HISTORY:  og placement;  Onychogryphosis    COMPARISON:  04/22/2011, CTA chest 07/22/2022    FINDINGS:  Single-view abdomen supine.    Nasogastric tube tip and side hole projects over the expected location of the gastric lumen.  The bowel gas pattern is nonobstructive.  Please see previously dictated CT in regard to the pulmonary process.                                X-Ray Chest AP Portable (Final result)  Result time 08/03/22 20:09:14    Final result by Bruna Manuel MD (08/03/22 20:09:14)                 Impression:      Nonspecific bilateral airspace opacities.  Findings may suggest multifocal pneumonia or edema.  Recommend clinical correlation.    Probable small pleural effusions.      Electronically signed by: Bruna Manuel  Date:    08/03/2022  Time:    20:09             Narrative:    EXAMINATION:  AP PORTABLE CHEST    CLINICAL HISTORY:  shortness of breath;    TECHNIQUE:  AP portable chest radiograph was submitted.    COMPARISON:  07/21/2022    FINDINGS:  The endotracheal tube is approximately 5.2 cm above the kwabena.  There is a feeding tube courses towards the left upper quadrant with the tip excluded from the film.  AP portable chest radiograph demonstrates a cardiac silhouette within normal limits.  Bilateral airspace opacities are present.  There are probable small pleural effusions.  There is no pneumothorax or significant pleural fluid.                                 Medications   fentaNYL 2500 mcg in 0.9% sodium chloride 250 mL infusion premix (titrating) (75 mcg/hr Intravenous Verify Only 8/3/22 2200)   midazolam (VERSED) 1 mg/mL in dextrose 5 % 100 mL infusion (titrating) (1.5 mg/hr Intravenous Verify Only 8/3/22 2200)   sodium chloride 0.9% flush 10 mL (has no administration in time range)   ondansetron injection 4 mg (has no administration in time range)   famotidine (PF) injection 20 mg (20 mg Intravenous Given 8/3/22 2242)   sodium chloride 0.9% flush 10 mL (has no administration in time range)   remdesivir 200 mg in sodium chloride 0.9% 250 mL infusion (has no administration in time range)   remdesivir 100 mg in sodium chloride 0.9% 250 mL infusion (has no administration in time range)   glucose chewable tablet 16 g (has no administration in time range)   glucose chewable tablet 24 g (has no administration in time range)    glucagon (human recombinant) injection 1 mg (has no administration in time range)   albuterol inhaler 2 puff (has no administration in time range)   ascorbic acid (vitamin C) tablet 500 mg (500 mg Oral Given 8/3/22 2242)   multivitamin tablet (has no administration in time range)   dexAMETHasone tablet 6 mg (has no administration in time range)   enoxaparin injection 50 mg (50 mg Subcutaneous Given 8/3/22 2242)   vancomycin - pharmacy to dose (has no administration in time range)   piperacillin-tazobactam 4.5 g in dextrose 5 % 100 mL IVPB (ready to mix system) (has no administration in time range)   dextrose 10% bolus 125 mL (has no administration in time range)   dextrose 10% bolus 250 mL (has no administration in time range)   vancomycin in dextrose 5 % 1 gram/250 mL IVPB 1,000 mg (has no administration in time range)   0.9%  NaCl infusion (has no administration in time range)   potassium chloride 10 mEq in 100 mL IVPB (has no administration in time range)   potassium chloride SA CR tablet 30 mEq (has no administration in time range)   mupirocin 2 % ointment (has no administration in time range)   piperacillin-tazobactam 4.5 g in dextrose 5 % 100 mL IVPB (ready to mix system) (4.5 g Intravenous New Bag 8/3/22 1842)   vancomycin 1.25 g in dextrose 5% 250 mL IVPB (ready to mix) (0 mg Intravenous Stopped 8/3/22 2147)   lorazepam (ATIVAN) injection 0.5 mg (0.5 mg Intravenous Given 8/3/22 1754)   acetaminophen tablet 650 mg (650 mg Oral Given 8/3/22 1842)   albuterol sulfate nebulizer solution 15 mg (15 mg Nebulization Given 8/3/22 1746)   ipratropium 0.02 % nebulizer solution 1 mg (1 mg Nebulization Given 8/3/22 1746)   dexamethasone injection 6 mg (6 mg Intravenous Given 8/3/22 1842)   haloperidol lactate injection 10 mg (10 mg Intramuscular Given 8/3/22 1857)   lorazepam injection 2 mg (2 mg Intravenous Given 8/3/22 1856)   etomidate injection 20 mg (20 mg Intravenous Given 8/3/22 1907)   succinylcholine injection  80 mg (80 mg Intravenous Given 8/3/22 1907)   acetaminophen suppository 325 mg (325 mg Rectal Given 8/3/22 2029)     Medical Decision Making:   History:   Old Medical Records: I decided to obtain old medical records.  Old Records Summarized: records from clinic visits and other records.       <> Summary of Records: Patient was admitted 07/21 to 7/26 due to new onset CHF.  Ultimately discharged on antibiotics and Lasix.  Initial Assessment:   50 yo F presents to ED via EMS from home due to AMS. Found in bathtub unresponsive.   On arrival, awake and alert, requesting water.  Hypoxic to 87% on RA, currently 91% on 4 L NC.  Will obtain labs, CXR, give IV ABX, and continue to monitor.   Differential Diagnosis:   Differential Diagnosis includes, but is not limited to:  CVA/TIA, seizure, status epilepticus, post-ictal state, meningitis/encephalitis, sepsis, MI/ACS, arrhythmia, syncope, intracranial mass/hemorrhage, head trauma, anaphylaxis, substance abuse, alcohol intoxication/withdrawal, medication reaction, intentional medication overdose, neuroleptic malignant syndrome, serotonin syndrome, CO poisoning, hypoxia/hypercapnea, hepatic encephalopathy, metabolic disturbance, thyroid disease, hypoglycemia.    Clinical Tests:   Lab Tests: Ordered and Reviewed  Radiological Study: Reviewed and Ordered  Medical Tests: Ordered and Reviewed  ED Management:  After complete ED evaluation, including thorough history, physical exam, and labs/imaging, patient's presentation is most consistent with viral respiratory illness due to COVID-19 infection during the current pandemic.    Labs unremarkable.  CXR with bilateral interstitial opacities most consistent with viral process.  Patient required intubation for airway protection.  Considering patient's persistent hypoxia, severe symptoms, and co-morbidities, patient is high risk for decompensation and complications including worsening hypoxia, respiratory failure, intubation, or  "cardiopulmonary arrest if discharged.  At this time, I feel the patient would benefit from admission for further management of COVID-19 infection, including oxygen supplementation in the hospital and supportive measures until symptoms improve and oxygen requirement returns to baseline.       On re-evaluation, the patient's status has remained critical.  At this time, I believe the patient should be admitted to the hospital for further evaluation and management of COVID infection, hypoxia, respiratory failure.  [facility] was contacted and the case was discussed.   The consulting physician/team agrees with plan and will admit under their service.   The patient and family were updated with test results, overall impression, and further plan of care. All questions were answered. The patient expressed understanding and agrees with the current plan.              Attending Attestation:         Attending Critical Care:   Critical Care Times:   ==============================================================  · Total Critical Care Time - exclusive of procedural time: 120 minutes.  ==============================================================  Critical care was necessary to treat or prevent imminent or life-threatening deterioration of the following conditions: respiratory failure and sepsis.   Critical Care Condition: critical           ED Course as of 08/04/22 0020   Wed Aug 03, 2022   1747 Initial temperature normal, however, after being roomed, repeat temp is now 102.5 F.  Coupled with hypoxia, tachycardia, concern for sepsis now higher on differential diagnosis.   Patient does not use home oxygen.  Sepsis order set utilized, blood cultures, lactate, IV antibiotics ordered.  Will continue to monitor. [SS]   1803 COVID +.  Patient's respirations increasingly labored, will start trial of BiPAP. [SS]   1854 Patient is now agitated, repeatedly screaming "I want to go to the bathroom, I need water and I want to " "eat!"  Attempted to verbally deescalate, patient repeatedly screaming inarticulate sounds at the top of her lungs, no longer able to redirect or verbally deescalate.  Haldol and Ativan ordered.  Due to persistent hypoxia and COVID infection, will prepare to intubate for airway protection. [SS]   1911 Patient received Haldol and Ativan, remained agitated, restless, continually attempting to get out of the bed and screaming incoherently.  Patient intubated with RSI and DL.  Postprocedure CXR ordered.  Labs reviewed, unremarkable.  [facility] to admit. [SS]      ED Course User Index  [SS] Lucho Blue MD             Clinical Impression:   Final diagnoses:  [Z00.8] Medical clearance for psychiatric admission  [G93.40] Acute encephalopathy  [A41.9] Sepsis, due to unspecified organism, unspecified whether acute organ dysfunction present  [J18.9] Pneumonia of left lower lobe due to infectious organism  [J96.01] Acute respiratory failure with hypoxia (Primary)  [U07.1] COVID-19 virus infection  [F23] Acute psychosis  [R45.1] Agitation  [L60.2] OG (onychogryphosis)  [F15.10] Amphetamine abuse          ED Disposition Condition    Admit               Lucho Blue MD  08/04/22 0021    "

## 2022-08-04 PROBLEM — J96.02 ACUTE RESPIRATORY FAILURE WITH HYPOXIA AND HYPERCAPNIA: Status: ACTIVE | Noted: 2021-08-29

## 2022-08-04 PROBLEM — I50.42 CHRONIC COMBINED SYSTOLIC AND DIASTOLIC HEART FAILURE: Status: ACTIVE | Noted: 2022-07-22

## 2022-08-04 PROBLEM — U07.1 COVID-19: Status: ACTIVE | Noted: 2022-08-04

## 2022-08-04 PROBLEM — J96.01 ACUTE RESPIRATORY FAILURE WITH HYPOXIA AND HYPERCAPNIA: Status: ACTIVE | Noted: 2021-08-29

## 2022-08-04 LAB
ALBUMIN SERPL BCP-MCNC: 2.4 G/DL (ref 3.5–5.2)
ALP SERPL-CCNC: 106 U/L (ref 55–135)
ALT SERPL W/O P-5'-P-CCNC: 56 U/L (ref 10–44)
ANION GAP SERPL CALC-SCNC: 6 MMOL/L (ref 8–16)
APTT BLDCRRT: 27.9 SEC (ref 21–32)
AST SERPL-CCNC: 76 U/L (ref 10–40)
BASOPHILS # BLD AUTO: 0 K/UL (ref 0–0.2)
BASOPHILS NFR BLD: 0 % (ref 0–1.9)
BILIRUB SERPL-MCNC: 0.5 MG/DL (ref 0.1–1)
BUN SERPL-MCNC: 15 MG/DL (ref 6–20)
CALCIUM SERPL-MCNC: 8.1 MG/DL (ref 8.7–10.5)
CHLORIDE SERPL-SCNC: 108 MMOL/L (ref 95–110)
CO2 SERPL-SCNC: 26 MMOL/L (ref 23–29)
CREAT SERPL-MCNC: 0.8 MG/DL (ref 0.5–1.4)
DIFFERENTIAL METHOD: ABNORMAL
EOSINOPHIL # BLD AUTO: 0 K/UL (ref 0–0.5)
EOSINOPHIL NFR BLD: 0 % (ref 0–8)
ERYTHROCYTE [DISTWIDTH] IN BLOOD BY AUTOMATED COUNT: 15.3 % (ref 11.5–14.5)
ERYTHROCYTE [SEDIMENTATION RATE] IN BLOOD BY WESTERGREN METHOD: 29 MM/HR (ref 0–20)
EST. GFR  (NO RACE VARIABLE): >60 ML/MIN/1.73 M^2
GLUCOSE SERPL-MCNC: 120 MG/DL (ref 70–110)
HCT VFR BLD AUTO: 31.4 % (ref 37–48.5)
HGB BLD-MCNC: 9.7 G/DL (ref 12–16)
IMM GRANULOCYTES # BLD AUTO: 0.01 K/UL (ref 0–0.04)
IMM GRANULOCYTES NFR BLD AUTO: 0.2 % (ref 0–0.5)
INR PPP: 1.1 (ref 0.8–1.2)
LDH SERPL L TO P-CCNC: 254 U/L (ref 110–260)
LYMPHOCYTES # BLD AUTO: 0.4 K/UL (ref 1–4.8)
LYMPHOCYTES NFR BLD: 8.4 % (ref 18–48)
MCH RBC QN AUTO: 27.2 PG (ref 27–31)
MCHC RBC AUTO-ENTMCNC: 30.9 G/DL (ref 32–36)
MCV RBC AUTO: 88 FL (ref 82–98)
MONOCYTES # BLD AUTO: 0.3 K/UL (ref 0.3–1)
MONOCYTES NFR BLD: 5.6 % (ref 4–15)
NEUTROPHILS # BLD AUTO: 4.4 K/UL (ref 1.8–7.7)
NEUTROPHILS NFR BLD: 85.8 % (ref 38–73)
NRBC BLD-RTO: 0 /100 WBC
PLATELET # BLD AUTO: 153 K/UL (ref 150–450)
PMV BLD AUTO: 10.9 FL (ref 9.2–12.9)
POTASSIUM SERPL-SCNC: 4.3 MMOL/L (ref 3.5–5.1)
PROT SERPL-MCNC: 5.3 G/DL (ref 6–8.4)
PROTHROMBIN TIME: 11.7 SEC (ref 9–12.5)
RBC # BLD AUTO: 3.56 M/UL (ref 4–5.4)
SODIUM SERPL-SCNC: 140 MMOL/L (ref 136–145)
TROPONIN I SERPL DL<=0.01 NG/ML-MCNC: 0.05 NG/ML (ref 0–0.03)
TROPONIN I SERPL DL<=0.01 NG/ML-MCNC: 0.07 NG/ML (ref 0–0.03)
WBC # BLD AUTO: 5.14 K/UL (ref 3.9–12.7)

## 2022-08-04 PROCEDURE — 99900035 HC TECH TIME PER 15 MIN (STAT)

## 2022-08-04 PROCEDURE — 94640 AIRWAY INHALATION TREATMENT: CPT

## 2022-08-04 PROCEDURE — 25000003 PHARM REV CODE 250: Performed by: INTERNAL MEDICINE

## 2022-08-04 PROCEDURE — 25000003 PHARM REV CODE 250: Performed by: EMERGENCY MEDICINE

## 2022-08-04 PROCEDURE — 63600175 PHARM REV CODE 636 W HCPCS: Performed by: NURSE PRACTITIONER

## 2022-08-04 PROCEDURE — 85610 PROTHROMBIN TIME: CPT | Performed by: NURSE PRACTITIONER

## 2022-08-04 PROCEDURE — 27200966 HC CLOSED SUCTION SYSTEM

## 2022-08-04 PROCEDURE — 80053 COMPREHEN METABOLIC PANEL: CPT | Performed by: NURSE PRACTITIONER

## 2022-08-04 PROCEDURE — 63600175 PHARM REV CODE 636 W HCPCS: Performed by: EMERGENCY MEDICINE

## 2022-08-04 PROCEDURE — 36415 COLL VENOUS BLD VENIPUNCTURE: CPT | Performed by: NURSE PRACTITIONER

## 2022-08-04 PROCEDURE — 94761 N-INVAS EAR/PLS OXIMETRY MLT: CPT

## 2022-08-04 PROCEDURE — 84484 ASSAY OF TROPONIN QUANT: CPT | Performed by: NURSE PRACTITIONER

## 2022-08-04 PROCEDURE — 83615 LACTATE (LD) (LDH) ENZYME: CPT | Performed by: NURSE PRACTITIONER

## 2022-08-04 PROCEDURE — 20000000 HC ICU ROOM

## 2022-08-04 PROCEDURE — 94003 VENT MGMT INPAT SUBQ DAY: CPT

## 2022-08-04 PROCEDURE — 85730 THROMBOPLASTIN TIME PARTIAL: CPT | Performed by: NURSE PRACTITIONER

## 2022-08-04 PROCEDURE — 84484 ASSAY OF TROPONIN QUANT: CPT | Mod: 91 | Performed by: NURSE PRACTITIONER

## 2022-08-04 PROCEDURE — 25000242 PHARM REV CODE 250 ALT 637 W/ HCPCS: Performed by: NURSE PRACTITIONER

## 2022-08-04 PROCEDURE — 27000207 HC ISOLATION

## 2022-08-04 PROCEDURE — 25000003 PHARM REV CODE 250: Performed by: NURSE PRACTITIONER

## 2022-08-04 PROCEDURE — 85652 RBC SED RATE AUTOMATED: CPT | Performed by: NURSE PRACTITIONER

## 2022-08-04 PROCEDURE — 27000221 HC OXYGEN, UP TO 24 HOURS

## 2022-08-04 PROCEDURE — 85025 COMPLETE CBC W/AUTO DIFF WBC: CPT | Performed by: NURSE PRACTITIONER

## 2022-08-04 RX ORDER — NOREPINEPHRINE BITARTRATE/D5W 4MG/250ML
PLASTIC BAG, INJECTION (ML) INTRAVENOUS
Status: DISPENSED
Start: 2022-08-04 | End: 2022-08-04

## 2022-08-04 RX ORDER — POTASSIUM CHLORIDE 7.45 MG/ML
10 INJECTION INTRAVENOUS
Status: COMPLETED | OUTPATIENT
Start: 2022-08-04 | End: 2022-08-04

## 2022-08-04 RX ORDER — LORAZEPAM 2 MG/ML
2 INJECTION INTRAMUSCULAR
Status: DISCONTINUED | OUTPATIENT
Start: 2022-08-04 | End: 2022-08-05

## 2022-08-04 RX ORDER — MUPIROCIN 20 MG/G
OINTMENT TOPICAL 2 TIMES DAILY
Status: COMPLETED | OUTPATIENT
Start: 2022-08-04 | End: 2022-08-08

## 2022-08-04 RX ORDER — ENOXAPARIN SODIUM 100 MG/ML
40 INJECTION SUBCUTANEOUS EVERY 24 HOURS
Status: DISCONTINUED | OUTPATIENT
Start: 2022-08-04 | End: 2022-08-11 | Stop reason: HOSPADM

## 2022-08-04 RX ORDER — POTASSIUM CHLORIDE 750 MG/1
30 TABLET, EXTENDED RELEASE ORAL ONCE
Status: COMPLETED | OUTPATIENT
Start: 2022-08-04 | End: 2022-08-04

## 2022-08-04 RX ORDER — SODIUM CHLORIDE 9 MG/ML
INJECTION, SOLUTION INTRAVENOUS CONTINUOUS
Status: DISCONTINUED | OUTPATIENT
Start: 2022-08-04 | End: 2022-08-10

## 2022-08-04 RX ADMIN — SODIUM CHLORIDE: 0.9 INJECTION, SOLUTION INTRAVENOUS at 01:08

## 2022-08-04 RX ADMIN — LORAZEPAM 2 MG: 2 INJECTION INTRAMUSCULAR; INTRAVENOUS at 08:08

## 2022-08-04 RX ADMIN — OXYCODONE HYDROCHLORIDE AND ACETAMINOPHEN 500 MG: 500 TABLET ORAL at 08:08

## 2022-08-04 RX ADMIN — PIPERACILLIN AND TAZOBACTAM 4.5 G: 4; .5 INJECTION, POWDER, LYOPHILIZED, FOR SOLUTION INTRAVENOUS; PARENTERAL at 09:08

## 2022-08-04 RX ADMIN — ALBUTEROL SULFATE 2 PUFF: 90 AEROSOL, METERED RESPIRATORY (INHALATION) at 01:08

## 2022-08-04 RX ADMIN — POTASSIUM CHLORIDE 10 MEQ: 7.46 INJECTION, SOLUTION INTRAVENOUS at 03:08

## 2022-08-04 RX ADMIN — MUPIROCIN: 20 OINTMENT TOPICAL at 08:08

## 2022-08-04 RX ADMIN — POTASSIUM CHLORIDE 30 MEQ: 750 TABLET, EXTENDED RELEASE ORAL at 12:08

## 2022-08-04 RX ADMIN — FAMOTIDINE 20 MG: 10 INJECTION, SOLUTION INTRAVENOUS at 08:08

## 2022-08-04 RX ADMIN — ALBUTEROL SULFATE 2 PUFF: 90 AEROSOL, METERED RESPIRATORY (INHALATION) at 07:08

## 2022-08-04 RX ADMIN — ENOXAPARIN SODIUM 50 MG: 100 INJECTION SUBCUTANEOUS at 08:08

## 2022-08-04 RX ADMIN — VANCOMYCIN HYDROCHLORIDE 1000 MG: 1 INJECTION, POWDER, LYOPHILIZED, FOR SOLUTION INTRAVENOUS at 09:08

## 2022-08-04 RX ADMIN — PIPERACILLIN AND TAZOBACTAM 4.5 G: 4; .5 INJECTION, POWDER, LYOPHILIZED, FOR SOLUTION INTRAVENOUS; PARENTERAL at 01:08

## 2022-08-04 RX ADMIN — REMDESIVIR 100 MG: 100 INJECTION, POWDER, LYOPHILIZED, FOR SOLUTION INTRAVENOUS at 09:08

## 2022-08-04 RX ADMIN — DEXAMETHASONE 6 MG: 4 TABLET ORAL at 08:08

## 2022-08-04 RX ADMIN — POTASSIUM CHLORIDE 10 MEQ: 7.46 INJECTION, SOLUTION INTRAVENOUS at 01:08

## 2022-08-04 RX ADMIN — SODIUM CHLORIDE: 0.9 INJECTION, SOLUTION INTRAVENOUS at 12:08

## 2022-08-04 RX ADMIN — Medication 1 TABLET: at 08:08

## 2022-08-04 RX ADMIN — MUPIROCIN: 20 OINTMENT TOPICAL at 09:08

## 2022-08-04 RX ADMIN — POTASSIUM CHLORIDE 10 MEQ: 7.46 INJECTION, SOLUTION INTRAVENOUS at 04:08

## 2022-08-04 RX ADMIN — PIPERACILLIN AND TAZOBACTAM 4.5 G: 4; .5 INJECTION, POWDER, LYOPHILIZED, FOR SOLUTION INTRAVENOUS; PARENTERAL at 05:08

## 2022-08-04 RX ADMIN — REMDESIVIR 200 MG: 100 INJECTION, POWDER, LYOPHILIZED, FOR SOLUTION INTRAVENOUS at 12:08

## 2022-08-04 NOTE — ED NOTES
Patient very diaphoretic. Gown and sheets soaked. MD notified. Temperature 102.8 axillary. MD notified. Orders received.

## 2022-08-04 NOTE — NURSING
Patient arrived to ICU room 558 via stretcher. Patient intubated, sedated on Fentanyl at 75 mcg/hr, Versed at 1.5 mg/hr. In bilateral soft wrist restraints. OG tube and Padgett in place. ET tube at 22 at the lips. PIV infusing. Patient has multiple tattoos and piercing in the abdomen. Belongings at the bedside. KAVEH Bal at bedside.

## 2022-08-04 NOTE — PLAN OF CARE
Pt received on BIPAP 15/8/80%. Pt was in mild distress at time. Pt became combative, and was in severe distress. Pt was intubated with 7.5 ETT secured 22cm @teeth. Pt was placed on vent AC/VC 12/450/+5/75%. Suctioned large frothy thick secretions. ABG obtained and reported to DR Mejia. RR rate was increased to 20 via vent. Pt was transferred to ICU at 2056.

## 2022-08-04 NOTE — ASSESSMENT & PLAN NOTE
-Patient is not chronically on statin.will not continue for now. Monitor clinically. Last LDL was   Lab Results   Component Value Date    LDLCALC 114.2 07/22/2022

## 2022-08-04 NOTE — NURSING
Per Xray note ETT 5.2 cm above kwabena. NP Valeriano notified. Orders to advance ET tube 2 cm. RT Katy notified.

## 2022-08-04 NOTE — ASSESSMENT & PLAN NOTE
Chronic, Latest blood pressure and vitals reviewed-   Temp:  [96.62 °F (35.9 °C)-102.8 °F (39.3 °C)]   Pulse:  []   Resp:  [6-70]   BP: ()/()   SpO2:  [54 %-100 %] .   Home meds for hypertension were reviewed and noted below.   Hypertension Medications             furosemide (LASIX) 40 MG tablet Take 1 tablet (40 mg total) by mouth once daily.    lisinopriL 10 MG tablet Take 1 tablet (10 mg total) by mouth once daily.    metoprolol succinate (TOPROL-XL) 25 MG 24 hr tablet Take 0.5 tablets (12.5 mg total) by mouth once daily.      -Resume home BP medications when stable in setting of soft BP  -Monitor and trend vital signs q4hr  -Will utilize p.r.n. blood pressure medication only if patient's blood pressure greater than  180/110 and she develops symptoms such as worsening chest pain or shortness of breath.

## 2022-08-04 NOTE — ASSESSMENT & PLAN NOTE
Attention deficit hyperactivity disorder (ADHD), combined type  -hold home meds until patient is stable

## 2022-08-04 NOTE — ASSESSMENT & PLAN NOTE
- Last ECHO results:   - Results for orders placed during the hospital encounter of 07/21/22  · The left ventricle is mildly enlarged with concentric hypertrophy and severely decreased systolic function.  · The estimated ejection fraction is 20%.  · There is severe left ventricular global hypokinesis.  · Grade III left ventricular diastolic dysfunction.  · Mild right ventricular enlargement with mildly reduced right ventricular systolic function.  · Severe left atrial enlargement.  · Moderate aortic regurgitation.  · Severe mitral regurgitation.  · Mild tricuspid regurgitation.  · Moderate pulmonic regurgitation.  · Intermediate central venous pressure (8 mmHg).  · The estimated PA systolic pressure is 33 mmHg.  · Trivial pericardial effusion.  - Troponin 0.034-0.052; likely demand; will continue to monitor and trend  - CXR revealed Nonspecific bilateral airspace opacities.  Findings may suggest multifocal pneumonia or edema. Recommend clinical correlation. Probable small pleural effusions.  - Does not appear clinically volume overload at this time  - Continue home BB, ACEi/ARB when stable  - Daily weights and strict I/Os  - Monitor on telemetry  - Monitor and trend BMP, Mg, and renal function; keep K >4, Mg >2  - Sodium restriction (<2g/d), fluid restriction (<2L)   - Monitor for signs of fluid overload: RR>30, O2 sat<92%, weight gain of >3 lbs in 24 hours, or urinary output <160ml/8hr

## 2022-08-04 NOTE — PLAN OF CARE
The pt's current with [facility],the  faxed them the pt's info via [employer] notifying them she's currently in the hospital.        08/04/22 0919   Post-Acute Status   Post-Acute Authorization Home Health   Home Health Status Referrals Sent  ([facility])   Discharge Delays None known at this time   Discharge Plan   Discharge Plan A Home Health   Discharge Plan B Other  (TBD)

## 2022-08-04 NOTE — ASSESSMENT & PLAN NOTE
Polysubstance abuse  - UDS positive  - Monitor for withdrawal  - May require psych consult upon extubation and stabilization

## 2022-08-04 NOTE — RESPIRATORY THERAPY
ETT advanced from 22cm to 24cm at teeth, patient tolerated well.     ABG results @ 2320:  Ph 7.40  Pco2 41.6  Po2 100  Be 1  Hco3 26.1

## 2022-08-04 NOTE — ASSESSMENT & PLAN NOTE
Encephalopathy, metabolic  COVID +  - Recently treated for aspiration pneumonia and new onset CHF at this facility  - Found down at home by home health, patient in tub disoriented and hard to arouse. EMS arrived O2 saturations on RA 87% placed on 4L increased to 91%, +cough +tachy   - Patient with acute hypercapnic and hypoxic respiratory failure    - Intubated and vent supported  - Found to be COVID +  - Continue empiric Zosyn and Vancomycin  - Continue remdesivir and dexamethasone, day #2  - SBT in progress  - As per Pulmonary/Critical care

## 2022-08-04 NOTE — H&P
[facility] Medicine  History & Physical    Patient Name: Stephanie T Barthelemy  MRN: [id number]  Patient Class: IP- Inpatient  Admission Date: 8/3/2022  Attending Physician: Rip Kunz MD   Primary Care Provider: No primary care provider on file.         Patient information was obtained from EMS personnel, past medical records and ER records.     Subjective:     Principal Problem:Acute respiratory failure with hypoxia and hypercapnia    Chief Complaint:   Chief Complaint   Patient presents with    Altered Mental Status     Patient was recently discharged from facility for pneumonia and received Home health, home health found patient in tub disoriented and hard to arouse. EMS arrived O2 saturations on RA 87% placed on 4L increased to 91%, +cough +tachy         HPI: Stephanie T Barthelemy is a 51 y.o. female who has a past medical history of ADHD (attention deficit hyperactivity disorder), Anemia, Anxiety, Bipolar disorder, History of hepatitis C - s/p clearance of virus (HCV neg 6/2015) (9/26/2014), History of psychiatric hospitalization, History of substance abuse, psychiatric care, Hypertension, Opioid overdose (5/10/2016), Psychiatric problem, Psychosis, Seizure disorder (4/3/2019), Sleep difficulties, Substance abuse, Therapy, and Vasculitis. She presented to the ED for altered mental status. She was brought in by EMS after found down at home by home health in bath tub unresponsive. Of note patient was just admitted for pneumonia and new onset CHF.     In the ED: She was found COVID-19 positive. UDS positive for amphetamines. Latest EKG with Sinus tach, rate 131, nonspecific ST changes, no ST elevations or other signs of ischemia, normal intervals. Compared to prior, QT prolongation has improved. CXR with diffuse interstitial opacities most consistent with viral process. Patient required intubation for airway protection. Considering patient's persistent hypoxia, severe symptoms, and  co-morbidities, patient is high risk for decompensation and complications including worsening hypoxia, respiratory failure, intubation, or cardiopulmonary arrest if discharged. Admitted to [facility] for further care.      Past Medical History:   Diagnosis Date    ADHD (attention deficit hyperactivity disorder)     Anemia     Anxiety     Bipolar disorder     History of hepatitis C - s/p clearance of virus (HCV neg 6/2015) 9/26/2014    History of psychiatric hospitalization     History of substance abuse     IV heroin - last use 2013 per pt    Hx of psychiatric care     Hypertension     Opioid overdose 5/10/2016    Psychiatric problem     Psychosis     Seizure disorder 4/3/2019    Sleep difficulties     Substance abuse     Therapy     Vasculitis        Past Surgical History:   Procedure Laterality Date    HYSTERECTOMY  3/16/2011    SALPINGOOPHORECTOMY Right 3/16/2011    TUBAL LIGATION      Vaginal cuff repair  04/22/2011       Review of patient's allergies indicates:  No Known Allergies    No current facility-administered medications on file prior to encounter.     Current Outpatient Medications on File Prior to Encounter   Medication Sig    benztropine (COGENTIN) 1 MG tablet Take 1 tablet (1 mg total) by mouth 2 (two) times daily.    furosemide (LASIX) 40 MG tablet Take 1 tablet (40 mg total) by mouth once daily.    hydrOXYzine pamoate (VISTARIL) 25 MG Cap Take 1 capsule (25 mg total) by mouth every 8 (eight) hours as needed (insomnia, agitation).    lisinopriL 10 MG tablet Take 1 tablet (10 mg total) by mouth once daily.    metoprolol succinate (TOPROL-XL) 25 MG 24 hr tablet Take 0.5 tablets (12.5 mg total) by mouth once daily.    OXcarbazepine (TRILEPTAL) 300 MG Tab Take 1 tablet (300 mg total) by mouth 2 (two) times daily.    QUEtiapine (SEROQUEL) 100 MG Tab Take 1 tablet (100 mg total) by mouth every evening.    risperiDONE (RISPERDAL) 1 MG tablet Take 1 mg by mouth 2  (two) times daily.    [DISCONTINUED] amLODIPine (NORVASC) 5 MG tablet Take 1 tablet (5 mg total) by mouth once daily.    [DISCONTINUED] FLUoxetine 20 MG capsule Take 1 capsule (20 mg total) by mouth once daily.    [DISCONTINUED] gabapentin (NEURONTIN) 300 MG capsule Take 1 capsule (300 mg total) by mouth 3 (three) times daily.    [DISCONTINUED] propranoloL (INDERAL) 10 MG tablet Take 1 tablet (10 mg total) by mouth 3 (three) times daily.     Family History       Problem Relation (Age of Onset)    Cancer Maternal Grandmother    Diabetes Maternal Grandmother          Tobacco Use    Smoking status: Current Every Day Smoker     Packs/day: 1.00     Years: 30.00     Pack years: 30.00     Start date: 4/15/1993    Smokeless tobacco: Never Used   Substance and Sexual Activity    Alcohol use: No    Drug use: Yes     Types: Heroin, Cocaine, Methamphetamines, Marijuana    Sexual activity: Not Currently     Partners: Male, Female     Birth control/protection: Other-see comments     Comment: hysterectomy     Review of Systems   Unable to perform ROS: Intubated   Objective:     Vital Signs (Most Recent):  Temp: 96.8 °F (36 °C) (08/03/22 2323)  Pulse: 81 (08/03/22 2323)  Resp: 20 (08/03/22 2323)  BP: (!) 91/51 (08/03/22 2230)  SpO2: 99 % (08/03/22 2323)   Vital Signs (24h Range):  Temp:  [96.62 °F (35.9 °C)-102.8 °F (39.3 °C)] 96.8 °F (36 °C)  Pulse:  [] 81  Resp:  [6-70] 20  SpO2:  [54 %-100 %] 99 %  BP: ()/() 91/51     Weight: 51 kg (112 lb 7 oz)  Body mass index is 21.96 kg/m².    Physical Exam  Vitals and nursing note reviewed.   Constitutional:       Appearance: She is not toxic-appearing.      Interventions: She is intubated.   HENT:      Head: Normocephalic and atraumatic.      Mouth/Throat:      Mouth: Mucous membranes are dry.   Eyes:      Extraocular Movements: Extraocular movements intact.      Pupils: Pupils are equal, round, and reactive to light.   Cardiovascular:      Rate and Rhythm:  Normal rate and regular rhythm.      Pulses: Normal pulses.      Heart sounds: Normal heart sounds. No murmur heard.  Pulmonary:      Effort: She is intubated.      Breath sounds: No rhonchi or rales.      Comments: Breath sounds clear bilaterally  Abdominal:      General: Bowel sounds are normal.      Palpations: Abdomen is soft.   Musculoskeletal:         General: No swelling.      Cervical back: Neck supple.   Skin:     General: Skin is warm and dry.      Capillary Refill: Capillary refill takes 2 to 3 seconds.   Neurological:      Comments: Intubated, no response to stimuli, on sedation         CRANIAL NERVES     CN III, IV, VI   Pupils are equal, round, and reactive to light.     Significant Labs: All pertinent labs within the past 24 hours have been reviewed.    Significant Imaging: I have reviewed all pertinent imaging results/findings within the past 24 hours.    Assessment/Plan:     * Acute respiratory failure with hypoxia and hypercapnia  Encephalopathy, metabolic  Recently d/c for pneumonia and new onset CHF. Found down at home by home health, patient in tub disoriented and hard to arouse. EMS arrived O2 saturations on RA 87% placed on 4L increased to 91%, +cough +tachy   -Patient with Hypercapnic and Hypoxic respiratory failure which is Acute   -she is not on home oxygen.   -Supplemental oxygen was provided and noted- Ventilator- Vent Mode: A/C  Oxygen Concentration (%):  [] 60  Resp Rate Total:  [12 br/min-20 br/min] 20 br/min  Vt Set:  [450 mL] 450 mL  PEEP/CPAP:  [5 cmH20] 5 cmH20  Mean Airway Pressure:  [9.6 lxA26-97 cmH20] 9.6 cmH20  -Signs/symptoms of respiratory failure include- tachypnea, increased work of breathing, respiratory distress and wheezing.   -Contributing diagnoses includes - CHF, Pleural effusion, Pneumonia and amphetamines  -Labs and images were reviewed. Patient Has recent ABG, which has been reviewed.  -Continue supplemental oxygen; titrate and wean to maintain SpO2  >90%  -Duo-nebs PRN for SOB/Wheezing  -Continue empiric antibiotics with vanc and zosyn  -ABG PRN     COVID-19  Patient is identified as Severe COVID-19 based on hypoxemia with O2 saturations <94% on room air or on ambulation   Initiate standard COVID protocols; COVID-19 testing Collection Date: 10/1/2021 Collection Time:   1:22 AM ,Infection Control notification  and isolation- respiratory, contact and droplet per protocol    Diagnostics: (leukopenia, hyponatremia, hyperferritinemia, elevated troponin, elevated d-dimer, age, and comorbidities are significant predictors of poor clinical outcome)  CMP, CRP, BNP and Portable CXR    -CXR:  -CRP & D-dimer pending  -Covid Labs remarkable for: pending  Management: Continuous cardiac monitoring. and Manage respiratory failure (O2 requirement >10LPM or needing NIPPV/Mechanical ventilation) and/or Pneumonia (active chest infiltrates) separately as described below.  -Monitor on Telemetry  -Initiated on dexamethasone, will continue for 10d course  -Initiated on remdesivir x5d; daily CMP  -Continuous Pulse Oximetry, goal SpO2 92-96%  -MVI & ascorbic acid 500mg PO BID  -Acetaminophen Q6hr PRN fever  -VTE PPx: enoxaparin       Chronic combined systolic and diastolic heart failure  Last ECHO results:   Results for orders placed during the hospital encounter of 07/21/22    Echo    Interpretation Summary  · The left ventricle is mildly enlarged with concentric hypertrophy and severely decreased systolic function.  · The estimated ejection fraction is 20%.  · There is severe left ventricular global hypokinesis.  · Grade III left ventricular diastolic dysfunction.  · Mild right ventricular enlargement with mildly reduced right ventricular systolic function.  · Severe left atrial enlargement.  · Moderate aortic regurgitation.  · Severe mitral regurgitation.  · Mild tricuspid regurgitation.  · Moderate pulmonic regurgitation.  · Intermediate central venous pressure (8 mmHg).  · The  estimated PA systolic pressure is 33 mmHg.  · Trivial pericardial effusion.    -Consistent clinical presentation; Last BNP reviewed- and noted below   Recent Labs   Lab 08/03/22  1730   *   -Troponin 0.034-0.052; likely demand; will continue to monitor and trend  -CXR revealed Nonspecific bilateral airspace opacities.  Findings may suggest multifocal pneumonia or edema.  Recommend clinical correlation. Probable small pleural effusions.  -Continue home BB, ACEi/ARB when stable  -Daily weights  -Strict I/Os  -Monitor on telemetry  -Monitor and trend BMP, Mg, and renal function; keep K >4, Mg >2  -Sodium restriction (<2g/d), fluid restriction (<2L)   -Monitor for signs of fluid overload: RR>30, O2 sat<92%, weight gain of >3 lbs in 24 hours, or urinary output <160ml/8hr    HLD (hyperlipidemia)   -Patient is not chronically on statin.will not continue for now. Monitor clinically. Last LDL was   Lab Results   Component Value Date    LDLCALC 114.2 07/22/2022          Bipolar 1 disorder, depressed  Attention deficit hyperactivity disorder (ADHD), combined type  -hold home meds until patient is stable    Seizure disorder  -seizure precautions  -PRN lorazepam      Hypokalemia  -Last electrolytes reviewed- Recent Labs   Lab 08/03/22  1730   K 3.9   -Patient received replacement  -Repeat BMP and Mg in am  -Replace electrolytes as indicated  -Monitor on telemetry    Amphetamine abuse  -UDS positive  -monitor for withdrawal  -may require psych consult upon extubation and stabilization      Anemia  -stable  -monitor      Essential hypertension  Chronic, Latest blood pressure and vitals reviewed-   Temp:  [96.62 °F (35.9 °C)-102.8 °F (39.3 °C)]   Pulse:  []   Resp:  [6-70]   BP: ()/()   SpO2:  [54 %-100 %] .   Home meds for hypertension were reviewed and noted below.   Hypertension Medications             furosemide (LASIX) 40 MG tablet Take 1 tablet (40 mg total) by mouth once daily.    lisinopriL 10 MG  tablet Take 1 tablet (10 mg total) by mouth once daily.    metoprolol succinate (TOPROL-XL) 25 MG 24 hr tablet Take 0.5 tablets (12.5 mg total) by mouth once daily.      -Resume home BP medications when stable in setting of soft BP  -Monitor and trend vital signs q4hr  -Will utilize p.r.n. blood pressure medication only if patient's blood pressure greater than  180/110 and she develops symptoms such as worsening chest pain or shortness of breath.    VTE Risk Mitigation (From admission, onward)         Ordered     enoxaparin injection 50 mg  2 times daily         08/03/22 2048     IP VTE HIGH RISK PATIENT  Once         08/03/22 2045     Place sequential compression device  Until discontinued         08/03/22 2045              Critical care time spent on the evaluation and treatment of severe organ dysfunction, review of pertinent labs and imaging studies, discussions with consulting providers and discussions with patient/family: 70 minutes.     Gladys Bal DNP, AGACNP-BC  Hospitalist   Department of Hospital Medicine   [facility]   Office #: [phone]

## 2022-08-04 NOTE — ED NOTES
With RT at bedside, PO tylenol administered. Bipap removed by RT for RN to administer PO medication. Patient happy that she got a sip of water.  Became aggressive and combative with RN and RT when bipap was attempted to be placed back on patient. Patient attempting to get up out of bed. Patient screaming at top of her lungs that she wants to eat and that she cannot breath. Patient sitting up in bed tripoding. MD notified and to bedside.

## 2022-08-04 NOTE — ASSESSMENT & PLAN NOTE
- Patient is identified as Severe COVID-19 based on hypoxemia with O2 saturations <94% on room air or on ambulation   - Initiate standard COVID protocols; COVID-19 testing Collection Date: 10/1/2021 Collection Time:   1:22 AM, Infection Control notification  and isolation- respiratory, contact and droplet per protocol  - Diagnostics: (leukopenia, hyponatremia, hyperferritinemia, elevated troponin, elevated d-dimer, age, and comorbidities are significant predictors of poor clinical outcome)  CMP, CRP, BNP and Portable CXR  -CXR:  -CRP & D-dimer pending  -Covid Labs remarkable for: pending  Management: Continuous cardiac monitoring. and Manage respiratory failure (O2 requirement >10LPM or needing NIPPV/Mechanical ventilation) and/or Pneumonia (active chest infiltrates) separately as described below.  - Monitor on Telemetry  - Initiated on dexamethasone, will continue for 10d course  - Initiated on remdesivir x5d; daily CMP  - Continuous Pulse Oximetry, goal SpO2 92-96%  - MVI & ascorbic acid 500mg PO BID  - Acetaminophen Q6hr PRN fever  - VTE PPx: enoxaparin   - As discussed above

## 2022-08-04 NOTE — EICU
"EICU BRIEF ADMIT NOTE:    HISTORY:  Acute Hypoxemic Respiratory failure Please refer to H/P and ER notes for detail    CAMERA ASSESSMENT: Two way audiovisual assessment was done: Yes    Telemetry was reviewed. Medical records including notes, labs and imaging were reviewed.Yes    DISCUSSED with bedside nurse.Yes     ASSESSMENT AND PLAN:    # Acute Hypoxemic respiratory failure due to Covid Pneumonia: IV steroids, Remdesivir and empiric Antibiotics. Vent setting adjusted   # DVT Prophylaxis: Lovenox " therapeutic doses" due to hgh  Dimer  # CHF: Echo on previous admission showing EF less than 20% and pulmonary edema    BEST PRACTICES REVIEW:    INTUBATED: Yes. Tidal Volume review completed.  ml, 8 ml per KG IBW. Vent bundle initiated yes  GLYCEMIN CONTROL:  Diabetes: No.  STRESS ULCER PROPHYLAXIS: H2 antagonist   DVT PROPHYLAXIS:  Pharmacological, on therapeutic anticoagulationLovenox      Thank You for allowing EICU to participate in the care of the patient. Please call as needed      Ghulam Rivero MD  EICU  Critical Care Medicine          "

## 2022-08-04 NOTE — ED NOTES
Patient refusing to have oxygen on. Patient becoming more modeled, pale and cyanotic. MD to bedside. Orders received. Plan to intubate.

## 2022-08-04 NOTE — CONSULTS
Consult Note  [facility]    Attending: Dr. Hastings  Admit Date: 8/3/2022  Today's Date: 08/04/2022    SUBJECTIVE:     HPI: 50 yo F patient with PMHx recent admit for new onset CHF with 20% dx, HTN, bipolar, ADHD, methamphetamine use. Patient brought to ED by EMS after being found in tub unresponsive by home health. In the ED, patient Tmax 102.5, hypoxic with increasingly labored breathing initially on BIPAP. Patient became agitated and got haldol and ativan. Patient amphetamine positive on UDS, COVID positive. Patient eventually requiring sedation with versed and fentanyl and intubation for airway protection.     Review of Systems   Unable to perform ROS: Intubated       OBJECTIVE:     Vital Signs Trends/Hx Reviewed  Vitals:    08/04/22 1045 08/04/22 1100 08/04/22 1115 08/04/22 1116   BP: 102/70 105/74 112/82    Pulse: 61 60 78 78   Resp: 20 20 20 20   Temp:    97 °F (36.1 °C)   TempSrc:    Axillary   SpO2: 100% 100% 100% 100%   Weight:       Height:           Vent Mode: A/CMV-VC  Oxygen Concentration (%):  [] 35  Resp Rate Total:  [12 br/min-20 br/min] 20 br/min  Vt Set:  [450 mL-451 mL] 450 mL  PEEP/CPAP:  [5 cmH20] 5 cmH20  Mean Airway Pressure:  [9.2 okU84-17 cmH20] 10.7 cmH20    Physical Exam  Constitutional:       Comments: Currently intubated, off sedation   Cardiovascular:      Rate and Rhythm: Normal rate and regular rhythm.      Pulses: Normal pulses.      Heart sounds: Normal heart sounds.   Pulmonary:      Breath sounds: No wheezing or rales.   Abdominal:      General: Abdomen is flat. Bowel sounds are normal.      Palpations: Abdomen is soft.   Musculoskeletal:      Right lower leg: Edema (Trace) present.      Left lower leg: Edema (Trace) present.   Skin:     General: Skin is warm and dry.         Laboratory:  Recent Labs   Lab 08/04/22  0454   WBC 5.14   RBC 3.56*   HGB 9.7*   HCT 31.4*      MCV 88   MCH 27.2   MCHC 30.9*     Recent Labs   Lab  08/04/22  0454      K 4.3      CO2 26   BUN 15   CREATININE 0.8     Recent Labs   Lab 08/03/22  2320   PH 7.406   PCO2 41.6   PO2 100   HCO3 26.1   POCSATURATED 98   BE 1         Chest Imaging:   Chest x-ray significant for Nonspecific bilateral airspace opacities. Findings may suggest multifocal pneumonia or edema. Probable small pleural effusions.     ASSESSMENT & RECOMMENDATIONS     50 yo F with Hx of recent CHF diagnosis and methamphetamine use currently admitted intubated to ICU in setting of SIRS+ hypoxemia and agitation requiring sedation plus positive COVID. Patient currently off sedation, remains unresponsive on ventilator. Currently monitoring patient response off sedation, treating for COVID, monitoring fluid status in setting of CHF.    Neuro/Psych  Patient is currently intubated  RASS goal: -1 to 0  Current RASS: -5 unarousable, no response to voice or physical stimulation  Daily SBT/SAT     #Encephalopathy - patient initially brought by EMS due to unresponsiveness, found to be hypoxic in ED. Patient with recent CHF diagnosis, amphetamine positive, SIRS positive. Encephalopathy related to these factors.      #Agitation - likely also secondary to combination of hypoxia, substance use. Agitation initially refractory to haldol + ativan, patient started on versed/fentanyl drip in ED. Patient currently off sedation, awaiting response.    #Amphetamine positive UDS    CV    #CHF - diagnosed recently during previous hospitalization, EF 20%. HTN vs methamphetamine use vs unclear etiology. Patient discharged on ACE/BB + lasix, adherence since then unclear. Patient BNP on current presentation 981. Not floridly overloaded. Monitor fluid status, diurese as needed.      Pulm    Vent settings as above  Most recent ABG as above    #Hypoxemia  - Recent CHF exacerbation  - Amphetamine positive  - Heavily sedated in ED    #COVID  - Continue rem/dex treatment per primary team      FEN/GI  Electrolytes are  wnl  Stress ulcer prophylaxis: Famotidine  No other known issues at this time.     RENAL  UOP: 825cc , In: 1,300cc, net +404cc since admit  BUN/Cr: 15/0.8   Continue to monitor renal status and urine output     Heme  H/H - anemia 9.7/31.4; continue to trend  WBC WNL  DVT prophylaxis: Lovenox    Endo  No known issues at this time.    ID  Tmax over last 24hr: 102.5  Cultures are pending  Currently on Vanc/Zosyn  COVID infection as above      Carson Weeks MD  [facility]   08/04/2022 8:11 AM

## 2022-08-04 NOTE — ASSESSMENT & PLAN NOTE
Encephalopathy, metabolic  Recently d/c for pneumonia and new onset CHF. Found down at home by home health, patient in tub disoriented and hard to arouse. EMS arrived O2 saturations on RA 87% placed on 4L increased to 91%, +cough +tachy   -Patient with Hypercapnic and Hypoxic respiratory failure which is Acute   -she is not on home oxygen.   -Supplemental oxygen was provided and noted- Ventilator- Vent Mode: A/C  Oxygen Concentration (%):  [] 60  Resp Rate Total:  [12 br/min-20 br/min] 20 br/min  Vt Set:  [450 mL] 450 mL  PEEP/CPAP:  [5 cmH20] 5 cmH20  Mean Airway Pressure:  [9.6 frJ99-00 cmH20] 9.6 cmH20  -Signs/symptoms of respiratory failure include- tachypnea, increased work of breathing, respiratory distress and wheezing.   -Contributing diagnoses includes - CHF, Pleural effusion, Pneumonia and amphetamines  -Labs and images were reviewed. Patient Has recent ABG, which has been reviewed.  -Continue supplemental oxygen; titrate and wean to maintain SpO2 >90%  -Duo-nebs PRN for SOB/Wheezing  -Continue empiric antibiotics with vanc and zosyn  -ABG PRN

## 2022-08-04 NOTE — PLAN OF CARE
Problem: Adult Inpatient Plan of Care  Goal: Plan of Care Review  Outcome: Ongoing, Progressing  Goal: Patient-Specific Goal (Individualized)  Outcome: Ongoing, Progressing  Goal: Absence of Hospital-Acquired Illness or Injury  Outcome: Ongoing, Progressing  Goal: Optimal Comfort and Wellbeing  Outcome: Ongoing, Progressing  Goal: Readiness for Transition of Care  Outcome: Ongoing, Progressing     Problem: Fall Injury Risk  Goal: Absence of Fall and Fall-Related Injury  Outcome: Ongoing, Progressing     Problem: Restraint, Nonbehavioral (Nonviolent)  Goal: Absence of Harm or Injury  Outcome: Ongoing, Progressing     Problem: Fluid Imbalance (Pneumonia)  Goal: Fluid Balance  Outcome: Ongoing, Progressing     Problem: Infection (Pneumonia)  Goal: Resolution of Infection Signs and Symptoms  Outcome: Ongoing, Progressing     Problem: Respiratory Compromise (Pneumonia)  Goal: Effective Oxygenation and Ventilation  Outcome: Ongoing, Progressing     Problem: Communication Impairment (Mechanical Ventilation, Invasive)  Goal: Effective Communication  Outcome: Ongoing, Progressing     Problem: Device-Related Complication Risk (Mechanical Ventilation, Invasive)  Goal: Optimal Device Function  Outcome: Ongoing, Progressing     Problem: Inability to Wean (Mechanical Ventilation, Invasive)  Goal: Mechanical Ventilation Liberation  Outcome: Ongoing, Progressing     Problem: Nutrition Impairment (Mechanical Ventilation, Invasive)  Goal: Optimal Nutrition Delivery  Outcome: Ongoing, Progressing     Problem: Skin and Tissue Injury (Mechanical Ventilation, Invasive)  Goal: Absence of Device-Related Skin and Tissue Injury  Outcome: Ongoing, Progressing     Problem: Communication Impairment (Artificial Airway)  Goal: Effective Communication  Outcome: Ongoing, Progressing     Problem: Device-Related Complication Risk (Artificial Airway)  Goal: Optimal Device Function  Outcome: Ongoing, Progressing     Problem: Ventilator-Induced Lung  Injury (Mechanical Ventilation, Invasive)  Goal: Absence of Ventilator-Induced Lung Injury  Outcome: Ongoing, Progressing

## 2022-08-04 NOTE — ED NOTES
Bedside RN, second RN, two RTs and MD at bedside. Patient intubated. Procedure explained to patient.

## 2022-08-04 NOTE — PLAN OF CARE
The pt's currently intubated(Covid +) in the ICU,so the sw spoke to her sister/caregiver Betty Martines [phone] via phone to complete the assessment. The pt lives in [place] with her father Ezekiel Martines [phone],Betty,her nephew and Betty's boyfriend in [place]. The pt's a readmit b/c she was discharged from [facility] 22. The pt's independent with her adl's but family's always near to assist as needed and she has a sw at home that she refuses to use. The pt's sister states she has been in and out of multiple IP psych facilities and her memory's fading.  The pt's current with [facility]. The pt doesn't drive so Betty transports the pt and will transport her home at d/c. The pt's last PCP was Dr. Elizabeth but he is now ,so she will see his granddaughter. The sw left her name and contact info with Betty and encouraged her to call if she has any further questions or concerns. The sw will continue to follow the pt throughout her transitions of care and will assist with any d/c.           22 1120   Discharge Assessment   Assessment Type Discharge Planning Assessment   Confirmed/corrected address, phone number and insurance Yes   Confirmed Demographics Correct on Facesheet   Source of Information family   If unable to respond/provide information was family/caregiver contacted? Yes   Contact Name/Number Betty Martines(sister/caregiver)[phone]   When was your last doctors appointment?   (2 yeasr ago(pt's very non-compliant with dr trent's))   Communicated AYSHA with patient/caregiver Date not available/Unable to determine   Reason For Admission Acute respiratory failure with hypoxia and hypercapnia   Lives With sibling(s);parent(s);other relative(s)   Do you expect to return to your current living situation? Yes   Do you have help at home or someone to help you manage your care at home? Yes   Who are your caregiver(s) and their phone number(s)? Betty  Conrad(sister/caregiver)[phone]/Ezekiel Martines(father)[phone]   Prior to hospitilization cognitive status: Unable to Assess   Current cognitive status: Coma/Sedated/Intubated   Walking or Climbing Stairs Difficulty ambulation difficulty, requires equipment;stair climbing difficulty, requires equipment;transferring difficulty, requires equipment   Dressing/Bathing Difficulty bathing difficulty, requires equipment   Home Accessibility wheelchair accessible   Home Layout Able to live on 1st floor   Equipment Currently Used at Home walker, standard   Readmission within 30 days? Yes  (pt was discharged from [facility] 7/26/22)   Patient currently being followed by outpatient case management? No   Do you currently have service(s) that help you manage your care at home?   ([facility]'s [facility])   Do you take prescription medications? Yes   Do you have prescription coverage? Yes   Coverage [employer] [facility]/Medicaid [facility]   Do you have any problems affording any of your prescribed medications? No  (the pt receives her meds affordably at [facility] in [place])   Is the patient taking medications as prescribed? yes   Who is going to help you get home at discharge? Betty Martines(sister/caregiver)[phone]   How do you get to doctors appointments? family or friend will provide   Are you on dialysis? No   Do you take coumadin? No   Discharge Plan A Home Health  (pt's current with [facility] )   Discharge Plan B Other  (TBD)   DME Needed Upon Discharge  other (see comments)  (TBD)   Discharge Plan discussed with: Caregiver;Sibling   Name(s) and Number(s) Betty Martines(sister/caregiver)[phone]   Discharge Barriers Identified Mental illness;Does not adhere to care plan;Substance Abuse;Transportation   Relationship/Environment   Name(s) of Who Lives With Patient Betty Martines(sister/caregiver)[phone]/ Ezekiel Martines(father)[phone]

## 2022-08-04 NOTE — PROGRESS NOTES
Pharmacokinetic Initial Assessment: IV Vancomycin    Assessment/Plan:    Initiate intravenous vancomycin with loading dose of 1250 mg (26 mg/kg) once followed by a maintenance dose of vancomycin 1000 mg IV every 24 hours (20.8 mg/kg)  Desired empiric serum trough concentration is 10 to 20 mcg/mL  Draw vancomycin trough level 60 min prior to third dose on 8/5 at approximately 2000  Pharmacy will continue to follow and monitor vancomycin.      Please contact pharmacy at extension [phone] with any questions regarding this assessment.     Thank you for the consult,   Iron Yang, PharmD, BCCCP       Patient brief summary:  Stephanie T Barthelemy is a 51 y.o. female initiated on antimicrobial therapy with IV Vancomycin for treatment of suspected sepsis    Drug Allergies:   Review of patient's allergies indicates:  No Known Allergies    Actual Body Weight:   48 kg    Renal Function:   Estimated Creatinine Clearance: 53.7 mL/min (based on SCr of 0.9 mg/dL).    Dialysis Method (if applicable):  N/A    CBC (last 72 hours):  Recent Labs   Lab Result Units 08/03/22  1730   WBC K/uL 7.29   Hemoglobin g/dL 11.5*   Hematocrit % 37.5   Platelets K/uL 214   Gran % % 77.6*   Lymph % % 13.6*   Mono % % 8.0   Eosinophil % % 0.1   Basophil % % 0.4   Differential Method  Automated       Metabolic Panel (last 72 hours):  Recent Labs   Lab Result Units 08/03/22  1730 08/03/22  1952   Sodium mmol/L 139  --    Potassium mmol/L 3.9  --    Chloride mmol/L 104  --    CO2 mmol/L 24  --    Glucose mg/dL 100  --    Glucose, UA   --  Negative   BUN mg/dL 17  --    Creatinine mg/dL 0.9  --    Creatinine, Urine mg/dL  --  107.6   Albumin g/dL 3.4*  --    Total Bilirubin mg/dL 0.6  --    Alkaline Phosphatase U/L 130  --    AST U/L 64*  --    ALT U/L 49*  --        Drug levels (last 3 results):  No results for input(s): VANCOMYCINRA, VANCORANDOM, VANCOMYCINPE, VANCOPEAK, VANCOMYCINTR, VANCOTROUGH in the last 72 hours.    Microbiologic  Results:  Microbiology Results (last 7 days)       Procedure Component Value Units Date/Time    Influenza A & B by Molecular [[id number]] Collected: 08/03/22 1735    Order Status: Completed Specimen: Nasopharyngeal Swab Updated: 08/03/22 1804     Influenza A, Molecular Negative     Influenza B, Molecular Negative     Flu A & B Source Nasal swab    Blood Culture #2 **CANNOT BE ORDERED STAT** [[id number]] Collected: 08/03/22 1750    Order Status: Sent Specimen: Blood from Peripheral, Wrist, Left Updated: 08/03/22 1800    Blood Culture #1 **CANNOT BE ORDERED STAT** [[id number]] Collected: 08/03/22 1730    Order Status: Sent Specimen: Blood from Peripheral, Forearm, Right Updated: 08/03/22 1759

## 2022-08-04 NOTE — SUBJECTIVE & OBJECTIVE
Past Medical History:   Diagnosis Date    ADHD (attention deficit hyperactivity disorder)     Anemia     Anxiety     Bipolar disorder     History of hepatitis C - s/p clearance of virus (HCV neg 6/2015) 9/26/2014    History of psychiatric hospitalization     History of substance abuse     IV heroin - last use 2013 per pt    Hx of psychiatric care     Hypertension     Opioid overdose 5/10/2016    Psychiatric problem     Psychosis     Seizure disorder 4/3/2019    Sleep difficulties     Substance abuse     Therapy     Vasculitis        Past Surgical History:   Procedure Laterality Date    HYSTERECTOMY  3/16/2011    SALPINGOOPHORECTOMY Right 3/16/2011    TUBAL LIGATION      Vaginal cuff repair  04/22/2011       Review of patient's allergies indicates:  No Known Allergies    No current facility-administered medications on file prior to encounter.     Current Outpatient Medications on File Prior to Encounter   Medication Sig    benztropine (COGENTIN) 1 MG tablet Take 1 tablet (1 mg total) by mouth 2 (two) times daily.    furosemide (LASIX) 40 MG tablet Take 1 tablet (40 mg total) by mouth once daily.    hydrOXYzine pamoate (VISTARIL) 25 MG Cap Take 1 capsule (25 mg total) by mouth every 8 (eight) hours as needed (insomnia, agitation).    lisinopriL 10 MG tablet Take 1 tablet (10 mg total) by mouth once daily.    metoprolol succinate (TOPROL-XL) 25 MG 24 hr tablet Take 0.5 tablets (12.5 mg total) by mouth once daily.    OXcarbazepine (TRILEPTAL) 300 MG Tab Take 1 tablet (300 mg total) by mouth 2 (two) times daily.    QUEtiapine (SEROQUEL) 100 MG Tab Take 1 tablet (100 mg total) by mouth every evening.    risperiDONE (RISPERDAL) 1 MG tablet Take 1 mg by mouth 2 (two) times daily.    [DISCONTINUED] amLODIPine (NORVASC) 5 MG tablet Take 1 tablet (5 mg total) by mouth once daily.    [DISCONTINUED] FLUoxetine 20 MG capsule Take 1 capsule (20 mg total) by mouth once daily.    [DISCONTINUED] gabapentin (NEURONTIN) 300 MG capsule  Take 1 capsule (300 mg total) by mouth 3 (three) times daily.    [DISCONTINUED] propranoloL (INDERAL) 10 MG tablet Take 1 tablet (10 mg total) by mouth 3 (three) times daily.     Family History       Problem Relation (Age of Onset)    Cancer Maternal Grandmother    Diabetes Maternal Grandmother          Tobacco Use    Smoking status: Current Every Day Smoker     Packs/day: 1.00     Years: 30.00     Pack years: 30.00     Start date: 4/15/1993    Smokeless tobacco: Never Used   Substance and Sexual Activity    Alcohol use: No    Drug use: Yes     Types: Heroin, Cocaine, Methamphetamines, Marijuana    Sexual activity: Not Currently     Partners: Male, Female     Birth control/protection: Other-see comments     Comment: hysterectomy     Review of Systems   Unable to perform ROS: Intubated   Objective:     Vital Signs (Most Recent):  Temp: 96.8 °F (36 °C) (08/03/22 2323)  Pulse: 81 (08/03/22 2323)  Resp: 20 (08/03/22 2323)  BP: (!) 91/51 (08/03/22 2230)  SpO2: 99 % (08/03/22 2323)   Vital Signs (24h Range):  Temp:  [96.62 °F (35.9 °C)-102.8 °F (39.3 °C)] 96.8 °F (36 °C)  Pulse:  [] 81  Resp:  [6-70] 20  SpO2:  [54 %-100 %] 99 %  BP: ()/() 91/51     Weight: 51 kg (112 lb 7 oz)  Body mass index is 21.96 kg/m².    Physical Exam  Vitals and nursing note reviewed.   Constitutional:       Appearance: She is not toxic-appearing.      Interventions: She is intubated.   HENT:      Head: Normocephalic and atraumatic.      Mouth/Throat:      Mouth: Mucous membranes are dry.   Eyes:      Extraocular Movements: Extraocular movements intact.      Pupils: Pupils are equal, round, and reactive to light.   Cardiovascular:      Rate and Rhythm: Normal rate and regular rhythm.      Pulses: Normal pulses.      Heart sounds: Normal heart sounds. No murmur heard.  Pulmonary:      Effort: She is intubated.      Breath sounds: No rhonchi or rales.      Comments: Breath sounds clear bilaterally  Abdominal:      General: Bowel  sounds are normal.      Palpations: Abdomen is soft.   Musculoskeletal:         General: No swelling.      Cervical back: Neck supple.   Skin:     General: Skin is warm and dry.      Capillary Refill: Capillary refill takes 2 to 3 seconds.   Neurological:      Comments: Intubated, no response to stimuli, on sedation         CRANIAL NERVES     CN III, IV, VI   Pupils are equal, round, and reactive to light.     Significant Labs: All pertinent labs within the past 24 hours have been reviewed.    Significant Imaging: I have reviewed all pertinent imaging results/findings within the past 24 hours.

## 2022-08-04 NOTE — PLAN OF CARE
Patient remains intubated and sedated on Fentanyl at 75 mcg/hr and Versed at 1.5 mg/hr. Received potassium IVPB replacements, now potassium 4.3. Sinus rhythm on monitor. BP 90/50s to 90s/60s with a MAP of 65 or greater. SpO2 100% on vent. Patient has minimal oral secretions. OG tube to LIWS, output 250cc.  cc via [place]. Skin dry and intact. On bilateral soft wrist restraints. Patient did not have seizures during night. Responding to painful stimuli. Unable to follow commands, briefly opens eyes to vigorous stimulation. Temperature within normal limits, jeffery hugger on. Call light within reach, side rails up x3, bed locked and in lowest position.

## 2022-08-04 NOTE — PROGRESS NOTES
[facility]  Progress Note    Patient Name: Stephanie T Barthelemy  MRN: [id number]  Patient Class: IP- Inpatient   Admission Date: 8/3/2022  Length of Stay: 1 days  Attending Physician: Almaz Lucio MD  Primary Care Provider: Hilda Elizabeth NP        Subjective:     Principal Problem:Acute respiratory failure with hypoxia and hypercapnia        HPI:  Stephanie T Barthelemy is a 51 y.o. female who has a past medical history of ADHD (attention deficit hyperactivity disorder), Anemia, Anxiety, Bipolar disorder, History of hepatitis C - s/p clearance of virus (HCV neg 6/2015) (9/26/2014), History of psychiatric hospitalization, History of substance abuse, psychiatric care, Hypertension, Opioid overdose (5/10/2016), Psychiatric problem, Psychosis, Seizure disorder (4/3/2019), Sleep difficulties, Substance abuse, Therapy, and Vasculitis. She presented to the ED for altered mental status. She was brought in by EMS after found down at home by home health in bath tub unresponsive. Of note patient was just admitted for pneumonia and new onset CHF.     In the ED: She was found COVID-19 positive. UDS positive for amphetamines. Latest EKG with Sinus tach, rate 131, nonspecific ST changes, no ST elevations or other signs of ischemia, normal intervals. Compared to prior, QT prolongation has improved. CXR with diffuse interstitial opacities most consistent with viral process. Patient required intubation for airway protection. Considering patient's persistent hypoxia, severe symptoms, and co-morbidities, patient is high risk for decompensation and complications including worsening hypoxia, respiratory failure, intubation, or cardiopulmonary arrest if discharged. Admitted to [facility] for further care.        Interval History: No acute events, SBT in progress.    Review of Systems   Unable to perform ROS: Intubated   Objective:     Vital Signs (Most Recent):  Temp: 97.7 °F (36.5 °C)  (08/04/22 1600)  Pulse: 79 (08/04/22 1630)  Resp: 20 (08/04/22 1630)  BP: 121/81 (08/04/22 1630)  SpO2: 100 % (08/04/22 1630) Vital Signs (24h Range):  Temp:  [95.72 °F (35.4 °C)-102.8 °F (39.3 °C)] 97.7 °F (36.5 °C)  Pulse:  [] 79  Resp:  [6-70] 20  SpO2:  [54 %-100 %] 100 %  BP: ()/() 121/81     Weight: 51 kg (112 lb 7 oz)  Body mass index is 21.96 kg/m².    Intake/Output Summary (Last 24 hours) at 8/4/2022 1650  Last data filed at 8/4/2022 0600  Gross per 24 hour   Intake 1229.21 ml   Output 825 ml   Net 404.21 ml      Physical Exam  Vitals and nursing note reviewed.   Constitutional:       Appearance: She is not toxic-appearing.      Interventions: She is intubated.      Comments: Sedated, appears older than stated age   HENT:      Head: Normocephalic and atraumatic.      Comments: ET tube in place  Eyes:      Pupils: Pupils are equal, round, and reactive to light.   Cardiovascular:      Rate and Rhythm: Normal rate and regular rhythm.      Pulses: Normal pulses.      Heart sounds: Normal heart sounds. No murmur heard.  Pulmonary:      Effort: She is intubated.      Breath sounds: No rhonchi or rales.      Comments: Ventilated breath sounds bilaterally  Abdominal:      General: Bowel sounds are normal.      Palpations: Abdomen is soft.      Tenderness: There is no abdominal tenderness. There is no guarding or rebound.   Musculoskeletal:         General: No swelling.      Cervical back: Neck supple.   Skin:     General: Skin is warm and dry.      Capillary Refill: Capillary refill takes 2 to 3 seconds.      Comments: Extensive tatoos throughout body   Neurological:      Comments: Intubated, no response to stimuli, on sedation       Significant Labs: All pertinent labs within the past 24 hours have been reviewed.    Significant Imaging: I have reviewed all pertinent imaging results/findings within the past 24 hours.      Assessment/Plan:      * Acute respiratory failure with hypoxia and  hypercapnia  Encephalopathy, metabolic  COVID +  - Recently treated for aspiration pneumonia and new onset CHF at this facility  - Found down at home by home health, patient in tub disoriented and hard to arouse. EMS arrived O2 saturations on RA 87% placed on 4L increased to 91%, +cough +tachy   - Patient with acute hypercapnic and hypoxic respiratory failure    - Intubated and vent supported  - Found to be COVID +  - Continue empiric Zosyn and Vancomycin  - Continue remdesivir and dexamethasone, day #2  - SBT in progress  - As per Pulmonary/Critical care    Chronic combined systolic and diastolic heart failure  - Last ECHO results:   - Results for orders placed during the hospital encounter of 07/21/22  · The left ventricle is mildly enlarged with concentric hypertrophy and severely decreased systolic function.  · The estimated ejection fraction is 20%.  · There is severe left ventricular global hypokinesis.  · Grade III left ventricular diastolic dysfunction.  · Mild right ventricular enlargement with mildly reduced right ventricular systolic function.  · Severe left atrial enlargement.  · Moderate aortic regurgitation.  · Severe mitral regurgitation.  · Mild tricuspid regurgitation.  · Moderate pulmonic regurgitation.  · Intermediate central venous pressure (8 mmHg).  · The estimated PA systolic pressure is 33 mmHg.  · Trivial pericardial effusion.  - Troponin 0.034-0.052; likely demand; will continue to monitor and trend  - CXR revealed Nonspecific bilateral airspace opacities.  Findings may suggest multifocal pneumonia or edema. Recommend clinical correlation. Probable small pleural effusions.  - Does not appear clinically volume overload at this time  - Continue home BB, ACEi/ARB when stable  - Daily weights and strict I/Os  - Monitor on telemetry  - Monitor and trend BMP, Mg, and renal function; keep K >4, Mg >2  - Sodium restriction (<2g/d), fluid restriction (<2L)   - Monitor for signs of fluid overload:  RR>30, O2 sat<92%, weight gain of >3 lbs in 24 hours, or urinary output <160ml/8hr    Encephalopathy, metabolic  - Due to above and in combination with ongoing polysubstance abuse  - Currently sedated  - Reassess after extubation    COVID-19  - Patient is identified as Severe COVID-19 based on hypoxemia with O2 saturations <94% on room air or on ambulation   - Initiate standard COVID protocols; COVID-19 testing Collection Date: 10/1/2021 Collection Time:   1:22 AM, Infection Control notification  and isolation- respiratory, contact and droplet per protocol  - Diagnostics: (leukopenia, hyponatremia, hyperferritinemia, elevated troponin, elevated d-dimer, age, and comorbidities are significant predictors of poor clinical outcome)  CMP, CRP, BNP and Portable CXR  -CXR:  -CRP & D-dimer pending  -Covid Labs remarkable for: pending  Management: Continuous cardiac monitoring. and Manage respiratory failure (O2 requirement >10LPM or needing NIPPV/Mechanical ventilation) and/or Pneumonia (active chest infiltrates) separately as described below.  - Monitor on Telemetry  - Initiated on dexamethasone, will continue for 10d course  - Initiated on remdesivir x5d; daily CMP  - Continuous Pulse Oximetry, goal SpO2 92-96%  - MVI & ascorbic acid 500mg PO BID  - Acetaminophen Q6hr PRN fever  - VTE PPx: enoxaparin   - As discussed above    HLD (hyperlipidemia)  - Not on medications at home    Bipolar 1 disorder, depressed  Attention deficit hyperactivity disorder (ADHD), combined type  - Hold home meds until patient is stable    Seizure disorder  - Seizure precautions  - PRN lorazepam    Hypokalemia  - Corrected to normal, monitor    Amphetamine abuse  Polysubstance abuse  - UDS positive  - Monitor for withdrawal  - May require psych consult upon extubation and stabilization    Anemia  - Stable, monitor    Essential hypertension  - Hold anti-HTN for now  - Will resume when indicated      VTE Risk Mitigation (From admission, onward)          Ordered     enoxaparin injection 40 mg  Daily         08/04/22 0941     IP VTE HIGH RISK PATIENT  Once         08/03/22 2045     Place sequential compression device  Until discontinued         08/03/22 2045                  Critical care time spent on the evaluation and treatment of severe organ dysfunction, review of pertinent labs and imaging studies, discussions with consulting providers and discussions with patient/family: 45 minutes.      Almaz Lucio MD  Department of Hospital Medicine   [place] - Intensive Care

## 2022-08-04 NOTE — HPI
Stephanie T Barthelemy is a 51 y.o. female who has a past medical history of ADHD (attention deficit hyperactivity disorder), Anemia, Anxiety, Bipolar disorder, History of hepatitis C - s/p clearance of virus (HCV neg 6/2015) (9/26/2014), History of psychiatric hospitalization, History of substance abuse, psychiatric care, Hypertension, Opioid overdose (5/10/2016), Psychiatric problem, Psychosis, Seizure disorder (4/3/2019), Sleep difficulties, Substance abuse, Therapy, and Vasculitis. She presented to the ED for altered mental status. She was brought in by EMS after found down at home by home health in bath tub unresponsive. Of note patient was just admitted for pneumonia and new onset CHF.     In the ED: She was found COVID-19 positive. UDS positive for amphetamines. Latest EKG with Sinus tach, rate 131, nonspecific ST changes, no ST elevations or other signs of ischemia, normal intervals. Compared to prior, QT prolongation has improved. CXR with diffuse interstitial opacities most consistent with viral process. Patient required intubation for airway protection. Considering patient's persistent hypoxia, severe symptoms, and co-morbidities, patient is high risk for decompensation and complications including worsening hypoxia, respiratory failure, intubation, or cardiopulmonary arrest if discharged. Admitted to [facility] for further care.

## 2022-08-04 NOTE — NURSING
Patient has large amounts of secretions through OG tube, ET tube and mouth. NP notified. Ok to place OG to LIWS.

## 2022-08-04 NOTE — ASSESSMENT & PLAN NOTE
-UDS positive  -monitor for withdrawal  -may require psych consult upon extubation and stabilization

## 2022-08-04 NOTE — ASSESSMENT & PLAN NOTE
-Last electrolytes reviewed- Recent Labs   Lab 08/03/22  1730   K 3.9   -Patient received replacement  -Repeat BMP and Mg in am  -Replace electrolytes as indicated  -Monitor on telemetry

## 2022-08-04 NOTE — ASSESSMENT & PLAN NOTE
Last ECHO results:   Results for orders placed during the hospital encounter of 07/21/22    Echo    Interpretation Summary  · The left ventricle is mildly enlarged with concentric hypertrophy and severely decreased systolic function.  · The estimated ejection fraction is 20%.  · There is severe left ventricular global hypokinesis.  · Grade III left ventricular diastolic dysfunction.  · Mild right ventricular enlargement with mildly reduced right ventricular systolic function.  · Severe left atrial enlargement.  · Moderate aortic regurgitation.  · Severe mitral regurgitation.  · Mild tricuspid regurgitation.  · Moderate pulmonic regurgitation.  · Intermediate central venous pressure (8 mmHg).  · The estimated PA systolic pressure is 33 mmHg.  · Trivial pericardial effusion.    -Consistent clinical presentation; Last BNP reviewed- and noted below   Recent Labs   Lab 08/03/22  1730   *   -Troponin 0.034-0.052; likely demand; will continue to monitor and trend  -CXR revealed Nonspecific bilateral airspace opacities.  Findings may suggest multifocal pneumonia or edema.  Recommend clinical correlation. Probable small pleural effusions.  -Continue home BB, ACEi/ARB when stable  -Daily weights  -Strict I/Os  -Monitor on telemetry  -Monitor and trend BMP, Mg, and renal function; keep K >4, Mg >2  -Sodium restriction (<2g/d), fluid restriction (<2L)   -Monitor for signs of fluid overload: RR>30, O2 sat<92%, weight gain of >3 lbs in 24 hours, or urinary output <160ml/8hr

## 2022-08-04 NOTE — ED NOTES
Patient in room. Report given to RN by JOANN Richter. Patient anxious, attempting to get out of bed, attempting to get bipap off. Screaming out that she wants food and water. Screaming that she needs to pee. Patient fighting staff and attempting to ambulate to bathroom. RT, and 3 RNs explained to patient  That for her safety, she c annot walk to bathroom. BSC at bed, Patient voided on BSC. Back in bed. MD notified of patient's restless and anxious behavior. Orders received and MD to bedside.

## 2022-08-04 NOTE — PROGRESS NOTES
[facility]           Pharmacy        Current Drug Shortage     Due to national backorder and [facility] is critically low on inventory of Dextrose 50% (D50) Syringes and Vials, pharmacy has automatically switched from D50% to D10% IVPB at the equivalent dose until resolution of the shortage per P&T approved protocol.               Iron Yang PharmD, [facility]  [phone]

## 2022-08-04 NOTE — ASSESSMENT & PLAN NOTE
Patient is identified as Severe COVID-19 based on hypoxemia with O2 saturations <94% on room air or on ambulation   Initiate standard COVID protocols; COVID-19 testing Collection Date: 10/1/2021 Collection Time:   1:22 AM ,Infection Control notification  and isolation- respiratory, contact and droplet per protocol    Diagnostics: (leukopenia, hyponatremia, hyperferritinemia, elevated troponin, elevated d-dimer, age, and comorbidities are significant predictors of poor clinical outcome)  CMP, CRP, BNP and Portable CXR    -CXR:  -CRP & D-dimer pending  -Covid Labs remarkable for: pending  Management: Continuous cardiac monitoring. and Manage respiratory failure (O2 requirement >10LPM or needing NIPPV/Mechanical ventilation) and/or Pneumonia (active chest infiltrates) separately as described below.  -Monitor on Telemetry  -Initiated on dexamethasone, will continue for 10d course  -Initiated on remdesivir x5d; daily CMP  -Continuous Pulse Oximetry, goal SpO2 92-96%  -MVI & ascorbic acid 500mg PO BID  -Acetaminophen Q6hr PRN fever  -VTE PPx: enoxaparin

## 2022-08-04 NOTE — ED NOTES
Patient intubated. 7.5 ET tube 22 at the lip. Positive color change. Breath sounds coarse and diminished, equal bilaterally. Chest xray ordered

## 2022-08-04 NOTE — ASSESSMENT & PLAN NOTE
Attention deficit hyperactivity disorder (ADHD), combined type  - Hold home meds until patient is stable

## 2022-08-04 NOTE — ASSESSMENT & PLAN NOTE
- Due to above and in combination with ongoing polysubstance abuse  - Currently sedated  - Reassess after extubation

## 2022-08-05 PROBLEM — G93.40 ACUTE ENCEPHALOPATHY: Status: ACTIVE | Noted: 2022-08-05

## 2022-08-05 PROBLEM — U07.1 COVID-19 VIRUS INFECTION: Status: ACTIVE | Noted: 2022-08-05

## 2022-08-05 PROBLEM — F23 ACUTE PSYCHOSIS: Status: ACTIVE | Noted: 2022-08-05

## 2022-08-05 LAB
ALBUMIN SERPL BCP-MCNC: 2.3 G/DL (ref 3.5–5.2)
ALLENS TEST: ABNORMAL
ALP SERPL-CCNC: 113 U/L (ref 55–135)
ALT SERPL W/O P-5'-P-CCNC: 48 U/L (ref 10–44)
ANION GAP SERPL CALC-SCNC: 10 MMOL/L (ref 8–16)
AST SERPL-CCNC: 40 U/L (ref 10–40)
BASOPHILS # BLD AUTO: 0.04 K/UL (ref 0–0.2)
BASOPHILS NFR BLD: 0.5 % (ref 0–1.9)
BILIRUB SERPL-MCNC: 0.6 MG/DL (ref 0.1–1)
BUN SERPL-MCNC: 22 MG/DL (ref 6–20)
CALCIUM SERPL-MCNC: 8.3 MG/DL (ref 8.7–10.5)
CHLORIDE SERPL-SCNC: 109 MMOL/L (ref 95–110)
CO2 SERPL-SCNC: 25 MMOL/L (ref 23–29)
CREAT SERPL-MCNC: 1 MG/DL (ref 0.5–1.4)
DELSYS: ABNORMAL
DIFFERENTIAL METHOD: ABNORMAL
EOSINOPHIL # BLD AUTO: 0 K/UL (ref 0–0.5)
EOSINOPHIL NFR BLD: 0.3 % (ref 0–8)
EP: 6
ERYTHROCYTE [DISTWIDTH] IN BLOOD BY AUTOMATED COUNT: 16 % (ref 11.5–14.5)
ERYTHROCYTE [SEDIMENTATION RATE] IN BLOOD BY WESTERGREN METHOD: 12 MM/H
EST. GFR  (NO RACE VARIABLE): >60 ML/MIN/1.73 M^2
FIO2: 30
GLUCOSE SERPL-MCNC: 80 MG/DL (ref 70–110)
HCO3 UR-SCNC: 29.5 MMOL/L (ref 24–28)
HCT VFR BLD AUTO: 37.1 % (ref 37–48.5)
HGB BLD-MCNC: 11.5 G/DL (ref 12–16)
IMM GRANULOCYTES # BLD AUTO: 0.02 K/UL (ref 0–0.04)
IMM GRANULOCYTES NFR BLD AUTO: 0.3 % (ref 0–0.5)
IP: 12
LYMPHOCYTES # BLD AUTO: 1 K/UL (ref 1–4.8)
LYMPHOCYTES NFR BLD: 13.6 % (ref 18–48)
MCH RBC QN AUTO: 27.5 PG (ref 27–31)
MCHC RBC AUTO-ENTMCNC: 31 G/DL (ref 32–36)
MCV RBC AUTO: 89 FL (ref 82–98)
MODE: ABNORMAL
MONOCYTES # BLD AUTO: 0.7 K/UL (ref 0.3–1)
MONOCYTES NFR BLD: 9.1 % (ref 4–15)
NEUTROPHILS # BLD AUTO: 5.7 K/UL (ref 1.8–7.7)
NEUTROPHILS NFR BLD: 76.2 % (ref 38–73)
NRBC BLD-RTO: 0 /100 WBC
PCO2 BLDA: 44.1 MMHG (ref 35–45)
PH SMN: 7.43 [PH] (ref 7.35–7.45)
PLATELET # BLD AUTO: 187 K/UL (ref 150–450)
PMV BLD AUTO: 12.4 FL (ref 9.2–12.9)
PO2 BLDA: 74 MMHG (ref 80–100)
POC BE: 5 MMOL/L
POC SATURATED O2: 95 % (ref 95–100)
POC TCO2: 31 MMOL/L (ref 23–27)
POTASSIUM SERPL-SCNC: 3.9 MMOL/L (ref 3.5–5.1)
PROT SERPL-MCNC: 5.4 G/DL (ref 6–8.4)
RBC # BLD AUTO: 4.18 M/UL (ref 4–5.4)
SAMPLE: ABNORMAL
SITE: ABNORMAL
SODIUM SERPL-SCNC: 144 MMOL/L (ref 136–145)
VANCOMYCIN TROUGH SERPL-MCNC: 9.4 UG/ML (ref 10–22)
WBC # BLD AUTO: 7.5 K/UL (ref 3.9–12.7)

## 2022-08-05 PROCEDURE — 82803 BLOOD GASES ANY COMBINATION: CPT

## 2022-08-05 PROCEDURE — 27000207 HC ISOLATION

## 2022-08-05 PROCEDURE — 63600175 PHARM REV CODE 636 W HCPCS: Performed by: NURSE PRACTITIONER

## 2022-08-05 PROCEDURE — 20000000 HC ICU ROOM

## 2022-08-05 PROCEDURE — 85025 COMPLETE CBC W/AUTO DIFF WBC: CPT | Performed by: NURSE PRACTITIONER

## 2022-08-05 PROCEDURE — 63600175 PHARM REV CODE 636 W HCPCS: Performed by: STUDENT IN AN ORGANIZED HEALTH CARE EDUCATION/TRAINING PROGRAM

## 2022-08-05 PROCEDURE — 25000003 PHARM REV CODE 250: Performed by: NURSE PRACTITIONER

## 2022-08-05 PROCEDURE — 25000003 PHARM REV CODE 250: Performed by: EMERGENCY MEDICINE

## 2022-08-05 PROCEDURE — 36600 WITHDRAWAL OF ARTERIAL BLOOD: CPT

## 2022-08-05 PROCEDURE — 36415 COLL VENOUS BLD VENIPUNCTURE: CPT | Performed by: NURSE PRACTITIONER

## 2022-08-05 PROCEDURE — 27000221 HC OXYGEN, UP TO 24 HOURS

## 2022-08-05 PROCEDURE — 25000003 PHARM REV CODE 250: Performed by: HOSPITALIST

## 2022-08-05 PROCEDURE — 27100098 HC SPACER

## 2022-08-05 PROCEDURE — 36415 COLL VENOUS BLD VENIPUNCTURE: CPT | Performed by: EMERGENCY MEDICINE

## 2022-08-05 PROCEDURE — 80053 COMPREHEN METABOLIC PANEL: CPT | Performed by: NURSE PRACTITIONER

## 2022-08-05 PROCEDURE — 63600175 PHARM REV CODE 636 W HCPCS: Performed by: EMERGENCY MEDICINE

## 2022-08-05 PROCEDURE — 27000190 HC CPAP FULL FACE MASK W/VALVE

## 2022-08-05 PROCEDURE — 94640 AIRWAY INHALATION TREATMENT: CPT

## 2022-08-05 PROCEDURE — 99900035 HC TECH TIME PER 15 MIN (STAT)

## 2022-08-05 PROCEDURE — 94761 N-INVAS EAR/PLS OXIMETRY MLT: CPT

## 2022-08-05 PROCEDURE — 94660 CPAP INITIATION&MGMT: CPT

## 2022-08-05 PROCEDURE — 80202 ASSAY OF VANCOMYCIN: CPT | Performed by: EMERGENCY MEDICINE

## 2022-08-05 PROCEDURE — 63600175 PHARM REV CODE 636 W HCPCS: Performed by: HOSPITALIST

## 2022-08-05 RX ORDER — FUROSEMIDE 10 MG/ML
40 INJECTION INTRAMUSCULAR; INTRAVENOUS ONCE
Status: COMPLETED | OUTPATIENT
Start: 2022-08-05 | End: 2022-08-05

## 2022-08-05 RX ORDER — DEXMEDETOMIDINE HYDROCHLORIDE 4 UG/ML
0-1.4 INJECTION, SOLUTION INTRAVENOUS CONTINUOUS
Status: DISCONTINUED | OUTPATIENT
Start: 2022-08-05 | End: 2022-08-06

## 2022-08-05 RX ORDER — MIDAZOLAM HYDROCHLORIDE 1 MG/ML
2 INJECTION INTRAMUSCULAR; INTRAVENOUS
Status: DISCONTINUED | OUTPATIENT
Start: 2022-08-05 | End: 2022-08-05

## 2022-08-05 RX ADMIN — ENOXAPARIN SODIUM 40 MG: 100 INJECTION SUBCUTANEOUS at 04:08

## 2022-08-05 RX ADMIN — DEXMEDETOMIDINE HYDROCHLORIDE 0.1 MCG/KG/HR: 4 INJECTION, SOLUTION INTRAVENOUS at 10:08

## 2022-08-05 RX ADMIN — Medication 1 TABLET: at 09:08

## 2022-08-05 RX ADMIN — ALBUTEROL SULFATE 2 PUFF: 90 AEROSOL, METERED RESPIRATORY (INHALATION) at 01:08

## 2022-08-05 RX ADMIN — PIPERACILLIN AND TAZOBACTAM 4.5 G: 4; .5 INJECTION, POWDER, LYOPHILIZED, FOR SOLUTION INTRAVENOUS; PARENTERAL at 02:08

## 2022-08-05 RX ADMIN — CEFTRIAXONE 1 G: 1 INJECTION, SOLUTION INTRAVENOUS at 04:08

## 2022-08-05 RX ADMIN — ALBUTEROL SULFATE 2 PUFF: 90 AEROSOL, METERED RESPIRATORY (INHALATION) at 08:08

## 2022-08-05 RX ADMIN — OXYCODONE HYDROCHLORIDE AND ACETAMINOPHEN 500 MG: 500 TABLET ORAL at 08:08

## 2022-08-05 RX ADMIN — DEXAMETHASONE 6 MG: 4 TABLET ORAL at 09:08

## 2022-08-05 RX ADMIN — DEXMEDETOMIDINE HYDROCHLORIDE 0.2 MCG/KG/HR: 4 INJECTION, SOLUTION INTRAVENOUS at 10:08

## 2022-08-05 RX ADMIN — FAMOTIDINE 20 MG: 10 INJECTION, SOLUTION INTRAVENOUS at 09:08

## 2022-08-05 RX ADMIN — REMDESIVIR 100 MG: 100 INJECTION, POWDER, LYOPHILIZED, FOR SOLUTION INTRAVENOUS at 09:08

## 2022-08-05 RX ADMIN — PIPERACILLIN AND TAZOBACTAM 4.5 G: 4; .5 INJECTION, POWDER, LYOPHILIZED, FOR SOLUTION INTRAVENOUS; PARENTERAL at 09:08

## 2022-08-05 RX ADMIN — ALBUTEROL SULFATE 2 PUFF: 90 AEROSOL, METERED RESPIRATORY (INHALATION) at 07:08

## 2022-08-05 RX ADMIN — MUPIROCIN: 20 OINTMENT TOPICAL at 09:08

## 2022-08-05 RX ADMIN — FAMOTIDINE 20 MG: 10 INJECTION, SOLUTION INTRAVENOUS at 08:08

## 2022-08-05 RX ADMIN — MUPIROCIN: 20 OINTMENT TOPICAL at 08:08

## 2022-08-05 RX ADMIN — FUROSEMIDE 40 MG: 10 INJECTION, SOLUTION INTRAMUSCULAR; INTRAVENOUS at 09:08

## 2022-08-05 RX ADMIN — LORAZEPAM 2 MG: 2 INJECTION INTRAMUSCULAR; INTRAVENOUS at 03:08

## 2022-08-05 RX ADMIN — OXYCODONE HYDROCHLORIDE AND ACETAMINOPHEN 500 MG: 500 TABLET ORAL at 09:08

## 2022-08-05 RX ADMIN — VANCOMYCIN HYDROCHLORIDE 500 MG: 500 INJECTION, POWDER, LYOPHILIZED, FOR SOLUTION INTRAVENOUS at 11:08

## 2022-08-05 RX ADMIN — VANCOMYCIN HYDROCHLORIDE 1000 MG: 1 INJECTION, POWDER, LYOPHILIZED, FOR SOLUTION INTRAVENOUS at 08:08

## 2022-08-05 NOTE — NURSING
Stepped away from bedside to place my other patient on the bedpan. Came back to the bedside, heard ventilator alarming, patient self extubated despite restraints. MD called to the bedside patient placed on nasal cannula and then Bipap. Restraints removed and precedex started for agitation and removal of Bipap mask. Vital signs stable, no signs and symptoms of distress at this time, will continue to monitor.

## 2022-08-05 NOTE — NURSING
Pt remained intubated over night. Ventilator set to rr20, vt450, 35% fiO2, peep 5. Pt is alert; becomes visibly anxious and agitated while awake. Ativan given IVP X2. Padgett is patent and intact; UOP~300 cc rob/ clear urine. NSR/ sinus tach throughout the night. SBT will continue today, team plans to possibly extubate. Plan of care review with patient. Report given to oncoming RNRosibel.

## 2022-08-05 NOTE — ASSESSMENT & PLAN NOTE
- Patient is identified as Severe COVID-19 based on hypoxemia with O2 saturations <94% on room air or on ambulation   - Initiate standard COVID protocols; COVID-19 testing Collection Date: 10/1/2021 Collection Time:   1:22 AM, Infection Control notification  and isolation- respiratory, contact and droplet per protocol  - Diagnostics: (leukopenia, hyponatremia, hyperferritinemia, elevated troponin, elevated d-dimer, age, and comorbidities are significant predictors of poor clinical outcome)  CMP, CRP, BNP and Portable CXR  Management: Continuous cardiac monitoring. and Manage respiratory failure (O2 requirement >10LPM or needing NIPPV/Mechanical ventilation) and/or Pneumonia (active chest infiltrates) separately as described below.  - Monitor on Telemetry  - Initiated on dexamethasone, continue for 10d course  - Initiated on remdesivir x5d; daily CMP  - Continuous Pulse Oximetry, goal SpO2 92-96%  - MVI & ascorbic acid 500mg PO BID  - Acetaminophen Q6hr PRN fever  - VTE PPx: enoxaparin   - As discussed above

## 2022-08-05 NOTE — PLAN OF CARE
Problem: Adult Inpatient Plan of Care  Goal: Plan of Care Review  Outcome: Ongoing, Progressing     Problem: Adult Inpatient Plan of Care  Goal: Patient-Specific Goal (Individualized)  Outcome: Ongoing, Progressing     Problem: Adult Inpatient Plan of Care  Goal: Absence of Hospital-Acquired Illness or Injury  Outcome: Ongoing, Progressing     Problem: Restraint, Nonbehavioral (Nonviolent)  Goal: Absence of Harm or Injury  Outcome: Ongoing, Progressing     Problem: Respiratory Compromise (Pneumonia)  Goal: Effective Oxygenation and Ventilation  Outcome: Ongoing, Progressing     Problem: Infection (Pneumonia)  Goal: Resolution of Infection Signs and Symptoms  Outcome: Ongoing, Progressing     Problem: Communication Impairment (Mechanical Ventilation, Invasive)  Goal: Effective Communication  Outcome: Ongoing, Progressing     Problem: Ventilator-Induced Lung Injury (Mechanical Ventilation, Invasive)  Goal: Absence of Ventilator-Induced Lung Injury  Outcome: Ongoing, Progressing     Problem: Skin and Tissue Injury (Mechanical Ventilation, Invasive)  Goal: Absence of Device-Related Skin and Tissue Injury  Outcome: Ongoing, Progressing     Problem: Nutrition Impairment (Mechanical Ventilation, Invasive)  Goal: Optimal Nutrition Delivery  Outcome: Ongoing, Progressing     Problem: Infection  Goal: Absence of Infection Signs and Symptoms  Outcome: Ongoing, Progressing     Problem: Skin Injury Risk Increased  Goal: Skin Health and Integrity  Outcome: Ongoing, Progressing

## 2022-08-05 NOTE — SUBJECTIVE & OBJECTIVE
Interval History: No acute events, agitated overnight and patient was given Versed and ordered for PRN Precedex. Difficult to arouse this am, SBT in progress.    Review of Systems   Unable to perform ROS: Intubated   Objective:     Vital Signs (Most Recent):  Temp: 99.14 °F (37.3 °C) (08/05/22 0815)  Pulse: 93 (08/05/22 0815)  Resp: 20 (08/05/22 0815)  BP: 122/81 (08/05/22 0815)  SpO2: 100 % (08/05/22 0815) Vital Signs (24h Range):  Temp:  [96.8 °F (36 °C)-99.5 °F (37.5 °C)] 99.14 °F (37.3 °C)  Pulse:  [] 93  Resp:  [18-26] 20  SpO2:  [99 %-100 %] 100 %  BP: (102-139)/(62-97) 122/81     Weight: 52.5 kg (115 lb 11.9 oz)  Body mass index is 22.6 kg/m².    Intake/Output Summary (Last 24 hours) at 8/5/2022 1003  Last data filed at 8/4/2022 2310  Gross per 24 hour   Intake 723.04 ml   Output 575 ml   Net 148.04 ml        Physical Exam  Vitals and nursing note reviewed.   Constitutional:       Appearance: She is not toxic-appearing.      Interventions: She is intubated.      Comments: Sedated, appears older than stated age   HENT:      Head: Normocephalic and atraumatic.      Comments: ET tube in place  Eyes:      Pupils: Pupils are equal, round, and reactive to light.   Cardiovascular:      Rate and Rhythm: Normal rate and regular rhythm.      Pulses: Normal pulses.      Heart sounds: Normal heart sounds. No murmur heard.  Pulmonary:      Effort: She is intubated.      Breath sounds: No rhonchi or rales.      Comments: Ventilated breath sounds bilaterally  Abdominal:      General: Bowel sounds are normal.      Palpations: Abdomen is soft.      Tenderness: There is no abdominal tenderness. There is no guarding or rebound.   Musculoskeletal:         General: No swelling.      Cervical back: Neck supple.   Skin:     General: Skin is warm and dry.      Capillary Refill: Capillary refill takes 2 to 3 seconds.      Comments: Extensive tatoos throughout body   Neurological:      Comments: Intubated, no response to  stimuli, on sedation       Significant Labs: All pertinent labs within the past 24 hours have been reviewed.    Significant Imaging: I have reviewed all pertinent imaging results/findings within the past 24 hours.

## 2022-08-05 NOTE — PLAN OF CARE
Stat called to patient's room. patient self extubated herself.. Placed on BIPAP per M.D. Will continue to monitor.

## 2022-08-05 NOTE — PROGRESS NOTES
[facility] Pulmonary/Critical Care Progress Note      Patient: Stephanie T Barthelemy  Age:51 y.o.   MRN:[id number]  Admit date:8/3/2022    LOS:2 day(s)     SUBJECTIVE:      Interval History:   Patient with some episodes of agitation overnight, was ordered benzo intervention. Otherwise no acute events overnight. Currently intubated with minimal response to verbal or physical stimuli.     OBJECTIVE DATA:      Intake/Output:    Intake/Output Summary (Last 24 hours) at 8/5/2022 1332  Last data filed at 8/5/2022 1101  Gross per 24 hour   Intake 723.04 ml   Output 1375 ml   Net -651.96 ml     Net IO Since Admission: -247.75 mL [08/05/22 1332]     Physical Exam:  Temp:  [66.74 °F (19.3 °C)-99.5 °F (37.5 °C)] 66.74 °F (19.3 °C)  Pulse:  [] 99  Resp:  [18-33] 29  SpO2:  [94 %-100 %] 100 %  BP: (102-139)/(62-97) 133/93  General: No acute distress, resting comfortably  HEENT: Atraumatic, normocephalic, moist mucous membranes  Cardiovascular: Regular rate and rhythm, normal S1 and S2, no extra heart sounds or murmurs appreciated   Chest wall: Non-tender to palpation, no gross deformities noted   Back: Non-tender to palpation, no abnormal curvature noted, symmetric rise with respiration   Respiratory: Mechanically ventilated, non-labored breathing, no accessory muscle use noted, clear to auscultation bilaterally anteriorly, no wheezes or crackles   Abdominal: Non-distended, soft, normoactive bowel sounds, non-tender to palpation, no rebound tenderness, no guarding, no organomegaly noted   Extremities: Atraumatic, non-edematous, moving all four extremities   Pulses: 2+ and symmetric radial artery, dorsalis pedis artery, posterior tibial artery  Skin: Non-diaphoretic, non-jaundice, intact  Neurologic: Minimally responsive to verbal or physical stimuli      Laboratory/Imaging:  Recent Labs   Lab 08/03/22  1730 08/03/22  1750 08/03/22  2320 08/04/22  0454 08/05/22  0517   WBC 7.29  --   --  5.14 7.50   HGB 11.5*  --   --  9.7* 11.5*    HCT 37.5   < > 31* 31.4* 37.1     --   --  153 187     --   --  140 144   K 3.9  --   --  4.3 3.9     --   --  108 109   CREATININE 0.9  --   --  0.8 1.0   BUN 17  --   --  15 22*   CO2 24  --   --  26 25   ALT 49*  --   --  56* 48*   AST 64*  --   --  76* 40    < > = values in this interval not displayed.     Microbiology: Reviewed      Imaging: Reviewed     Medications:   albuterol  2 puff Inhalation Q6H    ascorbic acid (vitamin C)  500 mg Oral BID    cefTRIAXone (ROCEPHIN) IVPB  1 g Intravenous Q24H    dexAMETHasone  6 mg Oral Daily    enoxaparin  40 mg Subcutaneous Daily    famotidine (PF)  20 mg Intravenous Q12H    multivitamin  1 tablet Oral Daily    mupirocin   Nasal BID    remdesivir infusion  100 mg Intravenous Daily    vancomycin (VANCOCIN) IVPB  1,000 mg Intravenous Q24H         sodium chloride 0.9% 5 mL/hr at 08/04/22 2115    sodium chloride 0.9% 5 mL/hr at 08/04/22 2115    dexmedetomidine (PRECEDEX) infusion 0.2 mcg/kg/hr (08/05/22 1025)        dextrose 10%, dextrose 10%, glucagon (human recombinant), glucose, glucose, ondansetron, sodium chloride 0.9%, sodium chloride 0.9%, Pharmacy to dose Vancomycin consult **AND** vancomycin - pharmacy to dose      Assessment/Plan:     50 yo F with Hx of recent CHF diagnosis and methamphetamine use currently admitted intubated to ICU in setting of SIRS+ hypoxemia and agitation requiring sedation plus positive COVID. Patient currently off sedation, remains with decreased arousal on ventilator. Currently monitoring patient response off sedation, treating for COVID, monitoring fluid status in setting of CHF.    Neuro/Psych  Patient is currently intubated  Daily SBT/SAT     #Encephalopathy - patient initially brought by EMS due to unresponsiveness, found to be hypoxic in ED. Patient with recent CHF diagnosis, amphetamine positive, SIRS positive. Encephalopathy related to these factors.    -patient arousable this morning however  received a few doses of ativan overnight for agitation  -plan for extubation today      #Agitation - likely also secondary to combination of hypoxia, substance use. Agitation initially refractory to haldol + ativan, patient started on versed/fentanyl drip in ED. Patient currently off sedation, awaiting response.        CV  #HFrEF - diagnosed recently during previous hospitalization, EF 20%. HTN vs methamphetamine use vs unclear etiology. Patient discharged on ACE/BB + lasix, adherence since then unclear. Patient BNP on current presentation 981.   -Not floridly overloaded.   -Monitor fluid status  -Will diurese to net negative 1 L today         Pulm  #Acute hypoxic respiratory failure   - ddx includes recently CHF exacerbation, amphetamine positive, sedated in ED, vs other etiology  -currently mechanically ventilated, decreasing requirements  -plan to extubate today if passes SBT      #COVID  - Continue rem/dex treatment per primary team     FEN/GI  No acute issues      RENAL  No acute issues      Heme  #Normocytic anemia  -no obvious signs or symptoms of bleeding  -will monitor     Endo  No acute issues      ID  #SIRS positive  -highest Tmax since admission 102.5 F  -Covid 19 infection as above  -was initially started on Vanc/Zosyn  -fever curve improving   -blood cultures no growth to date   -deescalated to Vanc/Rocephin  -will consider HSV encephalitis workup if no improvement in mentation with continued abx     Diet: NPO  VTE PPx: Love  Code Status: Full     Dispo: likely extubate today, pending improvement of mental status      Thank you for allowing us to participate in the care of this patient. We will continue to follow.      Guy Patterson MD  [facility]

## 2022-08-05 NOTE — PLAN OF CARE
Patient on vent with documented settings.  Alarms are set and functioning with adequate volumes.  AMBU bag and mask at bedside.  The proper method of use, as well as anticipated side effects, of this metered-dose inhaler are discussed and demonstrated to the patient.  Will continue to monitor.

## 2022-08-05 NOTE — PROGRESS NOTES
Restless & agitated at times   Planned for possible extubation today AM   - had ordered IV Versed PRN   - will add Precedex drip too

## 2022-08-05 NOTE — ASSESSMENT & PLAN NOTE
Encephalopathy, metabolic  COVID +  - Recently treated for aspiration pneumonia and new onset CHF at this facility  - Found down at home by home health, patient in tub disoriented and hard to arouse. EMS arrived O2 saturations on RA 87% placed on 4L increased to 91%, +cough +tachy   - Patient with acute hypercapnic and hypoxic respiratory failure    - Intubated and vent supported  - Found to be COVID +  - On empiric Zosyn and Vancomycin  - Pulm/critical care to stop Zosyn and start Rocephin today; continue vancomycin  - Continue remdesivir and dexamethasone, day #3  - SBT in progress but mental status main barrier to extubation  - As per Pulmonary/Critical care

## 2022-08-05 NOTE — EICU
Rounding (Video Assessment):  No    Intervention Initiated From:  Bedside    Maria Teresa Communicated with Bedside Nurse regarding:  Medication    Nurse Notified:  Yes    Doctor Notified:  Yes    Comments: RN called for some PRN sedation medications for pt.  Pt is still intubated, wide awake and anxious.  Notified MD

## 2022-08-05 NOTE — HOSPITAL COURSE
Ms. Barthelemy presented with confusion and hypoxia.  Admitted with acute respiratory failure with COVID-19 complicated by encephalopathy.  Intubated for airway protection and isolated protocol in place.  Treatment initiated with remdesivir and dexamethasone, along with possible concomitant bacterial infection with Zosyn and vancomycin.  Pulmonary/Critical Care consulted.  Antibiotics de-escalated to Rocephin and Vancomycin as per Pulm/Critical care recommendations. Extubated on 8/5/2022. Stepped down to floor on 8/6/2022.

## 2022-08-06 PROBLEM — A41.9 SEPSIS: Status: ACTIVE | Noted: 2022-08-06

## 2022-08-06 LAB
ALBUMIN SERPL BCP-MCNC: 2.4 G/DL (ref 3.5–5.2)
ALP SERPL-CCNC: 103 U/L (ref 55–135)
ALT SERPL W/O P-5'-P-CCNC: 36 U/L (ref 10–44)
ANION GAP SERPL CALC-SCNC: 11 MMOL/L (ref 8–16)
AST SERPL-CCNC: 23 U/L (ref 10–40)
BASOPHILS # BLD AUTO: 0.01 K/UL (ref 0–0.2)
BASOPHILS NFR BLD: 0.2 % (ref 0–1.9)
BILIRUB SERPL-MCNC: 0.3 MG/DL (ref 0.1–1)
BUN SERPL-MCNC: 22 MG/DL (ref 6–20)
CALCIUM SERPL-MCNC: 8.7 MG/DL (ref 8.7–10.5)
CHLORIDE SERPL-SCNC: 102 MMOL/L (ref 95–110)
CO2 SERPL-SCNC: 26 MMOL/L (ref 23–29)
CREAT SERPL-MCNC: 0.8 MG/DL (ref 0.5–1.4)
DIFFERENTIAL METHOD: ABNORMAL
EOSINOPHIL # BLD AUTO: 0 K/UL (ref 0–0.5)
EOSINOPHIL NFR BLD: 0.2 % (ref 0–8)
ERYTHROCYTE [DISTWIDTH] IN BLOOD BY AUTOMATED COUNT: 15.8 % (ref 11.5–14.5)
EST. GFR  (NO RACE VARIABLE): >60 ML/MIN/1.73 M^2
GLUCOSE SERPL-MCNC: 72 MG/DL (ref 70–110)
HCT VFR BLD AUTO: 37.1 % (ref 37–48.5)
HGB BLD-MCNC: 11.5 G/DL (ref 12–16)
IMM GRANULOCYTES # BLD AUTO: 0.02 K/UL (ref 0–0.04)
IMM GRANULOCYTES NFR BLD AUTO: 0.3 % (ref 0–0.5)
LYMPHOCYTES # BLD AUTO: 1.3 K/UL (ref 1–4.8)
LYMPHOCYTES NFR BLD: 20.2 % (ref 18–48)
MCH RBC QN AUTO: 27.3 PG (ref 27–31)
MCHC RBC AUTO-ENTMCNC: 31 G/DL (ref 32–36)
MCV RBC AUTO: 88 FL (ref 82–98)
MONOCYTES # BLD AUTO: 0.8 K/UL (ref 0.3–1)
MONOCYTES NFR BLD: 11.5 % (ref 4–15)
NEUTROPHILS # BLD AUTO: 4.4 K/UL (ref 1.8–7.7)
NEUTROPHILS NFR BLD: 67.6 % (ref 38–73)
NRBC BLD-RTO: 0 /100 WBC
PLATELET # BLD AUTO: 190 K/UL (ref 150–450)
PMV BLD AUTO: 11.5 FL (ref 9.2–12.9)
POTASSIUM SERPL-SCNC: 3.7 MMOL/L (ref 3.5–5.1)
PROT SERPL-MCNC: 5.7 G/DL (ref 6–8.4)
RBC # BLD AUTO: 4.22 M/UL (ref 4–5.4)
SODIUM SERPL-SCNC: 139 MMOL/L (ref 136–145)
WBC # BLD AUTO: 6.53 K/UL (ref 3.9–12.7)

## 2022-08-06 PROCEDURE — 94761 N-INVAS EAR/PLS OXIMETRY MLT: CPT

## 2022-08-06 PROCEDURE — 36415 COLL VENOUS BLD VENIPUNCTURE: CPT | Performed by: NURSE PRACTITIONER

## 2022-08-06 PROCEDURE — 25000003 PHARM REV CODE 250: Performed by: INTERNAL MEDICINE

## 2022-08-06 PROCEDURE — 94660 CPAP INITIATION&MGMT: CPT

## 2022-08-06 PROCEDURE — 80053 COMPREHEN METABOLIC PANEL: CPT | Performed by: NURSE PRACTITIONER

## 2022-08-06 PROCEDURE — 97165 OT EVAL LOW COMPLEX 30 MIN: CPT

## 2022-08-06 PROCEDURE — 25000003 PHARM REV CODE 250: Performed by: HOSPITALIST

## 2022-08-06 PROCEDURE — 99900035 HC TECH TIME PER 15 MIN (STAT)

## 2022-08-06 PROCEDURE — 27100098 HC SPACER

## 2022-08-06 PROCEDURE — 27000207 HC ISOLATION

## 2022-08-06 PROCEDURE — 63600175 PHARM REV CODE 636 W HCPCS: Performed by: HOSPITALIST

## 2022-08-06 PROCEDURE — 85025 COMPLETE CBC W/AUTO DIFF WBC: CPT | Performed by: NURSE PRACTITIONER

## 2022-08-06 PROCEDURE — 63600175 PHARM REV CODE 636 W HCPCS: Performed by: STUDENT IN AN ORGANIZED HEALTH CARE EDUCATION/TRAINING PROGRAM

## 2022-08-06 PROCEDURE — 11000001 HC ACUTE MED/SURG PRIVATE ROOM

## 2022-08-06 PROCEDURE — 27000221 HC OXYGEN, UP TO 24 HOURS

## 2022-08-06 PROCEDURE — 25000003 PHARM REV CODE 250: Performed by: NURSE PRACTITIONER

## 2022-08-06 PROCEDURE — 63600175 PHARM REV CODE 636 W HCPCS: Performed by: NURSE PRACTITIONER

## 2022-08-06 PROCEDURE — 94640 AIRWAY INHALATION TREATMENT: CPT

## 2022-08-06 PROCEDURE — 25000242 PHARM REV CODE 250 ALT 637 W/ HCPCS: Performed by: NURSE PRACTITIONER

## 2022-08-06 RX ORDER — HYDROXYZINE PAMOATE 25 MG/1
50 CAPSULE ORAL EVERY 8 HOURS PRN
Status: DISCONTINUED | OUTPATIENT
Start: 2022-08-06 | End: 2022-08-07

## 2022-08-06 RX ORDER — QUETIAPINE FUMARATE 25 MG/1
25 TABLET, FILM COATED ORAL NIGHTLY
Status: DISCONTINUED | OUTPATIENT
Start: 2022-08-06 | End: 2022-08-07

## 2022-08-06 RX ORDER — RISPERIDONE 0.5 MG/1
1 TABLET, ORALLY DISINTEGRATING ORAL 2 TIMES DAILY
Status: DISCONTINUED | OUTPATIENT
Start: 2022-08-06 | End: 2022-08-10

## 2022-08-06 RX ORDER — HYDROXYZINE PAMOATE 25 MG/1
25 CAPSULE ORAL EVERY 8 HOURS PRN
Status: DISCONTINUED | OUTPATIENT
Start: 2022-08-06 | End: 2022-08-06

## 2022-08-06 RX ADMIN — MUPIROCIN: 20 OINTMENT TOPICAL at 08:08

## 2022-08-06 RX ADMIN — FAMOTIDINE 20 MG: 10 INJECTION, SOLUTION INTRAVENOUS at 08:08

## 2022-08-06 RX ADMIN — ALBUTEROL SULFATE 2 PUFF: 90 AEROSOL, METERED RESPIRATORY (INHALATION) at 07:08

## 2022-08-06 RX ADMIN — QUETIAPINE FUMARATE 25 MG: 25 TABLET ORAL at 08:08

## 2022-08-06 RX ADMIN — OXYCODONE HYDROCHLORIDE AND ACETAMINOPHEN 500 MG: 500 TABLET ORAL at 08:08

## 2022-08-06 RX ADMIN — RISPERIDONE 1 MG: 0.5 TABLET, ORALLY DISINTEGRATING ORAL at 08:08

## 2022-08-06 RX ADMIN — ALBUTEROL SULFATE 2 PUFF: 90 AEROSOL, METERED RESPIRATORY (INHALATION) at 01:08

## 2022-08-06 RX ADMIN — REMDESIVIR 100 MG: 100 INJECTION, POWDER, LYOPHILIZED, FOR SOLUTION INTRAVENOUS at 08:08

## 2022-08-06 RX ADMIN — ENOXAPARIN SODIUM 40 MG: 100 INJECTION SUBCUTANEOUS at 04:08

## 2022-08-06 RX ADMIN — HYDROXYZINE PAMOATE 25 MG: 25 CAPSULE ORAL at 02:08

## 2022-08-06 RX ADMIN — ALBUTEROL SULFATE 2 PUFF: 90 AEROSOL, METERED RESPIRATORY (INHALATION) at 12:08

## 2022-08-06 RX ADMIN — SODIUM CHLORIDE: 0.9 INJECTION, SOLUTION INTRAVENOUS at 05:08

## 2022-08-06 RX ADMIN — VANCOMYCIN HYDROCHLORIDE 1250 MG: 1.25 INJECTION, POWDER, LYOPHILIZED, FOR SOLUTION INTRAVENOUS at 08:08

## 2022-08-06 RX ADMIN — Medication 1 TABLET: at 08:08

## 2022-08-06 RX ADMIN — DEXAMETHASONE 6 MG: 4 TABLET ORAL at 08:08

## 2022-08-06 RX ADMIN — HYDROXYZINE PAMOATE 50 MG: 25 CAPSULE ORAL at 08:08

## 2022-08-06 RX ADMIN — CEFTRIAXONE 1 G: 1 INJECTION, SOLUTION INTRAVENOUS at 04:08

## 2022-08-06 NOTE — ASSESSMENT & PLAN NOTE
- Due to above and in combination with ongoing polysubstance abuse  - Back to baseline  - Resolved

## 2022-08-06 NOTE — PLAN OF CARE
Pt calm at this time. Alert and oriented to person and place; easily reoriented to time and situation. Pt became more alert throughout shift; was able to educate and discuss plan of care this AM. NSR throughout the night. Pt removed BiPAP, requesting it to be off for a while. Placed pt on 4 L NC. O2 has been weaned to 2 L NC, spO2 100%. Padgett patent and intact, UOP~ 500 cc's overnight. Spoke with pt's sister, Betty, and updated on plan of care. Report to be given to oncoming RN.

## 2022-08-06 NOTE — ASSESSMENT & PLAN NOTE
Encephalopathy, metabolic  COVID +  - Recently treated for aspiration pneumonia and new onset CHF at this facility  - Found down at home by home health, patient in tub disoriented and hard to arouse. EMS arrived O2 saturations on RA 87% placed on 4L increased to 91%, +cough +tachy   - Patient with acute hypercapnic and hypoxic respiratory failure    - Intubated and vent supported  - Found to be COVID +  - Patient was on empiric Zosyn and Vancomycin  - Pulm/critical care stopped Zosyn and start Rocephin on 8/5/2022; continue vancomycin  - Continue remdesivir and dexamethasone, day #4  - Extubated to NC on 8/5/2022  - Currently stable on RA and mental status back to baseline  - Stable for step down to floor today

## 2022-08-06 NOTE — NURSING
Late entry - received report from JOANN Gabriel in ICU. Pt transferred to room 430. Arrived via wc, assisted to bed oriented to room. Pt aaox2 to person and place, disoriented to time and situation. per report, pt's IV was removed prior to transfer and area was red and swollen, an u/s to right forearm ordered to r/o DVT. u/s done at the bedside this evening. Marked area to monitor site. Iced water provided. Safety maintained, bed at lowest position, wheels locked, call light within reach, bed side commode close to bedside for easy access.

## 2022-08-06 NOTE — PLAN OF CARE
ICU Plan of Care Note:     Patient was extubated to BiPAP yesterday, did well and weaned to room air without issues overnight. Interval improvement in mentation, answering all questions appropriately this morning. Exam largely benign except for localized right upper extremity swelling and tenderness to palpation.     Plan to step down to floor today. Right upper extremity DVT US ordered.     Guy Patterson MD  [facility]  [facility]

## 2022-08-06 NOTE — SUBJECTIVE & OBJECTIVE
Interval History: No acute events. Extubated to NC yesterday without difficulty. Currently stable on RA, asking for medications to help her sleep while she is looking drowsy.     Review of Systems   Constitutional:  Negative for chills and fever.   Respiratory:  Positive for shortness of breath.    Cardiovascular:  Negative for chest pain.   Objective:     Vital Signs (Most Recent):  Temp: 98.3 °F (36.8 °C) (08/06/22 1117)  Pulse: 86 (08/06/22 1117)  Resp: 18 (08/06/22 1117)  BP: 113/69 (08/06/22 1117)  SpO2: 99 % (08/06/22 1117) Vital Signs (24h Range):  Temp:  [97.1 °F (36.2 °C)-98.8 °F (37.1 °C)] 98.3 °F (36.8 °C)  Pulse:  [61-92] 86  Resp:  [15-34] 18  SpO2:  [92 %-100 %] 99 %  BP: ()/(50-86) 113/69     Weight: 52.5 kg (115 lb 11.9 oz)  Body mass index is 22.6 kg/m².    Intake/Output Summary (Last 24 hours) at 8/6/2022 1149  Last data filed at 8/6/2022 1045  Gross per 24 hour   Intake 1653.81 ml   Output 4395 ml   Net -2741.19 ml      Physical Exam  Vitals and nursing note reviewed.   Constitutional:       Appearance: She is ill-appearing. She is not toxic-appearing.      Comments: Appears older than stated age    HENT:      Head: Normocephalic and atraumatic.      Nose: Nose normal.   Eyes:      Extraocular Movements: Extraocular movements intact.      Conjunctiva/sclera: Conjunctivae normal.   Cardiovascular:      Rate and Rhythm: Normal rate and regular rhythm.      Pulses: Normal pulses.      Heart sounds: Murmur heard.   Pulmonary:      Effort: Pulmonary effort is normal. No respiratory distress.      Comments: Decreased breath sounds at bases now resolved  Abdominal:      General: Bowel sounds are normal. There is no distension.      Palpations: Abdomen is soft.      Tenderness: There is no abdominal tenderness. There is no guarding or rebound.   Musculoskeletal:         General: Normal range of motion.      Cervical back: Normal range of motion.      Comments: Trace edema to lower extremities    Skin:     General: Skin is warm and dry.      Capillary Refill: Capillary refill takes less than 2 seconds.      Comments: Extensive tattoos throughout body   Neurological:      Mental Status: She is alert.      Motor: Weakness present.      Comments: Awake and alert, oriented x 3, able to maintain attention and answer simple questions and follow simple commands this morning   Psychiatric:         Attention and Perception: Attention normal.         Mood and Affect: Affect is labile.         Speech: Speech is delayed.         Behavior: Behavior is slowed.       Significant Labs: All pertinent labs within the past 24 hours have been reviewed.    Significant Imaging: I have reviewed all pertinent imaging results/findings within the past 24 hours.

## 2022-08-06 NOTE — PROGRESS NOTES
Pharmacokinetic Assessment Follow Up: IV Vancomycin    Vancomycin serum concentration assessment(s):    The trough level was drawn correctly and can be used to guide therapy at this time. The measurement is below the desired definitive target range of 15 to 20 mcg/mL.    Vancomycin Regimen Plan:  Gave 500mg one time dose to give a dose of 1500mg  Change regimen to Vancomycin 1250 mg IV every 24 hours with next serum trough concentration measured at 8/7/22 prior to 3 dose on 1930    Drug levels (last 3 results):  Recent Labs   Lab Result Units 08/05/22 1949   Vancomycin-Trough ug/mL 9.4*       Pharmacy will continue to follow and monitor vancomycin.    Please contact pharmacy at extension [phone] for questions regarding this assessment.    Thank you for the consult,   Jamia Hussein       Patient brief summary:  Stephanie T Barthelemy is a 51 y.o. female initiated on antimicrobial therapy with IV Vancomycin for treatment of lower respiratory infection    The patient's current regimen is vanco 1g q24    Drug Allergies:   Review of patient's allergies indicates:  No Known Allergies    Actual Body Weight:   52kg    Renal Function:   Estimated Creatinine Clearance: 48.3 mL/min (based on SCr of 1 mg/dL).,     Dialysis Method (if applicable):  N/A    CBC (last 72 hours):  Recent Labs   Lab Result Units 08/03/22 1730 08/04/22 0454 08/05/22  0517   WBC K/uL 7.29 5.14 7.50   Hemoglobin g/dL 11.5* 9.7* 11.5*   Hematocrit % 37.5 31.4* 37.1   Platelets K/uL 214 153 187   Gran % % 77.6* 85.8* 76.2*   Lymph % % 13.6* 8.4* 13.6*   Mono % % 8.0 5.6 9.1   Eosinophil % % 0.1 0.0 0.3   Basophil % % 0.4 0.0 0.5   Differential Method  Automated Automated Automated       Metabolic Panel (last 72 hours):  Recent Labs   Lab Result Units 08/03/22 1730 08/03/22 1952 08/04/22 0454 08/05/22  0517   Sodium mmol/L 139  --  140 144   Potassium mmol/L 3.9  --  4.3 3.9   Chloride mmol/L 104  --  108 109   CO2 mmol/L 24  --  26 25   Glucose mg/dL  100  --  120* 80   Glucose, UA   --  Negative  --   --    BUN mg/dL 17  --  15 22*   Creatinine mg/dL 0.9  --  0.8 1.0   Creatinine, Urine mg/dL  --  107.6  --   --    Albumin g/dL 3.4*  --  2.4* 2.3*   Total Bilirubin mg/dL 0.6  --  0.5 0.6   Alkaline Phosphatase U/L 130  --  106 113   AST U/L 64*  --  76* 40   ALT U/L 49*  --  56* 48*       Vancomycin Administrations:  vancomycin given in the last 96 hours                     vancomycin in dextrose 5 % 1 gram/250 mL IVPB 1,000 mg (mg) 1,000 mg New Bag 08/05/22 2049     1,000 mg New Bag 08/04/22 2115    vancomycin 1.25 g in dextrose 5% 250 mL IVPB (ready to mix) (mg) 1,250 mg New Bag 08/03/22 2017                    Microbiologic Results:  Microbiology Results (last 7 days)       Procedure Component Value Units Date/Time    Blood Culture #1 **CANNOT BE ORDERED STAT** [[id number]] Collected: 08/03/22 1730    Order Status: Completed Specimen: Blood from Peripheral, Forearm, Right Updated: 08/05/22 0613     Blood Culture, Routine No Growth to date      No Growth to date    Blood Culture #2 **CANNOT BE ORDERED STAT** [[id number]] Collected: 08/03/22 1750    Order Status: Completed Specimen: Blood from Peripheral, Wrist, Left Updated: 08/05/22 0613     Blood Culture, Routine No Growth to date      No Growth to date    Influenza A & B by Molecular [[id number]] Collected: 08/03/22 1735    Order Status: Completed Specimen: Nasopharyngeal Swab Updated: 08/03/22 1804     Influenza A, Molecular Negative     Influenza B, Molecular Negative     Flu A & B Source Nasal swab

## 2022-08-06 NOTE — RESPIRATORY THERAPY
Pt ripped off bipap per RN. Placed on 3 lpm nasal cannula. Pt tolerating NC appropriately. spo2 100% will continue to monitor and assess.

## 2022-08-06 NOTE — EICU
Rounding (Video Assessment):  Yes    Intervention Initiated From:  Bedside    Maria Teresa Communicated with Bedside Nurse regarding:  Other    Nurse Notified:  Yes    Doctor Notified:  Yes    Comments: Bedside nurse called to report that they pts map 65-67 but sbp <100. Requesting if md wants to give iv fluids. Dr Spencer notified

## 2022-08-06 NOTE — NURSING
"Pt removed BiPAP stating, "I just really need it off for a little bit." Pt placed on 4 L NC, SpO2 100%. Denies SOB; no signs/ symptoms of respiratory distress at this time.   "

## 2022-08-06 NOTE — PROGRESS NOTES
[facility] Medicine  Progress Note    Patient Name: Stephanie T Barthelemy  MRN: [id number]  Patient Class: IP- Inpatient   Admission Date: 8/3/2022  Length of Stay: 3 days  Attending Physician: Almaz Lucio MD  Primary Care Provider: Hilda Elizabeth NP        Subjective:     Principal Problem:Acute respiratory failure with hypoxia and hypercapnia        HPI:  Stephanie T Barthelemy is a 51 y.o. female who has a past medical history of ADHD (attention deficit hyperactivity disorder), Anemia, Anxiety, Bipolar disorder, History of hepatitis C - s/p clearance of virus (HCV neg 6/2015) (9/26/2014), History of psychiatric hospitalization, History of substance abuse, psychiatric care, Hypertension, Opioid overdose (5/10/2016), Psychiatric problem, Psychosis, Seizure disorder (4/3/2019), Sleep difficulties, Substance abuse, Therapy, and Vasculitis. She presented to the ED for altered mental status. She was brought in by EMS after found down at home by home health in bath tub unresponsive. Of note patient was just admitted for pneumonia and new onset CHF.     In the ED: She was found COVID-19 positive. UDS positive for amphetamines. Latest EKG with Sinus tach, rate 131, nonspecific ST changes, no ST elevations or other signs of ischemia, normal intervals. Compared to prior, QT prolongation has improved. CXR with diffuse interstitial opacities most consistent with viral process. Patient required intubation for airway protection. Considering patient's persistent hypoxia, severe symptoms, and co-morbidities, patient is high risk for decompensation and complications including worsening hypoxia, respiratory failure, intubation, or cardiopulmonary arrest if discharged. Admitted to [facility] for further care.      Overview/Hospital Course:  Ms. Barthelemy presented with confusion and hypoxia.  Admitted with acute respiratory failure with COVID-19 complicated by encephalopathy.  Intubated  for airway protection and isolated protocol in place.  Treatment initiated with remdesivir and dexamethasone, along with possible concomitant bacterial infection with Zosyn and vancomycin.  Pulmonary/Critical Care consulted.  Antibiotics de-escalated to Rocephin and Vancomycin as per Pulm/Critical care recommendations. Extubated on 8/5/2022.      Interval History: No acute events. Extubated to NC yesterday without difficulty. Currently stable on RA, asking for medications to help her sleep while she is looking drowsy.     Review of Systems   Constitutional:  Negative for chills and fever.   Respiratory:  Positive for shortness of breath.    Cardiovascular:  Negative for chest pain.   Objective:     Vital Signs (Most Recent):  Temp: 98.3 °F (36.8 °C) (08/06/22 1117)  Pulse: 86 (08/06/22 1117)  Resp: 18 (08/06/22 1117)  BP: 113/69 (08/06/22 1117)  SpO2: 99 % (08/06/22 1117) Vital Signs (24h Range):  Temp:  [97.1 °F (36.2 °C)-98.8 °F (37.1 °C)] 98.3 °F (36.8 °C)  Pulse:  [61-92] 86  Resp:  [15-34] 18  SpO2:  [92 %-100 %] 99 %  BP: ()/(50-86) 113/69     Weight: 52.5 kg (115 lb 11.9 oz)  Body mass index is 22.6 kg/m².    Intake/Output Summary (Last 24 hours) at 8/6/2022 1149  Last data filed at 8/6/2022 1045  Gross per 24 hour   Intake 1653.81 ml   Output 4395 ml   Net -2741.19 ml      Physical Exam  Vitals and nursing note reviewed.   Constitutional:       Appearance: She is ill-appearing. She is not toxic-appearing.      Comments: Appears older than stated age    HENT:      Head: Normocephalic and atraumatic.      Nose: Nose normal.   Eyes:      Extraocular Movements: Extraocular movements intact.      Conjunctiva/sclera: Conjunctivae normal.   Cardiovascular:      Rate and Rhythm: Normal rate and regular rhythm.      Pulses: Normal pulses.      Heart sounds: Murmur heard.   Pulmonary:      Effort: Pulmonary effort is normal. No respiratory distress.      Comments: Decreased breath sounds at bases now  resolved  Abdominal:      General: Bowel sounds are normal. There is no distension.      Palpations: Abdomen is soft.      Tenderness: There is no abdominal tenderness. There is no guarding or rebound.   Musculoskeletal:         General: Normal range of motion.      Cervical back: Normal range of motion.      Comments: Trace edema to lower extremities   Skin:     General: Skin is warm and dry.      Capillary Refill: Capillary refill takes less than 2 seconds.      Comments: Extensive tattoos throughout body   Neurological:      Mental Status: She is alert.      Motor: Weakness present.      Comments: Awake and alert, oriented x 3, able to maintain attention and answer simple questions and follow simple commands this morning   Psychiatric:         Attention and Perception: Attention normal.         Mood and Affect: Affect is labile.         Speech: Speech is delayed.         Behavior: Behavior is slowed.       Significant Labs: All pertinent labs within the past 24 hours have been reviewed.    Significant Imaging: I have reviewed all pertinent imaging results/findings within the past 24 hours.      Assessment/Plan:      * Acute respiratory failure with hypoxia and hypercapnia  Encephalopathy, metabolic  COVID +  - Recently treated for aspiration pneumonia and new onset CHF at this facility  - Found down at home by home health, patient in tub disoriented and hard to arouse. EMS arrived O2 saturations on RA 87% placed on 4L increased to 91%, +cough +tachy   - Patient with acute hypercapnic and hypoxic respiratory failure    - Intubated and vent supported  - Found to be COVID +  - Patient was on empiric Zosyn and Vancomycin  - Pulm/critical care stopped Zosyn and start Rocephin on 8/5/2022; continue vancomycin  - Continue remdesivir and dexamethasone, day #4  - Extubated to NC on 8/5/2022  - Currently stable on RA and mental status back to baseline  - Stable for step down to floor today    Chronic combined systolic and  diastolic heart failure  - Last ECHO results:   - Results for orders placed during the hospital encounter of 07/21/22  · The left ventricle is mildly enlarged with concentric hypertrophy and severely decreased systolic function.  · The estimated ejection fraction is 20%.  · There is severe left ventricular global hypokinesis.  · Grade III left ventricular diastolic dysfunction.  · Mild right ventricular enlargement with mildly reduced right ventricular systolic function.  · Severe left atrial enlargement.  · Moderate aortic regurgitation.  · Severe mitral regurgitation.  · Mild tricuspid regurgitation.  · Moderate pulmonic regurgitation.  · Intermediate central venous pressure (8 mmHg).  · The estimated PA systolic pressure is 33 mmHg.  · Trivial pericardial effusion.  - Troponin 0.034-0.052; likely demand; will continue to monitor and trend  - CXR revealed Nonspecific bilateral airspace opacities.  Findings may suggest multifocal pneumonia or edema. Recommend clinical correlation. Probable small pleural effusions.  - Does not appear clinically volume overload at this time  - Continue home BB, ACEi/ARB when stable  - Daily weights and strict I/Os  - Monitor on telemetry  - Monitor and trend BMP, Mg, and renal function; keep K >4, Mg >2  - Sodium restriction (<2g/d), fluid restriction (<2L)   - Monitor for signs of fluid overload: RR>30, O2 sat<92%, weight gain of >3 lbs in 24 hours, or urinary output <160ml/8hr    Encephalopathy, metabolic  - Due to above and in combination with ongoing polysubstance abuse  - Back to baseline  - Resolved    COVID-19  - Patient is identified as Severe COVID-19 based on hypoxemia with O2 saturations <94% on room air or on ambulation   - Initiate standard COVID protocols; COVID-19 testing Collection Date: 10/1/2021 Collection Time:   1:22 AM, Infection Control notification  and isolation- respiratory, contact and droplet per protocol  - Diagnostics: (leukopenia, hyponatremia,  hyperferritinemia, elevated troponin, elevated d-dimer, age, and comorbidities are significant predictors of poor clinical outcome)  CMP, CRP, BNP and Portable CXR  Management: Continuous cardiac monitoring. and Manage respiratory failure (O2 requirement >10LPM or needing NIPPV/Mechanical ventilation) and/or Pneumonia (active chest infiltrates) separately as described below.  - Monitor on Telemetry  - Initiated on dexamethasone, continue for 10d course  - Initiated on remdesivir x5d; daily CMP  - Continuous Pulse Oximetry, goal SpO2 92-96%  - MVI & ascorbic acid 500mg PO BID  - Acetaminophen Q6hr PRN fever  - VTE PPx: enoxaparin   - As discussed above    HLD (hyperlipidemia)  - Not on medications at home    Bipolar 1 disorder, depressed  Attention deficit hyperactivity disorder (ADHD), combined type  - Hold home meds until patient is stable    Seizure disorder  - Seizure precautions  - PRN lorazepam    Hypokalemia  - Corrected to normal, monitor    Amphetamine abuse  Polysubstance abuse  - UDS positive  - Monitor for withdrawal  - May require psych consult upon extubation and stabilization, but stable at this time    Debility  - PT and OT consult    Anemia  - Stable, monitor    Essential hypertension  - Hold anti-HTN for now  - Will resume when indicated      VTE Risk Mitigation (From admission, onward)         Ordered     enoxaparin injection 40 mg  Daily         08/04/22 0941     IP VTE HIGH RISK PATIENT  Once         08/03/22 2045     Place sequential compression device  Until discontinued         08/03/22 2045                  Critical care time spent on the evaluation and treatment of severe organ dysfunction, review of pertinent labs and imaging studies, discussions with consulting providers and discussions with patient/family: 35 minutes.        Almaz Lucio MD  Department of Hospital Medicine   [facility] - Intensive Care

## 2022-08-06 NOTE — ASSESSMENT & PLAN NOTE
Polysubstance abuse  - UDS positive  - Monitor for withdrawal  - May require psych consult upon extubation and stabilization, but stable at this time

## 2022-08-06 NOTE — NURSING TRANSFER
Nursing Transfer Note      8/6/2022     Reason patient is being transferred: step-down orders    Transfer To: 430    Transfer via wheelchair    Transfer with cardiac monitoring    Transported by Nery Chavez RN    Medicines sent: n/a    Any special needs or follow-up needed: holley removed prior to transfer (11am); follow up void within 6 hours     Chart send with patient: Yes    Notified: Patient said she would notify her sisterBetty    Patient reassessed at: 8/6/22 1300    Upon arrival to floor: cardiac monitor applied, patient oriented to room, call bell in reach and bed in lowest position   No

## 2022-08-06 NOTE — NURSING
Pt on bipap 12/6, 30% fiO2; tolerating well, spO2 99%. Pt is alert and oriented to person and place. Able to follow some commands. VSS. Continues on Precedex gtt at this time.

## 2022-08-06 NOTE — EICU
Alerted by bedside regarding MAP.. was in upper 60's when patient was on precedex and NIV.     Now she is off of both NIV and precedex. On 4 litres oxygen sating 100. No respiratory distress noted by her bedside exam and my AV equipment use.     She has low EF   Fluid wise we are being conservative   Current  / 69   Heart rate 72   Resp rate 16

## 2022-08-07 PROCEDURE — 25000003 PHARM REV CODE 250: Performed by: HOSPITALIST

## 2022-08-07 PROCEDURE — 94640 AIRWAY INHALATION TREATMENT: CPT

## 2022-08-07 PROCEDURE — 99900035 HC TECH TIME PER 15 MIN (STAT)

## 2022-08-07 PROCEDURE — 63600175 PHARM REV CODE 636 W HCPCS: Performed by: NURSE PRACTITIONER

## 2022-08-07 PROCEDURE — 94761 N-INVAS EAR/PLS OXIMETRY MLT: CPT

## 2022-08-07 PROCEDURE — 97161 PT EVAL LOW COMPLEX 20 MIN: CPT

## 2022-08-07 PROCEDURE — 63600175 PHARM REV CODE 636 W HCPCS: Performed by: STUDENT IN AN ORGANIZED HEALTH CARE EDUCATION/TRAINING PROGRAM

## 2022-08-07 PROCEDURE — 97530 THERAPEUTIC ACTIVITIES: CPT

## 2022-08-07 PROCEDURE — 25000003 PHARM REV CODE 250: Performed by: NURSE PRACTITIONER

## 2022-08-07 PROCEDURE — 11000001 HC ACUTE MED/SURG PRIVATE ROOM

## 2022-08-07 PROCEDURE — 25000003 PHARM REV CODE 250: Performed by: INTERNAL MEDICINE

## 2022-08-07 PROCEDURE — 27000207 HC ISOLATION

## 2022-08-07 PROCEDURE — 94660 CPAP INITIATION&MGMT: CPT

## 2022-08-07 PROCEDURE — 63600175 PHARM REV CODE 636 W HCPCS: Performed by: HOSPITALIST

## 2022-08-07 RX ORDER — QUETIAPINE FUMARATE 25 MG/1
50 TABLET, FILM COATED ORAL NIGHTLY
Status: DISCONTINUED | OUTPATIENT
Start: 2022-08-07 | End: 2022-08-10

## 2022-08-07 RX ORDER — FUROSEMIDE 20 MG/1
20 TABLET ORAL DAILY
Status: DISCONTINUED | OUTPATIENT
Start: 2022-08-07 | End: 2022-08-11 | Stop reason: HOSPADM

## 2022-08-07 RX ORDER — HYDROXYZINE PAMOATE 25 MG/1
50 CAPSULE ORAL EVERY 8 HOURS
Status: DISCONTINUED | OUTPATIENT
Start: 2022-08-07 | End: 2022-08-10

## 2022-08-07 RX ADMIN — Medication 1 TABLET: at 09:08

## 2022-08-07 RX ADMIN — ALBUTEROL SULFATE 2 PUFF: 90 AEROSOL, METERED RESPIRATORY (INHALATION) at 08:08

## 2022-08-07 RX ADMIN — REMDESIVIR 100 MG: 100 INJECTION, POWDER, LYOPHILIZED, FOR SOLUTION INTRAVENOUS at 09:08

## 2022-08-07 RX ADMIN — HYDROXYZINE PAMOATE 50 MG: 25 CAPSULE ORAL at 09:08

## 2022-08-07 RX ADMIN — ALBUTEROL SULFATE 2 PUFF: 90 AEROSOL, METERED RESPIRATORY (INHALATION) at 07:08

## 2022-08-07 RX ADMIN — OXYCODONE HYDROCHLORIDE AND ACETAMINOPHEN 500 MG: 500 TABLET ORAL at 09:08

## 2022-08-07 RX ADMIN — FAMOTIDINE 20 MG: 10 INJECTION, SOLUTION INTRAVENOUS at 09:08

## 2022-08-07 RX ADMIN — ALBUTEROL SULFATE 2 PUFF: 90 AEROSOL, METERED RESPIRATORY (INHALATION) at 12:08

## 2022-08-07 RX ADMIN — QUETIAPINE FUMARATE 50 MG: 25 TABLET ORAL at 09:08

## 2022-08-07 RX ADMIN — ENOXAPARIN SODIUM 40 MG: 100 INJECTION SUBCUTANEOUS at 04:08

## 2022-08-07 RX ADMIN — MUPIROCIN: 20 OINTMENT TOPICAL at 09:08

## 2022-08-07 RX ADMIN — CEFTRIAXONE 1 G: 1 INJECTION, SOLUTION INTRAVENOUS at 04:08

## 2022-08-07 RX ADMIN — DEXAMETHASONE 6 MG: 4 TABLET ORAL at 09:08

## 2022-08-07 RX ADMIN — HYDROXYZINE PAMOATE 50 MG: 25 CAPSULE ORAL at 11:08

## 2022-08-07 RX ADMIN — RISPERIDONE 1 MG: 0.5 TABLET, ORALLY DISINTEGRATING ORAL at 09:08

## 2022-08-07 RX ADMIN — FUROSEMIDE 20 MG: 20 TABLET ORAL at 11:08

## 2022-08-07 NOTE — ASSESSMENT & PLAN NOTE
Encephalopathy, metabolic  COVID +  - Recently treated for aspiration pneumonia and new onset CHF at this facility  - Found down at home by home health, patient in tub disoriented and hard to arouse. EMS arrived O2 saturations on RA 87% placed on 4L increased to 91%, +cough +tachy   - Patient with acute hypercapnic and hypoxic respiratory failure    - Intubated and vent supported  - Found to be COVID +  - Patient was on empiric Zosyn and Vancomycin  - Pulm/critical care stopped Zosyn and start Rocephin on 8/5/2022; continue vancomycin  - Extubated to NC on 8/5/2022  - Stepped down to floor on 8/6/2022  - Completed remdesivir, full 5 day course done on 8/7/2022  - Continue on dexamethasone, day #5  - Currently stable on RA and mental status back to baseline  - Will consider stopping steroids after today if patient continues to do well

## 2022-08-07 NOTE — ASSESSMENT & PLAN NOTE
Attention deficit hyperactivity disorder (ADHD), combined type  - Held home meds until patient is stable  - Restarted hydroxyzine, quetiapine, risperidone  - Will increase quetiapine dose today

## 2022-08-07 NOTE — PLAN OF CARE
Problem: Occupational Therapy  Goal: Occupational Therapy Goal  Description: Goals to be met by: 9/6/2022    Patient will increase functional independence with ADLs by performing:    UE Dressing with Modified [facility].  LE Dressing with Modified [facility].  Grooming while standing at sink with Modified [facility].  Toileting from toilet with Modified [facility] for hygiene and clothing management.   Step transfer with Modified [facility]  Toilet transfer to toilet with Modified [facility].  Upper extremity exercise program x10 reps per handout, with independence.    Outcome: Ongoing, Progressing   Limited OT eval completed 2/2 increased agitation and difficulty calming and redirecting pt. Continue with OT POC.

## 2022-08-07 NOTE — PLAN OF CARE
"  Problem: Physical Therapy  Goal: Physical Therapy Goal  Description: Goals to be met by: 22     Patient will increase functional independence with mobility by performin. Sit to stand transfer with Stand-by Assistance  2. Bed to chair transfer with Stand-by Assistance using No Assistive Device  3. Gait  x 150 feet with SBA with or without an AD.     Outcome: Ongoing, Progressing     Pt was able to t/f bed<>BSC with CGA-[facility] Alberto and no AD. Pt reporting she felt "extremely dizzy" after transfer and needing to return supine. BP elevated: 152/97 and pt diaphoretic. Nsg notified. D/c recs TBD pending progress - pt likely to need  supervision/assist.  "

## 2022-08-07 NOTE — PLAN OF CARE
PT on RA, no respiratory distress noted. Will continue to monitor.  The proper method of use, as well as anticipated side effects, of this metered-dose inhaler are discussed and demonstrated to the patient.

## 2022-08-07 NOTE — PROGRESS NOTES
[facility] Medicine  Progress Note    Patient Name: Stephanie T Barthelemy  MRN: [id number]  Patient Class: IP- Inpatient   Admission Date: 8/3/2022  Length of Stay: 4 days  Attending Physician: Almaz Lucio MD  Primary Care Provider: Hilda Elizabeth NP        Subjective:     Principal Problem:Acute respiratory failure with hypoxia and hypercapnia        HPI:  Stephanie T Barthelemy is a 51 y.o. female who has a past medical history of ADHD (attention deficit hyperactivity disorder), Anemia, Anxiety, Bipolar disorder, History of hepatitis C - s/p clearance of virus (HCV neg 6/2015) (9/26/2014), History of psychiatric hospitalization, History of substance abuse, psychiatric care, Hypertension, Opioid overdose (5/10/2016), Psychiatric problem, Psychosis, Seizure disorder (4/3/2019), Sleep difficulties, Substance abuse, Therapy, and Vasculitis. She presented to the ED for altered mental status. She was brought in by EMS after found down at home by home health in bath tub unresponsive. Of note patient was just admitted for pneumonia and new onset CHF.     In the ED: She was found COVID-19 positive. UDS positive for amphetamines. Latest EKG with Sinus tach, rate 131, nonspecific ST changes, no ST elevations or other signs of ischemia, normal intervals. Compared to prior, QT prolongation has improved. CXR with diffuse interstitial opacities most consistent with viral process. Patient required intubation for airway protection. Considering patient's persistent hypoxia, severe symptoms, and co-morbidities, patient is high risk for decompensation and complications including worsening hypoxia, respiratory failure, intubation, or cardiopulmonary arrest if discharged. Admitted to [facility] for further care.      Overview/Hospital Course:  Ms. Barthelemy presented with confusion and hypoxia.  Admitted with acute respiratory failure with COVID-19 complicated by encephalopathy.  Intubated for  airway protection and isolated protocol in place.  Treatment initiated with remdesivir and dexamethasone, along with possible concomitant bacterial infection with Zosyn and vancomycin.  Pulmonary/Critical Care consulted.  Antibiotics de-escalated to Rocephin and Vancomycin as per Pulm/Critical care recommendations. Extubated on 8/5/2022. Stepped down to floor on 8/6/2022.      Interval History: No acute events. Currently stable on RA, asking for medications for itching and anxiety.    Review of Systems   Constitutional:  Negative for chills and fever.   Respiratory:  Positive for shortness of breath.    Cardiovascular:  Negative for chest pain.   Objective:     Vital Signs (Most Recent):  Temp: 97.5 °F (36.4 °C) (08/07/22 1059)  Pulse: 89 (08/07/22 1158)  Resp: 18 (08/07/22 1059)  BP: 135/88 (08/07/22 1059)  SpO2: 99 % (08/07/22 1059) Vital Signs (24h Range):  Temp:  [97.1 °F (36.2 °C)-98.2 °F (36.8 °C)] 97.5 °F (36.4 °C)  Pulse:  [] 89  Resp:  [8-22] 18  SpO2:  [95 %-100 %] 99 %  BP: (116-143)/(79-97) 135/88     Weight: 52.5 kg (115 lb 11.9 oz)  Body mass index is 22.6 kg/m².    Intake/Output Summary (Last 24 hours) at 8/7/2022 1216  Last data filed at 8/7/2022 0800  Gross per 24 hour   Intake 1355 ml   Output 2825 ml   Net -1470 ml        Physical Exam  Vitals and nursing note reviewed.   Constitutional:       Appearance: She is ill-appearing. She is not toxic-appearing.      Comments: Appears older than stated age    HENT:      Head: Normocephalic and atraumatic.      Nose: Nose normal.   Eyes:      Extraocular Movements: Extraocular movements intact.      Conjunctiva/sclera: Conjunctivae normal.   Cardiovascular:      Rate and Rhythm: Normal rate and regular rhythm.      Pulses: Normal pulses.      Heart sounds: Murmur heard.   Pulmonary:      Effort: Pulmonary effort is normal. No respiratory distress.      Comments: Decreased breath sounds at bases now resolved  Abdominal:      General: Bowel sounds are  normal. There is no distension.      Palpations: Abdomen is soft.      Tenderness: There is no abdominal tenderness. There is no guarding or rebound.   Musculoskeletal:         General: Normal range of motion.      Cervical back: Normal range of motion.      Right lower leg: No edema.      Left lower leg: No edema.   Skin:     General: Skin is warm and dry.      Capillary Refill: Capillary refill takes less than 2 seconds.      Comments: Extensive tattoos throughout body   Neurological:      Mental Status: She is alert.      Motor: Weakness present.      Comments: Awake and alert, oriented x 3, able to maintain attention and answer simple questions and follow simple commands this morning   Psychiatric:         Attention and Perception: Attention normal.         Mood and Affect: Affect is labile.         Speech: Speech is delayed.         Behavior: Behavior is slowed.         Judgment: Judgment is impulsive.       Significant Labs: All pertinent labs within the past 24 hours have been reviewed.    Significant Imaging: I have reviewed all pertinent imaging results/findings within the past 24 hours.      Assessment/Plan:      * Acute respiratory failure with hypoxia and hypercapnia  Encephalopathy, metabolic  COVID +  - Recently treated for aspiration pneumonia and new onset CHF at this facility  - Found down at home by home health, patient in tub disoriented and hard to arouse. EMS arrived O2 saturations on RA 87% placed on 4L increased to 91%, +cough +tachy   - Patient with acute hypercapnic and hypoxic respiratory failure    - Intubated and vent supported  - Found to be COVID +  - Patient was on empiric Zosyn and Vancomycin  - Pulm/critical care stopped Zosyn and start Rocephin on 8/5/2022; continue vancomycin  - Extubated to NC on 8/5/2022  - Stepped down to floor on 8/6/2022  - Completed remdesivir, full 5 day course done on 8/7/2022  - Continue on dexamethasone, day #5  - Currently stable on RA and mental status  back to baseline  - Will consider stopping steroids after today if patient continues to do well    Chronic combined systolic and diastolic heart failure  - Last ECHO results:   - Results for orders placed during the hospital encounter of 07/21/22  · The left ventricle is mildly enlarged with concentric hypertrophy and severely decreased systolic function.  · The estimated ejection fraction is 20%.  · There is severe left ventricular global hypokinesis.  · Grade III left ventricular diastolic dysfunction.  · Mild right ventricular enlargement with mildly reduced right ventricular systolic function.  · Severe left atrial enlargement.  · Moderate aortic regurgitation.  · Severe mitral regurgitation.  · Mild tricuspid regurgitation.  · Moderate pulmonic regurgitation.  · Intermediate central venous pressure (8 mmHg).  · The estimated PA systolic pressure is 33 mmHg.  · Trivial pericardial effusion.  - Troponin 0.034-0.052; likely demand; will continue to monitor and trend  - CXR revealed Nonspecific bilateral airspace opacities.  Findings may suggest multifocal pneumonia or edema. Recommend clinical correlation. Probable small pleural effusions.  - Does not appear clinically volume overload at this time  - Continue home BB, ACEi/ARB when stable  - Daily weights and strict I/Os  - Monitor on telemetry  - Monitor and trend BMP, Mg, and renal function; keep K >4, Mg >2  - Sodium restriction (<2g/d), fluid restriction (<2L)   - Monitor for signs of fluid overload: RR>30, O2 sat<92%, weight gain of >3 lbs in 24 hours, or urinary output <160ml/8hr  - Start low dose lasix 20 mg daily today    Encephalopathy, metabolic  - Due to above and in combination with ongoing polysubstance abuse  - Back to baseline  - Resolved    COVID-19  - Patient is identified as Severe COVID-19 based on hypoxemia with O2 saturations <94% on room air or on ambulation   - Initiate standard COVID protocols; COVID-19 testing Collection Date: 10/1/2021  Collection Time:   1:22 AM, Infection Control notification  and isolation- respiratory, contact and droplet per protocol  - Diagnostics: (leukopenia, hyponatremia, hyperferritinemia, elevated troponin, elevated d-dimer, age, and comorbidities are significant predictors of poor clinical outcome)  CMP, CRP, BNP and Portable CXR  Management: Continuous cardiac monitoring. and Manage respiratory failure (O2 requirement >10LPM or needing NIPPV/Mechanical ventilation) and/or Pneumonia (active chest infiltrates) separately as described below.  - Monitor on Telemetry  - Initiated on dexamethasone, continue for 10d course  - Initiated on remdesivir x5d; daily CMP  - Continuous Pulse Oximetry, goal SpO2 92-96%  - MVI & ascorbic acid 500mg PO BID  - Acetaminophen Q6hr PRN fever  - VTE PPx: enoxaparin   - As discussed above    HLD (hyperlipidemia)  - Not on medications at home    Bipolar 1 disorder, depressed  Attention deficit hyperactivity disorder (ADHD), combined type  - Held home meds until patient is stable  - Restarted hydroxyzine, quetiapine, risperidone  - Will increase quetiapine dose today    Seizure disorder  - Seizure precautions  - PRN lorazepam    Hypokalemia  - Corrected to normal, monitor    Amphetamine abuse  Polysubstance abuse  - UDS positive  - Monitor for withdrawal  - May require psych consult upon extubation and stabilization, but stable at this time    Debility  - PT and OT consult    Anemia  - Stable, monitor    Essential hypertension  - Hold anti-HTN for now  - Will resume when indicated      VTE Risk Mitigation (From admission, onward)         Ordered     enoxaparin injection 40 mg  Daily         08/04/22 0941     IP VTE HIGH RISK PATIENT  Once         08/03/22 2045     Place sequential compression device  Until discontinued         08/03/22 2045                      Almaz Lucio MD  Department of Hospital Medicine   [place] - Telemetry

## 2022-08-07 NOTE — PT/OT/SLP EVAL
"Physical Therapy Evaluation    Patient Name:  Stephanie T Barthelemy   MRN:  [id number]    Recommendations:     Discharge Recommendations:   (TBD)   Discharge Equipment Recommendations:  (TBD)   Barriers to discharge: Impaired functional mobility     Assessment:     Stephanie T Barthelemy is a 51 y.o. female admitted with a medical diagnosis of Acute respiratory failure with hypoxia and hypercapnia.  She presents with the following impairments/functional limitations:  weakness, gait instability, impaired balance, impaired self care skills, impaired functional mobility, impaired cognition, decreased safety awareness, impaired cardiopulmonary response to activity .Pt was able to t/f bed<>BSC with CGA-Jesus Alberto and no AD. Pt reporting she felt "extremely dizzy" after transfer and needing to return supine. BP elevated: 152/97 and pt diaphoretic. Nsg notified. D/c recs TBD pending progress - pt likely to need 24/7 supervision/assist.    Rehab Prognosis: Good; patient would benefit from acute skilled PT services to address these deficits and reach maximum level of function.    Recent Surgery: * No surgery found *      Plan:     During this hospitalization, patient to be seen 3 x/week to address the identified rehab impairments via gait training, therapeutic activities, therapeutic exercises, neuromuscular re-education and progress toward the following goals:    · Plan of Care Expires:  09/07/22    Subjective     Chief Complaint: Dizziness  Patient/Family Comments/goals: None stated  Pain/Comfort:  · Pain Rating 1:  (not rated)  · Location - Side 1: Bilateral  · Location - Orientation 1: generalized  · Location 1: leg  · Pain Addressed 1: Reposition, Distraction, Cessation of Activity, Nurse notified  · Pain Rating Post-Intervention 1: 0/10    Patients cultural, spiritual, Anabaptism conflicts given the current situation: no    Living Environment:  Pt reports she lives with her father and sister in a H, 0 [address], and T/S  Prior to " "admission, patients level of function was Mod I  - unsure of full level of mobility and if using AD, pt with decreased alertness and participation in session.  Equipment used at home: walker, standard.  DME owned (not currently used): standard walker.  Upon discharge, patient will have assistance from family.    Objective:     Communicated with nsg prior to session.  Patient found HOB elevated with peripheral IV, bed alarm, telemetry  upon PT entry to room.    General Precautions: Standard, airborne, contact, droplet, fall, seizure, aspiration, hearing impaired   Orthopedic Precautions:N/A   Braces: N/A  Respiratory Status: Room air    Exams:  · Cognitive Exam:  Patient is oriented to Person  · Postural Exam:  Patient presented with the following abnormalities:    · -       Rounded shoulders  · -       Forward head  · Sensation: pt reports "everywhere" when asked if she had any numbness/tingling - nsg notified   · RLE ROM: WFL  · RLE Strength: grossly 4/5 - difficulty accurately assessing 2/2 decreased participation and command following  · LLE ROM: WFL  · LLE Strength: grossly 4/5 - difficulty accurately assessing 2/2 decreased participation and command following    Functional Mobility:  · Bed Mobility:     · Rolling Right: supervision  · Scooting: supervision  · Supine to Sit: supervision  · Sit to Supine: supervision  · Transfers:     · Sit to Stand:  contact guard assistance with no AD  · Bed to BSC: contact guard assistance and minimum assistance with  no AD  using  Step Transfer    Therapeutic Activities and Exercises:  Pt was able to t/f bed<>BSC with CGA-Jesus lAberto and no AD. Pt reporting she felt "extremely dizzy" after transfer and needing to return supine. Pt able to scoot self toward HOB. Brief and purewick replaced. BP elevated: 152/97, SpO2 97%, and pt diaphoretic. Nsg notified    AM-PAC 6 CLICK MOBILITY  Total Score:17     Patient left HOB elevated with all lines intact, call button in reach, bed alarm on, " nsg notified and CLEMENTE system present.    GOALS:   Multidisciplinary Problems     Physical Therapy Goals        Problem: Physical Therapy    Goal Priority Disciplines Outcome Goal Variances Interventions   Physical Therapy Goal     PT, PT/OT Ongoing, Progressing     Description: Goals to be met by: 22     Patient will increase functional independence with mobility by performin. Sit to stand transfer with Stand-by Assistance  2. Bed to chair transfer with Stand-by Assistance using No Assistive Device  3. Gait  x 150 feet with SBA with or without an AD.                      History:     Past Medical History:   Diagnosis Date    ADHD (attention deficit hyperactivity disorder)     Anemia     Anxiety     Bipolar disorder     History of hepatitis C - s/p clearance of virus (HCV neg 2015) 2014    History of psychiatric hospitalization     History of substance abuse     IV heroin - last use  per pt    Hx of psychiatric care     Hypertension     Opioid overdose 5/10/2016    Psychiatric problem     Psychosis     Seizure disorder 4/3/2019    Sleep difficulties     Substance abuse     Therapy     Vasculitis        Past Surgical History:   Procedure Laterality Date    HYSTERECTOMY  3/16/2011    SALPINGOOPHORECTOMY Right 3/16/2011    TUBAL LIGATION      Vaginal cuff repair  2011       Time Tracking:     PT Received On: 22  PT Start Time: 1212     PT Stop Time: 1230  PT Total Time (min): 18 min     Billable Minutes: Evaluation 10 and Therapeutic Activity 8       2022

## 2022-08-07 NOTE — PT/OT/SLP EVAL
"Occupational Therapy   Evaluation    Name: Stephanie T Barthelemy  MRN: [id number]  Admitting Diagnosis:  Acute respiratory failure with hypoxia and hypercapnia  Recent Surgery: * No surgery found *    The primary encounter diagnosis was Acute respiratory failure with hypoxia. Diagnoses of Medical clearance for psychiatric admission, Acute encephalopathy, Sepsis, due to unspecified organism, unspecified whether acute organ dysfunction present, Pneumonia of left lower lobe due to infectious organism, COVID-19 virus infection, Acute psychosis, Agitation, OG (onychogryphosis), Amphetamine abuse, Localized swelling of right upper extremity, Acute respiratory failure with hypoxia and hypercapnia, and COVID-19 were also pertinent to this visit.    Recommendations:     Discharge Recommendations:  (TBD)  Discharge Equipment Recommendations:   (TBD)  Barriers to discharge:  Decreased caregiver support    Assessment:     Stephanie T Barthelemy is a 51 y.o. female with a medical diagnosis of Acute respiratory failure with hypoxia and hypercapnia.  She presents with Performance deficits affecting function: gait instability, impaired cognition, decreased safety awareness, impaired coordination, decreased coordination, impaired functional mobility, impaired self care skills.      Limited OT eval completed 2/2 increased agitation and difficulty calming and redirecting pt. Continue with OT POC.    Rehab Prognosis: Good; patient would benefit from acute skilled OT services to address these deficits and reach maximum level of function.       Plan:     Patient to be seen 3 x/week to address the above listed problems via self-care/home management, therapeutic activities, therapeutic exercises, cognitive retraining  · Plan of Care Expires: 09/06/22  · Plan of Care Reviewed with: patient    Subjective     Chief Complaint: "I don't know if I am coming or going"  Patient/Family Comments/goals: none    Occupational Profile:  Living " Environment: Pt is an unreliable historian and was unable to express thoughts clearly due to increased anxiety.  Previous level of function: Independent  Roles and Routines: ?  Equipment Used at Home:  walker, standard  Assistance upon Discharge: unknown    Pain/Comfort:  · Pain Rating 1: 0/10  · Pain Rating Post-Intervention 1: 0/10    Patients cultural, spiritual, Synagogue conflicts given the current situation: no    Objective:     Communicated with: nurse prior to session.  Patient found HOB elevated with telemetry, peripheral IV, bed alarm upon OT entry to room.    General Precautions: Standard, fall, contact, droplet, respiratory, seizure, aspiration, hearing impaired   Orthopedic Precautions:N/A   Braces: N/A  Respiratory Status: Room air    Occupational Performance:    Bed Mobility:    · Patient completed Rolling/Turning to Left with  supervision  · Patient completed Scooting/Bridging with supervision  · Patient completed Supine to Sit with supervision  · Patient completed Sit to Supine with supervision    Functional Mobility/Transfers:  · Patient completed Sit <> Stand Transfer with stand by assistance  with  no assistive device   · Functional Mobility: ambulated SBA shuffling steps in a [place], increased anxiety and perseverated on words, poor safety awareness, difficulty redirecting or following directional cues.     Activities of Daily Living:  ·  unable to assess 2/2 increased anxiety and unable to calm or redirect pt.    Cognitive/Visual Perceptual:  Cognitive/Psychosocial Skills:     -       Oriented to: Person   -       Follows Commands/attention:Inattentive and Easily distracted  -       Communication: perseverated on words and difficult to understand - pt. speaking very fast  -       Memory: NT  -       Safety awareness/insight to disability: impaired   -       Mood/Affect/Coping skills/emotional control: Anxious  Visual/Perceptual:      -Intact     Physical Exam:  Balance:    -       sitting:  good   dynamic: NT  standing: fair plus  Postural examination/scapula alignment:    -       No postural abnormalities identified    AMPAC 6 Click ADL:  AMPA Total Score: 24    Treatment & Education:  Purpose of OT and POC, no evidence of leaning due to increased anxiety, difficult to redirect.  Education:    Patient left HOB elevated with all lines intact, call button in reach, bed alarm on and nurse notified    GOALS:   Multidisciplinary Problems     Occupational Therapy Goals        Problem: Occupational Therapy    Goal Priority Disciplines Outcome Interventions   Occupational Therapy Goal     OT, PT/OT Ongoing, Progressing    Description: Goals to be met by: 9/6/2022    Patient will increase functional independence with ADLs by performing:    UE Dressing with Modified [place].  LE Dressing with Modified [place].  Grooming while standing at sink with Modified [place].  Toileting from toilet with Modified [place] for hygiene and clothing management.   Step transfer with Modified [place]  Toilet transfer to toilet with Modified [place].  Upper extremity exercise program x10 reps per handout, with independence.                     History:     Past Medical History:   Diagnosis Date    ADHD (attention deficit hyperactivity disorder)     Anemia     Anxiety     Bipolar disorder     History of hepatitis C - s/p clearance of virus (HCV neg 6/2015) 9/26/2014    History of psychiatric hospitalization     History of substance abuse     IV heroin - last use 2013 per pt    Hx of psychiatric care     Hypertension     Opioid overdose 5/10/2016    Psychiatric problem     Psychosis     Seizure disorder 4/3/2019    Sleep difficulties     Substance abuse     Therapy     Vasculitis        Past Surgical History:   Procedure Laterality Date    HYSTERECTOMY  3/16/2011    SALPINGOOPHORECTOMY Right 3/16/2011    TUBAL LIGATION      Vaginal cuff repair  04/22/2011       Time Tracking:      OT Date of Treatment: 08/06/22  OT Start Time: 1728  OT Stop Time: 1738  OT Total Time (min): 10 min    Billable Minutes:Evaluation 10  Total Time 10    8/6/2022

## 2022-08-07 NOTE — NURSING
Care plan reviewed. Cardiac monitoring in place.IV antibiotics administered as ordered, tolerating well. Edema and redness to right upper extremety remain visible, tender to touch only. Pt found vaping in room, vape confiscated.  Luli in place, alarmed multiple times throughout the day, pt hard to redirect, does not comply with safety measures. Bed side commode left next to bed for easy access and decrease risk for fall.

## 2022-08-07 NOTE — ASSESSMENT & PLAN NOTE
- Last ECHO results:   - Results for orders placed during the hospital encounter of 07/21/22  · The left ventricle is mildly enlarged with concentric hypertrophy and severely decreased systolic function.  · The estimated ejection fraction is 20%.  · There is severe left ventricular global hypokinesis.  · Grade III left ventricular diastolic dysfunction.  · Mild right ventricular enlargement with mildly reduced right ventricular systolic function.  · Severe left atrial enlargement.  · Moderate aortic regurgitation.  · Severe mitral regurgitation.  · Mild tricuspid regurgitation.  · Moderate pulmonic regurgitation.  · Intermediate central venous pressure (8 mmHg).  · The estimated PA systolic pressure is 33 mmHg.  · Trivial pericardial effusion.  - Troponin 0.034-0.052; likely demand; will continue to monitor and trend  - CXR revealed Nonspecific bilateral airspace opacities.  Findings may suggest multifocal pneumonia or edema. Recommend clinical correlation. Probable small pleural effusions.  - Does not appear clinically volume overload at this time  - Continue home BB, ACEi/ARB when stable  - Daily weights and strict I/Os  - Monitor on telemetry  - Monitor and trend BMP, Mg, and renal function; keep K >4, Mg >2  - Sodium restriction (<2g/d), fluid restriction (<2L)   - Monitor for signs of fluid overload: RR>30, O2 sat<92%, weight gain of >3 lbs in 24 hours, or urinary output <160ml/8hr  - Start low dose lasix 20 mg daily today

## 2022-08-07 NOTE — SUBJECTIVE & OBJECTIVE
Interval History: No acute events. Currently stable on RA, asking for medications for itching and anxiety.    Review of Systems   Constitutional:  Negative for chills and fever.   Respiratory:  Positive for shortness of breath.    Cardiovascular:  Negative for chest pain.   Objective:     Vital Signs (Most Recent):  Temp: 97.5 °F (36.4 °C) (08/07/22 1059)  Pulse: 89 (08/07/22 1158)  Resp: 18 (08/07/22 1059)  BP: 135/88 (08/07/22 1059)  SpO2: 99 % (08/07/22 1059) Vital Signs (24h Range):  Temp:  [97.1 °F (36.2 °C)-98.2 °F (36.8 °C)] 97.5 °F (36.4 °C)  Pulse:  [] 89  Resp:  [8-22] 18  SpO2:  [95 %-100 %] 99 %  BP: (116-143)/(79-97) 135/88     Weight: 52.5 kg (115 lb 11.9 oz)  Body mass index is 22.6 kg/m².    Intake/Output Summary (Last 24 hours) at 8/7/2022 1216  Last data filed at 8/7/2022 0800  Gross per 24 hour   Intake 1355 ml   Output 2825 ml   Net -1470 ml        Physical Exam  Vitals and nursing note reviewed.   Constitutional:       Appearance: She is ill-appearing. She is not toxic-appearing.      Comments: Appears older than stated age    HENT:      Head: Normocephalic and atraumatic.      Nose: Nose normal.   Eyes:      Extraocular Movements: Extraocular movements intact.      Conjunctiva/sclera: Conjunctivae normal.   Cardiovascular:      Rate and Rhythm: Normal rate and regular rhythm.      Pulses: Normal pulses.      Heart sounds: Murmur heard.   Pulmonary:      Effort: Pulmonary effort is normal. No respiratory distress.      Comments: Decreased breath sounds at bases now resolved  Abdominal:      General: Bowel sounds are normal. There is no distension.      Palpations: Abdomen is soft.      Tenderness: There is no abdominal tenderness. There is no guarding or rebound.   Musculoskeletal:         General: Normal range of motion.      Cervical back: Normal range of motion.      Right lower leg: No edema.      Left lower leg: No edema.   Skin:     General: Skin is warm and dry.      Capillary  Refill: Capillary refill takes less than 2 seconds.      Comments: Extensive tattoos throughout body   Neurological:      Mental Status: She is alert.      Motor: Weakness present.      Comments: Awake and alert, oriented x 3, able to maintain attention and answer simple questions and follow simple commands this morning   Psychiatric:         Attention and Perception: Attention normal.         Mood and Affect: Affect is labile.         Speech: Speech is delayed.         Behavior: Behavior is slowed.         Judgment: Judgment is impulsive.       Significant Labs: All pertinent labs within the past 24 hours have been reviewed.    Significant Imaging: I have reviewed all pertinent imaging results/findings within the past 24 hours.

## 2022-08-07 NOTE — PLAN OF CARE
Safety maintained, bed alarm on and call bell in reach. Telemetry monitoring in progress NSR noted HR 89. Plan of care reviewed, no evidence of understanding. Antibiotic therapy in progress. Continue to get out of bed without calling for assistance. CLEMENTE camera in room.r-enforce instructions to call for assistance. No respiration distress noted. VSS. SpO2 97% on room air. Will continue to monitor Q 2 hr.

## 2022-08-08 PROCEDURE — 63600175 PHARM REV CODE 636 W HCPCS: Performed by: NURSE PRACTITIONER

## 2022-08-08 PROCEDURE — 97116 GAIT TRAINING THERAPY: CPT | Mod: CQ

## 2022-08-08 PROCEDURE — 63600175 PHARM REV CODE 636 W HCPCS: Performed by: STUDENT IN AN ORGANIZED HEALTH CARE EDUCATION/TRAINING PROGRAM

## 2022-08-08 PROCEDURE — 94640 AIRWAY INHALATION TREATMENT: CPT

## 2022-08-08 PROCEDURE — 27000207 HC ISOLATION

## 2022-08-08 PROCEDURE — 25000003 PHARM REV CODE 250: Performed by: HOSPITALIST

## 2022-08-08 PROCEDURE — 94761 N-INVAS EAR/PLS OXIMETRY MLT: CPT

## 2022-08-08 PROCEDURE — 99900035 HC TECH TIME PER 15 MIN (STAT)

## 2022-08-08 PROCEDURE — 11000001 HC ACUTE MED/SURG PRIVATE ROOM

## 2022-08-08 PROCEDURE — 25000003 PHARM REV CODE 250: Performed by: NURSE PRACTITIONER

## 2022-08-08 PROCEDURE — 27100098 HC SPACER

## 2022-08-08 PROCEDURE — 97110 THERAPEUTIC EXERCISES: CPT | Mod: CQ

## 2022-08-08 RX ORDER — LISINOPRIL 20 MG/1
40 TABLET ORAL DAILY
Status: DISCONTINUED | OUTPATIENT
Start: 2022-08-09 | End: 2022-08-11 | Stop reason: HOSPADM

## 2022-08-08 RX ORDER — FAMOTIDINE 20 MG/1
20 TABLET, FILM COATED ORAL 2 TIMES DAILY
Status: DISCONTINUED | OUTPATIENT
Start: 2022-08-08 | End: 2022-08-11 | Stop reason: HOSPADM

## 2022-08-08 RX ORDER — LOSARTAN POTASSIUM 50 MG/1
100 TABLET ORAL ONCE
Status: DISCONTINUED | OUTPATIENT
Start: 2022-08-08 | End: 2022-08-08

## 2022-08-08 RX ORDER — LISINOPRIL 5 MG/1
10 TABLET ORAL DAILY
Status: DISCONTINUED | OUTPATIENT
Start: 2022-08-08 | End: 2022-08-08

## 2022-08-08 RX ADMIN — ALBUTEROL SULFATE 2 PUFF: 90 AEROSOL, METERED RESPIRATORY (INHALATION) at 02:08

## 2022-08-08 RX ADMIN — LISINOPRIL 10 MG: 5 TABLET ORAL at 10:08

## 2022-08-08 RX ADMIN — LISINOPRIL 30 MG: 5 TABLET ORAL at 07:08

## 2022-08-08 RX ADMIN — Medication 1 TABLET: at 09:08

## 2022-08-08 RX ADMIN — METOPROLOL SUCCINATE 12.5 MG: 25 TABLET, EXTENDED RELEASE ORAL at 10:08

## 2022-08-08 RX ADMIN — ALBUTEROL SULFATE 2 PUFF: 90 AEROSOL, METERED RESPIRATORY (INHALATION) at 07:08

## 2022-08-08 RX ADMIN — MUPIROCIN: 20 OINTMENT TOPICAL at 10:08

## 2022-08-08 RX ADMIN — OXYCODONE HYDROCHLORIDE AND ACETAMINOPHEN 500 MG: 500 TABLET ORAL at 09:08

## 2022-08-08 RX ADMIN — ALBUTEROL SULFATE 2 PUFF: 90 AEROSOL, METERED RESPIRATORY (INHALATION) at 12:08

## 2022-08-08 RX ADMIN — FAMOTIDINE 20 MG: 20 TABLET ORAL at 09:08

## 2022-08-08 RX ADMIN — DEXAMETHASONE 6 MG: 4 TABLET ORAL at 09:08

## 2022-08-08 RX ADMIN — FUROSEMIDE 20 MG: 20 TABLET ORAL at 09:08

## 2022-08-08 RX ADMIN — QUETIAPINE FUMARATE 50 MG: 25 TABLET ORAL at 10:08

## 2022-08-08 RX ADMIN — HYDROXYZINE PAMOATE 50 MG: 25 CAPSULE ORAL at 06:08

## 2022-08-08 RX ADMIN — FAMOTIDINE 20 MG: 20 TABLET ORAL at 10:08

## 2022-08-08 RX ADMIN — ALBUTEROL SULFATE 2 PUFF: 90 AEROSOL, METERED RESPIRATORY (INHALATION) at 08:08

## 2022-08-08 RX ADMIN — OXYCODONE HYDROCHLORIDE AND ACETAMINOPHEN 500 MG: 500 TABLET ORAL at 10:08

## 2022-08-08 RX ADMIN — HYDROXYZINE PAMOATE 50 MG: 25 CAPSULE ORAL at 01:08

## 2022-08-08 RX ADMIN — ENOXAPARIN SODIUM 40 MG: 100 INJECTION SUBCUTANEOUS at 05:08

## 2022-08-08 RX ADMIN — HYDROXYZINE PAMOATE 50 MG: 25 CAPSULE ORAL at 10:08

## 2022-08-08 RX ADMIN — MUPIROCIN: 20 OINTMENT TOPICAL at 09:08

## 2022-08-08 NOTE — PROGRESS NOTES
[facility] Medicine  Progress Note    Patient Name: Stephanie T Barthelemy  MRN: [id number]  Patient Class: IP- Inpatient   Admission Date: 8/3/2022  Length of Stay: 5 days  Attending Physician: Almaz Lucio MD  Primary Care Provider: Hilda Elizabeth NP        Subjective:     Principal Problem:Acute respiratory failure with hypoxia and hypercapnia        HPI:  Stephanie T Barthelemy is a 51 y.o. female who has a past medical history of ADHD (attention deficit hyperactivity disorder), Anemia, Anxiety, Bipolar disorder, History of hepatitis C - s/p clearance of virus (HCV neg 6/2015) (9/26/2014), History of psychiatric hospitalization, History of substance abuse, psychiatric care, Hypertension, Opioid overdose (5/10/2016), Psychiatric problem, Psychosis, Seizure disorder (4/3/2019), Sleep difficulties, Substance abuse, Therapy, and Vasculitis. She presented to the ED for altered mental status. She was brought in by EMS after found down at home by home health in bath tub unresponsive. Of note patient was just admitted for pneumonia and new onset CHF.     In the ED: She was found COVID-19 positive. UDS positive for amphetamines. Latest EKG with Sinus tach, rate 131, nonspecific ST changes, no ST elevations or other signs of ischemia, normal intervals. Compared to prior, QT prolongation has improved. CXR with diffuse interstitial opacities most consistent with viral process. Patient required intubation for airway protection. Considering patient's persistent hypoxia, severe symptoms, and co-morbidities, patient is high risk for decompensation and complications including worsening hypoxia, respiratory failure, intubation, or cardiopulmonary arrest if discharged. Admitted to [facility] for further care.      Overview/Hospital Course:  Ms. Barthelemy presented with confusion and hypoxia.  Admitted with acute respiratory failure with COVID-19 complicated by encephalopathy.  Intubated for  airway protection and isolated protocol in place.  Treatment initiated with remdesivir and dexamethasone, along with possible concomitant bacterial infection with Zosyn and vancomycin.  Pulmonary/Critical Care consulted.  Antibiotics de-escalated to Rocephin and Vancomycin as per Pulm/Critical care recommendations. Extubated on 8/5/2022. Stepped down to floor on 8/6/2022.      Interval History: No acute events. Currently stable on RA, asking for medications for itching and anxiety, but feeling better overall today than yesterday.    Review of Systems   Constitutional:  Negative for chills and fever.        Itching   Respiratory:  Positive for shortness of breath.    Cardiovascular:  Negative for chest pain.   Objective:     Vital Signs (Most Recent):  Temp: 98.6 °F (37 °C) (08/08/22 0744)  Pulse: 86 (08/08/22 0827)  Resp: 18 (08/08/22 0827)  BP: (!) 151/105 (08/08/22 0744)  SpO2: 98 % (08/08/22 0827) Vital Signs (24h Range):  Temp:  [97 °F (36.1 °C)-98.6 °F (37 °C)] 98.6 °F (37 °C)  Pulse:  [] 86  Resp:  [17-18] 18  SpO2:  [97 %-100 %] 98 %  BP: (135-165)/() 151/105     Weight: 52.5 kg (115 lb 11.9 oz)  Body mass index is 22.6 kg/m².    Intake/Output Summary (Last 24 hours) at 8/8/2022 1000  Last data filed at 8/8/2022 0100  Gross per 24 hour   Intake 480 ml   Output 2800 ml   Net -2320 ml        Physical Exam  Vitals and nursing note reviewed.   Constitutional:       Appearance: She is ill-appearing. She is not toxic-appearing.      Comments: Appears older than stated age    HENT:      Head: Normocephalic and atraumatic.      Nose: Nose normal.   Eyes:      Extraocular Movements: Extraocular movements intact.      Conjunctiva/sclera: Conjunctivae normal.   Cardiovascular:      Rate and Rhythm: Normal rate and regular rhythm.      Pulses: Normal pulses.      Heart sounds: Murmur heard.   Pulmonary:      Effort: Pulmonary effort is normal. No respiratory distress.      Comments: Decreased breath sounds at  bases now resolved  Abdominal:      General: Bowel sounds are normal. There is no distension.      Palpations: Abdomen is soft.      Tenderness: There is no abdominal tenderness. There is no guarding or rebound.   Musculoskeletal:         General: Normal range of motion.      Cervical back: Normal range of motion.      Right lower leg: No edema.      Left lower leg: No edema.   Skin:     General: Skin is warm and dry.      Capillary Refill: Capillary refill takes less than 2 seconds.      Comments: Extensive tattoos throughout body   Neurological:      Mental Status: She is alert.      Motor: Weakness present.      Comments: Awake and alert, oriented x 3, able to maintain attention and answer simple questions and follow simple commands this morning   Psychiatric:         Attention and Perception: Attention normal.         Mood and Affect: Affect is labile.         Speech: Speech is delayed.         Behavior: Behavior is slowed.         Judgment: Judgment is impulsive.       Significant Labs: All pertinent labs within the past 24 hours have been reviewed.    Significant Imaging: I have reviewed all pertinent imaging results/findings within the past 24 hours.      Assessment/Plan:      * Acute respiratory failure with hypoxia and hypercapnia  Encephalopathy, metabolic  COVID +  - Recently treated for aspiration pneumonia and new onset CHF at this facility  - Found down at home by home health, patient in tub disoriented and hard to arouse. EMS arrived O2 saturations on RA 87% placed on 4L increased to 91%, +cough +tachy   - Patient with acute hypercapnic and hypoxic respiratory failure    - Intubated and vent supported  - Found to be COVID +  - Patient was on empiric Zosyn and Vancomycin  - Pulm/critical care stopped Zosyn and started Rocephin on 8/5/2022; continued vancomycin  - Extubated to NC on 8/5/2022  - Stepped down to floor on 8/6/2022  - Completed remdesivir, full 5 day course done on 8/7/2022  - Stopped  Rocephin and vancomycin after dose given on 8/7/2022  - Continue on dexamethasone, day #6  - Currently stable on RA and mental status back to baseline  - Will consider stopping steroids after today if patient continues to do well    Chronic combined systolic and diastolic heart failure  - Last ECHO results:   - Results for orders placed during the hospital encounter of 07/21/22  · The left ventricle is mildly enlarged with concentric hypertrophy and severely decreased systolic function.  · The estimated ejection fraction is 20%.  · There is severe left ventricular global hypokinesis.  · Grade III left ventricular diastolic dysfunction.  · Mild right ventricular enlargement with mildly reduced right ventricular systolic function.  · Severe left atrial enlargement.  · Moderate aortic regurgitation.  · Severe mitral regurgitation.  · Mild tricuspid regurgitation.  · Moderate pulmonic regurgitation.  · Intermediate central venous pressure (8 mmHg).  · The estimated PA systolic pressure is 33 mmHg.  · Trivial pericardial effusion.  - Troponin 0.034-0.052; likely demand; monitored and trended  - CXR revealed Nonspecific bilateral airspace opacities  - Does not appear clinically volume overload at this time  - Continue home BB, ACEi/ARB when stable  - Daily weights and strict I/Os, monitor on telemetry  - Monitor and trend BMP, Mg, and renal function; keep K >4, Mg >2  - Sodium restriction (<2g/d), fluid restriction (<2L)   - Monitor for signs of fluid overload: RR>30, O2 sat<92%, weight gain of >3 lbs in 24 hours, or urinary output <160ml/8hr  - Started low dose lasix 20 mg daily on 8/7/2022 and appears compensated on this regimen but will monitor to see if this needs to be increased to 40 mg daily    Encephalopathy, metabolic  - Due to above and in combination with ongoing polysubstance abuse  - Back to baseline  - Resolved    COVID-19  - Patient is identified as Severe COVID-19 based on hypoxemia with O2 saturations  <94% on room air or on ambulation   - Initiate standard COVID protocols; COVID-19 testing Collection Date: 10/1/2021 Collection Time:   1:22 AM, Infection Control notification  and isolation- respiratory, contact and droplet per protocol  - Diagnostics: (leukopenia, hyponatremia, hyperferritinemia, elevated troponin, elevated d-dimer, age, and comorbidities are significant predictors of poor clinical outcome)  CMP, CRP, BNP and Portable CXR  Management: Continuous cardiac monitoring. and Manage respiratory failure (O2 requirement >10LPM or needing NIPPV/Mechanical ventilation) and/or Pneumonia (active chest infiltrates) separately as described below.  - Monitor on Telemetry  - Initiated on dexamethasone, continue for 10d course  - Completed remdesivir x5d; daily CMP  - Continuous Pulse Oximetry, goal SpO2 92-96%  - MVI & ascorbic acid 500mg PO BID  - Acetaminophen Q6hr PRN fever  - VTE PPx: enoxaparin   - As discussed above    HLD (hyperlipidemia)  - Not on medications at home    Bipolar 1 disorder, depressed  Attention deficit hyperactivity disorder (ADHD), combined type  - Held home meds until patient was stable  - Restarted hydroxyzine 50 mg Q8, quetiapine 25 mg QHS (lower dose to prevent over sedation), risperidone 1mg BID  - Increased quetiapine to 50 mg QHS on 8/7/2022 with improvement    Seizure disorder  - Seizure precautions  - PRN lorazepam    Hypokalemia  - Corrected to normal, monitor    Amphetamine abuse  Polysubstance abuse  - UDS positive  - Monitor for withdrawal    Debility  - PT and OT consult, progressing with recommendation pending    Anemia  - Stable, monitor    Essential hypertension  - Held anti-HTN for low BP  - Resume metoprolol XL12.5 mg daily and lisinopril 10 mg daily today  - Monitor and make further adjustments as needed      VTE Risk Mitigation (From admission, onward)         Ordered     enoxaparin injection 40 mg  Daily         08/04/22 0941     IP VTE HIGH RISK PATIENT  Once          08/03/22 2045     Place sequential compression device  Until discontinued         08/03/22 2045                    Almaz Lucio MD  Department of Hospital Medicine   [place] [facility], [facility]

## 2022-08-08 NOTE — ASSESSMENT & PLAN NOTE
- Held anti-HTN for low BP  - Resume metoprolol XL12.5 mg daily and lisinopril 10 mg daily today  - Monitor and make further adjustments as needed

## 2022-08-08 NOTE — ASSESSMENT & PLAN NOTE
- Last ECHO results:   - Results for orders placed during the hospital encounter of 07/21/22  · The left ventricle is mildly enlarged with concentric hypertrophy and severely decreased systolic function.  · The estimated ejection fraction is 20%.  · There is severe left ventricular global hypokinesis.  · Grade III left ventricular diastolic dysfunction.  · Mild right ventricular enlargement with mildly reduced right ventricular systolic function.  · Severe left atrial enlargement.  · Moderate aortic regurgitation.  · Severe mitral regurgitation.  · Mild tricuspid regurgitation.  · Moderate pulmonic regurgitation.  · Intermediate central venous pressure (8 mmHg).  · The estimated PA systolic pressure is 33 mmHg.  · Trivial pericardial effusion.  - Troponin 0.034-0.052; likely demand; monitored and trended  - CXR revealed Nonspecific bilateral airspace opacities  - Does not appear clinically volume overload at this time  - Continue home BB, ACEi/ARB when stable  - Daily weights and strict I/Os, monitor on telemetry  - Monitor and trend BMP, Mg, and renal function; keep K >4, Mg >2  - Sodium restriction (<2g/d), fluid restriction (<2L)   - Monitor for signs of fluid overload: RR>30, O2 sat<92%, weight gain of >3 lbs in 24 hours, or urinary output <160ml/8hr  - Started low dose lasix 20 mg daily on 8/7/2022 and appears compensated on this regimen but will monitor to see if this needs to be increased to 40 mg daily

## 2022-08-08 NOTE — PROGRESS NOTES
Pharmacist Intervention IV to PO Note    Stephanie T Barthelemy is a 51 y.o. female being treated with IV medication famotidine    Patient Data:    Vital Signs (Most Recent):  Temp: 98.6 °F (37 °C) (08/08/22 0744)  Pulse: 86 (08/08/22 0827)  Resp: 18 (08/08/22 0827)  BP: (!) 151/105 (08/08/22 0744)  SpO2: 98 % (08/08/22 0827)   Vital Signs (72h Range):  Temp:  [66.74 °F (19.3 °C)-99.32 °F (37.4 °C)]   Pulse:  []   Resp:  [8-34]   BP: ()/()   SpO2:  [92 %-100 %]      CBC:  Recent Labs   Lab 08/04/22 0454 08/05/22  0517 08/06/22  0424   WBC 5.14 7.50 6.53   RBC 3.56* 4.18 4.22   HGB 9.7* 11.5* 11.5*   HCT 31.4* 37.1 37.1    187 190   MCV 88 89 88   MCH 27.2 27.5 27.3   MCHC 30.9* 31.0* 31.0*     CMP:     Recent Labs   Lab 08/04/22 0454 08/05/22  0517 08/06/22  0424   * 80 72   CALCIUM 8.1* 8.3* 8.7   ALBUMIN 2.4* 2.3* 2.4*   PROT 5.3* 5.4* 5.7*    144 139   K 4.3 3.9 3.7   CO2 26 25 26    109 102   BUN 15 22* 22*   CREATININE 0.8 1.0 0.8   ALKPHOS 106 113 103   ALT 56* 48* 36   AST 76* 40 23   BILITOT 0.5 0.6 0.3       Dietary Orders:  Diet Orders  Report           Diet Cardiac: Cardiac starting at 08/06 0729            Based on the following criteria, this patient qualifies for intravenous to oral conversion:  [x] The patients gastrointestinal tract is functioning (tolerating medications via oral or enteral route for 24 hours and tolerating food or enteral feeds for 24 hours).  [x] The patient is hemodynamically stable for 24 hours (heart rate <100 beats per minute, systolic blood pressure >99 mm Hg, and respiratory rate <20 breaths per minute).  [x] The patient shows clinical improvement (afebrile for at least 24 hours and white blood cell count downtrending or normalized). Additionally, the patient must be non-neutropenic (absolute neutrophil count >500 cells/mm3).  [x] For antimicrobials, the patient has received IV therapy for at least 24 hours.    IV medication  famotidine will be changed to oral medication     Pharmacist's Name: Dangelo Martinez  Pharmacist's Extension: [phone]

## 2022-08-08 NOTE — PLAN OF CARE
Problem: Physical Therapy  Goal: Physical Therapy Goal  Description: Goals to be met by: 22     Patient will increase functional independence with mobility by performin. Sit to stand transfer with Stand-by Assistance  2. Bed to chair transfer with Stand-by Assistance using No Assistive Device  3. Gait  x 150 feet with SBA with or without an AD.     Outcome: Ongoing, Progressing

## 2022-08-08 NOTE — ASSESSMENT & PLAN NOTE
- Patient is identified as Severe COVID-19 based on hypoxemia with O2 saturations <94% on room air or on ambulation   - Initiate standard COVID protocols; COVID-19 testing Collection Date: 10/1/2021 Collection Time:   1:22 AM, Infection Control notification  and isolation- respiratory, contact and droplet per protocol  - Diagnostics: (leukopenia, hyponatremia, hyperferritinemia, elevated troponin, elevated d-dimer, age, and comorbidities are significant predictors of poor clinical outcome)  CMP, CRP, BNP and Portable CXR  Management: Continuous cardiac monitoring. and Manage respiratory failure (O2 requirement >10LPM or needing NIPPV/Mechanical ventilation) and/or Pneumonia (active chest infiltrates) separately as described below.  - Monitor on Telemetry  - Initiated on dexamethasone, continue for 10d course  - Completed remdesivir x5d; daily CMP  - Continuous Pulse Oximetry, goal SpO2 92-96%  - MVI & ascorbic acid 500mg PO BID  - Acetaminophen Q6hr PRN fever  - VTE PPx: enoxaparin   - As discussed above

## 2022-08-08 NOTE — ASSESSMENT & PLAN NOTE
Attention deficit hyperactivity disorder (ADHD), combined type  - Held home meds until patient was stable  - Restarted hydroxyzine 50 mg Q8, quetiapine 25 mg QHS (lower dose to prevent over sedation), risperidone 1mg BID  - Increased quetiapine to 50 mg QHS on 8/7/2022 with improvement

## 2022-08-08 NOTE — SUBJECTIVE & OBJECTIVE
Interval History: No acute events. Currently stable on RA, asking for medications for itching and anxiety, but feeling better overall today than yesterday.    Review of Systems   Constitutional:  Negative for chills and fever.        Itching   Respiratory:  Positive for shortness of breath.    Cardiovascular:  Negative for chest pain.   Objective:     Vital Signs (Most Recent):  Temp: 98.6 °F (37 °C) (08/08/22 0744)  Pulse: 86 (08/08/22 0827)  Resp: 18 (08/08/22 0827)  BP: (!) 151/105 (08/08/22 0744)  SpO2: 98 % (08/08/22 0827) Vital Signs (24h Range):  Temp:  [97 °F (36.1 °C)-98.6 °F (37 °C)] 98.6 °F (37 °C)  Pulse:  [] 86  Resp:  [17-18] 18  SpO2:  [97 %-100 %] 98 %  BP: (135-165)/() 151/105     Weight: 52.5 kg (115 lb 11.9 oz)  Body mass index is 22.6 kg/m².    Intake/Output Summary (Last 24 hours) at 8/8/2022 1000  Last data filed at 8/8/2022 0100  Gross per 24 hour   Intake 480 ml   Output 2800 ml   Net -2320 ml        Physical Exam  Vitals and nursing note reviewed.   Constitutional:       Appearance: She is ill-appearing. She is not toxic-appearing.      Comments: Appears older than stated age    HENT:      Head: Normocephalic and atraumatic.      Nose: Nose normal.   Eyes:      Extraocular Movements: Extraocular movements intact.      Conjunctiva/sclera: Conjunctivae normal.   Cardiovascular:      Rate and Rhythm: Normal rate and regular rhythm.      Pulses: Normal pulses.      Heart sounds: Murmur heard.   Pulmonary:      Effort: Pulmonary effort is normal. No respiratory distress.      Comments: Decreased breath sounds at bases now resolved  Abdominal:      General: Bowel sounds are normal. There is no distension.      Palpations: Abdomen is soft.      Tenderness: There is no abdominal tenderness. There is no guarding or rebound.   Musculoskeletal:         General: Normal range of motion.      Cervical back: Normal range of motion.      Right lower leg: No edema.      Left lower leg: No edema.    Skin:     General: Skin is warm and dry.      Capillary Refill: Capillary refill takes less than 2 seconds.      Comments: Extensive tattoos throughout body   Neurological:      Mental Status: She is alert.      Motor: Weakness present.      Comments: Awake and alert, oriented x 3, able to maintain attention and answer simple questions and follow simple commands this morning   Psychiatric:         Attention and Perception: Attention normal.         Mood and Affect: Affect is labile.         Speech: Speech is delayed.         Behavior: Behavior is slowed.         Judgment: Judgment is impulsive.       Significant Labs: All pertinent labs within the past 24 hours have been reviewed.    Significant Imaging: I have reviewed all pertinent imaging results/findings within the past 24 hours.

## 2022-08-08 NOTE — ASSESSMENT & PLAN NOTE
Encephalopathy, metabolic  COVID +  - Recently treated for aspiration pneumonia and new onset CHF at this facility  - Found down at home by home health, patient in tub disoriented and hard to arouse. EMS arrived O2 saturations on RA 87% placed on 4L increased to 91%, +cough +tachy   - Patient with acute hypercapnic and hypoxic respiratory failure    - Intubated and vent supported  - Found to be COVID +  - Patient was on empiric Zosyn and Vancomycin  - Pulm/critical care stopped Zosyn and started Rocephin on 8/5/2022; continued vancomycin  - Extubated to NC on 8/5/2022  - Stepped down to floor on 8/6/2022  - Completed remdesivir, full 5 day course done on 8/7/2022  - Stopped Rocephin and vancomycin after dose given on 8/7/2022  - Continue on dexamethasone, day #6  - Currently stable on RA and mental status back to baseline  - Will consider stopping steroids after today if patient continues to do well

## 2022-08-08 NOTE — PLAN OF CARE
SW called pt on phone due to her covid positive status to discuss d/c planning. Pt stated that she would have her sister Betty [phone] help transport her home at time of d/c. NGUYỄN sent updated information to [facility] Phone: [phone]  via [employer]. NGUYỄN will follow.     JOHNNIE Barone  [phone]       08/08/22 1403   Post-Acute Status   Post-Acute Authorization Home Health   Coverage [employer]   Discharge Plan   Discharge Plan A Home Health

## 2022-08-08 NOTE — PLAN OF CARE
NGUYỄN attempted to speak with pt to discuss d/c planning. NGUYỄN called into pt's room due to her covid positive status. Pt confirmed her name and  then stated she could not hear NGUYỄN and hung up. NGUYỄN will continue to reach pt. NGUYỄN will follow.     JOHNNIE Barone  [phone]       22 1205   Post-Acute Status   Post-Acute Authorization Home Health   Discharge Plan   Discharge Plan A Home

## 2022-08-08 NOTE — PT/OT/SLP PROGRESS
Physical Therapy Treatment    Patient Name:  Stephanie T Barthelemy   MRN:  [id number]    Recommendations:     Discharge Recommendations:   (TBD)   Discharge Equipment Recommendations:  (TBD)   Barriers to discharge: Decreased caregiver support and decreased mobility,strength and endurance    Assessment:     Stephanie T Barthelemy is a 51 y.o. female admitted with a medical diagnosis of Acute respiratory failure with hypoxia and hypercapnia.  She presents with the following impairments/functional limitations:  weakness, impaired endurance, impaired functional mobility, impaired balance, impaired cognition, decreased lower extremity function, decreased safety awareness, decreased ROM, impaired coordination,pt with good participation and may benefit from continuing PT services upon discharge,pt is very anxious and always moving around.    Rehab Prognosis: Fair; patient would benefit from acute skilled PT services to address these deficits and reach maximum level of function.    Recent Surgery: * No surgery found *      Plan:     During this hospitalization, patient to be seen 3 x/week to address the identified rehab impairments via gait training, therapeutic activities, therapeutic exercises, neuromuscular re-education and progress toward the following goals:    · Plan of Care Expires:  09/07/22    Subjective     Chief Complaint: n/a  Patient/Family Comments/goals: pt states the alarm stays off.  Pain/Comfort:  · Pain Rating 1:  (no rating)  · Location 1:  (all over at times)  · Pain Addressed 1: Reposition, Distraction      Objective:     Communicated with nsg prior to session.  Patient found supine with peripheral IV, telemetry upon PT entry to room.     General Precautions: Standard, airborne, contact, droplet, LVAD   Orthopedic Precautions:N/A   Braces: N/A  Respiratory Status: Room air     Functional Mobility:  · Bed Mobility:     · Supine to Sit: modified independence  · Sit to Supine: modified  independence  · Transfers:     · Sit to Stand:  modified independence with no AD  · Gait: amb ~60' and ~40' w/o AD and S  · Balance: fair standing balance w/o AD      AM-PAC 6 CLICK MOBILITY  Turning over in bed (including adjusting bedclothes, sheets and blankets)?: 4  Sitting down on and standing up from a chair with arms (e.g., wheelchair, bedside commode, etc.): 4  Moving from lying on back to sitting on the side of the bed?: 4  Moving to and from a bed to a chair (including a wheelchair)?: 4  Need to walk in hospital room?: 4  Climbing 3-5 steps with a railing?: 3  Basic Mobility Total Score: 23       Therapeutic Activities and Exercises: le seated ex's within tolerance,pt ambulates in room independently,nsg is aware and no alarm set.       Patient left supine with all lines intact, call button in reach and nsg notified..    GOALS: see general POC  Multidisciplinary Problems     Physical Therapy Goals        Problem: Physical Therapy    Goal Priority Disciplines Outcome Goal Variances Interventions   Physical Therapy Goal     PT, PT/OT Ongoing, Progressing     Description: Goals to be met by: 22     Patient will increase functional independence with mobility by performin. Sit to stand transfer with Stand-by Assistance  2. Bed to chair transfer with Stand-by Assistance using No Assistive Device  3. Gait  x 150 feet with SBA with or without an AD.                      Time Tracking:     PT Received On: 22  PT Start Time: 1426     PT Stop Time: 1450  PT Total Time (min): 24 min     Billable Minutes: Gait Training 14 and Therapeutic Exercise 10       PT/PTA: PTA     PTA Visit Number: 1     2022

## 2022-08-08 NOTE — PLAN OF CARE
"Plan of care reviewed with the patient. Scheduled medicines given and the patient tolerated well. Fall and safety precautions taken and the standard interventions are in place. On Telemetry monitoring with NSR, no true "red alarms" noted in the shift. No acute distress reported either in the shift. Advised the patient to call for the assistance. Continued monitoring the patient.  "

## 2022-08-09 ENCOUNTER — CLINICAL SUPPORT (OUTPATIENT)
Dept: SMOKING CESSATION | Facility: CLINIC | Age: 52
End: 2022-08-09
Payer: COMMERCIAL

## 2022-08-09 DIAGNOSIS — F17.210 CIGARETTE SMOKER: Primary | ICD-10-CM

## 2022-08-09 LAB
ALBUMIN SERPL BCP-MCNC: 3 G/DL (ref 3.5–5.2)
ALP SERPL-CCNC: 86 U/L (ref 55–135)
ALT SERPL W/O P-5'-P-CCNC: 33 U/L (ref 10–44)
ANION GAP SERPL CALC-SCNC: 12 MMOL/L (ref 8–16)
AST SERPL-CCNC: 27 U/L (ref 10–40)
BACTERIA BLD CULT: NORMAL
BACTERIA BLD CULT: NORMAL
BILIRUB SERPL-MCNC: 0.3 MG/DL (ref 0.1–1)
BUN SERPL-MCNC: 21 MG/DL (ref 6–20)
CALCIUM SERPL-MCNC: 9.4 MG/DL (ref 8.7–10.5)
CHLORIDE SERPL-SCNC: 104 MMOL/L (ref 95–110)
CO2 SERPL-SCNC: 26 MMOL/L (ref 23–29)
CREAT SERPL-MCNC: 0.7 MG/DL (ref 0.5–1.4)
EST. GFR  (NO RACE VARIABLE): >60 ML/MIN/1.73 M^2
GLUCOSE SERPL-MCNC: 77 MG/DL (ref 70–110)
MAGNESIUM SERPL-MCNC: 1.8 MG/DL (ref 1.6–2.6)
PHOSPHATE SERPL-MCNC: 3.9 MG/DL (ref 2.7–4.5)
POTASSIUM SERPL-SCNC: 4 MMOL/L (ref 3.5–5.1)
PROT SERPL-MCNC: 6.5 G/DL (ref 6–8.4)
SODIUM SERPL-SCNC: 142 MMOL/L (ref 136–145)

## 2022-08-09 PROCEDURE — 99407 PR TOBACCO USE CESSATION INTENSIVE >10 MINUTES: ICD-10-PCS | Mod: S$GLB,,,

## 2022-08-09 PROCEDURE — 27100098 HC SPACER

## 2022-08-09 PROCEDURE — 99407 BEHAV CHNG SMOKING > 10 MIN: CPT | Mod: S$GLB,,,

## 2022-08-09 PROCEDURE — 94761 N-INVAS EAR/PLS OXIMETRY MLT: CPT

## 2022-08-09 PROCEDURE — 36415 COLL VENOUS BLD VENIPUNCTURE: CPT | Performed by: HOSPITALIST

## 2022-08-09 PROCEDURE — 99900035 HC TECH TIME PER 15 MIN (STAT)

## 2022-08-09 PROCEDURE — 11000001 HC ACUTE MED/SURG PRIVATE ROOM

## 2022-08-09 PROCEDURE — S4991 NICOTINE PATCH NONLEGEND: HCPCS | Performed by: FAMILY MEDICINE

## 2022-08-09 PROCEDURE — 25000003 PHARM REV CODE 250: Performed by: HOSPITALIST

## 2022-08-09 PROCEDURE — 84100 ASSAY OF PHOSPHORUS: CPT | Performed by: HOSPITALIST

## 2022-08-09 PROCEDURE — 94640 AIRWAY INHALATION TREATMENT: CPT

## 2022-08-09 PROCEDURE — 83735 ASSAY OF MAGNESIUM: CPT | Performed by: HOSPITALIST

## 2022-08-09 PROCEDURE — 25000003 PHARM REV CODE 250: Performed by: FAMILY MEDICINE

## 2022-08-09 PROCEDURE — 63600175 PHARM REV CODE 636 W HCPCS: Performed by: HOSPITALIST

## 2022-08-09 PROCEDURE — 99999 PR PBB SHADOW E&M-EST. PATIENT-LVL I: ICD-10-PCS | Mod: PBBFAC,,,

## 2022-08-09 PROCEDURE — 27000207 HC ISOLATION

## 2022-08-09 PROCEDURE — A4216 STERILE WATER/SALINE, 10 ML: HCPCS | Performed by: NURSE PRACTITIONER

## 2022-08-09 PROCEDURE — 99999 PR PBB SHADOW E&M-EST. PATIENT-LVL I: CPT | Mod: PBBFAC,,,

## 2022-08-09 PROCEDURE — 63600175 PHARM REV CODE 636 W HCPCS: Performed by: NURSE PRACTITIONER

## 2022-08-09 PROCEDURE — 80053 COMPREHEN METABOLIC PANEL: CPT | Performed by: HOSPITALIST

## 2022-08-09 PROCEDURE — 25000003 PHARM REV CODE 250: Performed by: NURSE PRACTITIONER

## 2022-08-09 RX ORDER — GABAPENTIN 300 MG/1
300 CAPSULE ORAL 3 TIMES DAILY
Status: DISCONTINUED | OUTPATIENT
Start: 2022-08-09 | End: 2022-08-11 | Stop reason: HOSPADM

## 2022-08-09 RX ORDER — BENZTROPINE MESYLATE 0.5 MG/1
1 TABLET ORAL 2 TIMES DAILY
Status: DISCONTINUED | OUTPATIENT
Start: 2022-08-09 | End: 2022-08-10

## 2022-08-09 RX ORDER — OXCARBAZEPINE 150 MG/1
300 TABLET, FILM COATED ORAL 2 TIMES DAILY
Status: DISCONTINUED | OUTPATIENT
Start: 2022-08-09 | End: 2022-08-11 | Stop reason: HOSPADM

## 2022-08-09 RX ORDER — IBUPROFEN 200 MG
1 TABLET ORAL DAILY
Status: DISCONTINUED | OUTPATIENT
Start: 2022-08-09 | End: 2022-08-11 | Stop reason: HOSPADM

## 2022-08-09 RX ORDER — HYDROXYZINE PAMOATE 25 MG/1
25 CAPSULE ORAL EVERY 8 HOURS PRN
Status: DISCONTINUED | OUTPATIENT
Start: 2022-08-09 | End: 2022-08-11 | Stop reason: HOSPADM

## 2022-08-09 RX ORDER — QUETIAPINE FUMARATE 25 MG/1
50 TABLET, FILM COATED ORAL DAILY
Status: DISCONTINUED | OUTPATIENT
Start: 2022-08-09 | End: 2022-08-11 | Stop reason: HOSPADM

## 2022-08-09 RX ADMIN — BENZTROPINE MESYLATE 1 MG: 0.5 TABLET ORAL at 11:08

## 2022-08-09 RX ADMIN — OXCARBAZEPINE 300 MG: 150 TABLET, FILM COATED ORAL at 09:08

## 2022-08-09 RX ADMIN — FUROSEMIDE 20 MG: 20 TABLET ORAL at 09:08

## 2022-08-09 RX ADMIN — DEXAMETHASONE 6 MG: 4 TABLET ORAL at 09:08

## 2022-08-09 RX ADMIN — HYDROXYZINE PAMOATE 50 MG: 25 CAPSULE ORAL at 06:08

## 2022-08-09 RX ADMIN — OXYCODONE HYDROCHLORIDE AND ACETAMINOPHEN 500 MG: 500 TABLET ORAL at 09:08

## 2022-08-09 RX ADMIN — QUETIAPINE FUMARATE 50 MG: 25 TABLET ORAL at 08:08

## 2022-08-09 RX ADMIN — NICOTINE 1 PATCH: 21 PATCH, EXTENDED RELEASE TRANSDERMAL at 03:08

## 2022-08-09 RX ADMIN — ALBUTEROL SULFATE 2 PUFF: 90 AEROSOL, METERED RESPIRATORY (INHALATION) at 07:08

## 2022-08-09 RX ADMIN — ENOXAPARIN SODIUM 40 MG: 100 INJECTION SUBCUTANEOUS at 05:08

## 2022-08-09 RX ADMIN — FAMOTIDINE 20 MG: 20 TABLET ORAL at 09:08

## 2022-08-09 RX ADMIN — Medication 1 TABLET: at 09:08

## 2022-08-09 RX ADMIN — OXCARBAZEPINE 300 MG: 150 TABLET, FILM COATED ORAL at 11:08

## 2022-08-09 RX ADMIN — BENZTROPINE MESYLATE 1 MG: 0.5 TABLET ORAL at 09:08

## 2022-08-09 RX ADMIN — HYDROXYZINE PAMOATE 50 MG: 25 CAPSULE ORAL at 09:08

## 2022-08-09 RX ADMIN — ALBUTEROL SULFATE 2 PUFF: 90 AEROSOL, METERED RESPIRATORY (INHALATION) at 02:08

## 2022-08-09 RX ADMIN — Medication 10 ML: at 09:08

## 2022-08-09 RX ADMIN — LISINOPRIL 40 MG: 20 TABLET ORAL at 09:08

## 2022-08-09 RX ADMIN — GABAPENTIN 300 MG: 300 CAPSULE ORAL at 09:08

## 2022-08-09 RX ADMIN — METOPROLOL SUCCINATE 12.5 MG: 25 TABLET, EXTENDED RELEASE ORAL at 09:08

## 2022-08-09 RX ADMIN — QUETIAPINE FUMARATE 50 MG: 25 TABLET ORAL at 07:08

## 2022-08-09 RX ADMIN — RISPERIDONE 1 MG: 0.5 TABLET, ORALLY DISINTEGRATING ORAL at 10:08

## 2022-08-09 RX ADMIN — ALBUTEROL SULFATE 2 PUFF: 90 AEROSOL, METERED RESPIRATORY (INHALATION) at 12:08

## 2022-08-09 RX ADMIN — GABAPENTIN 300 MG: 300 CAPSULE ORAL at 03:08

## 2022-08-09 RX ADMIN — HYDROXYZINE PAMOATE 25 MG: 25 CAPSULE ORAL at 11:08

## 2022-08-09 RX ADMIN — RISPERIDONE 1 MG: 0.5 TABLET, ORALLY DISINTEGRATING ORAL at 09:08

## 2022-08-09 NOTE — PT/OT/SLP PROGRESS
"Occupational Therapy      Patient Name:  Stephanie T Barthelemy   MRN:  [id number]    Patient not seen OOB today secondary to Patient fatigue. Patient presenting eyes partially open, communication hypophonic, unintelligible with exception of "No." jaw slackened. Obs reported to RN. OT will follow-up 08/10/22.    8/9/2022  "

## 2022-08-09 NOTE — PHYSICIAN QUERY
"PT Name: Stephanie T Barthelemy  MR #: [id number]    DOCUMENTATION CLARIFICATION     CDS/: Corrine Sofia RN CDI             Contact information: [email or web address]  This form is a permanent document in the medical record.    Query Date: August 9, 2022      By submitting this query, we are merely seeking further clarification of documentation.  Please utilize your independent clinical judgment when addressing the question(s) below.    The Medical Record reflects the following:    Clinical Information Location in Medical Record   Acute Hypoxemic respiratory failure due to Covid Pneumonia: IV steroids, Remdesivir and empiric Antibiotics. Vent setting adjusted     EMS arrived O2 saturations on RA 87% placed on 4L increased to 91%, +cough +tachy   Intubated 710pm 8/3  cxr 8/3  Nonspecific bilateral airspace opacities.  Findings may suggest multifocal pneumonia or edema.  Recommend clinical correlation.   Probable small pleural effusions.  Pneumonia of left lower lobe due to infectious organism    COVID +  Continue empiric antibiotics with vanc and zosyn    Recently treated for aspiration pneumonia and new onset CHF at this facility    cxr 8/4  Bilateral perihilar mixed opacities with lower zone predominance are again noted.  There is no acute pleural effusion pneumothorax or interval detrimental change EICU 8/3 S [facility] POOL      ER MD 8/3 S Mirza POOL                  H&P 8/4 B Valeriano NP/ POLY Kunz MD    [facility]Med 8/8 E Khadij[facility] POOL    Radiology         Please clarify/confirm the Consultants diagnosis of  "COVID PNA":     [x  ] COVID PNA ruled in   [  ] Other PNA, specify______________   [  ] COVID PNA ruled out   [  ] Other diagnosis (please specify): _____________________________   [  ] Clinically undetermined             "

## 2022-08-09 NOTE — ASSESSMENT & PLAN NOTE
- Held anti-HTN for low BP  - Resume metoprolol XL12.5 mg daily and lisinopril 10 mg daily  - Monitor and make further adjustments as needed

## 2022-08-09 NOTE — ASSESSMENT & PLAN NOTE
Encephalopathy, metabolic  COVID +  - Recently treated for aspiration pneumonia and new onset CHF at this facility  - Found down at home by home health, patient in tub disoriented and hard to arouse. EMS arrived O2 saturations on RA 87% placed on 4L increased to 91%, +cough +tachy   - Patient with acute hypercapnic and hypoxic respiratory failure    - Intubated and vent supported- extubated  - Found to be COVID +  - Patient was on empiric Zosyn and Vancomycin  - Pulm/critical care stopped Zosyn and started Rocephin on 8/5/2022; continued vancomycin  - Extubated to NC on 8/5/2022  - Stepped down to floor on 8/6/2022  - Completed remdesivir, full 5 day course done on 8/7/2022  - Stopped Rocephin and vancomycin after dose given on 8/7/2022  - Continue on dexamethasone,  - Currently stable on RA and mental status back to baseline  - Will consider stopping steroids after today if patient continues to do well. D/c on 8/9

## 2022-08-09 NOTE — PT/OT/SLP PROGRESS
Physical Therapy      Patient Name:  Stephanie T Barthelemy   MRN:  [id number]    Patient not seen today secondary to  (pt lethargic/asleep from medication issued to calm pt down). Will follow-up tomorrow.

## 2022-08-09 NOTE — ASSESSMENT & PLAN NOTE
Patient is identified as COVID 19 infection  Initiate standard COVID protocols; COVID-19 testing Collection Date: 8/26/2021 Collection Time:   4:17 PM ,Infection Control notification  and isolation- respiratory, contact and droplet per protocol    Diagnostics: (leukopenia, hyponatremia, hyperferritinemia, elevated troponin, elevated d-dimer, age, and comorbidities are significant predictors of poor clinical outcome)  CBC, CMP, Ferritin, CRP and Portable CXR    Management: Maintain oxygen saturations 92-96% via Nasal Cannula 2 LPM and monitor with continuous/intermittent pulse oximetry- weaned off.  Inhaled bronchodilators as needed for shortness of breath. and Continuous cardiac monitoring.

## 2022-08-09 NOTE — SUBJECTIVE & OBJECTIVE
Interval History: awake and alert, walking around the room on RA.   Completed COVID 19 treatment- remdesivir and hold dexamethasone.   Reported patient pacing the room, initially declining her meds- but now ready to take them   Resume home psych meds- consult psychiatry      Review of Systems   Constitutional:  Negative for chills and fever.        Itching   Respiratory:  Negative for shortness of breath.    Cardiovascular:  Negative for chest pain.   Psychiatric/Behavioral:  Positive for agitation. The patient is nervous/anxious.    Objective:     Vital Signs (Most Recent):  Temp: 98.2 °F (36.8 °C) (08/09/22 1048)  Pulse: 88 (08/09/22 1048)  Resp: (!) 22 (08/09/22 1048)  BP: 137/87 (08/09/22 1048)  SpO2: 100 % (08/09/22 1048)   Vital Signs (24h Range):  Temp:  [96.2 °F (35.7 °C)-98.4 °F (36.9 °C)] 98.2 °F (36.8 °C)  Pulse:  [] 88  Resp:  [16-22] 22  SpO2:  [97 %-100 %] 100 %  BP: (126-178)/() 137/87     Weight: 52.5 kg (115 lb 11.9 oz)  Body mass index is 22.6 kg/m².    Intake/Output Summary (Last 24 hours) at 8/9/2022 1101  Last data filed at 8/9/2022 0451  Gross per 24 hour   Intake 540 ml   Output --   Net 540 ml      Physical Exam  Vitals and nursing note reviewed.   Constitutional:       Appearance: She is ill-appearing. She is not toxic-appearing.      Comments: Appears older than stated age    HENT:      Head: Normocephalic and atraumatic.      Nose: Nose normal.   Eyes:      Extraocular Movements: Extraocular movements intact.      Conjunctiva/sclera: Conjunctivae normal.   Cardiovascular:      Rate and Rhythm: Normal rate and regular rhythm.      Pulses: Normal pulses.      Heart sounds: Murmur heard.   Pulmonary:      Effort: Pulmonary effort is normal. No respiratory distress.      Comments: Decreased breath sounds at bases now resolved  Abdominal:      General: Bowel sounds are normal. There is no distension.      Palpations: Abdomen is soft.      Tenderness: There is no guarding or rebound.    Musculoskeletal:         General: Normal range of motion.      Cervical back: Normal range of motion.      Right lower leg: No edema.      Left lower leg: No edema.   Skin:     General: Skin is warm and dry.      Capillary Refill: Capillary refill takes less than 2 seconds.      Comments: Extensive tattoos throughout body   Neurological:      Mental Status: She is alert.      Motor: Weakness present.      Comments: Awake and alert, oriented x 3   Psychiatric:         Attention and Perception: Attention normal.         Mood and Affect: Affect is labile.         Speech: Speech is delayed.         Behavior: Behavior is agitated. Behavior is not slowed.         Judgment: Judgment is impulsive.       Significant Labs: A1C:   Recent Labs   Lab 07/22/22  0718   HGBA1C 6.1*     Blood Culture: No results for input(s): LABBLOO in the last 48 hours.  CBC: No results for input(s): WBC, HGB, HCT, PLT in the last 48 hours.  CMP:   Recent Labs   Lab 08/09/22  0845      K 4.0      CO2 26   GLU 77   BUN 21*   CREATININE 0.7   CALCIUM 9.4   PROT 6.5   ALBUMIN 3.0*   BILITOT 0.3   ALKPHOS 86   AST 27   ALT 33   ANIONGAP 12     Lactic Acid: No results for input(s): LACTATE in the last 48 hours.  Lipase: No results for input(s): LIPASE in the last 48 hours.  Lipid Panel: No results for input(s): CHOL, HDL, LDLCALC, TRIG, CHOLHDL in the last 48 hours.  Magnesium: No results for input(s): MG in the last 48 hours.  Troponin: No results for input(s): TROPONINI in the last 48 hours.  TSH:   Recent Labs   Lab 08/03/22  1730   TSH 0.780     Urine Culture: No results for input(s): LABURIN in the last 48 hours.  Urine Studies: No results for input(s): COLORU, APPEARANCEUA, PHUR, SPECGRAV, PROTEINUA, GLUCUA, KETONESU, BILIRUBINUA, OCCULTUA, NITRITE, UROBILINOGEN, LEUKOCYTESUR, RBCUA, WBCUA, BACTERIA, SQUAMEPITHEL, HYALINECASTS in the last 48 hours.    Invalid input(s): WRIGHTSUR    Significant Imaging: I have reviewed all pertinent  imaging results/findings within the past 24 hours.

## 2022-08-09 NOTE — PROGRESS NOTES
[facility] Medicine  Progress Note    Patient Name: Stephanie T Barthelemy  MRN: [id number]  Patient Class: IP- Inpatient   Admission Date: 8/3/2022  Length of Stay: 6 days  Attending Physician: Nelly Pandey*  Primary Care Provider: Hilda Elizabeth NP        Subjective:     Principal Problem:Acute respiratory failure with hypoxia and hypercapnia        HPI:  Stephanie T Barthelemy is a 51 y.o. female who has a past medical history of ADHD (attention deficit hyperactivity disorder), Anemia, Anxiety, Bipolar disorder, History of hepatitis C - s/p clearance of virus (HCV neg 6/2015) (9/26/2014), History of psychiatric hospitalization, History of substance abuse, psychiatric care, Hypertension, Opioid overdose (5/10/2016), Psychiatric problem, Psychosis, Seizure disorder (4/3/2019), Sleep difficulties, Substance abuse, Therapy, and Vasculitis. She presented to the ED for altered mental status. She was brought in by EMS after found down at home by home health in bath tub unresponsive. Of note patient was just admitted for pneumonia and new onset CHF.     In the ED: She was found COVID-19 positive. UDS positive for amphetamines. Latest EKG with Sinus tach, rate 131, nonspecific ST changes, no ST elevations or other signs of ischemia, normal intervals. Compared to prior, QT prolongation has improved. CXR with diffuse interstitial opacities most consistent with viral process. Patient required intubation for airway protection. Considering patient's persistent hypoxia, severe symptoms, and co-morbidities, patient is high risk for decompensation and complications including worsening hypoxia, respiratory failure, intubation, or cardiopulmonary arrest if discharged. Admitted to [facility] for further care.      Overview/Hospital Course:  Ms. Barthelemy presented with confusion and hypoxia.  Admitted with acute respiratory failure with COVID-19 complicated by encephalopathy.   Intubated for airway protection and isolated protocol in place.  Treatment initiated with remdesivir and dexamethasone, along with possible concomitant bacterial infection with Zosyn and vancomycin.  Pulmonary/Critical Care consulted.  Antibiotics de-escalated to Rocephin and Vancomycin as per Pulm/Critical care recommendations. Extubated on 8/5/2022. Stepped down to floor on 8/6/2022.      Interval History: awake and alert, walking around the room on RA.   Completed COVID 19 treatment- remdesivir and hold dexamethasone.   Reported patient pacing the room, initially declining her meds- but now ready to take them   Resume home psych meds- consult psychiatry      Review of Systems   Constitutional:  Negative for chills and fever.        Itching   Respiratory:  Negative for shortness of breath.    Cardiovascular:  Negative for chest pain.   Psychiatric/Behavioral:  Positive for agitation. The patient is nervous/anxious.    Objective:     Vital Signs (Most Recent):  Temp: 98.2 °F (36.8 °C) (08/09/22 1048)  Pulse: 88 (08/09/22 1048)  Resp: (!) 22 (08/09/22 1048)  BP: 137/87 (08/09/22 1048)  SpO2: 100 % (08/09/22 1048)   Vital Signs (24h Range):  Temp:  [96.2 °F (35.7 °C)-98.4 °F (36.9 °C)] 98.2 °F (36.8 °C)  Pulse:  [] 88  Resp:  [16-22] 22  SpO2:  [97 %-100 %] 100 %  BP: (126-178)/() 137/87     Weight: 52.5 kg (115 lb 11.9 oz)  Body mass index is 22.6 kg/m².    Intake/Output Summary (Last 24 hours) at 8/9/2022 1101  Last data filed at 8/9/2022 0451  Gross per 24 hour   Intake 540 ml   Output --   Net 540 ml      Physical Exam  Vitals and nursing note reviewed.   Constitutional:       Appearance: She is ill-appearing. She is not toxic-appearing.      Comments: Appears older than stated age    HENT:      Head: Normocephalic and atraumatic.      Nose: Nose normal.   Eyes:      Extraocular Movements: Extraocular movements intact.      Conjunctiva/sclera: Conjunctivae normal.   Cardiovascular:      Rate and  Rhythm: Normal rate and regular rhythm.      Pulses: Normal pulses.      Heart sounds: Murmur heard.   Pulmonary:      Effort: Pulmonary effort is normal. No respiratory distress.      Comments: Decreased breath sounds at bases now resolved  Abdominal:      General: Bowel sounds are normal. There is no distension.      Palpations: Abdomen is soft.      Tenderness: There is no guarding or rebound.   Musculoskeletal:         General: Normal range of motion.      Cervical back: Normal range of motion.      Right lower leg: No edema.      Left lower leg: No edema.   Skin:     General: Skin is warm and dry.      Capillary Refill: Capillary refill takes less than 2 seconds.      Comments: Extensive tattoos throughout body   Neurological:      Mental Status: She is alert.      Motor: Weakness present.      Comments: Awake and alert, oriented x 3   Psychiatric:         Attention and Perception: Attention normal.         Mood and Affect: Affect is labile.         Speech: Speech is delayed.         Behavior: Behavior is agitated. Behavior is not slowed.         Judgment: Judgment is impulsive.       Significant Labs: A1C:   Recent Labs   Lab 07/22/22  0718   HGBA1C 6.1*     Blood Culture: No results for input(s): LABBLOO in the last 48 hours.  CBC: No results for input(s): WBC, HGB, HCT, PLT in the last 48 hours.  CMP:   Recent Labs   Lab 08/09/22  0845      K 4.0      CO2 26   GLU 77   BUN 21*   CREATININE 0.7   CALCIUM 9.4   PROT 6.5   ALBUMIN 3.0*   BILITOT 0.3   ALKPHOS 86   AST 27   ALT 33   ANIONGAP 12     Lactic Acid: No results for input(s): LACTATE in the last 48 hours.  Lipase: No results for input(s): LIPASE in the last 48 hours.  Lipid Panel: No results for input(s): CHOL, HDL, LDLCALC, TRIG, CHOLHDL in the last 48 hours.  Magnesium: No results for input(s): MG in the last 48 hours.  Troponin: No results for input(s): TROPONINI in the last 48 hours.  TSH:   Recent Labs   Lab 08/03/22  1730   TSH 0.780      Urine Culture: No results for input(s): LABURIN in the last 48 hours.  Urine Studies: No results for input(s): COLORU, APPEARANCEUA, PHUR, SPECGRAV, PROTEINUA, GLUCUA, KETONESU, BILIRUBINUA, OCCULTUA, NITRITE, UROBILINOGEN, LEUKOCYTESUR, RBCUA, WBCUA, BACTERIA, SQUAMEPITHEL, HYALINECASTS in the last 48 hours.    Invalid input(s): WRIGHTSUR    Significant Imaging: I have reviewed all pertinent imaging results/findings within the past 24 hours.      Assessment/Plan:      * Acute respiratory failure with hypoxia and hypercapnia  Encephalopathy, metabolic  COVID +  - Recently treated for aspiration pneumonia and new onset CHF at this facility  - Found down at home by home health, patient in tub disoriented and hard to arouse. EMS arrived O2 saturations on RA 87% placed on 4L increased to 91%, +cough +tachy   - Patient with acute hypercapnic and hypoxic respiratory failure    - Intubated and vent supported- extubated  - Found to be COVID +  - Patient was on empiric Zosyn and Vancomycin  - Pulm/critical care stopped Zosyn and started Rocephin on 8/5/2022; continued vancomycin  - Extubated to NC on 8/5/2022  - Stepped down to floor on 8/6/2022  - Completed remdesivir, full 5 day course done on 8/7/2022  - Stopped Rocephin and vancomycin after dose given on 8/7/2022  - Continue on dexamethasone,  - Currently stable on RA and mental status back to baseline  - Will consider stopping steroids after today if patient continues to do well. D/c on 8/9    COVID-19 virus infection  Patient is identified as COVID 19 infection  Initiate standard COVID protocols; COVID-19 testing Collection Date: 8/26/2021 Collection Time:   4:17 PM ,Infection Control notification  and isolation- respiratory, contact and droplet per protocol    Diagnostics: (leukopenia, hyponatremia, hyperferritinemia, elevated troponin, elevated d-dimer, age, and comorbidities are significant predictors of poor clinical outcome)  CBC, CMP, Ferritin, CRP and  Portable CXR    Management: Maintain oxygen saturations 92-96% via Nasal Cannula 2 LPM and monitor with continuous/intermittent pulse oximetry- weaned off.  Inhaled bronchodilators as needed for shortness of breath. and Continuous cardiac monitoring.    COVID-19  - Patient is identified as Severe COVID-19 based on hypoxemia with O2 saturations <94% on room air or on ambulation   - Initiate standard COVID protocols; COVID-19 testing Collection Date: 10/1/2021 Collection Time:   1:22 AM, Infection Control notification  and isolation- respiratory, contact and droplet per protocol  - Diagnostics: (leukopenia, hyponatremia, hyperferritinemia, elevated troponin, elevated d-dimer, age, and comorbidities are significant predictors of poor clinical outcome)  CMP, CRP, BNP and Portable CXR  Management: Continuous cardiac monitoring. and Manage respiratory failure (O2 requirement >10LPM or needing NIPPV/Mechanical ventilation) and/or Pneumonia (active chest infiltrates) separately as described below.  - Monitor on Telemetry  - Initiated on dexamethasone, continue for 10d course  - Completed remdesivir x5d; daily CMP  - Continuous Pulse Oximetry, goal SpO2 92-96%  - MVI & ascorbic acid 500mg PO BID  - Acetaminophen Q6hr PRN fever  - VTE PPx: enoxaparin   - As discussed above    Encephalopathy, metabolic  - Due to above and in combination with ongoing polysubstance abuse  - Back to baseline  - Resolved    Chronic combined systolic and diastolic heart failure  - Last ECHO results:   - Results for orders placed during the hospital encounter of 07/21/22  · The left ventricle is mildly enlarged with concentric hypertrophy and severely decreased systolic function.  · The estimated ejection fraction is 20%.  · There is severe left ventricular global hypokinesis.  · Grade III left ventricular diastolic dysfunction.  · Mild right ventricular enlargement with mildly reduced right ventricular systolic function.  · Severe left atrial  enlargement.  · Moderate aortic regurgitation.  · Severe mitral regurgitation.  · Mild tricuspid regurgitation.  · Moderate pulmonic regurgitation.  · Intermediate central venous pressure (8 mmHg).  · The estimated PA systolic pressure is 33 mmHg.  · Trivial pericardial effusion.  - Troponin 0.034-0.052; likely demand; monitored and trended  - CXR revealed Nonspecific bilateral airspace opacities  - Does not appear clinically volume overload at this time  - Continue home BB, ACEi/ARB when stable  - Daily weights and strict I/Os, monitor on telemetry  - Monitor and trend BMP, Mg, and renal function; keep K >4, Mg >2  - Sodium restriction (<2g/d), fluid restriction (<2L)   - Monitor for signs of fluid overload: RR>30, O2 sat<92%, weight gain of >3 lbs in 24 hours, or urinary output <160ml/8hr  - Started low dose lasix 20 mg daily on 8/7/2022 and appears compensated on this regimen but will monitor to see if this needs to be increased to 40 mg daily    HLD (hyperlipidemia)  - Not on medications at home    Bipolar 1 disorder, depressed  Attention deficit hyperactivity disorder (ADHD), combined type  - Held home meds until patient was stable  - Restarted hydroxyzine 50 mg Q8, quetiapine 25 mg QHS (lower dose to prevent over sedation), risperidone 1mg BID  - Increased quetiapine to 50 mg QHS on 8/7/2022 with improvement    Seizure disorder  - Seizure precautions  - PRN lorazepam  Resume home meds-Oxcarbazepine    Depression  Resume home meds  Consult psychiatry        Hypokalemia  - Corrected to normal, monitor    Amphetamine abuse  Polysubstance abuse  - UDS positive  - Monitor for withdrawal    Debility  - PT and OT consult, progressing with recommendation.     Anemia  - Stable, monitor    Essential hypertension  - Held anti-HTN for low BP  - Resume metoprolol XL12.5 mg daily and lisinopril 10 mg daily  - Monitor and make further adjustments as needed      VTE Risk Mitigation (From admission, onward)         Ordered      enoxaparin injection 40 mg  Daily         08/04/22 0941     IP VTE HIGH RISK PATIENT  Once         08/03/22 2045     Place sequential compression device  Until discontinued         08/03/22 2045                Discharge Planning   AYSHA:      Code Status: Full Code   Is the patient medically ready for discharge?:     Reason for patient still in hospital (select all that apply): Patient trending condition  Discharge Plan A: Home Health   Discharge Delays: None known at this time              Nelly Pandey MD  Department of [facility]   [facility]

## 2022-08-09 NOTE — PLAN OF CARE
"Plan of care reviewed with the patient. Scheduled medicines given and the patient tolerated well. Patient refused to take Risperidone 1 mg. Fall and safety precautions taken and the standard interventions are in place. On Telemetry monitoring with NSR to Sinus Tachycardia, no true "red alarms" noted in the shift. No acute distress reported either in the shift. Advised the patient to call for the assistance. Continued monitoring the patient.  "

## 2022-08-09 NOTE — PROGRESS NOTES
Individual Follow-Up Form    8/9/2022    Quit Date: To be determined    Clinical Status of Patient: Inpatient    Length of Service: 30 minutes    Comments: Virtual smoking cessation education note: Pt is a 1 pk/day cigarette smoker x 36 yrs. Order requested for 21 mg nicotine patch Q day. Pt states ready to quit. She is currently enrolled in the [employer]. Ambualtory referral to Smoking Cessation clinic following hospital discharge.     Diagnosis: F17.210    Next Visit:  -8/24/20252 10 am with Abhijeet PULIDO

## 2022-08-10 PROCEDURE — 25000003 PHARM REV CODE 250: Performed by: NURSE PRACTITIONER

## 2022-08-10 PROCEDURE — 99223 PR INITIAL HOSPITAL CARE,LEVL III: ICD-10-PCS | Mod: ,,, | Performed by: PSYCHIATRY & NEUROLOGY

## 2022-08-10 PROCEDURE — 27000207 HC ISOLATION

## 2022-08-10 PROCEDURE — 25000003 PHARM REV CODE 250: Performed by: HOSPITALIST

## 2022-08-10 PROCEDURE — 99900035 HC TECH TIME PER 15 MIN (STAT)

## 2022-08-10 PROCEDURE — 99223 1ST HOSP IP/OBS HIGH 75: CPT | Mod: ,,, | Performed by: PSYCHIATRY & NEUROLOGY

## 2022-08-10 PROCEDURE — 93010 EKG 12-LEAD: ICD-10-PCS | Mod: ,,, | Performed by: INTERNAL MEDICINE

## 2022-08-10 PROCEDURE — 93010 ELECTROCARDIOGRAM REPORT: CPT | Mod: ,,, | Performed by: INTERNAL MEDICINE

## 2022-08-10 PROCEDURE — 93005 ELECTROCARDIOGRAM TRACING: CPT

## 2022-08-10 PROCEDURE — 90833 PR PSYCHOTHERAPY W/PATIENT W/E&M, 30 MIN (ADD ON): ICD-10-PCS | Mod: ,,, | Performed by: PSYCHIATRY & NEUROLOGY

## 2022-08-10 PROCEDURE — 97535 SELF CARE MNGMENT TRAINING: CPT

## 2022-08-10 PROCEDURE — 25000003 PHARM REV CODE 250: Performed by: PSYCHIATRY & NEUROLOGY

## 2022-08-10 PROCEDURE — 94640 AIRWAY INHALATION TREATMENT: CPT

## 2022-08-10 PROCEDURE — 90833 PSYTX W PT W E/M 30 MIN: CPT | Mod: ,,, | Performed by: PSYCHIATRY & NEUROLOGY

## 2022-08-10 PROCEDURE — 25000003 PHARM REV CODE 250: Performed by: FAMILY MEDICINE

## 2022-08-10 PROCEDURE — S4991 NICOTINE PATCH NONLEGEND: HCPCS | Performed by: FAMILY MEDICINE

## 2022-08-10 PROCEDURE — 11000001 HC ACUTE MED/SURG PRIVATE ROOM

## 2022-08-10 RX ORDER — BENZTROPINE MESYLATE 0.5 MG/1
0.5 TABLET ORAL 2 TIMES DAILY
Status: DISCONTINUED | OUTPATIENT
Start: 2022-08-10 | End: 2022-08-11 | Stop reason: HOSPADM

## 2022-08-10 RX ORDER — QUETIAPINE FUMARATE 100 MG/1
100 TABLET, FILM COATED ORAL NIGHTLY
Status: DISCONTINUED | OUTPATIENT
Start: 2022-08-10 | End: 2022-08-11 | Stop reason: HOSPADM

## 2022-08-10 RX ORDER — HYDROXYZINE PAMOATE 25 MG/1
50 CAPSULE ORAL EVERY 8 HOURS PRN
Status: DISCONTINUED | OUTPATIENT
Start: 2022-08-10 | End: 2022-08-11 | Stop reason: HOSPADM

## 2022-08-10 RX ORDER — ALBUTEROL SULFATE 90 UG/1
2 AEROSOL, METERED RESPIRATORY (INHALATION) EVERY 6 HOURS
Status: DISCONTINUED | OUTPATIENT
Start: 2022-08-10 | End: 2022-08-11 | Stop reason: HOSPADM

## 2022-08-10 RX ORDER — PROPRANOLOL HYDROCHLORIDE 10 MG/1
10 TABLET ORAL 3 TIMES DAILY
Status: DISCONTINUED | OUTPATIENT
Start: 2022-08-10 | End: 2022-08-11 | Stop reason: HOSPADM

## 2022-08-10 RX ADMIN — QUETIAPINE FUMARATE 50 MG: 25 TABLET ORAL at 09:08

## 2022-08-10 RX ADMIN — OXCARBAZEPINE 300 MG: 150 TABLET, FILM COATED ORAL at 09:08

## 2022-08-10 RX ADMIN — BENZTROPINE MESYLATE 1 MG: 0.5 TABLET ORAL at 09:08

## 2022-08-10 RX ADMIN — NICOTINE 1 PATCH: 21 PATCH, EXTENDED RELEASE TRANSDERMAL at 09:08

## 2022-08-10 RX ADMIN — QUETIAPINE FUMARATE 100 MG: 100 TABLET ORAL at 09:08

## 2022-08-10 RX ADMIN — FUROSEMIDE 20 MG: 20 TABLET ORAL at 09:08

## 2022-08-10 RX ADMIN — LISINOPRIL 40 MG: 20 TABLET ORAL at 09:08

## 2022-08-10 RX ADMIN — FAMOTIDINE 20 MG: 20 TABLET ORAL at 09:08

## 2022-08-10 RX ADMIN — HYDROXYZINE PAMOATE 50 MG: 25 CAPSULE ORAL at 05:08

## 2022-08-10 RX ADMIN — GABAPENTIN 300 MG: 300 CAPSULE ORAL at 09:08

## 2022-08-10 RX ADMIN — OXYCODONE HYDROCHLORIDE AND ACETAMINOPHEN 500 MG: 500 TABLET ORAL at 09:08

## 2022-08-10 RX ADMIN — METOPROLOL SUCCINATE 12.5 MG: 25 TABLET, EXTENDED RELEASE ORAL at 09:08

## 2022-08-10 RX ADMIN — ALBUTEROL SULFATE 2 PUFF: 90 AEROSOL, METERED RESPIRATORY (INHALATION) at 08:08

## 2022-08-10 RX ADMIN — ALBUTEROL SULFATE 2 PUFF: 90 AEROSOL, METERED RESPIRATORY (INHALATION) at 12:08

## 2022-08-10 RX ADMIN — PROPRANOLOL HYDROCHLORIDE 10 MG: 10 TABLET ORAL at 09:08

## 2022-08-10 RX ADMIN — Medication 1 TABLET: at 09:08

## 2022-08-10 RX ADMIN — BENZTROPINE MESYLATE 0.5 MG: 0.5 TABLET ORAL at 09:08

## 2022-08-10 RX ADMIN — HYDROXYZINE PAMOATE 50 MG: 25 CAPSULE ORAL at 04:08

## 2022-08-10 RX ADMIN — GABAPENTIN 300 MG: 300 CAPSULE ORAL at 03:08

## 2022-08-10 RX ADMIN — ALBUTEROL SULFATE 2 PUFF: 90 AEROSOL, METERED RESPIRATORY (INHALATION) at 07:08

## 2022-08-10 NOTE — PLAN OF CARE
Patient on room air with documented SpO2. Patient is in no respiratory distress at this time.  Patient is currently agitated trying to convince sister to come get her.  Patient refused MDI and is also refusing to wear BiPAP tonight.  Will inform nurse.

## 2022-08-10 NOTE — PT/OT/SLP PROGRESS
Occupational Therapy   Treatment    Name: Stephanie T Barthelemy  MRN: [id number]  Admitting Diagnosis:  Acute respiratory failure with hypoxia and hypercapnia       Recommendations:     Discharge Recommendations:  (TBD)  Discharge Equipment Recommendations:   (TBD)  Barriers to discharge:  Decreased caregiver support    Assessment:     Stephanie T Barthelemy is a 51 y.o. female with a medical diagnosis of Acute respiratory failure with hypoxia and hypercapnia.  She presents with impaired safety awareness, increased verbal and physical cues required for pt to attend to OT while in room. Performance deficits affecting function are weakness, impaired endurance, impaired self care skills, impaired functional mobility, gait instability, impaired balance, decreased upper extremity function, decreased lower extremity function, decreased ROM, impaired coordination.     Rehab Prognosis:  Good; patient would benefit from acute skilled OT services to address these deficits and reach maximum level of function.       Plan:     Patient to be seen 3 x/week to address the above listed problems via self-care/home management, therapeutic activities, therapeutic exercises  · Plan of Care Expires: 09/06/22  · Plan of Care Reviewed with: patient    Subjective     Pain/Comfort:  · Pain Rating 1: 0/10    Objective:     Communicated with: nsg prior to session.  Patient found HOB elevated with peripheral IV, telemetry upon OT entry to room.    General Precautions: Standard, airborne, contact, droplet, fall   Orthopedic Precautions:N/A   Braces: N/A  Respiratory Status: Room air     Occupational Performance:     Bed Mobility:    · Patient completed Rolling/Turning to Left with  supervision  · Patient completed Rolling/Turning to Right with supervision  · Patient completed Scooting/Bridging with supervision  · Patient completed Supine to Sit with supervision  · Patient completed Sit to Supine with supervision     Functional  Mobility/Transfers:  · Patient completed Sit <> Stand Transfer with stand by assistance and contact guard assistance  with  no assistive device   · Patient completed Toilet Transfer Step Transfer technique with stand by assistance and contact guard assistance with  no AD  Functional Mobility: Pt with fair to fair- dynamic seated and standing balance. Shuffling gait noted, impaired one leg balance while donning undergarments with LOB [place] with each single leg stance requiring CGA to reduce falls risk      Activities of Daily Living:  · Upper Body Dressing: minimum assistance to don gown as robe  · Lower Body Dressing: minimum assistance to don undergarments and B socks  · Toileting x2 in session: supervision for pericare, no clothing management required      [facility] 6 Click ADL: 22    Treatment & Education:  Pt educated on role of OT and POC.   Pt performing skills as listed above.     Patient left HOB elevated with all lines intact, call button in reach, initially bed alarm placed but nsg ok'd no bed alarm 2/2 pt disregard of alarms notified, nsg present    GOALS:   Multidisciplinary Problems     Occupational Therapy Goals        Problem: Occupational Therapy    Goal Priority Disciplines Outcome Interventions   Occupational Therapy Goal     OT, PT/OT Ongoing, Progressing    Description: Goals to be met by: 9/6/2022    Patient will increase functional independence with ADLs by performing:    UE Dressing with Modified [place].  LE Dressing with Modified [place].  Grooming while standing at sink with Modified [place].  Toileting from toilet with Modified [place] for hygiene and clothing management.   Step transfer with Modified [place]  Toilet transfer to toilet with Modified [place].  Upper extremity exercise program x10 reps per handout, with independence.                     Time Tracking:     OT Date of Treatment: 08/10/22  OT Start Time: 1521  OT Stop Time: 1544  OT Total Time (min): 23  min    Billable Minutes:Self Care/Home Management 23    OT/EMILI: OT     EMILI Visit Number: 0    8/10/2022
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
## 2022-08-10 NOTE — SUBJECTIVE & OBJECTIVE
Interval History: awake and alert, sitting up in the chair, on RA.   Completed COVID 19 treatment- remdesivir and hold dexamethasone.   Patient with hx of methamphetamine. Appreciates Psych rec's_ keep one more night to monitor new med changes.   Possible discharge tomorrow if staBLE.       Review of Systems   Constitutional:  Negative for chills and fever.        Itching   Respiratory:  Negative for shortness of breath.    Cardiovascular:  Negative for chest pain.   Psychiatric/Behavioral:  Positive for agitation. The patient is nervous/anxious.    Objective:     Vital Signs (Most Recent):  Temp: 97.3 °F (36.3 °C) (08/10/22 0831)  Pulse: 94 (08/10/22 0831)  Resp: 19 (08/10/22 0831)  BP: (!) 101/59 (08/10/22 0831)  SpO2: 100 % (08/10/22 0831)   Vital Signs (24h Range):  Temp:  [97.3 °F (36.3 °C)-98.6 °F (37 °C)] 97.3 °F (36.3 °C)  Pulse:  [] 94  Resp:  [17-22] 19  SpO2:  [95 %-100 %] 100 %  BP: ()/(55-87) 101/59     Weight: 52.5 kg (115 lb 11.9 oz)  Body mass index is 22.6 kg/m².    Intake/Output Summary (Last 24 hours) at 8/10/2022 1017  Last data filed at 8/10/2022 0900  Gross per 24 hour   Intake 480 ml   Output 400 ml   Net 80 ml        Physical Exam  Vitals and nursing note reviewed.   Constitutional:       Appearance: She is ill-appearing. She is not toxic-appearing.      Comments: Appears older than stated age    HENT:      Head: Normocephalic and atraumatic.      Nose: Nose normal.   Eyes:      Extraocular Movements: Extraocular movements intact.      Conjunctiva/sclera: Conjunctivae normal.   Cardiovascular:      Rate and Rhythm: Normal rate and regular rhythm.      Pulses: Normal pulses.      Heart sounds: Murmur heard.   Pulmonary:      Effort: Pulmonary effort is normal. No respiratory distress.      Comments: Decreased breath sounds at bases now resolved  Abdominal:      General: Bowel sounds are normal. There is no distension.      Palpations: Abdomen is soft.      Tenderness: There is no  guarding or rebound.   Musculoskeletal:         General: Normal range of motion.      Cervical back: Normal range of motion.      Right lower leg: No edema.      Left lower leg: No edema.   Skin:     General: Skin is warm and dry.      Capillary Refill: Capillary refill takes less than 2 seconds.      Comments: Extensive tattoos throughout body   Neurological:      Mental Status: She is alert.      Motor: Weakness present.      Comments: Awake and alert, oriented x 3   Psychiatric:         Attention and Perception: Attention normal.         Mood and Affect: Affect is labile.         Speech: Speech is delayed.         Behavior: Behavior is agitated. Behavior is not slowed.         Judgment: Judgment is impulsive.       Significant Labs: A1C:   Recent Labs   Lab 07/22/22  0718   HGBA1C 6.1*       Blood Culture: No results for input(s): LABBLOO in the last 48 hours.  CBC: No results for input(s): WBC, HGB, HCT, PLT in the last 48 hours.  CMP:   Recent Labs   Lab 08/09/22  0845      K 4.0      CO2 26   GLU 77   BUN 21*   CREATININE 0.7   CALCIUM 9.4   PROT 6.5   ALBUMIN 3.0*   BILITOT 0.3   ALKPHOS 86   AST 27   ALT 33   ANIONGAP 12       Lactic Acid: No results for input(s): LACTATE in the last 48 hours.  Lipase: No results for input(s): LIPASE in the last 48 hours.  Lipid Panel: No results for input(s): CHOL, HDL, LDLCALC, TRIG, CHOLHDL in the last 48 hours.  Magnesium:   Recent Labs   Lab 08/09/22  0845   MG 1.8     Troponin: No results for input(s): TROPONINI in the last 48 hours.  TSH:   Recent Labs   Lab 08/03/22  1730   TSH 0.780       Urine Culture: No results for input(s): LABURIN in the last 48 hours.  Urine Studies: No results for input(s): COLORU, APPEARANCEUA, PHUR, SPECGRAV, PROTEINUA, GLUCUA, KETONESU, BILIRUBINUA, OCCULTUA, NITRITE, UROBILINOGEN, LEUKOCYTESUR, RBCUA, WBCUA, BACTERIA, SQUAMEPITHEL, HYALINECASTS in the last 48 hours.    Invalid input(s): WRIGHTSUR    Significant Imaging: I have  reviewed all pertinent imaging results/findings within the past 24 hours.

## 2022-08-10 NOTE — PLAN OF CARE
Problem: Occupational Therapy  Goal: Occupational Therapy Goal  Description: Goals to be met by: 9/6/2022    Patient will increase functional independence with ADLs by performing:    UE Dressing with Modified [place].  LE Dressing with Modified [place].  Grooming while standing at sink with Modified [place].  Toileting from toilet with Modified [place] for hygiene and clothing management.   Step transfer with Modified [place]  Toilet transfer to toilet with Modified [place].  Upper extremity exercise program x10 reps per handout, with independence.    Outcome: Ongoing, Progressing   Pt progressing towards goals. Pt continues with limited insight into disability and safety awareness, attempting LE dressing in stance with multiple LOB; max encouragement to complete threading of BLE into undergarments while seated but did not comply at this time, did comply to don B socks seated EOB  Cont OT POC

## 2022-08-10 NOTE — PLAN OF CARE
[facility] 9/28/22 @8am w/ Sangeeta Sears NP [facility]. Per scheduling rep please place the referral or have the discharge paperwork faxed to [phone].    Future Appointments   Date Time Provider Department Center   8/24/2022 10:00 AM Abhijeet Duran RRT [facility]   9/28/2022  8:00 AM Erich Sears III, NP [facility]        08/10/22 1301   Discharge Plan   Discharge Plan A Home with family

## 2022-08-10 NOTE — PROGRESS NOTES
[facility]  Progress Note    Patient Name: Stephanie T Barthelemy  MRN: [id number]  Patient Class: IP- Inpatient   Admission Date: 8/3/2022  Length of Stay: 7 days  Attending Physician: Nelly Pandey*  Primary Care Provider: Hilda Elizabeth NP        Subjective:     Principal Problem:Acute respiratory failure with hypoxia and hypercapnia        HPI:  Stephanie T Barthelemy is a 51 y.o. female who has a past medical history of ADHD (attention deficit hyperactivity disorder), Anemia, Anxiety, Bipolar disorder, History of hepatitis C - s/p clearance of virus (HCV neg 6/2015) (9/26/2014), History of psychiatric hospitalization, History of substance abuse, psychiatric care, Hypertension, Opioid overdose (5/10/2016), Psychiatric problem, Psychosis, Seizure disorder (4/3/2019), Sleep difficulties, Substance abuse, Therapy, and Vasculitis. She presented to the ED for altered mental status. She was brought in by EMS after found down at home by home health in bath tub unresponsive. Of note patient was just admitted for pneumonia and new onset CHF.     In the ED: She was found COVID-19 positive. UDS positive for amphetamines. Latest EKG with Sinus tach, rate 131, nonspecific ST changes, no ST elevations or other signs of ischemia, normal intervals. Compared to prior, QT prolongation has improved. CXR with diffuse interstitial opacities most consistent with viral process. Patient required intubation for airway protection. Considering patient's persistent hypoxia, severe symptoms, and co-morbidities, patient is high risk for decompensation and complications including worsening hypoxia, respiratory failure, intubation, or cardiopulmonary arrest if discharged. Admitted to [facility] for further care.      Overview/Hospital Course:  Ms. Barthelemy presented with confusion and hypoxia.  Admitted with acute respiratory failure with COVID-19 complicated by encephalopathy.   Intubated for airway protection and isolated protocol in place.  Treatment initiated with remdesivir and dexamethasone, along with possible concomitant bacterial infection with Zosyn and vancomycin.  Pulmonary/Critical Care consulted.  Antibiotics de-escalated to Rocephin and Vancomycin as per Pulm/Critical care recommendations. Extubated on 8/5/2022. Stepped down to floor on 8/6/2022.      Interval History: awake and alert, sitting up in the chair, on RA.   Completed COVID 19 treatment- remdesivir and hold dexamethasone.   Patient with hx of methamphetamine. Appreciates Psych rec's_ keep one more night to monitor new med changes.   Possible discharge tomorrow if staBLE.       Review of Systems   Constitutional:  Negative for chills and fever.        Itching   Respiratory:  Negative for shortness of breath.    Cardiovascular:  Negative for chest pain.   Psychiatric/Behavioral:  Positive for agitation. The patient is nervous/anxious.    Objective:     Vital Signs (Most Recent):  Temp: 97.3 °F (36.3 °C) (08/10/22 0831)  Pulse: 94 (08/10/22 0831)  Resp: 19 (08/10/22 0831)  BP: (!) 101/59 (08/10/22 0831)  SpO2: 100 % (08/10/22 0831)   Vital Signs (24h Range):  Temp:  [97.3 °F (36.3 °C)-98.6 °F (37 °C)] 97.3 °F (36.3 °C)  Pulse:  [] 94  Resp:  [17-22] 19  SpO2:  [95 %-100 %] 100 %  BP: ()/(55-87) 101/59     Weight: 52.5 kg (115 lb 11.9 oz)  Body mass index is 22.6 kg/m².    Intake/Output Summary (Last 24 hours) at 8/10/2022 1017  Last data filed at 8/10/2022 0900  Gross per 24 hour   Intake 480 ml   Output 400 ml   Net 80 ml        Physical Exam  Vitals and nursing note reviewed.   Constitutional:       Appearance: She is ill-appearing. She is not toxic-appearing.      Comments: Appears older than stated age    HENT:      Head: Normocephalic and atraumatic.      Nose: Nose normal.   Eyes:      Extraocular Movements: Extraocular movements intact.      Conjunctiva/sclera: Conjunctivae normal.   Cardiovascular:       Rate and Rhythm: Normal rate and regular rhythm.      Pulses: Normal pulses.      Heart sounds: Murmur heard.   Pulmonary:      Effort: Pulmonary effort is normal. No respiratory distress.      Comments: Decreased breath sounds at bases now resolved  Abdominal:      General: Bowel sounds are normal. There is no distension.      Palpations: Abdomen is soft.      Tenderness: There is no guarding or rebound.   Musculoskeletal:         General: Normal range of motion.      Cervical back: Normal range of motion.      Right lower leg: No edema.      Left lower leg: No edema.   Skin:     General: Skin is warm and dry.      Capillary Refill: Capillary refill takes less than 2 seconds.      Comments: Extensive tattoos throughout body   Neurological:      Mental Status: She is alert.      Motor: Weakness present.      Comments: Awake and alert, oriented x 3   Psychiatric:         Attention and Perception: Attention normal.         Mood and Affect: Affect is labile.         Speech: Speech is delayed.         Behavior: Behavior is agitated. Behavior is not slowed.         Judgment: Judgment is impulsive.       Significant Labs: A1C:   Recent Labs   Lab 07/22/22  0718   HGBA1C 6.1*       Blood Culture: No results for input(s): LABBLOO in the last 48 hours.  CBC: No results for input(s): WBC, HGB, HCT, PLT in the last 48 hours.  CMP:   Recent Labs   Lab 08/09/22  0845      K 4.0      CO2 26   GLU 77   BUN 21*   CREATININE 0.7   CALCIUM 9.4   PROT 6.5   ALBUMIN 3.0*   BILITOT 0.3   ALKPHOS 86   AST 27   ALT 33   ANIONGAP 12       Lactic Acid: No results for input(s): LACTATE in the last 48 hours.  Lipase: No results for input(s): LIPASE in the last 48 hours.  Lipid Panel: No results for input(s): CHOL, HDL, LDLCALC, TRIG, CHOLHDL in the last 48 hours.  Magnesium:   Recent Labs   Lab 08/09/22  0845   MG 1.8     Troponin: No results for input(s): TROPONINI in the last 48 hours.  TSH:   Recent Labs   Lab  08/03/22  1730   TSH 0.780       Urine Culture: No results for input(s): LABURIN in the last 48 hours.  Urine Studies: No results for input(s): COLORU, APPEARANCEUA, PHUR, SPECGRAV, PROTEINUA, GLUCUA, KETONESU, BILIRUBINUA, OCCULTUA, NITRITE, UROBILINOGEN, LEUKOCYTESUR, RBCUA, WBCUA, BACTERIA, SQUAMEPITHEL, HYALINECASTS in the last 48 hours.    Invalid input(s): WRIGHTSUR    Significant Imaging: I have reviewed all pertinent imaging results/findings within the past 24 hours.      Assessment/Plan:      * Acute respiratory failure with hypoxia and hypercapnia  Encephalopathy, metabolic  COVID +  - Recently treated for aspiration pneumonia and new onset CHF at this facility  - Found down at home by home health, patient in tub disoriented and hard to arouse. EMS arrived O2 saturations on RA 87% placed on 4L increased to 91%, +cough +tachy   - Patient with acute hypercapnic and hypoxic respiratory failure    - Intubated and vent supported- extubated  - Found to be COVID +  - Patient was on empiric Zosyn and Vancomycin  - Pulm/critical care stopped Zosyn and started Rocephin on 8/5/2022; continued vancomycin  - Extubated to NC on 8/5/2022  - Stepped down to floor on 8/6/2022  - Completed remdesivir, full 5 day course done on 8/7/2022  - Stopped Rocephin and vancomycin after dose given on 8/7/2022  - Continue on dexamethasone,  - Currently stable on RA and mental status back to baseline  - Will consider stopping steroids after today if patient continues to do well. D/c on 8/9    COVID-19 virus infection  Patient is identified as COVID 19 infection  Initiate standard COVID protocols; COVID-19 testing Collection Date: 8/26/2021 Collection Time:   4:17 PM ,Infection Control notification  and isolation- respiratory, contact and droplet per protocol    Diagnostics: (leukopenia, hyponatremia, hyperferritinemia, elevated troponin, elevated d-dimer, age, and comorbidities are significant predictors of poor clinical outcome)  CBC,  CMP, Ferritin, CRP and Portable CXR    Management: Maintain oxygen saturations 92-96% via Nasal Cannula 2 LPM and monitor with continuous/intermittent pulse oximetry- weaned off.  Inhaled bronchodilators as needed for shortness of breath. and Continuous cardiac monitoring.    COVID-19  - Patient is identified as Severe COVID-19 based on hypoxemia with O2 saturations <94% on room air or on ambulation   - Initiate standard COVID protocols; COVID-19 testing Collection Date: 10/1/2021 Collection Time:   1:22 AM, Infection Control notification  and isolation- respiratory, contact and droplet per protocol  - Diagnostics: (leukopenia, hyponatremia, hyperferritinemia, elevated troponin, elevated d-dimer, age, and comorbidities are significant predictors of poor clinical outcome)  CMP, CRP, BNP and Portable CXR  Management: Continuous cardiac monitoring. and Manage respiratory failure (O2 requirement >10LPM or needing NIPPV/Mechanical ventilation) and/or Pneumonia (active chest infiltrates) separately as described below.  - Monitor on Telemetry  - Initiated on dexamethasone, continue for 10d course  - Completed remdesivir x5d; daily CMP  - Continuous Pulse Oximetry, goal SpO2 92-96%  - MVI & ascorbic acid 500mg PO BID  - Acetaminophen Q6hr PRN fever  - VTE PPx: enoxaparin   - As discussed above    Encephalopathy, metabolic  - Due to above and in combination with ongoing polysubstance abuse  - Back to baseline  - Resolved    Chronic combined systolic and diastolic heart failure  - Last ECHO results:   - Results for orders placed during the hospital encounter of 07/21/22  · The left ventricle is mildly enlarged with concentric hypertrophy and severely decreased systolic function.  · The estimated ejection fraction is 20%.  · There is severe left ventricular global hypokinesis.  · Grade III left ventricular diastolic dysfunction.  · Mild right ventricular enlargement with mildly reduced right ventricular systolic function.  ·  Severe left atrial enlargement.  · Moderate aortic regurgitation.  · Severe mitral regurgitation.  · Mild tricuspid regurgitation.  · Moderate pulmonic regurgitation.  · Intermediate central venous pressure (8 mmHg).  · The estimated PA systolic pressure is 33 mmHg.  · Trivial pericardial effusion.  - Troponin 0.034-0.052; likely demand; monitored and trended  - CXR revealed Nonspecific bilateral airspace opacities  - Does not appear clinically volume overload at this time  - Continue home BB, ACEi/ARB when stable  - Daily weights and strict I/Os, monitor on telemetry  - Monitor and trend BMP, Mg, and renal function; keep K >4, Mg >2  - Sodium restriction (<2g/d), fluid restriction (<2L)   - Monitor for signs of fluid overload: RR>30, O2 sat<92%, weight gain of >3 lbs in 24 hours, or urinary output <160ml/8hr  - Started low dose lasix 20 mg daily on 8/7/2022 and appears compensated on this regimen but will monitor to see if this needs to be increased to 40 mg daily    HLD (hyperlipidemia)  - Not on medications at home    Bipolar 1 disorder, depressed  Attention deficit hyperactivity disorder (ADHD), combined type  - Held home meds until patient was stable  - Restarted hydroxyzine 50 mg Q8, quetiapine 25 mg QHS (lower dose to prevent over sedation), risperidone 1mg BID  - Increased quetiapine to 50 mg QHS on 8/7/2022 with improvement    Seizure disorder  - Seizure precautions  - PRN lorazepam  Resume home meds-Oxcarbazepine    Depression  Resume home meds  Consult psychiatry- appreciate plan re-eval meds        Hypokalemia  - Corrected to normal, monitor    Amphetamine abuse  Polysubstance abuse  - UDS positive  - Monitor for withdrawal    Debility  - PT and OT consult, progressing with recommendation.     Anemia  - Stable, monitor    Essential hypertension  - Held anti-HTN for low BP  - Resume metoprolol XL12.5 mg daily and lisinopril 10 mg daily  - Monitor and make further adjustments as needed      VTE Risk  Mitigation (From admission, onward)         Ordered     enoxaparin injection 40 mg  Daily         08/04/22 0941     IP VTE HIGH RISK PATIENT  Once         08/03/22 2045     Place sequential compression device  Until discontinued         08/03/22 2045                Discharge Planning   AYSHA:      Code Status: Full Code   Is the patient medically ready for discharge?:     Reason for patient still in hospital (select all that apply): Patient trending condition  Discharge Plan A: Home Health   Discharge Delays: None known at this time              Nelly Pandey MD  Department of [facility]   [facility]

## 2022-08-10 NOTE — NURSING
Propranolol to be given at 1500 10mg was held due to pt bp 100/63 pulse 86. Notified MD. Gave Hydroxyzine 50mg per MAR. Notified MD.  Per MD,  hold Propranolol for two hours after giving the Hydroxyzine 50mg, then administer propranolol.

## 2022-08-10 NOTE — MEDICAL/APP STUDENT
PSYCHIATRY INPATIENT CONSULT NOTE      8/10/2022 8:20 AM   Stephanie T Barthelemy   1970   [id number]           DATE OF ADMISSION: 8/3/2022  5:02 PM    SITE: [facility]    CURRENT LEGAL STATUS: Patient does not currently meet PEC/CEC criteria due to not currently being an imminent threat to self/others and not being gravely disabled 2/2 mental illness at this time.       HISTORY    Chief Complaint / Reason for Psychiatry Consult: History of Substance abuse, Psychiatric history    Per Hospital Medicine H&P:  Stephanie T Barthelemy is a 51 y.o. female who has a past medical history of ADHD (attention deficit hyperactivity disorder), Anemia, Anxiety, Bipolar disorder, History of hepatitis C - s/p clearance of virus (HCV neg 6/2015) (9/26/2014), History of psychiatric hospitalization, History of substance abuse, psychiatric care, Hypertension, Opioid overdose (5/10/2016), Psychiatric problem, Psychosis, Seizure disorder (4/3/2019), Sleep difficulties, Substance abuse, Therapy, and Vasculitis. She presented to the ED for altered mental status. She was brought in by EMS after found down at home by home health in bath tub unresponsive. Of note patient was just admitted for pneumonia and new onset CHF.      In the ED: She was found COVID-19 positive. UDS positive for amphetamines. Latest EKG with Sinus tach, rate 131, nonspecific ST changes, no ST elevations or other signs of ischemia, normal intervals. Compared to prior, QT prolongation has improved. CXR with diffuse interstitial opacities most consistent with viral process. Patient required intubation for airway protection. Considering patient's persistent hypoxia, severe symptoms, and co-morbidities, patient is high risk for decompensation and complications including worsening hypoxia, respiratory failure, intubation, or cardiopulmonary arrest if discharged. Admitted to [facility] for further care.    Overview/Hospital Course:  Ms. Barthelemy  presented with confusion and hypoxia.  Admitted with acute respiratory failure with COVID-19 complicated by encephalopathy.  Intubated for airway protection and isolated protocol in place.  Treatment initiated with remdesivir and dexamethasone, along with possible concomitant bacterial infection with Zosyn and vancomycin.  Pulmonary/Critical Care consulted.  Antibiotics de-escalated to Rocephin and Vancomycin as per Pulm/Critical care recommendations. Extubated on 8/5/2022. Stepped down to floor on 8/6/2022.    HPI   Stephanie T Barthelemy is a 51 y.o. female with a past medical history of Anemia, Hypertension, Congestive heart failure, vasculitis, seizure disorder and a past psychiatric history of substance abuse, ADHD, anxiety, bipolar disorder, psychiatric hospitalization, opioid overdose, psychosis, sleep difficulties, currently being treated by their inpatient primary treatment team for a principal problem of acute respiratory failure with COVID.  Psychiatry was originally consulted as noted above.  The patient was seen and examined.  The chart was reviewed.  On examination today, the patient was agitated, fidgety, pressured, and perseverant on going home and getting Adderall. She was oriented to person and place, but not time and situation. She was evasive regarding symptomatology, saying she was simply tired regarding being found unresponsive in the bathtub after work. She endorsed feelings of anxiety, insomnia, and substance abuse of methamphetamine. Patient denies any current/recent passive/active SI/HI. Patient was dissatisfied with current Risperdal, complaining of restless leg syndrome. She endorses adherence to Trileptal 300mg bid which was started after previous hospitalization. Regarding current medical/physical complaints, patient endorses fatigue, soreness, cough.  Patient denies any other medical complaints at this time.  NAD was observed during the examination.    Psychiatric Review Of Systems -  Currently, the patient is endorsing and/or denying the following: (patient's endorsements are BOLDED below; if not BOLDED, then patient denied):    Symptoms of Depression: diminished mood or loss of interest/anhedonia; irritability, diminished energy, change in sleep, change in appetite, diminished concentration or cognition or indecisiveness, PMA/R, excessive guilt or hopelessness or worthlessness, suicidal ideations    Sleep: initiation, maintenance, early morning awakening with inability to return to sleep    Suicidal/Homicidal ideations: active/passive ideations, organized plans, future intentions    Symptoms of psychosis: hallucinations, delusions, disorganized thinking, disorganized behavior or abnormal motor behavior, or negative symptoms (diminshed emotional expression, avolition, anhedonia, alogia, asociality     Symptoms of frederic or hypomania: elevated, expansive, or irritable mood with increased energy or activity; with inflated self-esteem or grandiosity, decreased need for sleep, increased rate of speech, FOI or racing thoughts, distractibility, increased goal directed activity or PMA, risky/disinhibited behavior    Symptoms of IVAN: excessive anxiety/worry/fear, more days than not, about numerous issues, difficult to control, with restlessness, fatigue, poor concentration, irritability, muscle tension, sleep disturbance; causes functionally impairing distress     Symptoms of Panic Disorder: recurrent panic attacks, precipitated or un-precipitated, source of worry and/or behavioral changes secondary; with or without agoraphobia    Symptoms of PTSD: h/o trauma; re-experiencing/intrusive symptoms, avoidant behavior, negative alterations in cognition or mood, or hyperarousal symptoms; with or without dissociative symptoms     Symptoms of OCD: obsessions or compulsions     Symptoms of Eating Disorders: anorexia, bulimia or binging    Substance Use: intoxication, withdrawal, tolerance, used in larger amounts  or duration than intended, unsuccessful attempts to limit or quit, increased time engaging in or seeking out, cravings or strong desire to use, failure to fulfill obligations, negative consequences in social/interpersonal/occupational,/recreational areas, use in dangerous situations, medical or psychological consequences       Psychiatric Review Of Systems - Is patient experiencing or having changes in:  sleep: yes, agitation, lack of comfort  appetite: no  weight: no  energy/anergy: yes, elevated  interest/pleasure/anhedonia: no  somatic symptoms: no  libido: no  guilty/hopelessness: no  concentration: yes, difficulty remembering questions while agitated  S.I.B.s/risky behavior: no  SI/SA:  no    anxiety/panic: yes  Agoraphobia:  no  Social phobia:  no  Recurrent nightmares:  no  hyper startle response:  no  Avoidance: no  Recurrent thoughts:  yes, desire for Adderall  Recurrent behaviors:  yes, drug seeking    Irritability: yes  Racing thoughts: yes  Impulsive behaviors: no  Pressured speech:  yes    Paranoia:no  Delusions: no  AVH:no      PSYCHOTHERAPY ADD-ON +[id number]   30 (16-37*) minutes    Time: *** minutes  Participants: Met with patient    Therapeutic Intervention Type: behavior modifying psychotherapy, supportive psychotherapy  Why chosen therapy is appropriate versus another modality: relevant to diagnosis, patient responds to this modality, evidence based practice    Target symptoms: {PSY TARGET SYMPTOMS:66145}  Primary focus: ***  Psychotherapeutic techniques: supportive and psychodynamic techniques; psycho-education; deep breathing exercises; CBT; problem solving techniques and managing life stressors    Outcome monitoring methods: self-report, observation    Patient's response to intervention:  The patient's response to intervention is {response:16111}.    Progress toward goals:  The patient's progress toward goals is {progress:22441}.      ROS  General ROS: negative for - chills, fatigue, fever or night  sweats  Ophthalmic ROS: negative for - blurry vision, double vision or eye pain  ENT ROS: negative for - sinus pain, headaches, sore throat or visual changes  Allergy and Immunology ROS: negative for - hives, itchy/watery eyes or nasal congestion  Hematological and Lymphatic ROS: negative for - bleeding problems, bruising, jaundice or pallor  Endocrine ROS: negative for - galactorrhea, hot flashes, mood swings, palpitations or temperature intolerance  Respiratory ROS: negative for - cough, hemoptysis, shortness of breath, tachypnea or wheezing  Cardiovascular ROS: negative for - chest pain, dyspnea on exertion, loss of consciousness, palpitations, rapid heart rate or shortness of breath  Gastrointestinal ROS: negative for - appetite loss, nausea, abdominal pain, blood in stools, change in bowel habits, constipation or diarrhea  Genito-Urinary ROS: negative for - incontinence, nocturia or pelvic pain  Musculoskeletal ROS: negative for - joint stiffness, joint swelling, joint pain or muscle pain   Neurological ROS: negative for - behavioral changes, confusion, dizziness, memory loss, numbness/tingling or seizures  Dermatological ROS: negative for dry skin, hair changes, pruritus or rash  Psychiatric ROS: see detailed psychiatric ROS above in history section       Past Psychiatric History:  Previous Medication Trials: {Responses; yes/no/unknown:74}   Previous Psychiatric Hospitalizations: {Responses; yes/no/unknown:74}   Previous Suicide Attempts: {Responses; yes/no/unknown:74}   History of Violence: {Responses; yes/no/unknown:74}  Outpatient Psychiatrist: {Responses; yes/no/unknown:74}    Social History:  Marital Status: {GEN; MARITAL STATUS:22204}  Children: {NUMBERS 0-12:17685}   Employment Status/Info: {DESC; EMPLOYMENT STATUS:56843}  Education: {misc; education:29030}  Special Ed: {Responses; yes/no/unknown:74}  : {Responses; yes/no/unknown:74}  Oriental orthodox: {Responses; yes/no/unknown:74}  Housing Status:  "{Responses; yes/no/unknown:74}  Hobbies/Leisure time: {Responses; yes/no/unknown:74}  History of phys/sexual abuse: {Responses; yes/no/unknown:74}  Access to gun: {Responses; yes/no/unknown:74}    Family Psychiatric History: {Responses; yes/no/unknown:74}    Substance Abuse History:  Recreational Drugs: {recreationaldrugs:93478}  Use of Alcohol: {etoh use:99650}  Rehab History:{Responses; yes/no/unknown:74}   Tobacco Use:{Responses; yes/no/unknown:74}  Use of Caffeine: { :50773}  Use of OTC: ***  Legal consequences of chemical use: {no/yes:920535::"no"}    Legal History:  Past Charges/Incarcerations:{Responses; yes/no/unknown:74}, ***   Pending charges:{Responses; yes/no/unknown:74}     Psychosocial Stressors: { :86015}.   Functioning Relationships: {Psychfxinrelationships:53453}  Strengths AND Liabilities  {PSY STRENGTHS/LIABILITIES:11151}      PAST MEDICAL & SURGICAL HISTORY   Past Medical History:   Diagnosis Date    ADHD (attention deficit hyperactivity disorder)     Anemia     Anxiety     Bipolar disorder     History of hepatitis C - s/p clearance of virus (HCV neg 6/2015) 9/26/2014    History of psychiatric hospitalization     History of substance abuse     IV heroin - last use 2013 per pt    Hx of psychiatric care     Hypertension     Opioid overdose 5/10/2016    Psychiatric problem     Psychosis     Seizure disorder 4/3/2019    Sleep difficulties     Substance abuse     Therapy     Vasculitis      Past Surgical History:   Procedure Laterality Date    HYSTERECTOMY  3/16/2011    SALPINGOOPHORECTOMY Right 3/16/2011    TUBAL LIGATION      Vaginal cuff repair  04/22/2011       NEUROLOGIC HISTORY  Seizures: Yes  Head trauma: No     FAMILY HISTORY   Family History   Problem Relation Age of Onset    Diabetes Maternal Grandmother     Cancer Maternal Grandmother        ALLERGIES   Review of patient's allergies indicates:  No Known Allergies    CURRENT MEDICATION REGIMEN   Home Meds:   Prior to " Admission medications    Medication Sig Start Date End Date Taking? Authorizing Provider   benztropine (COGENTIN) 1 MG tablet Take 1 tablet (1 mg total) by mouth 2 (two) times daily. 5/19/22 7/27/22  Ayad Lemons MD   furosemide (LASIX) 40 MG tablet Take 1 tablet (40 mg total) by mouth once daily. 7/26/22 7/26/23  Nelly Pandey MD   hydrOXYzine pamoate (VISTARIL) 25 MG Cap Take 1 capsule (25 mg total) by mouth every 8 (eight) hours as needed (insomnia, agitation). 7/26/22   Nelly Pandey MD   lisinopriL 10 MG tablet Take 1 tablet (10 mg total) by mouth once daily. 9/8/21 9/8/22  Petty Ibanez MD   metoprolol succinate (TOPROL-XL) 25 MG 24 hr tablet Take 0.5 tablets (12.5 mg total) by mouth once daily. 7/26/22 7/26/23  Nelly Pandey MD   OXcarbazepine (TRILEPTAL) 300 MG Tab Take 1 tablet (300 mg total) by mouth 2 (two) times daily. 7/26/22 7/26/23  Nelly Pandey MD   QUEtiapine (SEROQUEL) 100 MG Tab Take 1 tablet (100 mg total) by mouth every evening. 5/19/22 5/19/23  Ayad Lemons MD   risperiDONE (RISPERDAL) 1 MG tablet Take 1 mg by mouth 2 (two) times daily. 5/19/22   Historical Provider   amLODIPine (NORVASC) 5 MG tablet Take 1 tablet (5 mg total) by mouth once daily. 9/8/21 7/26/22  Petty Ibanez MD   FLUoxetine 20 MG capsule Take 1 capsule (20 mg total) by mouth once daily. 5/19/22 7/26/22  Ayad Lemons MD   gabapentin (NEURONTIN) 300 MG capsule Take 1 capsule (300 mg total) by mouth 3 (three) times daily. 5/19/22 7/26/22  Ayad Lemons MD   propranoloL (INDERAL) 10 MG tablet Take 1 tablet (10 mg total) by mouth 3 (three) times daily. 5/19/22 7/26/22  Ayad Lemons MD       Scheduled Meds:    albuterol  2 puff Inhalation Q6H    ascorbic acid (vitamin C)  500 mg Oral BID    benztropine  1 mg Oral BID    enoxaparin  40 mg Subcutaneous Daily    famotidine  20 mg Oral BID    furosemide  20 mg Oral Daily    gabapentin  300 mg Oral TID     hydrOXYzine pamoate  50 mg Oral Q8H    lisinopriL  40 mg Oral Daily    metoprolol succinate  12.5 mg Oral Daily    multivitamin  1 tablet Oral Daily    nicotine  1 patch Transdermal Daily    OXcarbazepine  300 mg Oral BID    QUEtiapine  50 mg Oral QHS    QUEtiapine  50 mg Oral Daily    risperiDONE  1 mg Oral BID      PRN Meds: dextrose 10%, dextrose 10%, glucagon (human recombinant), glucose, glucose, hydrOXYzine pamoate, ondansetron, sodium chloride 0.9%, sodium chloride 0.9%   Psychotherapeutics (From admission, onward)            Start     Stop Route Frequency Ordered    08/09/22 1900  QUEtiapine tablet 50 mg         -- Oral Daily 08/09/22 1849    08/07/22 2100  QUEtiapine tablet 50 mg         -- Oral Nightly 08/07/22 1224    08/06/22 2100  risperiDONE disintegrating tablet 1 mg         -- Oral 2 times daily 08/06/22 1426          LABORATORY DATA   Recent Results (from the past 72 hour(s))   Magnesium    Collection Time: 08/09/22  8:45 AM   Result Value Ref Range    Magnesium 1.8 1.6 - 2.6 mg/dL   Comprehensive Metabolic Panel    Collection Time: 08/09/22  8:45 AM   Result Value Ref Range    Sodium 142 136 - 145 mmol/L    Potassium 4.0 3.5 - 5.1 mmol/L    Chloride 104 95 - 110 mmol/L    CO2 26 23 - 29 mmol/L    Glucose 77 70 - 110 mg/dL    BUN 21 (H) 6 - 20 mg/dL    Creatinine 0.7 0.5 - 1.4 mg/dL    Calcium 9.4 8.7 - 10.5 mg/dL    Total Protein 6.5 6.0 - 8.4 g/dL    Albumin 3.0 (L) 3.5 - 5.2 g/dL    Total Bilirubin 0.3 0.1 - 1.0 mg/dL    Alkaline Phosphatase 86 55 - 135 U/L    AST 27 10 - 40 U/L    ALT 33 10 - 44 U/L    Anion Gap 12 8 - 16 mmol/L    eGFR >60 >60 mL/min/1.73 m^2   Phosphorus    Collection Time: 08/09/22  8:45 AM   Result Value Ref Range    Phosphorus 3.9 2.7 - 4.5 mg/dL      Lab Results   Component Value Date    VALPROATE <10.0 (L) 07/25/2019    CBMZ 2.1 (L) 04/15/2019         EXAMINATION    VITALS   Vitals:    08/09/22 2328 08/10/22 0000 08/10/22 0521 08/10/22 0737   BP: (!) 88/55  107/65     BP Location:       Patient Position: Lying  Lying    Pulse: 81 82 75 70   Resp: 20  18 17   Temp: 97.5 °F (36.4 °C)  97.5 °F (36.4 °C)    TempSrc: Oral  Axillary    SpO2: 95%  98% 98%   Weight:       Height:            CONSTITUTIONAL  General Appearance:   older than stated age, disheveled    MUSCULOSKELETAL  Muscle Strength and Tone: WNL    Abnormal Involuntary Movements: none observed   Gait and Station: WNL; non-ataxic     PSYCHIATRIC   Behavior/Cooperation:  reluctant to participate, restless and fidgety , eye contact normal  Speech:  normal tone, normal pitch, normal volume, pressured  Language: grossly intact, able to name, able to repeat with spontaneous speech  Mood: anxious  Affect:  congruent with mood and appropriate to situation/content Grossly intact  Associations: intact; no FÉLIX  Thought Process: racing, perseverative  Thought Content: normal, no suicidality, no homicidality, delusions, or paranoia  Sensorium:  Awake  Alert and Oriented: to person, place, stated month of July and year of 2021  Memory: 3/3 immediate, 0/3 at 5 minutes   Recent:  Unable to assess   Remote: Limited; Named 2/4 past presidents   Attention/concentration: appropriate for age/education. Able to spell [id number] & d-[id number]-r-[id number]   Similarities: Unable to assess  Abstract reasoning: Limited   Insight: Limited  Judgment: Limited / Intact    CAM ICU Delirium Assessment - NEGATIVE      Is the patient aware of the biomedical complications associated with substance abuse and mental illness? yes    Does the patient have an Advance Directive for Mental Health treatment? no  (If yes, inform patient to bring copy.)      MEDICAL DECISION MAKING    ASSESSMENT      ***     RECOMMENDATIONS       - patient does not currently meet PEC/CEC criteria due to not being an imminent threat to self/others and not being gravely disabled 2/2 mental illness.      - Begin *** (discussed risks/benefits/alt vs no treatment with patient)     - Psychotherapy was  performed with patient as noted above      - Please have CM/SW assist patient with outpatient mental health f/u for s/p discharge from this facility      - Patient was instructed to call 911 and return to the nearest ED if he/she begins feeling suicidal, homicidal, or gravely disabled      - Thank you for this consult ; will continue to follow patient         Time spent with patient and/or on unit managing/coordinating patient's care (excluding the time spent on psychotherapy): 70 minutes      More than 50% of the time was spent counseling/coordinating care      STAFF:  Otto Patel, MS3  [facility]  8/10/2022

## 2022-08-11 VITALS
WEIGHT: 115.75 LBS | OXYGEN SATURATION: 94 % | TEMPERATURE: 98 F | RESPIRATION RATE: 18 BRPM | HEIGHT: 60 IN | SYSTOLIC BLOOD PRESSURE: 123 MMHG | BODY MASS INDEX: 22.72 KG/M2 | DIASTOLIC BLOOD PRESSURE: 65 MMHG | HEART RATE: 87 BPM

## 2022-08-11 PROCEDURE — 90833 PSYTX W PT W E/M 30 MIN: CPT | Mod: ,,, | Performed by: PSYCHIATRY & NEUROLOGY

## 2022-08-11 PROCEDURE — 25000003 PHARM REV CODE 250: Performed by: NURSE PRACTITIONER

## 2022-08-11 PROCEDURE — 99900035 HC TECH TIME PER 15 MIN (STAT)

## 2022-08-11 PROCEDURE — 99233 SBSQ HOSP IP/OBS HIGH 50: CPT | Mod: ,,, | Performed by: PSYCHIATRY & NEUROLOGY

## 2022-08-11 PROCEDURE — 25000003 PHARM REV CODE 250: Performed by: FAMILY MEDICINE

## 2022-08-11 PROCEDURE — 94761 N-INVAS EAR/PLS OXIMETRY MLT: CPT

## 2022-08-11 PROCEDURE — 94660 CPAP INITIATION&MGMT: CPT

## 2022-08-11 PROCEDURE — 25000003 PHARM REV CODE 250: Performed by: PSYCHIATRY & NEUROLOGY

## 2022-08-11 PROCEDURE — 25000003 PHARM REV CODE 250: Performed by: HOSPITALIST

## 2022-08-11 PROCEDURE — 90833 PR PSYCHOTHERAPY W/PATIENT W/E&M, 30 MIN (ADD ON): ICD-10-PCS | Mod: ,,, | Performed by: PSYCHIATRY & NEUROLOGY

## 2022-08-11 PROCEDURE — 94640 AIRWAY INHALATION TREATMENT: CPT

## 2022-08-11 PROCEDURE — 99233 PR SUBSEQUENT HOSPITAL CARE,LEVL III: ICD-10-PCS | Mod: ,,, | Performed by: PSYCHIATRY & NEUROLOGY

## 2022-08-11 PROCEDURE — S4991 NICOTINE PATCH NONLEGEND: HCPCS | Performed by: FAMILY MEDICINE

## 2022-08-11 RX ORDER — QUETIAPINE FUMARATE 50 MG/1
50 TABLET, FILM COATED ORAL DAILY
Qty: 30 TABLET | Refills: 11 | Status: ON HOLD | OUTPATIENT
Start: 2022-08-11 | End: 2022-11-07 | Stop reason: HOSPADM

## 2022-08-11 RX ORDER — PROPRANOLOL HYDROCHLORIDE 10 MG/1
10 TABLET ORAL 3 TIMES DAILY
Qty: 90 TABLET | Refills: 11 | Status: ON HOLD | OUTPATIENT
Start: 2022-08-11 | End: 2022-11-30 | Stop reason: SDUPTHER

## 2022-08-11 RX ORDER — QUETIAPINE FUMARATE 100 MG/1
100 TABLET, FILM COATED ORAL NIGHTLY
Qty: 30 TABLET | Refills: 11 | Status: ON HOLD | OUTPATIENT
Start: 2022-08-11 | End: 2022-11-07 | Stop reason: HOSPADM

## 2022-08-11 RX ORDER — FAMOTIDINE 20 MG/1
20 TABLET, FILM COATED ORAL 2 TIMES DAILY
Qty: 60 TABLET | Refills: 11 | Status: ON HOLD | OUTPATIENT
Start: 2022-08-11 | End: 2022-12-14 | Stop reason: HOSPADM

## 2022-08-11 RX ORDER — HYDROXYZINE PAMOATE 50 MG/1
50 CAPSULE ORAL EVERY 8 HOURS PRN
Qty: 60 CAPSULE | Refills: 5 | Status: ON HOLD | OUTPATIENT
Start: 2022-08-11 | End: 2022-12-14 | Stop reason: HOSPADM

## 2022-08-11 RX ORDER — LISINOPRIL 40 MG/1
40 TABLET ORAL DAILY
Qty: 90 TABLET | Refills: 3 | Status: ON HOLD | OUTPATIENT
Start: 2022-08-11 | End: 2022-11-30 | Stop reason: SDUPTHER

## 2022-08-11 RX ORDER — ALBUTEROL SULFATE 90 UG/1
2 AEROSOL, METERED RESPIRATORY (INHALATION) EVERY 6 HOURS
Qty: 18 G | Refills: 0 | Status: ON HOLD | OUTPATIENT
Start: 2022-08-11 | End: 2022-12-14 | Stop reason: SDUPTHER

## 2022-08-11 RX ORDER — GABAPENTIN 300 MG/1
300 CAPSULE ORAL 3 TIMES DAILY
Qty: 90 CAPSULE | Refills: 11 | Status: ON HOLD | OUTPATIENT
Start: 2022-08-11 | End: 2022-11-30 | Stop reason: SDUPTHER

## 2022-08-11 RX ORDER — IBUPROFEN 200 MG
1 TABLET ORAL DAILY
Qty: 28 PATCH | Refills: 0 | Status: ON HOLD | OUTPATIENT
Start: 2022-08-11 | End: 2022-11-07 | Stop reason: SDUPTHER

## 2022-08-11 RX ORDER — BENZTROPINE MESYLATE 0.5 MG/1
0.5 TABLET ORAL 2 TIMES DAILY
Qty: 60 TABLET | Refills: 11 | Status: ON HOLD | OUTPATIENT
Start: 2022-08-11 | End: 2022-12-29 | Stop reason: SDUPTHER

## 2022-08-11 RX ORDER — FUROSEMIDE 20 MG/1
20 TABLET ORAL DAILY
Qty: 30 TABLET | Refills: 11 | Status: ON HOLD | OUTPATIENT
Start: 2022-08-11 | End: 2022-11-07 | Stop reason: HOSPADM

## 2022-08-11 RX ADMIN — ALBUTEROL SULFATE 2 PUFF: 90 AEROSOL, METERED RESPIRATORY (INHALATION) at 12:08

## 2022-08-11 RX ADMIN — FUROSEMIDE 20 MG: 20 TABLET ORAL at 09:08

## 2022-08-11 RX ADMIN — LISINOPRIL 40 MG: 20 TABLET ORAL at 09:08

## 2022-08-11 RX ADMIN — OXCARBAZEPINE 300 MG: 150 TABLET, FILM COATED ORAL at 09:08

## 2022-08-11 RX ADMIN — BENZTROPINE MESYLATE 0.5 MG: 0.5 TABLET ORAL at 09:08

## 2022-08-11 RX ADMIN — PROPRANOLOL HYDROCHLORIDE 10 MG: 10 TABLET ORAL at 09:08

## 2022-08-11 RX ADMIN — GABAPENTIN 300 MG: 300 CAPSULE ORAL at 09:08

## 2022-08-11 RX ADMIN — FAMOTIDINE 20 MG: 20 TABLET ORAL at 09:08

## 2022-08-11 RX ADMIN — ALBUTEROL SULFATE 2 PUFF: 90 AEROSOL, METERED RESPIRATORY (INHALATION) at 08:08

## 2022-08-11 RX ADMIN — QUETIAPINE FUMARATE 50 MG: 25 TABLET ORAL at 09:08

## 2022-08-11 RX ADMIN — OXYCODONE HYDROCHLORIDE AND ACETAMINOPHEN 500 MG: 500 TABLET ORAL at 09:08

## 2022-08-11 RX ADMIN — NICOTINE 1 PATCH: 21 PATCH, EXTENDED RELEASE TRANSDERMAL at 09:08

## 2022-08-11 RX ADMIN — Medication 1 TABLET: at 09:08

## 2022-08-11 NOTE — PLAN OF CARE
Discharge orders noted. Additional clinical references attached. Patient's discharge instructions given by bedside RN and reviewed by this VN with patient's sister Betty via phone. Education provided on home care instructions, new and previous medications, diagnosis, when to seek medical attention, and follow-up appointments. New medications to be delivered by pharmacy. Patient's sister verbalized understanding and teach back method was used. Patient's sister to provide ride/transportation home. All questions answered. Awaiting ride home. Floor nurse notified.

## 2022-08-11 NOTE — PLAN OF CARE
SW attempted to meet with pt through [facility]. Pt was asleep and could not be roused. SW called pt's sister Jessica [phone] to complete final assessment. Jessica stated that she would be at the hospital around 11:30 to help transport pt home. Pt lives with her father and sister, Jessica, and jessica helps care for pt. NGUYỄN asked MD if pt would be in need of HH as PT/OT recs are TBD. Nguyễn is awaiting a reply. White board updated with CM name and contact information.  Discharge brochure provided.  Pt encouraged to call with any questions or concerns.  Cm will continue to follow pt through transitions of care and assist with any discharge needs.    Pt approved by [facility] [phone] will start seeing pt tomorrow.     JOHNNIE Barone  [phone]    Future Appointments   Date Time Provider Department Center   8/24/2022 10:00 AM Abhijeet Duran, RRT [facility]   9/28/2022  8:00 AM Erich Guevara III, NP [facility]        08/11/22 0849   Final Note   Assessment Type Discharge Planning Assessment   Anticipated Discharge Disposition Home   What phone number can be called within the next 1-3 days to see how you are doing after discharge? [phone]   Hospital Resources/Appts/Education Provided Appointments scheduled and added to AVS   Post-Acute Status   Coverage [employer]   Discharge Delays None known at this time

## 2022-08-11 NOTE — ASSESSMENT & PLAN NOTE
- Held anti-HTN for low BP  - Resume metoprolol XL12.5 mg daily and lisinopril 10 mg daily  - Monitor and make further adjustments as needed  Patient started on Propranolol for akathesia and anxiety per psychiatry  Switch Metoprolol to Propanolol at discharge

## 2022-08-11 NOTE — PLAN OF CARE
Problem: Adult Inpatient Plan of Care  Goal: Plan of Care Review  Outcome: Ongoing, Progressing     Problem: Fall Injury Risk  Goal: Absence of Fall and Fall-Related Injury  Outcome: Ongoing, Progressing     Problem: Infection  Goal: Absence of Infection Signs and Symptoms  Outcome: Ongoing, Progressing

## 2022-08-11 NOTE — ASSESSMENT & PLAN NOTE
Encephalopathy, metabolic  COVID +  - Recently treated for aspiration pneumonia and new onset CHF at this facility  - Found down at home by home health, patient in tub disoriented and hard to arouse. EMS arrived O2 saturations on RA 87% placed on 4L increased to 91%, +cough +tachy   - Patient with acute hypercapnic and hypoxic respiratory failure    - Intubated and vent supported- extubated  - Found to be COVID +  - Patient was on empiric Zosyn and Vancomycin  - Pulm/critical care stopped Zosyn and started Rocephin on 8/5/2022; continued vancomycin  - Extubated to NC on 8/5/2022  - Stepped down to floor on 8/6/2022  - Completed remdesivir, full 5 day course done on 8/7/2022  - Stopped Rocephin and vancomycin after dose given on 8/7/2022  - Continue on dexamethasone,  - Currently stable on RA and mental status back to baseline  - d/c steroids on 8/9

## 2022-08-11 NOTE — SUBJECTIVE & OBJECTIVE
Interval History: no reported event  overnightovrnight   Completed COVID 19 treatment- remdesivir and hold dexamethasone.   Patient with hx of methamphetamine. Appreciates Psych rec's.   Possible discharge today       Review of Systems   Constitutional:  Negative for chills and fever.        Itching   Respiratory:  Negative for shortness of breath.    Cardiovascular:  Negative for chest pain.   Psychiatric/Behavioral:  Negative for agitation. The patient is not nervous/anxious.    Objective:     Vital Signs (Most Recent):  Temp: 97.9 °F (36.6 °C) (08/11/22 0742)  Pulse: 86 (08/11/22 0828)  Resp: 18 (08/11/22 0828)  BP: 110/63 (08/11/22 0742)  SpO2: 96 % (08/11/22 0828)   Vital Signs (24h Range):  Temp:  [96.3 °F (35.7 °C)-98.3 °F (36.8 °C)] 97.9 °F (36.6 °C)  Pulse:  [74-99] 86  Resp:  [12-20] 18  SpO2:  [96 %-99 %] 96 %  BP: ()/(52-65) 110/63     Weight: 52.5 kg (115 lb 11.9 oz)  Body mass index is 22.6 kg/m².    Intake/Output Summary (Last 24 hours) at 8/11/2022 0833  Last data filed at 8/10/2022 1800  Gross per 24 hour   Intake 1320 ml   Output --   Net 1320 ml        Physical Exam  Vitals and nursing note reviewed.   Constitutional:       Appearance: She is ill-appearing. She is not toxic-appearing.      Comments: Appears older than stated age    HENT:      Head: Normocephalic and atraumatic.      Nose: Nose normal.   Eyes:      Extraocular Movements: Extraocular movements intact.      Conjunctiva/sclera: Conjunctivae normal.   Cardiovascular:      Rate and Rhythm: Normal rate and regular rhythm.      Pulses: Normal pulses.      Heart sounds: Murmur heard.   Pulmonary:      Effort: Pulmonary effort is normal. No respiratory distress.      Comments: Decreased breath sounds at bases now resolved  Abdominal:      General: Bowel sounds are normal. There is no distension.      Palpations: Abdomen is soft.      Tenderness: There is no guarding or rebound.   Musculoskeletal:         General: Normal range of  motion.      Cervical back: Normal range of motion.      Right lower leg: No edema.      Left lower leg: No edema.   Skin:     General: Skin is warm and dry.      Capillary Refill: Capillary refill takes less than 2 seconds.      Comments: Extensive tattoos throughout body   Neurological:      Mental Status: She is alert.      Motor: Weakness present.      Comments: Awake and alert, oriented x 3   Psychiatric:         Attention and Perception: Attention normal.         Mood and Affect: Affect is labile.         Speech: Speech is not delayed.         Behavior: Behavior is not agitated or slowed.         Judgment: Judgment is impulsive.       Significant Labs: A1C:   Recent Labs   Lab 07/22/22  0718   HGBA1C 6.1*       Blood Culture: No results for input(s): LABBLOO in the last 48 hours.  CBC: No results for input(s): WBC, HGB, HCT, PLT in the last 48 hours.  CMP:   Recent Labs   Lab 08/09/22  0845      K 4.0      CO2 26   GLU 77   BUN 21*   CREATININE 0.7   CALCIUM 9.4   PROT 6.5   ALBUMIN 3.0*   BILITOT 0.3   ALKPHOS 86   AST 27   ALT 33   ANIONGAP 12       Lactic Acid: No results for input(s): LACTATE in the last 48 hours.  Lipase: No results for input(s): LIPASE in the last 48 hours.  Lipid Panel: No results for input(s): CHOL, HDL, LDLCALC, TRIG, CHOLHDL in the last 48 hours.  Magnesium:   Recent Labs   Lab 08/09/22  0845   MG 1.8       Troponin: No results for input(s): TROPONINI in the last 48 hours.  TSH:   Recent Labs   Lab 08/03/22  1730   TSH 0.780       Urine Culture: No results for input(s): LABURIN in the last 48 hours.  Urine Studies: No results for input(s): COLORU, APPEARANCEUA, PHUR, SPECGRAV, PROTEINUA, GLUCUA, KETONESU, BILIRUBINUA, OCCULTUA, NITRITE, UROBILINOGEN, LEUKOCYTESUR, RBCUA, WBCUA, BACTERIA, SQUAMEPITHEL, HYALINECASTS in the last 48 hours.    Invalid input(s): WRIGHTSUR    Significant Imaging: I have reviewed all pertinent imaging results/findings within the past 24 hours.

## 2022-08-11 NOTE — PROGRESS NOTES
PSYCHIATRY INPATIENT PROGRESS NOTE  SUBSEQUENT HOSPITAL VISIT      8/11/2022 8:00 AM   Stephanie T Barthelemy   1970   [id number]           DATE OF ADMISSION: 8/3/2022  5:02 PM    SITE: [facility]    CURRENT LEGAL STATUS: Patient does not currently meet PEC criteria due to not currently being an imminent threat to self/others and not being gravely disabled 2/2 mental illness at this time.     HISTORY    Per Initial History from Primary Team:  Stephanie T Barthelemy is a 51 y.o. female who has a past medical history of ADHD (attention deficit hyperactivity disorder), Anemia, Anxiety, Bipolar disorder, History of hepatitis C - s/p clearance of virus (HCV neg 6/2015) (9/26/2014), History of psychiatric hospitalization, History of substance abuse, psychiatric care, Hypertension, Opioid overdose (5/10/2016), Psychiatric problem, Psychosis, Seizure disorder (4/3/2019), Sleep difficulties, Substance abuse, Therapy, and Vasculitis. She presented to the ED for altered mental status. She was brought in by EMS after found down at home by home health in bath tub unresponsive. Of note patient was just admitted for pneumonia and new onset CHF.   In the ED: She was found COVID-19 positive. UDS positive for amphetamines. Latest EKG with Sinus tach, rate 131, nonspecific ST changes, no ST elevations or other signs of ischemia, normal intervals. Compared to prior, QT prolongation has improved. CXR with diffuse interstitial opacities most consistent with viral process. Patient required intubation for airway protection. Considering patient's persistent hypoxia, severe symptoms, and co-morbidities, patient is high risk for decompensation and complications including worsening hypoxia, respiratory failure, intubation, or cardiopulmonary arrest if discharged. Admitted to [facility] for further care.  Overview/Hospital Course from Primary Team:  Ms. Barthelemy presented with confusion and hypoxia.  Admitted with acute  "respiratory failure with COVID-19 complicated by encephalopathy.  Intubated for airway protection and isolated protocol in place.  Treatment initiated with remdesivir and dexamethasone, along with possible concomitant bacterial infection with Zosyn and vancomycin.  Pulmonary/Critical Care consulted.  Antibiotics de-escalated to Rocephin and Vancomycin as per Pulm/Critical care recommendations. Extubated on 8/5/2022. Stepped down to floor on 8/6/2022.  Interval History from Primary Team on 08/10/2022:   awake and alert, sitting up in the chair, on RA.   Completed COVID 19 treatment- remdesivir and hold dexamethasone.   Patient with hx of methamphetamine. Appreciates Psych rec's_ keep one more night to monitor new med changes.   Possible discharge tomorrow if staBLE.        Chief Complaint / Reason for Original Psychiatry Consult: "psychiatric medication management"        Subjective / Interval Psychiatric History Today (08/11/2022) (with psychiatric ROS below):   Stephanie T Barthelemy is a 51 y.o. female with a past medical history of heart failure, HCV, seizure disorder, and HTN, and a past psychiatric history of Substance Induced Mood/Psychotic Disorder, Polysubstance Abuse (most notably methamphetamines), Bipolar Disorder, Anxiety, Depression, and ADHD, currently being treated by her inpatient primary treatment team for a principal problem of acute respiratory failure.  Psychiatry was originally consulted as noted above.  The patient was seen and examined again today.  The chart was reviewed again today.  On examination today, the patient was more alert and engaged.  She was up and eating breakfast in her room.  She was now oriented to person, place, city, state, month, year, and situation.  She was CAM-ICU negative for delirium.  She endorses improvement in her mood, sleep (about 7-8 hours overnight with increase in Seroquel QHS), and anxiety when compared to yesterday (see detailed psych ROS below).  She " "endorses resolution of Akathisia with discontinuation of Risperdal and beginning of Propranolol (also endorsing improving anxiety with this).  She endorses a good appetite.  She denies AH, VH, TH, delusions, paranoia, or DIGFAST (frederic) s/s.  She denies any current/recent passive/active SI/HI.  She continues to display poor insight and poor self-responsibility / accountability regarding her polysubstance abuse, blaming her relapses on her doctors for not prescribing her "Adderall and Klonopin."  Patient has chronically poor insight into her methamphetamine use d/o (despite multiple attempts at counseling / education / psychotherapy).  She denies any AEs to her current med regimen.  She denies any current medical/physical complaints at this time other than mild fatigue.  NAD was observed during the examination.  Psychotherapy was again implemented as noted below with a focus on improving mood, anxiety, sleep, insight, and polysubstance cessation / total sobriety.  See detailed psych ROS below.  See A/P below.  Patient remains agreeable with outpatient MH & Addiction f/u.          Psychiatric Review Of Systems - Currently, the patient is endorsing and/or denying the following:  (patient's endorsements are BOLDED below; if not BOLDED, then patient denied):     Endorses intermittent Symptoms of Depression: diminished mood, low motivation, loss of interest/anhedonia, irritability, diminished energy, change in sleep, change in appetite, diminished concentration or cognition or indecisiveness, PMA/R, excessive guilt or hopelessness or worthlessness, suicidal ideations     Denies issues with Sleep: initiation, maintenance, early morning awakening with inability to return to sleep     Denies Suicidal/Homicidal ideations: active/passive ideations, organized plans, future intentions     Denies Symptoms of psychosis: hallucinations, delusions, disorganized thinking, disorganized behavior or abnormal motor behavior, or negative " symptoms (diminshed emotional expression, avolition, anhedonia, alogia, asociality      Denies Symptoms of frederic or hypomania: elevated, expansive, or irritable mood with increased energy or activity; with inflated self-esteem or grandiosity, decreased need for sleep, increased rate of speech, FOI or racing thoughts, distractibility, increased goal directed activity or PMA, risky/disinhibited behavior     Endorses intermittent Symptoms of Anxiety: excessive anxiety/worry/fear, more days than not, about numerous issues, difficult to control, with restlessness, fatigue, poor concentration, irritability, muscle tension, sleep disturbance; causes functionally impairing distress      Denies Symptoms of Panic Disorder: recurrent panic attacks, precipitated or un-precipitated, source of worry and/or behavioral changes secondary; with or without agoraphobia     Denies Symptoms of PTSD: h/o trauma; re-experiencing/intrusive symptoms, avoidant behavior, negative alterations in cognition or mood, or hyperarousal symptoms; with or without dissociative symptoms      Denies Symptoms of OCD: obsessions or compulsions      Denies Symptoms of Eating Disorders: anorexia, bulimia or binging     Endorses Substance Use (methamphetamines and cannabis): intoxication, withdrawal, tolerance, used in larger amounts or duration than intended, unsuccessful attempts to limit or quit, increased time engaging in or seeking out, cravings or strong desire to use, failure to fulfill obligations, negative consequences in social/interpersonal/occupational,/recreational areas, use in dangerous situations, medical or psychological consequences         [facility] Toolkit ASQ Suicide Screening Tool:  1. In the past few weeks, have you wished you were dead? Denies   2. In the past few weeks, have you felt that you or your family would be better off if you were dead? Denies  3. In the past week, have you been having thoughts about killing  yourself? Denies  4. Have you ever tried to kill yourself? Yes (remote hx)  5. Are you having thoughts of killing yourself right now? Denies        PSYCHOTHERAPY ADD-ON +[id number]   30 (16-37*) minutes     Time: 19 minutes  Participants: Met with patient     Therapeutic Intervention Type: behavior modifying psychotherapy, supportive psychotherapy  Why chosen therapy is appropriate versus another modality: relevant to diagnosis, patient responds to this modality, evidence based practice     Target symptoms: depression, anxiety, insomnia, poor insight, and polysubstance abuse / dependence   Primary focus: improving mood, anxiety, sleep, insight, and polysubstance cessation / total sobriety   Psychotherapeutic techniques: supportive and psychodynamic techniques; psycho-education; deep breathing exercises; motivational interviewing; reality / insight orientation; CBT; problem solving techniques and managing life/medical stressors     Outcome monitoring methods: self-report, observation     Patient's response to intervention:  The patient's response to intervention is accepting / limited.       Progress toward goals:  The patient's progress toward goals is fair / limited.          ROS:  General ROS: negative for - chills, fever, or night sweats; positive for mild fatigue  Ophthalmic ROS: negative for - blurry vision, double vision or eye pain  ENT ROS: negative for - sinus pain, headaches, sore throat or visual changes  Allergy and Immunology ROS: negative for - hives, itchy/watery eyes or nasal congestion  Hematological and Lymphatic ROS: negative for - bleeding problems, bruising, jaundice or pallor  Endocrine ROS: negative for - galactorrhea, hot flashes, mood swings, palpitations or temperature intolerance  Respiratory ROS: negative for - cough, hemoptysis, shortness of breath, tachypnea or wheezing  Cardiovascular ROS: negative for - chest pain, SOB, cough, dyspnea on exertion, loss of consciousness,  palpitations, or rapid heart rate  Gastrointestinal ROS: negative for - appetite loss, nausea, abdominal pain, blood in stools, change in bowel habits, constipation or diarrhea  Genito-Urinary ROS: negative for - incontinence, nocturia or pelvic pain  Musculoskeletal ROS: negative for - joint stiffness, joint swelling, joint pain or muscle pain   Neurological ROS: negative for - behavioral changes, confusion, dizziness, numbness/tingling or seizures; positive for memory loss   Dermatological ROS: negative for dry skin, hair changes, pruritus or rash  Psychiatric ROS: see detailed psychiatric ROS above in subjective section       PAST MEDICAL & SURGICAL HISTORY   Past Medical History:   Diagnosis Date    ADHD (attention deficit hyperactivity disorder)     Anemia     Anxiety     Bipolar disorder     History of hepatitis C - s/p clearance of virus (HCV neg 6/2015) 9/26/2014    History of psychiatric hospitalization     History of substance abuse     IV heroin - last use 2013 per pt    Hx of psychiatric care     Hypertension     Opioid overdose 5/10/2016    Psychiatric problem     Psychosis     Seizure disorder 4/3/2019    Sleep difficulties     Substance abuse     Therapy     Vasculitis      Past Surgical History:   Procedure Laterality Date    HYSTERECTOMY  3/16/2011    SALPINGOOPHORECTOMY Right 3/16/2011    TUBAL LIGATION      Vaginal cuff repair  04/22/2011       NEUROLOGIC HISTORY  Seizures: yes   Head trauma: denies  CVA: denies      FAMILY HISTORY   Family History   Problem Relation Age of Onset    Diabetes Maternal Grandmother     Cancer Maternal Grandmother        ALLERGIES   Review of patient's allergies indicates:  No Known Allergies    CURRENT MEDICATION REGIMEN   Home Meds:   Prior to Admission medications    Medication Sig Start Date End Date Taking? Authorizing Provider   albuterol (PROVENTIL/VENTOLIN HFA) 90 mcg/actuation inhaler Inhale 2 puffs into the lungs every 6 (six) hours.  Rescue 8/11/22 8/11/23  Nelly Pandey MD   benztropine (COGENTIN) 0.5 MG tablet Take 1 tablet (0.5 mg total) by mouth 2 (two) times daily. 8/11/22 8/11/23  Nelly Pandey MD   famotidine (PEPCID) 20 MG tablet Take 1 tablet (20 mg total) by mouth 2 (two) times daily. 8/11/22 8/11/23  Nelly Pandey MD   furosemide (LASIX) 20 MG tablet Take 1 tablet (20 mg total) by mouth once daily. 8/11/22 8/11/23  Nelly Pandey MD   gabapentin (NEURONTIN) 300 MG capsule Take 1 capsule (300 mg total) by mouth 3 (three) times daily. 8/11/22 8/11/23  Nelly Pandey MD   hydrOXYzine pamoate (VISTARIL) 50 MG Cap Take 1 capsule (50 mg total) by mouth every 8 (eight) hours as needed (anxiety, insomnia). 8/11/22   Nelly Pandey MD   lisinopriL (PRINIVIL,ZESTRIL) 40 MG tablet Take 1 tablet (40 mg total) by mouth once daily. 8/11/22 8/11/23  Nelly Pandey MD   nicotine (NICODERM CQ) 21 mg/24 hr Place 1 patch onto the skin once daily. 8/11/22   Nelly Pandey MD   OXcarbazepine (TRILEPTAL) 300 MG Tab Take 1 tablet (300 mg total) by mouth 2 (two) times daily. 7/26/22 7/26/23  Nelly Pandey MD   propranoloL (INDERAL) 10 MG tablet Take 1 tablet (10 mg total) by mouth 3 (three) times daily. 8/11/22 8/11/23  Nelly Pandey MD   pulse oximeter (PULSE OXIMETER) device by Apply Externally route 2 (two) times a day. Use twice daily at 8 AM and 3 PM and [facility]. 8/11/22   Nelly Pandey MD   QUEtiapine (SEROQUEL) 100 MG Tab Take 1 tablet (100 mg total) by mouth every evening. 8/11/22 8/11/23  Nelly Pandey MD   QUEtiapine (SEROQUEL) 50 MG tablet Take 1 tablet (50 mg total) by mouth once daily. 8/11/22 8/11/23  Nelly Pandey MD   amLODIPine (NORVASC) 5 MG tablet Take 1 tablet (5 mg total) by mouth once daily. 9/8/21 7/26/22  Petty Ibanez MD   benztropine (COGENTIN) 1 MG  tablet Take 1 tablet (1 mg total) by mouth 2 (two) times daily. 5/19/22 8/11/22  Ayad Lemons MD   FLUoxetine 20 MG capsule Take 1 capsule (20 mg total) by mouth once daily. 5/19/22 7/26/22  Ayad Lemons MD   furosemide (LASIX) 40 MG tablet Take 1 tablet (40 mg total) by mouth once daily. 7/26/22 8/11/22  Nelly Pandey MD   hydrOXYzine pamoate (VISTARIL) 25 MG Cap Take 1 capsule (25 mg total) by mouth every 8 (eight) hours as needed (insomnia, agitation). 7/26/22 8/11/22  Nelly Pandey MD   lisinopriL 10 MG tablet Take 1 tablet (10 mg total) by mouth once daily. 9/8/21 8/11/22  Petty Ibanez MD   metoprolol succinate (TOPROL-XL) 25 MG 24 hr tablet Take 0.5 tablets (12.5 mg total) by mouth once daily. 7/26/22 8/11/22  Nelly Pandey MD   QUEtiapine (SEROQUEL) 100 MG Tab Take 1 tablet (100 mg total) by mouth every evening. 5/19/22 8/11/22  Ayad Lemons MD   risperiDONE (RISPERDAL) 1 MG tablet Take 1 mg by mouth 2 (two) times daily. 5/19/22 8/11/22  Historical Provider       Scheduled Meds:    albuterol  2 puff Inhalation Q6H    ascorbic acid (vitamin C)  500 mg Oral BID    benztropine  0.5 mg Oral BID    enoxaparin  40 mg Subcutaneous Daily    famotidine  20 mg Oral BID    furosemide  20 mg Oral Daily    gabapentin  300 mg Oral TID    lisinopriL  40 mg Oral Daily    multivitamin  1 tablet Oral Daily    nicotine  1 patch Transdermal Daily    OXcarbazepine  300 mg Oral BID    propranoloL  10 mg Oral TID    QUEtiapine  100 mg Oral QHS    QUEtiapine  50 mg Oral Daily      PRN Meds: dextrose 10%, dextrose 10%, glucagon (human recombinant), glucose, glucose, hydrOXYzine pamoate, hydrOXYzine pamoate, ondansetron, sodium chloride 0.9%, sodium chloride 0.9%   Psychotherapeutics (From admission, onward)            Start     Stop Route Frequency Ordered    08/10/22 2100  QUEtiapine tablet 100 mg         -- Oral Nightly 08/10/22 1025    08/09/22 1900  QUEtiapine tablet  50 mg         -- Oral Daily 08/09/22 7889          LABORATORY DATA   Recent Results (from the past 72 hour(s))   Magnesium    Collection Time: 08/09/22  8:45 AM   Result Value Ref Range    Magnesium 1.8 1.6 - 2.6 mg/dL   Comprehensive Metabolic Panel    Collection Time: 08/09/22  8:45 AM   Result Value Ref Range    Sodium 142 136 - 145 mmol/L    Potassium 4.0 3.5 - 5.1 mmol/L    Chloride 104 95 - 110 mmol/L    CO2 26 23 - 29 mmol/L    Glucose 77 70 - 110 mg/dL    BUN 21 (H) 6 - 20 mg/dL    Creatinine 0.7 0.5 - 1.4 mg/dL    Calcium 9.4 8.7 - 10.5 mg/dL    Total Protein 6.5 6.0 - 8.4 g/dL    Albumin 3.0 (L) 3.5 - 5.2 g/dL    Total Bilirubin 0.3 0.1 - 1.0 mg/dL    Alkaline Phosphatase 86 55 - 135 U/L    AST 27 10 - 40 U/L    ALT 33 10 - 44 U/L    Anion Gap 12 8 - 16 mmol/L    eGFR >60 >60 mL/min/1.73 m^2   Phosphorus    Collection Time: 08/09/22  8:45 AM   Result Value Ref Range    Phosphorus 3.9 2.7 - 4.5 mg/dL      Lab Results   Component Value Date    VALPROATE <10.0 (L) 07/25/2019    CBMZ 2.1 (L) 04/15/2019         EXAMINATION    VITALS   Vitals:    08/11/22 0410 08/11/22 0616 08/11/22 0742 08/11/22 0828   BP:  108/65 110/63    BP Location:  Left arm     Patient Position:  Lying     Pulse: 82 87 80 86   Resp:  12 18 18   Temp:  96.7 °F (35.9 °C) 97.9 °F (36.6 °C)    TempSrc:  Oral     SpO2:  98% 98% 96%   Weight:       Height:            CONSTITUTIONAL  General Appearance: NAD, unremarkable, age appropriate, normal weight, calm, seated in bed eating breakfast     MUSCULOSKELETAL  Muscle Strength and Tone: WNL  Abnormal Involuntary Movements: none observed; akathisia now resolved as noted in subjective above   Gait and Station: WNL ; non-ataxic      PSYCHIATRIC   Behavior/Cooperation:  cooperative, calm, eye contact intermittent   Speech:  normal tone, normal rate, normal pitch, normal volume, some abnormal speech 2/2 dentures   Language: grossly intact, able to name, able to repeat with spontaneous  "speech  Mood: "better"  Affect:  Mildly Constricted   Associations: intact; no FÉLIX  Thought Process: Linear and Future-Oriented   Thought Content: denies SI, HI, AH, VH, TH, delusions, or paranoia (no RIS observed)  Sensorium:  Awake  Alert and Oriented: to person, place, city, state, month, year, and situation.   Memory: 3/3 immediate recall and 1/3 recall at 5 minutes.  Attention/concentration: Limited / Improving.  Able to spell [id number]-[id number] but NOT [id number]-r-[id number]-w.   Similarities: Limited / Intact   Abstract reasoning: Limited    Fund of Knowledge: Able to name 2/4 past U.[place]. Presidents    Insight: Limited / Intact   Judgment: Limited / Intact      CAM ICU Delirium Assessment - NEGATIVE     Is the patient aware of the biomedical complications associated with substance abuse and mental illness? yes           MEDICAL DECISION MAKING     ASSESSMENT         Bipolar Depression (improving)  Unspecified Anxiety Disorder (improving)  Unspecified Insomnia (improving)  Methamphetamine Use Disorder, Severe, Dependence   Polysubstance Abuse   (rule out SIMD)        RECOMMENDATIONS       - Discontinued Risperdal on 08/10/2022 due to concerns for AE of EPS / Akathisia (discussed risks/benefits/alt vs no treatment with patient).     - Continue Cogentin 0.5 mg PO BID for possible EPS (discussed risks/benefits/alt vs no treatment with patient).     - Continue Propranolol 10 mg PO TID for anxiety and akathisia (discussed risks/benefits/alt vs no treatment with patient).       - Continue Trileptal 300 mg PO BID for Mood & Seizure Hx (discussed risks/benefits/alt vs no treatment with patient).     - Continue Seroquel 50 mg PO QAM and 100 mg PO QHS for mood / sleep / anxiety (discussed risks/benefits/alt vs no treatment with patient).     - Can use Vistaril 25 mg to 50 mg PO TID PRN for anxiety and/or insomnia (discussed risks/benefits/alt vs no treatment with patient).     - Psychotherapy was again performed with patient as noted above with " a focus on improving mood, anxiety, sleep, insight, and polysubstance cessation / total sobriety.      - Patient's most recent labs, imaging, and EKG were reviewed today.  Repeat EKG on 08/10/2022 with QTc slightly improved to 501 with no PVCs; QTc likely to continue to improve with recent discontinuation of Risperdal; patient aware of risks and aware of the need to continue to monitor as an outpatient.       - Please have CM/SW assist patient with asap outpatient mental health & addiction f/u for s/p discharge from this facility (patient again denies interest in residential or IOP rehab options).     - Patient was again instructed to call 911 and return to the nearest ED if she begins feeling suicidal, homicidal, or gravely disabled (for s/p this hospitalization).       - Thank you for this consult ; will continue to follow patient         Total time spent with patient and/or managing/coordinating patient's care today (excluding the time spent on psychotherapy): 41 minutes   Time spent on psychotherapy today (as noted above): 19 minutes   Total time for encounter today including psychotherapy: 60 minutes      More than 50% of the time was spent counseling/coordinating care.     Consulting clinician was informed of the encounter and consult note.      STAFF:  Edis Hughes MD  [facility]  8/11/2022

## 2022-08-11 NOTE — PT/OT/SLP PROGRESS
Physical Therapy      Patient Name:  Stephanie T Barthelemy   MRN:  [id number]    Patient not seen today secondary to  (pt asleep and has discharge orders(9490)). Will follow-up n/a.

## 2022-08-11 NOTE — PROGRESS NOTES
[facility] Medicine  Progress Note    Patient Name: Stephanie T Barthelemy  MRN: [id number]  Patient Class: IP- Inpatient   Admission Date: 8/3/2022  Length of Stay: 8 days  Attending Physician: Nelly Pandey*  Primary Care Provider: Hilda Elizabeth NP        Subjective:     Principal Problem:Acute respiratory failure with hypoxia and hypercapnia        HPI:  Stephanie T Barthelemy is a 51 y.o. female who has a past medical history of ADHD (attention deficit hyperactivity disorder), Anemia, Anxiety, Bipolar disorder, History of hepatitis C - s/p clearance of virus (HCV neg 6/2015) (9/26/2014), History of psychiatric hospitalization, History of substance abuse, psychiatric care, Hypertension, Opioid overdose (5/10/2016), Psychiatric problem, Psychosis, Seizure disorder (4/3/2019), Sleep difficulties, Substance abuse, Therapy, and Vasculitis. She presented to the ED for altered mental status. She was brought in by EMS after found down at home by home health in bath tub unresponsive. Of note patient was just admitted for pneumonia and new onset CHF.     In the ED: She was found COVID-19 positive. UDS positive for amphetamines. Latest EKG with Sinus tach, rate 131, nonspecific ST changes, no ST elevations or other signs of ischemia, normal intervals. Compared to prior, QT prolongation has improved. CXR with diffuse interstitial opacities most consistent with viral process. Patient required intubation for airway protection. Considering patient's persistent hypoxia, severe symptoms, and co-morbidities, patient is high risk for decompensation and complications including worsening hypoxia, respiratory failure, intubation, or cardiopulmonary arrest if discharged. Admitted to [facility] for further care.      Overview/Hospital Course:  Ms. Barthelemy presented with confusion and hypoxia.  Admitted with acute respiratory failure with COVID-19 complicated by encephalopathy.   Intubated for airway protection and isolated protocol in place.  Treatment initiated with remdesivir and dexamethasone, along with possible concomitant bacterial infection with Zosyn and vancomycin.  Pulmonary/Critical Care consulted.  Antibiotics de-escalated to Rocephin and Vancomycin as per Pulm/Critical care recommendations. Extubated on 8/5/2022. Stepped down to floor on 8/6/2022.      Interval History: no reported event  overnightovrnight   Completed COVID 19 treatment- remdesivir and hold dexamethasone.   Patient with hx of methamphetamine. Appreciates Psych rec's.   Possible discharge today       Review of Systems   Constitutional:  Negative for chills and fever.        Itching   Respiratory:  Negative for shortness of breath.    Cardiovascular:  Negative for chest pain.   Psychiatric/Behavioral:  Negative for agitation. The patient is not nervous/anxious.    Objective:     Vital Signs (Most Recent):  Temp: 97.9 °F (36.6 °C) (08/11/22 0742)  Pulse: 86 (08/11/22 0828)  Resp: 18 (08/11/22 0828)  BP: 110/63 (08/11/22 0742)  SpO2: 96 % (08/11/22 0828)   Vital Signs (24h Range):  Temp:  [96.3 °F (35.7 °C)-98.3 °F (36.8 °C)] 97.9 °F (36.6 °C)  Pulse:  [74-99] 86  Resp:  [12-20] 18  SpO2:  [96 %-99 %] 96 %  BP: ()/(52-65) 110/63     Weight: 52.5 kg (115 lb 11.9 oz)  Body mass index is 22.6 kg/m².    Intake/Output Summary (Last 24 hours) at 8/11/2022 0833  Last data filed at 8/10/2022 1800  Gross per 24 hour   Intake 1320 ml   Output --   Net 1320 ml        Physical Exam  Vitals and nursing note reviewed.   Constitutional:       Appearance: She is ill-appearing. She is not toxic-appearing.      Comments: Appears older than stated age    HENT:      Head: Normocephalic and atraumatic.      Nose: Nose normal.   Eyes:      Extraocular Movements: Extraocular movements intact.      Conjunctiva/sclera: Conjunctivae normal.   Cardiovascular:      Rate and Rhythm: Normal rate and regular rhythm.      Pulses: Normal  pulses.      Heart sounds: Murmur heard.   Pulmonary:      Effort: Pulmonary effort is normal. No respiratory distress.      Comments: Decreased breath sounds at bases now resolved  Abdominal:      General: Bowel sounds are normal. There is no distension.      Palpations: Abdomen is soft.      Tenderness: There is no guarding or rebound.   Musculoskeletal:         General: Normal range of motion.      Cervical back: Normal range of motion.      Right lower leg: No edema.      Left lower leg: No edema.   Skin:     General: Skin is warm and dry.      Capillary Refill: Capillary refill takes less than 2 seconds.      Comments: Extensive tattoos throughout body   Neurological:      Mental Status: She is alert.      Motor: Weakness present.      Comments: Awake and alert, oriented x 3   Psychiatric:         Attention and Perception: Attention normal.         Mood and Affect: Affect is labile.         Speech: Speech is not delayed.         Behavior: Behavior is not agitated or slowed.         Judgment: Judgment is impulsive.       Significant Labs: A1C:   Recent Labs   Lab 07/22/22  0718   HGBA1C 6.1*       Blood Culture: No results for input(s): LABBLOO in the last 48 hours.  CBC: No results for input(s): WBC, HGB, HCT, PLT in the last 48 hours.  CMP:   Recent Labs   Lab 08/09/22  0845      K 4.0      CO2 26   GLU 77   BUN 21*   CREATININE 0.7   CALCIUM 9.4   PROT 6.5   ALBUMIN 3.0*   BILITOT 0.3   ALKPHOS 86   AST 27   ALT 33   ANIONGAP 12       Lactic Acid: No results for input(s): LACTATE in the last 48 hours.  Lipase: No results for input(s): LIPASE in the last 48 hours.  Lipid Panel: No results for input(s): CHOL, HDL, LDLCALC, TRIG, CHOLHDL in the last 48 hours.  Magnesium:   Recent Labs   Lab 08/09/22  0845   MG 1.8       Troponin: No results for input(s): TROPONINI in the last 48 hours.  TSH:   Recent Labs   Lab 08/03/22  1730   TSH 0.780       Urine Culture: No results for input(s): LABURIN in the  last 48 hours.  Urine Studies: No results for input(s): COLORU, APPEARANCEUA, PHUR, SPECGRAV, PROTEINUA, GLUCUA, KETONESU, BILIRUBINUA, OCCULTUA, NITRITE, UROBILINOGEN, LEUKOCYTESUR, RBCUA, WBCUA, BACTERIA, SQUAMEPITHEL, HYALINECASTS in the last 48 hours.    Invalid input(s): WRIGHTSUR    Significant Imaging: I have reviewed all pertinent imaging results/findings within the past 24 hours.      Assessment/Plan:      * Acute respiratory failure with hypoxia and hypercapnia  Encephalopathy, metabolic  COVID +  - Recently treated for aspiration pneumonia and new onset CHF at this facility  - Found down at home by home health, patient in tub disoriented and hard to arouse. EMS arrived O2 saturations on RA 87% placed on 4L increased to 91%, +cough +tachy   - Patient with acute hypercapnic and hypoxic respiratory failure    - Intubated and vent supported- extubated  - Found to be COVID +  - Patient was on empiric Zosyn and Vancomycin  - Pulm/critical care stopped Zosyn and started Rocephin on 8/5/2022; continued vancomycin  - Extubated to NC on 8/5/2022  - Stepped down to floor on 8/6/2022  - Completed remdesivir, full 5 day course done on 8/7/2022  - Stopped Rocephin and vancomycin after dose given on 8/7/2022  - Continue on dexamethasone,  - Currently stable on RA and mental status back to baseline  - d/c steroids on 8/9    Sepsis        COVID-19 virus infection  Patient is identified as COVID 19 infection  Initiate standard COVID protocols; COVID-19 testing Collection Date: 8/26/2021 Collection Time:   4:17 PM ,Infection Control notification  and isolation- respiratory, contact and droplet per protocol    Diagnostics: (leukopenia, hyponatremia, hyperferritinemia, elevated troponin, elevated d-dimer, age, and comorbidities are significant predictors of poor clinical outcome)  CBC, CMP, Ferritin, CRP and Portable CXR    Management: Maintain oxygen saturations 92-96% via Nasal Cannula 2 LPM and monitor with  continuous/intermittent pulse oximetry- weaned off.  Inhaled bronchodilators as needed for shortness of breath. and Continuous cardiac monitoring.    COVID-19  - Patient is identified as Severe COVID-19 based on hypoxemia with O2 saturations <94% on room air or on ambulation   - Initiate standard COVID protocols; COVID-19 testing Collection Date: 10/1/2021 Collection Time:   1:22 AM, Infection Control notification  and isolation- respiratory, contact and droplet per protocol  - Diagnostics: (leukopenia, hyponatremia, hyperferritinemia, elevated troponin, elevated d-dimer, age, and comorbidities are significant predictors of poor clinical outcome)  CMP, CRP, BNP and Portable CXR  Management: Continuous cardiac monitoring. and Manage respiratory failure (O2 requirement >10LPM or needing NIPPV/Mechanical ventilation) and/or Pneumonia (active chest infiltrates) separately as described below.  - Monitor on Telemetry  - Initiated on dexamethasone, continue for 10d course  - Completed remdesivir x5d; daily CMP  - Continuous Pulse Oximetry, goal SpO2 92-96%  - MVI & ascorbic acid 500mg PO BID  - Acetaminophen Q6hr PRN fever  - VTE PPx: enoxaparin   - As discussed above    Encephalopathy, metabolic  - Due to above and in combination with ongoing polysubstance abuse  - Back to baseline  - Resolved    Chronic combined systolic and diastolic heart failure  - Last ECHO results:   - Results for orders placed during the hospital encounter of 07/21/22  · The left ventricle is mildly enlarged with concentric hypertrophy and severely decreased systolic function.  · The estimated ejection fraction is 20%.  · There is severe left ventricular global hypokinesis.  · Grade III left ventricular diastolic dysfunction.  · Mild right ventricular enlargement with mildly reduced right ventricular systolic function.  · Severe left atrial enlargement.  · Moderate aortic regurgitation.  · Severe mitral regurgitation.  · Mild tricuspid  regurgitation.  · Moderate pulmonic regurgitation.  · Intermediate central venous pressure (8 mmHg).  · The estimated PA systolic pressure is 33 mmHg.  · Trivial pericardial effusion.  - Troponin 0.034-0.052; likely demand; monitored and trended  - CXR revealed Nonspecific bilateral airspace opacities  - Does not appear clinically volume overload at this time  - Continue home BB, ACEi/ARB when stable  - Daily weights and strict I/Os, monitor on telemetry  - Monitor and trend BMP, Mg, and renal function; keep K >4, Mg >2  - Sodium restriction (<2g/d), fluid restriction (<2L)   - Monitor for signs of fluid overload: RR>30, O2 sat<92%, weight gain of >3 lbs in 24 hours, or urinary output <160ml/8hr  - Started low dose lasix 20 mg daily on 8/7/2022 and appears compensated on this regimen but will monitor to see if this needs to be increased to 40 mg daily    HLD (hyperlipidemia)  - Not on medications at home    Bipolar 1 disorder, depressed  Attention deficit hyperactivity disorder (ADHD), combined type  - Held home meds until patient was stable  - Restarted hydroxyzine 50 mg Q8, quetiapine 25 mg QHS (lower dose to prevent over sedation), risperidone 1mg BID  - Increased quetiapine to 50 mg QHS on 8/7/2022 with improvement    Seizure disorder  - Seizure precautions  - PRN lorazepam  Resume home meds-Oxcarbazepine    Depression  Resume home meds  Consult psychiatry- appreciate plan re-eval meds        Hypokalemia  - Corrected to normal, monitor    Amphetamine abuse  Polysubstance abuse  - UDS positive  - Monitor for withdrawal    Debility  - PT and OT consult, progressing with recommendation.     Anemia  - Stable, monitor    Essential hypertension  - Held anti-HTN for low BP  - Resume metoprolol XL12.5 mg daily and lisinopril 10 mg daily  - Monitor and make further adjustments as needed  Patient started on Propranolol for akathesia and anxiety per psychiatry  Switch Metoprolol to Propanolol at discharge        VTE Risk  Mitigation (From admission, onward)         Ordered     enoxaparin injection 40 mg  Daily         08/04/22 0941     IP VTE HIGH RISK PATIENT  Once         08/03/22 2045     Place sequential compression device  Until discontinued         08/03/22 2045                Discharge Planning   AYSHA: 8/11/2022     Code Status: Full Code   Is the patient medically ready for discharge?:     Reason for patient still in hospital (select all that apply): Pending disposition  Discharge Plan A: Home with family   Discharge Delays: None known at this time              Nelly Pandey MD  Department of Hospital Medicine   [facility]

## 2022-08-11 NOTE — PLAN OF CARE
Sw attempted to call pt's sister Betty there was no answer, sw left a  requesting a return call. NGUYỄN will follow.    Yfn Almanza, MSW  [phone]    Future Appointments   Date Time Provider Department Center   8/24/2022 10:00 AM Abhijeet Duran, RRT [facility]   9/28/2022  8:00 AM Erich Guevara III, NP [facility]        08/11/22 1326   Post-Acute Status   Post-Acute Authorization Home Health

## 2022-08-11 NOTE — PLAN OF CARE
NGUYỄN attempted to call Pt's sister Betty [phone] sw left a  requesting a return call. NGUYỄN called Pt's father Ezekiel [phone] he confirmed that Betty was on her way to the hospital to help transport pt home. Ezekiel asked about more long term solutions for pt sw informed pt's father of pricing of detention living. NGUYỄN offered to give name and number of [facility] and he denied. NGUYỄN informed Ezekiel that out pt CM would be reaching out.     Yfn Almanza, MSYESI  [phone]    Future Appointments   Date Time Provider Department Center   8/24/2022 10:00 AM Abhijeet Duran, [facility]   9/28/2022  8:00 AM Erich Guevara III, NP [facility]        08/11/22 4260   Discharge Plan   Discharge Plan A Home with family

## 2022-08-11 NOTE — PLAN OF CARE
[facility] - Telemetry      HOME HEALTH ORDERS  FACE TO FACE ENCOUNTER    Patient Name: Stephanie T Barthelemy  YOB: 1970    PCP: Hilda Elizabeth NP   PCP Address: [address] [place] [address]*  PCP Phone Number: [phone]  PCP Fax: [phone]    Encounter Date: 8/3/22    Admit to Home Health    Diagnoses:  Active Hospital Problems    Diagnosis  POA    *Acute respiratory failure with hypoxia and hypercapnia [J96.01, J96.02]  Yes    Sepsis [A41.9]  Yes    Agitation [R45.1]  Yes    Acute encephalopathy [G93.40]  Yes    Acute psychosis [F23]  Yes    COVID-19 virus infection [U07.1]  Yes    COVID-19 [U07.1]  Yes    Chronic combined systolic and diastolic heart failure [I50.42]  Yes    Encephalopathy, metabolic [G93.41]  Yes    HLD (hyperlipidemia) [E78.5]  Yes    Bipolar 1 disorder, depressed [F31.9]  Yes    Seizure disorder [G40.909]  Yes    Depression [F32.A]  Yes    Hypokalemia [E87.6]  Yes    Amphetamine abuse [F15.10]  Yes    Attention deficit hyperactivity disorder (ADHD), combined type [F90.2]  Yes    Debility [R53.81]  Yes    Anemia [D64.9]  Yes    Essential hypertension [I10]  Yes     Chronic      Resolved Hospital Problems   No resolved problems to display.       Follow Up Appointments:  No future appointments.    Allergies:Review of patient's allergies indicates:  No Known Allergies    Medications: Review discharge medications with patient and family and provide education.    Current Facility-Administered Medications   Medication Dose Route Frequency Provider Last Rate Last Admin    0.9%  NaCl infusion   Intravenous Continuous Almaz Lucio MD 5 mL/hr at 08/06/22 0513 Rate Verify at 08/06/22 0513    albuterol inhaler 2 puff  2 puff Inhalation Q6H Nelly Pandey MD   2 puff at 08/11/22 0828    ascorbic acid (vitamin C) tablet 500 mg  500 mg Oral BID Gladys Bal NP   500 mg at 08/11/22 0924    benztropine tablet 0.5  mg  0.5 mg Oral BID Edis Hughes MD   0.5 mg at 08/11/22 0924    dextrose 10% bolus 125 mL  12.5 g Intravenous PRN Almaz Lucio MD        dextrose 10% bolus 250 mL  25 g Intravenous PRN Almaz Lucio MD        enoxaparin injection 40 mg  40 mg Subcutaneous Daily Almaz Lucio MD   40 mg at 08/09/22 1723    famotidine tablet 20 mg  20 mg Oral BID Almaz Lucio MD   20 mg at 08/11/22 0924    furosemide tablet 20 mg  20 mg Oral Daily Almaz Lucio MD   20 mg at 08/11/22 0924    gabapentin capsule 300 mg  300 mg Oral TID Nelly Pandey MD   300 mg at 08/11/22 0923    glucagon (human recombinant) injection 1 mg  1 mg Intramuscular PRN Gladys Bal, NP        glucose chewable tablet 16 g  16 g Oral PRN Gladys Bal, NP        glucose chewable tablet 24 g  24 g Oral PRN Gladys Bal, WILLIAM        hydrOXYzine pamoate capsule 25 mg  25 mg Oral Q8H PRN Nelly Pandey MD   25 mg at 08/09/22 1145    hydrOXYzine pamoate capsule 50 mg  50 mg Oral Q8H PRN Nelly Pandey MD        lisinopriL tablet 40 mg  40 mg Oral Daily Almaz Lucio MD   40 mg at 08/11/22 0924    multivitamin tablet  1 tablet Oral Daily Gladys Bal, NP   1 tablet at 08/11/22 0900    nicotine 21 mg/24 hr 1 patch  1 patch Transdermal Daily Nelly Pandey MD   1 patch at 08/11/22 0924    ondansetron injection 4 mg  4 mg Intravenous Q8H PRN Gladys Bal, WILLIAM        OXcarbazepine tablet 300 mg  300 mg Oral BID Nelly Pandey MD   300 mg at 08/11/22 0924    propranoloL tablet 10 mg  10 mg Oral TID Edis Hughes MD   10 mg at 08/11/22 0924    QUEtiapine tablet 100 mg  100 mg Oral QHS Edis Hughes MD   100 mg at 08/10/22 2110    QUEtiapine tablet 50 mg  50 mg Oral Daily Nelly Pandey MD   50 mg at 08/11/22 0924    sodium chloride 0.9% flush 10 mL  10 mL Intravenous PRN Gladys Bal NP   10 mL at 08/09/22 0939    sodium chloride 0.9% flush 10  mL  10 mL Intravenous PRN Gladys Bal NP         Current Discharge Medication List      START taking these medications    Details   albuterol (PROVENTIL/VENTOLIN HFA) 90 mcg/actuation inhaler Inhale 2 puffs into the lungs every 6 (six) hours. Rescue  Qty: 18 g, Refills: 0      famotidine (PEPCID) 20 MG tablet Take 1 tablet (20 mg total) by mouth 2 (two) times daily.  Qty: 60 tablet, Refills: 11      nicotine (NICODERM CQ) 21 mg/24 hr Place 1 patch onto the skin once daily.  Qty: 28 patch, Refills: 0      pulse oximeter (PULSE OXIMETER) device by Apply Externally route 2 (two) times a day. Use twice daily at 8 AM and 3 PM and record the value in [employer] as directed.  Qty: 1 each, Refills: 0    Comments: This is a NO CHARGE item.  Please override price to zero.  DO NOT PRINT.  NORMAL MODE e-PRESCRIBE ONLY.         CONTINUE these medications which have CHANGED    Details   benztropine (COGENTIN) 0.5 MG tablet Take 1 tablet (0.5 mg total) by mouth 2 (two) times daily.  Qty: 60 tablet, Refills: 11      furosemide (LASIX) 20 MG tablet Take 1 tablet (20 mg total) by mouth once daily.  Qty: 30 tablet, Refills: 11      gabapentin (NEURONTIN) 300 MG capsule Take 1 capsule (300 mg total) by mouth 3 (three) times daily.  Qty: 90 capsule, Refills: 11      hydrOXYzine pamoate (VISTARIL) 50 MG Cap Take 1 capsule (50 mg total) by mouth every 8 (eight) hours as needed (anxiety, insomnia).  Qty: 60 capsule, Refills: 5      lisinopriL (PRINIVIL,ZESTRIL) 40 MG tablet Take 1 tablet (40 mg total) by mouth once daily.  Qty: 90 tablet, Refills: 3    Comments: .      propranoloL (INDERAL) 10 MG tablet Take 1 tablet (10 mg total) by mouth 3 (three) times daily.  Qty: 90 tablet, Refills: 11    Comments: .      !! QUEtiapine (SEROQUEL) 100 MG Tab Take 1 tablet (100 mg total) by mouth every evening.  Qty: 30 tablet, Refills: 11      !! QUEtiapine (SEROQUEL) 50 MG tablet Take 1 tablet (50 mg total) by mouth once daily.  Qty: 30 tablet,  Refills: 11       !! - Potential duplicate medications found. Please discuss with provider.      CONTINUE these medications which have NOT CHANGED    Details   OXcarbazepine (TRILEPTAL) 300 MG Tab Take 1 tablet (300 mg total) by mouth 2 (two) times daily.  Qty: 60 tablet, Refills: 11         STOP taking these medications       amLODIPine (NORVASC) 5 MG tablet Comments:   Reason for Stopping:         FLUoxetine 20 MG capsule Comments:   Reason for Stopping:         metoprolol succinate (TOPROL-XL) 25 MG 24 hr tablet Comments:   Reason for Stopping:         risperiDONE (RISPERDAL) 1 MG tablet Comments:   Reason for Stopping:                 I have seen and examined this patient within the last 30 days. My clinical findings that support the need for the home health skilled services and home bound status are the following:no   Weakness/numbness causing balance and gait disturbance due to Infection and Weakness/Debility making it taxing to leave home.     Diet:   cardiac diet        Referrals/ Consults  Physical Therapy to evaluate and treat. Evaluate for home safety and equipment needs; Establish/upgrade home exercise program. Perform / instruct on therapeutic exercises, gait training, transfer training, and Range of Motion.   to evaluate for community resources/long-range planning.    Activities:   activity as tolerated    Nursing:   Agency to admit patient within 24 hours of hospital discharge unless specified on physician order or at patient request    SN to complete comprehensive assessment including routine vital signs. Instruct on disease process and s/s of complications to report to MD. Review/verify medication list sent home with the patient at time of discharge  and instruct patient/caregiver as needed. Frequency may be adjusted depending on start of care date.     Skilled nurse to perform up to 3 visits PRN for symptoms related to diagnosis    Notify MD if SBP > 160 or < 90; DBP > 90 or < 50; HR >  120 or < 50; Temp > 101; O2 < 88%; Other:       Ok to schedule additional visits based on staff availability and patient request on consecutive days within the home health episode.    When multiple disciplines ordered:    Start of Care occurs on Sunday - Wednesday schedule remaining discipline evaluations as ordered on separate consecutive days following the start of care.    Thursday SOC -schedule subsequent evaluations Friday and Monday the following week.     Friday - Saturday SOC - schedule subsequent discipline evaluations on consecutive days starting Monday of the following week.    For all post-discharge communication and subsequent orders please contact patient's primary care physician. If unable to reach primary care physician or do not receive response within 30 minutes, please contact  for clinical staff order clarification        Home Health Aide:  Nursing Three times weekly, Physical Therapy Three times weekly and Medical Social Work Three times weekly        I certify that this patient is confined to her home and needs intermittent skilled nursing care, physical therapy and occupational therapy.

## 2022-08-12 ENCOUNTER — NURSE TRIAGE (OUTPATIENT)
Dept: ADMINISTRATIVE | Facility: CLINIC | Age: 52
End: 2022-08-12
Payer: MEDICARE

## 2022-08-12 NOTE — TELEPHONE ENCOUNTER
2nd attempt to contact pt regarding enrollment in the Covid Surveillance Program.  Unable to contact pt.  Pt has had 2 attempts for Covid Surveillance Program enrollment.  HPC355 ordered.  Tasks removed.  No contact call.    Reason for Disposition   Message left on unidentified voice mail. Phone number verified.    Additional Information   Negative: Caller has already spoken with the PCP (or office), and has no further questions   Negative: Caller has already spoken with another triager and has no further questions   Negative: Caller has already spoken with another triager or PCP (or office), and has further questions and triager able to answer questions.   Negative: Busy signal.  First attempt to contact caller.  Follow-up call scheduled within 15 minutes.   Negative: No answer.  First attempt to contact caller.  Follow-up call scheduled within 15 minutes.   Negative: Message left on identified voicemail    Protocols used: NO CONTACT OR DUPLICATE CONTACT CALL-A-OH

## 2022-08-12 NOTE — TELEPHONE ENCOUNTER
1st attempt to contact pt regarding enrollment in the Covid Surveillance Program.  Unable to contact pt.  Will make another attempt for enrollment later.  Unable to send [employer] message d/t pt portal is not setup.  No contact call.      Reason for Disposition   Message left on unidentified voice mail. Phone number verified.    Additional Information   Negative: Caller has already spoken with the PCP (or office), and has no further questions   Negative: Caller has already spoken with another triager and has no further questions   Negative: Caller has already spoken with another triager or PCP (or office), and has further questions and triager able to answer questions.   Negative: Busy signal.  First attempt to contact caller.  Follow-up call scheduled within 15 minutes.   Negative: No answer.  First attempt to contact caller.  Follow-up call scheduled within 15 minutes.   Negative: Message left on identified voicemail    Protocols used: NO CONTACT OR DUPLICATE CONTACT CALL-A-OH

## 2022-08-19 ENCOUNTER — DOCUMENT SCAN (OUTPATIENT)
Dept: HOME HEALTH SERVICES | Facility: HOSPITAL | Age: 52
End: 2022-08-19

## 2022-08-19 ENCOUNTER — DOCUMENT SCAN (OUTPATIENT)
Dept: HOME HEALTH SERVICES | Facility: HOSPITAL | Age: 52
End: 2022-08-19
Payer: MEDICARE

## 2022-08-24 ENCOUNTER — DOCUMENT SCAN (OUTPATIENT)
Dept: HOME HEALTH SERVICES | Facility: HOSPITAL | Age: 52
End: 2022-08-24
Payer: MEDICARE

## 2022-08-26 ENCOUNTER — DOCUMENT SCAN (OUTPATIENT)
Dept: HOME HEALTH SERVICES | Facility: HOSPITAL | Age: 52
End: 2022-08-26
Payer: MEDICARE

## 2022-08-26 NOTE — NURSING
Patient was in the hospital on 8/11/22 and was calling to schedule a hospital f/u. Left a detail message on the patient's personal VM to call the office back to schedule a f/u appointment. Upon arrival with EMS from [facility], pt was very lethargic, aroused with aggressive stimulation. Vitals are as follows:   / 107 (126)  T 97.7    O2 100 on 3LNC     NP MICHAEL Venegas notified.Pt did arouse to having to urinate where she jumped up demanding to use the the toilet. Once back in bed, pt went back to baseline of being lethargic and needing aggressive stimulation. ABG normal , CT head and drug screen ordered. Narcan given x3. BG normal.

## 2022-08-29 ENCOUNTER — EXTERNAL HOME HEALTH (OUTPATIENT)
Dept: HOME HEALTH SERVICES | Facility: HOSPITAL | Age: 52
End: 2022-08-29
Payer: MEDICARE

## 2022-08-31 ENCOUNTER — DOCUMENT SCAN (OUTPATIENT)
Dept: HOME HEALTH SERVICES | Facility: HOSPITAL | Age: 52
End: 2022-08-31
Payer: MEDICARE

## 2022-09-06 ENCOUNTER — DOCUMENT SCAN (OUTPATIENT)
Dept: HOME HEALTH SERVICES | Facility: HOSPITAL | Age: 52
End: 2022-09-06
Payer: MEDICARE

## 2022-09-18 ENCOUNTER — HOSPITAL ENCOUNTER (INPATIENT)
Facility: HOSPITAL | Age: 52
LOS: 3 days | Discharge: HOME | DRG: 291 | End: 2022-09-22
Attending: EMERGENCY MEDICINE | Admitting: STUDENT IN AN ORGANIZED HEALTH CARE EDUCATION/TRAINING PROGRAM
Payer: MEDICARE

## 2022-09-18 DIAGNOSIS — I50.43 ACUTE ON CHRONIC COMBINED SYSTOLIC AND DIASTOLIC CONGESTIVE HEART FAILURE: Primary | ICD-10-CM

## 2022-09-18 DIAGNOSIS — R60.1 ANASARCA: ICD-10-CM

## 2022-09-18 DIAGNOSIS — R07.9 CHEST PAIN: ICD-10-CM

## 2022-09-18 DIAGNOSIS — R18.8 OTHER ASCITES: ICD-10-CM

## 2022-09-18 DIAGNOSIS — N17.9 AKI (ACUTE KIDNEY INJURY): ICD-10-CM

## 2022-09-18 DIAGNOSIS — R00.0 TACHYCARDIA: ICD-10-CM

## 2022-09-18 DIAGNOSIS — R79.89 ELEVATED TROPONIN: ICD-10-CM

## 2022-09-18 DIAGNOSIS — I13.0 CARDIORENAL SYNDROME WITH RENAL FAILURE, STAGE 1-4 OR UNSPECIFIED CHRONIC KIDNEY DISEASE, WITH HEART FAILURE: ICD-10-CM

## 2022-09-18 DIAGNOSIS — R06.02 SOB (SHORTNESS OF BREATH): ICD-10-CM

## 2022-09-18 LAB
ALBUMIN SERPL BCP-MCNC: 3.2 G/DL (ref 3.5–5.2)
ALP SERPL-CCNC: 148 U/L (ref 38–126)
ALT SERPL W/O P-5'-P-CCNC: 310 U/L (ref 10–44)
ANION GAP SERPL CALC-SCNC: 11 MMOL/L (ref 8–16)
AST SERPL-CCNC: 325 U/L (ref 15–46)
BASOPHILS # BLD AUTO: 0.06 K/UL (ref 0–0.2)
BASOPHILS NFR BLD: 0.5 % (ref 0–1.9)
BILIRUB SERPL-MCNC: 0.6 MG/DL (ref 0.1–1)
CALCIUM SERPL-MCNC: 8.4 MG/DL (ref 8.7–10.5)
CHLORIDE SERPL-SCNC: 102 MMOL/L (ref 95–110)
CO2 SERPL-SCNC: 27 MMOL/L (ref 23–29)
CREAT SERPL-MCNC: 1.14 MG/DL (ref 0.5–1.4)
DIFFERENTIAL METHOD: ABNORMAL
EOSINOPHIL # BLD AUTO: 0 K/UL (ref 0–0.5)
EOSINOPHIL NFR BLD: 0.3 % (ref 0–8)
ERYTHROCYTE [DISTWIDTH] IN BLOOD BY AUTOMATED COUNT: 17 % (ref 11.5–14.5)
EST. GFR  (NO RACE VARIABLE): 58.6 ML/MIN/1.73 M^2
GLUCOSE SERPL-MCNC: 121 MG/DL (ref 70–110)
HCT VFR BLD AUTO: 37.5 % (ref 37–48.5)
HGB BLD-MCNC: 11.4 G/DL (ref 12–16)
IMM GRANULOCYTES # BLD AUTO: 0.04 K/UL (ref 0–0.04)
IMM GRANULOCYTES NFR BLD AUTO: 0.4 % (ref 0–0.5)
LYMPHOCYTES # BLD AUTO: 2.1 K/UL (ref 1–4.8)
LYMPHOCYTES NFR BLD: 18.6 % (ref 18–48)
MCH RBC QN AUTO: 26 PG (ref 27–31)
MCHC RBC AUTO-ENTMCNC: 30.4 G/DL (ref 32–36)
MCV RBC AUTO: 85 FL (ref 82–98)
MONOCYTES # BLD AUTO: 0.9 K/UL (ref 0.3–1)
MONOCYTES NFR BLD: 8.1 % (ref 4–15)
NEUTROPHILS # BLD AUTO: 8.1 K/UL (ref 1.8–7.7)
NEUTROPHILS NFR BLD: 72.1 % (ref 38–73)
NRBC BLD-RTO: 0 /100 WBC
NT-PROBNP SERPL-MCNC: ABNORMAL PG/ML (ref 5–450)
PLATELET # BLD AUTO: 302 K/UL (ref 150–450)
PMV BLD AUTO: 11.8 FL (ref 9.2–12.9)
POTASSIUM SERPL-SCNC: 4 MMOL/L (ref 3.5–5.1)
PROT SERPL-MCNC: 6.3 G/DL (ref 6–8.4)
RBC # BLD AUTO: 4.39 M/UL (ref 4–5.4)
SODIUM SERPL-SCNC: 140 MMOL/L (ref 136–145)
TROPONIN I SERPL-MCNC: 0.09 NG/ML (ref 0.01–0.03)
UUN UR-MCNC: 24 MG/DL (ref 7–17)
WBC # BLD AUTO: 11.2 K/UL (ref 3.9–12.7)

## 2022-09-18 PROCEDURE — 80053 COMPREHEN METABOLIC PANEL: CPT | Mod: ER | Performed by: EMERGENCY MEDICINE

## 2022-09-18 PROCEDURE — 93010 EKG 12-LEAD: ICD-10-PCS | Mod: ,,, | Performed by: INTERNAL MEDICINE

## 2022-09-18 PROCEDURE — 93010 ELECTROCARDIOGRAM REPORT: CPT | Mod: ,,, | Performed by: INTERNAL MEDICINE

## 2022-09-18 PROCEDURE — 83880 ASSAY OF NATRIURETIC PEPTIDE: CPT | Mod: ER | Performed by: EMERGENCY MEDICINE

## 2022-09-18 PROCEDURE — 84484 ASSAY OF TROPONIN QUANT: CPT | Mod: ER | Performed by: EMERGENCY MEDICINE

## 2022-09-18 PROCEDURE — 93005 ELECTROCARDIOGRAM TRACING: CPT | Mod: ER

## 2022-09-18 PROCEDURE — 85025 COMPLETE CBC W/AUTO DIFF WBC: CPT | Mod: ER | Performed by: EMERGENCY MEDICINE

## 2022-09-18 NOTE — Clinical Note
Diagnosis: Acute on chronic combined systolic and diastolic congestive heart failure [[id number]]   Future Attending Provider: GABINO NORRIS [[id number]]   Admitting Provider:: GABINO NORRIS [[id number]]

## 2022-09-19 ENCOUNTER — CLINICAL SUPPORT (OUTPATIENT)
Dept: SMOKING CESSATION | Facility: CLINIC | Age: 52
End: 2022-09-19
Payer: COMMERCIAL

## 2022-09-19 DIAGNOSIS — F17.210 CIGARETTE SMOKER: Primary | ICD-10-CM

## 2022-09-19 PROBLEM — I50.43 ACUTE ON CHRONIC COMBINED SYSTOLIC AND DIASTOLIC CONGESTIVE HEART FAILURE: Status: ACTIVE | Noted: 2022-09-19

## 2022-09-19 PROBLEM — F19.10 POLYSUBSTANCE ABUSE: Status: ACTIVE | Noted: 2022-09-19

## 2022-09-19 PROBLEM — R79.89 ELEVATED LFTS: Status: ACTIVE | Noted: 2022-09-19

## 2022-09-19 LAB
ALLENS TEST: ABNORMAL
AMMONIA PLAS-SCNC: 49 UMOL/L (ref 10–50)
AMPHET+METHAMPHET UR QL: ABNORMAL
ANION GAP SERPL CALC-SCNC: 12 MMOL/L (ref 8–16)
BARBITURATES UR QL SCN>200 NG/ML: NEGATIVE
BASOPHILS # BLD AUTO: 0.08 K/UL (ref 0–0.2)
BASOPHILS NFR BLD: 0.7 % (ref 0–1.9)
BENZODIAZ UR QL SCN>200 NG/ML: NEGATIVE
BILIRUB UR QL STRIP: NEGATIVE
BNP SERPL-MCNC: >4900 PG/ML (ref 0–99)
BUN SERPL-MCNC: 22 MG/DL (ref 6–20)
BZE UR QL SCN: NEGATIVE
CALCIUM SERPL-MCNC: 8.7 MG/DL (ref 8.7–10.5)
CANNABINOIDS UR QL SCN: NEGATIVE
CHLORIDE SERPL-SCNC: 104 MMOL/L (ref 95–110)
CLARITY UR: CLEAR
CO2 SERPL-SCNC: 23 MMOL/L (ref 23–29)
COLOR UR: YELLOW
CREAT SERPL-MCNC: 1 MG/DL (ref 0.5–1.4)
CREAT UR-MCNC: 37.4 MG/DL (ref 15–325)
CTP QC/QA: YES
DIFFERENTIAL METHOD: ABNORMAL
EOSINOPHIL # BLD AUTO: 0.1 K/UL (ref 0–0.5)
EOSINOPHIL NFR BLD: 0.5 % (ref 0–8)
ERYTHROCYTE [DISTWIDTH] IN BLOOD BY AUTOMATED COUNT: 17.3 % (ref 11.5–14.5)
EST. GFR  (NO RACE VARIABLE): >60 ML/MIN/1.73 M^2
GLUCOSE SERPL-MCNC: 104 MG/DL (ref 70–110)
GLUCOSE UR QL STRIP: NEGATIVE
HCO3 UR-SCNC: 25 MMOL/L (ref 24–28)
HCT VFR BLD AUTO: 37.5 % (ref 37–48.5)
HGB BLD-MCNC: 11.3 G/DL (ref 12–16)
HGB UR QL STRIP: NEGATIVE
IMM GRANULOCYTES # BLD AUTO: 0.04 K/UL (ref 0–0.04)
IMM GRANULOCYTES NFR BLD AUTO: 0.4 % (ref 0–0.5)
KETONES UR QL STRIP: NEGATIVE
LEUKOCYTE ESTERASE UR QL STRIP: NEGATIVE
LYMPHOCYTES # BLD AUTO: 2.4 K/UL (ref 1–4.8)
LYMPHOCYTES NFR BLD: 20.8 % (ref 18–48)
MAGNESIUM SERPL-MCNC: 1.9 MG/DL (ref 1.6–2.6)
MCH RBC QN AUTO: 25.5 PG (ref 27–31)
MCHC RBC AUTO-ENTMCNC: 30.1 G/DL (ref 32–36)
MCV RBC AUTO: 85 FL (ref 82–98)
METHADONE UR QL SCN>300 NG/ML: NEGATIVE
MONOCYTES # BLD AUTO: 1.1 K/UL (ref 0.3–1)
MONOCYTES NFR BLD: 9.5 % (ref 4–15)
NEUTROPHILS # BLD AUTO: 7.7 K/UL (ref 1.8–7.7)
NEUTROPHILS NFR BLD: 68.1 % (ref 38–73)
NITRITE UR QL STRIP: NEGATIVE
NRBC BLD-RTO: 0 /100 WBC
OPIATES UR QL SCN: NEGATIVE
PCO2 BLDA: 37.9 MMHG (ref 35–45)
PCP UR QL SCN>25 NG/ML: NEGATIVE
PH SMN: 7.43 [PH] (ref 7.35–7.45)
PH UR STRIP: 6 [PH] (ref 5–8)
PLATELET # BLD AUTO: 262 K/UL (ref 150–450)
PMV BLD AUTO: 11.6 FL (ref 9.2–12.9)
PO2 BLDA: 55 MMHG (ref 80–100)
POC BE: 1 MMOL/L
POC SATURATED O2: 89 % (ref 95–100)
POC TCO2: 26 MMOL/L (ref 23–27)
POCT GLUCOSE: 121 MG/DL (ref 70–110)
POCT GLUCOSE: 140 MG/DL (ref 70–110)
POTASSIUM SERPL-SCNC: 3.8 MMOL/L (ref 3.5–5.1)
PROT UR QL STRIP: ABNORMAL
RBC # BLD AUTO: 4.44 M/UL (ref 4–5.4)
SAMPLE: ABNORMAL
SARS-COV-2 RDRP RESP QL NAA+PROBE: NEGATIVE
SITE: ABNORMAL
SODIUM SERPL-SCNC: 139 MMOL/L (ref 136–145)
SP GR UR STRIP: 1.01 (ref 1–1.03)
TOXICOLOGY INFORMATION: ABNORMAL
TROPONIN I SERPL DL<=0.01 NG/ML-MCNC: 0.17 NG/ML (ref 0–0.03)
URN SPEC COLLECT METH UR: ABNORMAL
UROBILINOGEN UR STRIP-ACNC: NEGATIVE EU/DL
WBC # BLD AUTO: 11.33 K/UL (ref 3.9–12.7)

## 2022-09-19 PROCEDURE — 99900035 HC TECH TIME PER 15 MIN (STAT)

## 2022-09-19 PROCEDURE — 25000003 PHARM REV CODE 250: Performed by: STUDENT IN AN ORGANIZED HEALTH CARE EDUCATION/TRAINING PROGRAM

## 2022-09-19 PROCEDURE — 94761 N-INVAS EAR/PLS OXIMETRY MLT: CPT

## 2022-09-19 PROCEDURE — 11000001 HC ACUTE MED/SURG PRIVATE ROOM

## 2022-09-19 PROCEDURE — 99407 BEHAV CHNG SMOKING > 10 MIN: CPT | Mod: S$GLB,,,

## 2022-09-19 PROCEDURE — 93010 EKG 12-LEAD: ICD-10-PCS | Mod: ,,, | Performed by: INTERNAL MEDICINE

## 2022-09-19 PROCEDURE — 63600175 PHARM REV CODE 636 W HCPCS: Performed by: INTERNAL MEDICINE

## 2022-09-19 PROCEDURE — 63600175 PHARM REV CODE 636 W HCPCS: Performed by: NURSE PRACTITIONER

## 2022-09-19 PROCEDURE — 99407 PR TOBACCO USE CESSATION INTENSIVE >10 MINUTES: ICD-10-PCS | Mod: S$GLB,,,

## 2022-09-19 PROCEDURE — 99233 SBSQ HOSP IP/OBS HIGH 50: CPT | Mod: ,,, | Performed by: NURSE PRACTITIONER

## 2022-09-19 PROCEDURE — 81003 URINALYSIS AUTO W/O SCOPE: CPT | Mod: 59 | Performed by: NURSE PRACTITIONER

## 2022-09-19 PROCEDURE — 82140 ASSAY OF AMMONIA: CPT | Performed by: NURSE PRACTITIONER

## 2022-09-19 PROCEDURE — 96374 THER/PROPH/DIAG INJ IV PUSH: CPT

## 2022-09-19 PROCEDURE — 96376 TX/PRO/DX INJ SAME DRUG ADON: CPT

## 2022-09-19 PROCEDURE — 83735 ASSAY OF MAGNESIUM: CPT | Performed by: NURSE PRACTITIONER

## 2022-09-19 PROCEDURE — 27000221 HC OXYGEN, UP TO 24 HOURS

## 2022-09-19 PROCEDURE — 82803 BLOOD GASES ANY COMBINATION: CPT

## 2022-09-19 PROCEDURE — 94760 N-INVAS EAR/PLS OXIMETRY 1: CPT

## 2022-09-19 PROCEDURE — 83880 ASSAY OF NATRIURETIC PEPTIDE: CPT | Performed by: NURSE PRACTITIONER

## 2022-09-19 PROCEDURE — 93005 ELECTROCARDIOGRAM TRACING: CPT

## 2022-09-19 PROCEDURE — 25000003 PHARM REV CODE 250: Performed by: NURSE PRACTITIONER

## 2022-09-19 PROCEDURE — 99233 PR SUBSEQUENT HOSPITAL CARE,LEVL III: ICD-10-PCS | Mod: ,,, | Performed by: NURSE PRACTITIONER

## 2022-09-19 PROCEDURE — 99285 EMERGENCY DEPT VISIT HI MDM: CPT | Mod: 25

## 2022-09-19 PROCEDURE — 63600175 PHARM REV CODE 636 W HCPCS: Performed by: STUDENT IN AN ORGANIZED HEALTH CARE EDUCATION/TRAINING PROGRAM

## 2022-09-19 PROCEDURE — 80307 DRUG TEST PRSMV CHEM ANLYZR: CPT | Performed by: NURSE PRACTITIONER

## 2022-09-19 PROCEDURE — U0002 COVID-19 LAB TEST NON-CDC: HCPCS | Performed by: NURSE PRACTITIONER

## 2022-09-19 PROCEDURE — 93010 ELECTROCARDIOGRAM REPORT: CPT | Mod: ,,, | Performed by: INTERNAL MEDICINE

## 2022-09-19 PROCEDURE — 87040 BLOOD CULTURE FOR BACTERIA: CPT | Performed by: NURSE PRACTITIONER

## 2022-09-19 PROCEDURE — 82962 GLUCOSE BLOOD TEST: CPT

## 2022-09-19 PROCEDURE — 80048 BASIC METABOLIC PNL TOTAL CA: CPT | Performed by: NURSE PRACTITIONER

## 2022-09-19 PROCEDURE — 94660 CPAP INITIATION&MGMT: CPT

## 2022-09-19 PROCEDURE — 27000190 HC CPAP FULL FACE MASK W/VALVE

## 2022-09-19 PROCEDURE — 85025 COMPLETE CBC W/AUTO DIFF WBC: CPT | Performed by: NURSE PRACTITIONER

## 2022-09-19 PROCEDURE — 84484 ASSAY OF TROPONIN QUANT: CPT | Performed by: NURSE PRACTITIONER

## 2022-09-19 PROCEDURE — 96375 TX/PRO/DX INJ NEW DRUG ADDON: CPT

## 2022-09-19 PROCEDURE — 36600 WITHDRAWAL OF ARTERIAL BLOOD: CPT

## 2022-09-19 RX ORDER — SODIUM CHLORIDE 0.9 % (FLUSH) 0.9 %
10 SYRINGE (ML) INJECTION
Status: DISCONTINUED | OUTPATIENT
Start: 2022-09-19 | End: 2022-09-22 | Stop reason: HOSPADM

## 2022-09-19 RX ORDER — MAG HYDROX/ALUMINUM HYD/SIMETH 200-200-20
30 SUSPENSION, ORAL (FINAL DOSE FORM) ORAL EVERY 6 HOURS PRN
Status: DISCONTINUED | OUTPATIENT
Start: 2022-09-19 | End: 2022-09-22 | Stop reason: HOSPADM

## 2022-09-19 RX ORDER — PROPRANOLOL HYDROCHLORIDE 10 MG/1
10 TABLET ORAL 3 TIMES DAILY
Status: DISCONTINUED | OUTPATIENT
Start: 2022-09-19 | End: 2022-09-22 | Stop reason: HOSPADM

## 2022-09-19 RX ORDER — SODIUM CHLORIDE 0.9 % (FLUSH) 0.9 %
3 SYRINGE (ML) INJECTION EVERY 12 HOURS PRN
Status: DISCONTINUED | OUTPATIENT
Start: 2022-09-19 | End: 2022-09-22 | Stop reason: HOSPADM

## 2022-09-19 RX ORDER — MUPIROCIN 20 MG/G
OINTMENT TOPICAL 2 TIMES DAILY
Status: DISCONTINUED | OUTPATIENT
Start: 2022-09-19 | End: 2022-09-22 | Stop reason: HOSPADM

## 2022-09-19 RX ORDER — QUETIAPINE FUMARATE 100 MG/1
100 TABLET, FILM COATED ORAL ONCE
Status: COMPLETED | OUTPATIENT
Start: 2022-09-19 | End: 2022-09-19

## 2022-09-19 RX ORDER — LISINOPRIL 20 MG/1
40 TABLET ORAL DAILY
Status: DISCONTINUED | OUTPATIENT
Start: 2022-09-19 | End: 2022-09-22 | Stop reason: HOSPADM

## 2022-09-19 RX ORDER — TALC
6 POWDER (GRAM) TOPICAL NIGHTLY PRN
Status: DISCONTINUED | OUTPATIENT
Start: 2022-09-19 | End: 2022-09-22 | Stop reason: HOSPADM

## 2022-09-19 RX ORDER — FUROSEMIDE 10 MG/ML
40 INJECTION INTRAMUSCULAR; INTRAVENOUS
Status: DISCONTINUED | OUTPATIENT
Start: 2022-09-19 | End: 2022-09-22

## 2022-09-19 RX ORDER — ONDANSETRON 2 MG/ML
4 INJECTION INTRAMUSCULAR; INTRAVENOUS EVERY 8 HOURS PRN
Status: DISCONTINUED | OUTPATIENT
Start: 2022-09-19 | End: 2022-09-22 | Stop reason: HOSPADM

## 2022-09-19 RX ORDER — NOREPINEPHRINE BITARTRATE/D5W 4MG/250ML
0.05 PLASTIC BAG, INJECTION (ML) INTRAVENOUS CONTINUOUS
Status: DISCONTINUED | OUTPATIENT
Start: 2022-09-19 | End: 2022-09-19

## 2022-09-19 RX ORDER — IBUPROFEN 200 MG
24 TABLET ORAL
Status: DISCONTINUED | OUTPATIENT
Start: 2022-09-19 | End: 2022-09-22 | Stop reason: HOSPADM

## 2022-09-19 RX ORDER — NALOXONE HCL 0.4 MG/ML
0.02 VIAL (ML) INJECTION
Status: DISCONTINUED | OUTPATIENT
Start: 2022-09-19 | End: 2022-09-22 | Stop reason: HOSPADM

## 2022-09-19 RX ORDER — IBUPROFEN 200 MG
16 TABLET ORAL
Status: DISCONTINUED | OUTPATIENT
Start: 2022-09-19 | End: 2022-09-22 | Stop reason: HOSPADM

## 2022-09-19 RX ORDER — FUROSEMIDE 10 MG/ML
40 INJECTION INTRAMUSCULAR; INTRAVENOUS DAILY
Status: DISCONTINUED | OUTPATIENT
Start: 2022-09-19 | End: 2022-09-19

## 2022-09-19 RX ORDER — FUROSEMIDE 10 MG/ML
80 INJECTION INTRAMUSCULAR; INTRAVENOUS
Status: COMPLETED | OUTPATIENT
Start: 2022-09-19 | End: 2022-09-19

## 2022-09-19 RX ORDER — ACETAMINOPHEN 325 MG/1
650 TABLET ORAL EVERY 4 HOURS PRN
Status: DISCONTINUED | OUTPATIENT
Start: 2022-09-19 | End: 2022-09-22 | Stop reason: HOSPADM

## 2022-09-19 RX ORDER — FAMOTIDINE 20 MG/1
20 TABLET, FILM COATED ORAL DAILY
Status: DISCONTINUED | OUTPATIENT
Start: 2022-09-19 | End: 2022-09-22 | Stop reason: HOSPADM

## 2022-09-19 RX ORDER — ENOXAPARIN SODIUM 100 MG/ML
40 INJECTION SUBCUTANEOUS EVERY 24 HOURS
Status: DISCONTINUED | OUTPATIENT
Start: 2022-09-19 | End: 2022-09-22 | Stop reason: HOSPADM

## 2022-09-19 RX ORDER — NALOXONE HCL 0.4 MG/ML
1.2 VIAL (ML) INJECTION ONCE
Status: COMPLETED | OUTPATIENT
Start: 2022-09-19 | End: 2022-09-19

## 2022-09-19 RX ORDER — GLUCAGON 1 MG
1 KIT INJECTION
Status: DISCONTINUED | OUTPATIENT
Start: 2022-09-19 | End: 2022-09-22 | Stop reason: HOSPADM

## 2022-09-19 RX ADMIN — PROPRANOLOL HYDROCHLORIDE 10 MG: 10 TABLET ORAL at 09:09

## 2022-09-19 RX ADMIN — MUPIROCIN: 20 OINTMENT TOPICAL at 09:09

## 2022-09-19 RX ADMIN — FUROSEMIDE 80 MG: 10 INJECTION, SOLUTION INTRAMUSCULAR; INTRAVENOUS at 02:09

## 2022-09-19 RX ADMIN — LISINOPRIL 40 MG: 20 TABLET ORAL at 08:09

## 2022-09-19 RX ADMIN — ENOXAPARIN SODIUM 40 MG: 100 INJECTION SUBCUTANEOUS at 04:09

## 2022-09-19 RX ADMIN — NALXONE HYDROCHLORIDE 1.2 MG: 0.4 INJECTION INTRAMUSCULAR; INTRAVENOUS; SUBCUTANEOUS at 11:09

## 2022-09-19 RX ADMIN — FUROSEMIDE 40 MG: 10 INJECTION, SOLUTION INTRAMUSCULAR; INTRAVENOUS at 08:09

## 2022-09-19 RX ADMIN — ACETAMINOPHEN 650 MG: 325 TABLET ORAL at 04:09

## 2022-09-19 RX ADMIN — QUETIAPINE FUMARATE 100 MG: 100 TABLET ORAL at 09:09

## 2022-09-19 RX ADMIN — PROPRANOLOL HYDROCHLORIDE 10 MG: 10 TABLET ORAL at 02:09

## 2022-09-19 RX ADMIN — FAMOTIDINE 20 MG: 20 TABLET ORAL at 08:09

## 2022-09-19 RX ADMIN — ALUMINUM HYDROXIDE, MAGNESIUM HYDROXIDE, AND DIMETHICONE 30 ML: 200; 20; 200 SUSPENSION ORAL at 06:09

## 2022-09-19 NOTE — ED PROVIDER NOTES
Encounter Date: 9/18/2022       History     Chief Complaint   Patient presents with    Shortness of Breath     Brought in by [facility] for c/o SOB. Was dx with CHF 3weeks ago. Has edema from her belly down. Patient is agitated and cursing at staff because she wants water.     Patient currently presents with concern regarding shortness of breath and swelling.  Patient reports a history of significant heart failure noting she was only recently discharged from the hospital following similar symptoms.  On this occasion however she notes substantial increase in abdominal girth as well as swelling from the feet all the way up to the hips.  Patient denies chest pain present.  There has not been fever or chills.  Patient does have a additional history for hepatitis C but reports a viral clearance several years ago following treatment.    Review of patient's allergies indicates:  No Known Allergies  Past Medical History:   Diagnosis Date    ADHD (attention deficit hyperactivity disorder)     Anemia     Anxiety     Bipolar disorder     History of hepatitis C - s/p clearance of virus (HCV neg 6/2015) 9/26/2014    History of psychiatric hospitalization     History of substance abuse     IV heroin - last use 2013 per pt    Hx of psychiatric care     Hypertension     Opioid overdose 5/10/2016    Psychiatric problem     Psychosis     Seizure disorder 4/3/2019    Sleep difficulties     Substance abuse     Therapy     Vasculitis      Past Surgical History:   Procedure Laterality Date    HYSTERECTOMY  3/16/2011    SALPINGOOPHORECTOMY Right 3/16/2011    TUBAL LIGATION      Vaginal cuff repair  04/22/2011     Family History   Problem Relation Age of Onset    Diabetes Maternal Grandmother     Cancer Maternal Grandmother      Social History     Tobacco Use    Smoking status: Every Day     Packs/day: 1.00     Years: 36.00     Pack years: 36.00     Types: Cigarettes     Start date: 1986    Smokeless tobacco: Never    Tobacco comments:      Enrolled in [employer] on 10/23/14 ([employer] Member ID # [id number])  Ambulatory referral to Smoking Cessation clinic.   Substance Use Topics    Alcohol use: No    Drug use: Yes     Types: Heroin, Cocaine, Methamphetamines, Marijuana     Review of Systems   Constitutional:  Positive for fatigue. Negative for chills and fever.   HENT:  Negative for congestion and rhinorrhea.    Respiratory:  Positive for shortness of breath. Negative for cough.    Cardiovascular:  Positive for leg swelling. Negative for chest pain and palpitations.   Gastrointestinal:  Negative for abdominal pain, diarrhea and vomiting.   Genitourinary:  Negative for dysuria.   Skin:  Negative for color change and rash.   Neurological:  Negative for dizziness and light-headedness.   Hematological:  Negative for adenopathy. Does not bruise/bleed easily.     Physical Exam     Initial Vitals [09/18/22 2150]   BP Pulse Resp Temp SpO2   (!) 141/99 (!) 115 (!) 24 98.3 °F (36.8 °C) (!) 94 %      MAP       --         Physical Exam    Nursing note and vitals reviewed.  Constitutional: She appears well-developed and well-nourished. She is not diaphoretic. No distress.   HENT:   Head: Normocephalic and atraumatic.   Nose: Nose normal.   Mouth/Throat: Oropharynx is clear and moist.   Eyes: Conjunctivae are normal. No scleral icterus.   Neck: Neck supple. No JVD present.   Cardiovascular:  Normal rate, regular rhythm, normal heart sounds and intact distal pulses.           Pulmonary/Chest: Breath sounds normal. No respiratory distress.   Abdominal: Abdomen is soft. She exhibits distension (shifting dullness). There is no abdominal tenderness.   Musculoskeletal:      Cervical back: Neck supple.     Neurological: She is alert and oriented to person, place, and time.   Skin: Skin is warm and dry.       ED Course   Procedures  Labs Reviewed   CBC W/ AUTO DIFFERENTIAL - Abnormal; Notable for the following components:       Result Value    Hemoglobin 11.4 (*)     MCH  26.0 (*)     MCHC 30.4 (*)     RDW 17.0 (*)     Gran # (ANC) 8.1 (*)     All other components within normal limits   COMPREHENSIVE METABOLIC PANEL - Abnormal; Notable for the following components:    Glucose 121 (*)     BUN 24 (*)     Calcium 8.4 (*)     Albumin 3.2 (*)     Alkaline Phosphatase 148 (*)      (*)      (*)     eGFR 58.6 (*)     All other components within normal limits   NT-PRO NATRIURETIC PEPTIDE - Abnormal; Notable for the following components:    NT-proBNP 71149 (*)     All other components within normal limits   TROPONIN I - Abnormal; Notable for the following components:    Troponin I 0.088 (*)     All other components within normal limits   CBC W/ AUTO DIFFERENTIAL - Abnormal; Notable for the following components:    Hemoglobin 11.3 (*)     MCH 25.5 (*)     MCHC 30.1 (*)     RDW 17.3 (*)     Mono # 1.1 (*)     All other components within normal limits   POCT GLUCOSE - Abnormal; Notable for the following components:    POCT Glucose 121 (*)     All other components within normal limits   ISTAT PROCEDURE - Abnormal; Notable for the following components:    POC PO2 55 (*)     POC SATURATED O2 89 (*)     All other components within normal limits   SARS-COV-2 RDRP GENE - Normal    Narrative:     This test utilizes isothermal nucleic acid amplification   technology to detect the SARS-CoV-2 RdRp nucleic acid segment.   The analytical sensitivity (limit of detection) is 125 genome   equivalents/mL.   A POSITIVE result implies infection with the SARS-CoV-2 virus;   the patient is presumed to be contagious.     A NEGATIVE result means that SARS-CoV-2 nucleic acids are not   present above the limit of detection. A NEGATIVE result should be   treated as presumptive. It does not rule out the possibility of   COVID-19 and should not be the sole basis for treatment decisions.   If COVID-19 is strongly suspected based on clinical and exposure   history, re-testing using an alternate molecular assay  should be   considered.   This test is only for use under the Food and Drug   Administration s Emergency Use Authorization (EUA).   Commercial kits are provided by [facility].   Performance characteristics of the EUA have been independently   verified by [facility] Department of   Pathology and Laboratory Medicine.   _________________________________________________________________   The authorized Fact Sheet for Healthcare Providers and the authorized Fact   Sheet for Patients of the ID NOW COVID-19 are available on the FDA   website:     https://www.fda.gov/media/418340/download  https://www.fda.gov/media/244243/download           POCT GLUCOSE MONITORING CONTINUOUS     EKG Readings: (Independently Interpreted)   Initial Reading: No STEMI. Rhythm: Sinus Tachycardia. Heart Rate: 114. Ectopy: No Ectopy. Other Impression: Nonspecific intraventricular block noted.  This appears consistent with prior EKGs.       Imaging Results              CT Head Without Contrast (Final result)  Result time 09/19/22 04:16:58      Final result by Reji Gonzales MD (09/19/22 04:16:58)                   Impression:      No CT evidence of acute intracranial abnormality. Clinical correlation and further evaluation as warranted.      Electronically signed by: Reji Gonzales MD  Date:    09/19/2022  Time:    04:16               Narrative:    EXAMINATION:  CT HEAD WITHOUT CONTRAST    CLINICAL HISTORY:  Mental status change, unknown cause;    TECHNIQUE:  Low dose axial images were obtained through the head.  Coronal and sagittal reformations were also performed. Contrast was not administered.    COMPARISON:  CT 07/22/2022    FINDINGS:  There is no acute intracranial hemorrhage, hydrocephalus, midline shift or mass effect. Gray-white matter differentiation appears maintained. The basal cisterns are patent. The mastoid air cells and paranasal sinuses are clear of acute process. The visualized bones of the calvarium  demonstrate no acute osseous abnormality.                                       X-Ray Chest AP Portable (Final result)  Result time 09/18/22 22:52:25      Final result by Denise Herring MD (09/18/22 22:52:25)                   Impression:      Cardiomegaly with perihilar edema and mild right-sided pleural effusion with retrocardiac consolidation.  Correlate clinically to CHF versus pneumonia      Electronically signed by: Nima Walker  Date:    09/18/2022  Time:    22:52               Narrative:    EXAMINATION:  XR CHEST AP PORTABLE    CLINICAL HISTORY:  Chest Pain;    TECHNIQUE:  Single frontal view of the chest was performed.    COMPARISON:  None    FINDINGS:  Cardiomegaly.  Mild right-sided pleural effusion.  Moderate perihilar edema.  Air bronchograms in the retrocardiac region may relate to pneumonia    Bones are intact.                                       Medications   sodium chloride 0.9% flush 3 mL (has no administration in time range)   melatonin tablet 6 mg (has no administration in time range)   ondansetron injection 4 mg (has no administration in time range)   acetaminophen tablet 650 mg (650 mg Oral Given 9/19/22 1637)   naloxone 0.4 mg/mL injection 0.02 mg (has no administration in time range)   glucose chewable tablet 16 g (has no administration in time range)   glucose chewable tablet 24 g (has no administration in time range)   glucagon (human recombinant) injection 1 mg (has no administration in time range)   dextrose 10% bolus 125 mL (has no administration in time range)   dextrose 10% bolus 250 mL (has no administration in time range)   enoxaparin injection 40 mg (40 mg Subcutaneous Given 9/19/22 1638)   sodium chloride 0.9% flush 10 mL (has no administration in time range)   furosemide injection 40 mg (40 mg Intravenous Given 9/19/22 0859)   famotidine tablet 20 mg (20 mg Oral Given 9/19/22 0859)   lisinopriL tablet 40 mg (40 mg Oral Given 9/19/22 0859)   propranoloL tablet 10 mg (10 mg Oral  Given 9/19/22 1418)   aluminum-magnesium hydroxide-simethicone 200-200-20 mg/5 mL suspension 30 mL (30 mLs Oral Given 9/19/22 1708)   furosemide injection 80 mg (80 mg Intravenous Given 9/19/22 0249)   naloxone 0.4 mg/mL injection 1.2 mg (1.2 mg Intravenous Given 9/19/22 1136)     Medical Decision Making:   History:   Old Medical Records: I decided to obtain old medical records.  Old Records Summarized: records from previous admission(s).       <> Summary of Records: · The left ventricle is mildly enlarged with concentric hypertrophy and severely decreased systolic function.  · The estimated ejection fraction is 20%.  · There is severe left ventricular global hypokinesis.  · Grade III left ventricular diastolic dysfunction.  · Mild right ventricular enlargement with mildly reduced right ventricular systolic function.  · Severe left atrial enlargement.  · Moderate aortic regurgitation.  · Severe mitral regurgitation.  · Mild tricuspid regurgitation.  · Moderate pulmonic regurgitation.  · Intermediate central venous pressure (8 mmHg).  · The estimated PA systolic pressure is 33 mmHg.  · Trivial pericardial effusion.    Independently Interpreted Test(s):   I have ordered and independently interpreted EKG Reading(s) - see prior notes  Clinical Tests:   Lab Tests: Ordered and Reviewed  Radiological Study: Ordered and Reviewed  ED Management:  All historical, clinical, radiographic, and laboratory findings were reviewed with the patient/family in detail along with the indications for transport to the facility in [place] in order to receive ongoing diuresis along with cardiology consultation and consideration for therapeutic paracentesis.  At this point the patient's respiratory distress has improved following administration of oxygen but she remains mildly tachycardic.  Oxygen saturation remained satisfactory.  All remaining questions and concerns were addressed at this time and the patient/family communicates  understanding and agrees to proceed accordingly.  Similarly, all pertinent details of the encounter were discussed with Dr. Irby via [facility] ZOIE [facility] who agrees to receive the patient at [facility] for further care as outlined above.  The patient will be transferred by [facility] ambulance services secondary to a need for ongoing cardiac monitoring en route.               ED Course as of 09/19/22 1822   Mon Sep 19, 2022   0956 Critical care time spent on the evaluation and treatment of severe organ dysfunction, review of pertinent labs and imaging studies, discussions with consulting providers and discussions with patient/family: 35 minutes.   [BG]      ED Course User Index  [BG] Yang Josue MD                   Clinical Impression:   Final diagnoses:  [I50.43] Acute on chronic combined systolic and diastolic congestive heart failure (Primary)  [R60.1] Anasarca  [R18.8] Other ascites  [R77.8] Elevated troponin  [N17.9] ARABELLA (acute kidney injury)  [R06.02] SOB (shortness of breath)      ED Disposition Condition    Observation Stable                Elmer Brower MD  09/19/22 1822

## 2022-09-19 NOTE — CONSULTS
[facility] - Intensive Care  Cardiology  Consult Note    Patient Name: Stephanie T Barthelemy  MRN: [id number]  Admission Date: 9/18/2022  Hospital Length of Stay: 0 days  Code Status: Full Code   Attending Provider: Maryan Irby MD   Consulting Provider: MICHAEL Chowdhury, BABAK  Primary Care Physician: No primary care provider on file.  Principal Problem:Acute on chronic combined systolic and diastolic congestive heart failure    Patient information was obtained from patient, past medical records and ER records.     Inpatient consult to [facility]iology-[facility]  Consult performed by: MICHAEL Rivas, BABAK  Consult ordered by: Ольга Martin NP  Reason for consult: ADHF         Subjective:     Chief Complaint:  SOB and abdominal bloating      HPI:   52yo female with HTN, tobacco abuse, bipolar disorder, elevated troponin, acute respiratory failure with hypoxia, acute encephalopathy, acute on chronic systolic heart failure, polysubstance abuse and elevated LFTs who presented to the ER with SOB and abdominal swelling/bloating. She was seen on rounds while resting in the ICU and was drowsy making HPI difficult. She complained of SOB and abdominal bloating and swelling. She was diagnosed with systolic heart failure in July and was placed on good medications at that time. She is able to recall taking Lasix but cannot recall her other medications. Labs: CBC WNL. BMP BUN 22 creatinine 1.0 BNP >4900 Troponin .088-.175 Tox screen + methamphetamine. Started on IV Lasix BID with 3.6L out overnight and admitted to [facility]. Cardiology consulted for ADHF    Hospital Course: 9/19/2022 per HPI   Past Medical History:   Diagnosis Date    ADHD (attention deficit hyperactivity disorder)     Anemia     Anxiety     Bipolar disorder     History of hepatitis C - s/p clearance of virus (HCV neg 6/2015) 9/26/2014    History of psychiatric hospitalization     History of substance abuse     IV heroin - last use  2013 per pt    Hx of psychiatric care     Hypertension     Opioid overdose 5/10/2016    Psychiatric problem     Psychosis     Seizure disorder 4/3/2019    Sleep difficulties     Substance abuse     Therapy     Vasculitis        Past Surgical History:   Procedure Laterality Date    HYSTERECTOMY  3/16/2011    SALPINGOOPHORECTOMY Right 3/16/2011    TUBAL LIGATION      Vaginal cuff repair  04/22/2011       Review of patient's allergies indicates:  No Known Allergies    No current facility-administered medications on file prior to encounter.     Current Outpatient Medications on File Prior to Encounter   Medication Sig    albuterol (PROVENTIL/VENTOLIN HFA) 90 mcg/actuation inhaler Inhale 2 puffs into the lungs every 6 (six) hours. Rescue    benztropine (COGENTIN) 0.5 MG tablet Take 1 tablet (0.5 mg total) by mouth 2 (two) times daily.    famotidine (PEPCID) 20 MG tablet Take 1 tablet (20 mg total) by mouth 2 (two) times daily.    furosemide (LASIX) 20 MG tablet Take 1 tablet (20 mg total) by mouth once daily.    gabapentin (NEURONTIN) 300 MG capsule Take 1 capsule (300 mg total) by mouth 3 (three) times daily.    hydrOXYzine pamoate (VISTARIL) 50 MG Cap Take 1 capsule (50 mg total) by mouth every 8 (eight) hours as needed (anxiety, insomnia).    lisinopriL (PRINIVIL,ZESTRIL) 40 MG tablet Take 1 tablet (40 mg total) by mouth once daily.    nicotine (NICODERM CQ) 21 mg/24 hr Place 1 patch onto the skin once daily.    OXcarbazepine (TRILEPTAL) 300 MG Tab Take 1 tablet (300 mg total) by mouth 2 (two) times daily.    propranoloL (INDERAL) 10 MG tablet Take 1 tablet (10 mg total) by mouth 3 (three) times daily.    pulse oximeter (PULSE OXIMETER) device by Apply Externally route 2 (two) times a day. Use twice daily at 8 AM and 3 PM and [facility] as directed.    QUEtiapine (SEROQUEL) 100 MG Tab Take 1 tablet (100 mg total) by mouth every evening.    QUEtiapine (SEROQUEL) 50 MG tablet  Take 1 tablet (50 mg total) by mouth once daily.    [DISCONTINUED] amLODIPine (NORVASC) 5 MG tablet Take 1 tablet (5 mg total) by mouth once daily.    [DISCONTINUED] FLUoxetine 20 MG capsule Take 1 capsule (20 mg total) by mouth once daily.    [DISCONTINUED] metoprolol succinate (TOPROL-XL) 25 MG 24 hr tablet Take 0.5 tablets (12.5 mg total) by mouth once daily.    [DISCONTINUED] risperiDONE (RISPERDAL) 1 MG tablet Take 1 mg by mouth 2 (two) times daily.     Family History       Problem Relation (Age of Onset)    Cancer Maternal Grandmother    Diabetes Maternal Grandmother          Tobacco Use    Smoking status: Every Day     Packs/day: 1.00     Years: 36.00     Pack years: 36.00     Types: Cigarettes     Start date: 1986    Smokeless tobacco: Never    Tobacco comments:     Enrolled in [employer] on 10/23/14 (SCT Member ID # [id number])  Ambulatory referral to Smoking Cessation clinic.   Substance and Sexual Activity    Alcohol use: No    Drug use: Yes     Types: Heroin, Cocaine, Methamphetamines, Marijuana    Sexual activity: Not Currently     Partners: Male, Female     Birth control/protection: Other-see comments     Comment: hysterectomy     Review of Systems   Cardiovascular:  Positive for dyspnea on exertion.   Respiratory:  Positive for shortness of breath.    Gastrointestinal:  Positive for bloating.   Objective:     Vital Signs (Most Recent):  Temp: 98.4 °F (36.9 °C) (09/19/22 1129)  Pulse: 94 (09/19/22 1100)  Resp: 19 (09/19/22 1100)  BP: 123/87 (09/19/22 1100)  SpO2: (!) 94 % (09/19/22 1100)   Vital Signs (24h Range):  Temp:  [97.4 °F (36.3 °C)-98.6 °F (37 °C)] 98.4 °F (36.9 °C)  Pulse:  [] 94  Resp:  [17-30] 19  SpO2:  [92 %-100 %] 94 %  BP: (120-151)/() 123/87     Weight: 63 kg (138 lb 14.2 oz)  Body mass index is 27.13 kg/m².    SpO2: (!) 94 %  O2 Device (Oxygen Therapy): room air      Intake/Output Summary (Last 24 hours) at 9/19/2022 1208  Last data filed at 9/19/2022  1158  Gross per 24 hour   Intake 300 ml   Output 6825 ml   Net -6525 ml       Lines/Drains/Airways       Peripheral Intravenous Line  Duration                  Peripheral IV - Single Lumen 09/18/22 2239 20 G Left Antecubital <1 day         Peripheral IV - Single Lumen 09/19/22 0320 Anterior;Right Forearm <1 day                    Physical Exam  Constitutional:       General: She is not in acute distress.     Appearance: She is well-developed.   Cardiovascular:      Rate and Rhythm: Normal rate and regular rhythm.      Heart sounds: No murmur heard.    No gallop.   Pulmonary:      Effort: Pulmonary effort is normal. Tachypnea present. No respiratory distress.      Breath sounds: Normal breath sounds. No wheezing.   Abdominal:      General: Bowel sounds are normal. There is no distension.      Palpations: Abdomen is soft.      Tenderness: There is no abdominal tenderness.   Musculoskeletal:      Right lower leg: Edema present.      Left lower leg: Edema present.   Skin:     General: Skin is warm and dry.   Neurological:      Mental Status: She is alert and oriented to person, place, and time.       Significant Labs: BMP:   Recent Labs   Lab 09/18/22 2244 09/19/22  0322   * 104    139   K 4.0 3.8    104   CO2 27 23   BUN 24* 22*   CREATININE 1.14 1.0   CALCIUM 8.4* 8.7   MG  --  1.9   , CBC   Recent Labs   Lab 09/18/22 2244 09/19/22  0315   WBC 11.20 11.33   HGB 11.4* 11.3*   HCT 37.5 37.5    262   , and Troponin   Recent Labs   Lab 09/18/22 2244 09/19/22  0322   TROPONINI 0.088* 0.175*       Significant Imaging: Echocardiogram: Transthoracic echo (TTE) complete (Cupid Only):   Results for orders placed or performed during the hospital encounter of 07/21/22   Echo   Result Value Ref Range    BSA 1.56 m2    LA WIDTH 4.59 cm    PV PEAK VELOCITY 1.25 cm/s    LVIDd 5.22 3.5 - 6.0 cm    IVS 1.32 (A) 0.6 - 1.1 cm    Posterior Wall 1.23 (A) 0.6 - 1.1 cm    Ao root annulus 2.52 cm    LVIDs 4.80 (A)  2.1 - 4.0 cm    FS 8 28 - 44 %    LV mass 272.55 g    LA size 4.24 cm    RVDD 4.10 cm    Left Ventricle Relative Wall Thickness 0.47 cm    AV mean gradient 3 mmHg    AV valve area 2.32 cm2    AV Velocity Ratio 0.75     AV index (prosthetic) 0.74     MV mean gradient 1 mmHg    MV valve area p 1/2 method 7.47 cm2    MV valve area by continuity eq 1.95 cm2    E/A ratio 2.73     E wave deceleration time 85.51 msec    LVOT diameter 2.00 cm    LVOT area 3.1 cm2    LVOT peak curt 0.97 m/s    LVOT peak VTI 13.45 cm    Ao peak curt 1.29 m/s    Ao VTI 18.21 cm    Mr max curt 0.06 m/s    LVOT stroke volume 42.23 cm3    AV peak gradient 7 mmHg    MV peak gradient 7 mmHg    MV Peak E Curt 1.31 m/s    TR Max Curt 2.49 m/s    MV VTI 21.66 cm    MV stenosis pressure 1/2 time 29.44 ms    MV Peak A Curt 0.48 m/s    LV Systolic Volume 107.66 mL    LV Systolic Volume Index 70.4 mL/m2    LV Diastolic Volume 130.52 mL    LV Diastolic Volume Index 85.31 mL/m2    LV Mass Index 178 g/m2    RA Major Axis 4.59 cm    Left Atrium Major Axis 6.65 cm    Triscuspid Valve Regurgitation Peak Gradient 25 mmHg    LA Volume Index (Mod) 53.8 mL/m2    LA volume (mod) 82.28 cm3    RA Width 3.48 cm    Right Atrial Pressure (from IVC) 8 mmHg    EF 20 %    TV rest pulmonary artery pressure 33 mmHg    Narrative    · The left ventricle is mildly enlarged with concentric hypertrophy and   severely decreased systolic function.  · The estimated ejection fraction is 20%.  · There is severe left ventricular global hypokinesis.  · Grade III left ventricular diastolic dysfunction.  · Mild right ventricular enlargement with mildly reduced right ventricular   systolic function.  · Severe left atrial enlargement.  · Moderate aortic regurgitation.  · Severe mitral regurgitation.  · Mild tricuspid regurgitation.  · Moderate pulmonic regurgitation.  · Intermediate central venous pressure (8 mmHg).  · The estimated PA systolic pressure is 33 mmHg.  · Trivial pericardial  effusion.        Assessment and Plan:     * Acute on chronic combined systolic and diastolic congestive heart failure  - echo with EF 20% and grade III diastolic dysfunction  - BNP >4900; CXR with pulmonary vascular with small pleural effusion  - on IV Lasix 40mg BID with 3.6L out so far; negative 3.6L since admission  - on ACEI and BB; continue with IV diuresis- suspect noncompliance     Polysubstance abuse  - history of substance abuse with cocaine/THC and methamphetamine use  - tox screen positive for methampethamine this admission     Acute encephalopathy  - responsive but unstimulated easily falls asleep  - tox positive; CT head negative     Elevated troponin  - troponin .088 with trend up to .175  - felt to be demand related at this time  - low EF new based on echo from July; uncertain of etiology currently; continue medical management       Essential hypertension  - SBP 140s-150s  - continue ACEI and BB     Tobacco abuse  - active smoker  - needs counseling on importance of complete cessation         VTE Risk Mitigation (From admission, onward)         Ordered     enoxaparin injection 40 mg  Daily         09/19/22 0042     IP VTE HIGH RISK PATIENT  Once         09/19/22 0042     Place sequential compression device  Until discontinued         09/19/22 0042                Thank you for your consult. I will follow-up with patient. Please contact us if you have any additional questions.    MICHAEL Chowdhury, ANP  Cardiology   [facility] - Intensive Care

## 2022-09-19 NOTE — SUBJECTIVE & OBJECTIVE
Past Medical History:   Diagnosis Date    ADHD (attention deficit hyperactivity disorder)     Anemia     Anxiety     Bipolar disorder     History of hepatitis C - s/p clearance of virus (HCV neg 6/2015) 9/26/2014    History of psychiatric hospitalization     History of substance abuse     IV heroin - last use 2013 per pt    Hx of psychiatric care     Hypertension     Opioid overdose 5/10/2016    Psychiatric problem     Psychosis     Seizure disorder 4/3/2019    Sleep difficulties     Substance abuse     Therapy     Vasculitis        Past Surgical History:   Procedure Laterality Date    HYSTERECTOMY  3/16/2011    SALPINGOOPHORECTOMY Right 3/16/2011    TUBAL LIGATION      Vaginal cuff repair  04/22/2011       Review of patient's allergies indicates:  No Known Allergies    No current facility-administered medications on file prior to encounter.     Current Outpatient Medications on File Prior to Encounter   Medication Sig    albuterol (PROVENTIL/VENTOLIN HFA) 90 mcg/actuation inhaler Inhale 2 puffs into the lungs every 6 (six) hours. Rescue    benztropine (COGENTIN) 0.5 MG tablet Take 1 tablet (0.5 mg total) by mouth 2 (two) times daily.    famotidine (PEPCID) 20 MG tablet Take 1 tablet (20 mg total) by mouth 2 (two) times daily.    furosemide (LASIX) 20 MG tablet Take 1 tablet (20 mg total) by mouth once daily.    gabapentin (NEURONTIN) 300 MG capsule Take 1 capsule (300 mg total) by mouth 3 (three) times daily.    hydrOXYzine pamoate (VISTARIL) 50 MG Cap Take 1 capsule (50 mg total) by mouth every 8 (eight) hours as needed (anxiety, insomnia).    lisinopriL (PRINIVIL,ZESTRIL) 40 MG tablet Take 1 tablet (40 mg total) by mouth once daily.    nicotine (NICODERM CQ) 21 mg/24 hr Place 1 patch onto the skin once daily.    OXcarbazepine (TRILEPTAL) 300 MG Tab Take 1 tablet (300 mg total) by mouth 2 (two) times daily.    propranoloL (INDERAL) 10 MG tablet Take 1 tablet (10 mg total) by mouth 3 (three) times daily.    pulse  oximeter (PULSE OXIMETER) device by Apply Externally route 2 (two) times a day. Use twice daily at 8 AM and 3 PM and record the value in MyChart as directed.    QUEtiapine (SEROQUEL) 100 MG Tab Take 1 tablet (100 mg total) by mouth every evening.    QUEtiapine (SEROQUEL) 50 MG tablet Take 1 tablet (50 mg total) by mouth once daily.    [DISCONTINUED] amLODIPine (NORVASC) 5 MG tablet Take 1 tablet (5 mg total) by mouth once daily.    [DISCONTINUED] FLUoxetine 20 MG capsule Take 1 capsule (20 mg total) by mouth once daily.    [DISCONTINUED] metoprolol succinate (TOPROL-XL) 25 MG 24 hr tablet Take 0.5 tablets (12.5 mg total) by mouth once daily.    [DISCONTINUED] risperiDONE (RISPERDAL) 1 MG tablet Take 1 mg by mouth 2 (two) times daily.     Family History       Problem Relation (Age of Onset)    Cancer Maternal Grandmother    Diabetes Maternal Grandmother          Tobacco Use    Smoking status: Every Day     Packs/day: 1.00     Years: 36.00     Pack years: 36.00     Types: Cigarettes     Start date: 1986    Smokeless tobacco: Never    Tobacco comments:     Enrolled in [employer] on 10/23/14 ([employer] Member ID # [id number])  Ambulatory referral to Smoking Cessation clinic.   Substance and Sexual Activity    Alcohol use: No    Drug use: Yes     Types: Heroin, Cocaine, Methamphetamines, Marijuana    Sexual activity: Not Currently     Partners: Male, Female     Birth control/protection: Other-see comments     Comment: hysterectomy     Review of Systems   Cardiovascular:  Positive for dyspnea on exertion.   Respiratory:  Positive for shortness of breath.    Gastrointestinal:  Positive for bloating.   Objective:     Vital Signs (Most Recent):  Temp: 98.4 °F (36.9 °C) (09/19/22 1129)  Pulse: 94 (09/19/22 1100)  Resp: 19 (09/19/22 1100)  BP: 123/87 (09/19/22 1100)  SpO2: (!) 94 % (09/19/22 1100)   Vital Signs (24h Range):  Temp:  [97.4 °F (36.3 °C)-98.6 °F (37 °C)] 98.4 °F (36.9 °C)  Pulse:  [] 94  Resp:  [17-30]  19  SpO2:  [92 %-100 %] 94 %  BP: (120-151)/() 123/87     Weight: 63 kg (138 lb 14.2 oz)  Body mass index is 27.13 kg/m².    SpO2: (!) 94 %  O2 Device (Oxygen Therapy): room air      Intake/Output Summary (Last 24 hours) at 9/19/2022 1208  Last data filed at 9/19/2022 1158  Gross per 24 hour   Intake 300 ml   Output 6825 ml   Net -6525 ml       Lines/Drains/Airways       Peripheral Intravenous Line  Duration                  Peripheral IV - Single Lumen 09/18/22 2239 20 G Left Antecubital <1 day         Peripheral IV - Single Lumen 09/19/22 0320 Anterior;Right Forearm <1 day                    Physical Exam  Constitutional:       General: She is not in acute distress.     Appearance: She is well-developed.   Cardiovascular:      Rate and Rhythm: Normal rate and regular rhythm.      Heart sounds: No murmur heard.    No gallop.   Pulmonary:      Effort: Pulmonary effort is normal. Tachypnea present. No respiratory distress.      Breath sounds: Normal breath sounds. No wheezing.   Abdominal:      General: Bowel sounds are normal. There is no distension.      Palpations: Abdomen is soft.      Tenderness: There is no abdominal tenderness.   Musculoskeletal:      Right lower leg: Edema present.      Left lower leg: Edema present.   Skin:     General: Skin is warm and dry.   Neurological:      Mental Status: She is alert and oriented to person, place, and time.       Significant Labs: BMP:   Recent Labs   Lab 09/18/22 2244 09/19/22 0322   * 104    139   K 4.0 3.8    104   CO2 27 23   BUN 24* 22*   CREATININE 1.14 1.0   CALCIUM 8.4* 8.7   MG  --  1.9   , CBC   Recent Labs   Lab 09/18/22 2244 09/19/22  0315   WBC 11.20 11.33   HGB 11.4* 11.3*   HCT 37.5 37.5    262   , and Troponin   Recent Labs   Lab 09/18/22 2244 09/19/22  0322   TROPONINI 0.088* 0.175*       Significant Imaging: Echocardiogram: Transthoracic echo (TTE) complete (Cupid Only):   Results for orders placed or performed  during the hospital encounter of 07/21/22   Echo   Result Value Ref Range    BSA 1.56 m2    LA WIDTH 4.59 cm    PV PEAK VELOCITY 1.25 cm/s    LVIDd 5.22 3.5 - 6.0 cm    IVS 1.32 (A) 0.6 - 1.1 cm    Posterior Wall 1.23 (A) 0.6 - 1.1 cm    Ao root annulus 2.52 cm    LVIDs 4.80 (A) 2.1 - 4.0 cm    FS 8 28 - 44 %    LV mass 272.55 g    LA size 4.24 cm    RVDD 4.10 cm    Left Ventricle Relative Wall Thickness 0.47 cm    AV mean gradient 3 mmHg    AV valve area 2.32 cm2    AV Velocity Ratio 0.75     AV index (prosthetic) 0.74     MV mean gradient 1 mmHg    MV valve area p 1/2 method 7.47 cm2    MV valve area by continuity eq 1.95 cm2    E/A ratio 2.73     E wave deceleration time 85.51 msec    LVOT diameter 2.00 cm    LVOT area 3.1 cm2    LVOT peak curt 0.97 m/s    LVOT peak VTI 13.45 cm    Ao peak curt 1.29 m/s    Ao VTI 18.21 cm    Mr max curt 0.06 m/s    LVOT stroke volume 42.23 cm3    AV peak gradient 7 mmHg    MV peak gradient 7 mmHg    MV Peak E Curt 1.31 m/s    TR Max Curt 2.49 m/s    MV VTI 21.66 cm    MV stenosis pressure 1/2 time 29.44 ms    MV Peak A Curt 0.48 m/s    LV Systolic Volume 107.66 mL    LV Systolic Volume Index 70.4 mL/m2    LV Diastolic Volume 130.52 mL    LV Diastolic Volume Index 85.31 mL/m2    LV Mass Index 178 g/m2    RA Major Axis 4.59 cm    Left Atrium Major Axis 6.65 cm    Triscuspid Valve Regurgitation Peak Gradient 25 mmHg    LA Volume Index (Mod) 53.8 mL/m2    LA volume (mod) 82.28 cm3    RA Width 3.48 cm    Right Atrial Pressure (from IVC) 8 mmHg    EF 20 %    TV rest pulmonary artery pressure 33 mmHg    Narrative    · The left ventricle is mildly enlarged with concentric hypertrophy and   severely decreased systolic function.  · The estimated ejection fraction is 20%.  · There is severe left ventricular global hypokinesis.  · Grade III left ventricular diastolic dysfunction.  · Mild right ventricular enlargement with mildly reduced right ventricular   systolic function.  · Severe left atrial  enlargement.  · Moderate aortic regurgitation.  · Severe mitral regurgitation.  · Mild tricuspid regurgitation.  · Moderate pulmonic regurgitation.  · Intermediate central venous pressure (8 mmHg).  · The estimated PA systolic pressure is 33 mmHg.  · Trivial pericardial effusion.

## 2022-09-19 NOTE — ASSESSMENT & PLAN NOTE
Given lasix 80mg IV  -cont lasix 40mg IV BID  -cont lisinopril  -cont propranolol  -consult cardiology  -echo done 2 months ago with 20% EF and G3DD

## 2022-09-19 NOTE — EICU
EICU BRIEF ADMIT NOTE:    HISTORY:  Pulmonary edema Please refer to H/P and ER notes for detail    CAMERA ASSESSMENT: Two way audiovisual assessment was done: Yes    Telemetry was reviewed. Medical records including notes, labs and imaging were reviewed.Yes    DISCUSSED with bedside nurse.No     ASSESSMENT AND PLAN:    # Pulmonary edema due to CHF, EF 20%. IV lasix given. Continue ACE inhibitors  # Encephalopathy  BEST PRACTICES REVIEW:    INTUBATED: No  GLYCEMIN CONTROL:  Diabetes: No  STRESS ULCER PROPHYLAXIS: H2 antagonist   DVT PROPHYLAXIS:  Pharmacological    Thank You for allowing EICU to participate in the care of the patient. Please call as needed      Ghulam Rivero MD  EICU  Critical Care Medicine

## 2022-09-19 NOTE — CONSULTS
Consult Note  Pulmonary & Critical Care Medicine    Attending: Christian  Admit Date: 9/18/2022  Today's Date: 09/19/2022  Reason for Consult:  acute respiratory failure requiring BiPAP    SUBJECTIVE:     HPI: 51 y.o. female with a history of HFrEF (EF 20% July 2022), polysubstance abuse with prior opioid overdose, HTN, infective endocarditis, bipolar disorder, seizure disorder, and treated Hep C presenting with shortness of breath and edema.     Patient was taken to [facility] ED via EMS where she was found to have anasarca throughout bilateral LE to torso. Was tachypneic and tachycardic (110s). Patient was transferred to [place] ED where she was found to be very lethargic. She was administered Narcan with no response and was placed on BiPAP. She was administered IV Lasix 80mg x1 dose and admitted to ICU. ABG with pO2 55. She was weaned to 2L NC overnight with good UOP.     Review of patient's allergies indicates:  No Known Allergies    Past Medical History:   Diagnosis Date    ADHD (attention deficit hyperactivity disorder)     Anemia     Anxiety     Bipolar disorder     History of hepatitis C - s/p clearance of virus (HCV neg 6/2015) 9/26/2014    History of psychiatric hospitalization     History of substance abuse     IV heroin - last use 2013 per pt    Hx of psychiatric care     Hypertension     Opioid overdose 5/10/2016    Psychiatric problem     Psychosis     Seizure disorder 4/3/2019    Sleep difficulties     Substance abuse     Therapy     Vasculitis      Past Surgical History:   Procedure Laterality Date    HYSTERECTOMY  3/16/2011    SALPINGOOPHORECTOMY Right 3/16/2011    TUBAL LIGATION      Vaginal cuff repair  04/22/2011     Family History   Problem Relation Age of Onset    Diabetes Maternal Grandmother     Cancer Maternal Grandmother      Social History     Tobacco Use    Smoking status: Every Day     Packs/day: 1.00     Years: 36.00     Pack years: 36.00     Types: Cigarettes     Start date: 1986    Smokeless  tobacco: Never    Tobacco comments:     Enrolled in [employer] on 10/23/14 (SCT Member ID # [id number])  Ambulatory referral to Smoking Cessation clinic.   Substance Use Topics    Alcohol use: No    Drug use: Yes     Types: Heroin, Cocaine, Methamphetamines, Marijuana       All medications reviewed.    Review of Systems:  Pertinent items noted in HPI. All other systems reviewed and are negative.    OBJECTIVE:     Vital Signs Trends/Hx Reviewed  Vitals:    09/19/22 1000 09/19/22 1100 09/19/22 1129 09/19/22 1200   BP: 120/83 123/87  121/87   BP Location: Left arm Left arm  Left arm   Patient Position:       Pulse: 90 94  92   Resp: (!) 21 19 18   Temp:   98.4 °F (36.9 °C)    TempSrc:   Oral    SpO2: (!) 92% (!) 94%  (!) 92%   Weight:       Height:           Physical Exam:  General: NAD, sleepy and falling asleep, somewhat agiitated but easily awoken and answers questions appropriately  HEENT: AT/NC, EOMI, oral and nasal mucosa moist.   Neck: Normal ROM  Cardiac: normal rate, regular rhythm, holosystolic murmur, brisk cap refill and symmetric pulses in distal extremities.  Respiratory: Normal inspection. Symmetric chest rise. Normal palpation and percussion. Auscultation clear bilaterally. No increased work of breathing noted.   Abdomen: Soft, NT/ND. +BS. No hepatosplenomegaly.   Extremities: pitting edema of bilateral LEs to torso  Neuro: Grossly intact to brief exam. Oriented x3, sleepy and somewhat agitated    Laboratory:  Recent Labs   Lab 09/19/22  0121   PH 7.427   PCO2 37.9   PO2 55*   HCO3 25.0   POCSATURATED 89*   BE 1     Recent Labs   Lab 09/19/22  0315   WBC 11.33   RBC 4.44   HGB 11.3*   HCT 37.5      MCV 85   MCH 25.5*   MCHC 30.1*     Recent Labs   Lab 09/19/22  0322      K 3.8      CO2 23   BUN 22*   CREATININE 1.0   MG 1.9       Microbiology Data:   Microbiology Results (last 7 days)       Procedure Component Value Units Date/Time    Blood culture [[id number]] Collected: 09/19/22  0316    Order Status: Sent Specimen: Blood from Peripheral, Hand, Left Updated: 09/19/22 0316    Blood culture [[id number]] Collected: 09/19/22 0311    Order Status: Sent Specimen: Blood from Peripheral, Hand, Right Updated: 09/19/22 0312             Chest Imaging:   No new imaging.     Infusions:        Scheduled Medications:    enoxaparin  40 mg Subcutaneous Daily    famotidine  20 mg Oral Daily    furosemide (LASIX) injection  40 mg Intravenous Q12H    lisinopriL  40 mg Oral Daily    propranoloL  10 mg Oral TID       PRN Medications:   acetaminophen, dextrose 10%, dextrose 10%, glucagon (human recombinant), glucose, glucose, melatonin, naloxone, ondansetron, sodium chloride 0.9%, sodium chloride 0.9%    Assessment & Plan:   Patient Active Problem List   Diagnosis    LBP (low back pain)    Tobacco abuse    Lower extremity edema    Proteinuria    Hypoalbuminemia    Essential hypertension    Anemia    History of hepatitis C - s/p clearance of virus (HCV neg 6/2015)    Intravenous drug abuse    Pneumonia    Mixed hearing loss, bilateral    Spine metastasis    Sarcoidosis of lymph nodes    Debility    Otosclerosis of both ears    Muscle pain    Fibromyalgia    Vasculitis    Unspecified mood (affective) disorder    Genital herpes    Tingling in extremities    Leg pain, bilateral    Long term current use of opiate analgesic    Secondary hypertension    Amphetamine abuse    Attention deficit hyperactivity disorder (ADHD), combined type    Cocaine abuse    Hypokalemia    Depression    Methamphetamine use disorder, severe    Cannabis abuse    Substance abuse    Seizure disorder    Methamphetamine addiction    Bipolar 1 disorder, depressed    Laceration of left upper extremity    HLD (hyperlipidemia)    Abnormal urinalysis    Hypertensive emergency without congestive heart failure    Methamphetamine-induced psychotic disorder    Psychosis    Psychoactive substance-induced organic hallucinosis    Acute respiratory failure  with hypoxia and hypercapnia    Chronic combined systolic and diastolic heart failure    Elevated troponin    Acute respiratory failure with hypoxia    Encephalopathy, metabolic    COVID-19    Acute encephalopathy    Acute psychosis    COVID-19 virus infection    Sepsis    Agitation    Acute on chronic combined systolic and diastolic congestive heart failure    Polysubstance abuse    Elevated LFTs       ASSESSMENT & RECOMMENDATIONS       Neuro:  - somnolent but can be awaked to loud voice and pain  - oriented x3  - No acute issues    #Acute Encephalopathy, improved  - Somnolent in ED, no response to Narcan in ED  - UDS positive for amphetamines  - Ammonia 49  - CTH negative  - continues to be tired and continuously falls asleep, but improved, continue to monitor    CV:  #Acute on Chronic Combined HFrEF  - presented with anasarca, shortness of breath, and somnolence non-responsive to Narcan x1 dose in ED initially requiring BiPAP, now weaned to 2L NC  - CXR with cardiomegaly, perihilar edema, and mild R pleural effusion with retrocardiac consolidation  - pro-BNP 21,100, BNP >4900  - Echo (July 2022): severely decreased systolic function (LVEF ~20%), Grade III LV diastolic dysfunction, mild R ventricular enlargement with mildly decreased RV systolic function, severe L atrial enlargement, severe MR, mild TR, moderate pulmonary valve regurgitation, trivial pericardial effusion  - Received IV Lasix 80mg x1 dose in ED, currently on IV Lasix 40mg q12 hours with good UOP, net -6.6L since admission    #Elevated troponin  - troponin 0.088 -> 0.175  - EKG with sinus tachycardia, T wave inversions in leads III and aVF and possible depression/flattening in lead II  - likely in the setting of amphetamine use, would continue to trend    #Hypertension  - hypertensive at admission to 140-150/100s  - continued home lisinopril, improved    Pulm:  - ABG 4.427/37.9/55/25% at admission  - weaned off BiPAP to 2L NC    Renal:  #Acute  Kidney Injury  - BUN/Cr 24/1.14, baseline Cr ~0.7-0.8  - likely cardio-renal  - continue Lasix  - continue to monitor    FEN/GI:  #Transaminitis  - AST/ALT/Alk Phos 325/310/148, significantly elevated from 1 month ago; bilirubin wnl  - History of HepC that was treated, viral load undetectable in 2017  - would re-check viral load to evaluate for recurrence, although elevation not as significant as one would expect from acute hepatitis    Heme/Onc:  - DVT PPx: lovenox  #Normocytic anemia  - H/H 11.3/37.5, stable from priors    ID:  #History of treated HepC  - History of HepC that was treated, viral load undetectable in 2017  - with new transaminitis would re-check viral load    Endo:  - no acute issues.      This patient is stable to step down to floor. Please call with questions.    Lelia Khan M.D., PGY-2  [employer] Pulmonary/Critical Care    Pt seen and examined with Pulmonary/Critical Care team and this note reviewed and validated with the following additional comments:    SOB improved with diuresis. Hypersomnolent. No response to large dose of naloxone. Still has ARABELLA and acute liver injury but improving.  Both likely a manifestation of cardiogenic shock.  Slowly returning to baseline.  Should be able to step down.    Kingston Aragon MD  Phone [phone]

## 2022-09-19 NOTE — ED NOTES
Pt tolerating bipap well at this time, adequate chest rise and fall, 100% spo2.   Pt is still lethargic, able to follow simple commands.

## 2022-09-19 NOTE — ED NOTES
Pt has pair of dark earings, filomena bracelet and two metal all bracelets in cup  Pt in no acute distress prior to discharge  400cc output on bedpan. Pt cleaned prior to transfer. No breakdown.

## 2022-09-19 NOTE — H&P
[place] - Intensive Care  [facility] Medicine  History & Physical    Patient Name: Stephanie T Barthelemy  MRN: [id number]  Patient Class: OP- Observation  Admission Date: 9/18/2022  Attending Physician: Maryan Irby MD   Primary Care Provider: No primary care provider on file.         Patient information was obtained from past medical records and ER records.     Subjective:     Principal Problem:Acute on chronic combined systolic and diastolic congestive heart failure    Chief Complaint:   Chief Complaint   Patient presents with    Shortness of Breath     Brought in by [facility] for c/o SOB. Was dx with CHF 3weeks ago. Has edema from her belly down. Patient is agitated and cursing at staff because she wants water.        HPI: Stephanie Barthelemy is a 52yo female with CHF (EF 20%), polysubstance abuse, opioid overdose, tobacco abuse, HTN, infective endocarditis, psychosis, seizure disorder, hep C (treated), bipolar disorder. She presented to [facility] ED via EMS with reported SOB. She was noted to have anasarca from abdomen down. She was tachycardic and mildly hypoxic with O2 sat 94%. proBNP 23877. Elevated LFTs. Does not appear she was given any medications while there. On arrival to [place], she arrived on room air, was lethargic, and placed on bipap. Given narcan with minimal response. ABG with pO2 55. Pitting edema to BLE and swelling to abdomen. Given 80mg IV lasix. She was able to wean down to nasal cannula while in ED. More alert at times.       Past Medical History:   Diagnosis Date    ADHD (attention deficit hyperactivity disorder)     Anemia     Anxiety     Bipolar disorder     History of hepatitis C - s/p clearance of virus (HCV neg 6/2015) 9/26/2014    History of psychiatric hospitalization     History of substance abuse     IV heroin - last use 2013 per pt    Hx of psychiatric care     Hypertension     Opioid overdose 5/10/2016    Psychiatric problem     Psychosis     Seizure disorder  4/3/2019    Sleep difficulties     Substance abuse     Therapy     Vasculitis        Past Surgical History:   Procedure Laterality Date    HYSTERECTOMY  3/16/2011    SALPINGOOPHORECTOMY Right 3/16/2011    TUBAL LIGATION      Vaginal cuff repair  04/22/2011       Review of patient's allergies indicates:  No Known Allergies    No current facility-administered medications on file prior to encounter.     Current Outpatient Medications on File Prior to Encounter   Medication Sig    albuterol (PROVENTIL/VENTOLIN HFA) 90 mcg/actuation inhaler Inhale 2 puffs into the lungs every 6 (six) hours. Rescue    benztropine (COGENTIN) 0.5 MG tablet Take 1 tablet (0.5 mg total) by mouth 2 (two) times daily.    famotidine (PEPCID) 20 MG tablet Take 1 tablet (20 mg total) by mouth 2 (two) times daily.    furosemide (LASIX) 20 MG tablet Take 1 tablet (20 mg total) by mouth once daily.    gabapentin (NEURONTIN) 300 MG capsule Take 1 capsule (300 mg total) by mouth 3 (three) times daily.    hydrOXYzine pamoate (VISTARIL) 50 MG Cap Take 1 capsule (50 mg total) by mouth every 8 (eight) hours as needed (anxiety, insomnia).    lisinopriL (PRINIVIL,ZESTRIL) 40 MG tablet Take 1 tablet (40 mg total) by mouth once daily.    nicotine (NICODERM CQ) 21 mg/24 hr Place 1 patch onto the skin once daily.    OXcarbazepine (TRILEPTAL) 300 MG Tab Take 1 tablet (300 mg total) by mouth 2 (two) times daily.    propranoloL (INDERAL) 10 MG tablet Take 1 tablet (10 mg total) by mouth 3 (three) times daily.    pulse oximeter (PULSE OXIMETER) device by Apply Externally route 2 (two) times a day. Use twice daily at 8 AM and 3 PM and record the value in [email or web address] as directed.    QUEtiapine (SEROQUEL) 100 MG Tab Take 1 tablet (100 mg total) by mouth every evening.    QUEtiapine (SEROQUEL) 50 MG tablet Take 1 tablet (50 mg total) by mouth once daily.    [DISCONTINUED] amLODIPine (NORVASC) 5 MG tablet Take 1 tablet (5 mg total) by mouth once daily.     [DISCONTINUED] FLUoxetine 20 MG capsule Take 1 capsule (20 mg total) by mouth once daily.    [DISCONTINUED] metoprolol succinate (TOPROL-XL) 25 MG 24 hr tablet Take 0.5 tablets (12.5 mg total) by mouth once daily.    [DISCONTINUED] risperiDONE (RISPERDAL) 1 MG tablet Take 1 mg by mouth 2 (two) times daily.     Family History       Problem Relation (Age of Onset)    Cancer Maternal Grandmother    Diabetes Maternal Grandmother          Tobacco Use    Smoking status: Every Day     Packs/day: 1.00     Years: 36.00     Pack years: 36.00     Types: Cigarettes     Start date: 1986    Smokeless tobacco: Never    Tobacco comments:     Enrolled in [employer] on 10/23/14 ([employer] Member ID # [id number])  Ambulatory referral to Smoking Cessation clinic.   Substance and Sexual Activity    Alcohol use: No    Drug use: Yes     Types: Heroin, Cocaine, Methamphetamines, Marijuana    Sexual activity: Not Currently     Partners: Male, Female     Birth control/protection: Other-see comments     Comment: hysterectomy     Review of Systems   Unable to perform ROS: Patient unresponsive   Objective:     Vital Signs (Most Recent):  Temp: 98.3 °F (36.8 °C) (09/19/22 0118)  Pulse: 106 (09/19/22 0200)  Resp: (!) 26 (09/19/22 0200)  BP: (!) 151/94 (09/19/22 0200)  SpO2: 100 % (09/19/22 0200)   Vital Signs (24h Range):  Temp:  [98.3 °F (36.8 °C)] 98.3 °F (36.8 °C)  Pulse:  [106-115] 106  Resp:  [20-26] 26  SpO2:  [93 %-100 %] 100 %  BP: (141-151)/() 151/94     Weight: 64.8 kg (142 lb 13.7 oz)  Body mass index is 27.9 kg/m².    Physical Exam  Vitals and nursing note reviewed.   Constitutional:       General: She is not in acute distress.     Appearance: She is ill-appearing.   HENT:      Head: Normocephalic and atraumatic.      Nose: Nose normal.      Mouth/Throat:      Mouth: Mucous membranes are moist.   Eyes:      Pupils: Pupils are equal, round, and reactive to light.   Cardiovascular:      Rate and Rhythm: Regular rhythm.  Tachycardia present.      Pulses: Normal pulses.      Heart sounds: Murmur heard.   Pulmonary:      Breath sounds: Rales present.      Comments: Tachypnea   Abdominal:      General: Bowel sounds are normal. There is distension.   Musculoskeletal:         General: Normal range of motion.      Cervical back: Normal range of motion.      Right lower leg: Edema present.      Left lower leg: Edema present.   Skin:     General: Skin is warm and dry.   Neurological:      Comments: lethargic   Psychiatric:      Comments: Periods of yelling and crying out, uncooperative         CRANIAL NERVES     CN III, IV, VI   Pupils are equal, round, and reactive to light.     Significant Labs: All pertinent labs within the past 24 hours have been reviewed.    Significant Imaging: I have reviewed all pertinent imaging results/findings within the past 24 hours.    Assessment/Plan:     * Acute on chronic combined systolic and diastolic congestive heart failure  Given lasix 80mg IV  -cont lasix 40mg IV BID  -cont lisinopril  -cont propranolol  -consult cardiology  -echo done 2 months ago with 20% EF and G3DD      Polysubstance abuse  -UDS pending      Acute encephalopathy  Had similar presentation in the past  ABG with hypoxia  -UA and drug screen pending  -Head CT pending  -Ammonia pending  -Blood cx pending  -cont supplemental O2 as needed        Acute respiratory failure with hypoxia  -bipap weaned in ED with 100% O2 sat on 2lNC  -cont supplemental O2 as needed        Elevated troponin  In setting of volume overload  -Trend troponin  -cardiology consulted      Bipolar 1 disorder, depressed  -patient is lethargic  -hold home meds for now      Essential hypertension  uncontrolled  -cont lisinopril        Tobacco abuse  -would benefit from tobacco cessation        VTE Risk Mitigation (From admission, onward)         Ordered     enoxaparin injection 40 mg  Daily         09/19/22 0042     IP VTE HIGH RISK PATIENT  Once         09/19/22 0042      Place sequential compression device  Until discontinued         09/19/22 0042              Critical care time spent on the evaluation and treatment of severe organ dysfunction, review of pertinent labs and imaging studies, discussions with consulting providers and discussions with patient/family: 60 minutes.     Ольга Martin NP  [facility]   [place] - Intensive Care

## 2022-09-19 NOTE — ED NOTES
Report, transfer form, and facesheet printed and provider to EMS for transport.   Pt calm and understanding of the need to be admitted and transferred to admitting facility.  Pt is aware that the hospital is full and she will be a boarder in the ER at [place].  Pt verbalized understanding

## 2022-09-19 NOTE — ASSESSMENT & PLAN NOTE
- troponin .088 with trend up to .175  - felt to be demand related at this time  - low EF new based on echo from July; uncertain of etiology currently; continue medical management

## 2022-09-19 NOTE — ED NOTES
Patient received from EMS, pt transfer in from [facility]. Pt recently diagnosed with CHF 3 weeks ago. EMS reports pt 94% on RA, pt is 92% R/A on monitor, 2L NC o2 applied, pulse ox increased from 92 to 97%. Pt airway patent and clear, breathing spontaneously and answers simple questions. See note below regarding extremities, pt pulses present with severe edema.      Psychosocial:  Patient is lethargic but wakes to verbal and tactile stimuli.  Appears clean, well maintained, with clothing appropriate to environment.  No evidence of delusions, hallucinations, or psychosis.     Neuro:  Eyes open spontaneously.  Mumbles speech.  Tolerating saliva secretions well.  Able to follow simple commands.  Symmetrical facial muscles.  Weakness noted in extremities. Poor muscle tone present. Pupils are 2cm pinpoint     Airway:  Bilateral chest rise and fall.  RR tacchypneic but non-labored.  Air entry patent and clear x 5 lobes of the lungs.  No crepitus or subcutaneous emphysema noted on palpation.       Circulatory:  Skin cool dry, and pink.  Apical and radial pulses rapid and regular.  Capillary refill/skin blanching less than 3 seconds to distal of 4 extremities.     Abdomen:  Abdomen soft and distended/edematous.  Positive normo-active bowel sounds x 4 quadrants.       Urinary:  Patient denies pain, frequency, or urgency.  Voids independently.  Reports urine appears rob/yellow in color.     Extremities:  No redness, heat, deformity. Pt has pitting edema from the upper legs to feet bilat. +4 From the knees down.      Skin:  Intact with no bruising/discolorations noted. Skin is cool.

## 2022-09-19 NOTE — HPI
Stephanie Barthelemy is a 50yo female with CHF (EF 20%), polysubstance abuse, opioid overdose, tobacco abuse, HTN, infective endocarditis, psychosis, seizure disorder, hep C (treated), bipolar disorder. She presented to [facility] ED via EMS with reported SOB. She was noted to have anasarca from abdomen down. She was tachycardic and mildly hypoxic with O2 sat 94%. proBNP 00599. Elevated LFTs. Does not appear she was given any medications while there. On arrival to [place], she arrived on room air, was lethargic, and placed on bipap. Given narcan with minimal response. ABG with pO2 55. Pitting edema to BLE and swelling to abdomen. Given 80mg IV lasix. She was able to wean down to nasal cannula while in ED. More alert at times.

## 2022-09-19 NOTE — ED PROVIDER NOTES
Encounter Date: 9/18/2022       History     Chief Complaint   Patient presents with    Shortness of Breath     Brought in by Jolly for c/o SOB. Was dx with CHF 3weeks ago. Has edema from her belly down. Patient is agitated and cursing at staff because she wants water.     51-year-old female with past medical history of polysubstance drug use, hypertension, infective endocarditis, newly diagnosed CHF (EF 20%), seizure disorder, hep C (treated), and bipolar disorder presenting as a transfer from [facility] ED via EMS where she presented earlier this evening with SOB and anasarca. OSH labs notable for elevated proBNP, troponin and LFTs. Patient was not treated with medications at OSH prior to transfer. Upon arrival, patient is lethargic and mildly hypoxic. Remainder of history is limited due to acuity of patient's condition.     Review of patient's allergies indicates:  No Known Allergies  Past Medical History:   Diagnosis Date    ADHD (attention deficit hyperactivity disorder)     Anemia     Anxiety     Bipolar disorder     History of hepatitis C - s/p clearance of virus (HCV neg 6/2015) 9/26/2014    History of psychiatric hospitalization     History of substance abuse     IV heroin - last use 2013 per pt    Hx of psychiatric care     Hypertension     Opioid overdose 5/10/2016    Psychiatric problem     Psychosis     Seizure disorder 4/3/2019    Sleep difficulties     Substance abuse     Therapy     Vasculitis      Past Surgical History:   Procedure Laterality Date    HYSTERECTOMY  3/16/2011    SALPINGOOPHORECTOMY Right 3/16/2011    TUBAL LIGATION      Vaginal cuff repair  04/22/2011     Family History   Problem Relation Age of Onset    Diabetes Maternal Grandmother     Cancer Maternal Grandmother      Social History     Tobacco Use    Smoking status: Every Day     Packs/day: 1.00     Years: 36.00     Pack years: 36.00     Types: Cigarettes     Start date: 1986    Smokeless tobacco: Never    Tobacco comments:      Enrolled in [place] [employer] [facility] on 10/23/14 (SCT Member ID # [id number])  Ambulatory referral to Smoking Cessation clinic.   Substance Use Topics    Alcohol use: No    Drug use: Yes     Types: Heroin, Cocaine, Methamphetamines, Marijuana     Review of Systems   Unable to perform ROS: Acuity of condition     Physical Exam     Initial Vitals [09/18/22 2150]   BP Pulse Resp Temp SpO2   (!) 141/99 (!) 115 (!) 24 98.3 °F (36.8 °C) (!) 94 %      MAP       --         Physical Exam    Nursing note and vitals reviewed.  Constitutional:   Ill-appearing lethargic female lying in bed providing minimal one-word responses.    HENT:   Head: Normocephalic and atraumatic.   Mouth/Throat: Oropharynx is clear and moist.   Eyes: Conjunctivae and EOM are normal.   Small, bilaterally reactive pupils.    Neck: Neck supple.   Normal range of motion.  Cardiovascular:  Regular rhythm.           Tachycardia. BLE pitting edema   Pulmonary/Chest: She has rales.   Abdominal: She exhibits distension. She exhibits no mass.   Bloated abdomen   Musculoskeletal:         General: Edema present. Normal range of motion.      Cervical back: Normal range of motion and neck supple.     Neurological:   Lethargic. Spontaneously moving all 4 extremities (UE>LE). Unable to cooperate with further neurologic examination   Skin: Skin is warm and dry. Capillary refill takes less than 2 seconds.       ED Course   Procedures  Labs Reviewed   CBC W/ AUTO DIFFERENTIAL - Abnormal; Notable for the following components:       Result Value    Hemoglobin 11.4 (*)     MCH 26.0 (*)     MCHC 30.4 (*)     RDW 17.0 (*)     Gran # (ANC) 8.1 (*)     All other components within normal limits   COMPREHENSIVE METABOLIC PANEL - Abnormal; Notable for the following components:    Glucose 121 (*)     BUN 24 (*)     Calcium 8.4 (*)     Albumin 3.2 (*)     Alkaline Phosphatase 148 (*)      (*)      (*)     eGFR 58.6 (*)     All other components within normal limits   NT-PRO  NATRIURETIC PEPTIDE - Abnormal; Notable for the following components:    NT-proBNP 20953 (*)     All other components within normal limits   TROPONIN I - Abnormal; Notable for the following components:    Troponin I 0.088 (*)     All other components within normal limits   CBC W/ AUTO DIFFERENTIAL - Abnormal; Notable for the following components:    Hemoglobin 11.3 (*)     MCH 25.5 (*)     MCHC 30.1 (*)     RDW 17.3 (*)     Mono # 1.1 (*)     All other components within normal limits   POCT GLUCOSE - Abnormal; Notable for the following components:    POCT Glucose 121 (*)     All other components within normal limits   ISTAT PROCEDURE - Abnormal; Notable for the following components:    POC PO2 55 (*)     POC SATURATED O2 89 (*)     All other components within normal limits   SARS-COV-2 RDRP GENE - Normal    Narrative:     This test utilizes isothermal nucleic acid amplification   technology to detect the SARS-CoV-2 RdRp nucleic acid segment.   The analytical sensitivity (limit of detection) is 125 genome   equivalents/mL.   A POSITIVE result implies infection with the SARS-CoV-2 virus;   the patient is presumed to be contagious.     A NEGATIVE result means that SARS-CoV-2 nucleic acids are not   present above the limit of detection. A NEGATIVE result should be   treated as presumptive. It does not rule out the possibility of   COVID-19 and should not be the sole basis for treatment decisions.   If COVID-19 is strongly suspected based on clinical and exposure   history, re-testing using an alternate molecular assay should be   considered.   This test is only for use under the Food and Drug   Administration s Emergency Use Authorization (EUA).   Commercial kits are provided by [employer].   Performance characteristics of the EUA have been independently   verified by [facility] Department of   Pathology and Laboratory Medicine.   _________________________________________________________________    The authorized Fact Sheet for Healthcare Providers and the authorized Fact   Sheet for Patients of the ID NOW COVID-19 are available on the FDA   website:     https://www.fda.gov/media/395936/download  https://www.fda.gov/media/785996/download           CULTURE, BLOOD   CULTURE, BLOOD   POCT GLUCOSE MONITORING CONTINUOUS          Imaging Results              CT Head Without Contrast (Final result)  Result time 09/19/22 04:16:58      Final result by Reji Gonzales MD (09/19/22 04:16:58)                   Impression:      No CT evidence of acute intracranial abnormality. Clinical correlation and further evaluation as warranted.      Electronically signed by: Reji Gonzales MD  Date:    09/19/2022  Time:    04:16               Narrative:    EXAMINATION:  CT HEAD WITHOUT CONTRAST    CLINICAL HISTORY:  Mental status change, unknown cause;    TECHNIQUE:  Low dose axial images were obtained through the head.  Coronal and sagittal reformations were also performed. Contrast was not administered.    COMPARISON:  CT 07/22/2022    FINDINGS:  There is no acute intracranial hemorrhage, hydrocephalus, midline shift or mass effect. Gray-white matter differentiation appears maintained. The basal cisterns are patent. The mastoid air cells and paranasal sinuses are clear of acute process. The visualized bones of the calvarium demonstrate no acute osseous abnormality.                                       X-Ray Chest AP Portable (Final result)  Result time 09/18/22 22:52:25      Final result by Denise Herring MD (09/18/22 22:52:25)                   Impression:      Cardiomegaly with perihilar edema and mild right-sided pleural effusion with retrocardiac consolidation.  Correlate clinically to CHF versus pneumonia      Electronically signed by: Nima Walker  Date:    09/18/2022  Time:    22:52               Narrative:    EXAMINATION:  XR CHEST AP PORTABLE    CLINICAL HISTORY:  Chest Pain;    TECHNIQUE:  Single frontal view of  the chest was performed.    COMPARISON:  None    FINDINGS:  Cardiomegaly.  Mild right-sided pleural effusion.  Moderate perihilar edema.  Air bronchograms in the retrocardiac region may relate to pneumonia    Bones are intact.                                       Medications   sodium chloride 0.9% flush 3 mL (has no administration in time range)   melatonin tablet 6 mg (has no administration in time range)   ondansetron injection 4 mg (has no administration in time range)   acetaminophen tablet 650 mg (has no administration in time range)   naloxone 0.4 mg/mL injection 0.02 mg (has no administration in time range)   glucose chewable tablet 16 g (has no administration in time range)   glucose chewable tablet 24 g (has no administration in time range)   glucagon (human recombinant) injection 1 mg (has no administration in time range)   dextrose 10% bolus 125 mL (has no administration in time range)   dextrose 10% bolus 250 mL (has no administration in time range)   enoxaparin injection 40 mg (has no administration in time range)   sodium chloride 0.9% flush 10 mL (has no administration in time range)   furosemide injection 40 mg (has no administration in time range)   famotidine tablet 20 mg (has no administration in time range)   lisinopriL tablet 40 mg (has no administration in time range)   propranoloL tablet 10 mg (has no administration in time range)   furosemide injection 80 mg (80 mg Intravenous Given 9/19/22 0249)     Medical Decision Making:   History:   Old Medical Records: I decided to obtain old medical records.  Initial Assessment:   51-year-old female with past medical history of polysubstance drug use, hypertension, infective endocarditis, newly diagnosed CHF (EF 20%), seizure disorder, hep C (treated), and bipolar disorder presenting as a transfer from [facility] ED via EMS where she presented earlier this evening with SOB and anasarca.  Differential Diagnosis:   CHF  exacerbation  Overdose  Cirrhosis  Metabolic encephalopathy  Clinical Tests:   Medical Tests: Ordered and Reviewed  ED Management:  Patient is ill-appearing with anasarca and shortness of breath. ECG shows tachycardia with LBBB. POCUS demonstrates severely depressed EF without pericardial effusion. Bedside US of the abdomen demonstrates ascites. Due to small pupils, Narcan given with no improvement. Initially with oxygen satting in the low 90s.  BiPAP placed with improvement to 100% O2 saturation.  Patient weaned to nasal cannula in the ED. 80 mg IV Lasix given and patient admitted to ICU for higher level of care.           Attending Attestation:   Physician Attestation Statement for Resident:  As the supervising MD   Physician Attestation Statement: I have personally seen and examined this patient.   I agree with the above history.  - with the following exceptions: Patient transferred from [facility] due to capacity complications.  The patient has a significant history of decompensated heart failure with poor ejection fraction anasarca.  She has warm well perfused however has a tenuous mentation that complicates her airway patency.  While here, arterial blood gases showing no evidence for hypercapnia and she does have good oxygenation.  She is minimally responsive to Narcan pushes as her initial presentation was concerning for an opioid overdose.  Patient has a significant cardiac history with valvular disease in addition to a pneumonia on x-ray.  The patient will be admitted to Hospital Medicine to the intensive care unit due to her disease comorbidities, ventilatory needs for BiPAP and aggressive IV diuresis.                    ED Course as of 09/19/22 0537   Mon Sep 19, 2022   0336 Critical care time spent on the evaluation and treatment of severe organ dysfunction, review of pertinent labs and imaging studies, discussions with consulting providers and discussions with patient/family: 35 minutes.   [BG]       ED Course User Index  [BG] Yang Josue MD                 Clinical Impression:   Final diagnoses:  [I50.43] Acute on chronic combined systolic and diastolic congestive heart failure (Primary)  [R60.1] Anasarca  [R18.8] Other ascites  [R77.8] Elevated troponin  [N17.9] ARABELLA (acute kidney injury)  [R06.02] SOB (shortness of breath)        ED Disposition Condition    Observation Stable                Angela Olmedo MD  Resident  09/19/22 0519       Yang Josue MD  09/19/22 0539

## 2022-09-19 NOTE — NURSING
Pt transferred to [facility] at 1430 via bed w/ 2 transporters and RN. Connected to remote telemetry box prior to leaving ICU room. Pt transferred sell from ICU bed to tele bed independently. All pt belongings transferred with patient. Care relinquished to Linda receiving nurse, at bedside.

## 2022-09-19 NOTE — PLAN OF CARE
Problem: Adult Inpatient Plan of Care  Goal: Plan of Care Review  Outcome: Ongoing, Progressing  Goal: Patient-Specific Goal (Individualized)  9/19/2022 0421 by Oswaldo Sharma RN  Outcome: Ongoing, Progressing  9/19/2022 0421 by Oswaldo Sharma RN  Outcome: Ongoing, Progressing  Goal: Absence of Hospital-Acquired Illness or Injury  9/19/2022 0421 by Oswaldo Sharma RN  Outcome: Ongoing, Progressing  9/19/2022 0421 by Oswaldo Sharma RN  Outcome: Ongoing, Progressing  Goal: Optimal Comfort and Wellbeing  9/19/2022 0421 by Oswaldo Sharma RN  Outcome: Ongoing, Progressing  9/19/2022 0421 by Oswaldo Sharma RN  Outcome: Ongoing, Progressing  Goal: Readiness for Transition of Care  9/19/2022 0421 by Oswaldo Sharma RN  Outcome: Ongoing, Progressing  9/19/2022 0421 by Oswaldo Sharma RN  Outcome: Ongoing, Progressing    Patient received from the OR drowsy but arousable. Oriented x4. Tachycardic. Denies pain. Patient oriented to unit and educated on fall precautions. Will need reinforcement of teaching. Call light within reach. Bed alarm on.

## 2022-09-19 NOTE — NURSING
"While sitting up in bed and eating lunch, pt screaming "ouch! My arm! It hurts!" Unable to describe pain, unable to rate pain on 0-10 scale, unable to states whether it has happened before or not. Offered acetaminophen PO PRN per order for pain control; States "no, it went away" while continuing to eat lunch.   "

## 2022-09-19 NOTE — HOSPITAL COURSE
9/19/2022 per HPI   9/20/2022 Transferred out of ICU overnight. Remains on IV Lasix BID with 5.4L out overnight. Negative 7.8L since admission. Uncertain if SOB improved due to continued bedrest. HR 80s-90s SBO 100s-130s overnight. CBC and BMP WNL

## 2022-09-19 NOTE — PROGRESS NOTES
Individual Follow-Up Form    9/19/2022    Quit Date: To be determined    Clinical Status of Patient: Inpatient    Length of Service: 15 minutes    Comments: Smoking cessation education note: Pt is a 1 pk/day cigarette smoker x 36 yrs. She is reporting nictoine withdrawal symptoms. MD contacted to request order for 21 mg nicotine patch Q day. Pt very drowsy- will follow up for additional smoking cessation education when patient condition improves.     Diagnosis: F17.210

## 2022-09-19 NOTE — ASSESSMENT & PLAN NOTE
- history of substance abuse with cocaine/THC and methamphetamine use  - tox screen positive for methampethamine this admission

## 2022-09-19 NOTE — ASSESSMENT & PLAN NOTE
- echo with EF 20% and grade III diastolic dysfunction  - BNP >4900; CXR with pulmonary vascular with small pleural effusion  - on IV Lasix 40mg BID with 3.6L out so far; negative 3.6L since admission  - on ACEI and BB; continue with IV diuresis- suspect noncompliance

## 2022-09-19 NOTE — SUBJECTIVE & OBJECTIVE
Past Medical History:   Diagnosis Date    ADHD (attention deficit hyperactivity disorder)     Anemia     Anxiety     Bipolar disorder     History of hepatitis C - s/p clearance of virus (HCV neg 6/2015) 9/26/2014    History of psychiatric hospitalization     History of substance abuse     IV heroin - last use 2013 per pt    Hx of psychiatric care     Hypertension     Opioid overdose 5/10/2016    Psychiatric problem     Psychosis     Seizure disorder 4/3/2019    Sleep difficulties     Substance abuse     Therapy     Vasculitis        Past Surgical History:   Procedure Laterality Date    HYSTERECTOMY  3/16/2011    SALPINGOOPHORECTOMY Right 3/16/2011    TUBAL LIGATION      Vaginal cuff repair  04/22/2011       Review of patient's allergies indicates:  No Known Allergies    No current facility-administered medications on file prior to encounter.     Current Outpatient Medications on File Prior to Encounter   Medication Sig    albuterol (PROVENTIL/VENTOLIN HFA) 90 mcg/actuation inhaler Inhale 2 puffs into the lungs every 6 (six) hours. Rescue    benztropine (COGENTIN) 0.5 MG tablet Take 1 tablet (0.5 mg total) by mouth 2 (two) times daily.    famotidine (PEPCID) 20 MG tablet Take 1 tablet (20 mg total) by mouth 2 (two) times daily.    furosemide (LASIX) 20 MG tablet Take 1 tablet (20 mg total) by mouth once daily.    gabapentin (NEURONTIN) 300 MG capsule Take 1 capsule (300 mg total) by mouth 3 (three) times daily.    hydrOXYzine pamoate (VISTARIL) 50 MG Cap Take 1 capsule (50 mg total) by mouth every 8 (eight) hours as needed (anxiety, insomnia).    lisinopriL (PRINIVIL,ZESTRIL) 40 MG tablet Take 1 tablet (40 mg total) by mouth once daily.    nicotine (NICODERM CQ) 21 mg/24 hr Place 1 patch onto the skin once daily.    OXcarbazepine (TRILEPTAL) 300 MG Tab Take 1 tablet (300 mg total) by mouth 2 (two) times daily.    propranoloL (INDERAL) 10 MG tablet Take 1 tablet (10 mg total) by mouth 3 (three) times daily.    pulse  oximeter (PULSE OXIMETER) device by Apply Externally route 2 (two) times a day. Use twice daily at 8 AM and 3 PM and record the value in MyChart as directed.    QUEtiapine (SEROQUEL) 100 MG Tab Take 1 tablet (100 mg total) by mouth every evening.    QUEtiapine (SEROQUEL) 50 MG tablet Take 1 tablet (50 mg total) by mouth once daily.    [DISCONTINUED] amLODIPine (NORVASC) 5 MG tablet Take 1 tablet (5 mg total) by mouth once daily.    [DISCONTINUED] FLUoxetine 20 MG capsule Take 1 capsule (20 mg total) by mouth once daily.    [DISCONTINUED] metoprolol succinate (TOPROL-XL) 25 MG 24 hr tablet Take 0.5 tablets (12.5 mg total) by mouth once daily.    [DISCONTINUED] risperiDONE (RISPERDAL) 1 MG tablet Take 1 mg by mouth 2 (two) times daily.     Family History       Problem Relation (Age of Onset)    Cancer Maternal Grandmother    Diabetes Maternal Grandmother          Tobacco Use    Smoking status: Every Day     Packs/day: 1.00     Years: 36.00     Pack years: 36.00     Types: Cigarettes     Start date: 1986    Smokeless tobacco: Never    Tobacco comments:     Enrolled in [employer] on 10/23/14 ([employer] Member ID # [id number])  Ambulatory referral to Smoking Cessation clinic.   Substance and Sexual Activity    Alcohol use: No    Drug use: Yes     Types: Heroin, Cocaine, Methamphetamines, Marijuana    Sexual activity: Not Currently     Partners: Male, Female     Birth control/protection: Other-see comments     Comment: hysterectomy     Review of Systems   Unable to perform ROS: Patient unresponsive   Objective:     Vital Signs (Most Recent):  Temp: 98.3 °F (36.8 °C) (09/19/22 0118)  Pulse: 106 (09/19/22 0200)  Resp: (!) 26 (09/19/22 0200)  BP: (!) 151/94 (09/19/22 0200)  SpO2: 100 % (09/19/22 0200)   Vital Signs (24h Range):  Temp:  [98.3 °F (36.8 °C)] 98.3 °F (36.8 °C)  Pulse:  [106-115] 106  Resp:  [20-26] 26  SpO2:  [93 %-100 %] 100 %  BP: (141-151)/() 151/94     Weight: 64.8 kg (142 lb 13.7 oz)  Body mass index is  27.9 kg/m².    Physical Exam  Vitals and nursing note reviewed.   Constitutional:       General: She is not in acute distress.     Appearance: She is ill-appearing.   HENT:      Head: Normocephalic and atraumatic.      Nose: Nose normal.      Mouth/Throat:      Mouth: Mucous membranes are moist.   Eyes:      Pupils: Pupils are equal, round, and reactive to light.   Cardiovascular:      Rate and Rhythm: Regular rhythm. Tachycardia present.      Pulses: Normal pulses.      Heart sounds: Murmur heard.   Pulmonary:      Breath sounds: Rales present.      Comments: Tachypnea   Abdominal:      General: Bowel sounds are normal. There is distension.   Musculoskeletal:         General: Normal range of motion.      Cervical back: Normal range of motion.      Right lower leg: Edema present.      Left lower leg: Edema present.   Skin:     General: Skin is warm and dry.   Neurological:      Comments: lethargic   Psychiatric:      Comments: Periods of yelling and crying out, uncooperative         CRANIAL NERVES     CN III, IV, VI   Pupils are equal, round, and reactive to light.     Significant Labs: All pertinent labs within the past 24 hours have been reviewed.    Significant Imaging: I have reviewed all pertinent imaging results/findings within the past 24 hours.

## 2022-09-19 NOTE — CONSULTS
Consult received for education on fluid and salt restricted diet. Pt screaming/crying in pain at visit. RN in room. Unable to educate at this time. Will follow up.

## 2022-09-19 NOTE — PLAN OF CARE
Pt lives in [place] with her dad.  She is independent at home and does not use DME.  Her sister transports her as needed.  Pt is not sure who her PCP is.  She states she does not make regular appointments and does not keep up with her prescriptions.  Her sister will transport her home at time of discharge.    Betty Martines (Sister)  [phone]    Ezekiel Auguste (Father) [phone]       09/19/22 1535   Discharge Assessment   Assessment Type Discharge Planning Assessment   Confirmed/corrected address, phone number and insurance Yes   Confirmed Demographics Correct on Facesheet   Source of Information patient   Communicated AYSHA with patient/caregiver Yes   Reason For Admission CHF exac   Lives With parent(s)   Facility Arrived From: home   Do you expect to return to your current living situation? Yes   Do you have help at home or someone to help you manage your care at home? Yes   Who are your caregiver(s) and their phone number(s)? see note   Prior to hospitilization cognitive status: Alert/Oriented   Current cognitive status: Alert/Oriented   Walking or Climbing Stairs Difficulty none   Dressing/Bathing Difficulty none   Home Layout Able to live on 1st floor   Equipment Currently Used at Home none   Readmission within 30 days? No   Patient currently being followed by outpatient case management? No   Do you currently have service(s) that help you manage your care at home? No   Do you take prescription medications? No   Do you have prescription coverage? Yes   Do you have any problems affording any of your prescribed medications? No   Is the patient taking medications as prescribed? no   If no, which medications is patient not taking? pt states she does not see her PCP on a regular basis to have medications refilled   Who is going to help you get home at discharge? see note   How do you get to doctors appointments? family or friend will provide   Are you on dialysis? No   Do you take coumadin?  No   Discharge Plan A Home   Discharge Plan B Home with family   DME Needed Upon Discharge  none   Discharge Plan discussed with: Patient   Discharge Barriers Identified None   Financial Resource Strain   How hard is it for you to pay for the very basics like food, housing, medical care, and heating? Not hard   Housing Stability   In the last 12 months, was there a time when you were not able to pay the mortgage or rent on time? N   In the last 12 months, was there a time when you did not have a steady place to sleep or slept in a shelter (including now)? N   Transportation Needs   In the past 12 months, has lack of transportation kept you from medical appointments or from getting medications? no   In the past 12 months, has lack of transportation kept you from meetings, work, or from getting things needed for daily living? No   Food Insecurity   Within the past 12 months, you worried that your food would run out before you got the money to buy more. Never true   Within the past 12 months, the food you bought just didn't last and you didn't have money to get more. Never true   Stress   Do you feel stress - tense, restless, nervous, or anxious, or unable to sleep at night because your mind is troubled all the time - these days? To some exte   Social Connections   In a typical week, how many times do you talk on the phone with family, friends, or neighbors? More than 3   How often do you get together with friends or relatives? More than 3   Do you belong to any clubs or organizations such as Quaker groups, unions, fraternal or athletic groups, or school groups? No   How often do you attend meetings of the clubs or organizations you belong to? Never     Rafa Martínez RN   Supervisor Case Management[facility]  [phone]

## 2022-09-19 NOTE — ASSESSMENT & PLAN NOTE
Had similar presentation in the past  ABG with hypoxia  -UA and drug screen pending  -Head CT pending  -Ammonia pending  -Blood cx pending  -cont supplemental O2 as needed

## 2022-09-20 ENCOUNTER — PATIENT OUTREACH (OUTPATIENT)
Dept: ADMINISTRATIVE | Facility: OTHER | Age: 52
End: 2022-09-20
Payer: MEDICARE

## 2022-09-20 ENCOUNTER — CLINICAL SUPPORT (OUTPATIENT)
Dept: SMOKING CESSATION | Facility: CLINIC | Age: 52
End: 2022-09-20
Payer: MEDICARE

## 2022-09-20 DIAGNOSIS — F17.210 CIGARETTE SMOKER: Primary | ICD-10-CM

## 2022-09-20 PROBLEM — Z71.89 ADVANCE CARE PLANNING: Status: ACTIVE | Noted: 2022-09-20

## 2022-09-20 PROBLEM — R60.1 ANASARCA: Status: ACTIVE | Noted: 2022-09-20

## 2022-09-20 PROBLEM — I13.10 CARDIORENAL SYNDROME: Status: ACTIVE | Noted: 2022-09-20

## 2022-09-20 LAB
ALBUMIN SERPL BCP-MCNC: 2.3 G/DL (ref 3.5–5.2)
ALP SERPL-CCNC: 116 U/L (ref 55–135)
ALT SERPL W/O P-5'-P-CCNC: 196 U/L (ref 10–44)
ANION GAP SERPL CALC-SCNC: 9 MMOL/L (ref 8–16)
AST SERPL-CCNC: 121 U/L (ref 10–40)
BASOPHILS # BLD AUTO: 0.03 K/UL (ref 0–0.2)
BASOPHILS NFR BLD: 0.5 % (ref 0–1.9)
BILIRUB SERPL-MCNC: 0.4 MG/DL (ref 0.1–1)
BUN SERPL-MCNC: 25 MG/DL (ref 6–20)
CALCIUM SERPL-MCNC: 8.4 MG/DL (ref 8.7–10.5)
CHLORIDE SERPL-SCNC: 100 MMOL/L (ref 95–110)
CO2 SERPL-SCNC: 35 MMOL/L (ref 23–29)
CREAT SERPL-MCNC: 0.9 MG/DL (ref 0.5–1.4)
DIFFERENTIAL METHOD: ABNORMAL
EOSINOPHIL # BLD AUTO: 0.1 K/UL (ref 0–0.5)
EOSINOPHIL NFR BLD: 2.3 % (ref 0–8)
ERYTHROCYTE [DISTWIDTH] IN BLOOD BY AUTOMATED COUNT: 16.9 % (ref 11.5–14.5)
EST. GFR  (NO RACE VARIABLE): >60 ML/MIN/1.73 M^2
GLUCOSE SERPL-MCNC: 99 MG/DL (ref 70–110)
HCT VFR BLD AUTO: 36.8 % (ref 37–48.5)
HGB BLD-MCNC: 10.9 G/DL (ref 12–16)
IMM GRANULOCYTES # BLD AUTO: 0.01 K/UL (ref 0–0.04)
IMM GRANULOCYTES NFR BLD AUTO: 0.2 % (ref 0–0.5)
LYMPHOCYTES # BLD AUTO: 1.6 K/UL (ref 1–4.8)
LYMPHOCYTES NFR BLD: 25.4 % (ref 18–48)
MAGNESIUM SERPL-MCNC: 1.7 MG/DL (ref 1.6–2.6)
MCH RBC QN AUTO: 25.4 PG (ref 27–31)
MCHC RBC AUTO-ENTMCNC: 29.6 G/DL (ref 32–36)
MCV RBC AUTO: 86 FL (ref 82–98)
MONOCYTES # BLD AUTO: 0.7 K/UL (ref 0.3–1)
MONOCYTES NFR BLD: 10.9 % (ref 4–15)
NEUTROPHILS # BLD AUTO: 3.7 K/UL (ref 1.8–7.7)
NEUTROPHILS NFR BLD: 60.7 % (ref 38–73)
NRBC BLD-RTO: 0 /100 WBC
PLATELET # BLD AUTO: 206 K/UL (ref 150–450)
PMV BLD AUTO: 11.6 FL (ref 9.2–12.9)
POCT GLUCOSE: 106 MG/DL (ref 70–110)
POTASSIUM SERPL-SCNC: 3.2 MMOL/L (ref 3.5–5.1)
PROT SERPL-MCNC: 5.5 G/DL (ref 6–8.4)
RBC # BLD AUTO: 4.29 M/UL (ref 4–5.4)
SODIUM SERPL-SCNC: 144 MMOL/L (ref 136–145)
WBC # BLD AUTO: 6.15 K/UL (ref 3.9–12.7)

## 2022-09-20 PROCEDURE — 25000003 PHARM REV CODE 250: Performed by: STUDENT IN AN ORGANIZED HEALTH CARE EDUCATION/TRAINING PROGRAM

## 2022-09-20 PROCEDURE — 25000003 PHARM REV CODE 250: Performed by: NURSE PRACTITIONER

## 2022-09-20 PROCEDURE — 83735 ASSAY OF MAGNESIUM: CPT | Performed by: NURSE PRACTITIONER

## 2022-09-20 PROCEDURE — S4991 NICOTINE PATCH NONLEGEND: HCPCS | Performed by: FAMILY MEDICINE

## 2022-09-20 PROCEDURE — 99406 PT REFUSED TOBACCO CESSATION: ICD-10-PCS | Mod: ,,,

## 2022-09-20 PROCEDURE — 63600175 PHARM REV CODE 636 W HCPCS: Performed by: NURSE PRACTITIONER

## 2022-09-20 PROCEDURE — 94760 N-INVAS EAR/PLS OXIMETRY 1: CPT

## 2022-09-20 PROCEDURE — 25000003 PHARM REV CODE 250: Performed by: FAMILY MEDICINE

## 2022-09-20 PROCEDURE — 94761 N-INVAS EAR/PLS OXIMETRY MLT: CPT

## 2022-09-20 PROCEDURE — 99233 SBSQ HOSP IP/OBS HIGH 50: CPT | Mod: ,,, | Performed by: NURSE PRACTITIONER

## 2022-09-20 PROCEDURE — 99406 BEHAV CHNG SMOKING 3-10 MIN: CPT | Mod: ,,,

## 2022-09-20 PROCEDURE — 85025 COMPLETE CBC W/AUTO DIFF WBC: CPT | Performed by: NURSE PRACTITIONER

## 2022-09-20 PROCEDURE — 80053 COMPREHEN METABOLIC PANEL: CPT | Performed by: NURSE PRACTITIONER

## 2022-09-20 PROCEDURE — 99233 PR SUBSEQUENT HOSPITAL CARE,LEVL III: ICD-10-PCS | Mod: ,,, | Performed by: NURSE PRACTITIONER

## 2022-09-20 PROCEDURE — 11000001 HC ACUTE MED/SURG PRIVATE ROOM

## 2022-09-20 RX ORDER — IBUPROFEN 200 MG
1 TABLET ORAL DAILY
Status: DISCONTINUED | OUTPATIENT
Start: 2022-09-20 | End: 2022-09-22 | Stop reason: HOSPADM

## 2022-09-20 RX ORDER — CALCIUM CARBONATE 200(500)MG
500 TABLET,CHEWABLE ORAL 2 TIMES DAILY PRN
Status: DISCONTINUED | OUTPATIENT
Start: 2022-09-20 | End: 2022-09-22 | Stop reason: HOSPADM

## 2022-09-20 RX ORDER — QUETIAPINE FUMARATE 25 MG/1
25 TABLET, FILM COATED ORAL ONCE
Status: COMPLETED | OUTPATIENT
Start: 2022-09-20 | End: 2022-09-20

## 2022-09-20 RX ORDER — SIMETHICONE 125 MG
125 TABLET,CHEWABLE ORAL EVERY 6 HOURS PRN
Status: CANCELLED | OUTPATIENT
Start: 2022-09-20

## 2022-09-20 RX ADMIN — NICOTINE 1 PATCH: 21 PATCH, EXTENDED RELEASE TRANSDERMAL at 01:09

## 2022-09-20 RX ADMIN — LISINOPRIL 40 MG: 20 TABLET ORAL at 08:09

## 2022-09-20 RX ADMIN — PROPRANOLOL HYDROCHLORIDE 10 MG: 10 TABLET ORAL at 08:09

## 2022-09-20 RX ADMIN — ALUMINUM HYDROXIDE, MAGNESIUM HYDROXIDE, AND DIMETHICONE 30 ML: 200; 20; 200 SUSPENSION ORAL at 05:09

## 2022-09-20 RX ADMIN — FUROSEMIDE 40 MG: 10 INJECTION, SOLUTION INTRAMUSCULAR; INTRAVENOUS at 08:09

## 2022-09-20 RX ADMIN — QUETIAPINE FUMARATE 25 MG: 25 TABLET ORAL at 10:09

## 2022-09-20 RX ADMIN — MUPIROCIN: 20 OINTMENT TOPICAL at 08:09

## 2022-09-20 RX ADMIN — PROPRANOLOL HYDROCHLORIDE 10 MG: 10 TABLET ORAL at 02:09

## 2022-09-20 RX ADMIN — CALCIUM CARBONATE (ANTACID) CHEW TAB 500 MG 500 MG: 500 CHEW TAB at 08:09

## 2022-09-20 RX ADMIN — ENOXAPARIN SODIUM 40 MG: 100 INJECTION SUBCUTANEOUS at 04:09

## 2022-09-20 RX ADMIN — ALUMINUM HYDROXIDE, MAGNESIUM HYDROXIDE, AND DIMETHICONE 30 ML: 200; 20; 200 SUSPENSION ORAL at 06:09

## 2022-09-20 RX ADMIN — FAMOTIDINE 20 MG: 20 TABLET ORAL at 08:09

## 2022-09-20 NOTE — PROGRESS NOTES
IP Liaison - Initial Visit Note    Patient: Stephanie T Barthelemy  MRN:  [id number]  Date of Service:  9/20/2022  Completed by:  FANY Johnston    Reason for Visit   Patient presents with    IP Liaison Initial Visit       RSW met with patient at bedside in order to complete SDOH questionnaire and liaison assessment.  Pt has identified barriers to care stress. Pt lives with her father Ezekiel but stated her father is looking to evict the pt and her sister some time soon. Per pt, pt bought a camper but she is not able to move into the camper right now. FANY provided pt with [facility] and added information to pt AVS for pt to reach out to the & if future assistance is needed.    The following were addressed during this visit:  - Review SDOH Questions   - Complete patient assessment   - Complete initial visit with patient        Patient Summary     IP Liaison Patient Assessment    General  Level of Caregiver support: Member independent and does not need caregiver assistance  Have you had to make a decision between paying for any of the following in the last 2 months?: None  Employment status: Disabled  Assessments  Was the PHQ Depression Screening completed this visit?: No  Was the IVAN-7 Screening completed this visit?: No       FANY Johnston

## 2022-09-20 NOTE — NURSING
Home Oxygen Evaluation    Date Performed: 2022    1) Patient's Home O2 Sat on room air, while at rest: 96%        If O2 sats on room air at rest are 88% or below, patient qualifies. No additional testing needed. Document N/A in steps 2 and 3. If 89% or above, complete steps 2.      2) Patient's O2 Sat on room air while exercisin%        If O2 sats on room air while exercising remain 89% or above patient does not qualify, no further testing needed Document N/A in step 3. If O2 sats on room air while exercising are 88% or below, continue to step 3.      3) Patient's O2 Sat while exercising on O2:  at  LPM   N/A        (Must show improvement from #2 for patients to qualify)    If O2 sats improve on oxygen, patient qualifies for portable oxygen. If not, the patient does not qualify.

## 2022-09-20 NOTE — ASSESSMENT & PLAN NOTE
- BNP >4900;  CXR with pulmonary vascular with small pleural effusion  Given lasix 80mg IV  -cont lasix 40mg IV BID- suspect noncompliance   -cont lisinopril  -cont propranolol  -consult cardiology- appreciates rec's   -echo done 2 months ago with 20% EF and G3DD

## 2022-09-20 NOTE — ASSESSMENT & PLAN NOTE
- echo with EF 20% and grade III diastolic dysfunction  - BNP >4900; CXR with pulmonary vascular with small pleural effusion  - remains on IV Lasix 40mg BID with 5.4L out overnight; negative 7.8L since admission; trace BLE edema; tachypnea at rest appears improved in comparison to yesterday; recommend OOB and ambulate and if no significant SOB or O2 desaturation then transition to oral Lasix   - on ACEI and BB; plan to resume home dose of Lasix given suspicion of noncompliance

## 2022-09-20 NOTE — PROGRESS NOTES
[facility] - Telemetry  Cardiology  Progress Note    Patient Name: Stephanie T Barthelemy  MRN: [id number]  Admission Date: 9/18/2022  Hospital Length of Stay: 1 days  Code Status: Full Code   Attending Physician: Nelly Pandey*   Primary Care Physician: No primary care provider on file.  Expected Discharge Date:   Principal Problem:Acute on chronic combined systolic and diastolic congestive heart failure    Subjective:     Hospital Course:   9/19/2022 per HPI   9/20/2022 Transferred out of ICU overnight. Remains on IV Lasix BID with 5.4L out overnight. Negative 7.8L since admission. Uncertain if SOB improved due to continued bedrest. HR 80s-90s SBO 100s-130s overnight. CBC and BMP WNL           Review of Systems   Constitutional: Negative for chills, decreased appetite, diaphoresis and fever.   Cardiovascular:  Positive for dyspnea on exertion. Negative for chest pain, claudication, cyanosis, irregular heartbeat, leg swelling, near-syncope, orthopnea, palpitations, paroxysmal nocturnal dyspnea and syncope.   Respiratory:  Negative for cough, hemoptysis, shortness of breath and wheezing.    Gastrointestinal:  Negative for bloating, abdominal pain, constipation, diarrhea, melena, nausea and vomiting.   Neurological:  Negative for dizziness and weakness.   Objective:     Vital Signs (Most Recent):  Temp: 97.8 °F (36.6 °C) (09/20/22 0804)  Pulse: 95 (09/20/22 0804)  Resp: 18 (09/20/22 0804)  BP: (!) 148/103 (09/20/22 0804)  SpO2: (!) 94 % (09/20/22 0804)   Vital Signs (24h Range):  Temp:  [96.1 °F (35.6 °C)-98.4 °F (36.9 °C)] 97.8 °F (36.6 °C)  Pulse:  [80-98] 95  Resp:  [18-21] 18  SpO2:  [85 %-96 %] 94 %  BP: (105-148)/() 148/103     Weight: 63 kg (138 lb 14.2 oz)  Body mass index is 27.13 kg/m².     SpO2: (!) 94 %  O2 Device (Oxygen Therapy): room air      Intake/Output Summary (Last 24 hours) at 9/20/2022 1007  Last data filed at 9/19/2022 2315  Gross per 24 hour   Intake 990 ml   Output 3700 ml   Net  -2710 ml       Lines/Drains/Airways       Peripheral Intravenous Line  Duration                  Peripheral IV - Single Lumen 09/18/22 2239 20 G Left Antecubital 1 day         Peripheral IV - Single Lumen 09/19/22 0320 Anterior;Right Forearm 1 day                    Physical Exam  Constitutional:       General: She is not in acute distress.     Appearance: She is well-developed.   Cardiovascular:      Rate and Rhythm: Normal rate and regular rhythm.      Heart sounds: No murmur heard.    No gallop.   Pulmonary:      Effort: Pulmonary effort is normal. No respiratory distress.      Breath sounds: Normal breath sounds. No wheezing.   Abdominal:      General: Bowel sounds are normal. There is no distension.      Palpations: Abdomen is soft.      Tenderness: There is no abdominal tenderness.   Musculoskeletal:      Right lower leg: Edema present.      Left lower leg: Edema present.   Skin:     General: Skin is warm and dry.   Neurological:      Mental Status: She is alert and oriented to person, place, and time.       Significant Labs: BMP:   Recent Labs   Lab 09/18/22 2244 09/19/22  0322 09/20/22  0301 09/20/22  0302   * 104 99  --     139 144  --    K 4.0 3.8 3.2*  --     104 100  --    CO2 27 23 35*  --    BUN 24* 22* 25*  --    CREATININE 1.14 1.0 0.9  --    CALCIUM 8.4* 8.7 8.4*  --    MG  --  1.9  --  1.7    and CBC   Recent Labs   Lab 09/18/22 2244 09/19/22  0315 09/20/22  0302   WBC 11.20 11.33 6.15   HGB 11.4* 11.3* 10.9*   HCT 37.5 37.5 36.8*    262 206       Significant Imaging: Echocardiogram: Transthoracic echo (TTE) complete (Cupid Only):   Results for orders placed or performed during the hospital encounter of 07/21/22   Echo   Result Value Ref Range    BSA 1.56 m2    LA WIDTH 4.59 cm    PV PEAK VELOCITY 1.25 cm/s    LVIDd 5.22 3.5 - 6.0 cm    IVS 1.32 (A) 0.6 - 1.1 cm    Posterior Wall 1.23 (A) 0.6 - 1.1 cm    Ao root annulus 2.52 cm    LVIDs 4.80 (A) 2.1 - 4.0 cm    FS 8 28 -  44 %    LV mass 272.55 g    LA size 4.24 cm    RVDD 4.10 cm    Left Ventricle Relative Wall Thickness 0.47 cm    AV mean gradient 3 mmHg    AV valve area 2.32 cm2    AV Velocity Ratio 0.75     AV index (prosthetic) 0.74     MV mean gradient 1 mmHg    MV valve area p 1/2 method 7.47 cm2    MV valve area by continuity eq 1.95 cm2    E/A ratio 2.73     E wave deceleration time 85.51 msec    LVOT diameter 2.00 cm    LVOT area 3.1 cm2    LVOT peak curt 0.97 m/s    LVOT peak VTI 13.45 cm    Ao peak curt 1.29 m/s    Ao VTI 18.21 cm    Mr max curt 0.06 m/s    LVOT stroke volume 42.23 cm3    AV peak gradient 7 mmHg    MV peak gradient 7 mmHg    MV Peak E Curt 1.31 m/s    TR Max Curt 2.49 m/s    MV VTI 21.66 cm    MV stenosis pressure 1/2 time 29.44 ms    MV Peak A Curt 0.48 m/s    LV Systolic Volume 107.66 mL    LV Systolic Volume Index 70.4 mL/m2    LV Diastolic Volume 130.52 mL    LV Diastolic Volume Index 85.31 mL/m2    LV Mass Index 178 g/m2    RA Major Axis 4.59 cm    Left Atrium Major Axis 6.65 cm    Triscuspid Valve Regurgitation Peak Gradient 25 mmHg    LA Volume Index (Mod) 53.8 mL/m2    LA volume (mod) 82.28 cm3    RA Width 3.48 cm    Right Atrial Pressure (from IVC) 8 mmHg    EF 20 %    TV rest pulmonary artery pressure 33 mmHg    Narrative    · The left ventricle is mildly enlarged with concentric hypertrophy and   severely decreased systolic function.  · The estimated ejection fraction is 20%.  · There is severe left ventricular global hypokinesis.  · Grade III left ventricular diastolic dysfunction.  · Mild right ventricular enlargement with mildly reduced right ventricular   systolic function.  · Severe left atrial enlargement.  · Moderate aortic regurgitation.  · Severe mitral regurgitation.  · Mild tricuspid regurgitation.  · Moderate pulmonic regurgitation.  · Intermediate central venous pressure (8 mmHg).  · The estimated PA systolic pressure is 33 mmHg.  · Trivial pericardial effusion.        Assessment and  Plan:     Brief HPI: Seen on AM NP rounds while resting in bed. Denies any major complaints. Discussed POC as detailed below-slow to respond and not certain she fully grasps caliber of cardiac condition    * Acute on chronic combined systolic and diastolic congestive heart failure  - echo with EF 20% and grade III diastolic dysfunction  - BNP >4900; CXR with pulmonary vascular with small pleural effusion  - remains on IV Lasix 40mg BID with 5.4L out overnight; negative 7.8L since admission; trace BLE edema; tachypnea at rest appears improved in comparison to yesterday; recommend OOB and ambulate and if no significant SOB or O2 desaturation then transition to oral Lasix   - on ACEI and BB; plan to resume home dose of Lasix given suspicion of noncompliance     Polysubstance abuse  - history of substance abuse with cocaine/THC and methamphetamine use  - tox screen positive for methampethamine this admission     Acute encephalopathy  - responsive but unstimulated easily falls asleep  - tox positive; CT head negative     Elevated troponin  - troponin .088 with trend up to .175  - felt to be demand related at this time  - low EF new based on echo from July; uncertain of etiology currently; continue medical management       Essential hypertension  - SBP 100s-130s  - continue ACEI and BB     Tobacco abuse  - active smoker  - needs counseling on importance of complete cessation         VTE Risk Mitigation (From admission, onward)         Ordered     enoxaparin injection 40 mg  Daily         09/19/22 0042     IP VTE HIGH RISK PATIENT  Once         09/19/22 0042     Place sequential compression device  Until discontinued         09/19/22 0042                MICHAEL Chowdhury, ANP  Cardiology  [facility] - Telemetry

## 2022-09-20 NOTE — PLAN OF CARE
09/20/22 1345   Post-Acute Status   Post-Acute Authorization Other   Other Status Awaiting f/u Appts      Follow-up Information       [facility] Follow up.    Specialty: [facility]  Contact information:  [address], [address]  [facility] [address] [phone]  Additional information:  Please park in [address] or D and use [employer]. Take Medical Office Bldg elevators.             [facility] .    Specialty: Cardiology  Contact information:  [address], [address]  [facility] [address] [phone]  Additional information:  Please park in [address] or [address] and use [employer] [address].

## 2022-09-20 NOTE — ASSESSMENT & PLAN NOTE
Had similar presentation in the past  ABG with hypoxia  -UA and drug screen - tox screen positive for methampethamine this admission   -Head CT- CT head negative   -Ammonia pending  -Blood cx pending  -cont supplemental O2 as needed

## 2022-09-20 NOTE — ASSESSMENT & PLAN NOTE
- history of substance abuse with cocaine/THC and methamphetamine use  UDS- positive for methampethamine this admission

## 2022-09-20 NOTE — PLAN OF CARE
"  Problem: Adult Inpatient Plan of Care  Goal: Plan of Care Review  Outcome: Ongoing, Progressing  Goal: Patient-Specific Goal (Individualized)  Outcome: Ongoing, Progressing  Goal: Absence of Hospital-Acquired Illness or Injury  Outcome: Ongoing, Progressing  Goal: Readiness for Transition of Care  Outcome: Ongoing, Progressing     Problem: Skin Injury Risk Increased  Goal: Skin Health and Integrity  Outcome: Ongoing, Progressing     Problem: Cardiac Output Decreased (Heart Failure)  Goal: Optimal Cardiac Output  Outcome: Ongoing, Progressing     Problem: Fluid Imbalance (Heart Failure)  Goal: Fluid Balance  Outcome: Ongoing, Progressing     Problem: Functional Ability Impaired (Heart Failure)  Goal: Optimal Functional Ability  Outcome: Ongoing, Progressing     Problem: Fall Injury Risk  Goal: Absence of Fall and Fall-Related Injury  Outcome: Ongoing, Progressing     Problem: Activity and Energy Impairment (Excessive Substance Use)  Goal: Optimized Energy Level (Excessive Substance Use)  Outcome: Ongoing, Progressing     Problem: Behavior Regulation Impairment (Excessive Substance Use)  Goal: Improved Behavioral Control (Excessive Substance Use)  Outcome: Ongoing, Progressing     Problem: Decreased Participation and Engagement (Excessive Substance Use)  Goal: Increased Participation and Engagement (Excessive Substance Use)  Outcome: Ongoing, Progressing     Problem: Hypertension Comorbidity  Goal: Blood Pressure in Desired Range  Outcome: Ongoing, Progressing     Plan of care reviewed with patient, understanding verbalized. Pt is AAO x 3, confused to situation. Pt is on room air, denies SOB and pain, no distress noted. All meds administered as prescribed. Educated pt multiple times that she is on a 1500 ml fluid restriction in which her reply was always "why"? Repeatedly had to ask pt not to get out of bed without calling first, however she still continued to do so all night. Pt had a large bowel movement, " diarrhea, and requested meds. Informed NP and was told to keep updated if she has another and collect a sample, no meds ordered. Pt has not had another BM yet. Pt is currently asleep, bed in lowest position, bed alarm activated, side rails up x 2, call light and bedside table within reach. Pt instructed to call if assistance is needed.

## 2022-09-20 NOTE — PROGRESS NOTES
Individual Follow-Up Form    9/20/2022    Quit Date: To be determined    Clinical Status of Patient: Inpatient    Length of Service: 30 minutes    Comments: Smoking cessation education follow-up: Pt is a 1 pk/day cigarette smoker x 36 yrs. Order for 21 mg nicotine patch again requested. Pt states not ready to quit smoking,. She declines referral to Ambulatory Smoking Cessation clinic at this time.     Diagnosis: F17.210

## 2022-09-20 NOTE — SUBJECTIVE & OBJECTIVE
Review of Systems   Constitutional: Negative for chills, decreased appetite, diaphoresis and fever.   Cardiovascular:  Positive for dyspnea on exertion. Negative for chest pain, claudication, cyanosis, irregular heartbeat, leg swelling, near-syncope, orthopnea, palpitations, paroxysmal nocturnal dyspnea and syncope.   Respiratory:  Negative for cough, hemoptysis, shortness of breath and wheezing.    Gastrointestinal:  Negative for bloating, abdominal pain, constipation, diarrhea, melena, nausea and vomiting.   Neurological:  Negative for dizziness and weakness.   Objective:     Vital Signs (Most Recent):  Temp: 97.8 °F (36.6 °C) (09/20/22 0804)  Pulse: 95 (09/20/22 0804)  Resp: 18 (09/20/22 0804)  BP: (!) 148/103 (09/20/22 0804)  SpO2: (!) 94 % (09/20/22 0804)   Vital Signs (24h Range):  Temp:  [96.1 °F (35.6 °C)-98.4 °F (36.9 °C)] 97.8 °F (36.6 °C)  Pulse:  [80-98] 95  Resp:  [18-21] 18  SpO2:  [85 %-96 %] 94 %  BP: (105-148)/() 148/103     Weight: 63 kg (138 lb 14.2 oz)  Body mass index is 27.13 kg/m².     SpO2: (!) 94 %  O2 Device (Oxygen Therapy): room air      Intake/Output Summary (Last 24 hours) at 9/20/2022 1007  Last data filed at 9/19/2022 2315  Gross per 24 hour   Intake 990 ml   Output 3700 ml   Net -2710 ml       Lines/Drains/Airways       Peripheral Intravenous Line  Duration                  Peripheral IV - Single Lumen 09/18/22 2239 20 G Left Antecubital 1 day         Peripheral IV - Single Lumen 09/19/22 0320 Anterior;Right Forearm 1 day                    Physical Exam  Constitutional:       General: She is not in acute distress.     Appearance: She is well-developed.   Cardiovascular:      Rate and Rhythm: Normal rate and regular rhythm.      Heart sounds: No murmur heard.    No gallop.   Pulmonary:      Effort: Pulmonary effort is normal. No respiratory distress.      Breath sounds: Normal breath sounds. No wheezing.   Abdominal:      General: Bowel sounds are normal. There is no  distension.      Palpations: Abdomen is soft.      Tenderness: There is no abdominal tenderness.   Musculoskeletal:      Right lower leg: Edema present.      Left lower leg: Edema present.   Skin:     General: Skin is warm and dry.   Neurological:      Mental Status: She is alert and oriented to person, place, and time.       Significant Labs: BMP:   Recent Labs   Lab 09/18/22 2244 09/19/22  0322 09/20/22  0301 09/20/22  0302   * 104 99  --     139 144  --    K 4.0 3.8 3.2*  --     104 100  --    CO2 27 23 35*  --    BUN 24* 22* 25*  --    CREATININE 1.14 1.0 0.9  --    CALCIUM 8.4* 8.7 8.4*  --    MG  --  1.9  --  1.7    and CBC   Recent Labs   Lab 09/18/22 2244 09/19/22  0315 09/20/22  0302   WBC 11.20 11.33 6.15   HGB 11.4* 11.3* 10.9*   HCT 37.5 37.5 36.8*    262 206       Significant Imaging: Echocardiogram: Transthoracic echo (TTE) complete (Cupid Only):   Results for orders placed or performed during the hospital encounter of 07/21/22   Echo   Result Value Ref Range    BSA 1.56 m2    LA WIDTH 4.59 cm    PV PEAK VELOCITY 1.25 cm/s    LVIDd 5.22 3.5 - 6.0 cm    IVS 1.32 (A) 0.6 - 1.1 cm    Posterior Wall 1.23 (A) 0.6 - 1.1 cm    Ao root annulus 2.52 cm    LVIDs 4.80 (A) 2.1 - 4.0 cm    FS 8 28 - 44 %    LV mass 272.55 g    LA size 4.24 cm    RVDD 4.10 cm    Left Ventricle Relative Wall Thickness 0.47 cm    AV mean gradient 3 mmHg    AV valve area 2.32 cm2    AV Velocity Ratio 0.75     AV index (prosthetic) 0.74     MV mean gradient 1 mmHg    MV valve area p 1/2 method 7.47 cm2    MV valve area by continuity eq 1.95 cm2    E/A ratio 2.73     E wave deceleration time 85.51 msec    LVOT diameter 2.00 cm    LVOT area 3.1 cm2    LVOT peak curt 0.97 m/s    LVOT peak VTI 13.45 cm    Ao peak curt 1.29 m/s    Ao VTI 18.21 cm    Mr max curt 0.06 m/s    LVOT stroke volume 42.23 cm3    AV peak gradient 7 mmHg    MV peak gradient 7 mmHg    MV Peak E Curt 1.31 m/s    TR Max Curt 2.49 m/s    MV VTI  21.66 cm    MV stenosis pressure 1/2 time 29.44 ms    MV Peak A Curt 0.48 m/s    LV Systolic Volume 107.66 mL    LV Systolic Volume Index 70.4 mL/m2    LV Diastolic Volume 130.52 mL    LV Diastolic Volume Index 85.31 mL/m2    LV Mass Index 178 g/m2    RA Major Axis 4.59 cm    Left Atrium Major Axis 6.65 cm    Triscuspid Valve Regurgitation Peak Gradient 25 mmHg    LA Volume Index (Mod) 53.8 mL/m2    LA volume (mod) 82.28 cm3    RA Width 3.48 cm    Right Atrial Pressure (from IVC) 8 mmHg    EF 20 %    TV rest pulmonary artery pressure 33 mmHg    Narrative    · The left ventricle is mildly enlarged with concentric hypertrophy and   severely decreased systolic function.  · The estimated ejection fraction is 20%.  · There is severe left ventricular global hypokinesis.  · Grade III left ventricular diastolic dysfunction.  · Mild right ventricular enlargement with mildly reduced right ventricular   systolic function.  · Severe left atrial enlargement.  · Moderate aortic regurgitation.  · Severe mitral regurgitation.  · Mild tricuspid regurgitation.  · Moderate pulmonic regurgitation.  · Intermediate central venous pressure (8 mmHg).  · The estimated PA systolic pressure is 33 mmHg.  · Trivial pericardial effusion.

## 2022-09-20 NOTE — PROGRESS NOTES
[facility]  Progress Note    Patient Name: Stephanie T Barthelemy  MRN: [id number]  Patient Class: IP- Inpatient   Admission Date: 9/18/2022  Length of Stay: 1 days  Attending Physician: Nelly Pandey*  Primary Care Provider: No primary care provider on file.        Subjective:     Principal Problem:Acute on chronic combined systolic and diastolic congestive heart failure        HPI:  Stephanie Barthelemy is a 52yo female with CHF (EF 20%), polysubstance abuse, opioid overdose, tobacco abuse, HTN, infective endocarditis, psychosis, seizure disorder, hep C (treated), bipolar disorder. She presented to [facility] ED via EMS with reported SOB. She was noted to have anasarca from abdomen down. She was tachycardic and mildly hypoxic with O2 sat 94%. proBNP 09242. Elevated LFTs. Does not appear she was given any medications while there. On arrival to [place], she arrived on room air, was lethargic, and placed on bipap. Given narcan with minimal response. ABG with pO2 55. Pitting edema to BLE and swelling to abdomen. Given 80mg IV lasix. She was able to wean down to nasal cannula while in ED. More alert at times.       Overview/Hospital Course:  No notes on file    Interval History: awake and alert, still + pedal edema, improving   Down titrate Lasix- awaits cardiology rec;s today    Review of Systems   Constitutional:  Positive for activity change, appetite change and fatigue.   HENT:          Hard of hearing   Respiratory:  Negative for cough and shortness of breath.    Cardiovascular:  Positive for leg swelling.   Gastrointestinal:  Negative for nausea and vomiting.   Musculoskeletal:  Positive for myalgias. Negative for back pain.   Skin:  Negative for wound.   Neurological:  Negative for tremors and weakness.   Psychiatric/Behavioral:  Negative for agitation.    Objective:     Vital Signs (Most Recent):  Temp: 97.8 °F (36.6 °C) (09/20/22 0804)  Pulse: 95 (09/20/22 0804)  Resp: 18  (09/20/22 0804)  BP: (!) 148/103 (09/20/22 0804)  SpO2: (!) 94 % (09/20/22 0804)   Vital Signs (24h Range):  Temp:  [96.1 °F (35.6 °C)-98.2 °F (36.8 °C)] 97.8 °F (36.6 °C)  Pulse:  [80-98] 95  Resp:  [18-21] 18  SpO2:  [85 %-96 %] 94 %  BP: (105-148)/() 148/103     Weight: 63 kg (138 lb 14.2 oz)  Body mass index is 27.13 kg/m².    Intake/Output Summary (Last 24 hours) at 9/20/2022 1158  Last data filed at 9/19/2022 2315  Gross per 24 hour   Intake 990 ml   Output 2275 ml   Net -1285 ml      Physical Exam  Vitals and nursing note reviewed.   Constitutional:       General: She is not in acute distress.     Appearance: She is ill-appearing.   HENT:      Head: Normocephalic and atraumatic.   Cardiovascular:      Rate and Rhythm: Normal rate and regular rhythm.      Pulses: Normal pulses.      Heart sounds: Murmur heard.   Pulmonary:      Breath sounds: Rales present.   Abdominal:      General: Bowel sounds are normal. There is distension.      Palpations: Abdomen is soft.   Musculoskeletal:         General: Normal range of motion.      Cervical back: Normal range of motion.      Right lower leg: Edema present.      Left lower leg: Edema present.   Skin:     General: Skin is warm and dry.   Neurological:      Mental Status: She is oriented to person, place, and time.       Significant Labs: ABGs:   Recent Labs   Lab 09/19/22  0121   PH 7.427   PCO2 37.9   HCO3 25.0   POCSATURATED 89*   BE 1   PO2 55*     CBC:   Recent Labs   Lab 09/18/22  2244 09/19/22  0315 09/20/22  0302   WBC 11.20 11.33 6.15   HGB 11.4* 11.3* 10.9*   HCT 37.5 37.5 36.8*    262 206     CMP:   Recent Labs   Lab 09/18/22  2244 09/19/22  0322 09/20/22  0301    139 144   K 4.0 3.8 3.2*    104 100   CO2 27 23 35*   * 104 99   BUN 24* 22* 25*   CREATININE 1.14 1.0 0.9   CALCIUM 8.4* 8.7 8.4*   PROT 6.3  --  5.5*   ALBUMIN 3.2*  --  2.3*   BILITOT 0.6  --  0.4   ALKPHOS 148*  --  116   *  --  121*   *  --  196*    ANIONGAP 11 12 9     Lactic Acid: No results for input(s): LACTATE in the last 48 hours.  Lipase: No results for input(s): LIPASE in the last 48 hours.  Lipid Panel: No results for input(s): CHOL, HDL, LDLCALC, TRIG, CHOLHDL in the last 48 hours.  TSH:   Recent Labs   Lab 08/03/22  1730   TSH 0.780     Urine Culture: No results for input(s): LABURIN in the last 48 hours.  Urine Studies:   Recent Labs   Lab 09/19/22  0331   COLORU Yellow   APPEARANCEUA Clear   PHUR 6.0   SPECGRAV 1.010   PROTEINUA Trace*   GLUCUA Negative   KETONESU Negative   BILIRUBINUA Negative   OCCULTUA Negative   NITRITE Negative   UROBILINOGEN Negative   LEUKOCYTESUR Negative       Significant Imaging: I have reviewed all pertinent imaging results/findings within the past 24 hours.      Assessment/Plan:      * Acute on chronic combined systolic and diastolic congestive heart failure  - BNP >4900;  CXR with pulmonary vascular with small pleural effusion  Given lasix 80mg IV  -cont lasix 40mg IV BID- suspect noncompliance   -cont lisinopril  -cont propranolol  -consult cardiology- appreciates rec's   -echo done 2 months ago with 20% EF and G3DD      Advance care planning  Code status is Full code           Polysubstance abuse  - history of substance abuse with cocaine/THC and methamphetamine use  UDS- positive for methampethamine this admission     Acute encephalopathy  Had similar presentation in the past  ABG with hypoxia  -UA and drug screen - tox screen positive for methampethamine this admission   -Head CT- CT head negative   -Ammonia pending  -Blood cx pending  -cont supplemental O2 as needed        Acute respiratory failure with hypoxia  -bipap weaned in ED with 100% O2 sat on 2lNC  -cont supplemental O2 as needed        Elevated troponin  likely due to demand in setting of volume overload  -Trend troponin 0.088 >> 0.175  -cardiology consulted- appreciates rec's      Bipolar 1 disorder, depressed  -patient is lethargic  -hold home meds  for now      Essential hypertension  uncontrolled  -cont lisinopril and BB        Tobacco abuse   active smoker  - continue counseling on importance of complete cessation           VTE Risk Mitigation (From admission, onward)         Ordered     enoxaparin injection 40 mg  Daily         09/19/22 0042     IP VTE HIGH RISK PATIENT  Once         09/19/22 0042     Place sequential compression device  Until discontinued         09/19/22 0042                Discharge Planning   AYSHA:      Code Status: Full Code   Is the patient medically ready for discharge?:     Reason for patient still in hospital (select all that apply): Patient trending condition  Discharge Plan A: Home                  Nelly Pandey MD  Department of Hospital Medicine   [place] [facility]

## 2022-09-20 NOTE — PROGRESS NOTES
Attempted to follow up with pt regarding diet education. Pt was asleep at visit and did not awaken. Left handouts at bedside. Will follow up.

## 2022-09-20 NOTE — ASSESSMENT & PLAN NOTE
likely due to demand in setting of volume overload  -Trend troponin 0.088 >> 0.175  -cardiology consulted- appreciates rec's

## 2022-09-21 LAB
ANION GAP SERPL CALC-SCNC: 9 MMOL/L (ref 8–16)
BASOPHILS # BLD AUTO: 0.05 K/UL (ref 0–0.2)
BASOPHILS NFR BLD: 0.6 % (ref 0–1.9)
BUN SERPL-MCNC: 27 MG/DL (ref 6–20)
CALCIUM SERPL-MCNC: 9.1 MG/DL (ref 8.7–10.5)
CHLORIDE SERPL-SCNC: 98 MMOL/L (ref 95–110)
CO2 SERPL-SCNC: 35 MMOL/L (ref 23–29)
CREAT SERPL-MCNC: 1.3 MG/DL (ref 0.5–1.4)
DIFFERENTIAL METHOD: ABNORMAL
EOSINOPHIL # BLD AUTO: 0.2 K/UL (ref 0–0.5)
EOSINOPHIL NFR BLD: 2.5 % (ref 0–8)
ERYTHROCYTE [DISTWIDTH] IN BLOOD BY AUTOMATED COUNT: 17.1 % (ref 11.5–14.5)
EST. GFR  (NO RACE VARIABLE): 50 ML/MIN/1.73 M^2
GLUCOSE SERPL-MCNC: 110 MG/DL (ref 70–110)
HCT VFR BLD AUTO: 41.5 % (ref 37–48.5)
HGB BLD-MCNC: 12.5 G/DL (ref 12–16)
IMM GRANULOCYTES # BLD AUTO: 0.02 K/UL (ref 0–0.04)
IMM GRANULOCYTES NFR BLD AUTO: 0.3 % (ref 0–0.5)
LYMPHOCYTES # BLD AUTO: 2 K/UL (ref 1–4.8)
LYMPHOCYTES NFR BLD: 25.5 % (ref 18–48)
MAGNESIUM SERPL-MCNC: 2 MG/DL (ref 1.6–2.6)
MCH RBC QN AUTO: 25.7 PG (ref 27–31)
MCHC RBC AUTO-ENTMCNC: 30.1 G/DL (ref 32–36)
MCV RBC AUTO: 85 FL (ref 82–98)
MONOCYTES # BLD AUTO: 1 K/UL (ref 0.3–1)
MONOCYTES NFR BLD: 12.4 % (ref 4–15)
NEUTROPHILS # BLD AUTO: 4.6 K/UL (ref 1.8–7.7)
NEUTROPHILS NFR BLD: 58.7 % (ref 38–73)
NRBC BLD-RTO: 0 /100 WBC
PLATELET # BLD AUTO: 284 K/UL (ref 150–450)
PMV BLD AUTO: 11.6 FL (ref 9.2–12.9)
POTASSIUM SERPL-SCNC: 3.8 MMOL/L (ref 3.5–5.1)
RBC # BLD AUTO: 4.87 M/UL (ref 4–5.4)
SODIUM SERPL-SCNC: 142 MMOL/L (ref 136–145)
WBC # BLD AUTO: 7.91 K/UL (ref 3.9–12.7)

## 2022-09-21 PROCEDURE — 93005 ELECTROCARDIOGRAM TRACING: CPT

## 2022-09-21 PROCEDURE — 11000001 HC ACUTE MED/SURG PRIVATE ROOM

## 2022-09-21 PROCEDURE — 85025 COMPLETE CBC W/AUTO DIFF WBC: CPT | Performed by: NURSE PRACTITIONER

## 2022-09-21 PROCEDURE — 94761 N-INVAS EAR/PLS OXIMETRY MLT: CPT

## 2022-09-21 PROCEDURE — 25000003 PHARM REV CODE 250: Performed by: NURSE PRACTITIONER

## 2022-09-21 PROCEDURE — 83735 ASSAY OF MAGNESIUM: CPT | Performed by: NURSE PRACTITIONER

## 2022-09-21 PROCEDURE — 99900035 HC TECH TIME PER 15 MIN (STAT)

## 2022-09-21 PROCEDURE — S4991 NICOTINE PATCH NONLEGEND: HCPCS | Performed by: FAMILY MEDICINE

## 2022-09-21 PROCEDURE — 93010 ELECTROCARDIOGRAM REPORT: CPT | Mod: 76,,, | Performed by: INTERNAL MEDICINE

## 2022-09-21 PROCEDURE — 25000003 PHARM REV CODE 250: Performed by: STUDENT IN AN ORGANIZED HEALTH CARE EDUCATION/TRAINING PROGRAM

## 2022-09-21 PROCEDURE — 63600175 PHARM REV CODE 636 W HCPCS: Performed by: NURSE PRACTITIONER

## 2022-09-21 PROCEDURE — 80048 BASIC METABOLIC PNL TOTAL CA: CPT | Performed by: NURSE PRACTITIONER

## 2022-09-21 PROCEDURE — 93010 EKG 12-LEAD: ICD-10-PCS | Mod: ,,, | Performed by: INTERNAL MEDICINE

## 2022-09-21 PROCEDURE — 36415 COLL VENOUS BLD VENIPUNCTURE: CPT | Performed by: NURSE PRACTITIONER

## 2022-09-21 PROCEDURE — 25000003 PHARM REV CODE 250: Performed by: FAMILY MEDICINE

## 2022-09-21 RX ORDER — POTASSIUM CHLORIDE 20 MEQ/1
40 TABLET, EXTENDED RELEASE ORAL ONCE
Status: COMPLETED | OUTPATIENT
Start: 2022-09-21 | End: 2022-09-21

## 2022-09-21 RX ADMIN — FAMOTIDINE 20 MG: 20 TABLET ORAL at 08:09

## 2022-09-21 RX ADMIN — ACETAMINOPHEN 650 MG: 325 TABLET ORAL at 04:09

## 2022-09-21 RX ADMIN — FUROSEMIDE 40 MG: 10 INJECTION, SOLUTION INTRAMUSCULAR; INTRAVENOUS at 08:09

## 2022-09-21 RX ADMIN — PROPRANOLOL HYDROCHLORIDE 10 MG: 10 TABLET ORAL at 08:09

## 2022-09-21 RX ADMIN — PROPRANOLOL HYDROCHLORIDE 10 MG: 10 TABLET ORAL at 02:09

## 2022-09-21 RX ADMIN — ALUMINUM HYDROXIDE, MAGNESIUM HYDROXIDE, AND DIMETHICONE 30 ML: 200; 20; 200 SUSPENSION ORAL at 02:09

## 2022-09-21 RX ADMIN — MUPIROCIN: 20 OINTMENT TOPICAL at 08:09

## 2022-09-21 RX ADMIN — LISINOPRIL 40 MG: 20 TABLET ORAL at 08:09

## 2022-09-21 RX ADMIN — ALUMINUM HYDROXIDE, MAGNESIUM HYDROXIDE, AND DIMETHICONE 30 ML: 200; 20; 200 SUSPENSION ORAL at 04:09

## 2022-09-21 RX ADMIN — ALUMINUM HYDROXIDE, MAGNESIUM HYDROXIDE, AND DIMETHICONE 30 ML: 200; 20; 200 SUSPENSION ORAL at 10:09

## 2022-09-21 RX ADMIN — NICOTINE 1 PATCH: 21 PATCH, EXTENDED RELEASE TRANSDERMAL at 08:09

## 2022-09-21 RX ADMIN — POTASSIUM CHLORIDE 40 MEQ: 1500 TABLET, EXTENDED RELEASE ORAL at 01:09

## 2022-09-21 RX ADMIN — ENOXAPARIN SODIUM 40 MG: 100 INJECTION SUBCUTANEOUS at 04:09

## 2022-09-21 NOTE — NURSING
19:58 : Notified Barbara Waller NP patient complain of abdomen pain, patient stated Maalox helped with pain earlier, patient requesting Tums.Order received see order .     21:23: Patient requested Seroquel for sleeping medication. Patient stated she had it yesterday at bedtime Patient also stated she takes Seroquel at home for sleep. Notified NP of request. Order received see order.     22:07 : Notified NP patient B/P 146/113, propranolol and lasix given per order. Recheck B/P 142/106. No new order received at this time . Patient resting in bed, no distress noted. Will continue to monitor.

## 2022-09-21 NOTE — SUBJECTIVE & OBJECTIVE
Interval History: awake and alert, diuresing, edema improving. Feels better  Neg 3350ml  Continue diuresing   Repeat vitals. Possible EKG      Review of Systems   Constitutional:  Positive for activity change, appetite change and fatigue.   HENT:          Hard of hearing   Respiratory:  Negative for cough and shortness of breath.    Cardiovascular:  Positive for leg swelling.   Gastrointestinal:  Negative for nausea and vomiting.   Musculoskeletal:  Positive for myalgias. Negative for back pain.   Skin:  Negative for wound.   Neurological:  Negative for tremors and weakness.   Psychiatric/Behavioral:  Negative for agitation.    Objective:     Vital Signs (Most Recent):  Temp: 97.3 °F (36.3 °C) (09/21/22 1119)  Pulse: (!) 125 (09/21/22 1210)  Resp: 18 (09/21/22 1119)  BP: (!) 139/108 (09/21/22 1119)  SpO2: 99 % (09/21/22 1119)   Vital Signs (24h Range):  Temp:  [96.8 °F (36 °C)-97.8 °F (36.6 °C)] 97.3 °F (36.3 °C)  Pulse:  [] 125  Resp:  [18] 18  SpO2:  [86 %-99 %] 99 %  BP: (139-150)/() 139/108     Weight: 52.3 kg (115 lb 4.8 oz)  Body mass index is 22.52 kg/m².    Intake/Output Summary (Last 24 hours) at 9/21/2022 1335  Last data filed at 9/21/2022 0700  Gross per 24 hour   Intake 900 ml   Output 4250 ml   Net -3350 ml        Physical Exam  Vitals and nursing note reviewed.   Constitutional:       General: She is not in acute distress.     Appearance: She is ill-appearing.   HENT:      Head: Normocephalic and atraumatic.   Cardiovascular:      Rate and Rhythm: Normal rate and regular rhythm.      Pulses: Normal pulses.      Heart sounds: Murmur heard.   Pulmonary:      Breath sounds: Rales present.   Abdominal:      General: Bowel sounds are normal. There is distension.      Palpations: Abdomen is soft.   Musculoskeletal:         General: Normal range of motion.      Cervical back: Normal range of motion.      Right lower leg: Edema present.      Left lower leg: Edema present.   Skin:     General: Skin  is warm and dry.   Neurological:      Mental Status: She is oriented to person, place, and time.       Significant Labs: ABGs:   No results for input(s): PH, PCO2, HCO3, POCSATURATED, BE, TOTALHB, COHB, METHB, O2HB, POCFIO2, PO2 in the last 48 hours.    CBC:   Recent Labs   Lab 09/20/22  0302 09/21/22  0322   WBC 6.15 7.91   HGB 10.9* 12.5   HCT 36.8* 41.5    284       CMP:   Recent Labs   Lab 09/20/22  0301 09/21/22  0322    142   K 3.2* 3.8    98   CO2 35* 35*   GLU 99 110   BUN 25* 27*   CREATININE 0.9 1.3   CALCIUM 8.4* 9.1   PROT 5.5*  --    ALBUMIN 2.3*  --    BILITOT 0.4  --    ALKPHOS 116  --    *  --    *  --    ANIONGAP 9 9       Lactic Acid: No results for input(s): LACTATE in the last 48 hours.  Lipase: No results for input(s): LIPASE in the last 48 hours.  Lipid Panel: No results for input(s): CHOL, HDL, LDLCALC, TRIG, CHOLHDL in the last 48 hours.  TSH:   Recent Labs   Lab 08/03/22  1730   TSH 0.780       Urine Culture: No results for input(s): LABURIN in the last 48 hours.  Urine Studies:   No results for input(s): COLORU, APPEARANCEUA, PHUR, SPECGRAV, PROTEINUA, GLUCUA, KETONESU, BILIRUBINUA, OCCULTUA, NITRITE, UROBILINOGEN, LEUKOCYTESUR, RBCUA, WBCUA, BACTERIA, SQUAMEPITHEL, HYALINECASTS in the last 48 hours.    Invalid input(s): WRIGHTSUR      Significant Imaging: I have reviewed all pertinent imaging results/findings within the past 24 hours.

## 2022-09-21 NOTE — PROGRESS NOTES
[facility]  Progress Note    Patient Name: Stephanie T Barthelemy  MRN: [id number]  Patient Class: IP- Inpatient   Admission Date: 9/18/2022  Length of Stay: 2 days  Attending Physician: Nelly Pandey*  Primary Care Provider: No primary care provider on file.        Subjective:     Principal Problem:Acute on chronic combined systolic and diastolic congestive heart failure        HPI:  Stephanie Barthelemy is a 50yo female with CHF (EF 20%), polysubstance abuse, opioid overdose, tobacco abuse, HTN, infective endocarditis, psychosis, seizure disorder, hep C (treated), bipolar disorder. She presented to [facility] ED via EMS with reported SOB. She was noted to have anasarca from abdomen down. She was tachycardic and mildly hypoxic with O2 sat 94%. proBNP 94245. Elevated LFTs. Does not appear she was given any medications while there. On arrival to [place], she arrived on room air, was lethargic, and placed on bipap. Given narcan with minimal response. ABG with pO2 55. Pitting edema to BLE and swelling to abdomen. Given 80mg IV lasix. She was able to wean down to nasal cannula while in ED. More alert at times.       Overview/Hospital Course:  No notes on file    Interval History: awake and alert, diuresing, edema improving. Feels better  Neg 3350ml  Continue diuresing   Repeat vitals. Possible EKG      Review of Systems   Constitutional:  Positive for activity change, appetite change and fatigue.   HENT:          Hard of hearing   Respiratory:  Negative for cough and shortness of breath.    Cardiovascular:  Positive for leg swelling.   Gastrointestinal:  Negative for nausea and vomiting.   Musculoskeletal:  Positive for myalgias. Negative for back pain.   Skin:  Negative for wound.   Neurological:  Negative for tremors and weakness.   Psychiatric/Behavioral:  Negative for agitation.    Objective:     Vital Signs (Most Recent):  Temp: 97.3 °F (36.3 °C) (09/21/22 1119)  Pulse: (!) 125  (09/21/22 1210)  Resp: 18 (09/21/22 1119)  BP: (!) 139/108 (09/21/22 1119)  SpO2: 99 % (09/21/22 1119)   Vital Signs (24h Range):  Temp:  [96.8 °F (36 °C)-97.8 °F (36.6 °C)] 97.3 °F (36.3 °C)  Pulse:  [] 125  Resp:  [18] 18  SpO2:  [86 %-99 %] 99 %  BP: (139-150)/() 139/108     Weight: 52.3 kg (115 lb 4.8 oz)  Body mass index is 22.52 kg/m².    Intake/Output Summary (Last 24 hours) at 9/21/2022 1335  Last data filed at 9/21/2022 0700  Gross per 24 hour   Intake 900 ml   Output 4250 ml   Net -3350 ml        Physical Exam  Vitals and nursing note reviewed.   Constitutional:       General: She is not in acute distress.     Appearance: She is ill-appearing.   HENT:      Head: Normocephalic and atraumatic.   Cardiovascular:      Rate and Rhythm: Normal rate and regular rhythm.      Pulses: Normal pulses.      Heart sounds: Murmur heard.   Pulmonary:      Breath sounds: Rales present.   Abdominal:      General: Bowel sounds are normal. There is distension.      Palpations: Abdomen is soft.   Musculoskeletal:         General: Normal range of motion.      Cervical back: Normal range of motion.      Right lower leg: Edema present.      Left lower leg: Edema present.   Skin:     General: Skin is warm and dry.   Neurological:      Mental Status: She is oriented to person, place, and time.       Significant Labs: ABGs:   No results for input(s): PH, PCO2, HCO3, POCSATURATED, BE, TOTALHB, COHB, METHB, O2HB, POCFIO2, PO2 in the last 48 hours.    CBC:   Recent Labs   Lab 09/20/22 0302 09/21/22  0322   WBC 6.15 7.91   HGB 10.9* 12.5   HCT 36.8* 41.5    284       CMP:   Recent Labs   Lab 09/20/22  0301 09/21/22  0322    142   K 3.2* 3.8    98   CO2 35* 35*   GLU 99 110   BUN 25* 27*   CREATININE 0.9 1.3   CALCIUM 8.4* 9.1   PROT 5.5*  --    ALBUMIN 2.3*  --    BILITOT 0.4  --    ALKPHOS 116  --    *  --    *  --    ANIONGAP 9 9       Lactic Acid: No results for input(s): LACTATE in the  last 48 hours.  Lipase: No results for input(s): LIPASE in the last 48 hours.  Lipid Panel: No results for input(s): CHOL, HDL, LDLCALC, TRIG, CHOLHDL in the last 48 hours.  TSH:   Recent Labs   Lab 08/03/22  1730   TSH 0.780       Urine Culture: No results for input(s): LABURIN in the last 48 hours.  Urine Studies:   No results for input(s): COLORU, APPEARANCEUA, PHUR, SPECGRAV, PROTEINUA, GLUCUA, KETONESU, BILIRUBINUA, OCCULTUA, NITRITE, UROBILINOGEN, LEUKOCYTESUR, RBCUA, WBCUA, BACTERIA, SQUAMEPITHEL, HYALINECASTS in the last 48 hours.    Invalid input(s): WRIGHTSUR      Significant Imaging: I have reviewed all pertinent imaging results/findings within the past 24 hours.      Assessment/Plan:      * Acute on chronic combined systolic and diastolic congestive heart failure  - BNP >4900;  CXR with pulmonary vascular with small pleural effusion  Given lasix 80mg IV  -cont lasix 40mg IV BID- suspect noncompliance   -cont lisinopril  -cont propranolol  -consult cardiology- appreciates rec's   -echo done 2 months ago with 20% EF and G3DD      Advance care planning  Code status is Full code           Polysubstance abuse  - history of substance abuse with cocaine/THC and methamphetamine use  UDS- positive for methampethamine this admission     Acute encephalopathy  Had similar presentation in the past  ABG with hypoxia  -UA and drug screen - tox screen positive for methampethamine this admission   -Head CT- CT head negative   -Ammonia pending  -Blood cx pending  -cont supplemental O2 as needed        Acute respiratory failure with hypoxia  -bipap weaned in ED with 100% O2 sat on 2lNC  -cont supplemental O2 as needed        Elevated troponin  likely due to demand in setting of volume overload  -Trend troponin 0.088 >> 0.175  -cardiology consulted- appreciates rec's      Bipolar 1 disorder, depressed  -patient is lethargic  -hold home meds for now      Essential hypertension  uncontrolled  -cont lisinopril and  BB        Tobacco abuse   active smoker  - continue counseling on importance of complete cessation           VTE Risk Mitigation (From admission, onward)         Ordered     enoxaparin injection 40 mg  Daily         09/19/22 0042     IP VTE HIGH RISK PATIENT  Once         09/19/22 0042     Place sequential compression device  Until discontinued         09/19/22 0042                Discharge Planning   AYSHA:      Code Status: Full Code   Is the patient medically ready for discharge?:     Reason for patient still in hospital (select all that apply): Patient trending condition  Discharge Plan A: Home                  Nelly Pandey MD  Department of Hospital Medicine   [place] - Telemetry

## 2022-09-21 NOTE — PLAN OF CARE
Problem: Cardiac Output Decreased (Heart Failure)  Goal: Optimal Cardiac Output  Outcome: Ongoing, Progressing     Problem: Fluid Imbalance (Heart Failure)  Goal: Fluid Balance  Outcome: Ongoing, Progressing     Problem: Respiratory Compromise (Heart Failure)  Goal: Effective Oxygenation and Ventilation  Outcome: Ongoing, Progressing     Problem: Fall Injury Risk  Goal: Absence of Fall and Fall-Related Injury  Outcome: Ongoing, Progressing     Problem: Hypertension Comorbidity  Goal: Blood Pressure in Desired Range  Outcome: Ongoing, Progressing     Problem: Fluid and Electrolyte Imbalance (Acute Kidney Injury/Impairment)  Goal: Fluid and Electrolyte Balance  Outcome: Ongoing, Progressing       Potassium level low. Notified Barbara Waller NP . Potassium replaced per NP order. Will continue to monitor.

## 2022-09-22 ENCOUNTER — PATIENT OUTREACH (OUTPATIENT)
Dept: ADMINISTRATIVE | Facility: OTHER | Age: 52
End: 2022-09-22
Payer: MEDICARE

## 2022-09-22 VITALS
BODY MASS INDEX: 21.42 KG/M2 | DIASTOLIC BLOOD PRESSURE: 105 MMHG | RESPIRATION RATE: 18 BRPM | HEART RATE: 103 BPM | TEMPERATURE: 99 F | WEIGHT: 109.13 LBS | HEIGHT: 60 IN | SYSTOLIC BLOOD PRESSURE: 141 MMHG | OXYGEN SATURATION: 99 %

## 2022-09-22 LAB
ANION GAP SERPL CALC-SCNC: 14 MMOL/L (ref 8–16)
BUN SERPL-MCNC: 23 MG/DL (ref 6–20)
CALCIUM SERPL-MCNC: 9.8 MG/DL (ref 8.7–10.5)
CHLORIDE SERPL-SCNC: 96 MMOL/L (ref 95–110)
CO2 SERPL-SCNC: 31 MMOL/L (ref 23–29)
CREAT SERPL-MCNC: 1.1 MG/DL (ref 0.5–1.4)
EST. GFR  (NO RACE VARIABLE): >60 ML/MIN/1.73 M^2
GLUCOSE SERPL-MCNC: 102 MG/DL (ref 70–110)
POCT GLUCOSE: 108 MG/DL (ref 70–110)
POCT GLUCOSE: 134 MG/DL (ref 70–110)
POTASSIUM SERPL-SCNC: 3.7 MMOL/L (ref 3.5–5.1)
SODIUM SERPL-SCNC: 141 MMOL/L (ref 136–145)

## 2022-09-22 PROCEDURE — 94761 N-INVAS EAR/PLS OXIMETRY MLT: CPT

## 2022-09-22 PROCEDURE — 25000003 PHARM REV CODE 250: Performed by: FAMILY MEDICINE

## 2022-09-22 PROCEDURE — S4991 NICOTINE PATCH NONLEGEND: HCPCS | Performed by: FAMILY MEDICINE

## 2022-09-22 PROCEDURE — 80048 BASIC METABOLIC PNL TOTAL CA: CPT | Performed by: NURSE PRACTITIONER

## 2022-09-22 PROCEDURE — 36415 COLL VENOUS BLD VENIPUNCTURE: CPT | Performed by: NURSE PRACTITIONER

## 2022-09-22 PROCEDURE — 63600175 PHARM REV CODE 636 W HCPCS: Performed by: NURSE PRACTITIONER

## 2022-09-22 PROCEDURE — 25000003 PHARM REV CODE 250: Performed by: NURSE PRACTITIONER

## 2022-09-22 RX ORDER — FUROSEMIDE 20 MG/1
20 TABLET ORAL DAILY
Status: DISCONTINUED | OUTPATIENT
Start: 2022-09-23 | End: 2022-09-22 | Stop reason: HOSPADM

## 2022-09-22 RX ADMIN — MUPIROCIN: 20 OINTMENT TOPICAL at 08:09

## 2022-09-22 RX ADMIN — FUROSEMIDE 40 MG: 10 INJECTION, SOLUTION INTRAMUSCULAR; INTRAVENOUS at 08:09

## 2022-09-22 RX ADMIN — PROPRANOLOL HYDROCHLORIDE 10 MG: 10 TABLET ORAL at 08:09

## 2022-09-22 RX ADMIN — PROPRANOLOL HYDROCHLORIDE 10 MG: 10 TABLET ORAL at 03:09

## 2022-09-22 RX ADMIN — LISINOPRIL 40 MG: 20 TABLET ORAL at 08:09

## 2022-09-22 RX ADMIN — ACETAMINOPHEN 650 MG: 325 TABLET ORAL at 04:09

## 2022-09-22 RX ADMIN — FAMOTIDINE 20 MG: 20 TABLET ORAL at 08:09

## 2022-09-22 RX ADMIN — NICOTINE 1 PATCH: 21 PATCH, EXTENDED RELEASE TRANSDERMAL at 08:09

## 2022-09-22 NOTE — PROGRESS NOTES
[facility]                    Pharmacy       Discharge Medication Education    Patient ACCEPTED medication education. Pharmacy has provided education on the name, indication, and possible side effects of the medication(s) prescribed, using teach-back method.     The following medications have also been discussed, during this admission.        Medication List        CONTINUE taking these medications      albuterol 90 mcg/actuation inhaler  Commonly known as: PROVENTIL/VENTOLIN HFA  Inhale 2 puffs into the lungs every 6 (six) hours. Rescue     benztropine 0.5 MG tablet  Commonly known as: COGENTIN  Take 1 tablet (0.5 mg total) by mouth 2 (two) times daily.     famotidine 20 MG tablet  Commonly known as: PEPCID  Take 1 tablet (20 mg total) by mouth 2 (two) times daily.     furosemide 20 MG tablet  Commonly known as: LASIX  Take 1 tablet (20 mg total) by mouth once daily.     gabapentin 300 MG capsule  Commonly known as: NEURONTIN  Take 1 capsule (300 mg total) by mouth 3 (three) times daily.     hydrOXYzine pamoate 50 MG Cap  Commonly known as: VISTARIL  Take 1 capsule (50 mg total) by mouth every 8 (eight) hours as needed (anxiety, insomnia).     lisinopriL 40 MG tablet  Commonly known as: PRINIVIL,ZESTRIL  Take 1 tablet (40 mg total) by mouth once daily.     nicotine 21 mg/24 hr  Commonly known as: NICODERM CQ  Place 1 patch onto the skin once daily.     OXcarbazepine 300 MG Tab  Commonly known as: TRILEPTAL  Take 1 tablet (300 mg total) by mouth 2 (two) times daily.     propranoloL 10 MG tablet  Commonly known as: INDERAL  Take 1 tablet (10 mg total) by mouth 3 (three) times daily.     pulse oximeter device  Commonly known as: pulse oximeter  by Apply Externally route 2 (two) times a day. Use twice daily at 8 AM and 3 PM and [facility].     * QUEtiapine 50 MG tablet  Commonly known as: SEROQUEL  Take 1 tablet (50 mg total) by mouth once daily.     * QUEtiapine 100 MG  Tab  Commonly known as: SEROQUEL  Take 1 tablet (100 mg total) by mouth every evening.           * This list has 2 medication(s) that are the same as other medications prescribed for you. Read the directions carefully, and ask your doctor or other care provider to review them with you.                STOP taking these medications      amLODIPine 5 MG tablet  Commonly known as: NORVASC     FLUoxetine 20 MG capsule     metoprolol succinate 25 MG 24 hr tablet  Commonly known as: TOPROL-XL     risperiDONE 1 MG tablet  Commonly known as: RISPERDAL               Thank you  Rosi Reddy, PharmD  [phone]

## 2022-09-22 NOTE — PLAN OF CARE
Problem: Skin Injury Risk Increased  Goal: Skin Health and Integrity  Outcome: Ongoing, Progressing     Problem: Cardiac Output Decreased (Heart Failure)  Goal: Optimal Cardiac Output  Outcome: Ongoing, Progressing     Problem: Fall Injury Risk  Goal: Absence of Fall and Fall-Related Injury  Outcome: Ongoing, Progressing     Problem: Fluid and Electrolyte Imbalance (Acute Kidney Injury/Impairment)  Goal: Fluid and Electrolyte Balance  Outcome: Ongoing, Progressing

## 2022-09-22 NOTE — NURSING
Patient discharging home. All discharge instructions reviewed with VN. Ivs and telemetry removed, patient tolerated well.

## 2022-09-22 NOTE — PROGRESS NOTES
RSW met with patient to discuss discharge and additional patient barriers to care. Pt identified no additional social barriers to care. Per pt, pt is not in need of additional resources at this time.    The following were addressed during this visit:  -Complete follow-up with patient    FANY Johnston

## 2022-09-22 NOTE — PROGRESS NOTES
[facility] Medicine  Progress Note    Patient Name: Stephanie T Barthelemy  MRN: [id number]  Patient Class: IP- Inpatient   Admission Date: 9/18/2022  Length of Stay: 3 days  Attending Physician: Nelly Pandey*  Primary Care Provider: No primary care provider on file.        Subjective:     Principal Problem:Acute on chronic combined systolic and diastolic congestive heart failure        HPI:  Stephanie Barthelemy is a 52yo female with CHF (EF 20%), polysubstance abuse, opioid overdose, tobacco abuse, HTN, infective endocarditis, psychosis, seizure disorder, hep C (treated), bipolar disorder. She presented to [facility] ED via EMS with reported SOB. She was noted to have anasarca from abdomen down. She was tachycardic and mildly hypoxic with O2 sat 94%. proBNP 33293. Elevated LFTs. Does not appear she was given any medications while there. On arrival to [place], she arrived on room air, was lethargic, and placed on bipap. Given narcan with minimal response. ABG with pO2 55. Pitting edema to BLE and swelling to abdomen. Given 80mg IV lasix. She was able to wean down to nasal cannula while in ED. More alert at times.       Overview/Hospital Course:  No notes on file    Interval History: awake and alert, diuresing, edema improving. Feels better  Neg 3350ml  Continue diuresing   Repeat vitals. Possible EKG      Review of Systems   Constitutional:  Positive for activity change, appetite change and fatigue.   HENT:          Hard of hearing   Respiratory:  Negative for cough and shortness of breath.    Cardiovascular:  Positive for leg swelling.   Gastrointestinal:  Negative for nausea and vomiting.   Musculoskeletal:  Positive for myalgias. Negative for back pain.   Skin:  Negative for wound.   Neurological:  Negative for tremors and weakness.   Psychiatric/Behavioral:  Negative for agitation.    Objective:     Vital Signs (Most Recent):  Temp: 97.8 °F (36.6 °C) (09/22/22 0842)  Pulse: 108  (09/22/22 0842)  Resp: 18 (09/22/22 0842)  BP: (!) 127/97 (09/22/22 0842)  SpO2: 99 % (09/22/22 0842)   Vital Signs (24h Range):  Temp:  [96.7 °F (35.9 °C)-97.8 °F (36.6 °C)] 97.8 °F (36.6 °C)  Pulse:  [] 108  Resp:  [18-20] 18  SpO2:  [97 %-100 %] 99 %  BP: (127-151)/() 127/97     Weight: 49.5 kg (109 lb 2 oz)  Body mass index is 21.31 kg/m².    Intake/Output Summary (Last 24 hours) at 9/22/2022 0925  Last data filed at 9/22/2022 0415  Gross per 24 hour   Intake 600 ml   Output 1600 ml   Net -1000 ml        Physical Exam  Vitals and nursing note reviewed.   Constitutional:       General: She is not in acute distress.     Appearance: She is ill-appearing (chronic).   HENT:      Head: Normocephalic and atraumatic.   Cardiovascular:      Rate and Rhythm: Normal rate and regular rhythm.      Pulses: Normal pulses.      Heart sounds: Murmur heard.   Pulmonary:      Breath sounds: Rales present.   Abdominal:      General: Bowel sounds are normal. There is distension.      Palpations: Abdomen is soft.   Musculoskeletal:         General: Normal range of motion.      Cervical back: Normal range of motion.      Right lower leg: Edema present.      Left lower leg: Edema present.   Skin:     General: Skin is warm and dry.   Neurological:      Mental Status: She is oriented to person, place, and time.       Significant Labs: ABGs:   No results for input(s): PH, PCO2, HCO3, POCSATURATED, BE, TOTALHB, COHB, METHB, O2HB, POCFIO2, PO2 in the last 48 hours.    CBC:   Recent Labs   Lab 09/21/22 0322   WBC 7.91   HGB 12.5   HCT 41.5          CMP:   Recent Labs   Lab 09/21/22 0322 09/22/22 0322    141   K 3.8 3.7   CL 98 96   CO2 35* 31*    102   BUN 27* 23*   CREATININE 1.3 1.1   CALCIUM 9.1 9.8   ANIONGAP 9 14       Lactic Acid: No results for input(s): LACTATE in the last 48 hours.  Lipase: No results for input(s): LIPASE in the last 48 hours.  Lipid Panel: No results for input(s): CHOL, HDL,  LDLCALC, TRIG, CHOLHDL in the last 48 hours.  TSH:   Recent Labs   Lab 08/03/22  1730   TSH 0.780       Urine Culture: No results for input(s): LABURIN in the last 48 hours.  Urine Studies:   No results for input(s): COLORU, APPEARANCEUA, PHUR, SPECGRAV, PROTEINUA, GLUCUA, KETONESU, BILIRUBINUA, OCCULTUA, NITRITE, UROBILINOGEN, LEUKOCYTESUR, RBCUA, WBCUA, BACTERIA, SQUAMEPITHEL, HYALINECASTS in the last 48 hours.    Invalid input(s): WRIGHTSUR      Significant Imaging: I have reviewed all pertinent imaging results/findings within the past 24 hours.      Assessment/Plan:      * Acute on chronic combined systolic and diastolic congestive heart failure  - BNP >4900;  CXR with pulmonary vascular with small pleural effusion  Given lasix 80mg IV  -cont lasix 40mg IV BID- suspect noncompliance   -cont lisinopril  -cont propranolol  -consult cardiology- appreciates rec's   -echo done 2 months ago with 20% EF and G3DD      Advance care planning  Code status is Full code           Polysubstance abuse  - history of substance abuse with cocaine/THC and methamphetamine use  UDS- positive for methampethamine this admission     Acute encephalopathy  Had similar presentation in the past  ABG with hypoxia  -UA and drug screen - tox screen positive for methampethamine this admission   -Head CT- CT head negative   -Ammonia   -cont supplemental O2 as needed        Acute respiratory failure with hypoxia  -bipap weaned in ED with 100% O2 sat on 2lNC  -cont supplemental O2 as needed        Elevated troponin  likely due to demand in setting of volume overload  -Trend troponin 0.088 >> 0.175  -cardiology consulted- appreciates rec's      Bipolar 1 disorder, depressed  -patient is lethargic  -hold home meds for now- resume at discharge      Essential hypertension  uncontrolled  -cont lisinopril and BB        Tobacco abuse   active smoker  - continue counseling on importance of complete cessation           VTE Risk Mitigation (From admission,  onward)         Ordered     enoxaparin injection 40 mg  Daily         09/19/22 0042     IP VTE HIGH RISK PATIENT  Once         09/19/22 0042     Place sequential compression device  Until discontinued         09/19/22 0042                Discharge Planning   AYSHA: 9/22/2022     Code Status: Full Code   Is the patient medically ready for discharge?:     Reason for patient still in hospital (select all that apply): Pending disposition  Discharge Plan A: Home                  Nelly Pandey MD  Department of [facility] Medicine   [facility]

## 2022-09-22 NOTE — SUBJECTIVE & OBJECTIVE
Interval History: awake and alert, diuresing, edema improving. Feels better  Neg 3350ml  Continue diuresing   Repeat vitals. Possible EKG      Review of Systems   Constitutional:  Positive for activity change, appetite change and fatigue.   HENT:          Hard of hearing   Respiratory:  Negative for cough and shortness of breath.    Cardiovascular:  Positive for leg swelling.   Gastrointestinal:  Negative for nausea and vomiting.   Musculoskeletal:  Positive for myalgias. Negative for back pain.   Skin:  Negative for wound.   Neurological:  Negative for tremors and weakness.   Psychiatric/Behavioral:  Negative for agitation.    Objective:     Vital Signs (Most Recent):  Temp: 97.8 °F (36.6 °C) (09/22/22 0842)  Pulse: 108 (09/22/22 0842)  Resp: 18 (09/22/22 0842)  BP: (!) 127/97 (09/22/22 0842)  SpO2: 99 % (09/22/22 0842)   Vital Signs (24h Range):  Temp:  [96.7 °F (35.9 °C)-97.8 °F (36.6 °C)] 97.8 °F (36.6 °C)  Pulse:  [] 108  Resp:  [18-20] 18  SpO2:  [97 %-100 %] 99 %  BP: (127-151)/() 127/97     Weight: 49.5 kg (109 lb 2 oz)  Body mass index is 21.31 kg/m².    Intake/Output Summary (Last 24 hours) at 9/22/2022 0925  Last data filed at 9/22/2022 0415  Gross per 24 hour   Intake 600 ml   Output 1600 ml   Net -1000 ml        Physical Exam  Vitals and nursing note reviewed.   Constitutional:       General: She is not in acute distress.     Appearance: She is ill-appearing (chronic).   HENT:      Head: Normocephalic and atraumatic.   Cardiovascular:      Rate and Rhythm: Normal rate and regular rhythm.      Pulses: Normal pulses.      Heart sounds: Murmur heard.   Pulmonary:      Breath sounds: Rales present.   Abdominal:      General: Bowel sounds are normal. There is distension.      Palpations: Abdomen is soft.   Musculoskeletal:         General: Normal range of motion.      Cervical back: Normal range of motion.      Right lower leg: Edema present.      Left lower leg: Edema present.   Skin:      General: Skin is warm and dry.   Neurological:      Mental Status: She is oriented to person, place, and time.       Significant Labs: ABGs:   No results for input(s): PH, PCO2, HCO3, POCSATURATED, BE, TOTALHB, COHB, METHB, O2HB, POCFIO2, PO2 in the last 48 hours.    CBC:   Recent Labs   Lab 09/21/22  0322   WBC 7.91   HGB 12.5   HCT 41.5          CMP:   Recent Labs   Lab 09/21/22 0322 09/22/22  0322    141   K 3.8 3.7   CL 98 96   CO2 35* 31*    102   BUN 27* 23*   CREATININE 1.3 1.1   CALCIUM 9.1 9.8   ANIONGAP 9 14       Lactic Acid: No results for input(s): LACTATE in the last 48 hours.  Lipase: No results for input(s): LIPASE in the last 48 hours.  Lipid Panel: No results for input(s): CHOL, HDL, LDLCALC, TRIG, CHOLHDL in the last 48 hours.  TSH:   Recent Labs   Lab 08/03/22  1730   TSH 0.780       Urine Culture: No results for input(s): LABURIN in the last 48 hours.  Urine Studies:   No results for input(s): COLORU, APPEARANCEUA, PHUR, SPECGRAV, PROTEINUA, GLUCUA, KETONESU, BILIRUBINUA, OCCULTUA, NITRITE, UROBILINOGEN, LEUKOCYTESUR, RBCUA, WBCUA, BACTERIA, SQUAMEPITHEL, HYALINECASTS in the last 48 hours.    Invalid input(s): ANA      Significant Imaging: I have reviewed all pertinent imaging results/findings within the past 24 hours.

## 2022-09-22 NOTE — ASSESSMENT & PLAN NOTE
Had similar presentation in the past  ABG with hypoxia  -UA and drug screen - tox screen positive for methampethamine this admission   -Head CT- CT head negative   -Ammonia   -cont supplemental O2 as needed

## 2022-09-22 NOTE — DISCHARGE SUMMARY
[facility] Medicine  Discharge Summary      Patient Name: Stephanie T Barthelemy  MRN: [id number]  Patient Class: IP- Inpatient  Admission Date: 9/18/2022  Hospital Length of Stay: 3 days  Discharge Date and Time: 9/22/2022  6:15 PM  Attending Physician: Nelly Pandey*   Discharging Provider: Nelly Pandey MD  Primary Care Provider: No primary care provider on file.      HPI:   Stephanie Barthelemy is a 50yo female with CHF (EF 20%), polysubstance abuse, opioid overdose, tobacco abuse, HTN, infective endocarditis, psychosis, seizure disorder, hep C (treated), bipolar disorder. She presented to [facility] ED via EMS with reported SOB. She was noted to have anasarca from abdomen down. She was tachycardic and mildly hypoxic with O2 sat 94%. proBNP 88108. Elevated LFTs. Does not appear she was given any medications while there. On arrival to [place], she arrived on room air, was lethargic, and placed on bipap. Given narcan with minimal response. ABG with pO2 55. Pitting edema to BLE and swelling to abdomen. Given 80mg IV lasix. She was able to wean down to nasal cannula while in ED. More alert at times.       * No surgery found *      Hospital Course:   No notes on file     Goals of Care Treatment Preferences:  Code Status: Full Code      Consults:   Consults (From admission, onward)          Status Ordering Provider     [facility]  [facility]        Provider:  (Not yet assigned)    Completed TIFFANIE MELÉNDEZ     Inpatient consult to Social Work/Case Management  Once        Provider:  (Not yet assigned)    Acknowledged TIFFANIE MELÉNDEZ     Inpatient consult to Registered Dietitian/Nutritionist  Once        Provider:  (Not yet assigned)    Completed TIFFANIE MELÉNDEZ            * Acute on chronic combined systolic and diastolic congestive heart failure  - BNP >4900;  CXR with pulmonary vascular with small pleural effusion  Given lasix 80mg  IV  -cont lasix 40mg IV BID- suspect noncompliance   -cont lisinopril  -cont propranolol  -consult cardiology- appreciates rec's   -echo done 2 months ago with 20% EF and G3DD      Advance care planning  Code status is Full code           Polysubstance abuse  - history of substance abuse with cocaine/THC and methamphetamine use  UDS- positive for methampethamine this admission     Acute encephalopathy  Had similar presentation in the past  ABG with hypoxia  -UA and drug screen - tox screen positive for methampethamine this admission   -Head CT- CT head negative   -Ammonia   -cont supplemental O2 as needed        Acute respiratory failure with hypoxia  -bipap weaned in ED with 100% O2 sat on 2lNC  -cont supplemental O2 as needed        Elevated troponin  likely due to demand in setting of volume overload  -Trend troponin 0.088 >> 0.175  -cardiology consulted- appreciates rec's      Bipolar 1 disorder, depressed  -patient is lethargic  -hold home meds for now- resume at discharge      Essential hypertension  uncontrolled  -cont lisinopril and BB        Tobacco abuse   active smoker  - continue counseling on importance of complete cessation           Final Active Diagnoses:    Diagnosis Date Noted POA    PRINCIPAL PROBLEM:  Acute on chronic combined systolic and diastolic congestive heart failure [I50.43] 09/19/2022 Yes    Anasarca [R60.1] 09/20/2022 Yes    Cardiorenal syndrome [I13.10] 09/20/2022 Yes    Advance care planning [Z71.89] 09/20/2022 Not Applicable    Polysubstance abuse [F19.10] 09/19/2022 Yes    Elevated LFTs [R79.89] 09/19/2022 Yes    Acute encephalopathy [G93.40] 08/05/2022 Yes    Elevated troponin [R77.8] 07/22/2022 Yes    Acute respiratory failure with hypoxia [J96.01] 07/22/2022 Yes    Bipolar 1 disorder, depressed [F31.9] 10/15/2019 Yes    ARABELLA (acute kidney injury) [N17.9] 05/23/2018 Yes    Tobacco abuse [Z72.0] 09/24/2014 Yes    Essential hypertension [I10] 09/24/2014 Yes     Chronic      Problems  Resolved During this Admission:       Discharged Condition: stable    Disposition: Home-Health Care [facility]    Follow Up:   Follow-up Information       [facility] Follow up.    Specialty: Priority Care  Contact information:  [address], [address]  [facility] [address] [phone]  Additional information:  Please park in Lot C or D and use [employer]. Take Medical Office Bldg elevators.             [facility] - Cardiology .    Specialty: Cardiology  Contact information:  [address], [address]  [facility] [address] [phone]  Additional information:  Please park in [address] or D and use [address].             [place] [facility]. Call.    Why: to apply for housing assistance and additional assistance provided through the [place].  Contact information:  Phone: [phone]                         Patient Instructions:      Ambulatory referral/consult to Cardiology   Standing Status: Future   Referral Priority: Routine Referral Type: Consultation   Referral Reason: Specialty Services Required   Requested Specialty: Cardiology   Number of Visits Requested: 1     Diet Cardiac     Activity as tolerated       Pending Diagnostic Studies:       None           Medications:  Reconciled Home Medications:      Medication List        CONTINUE taking these medications      albuterol 90 mcg/actuation inhaler  Commonly known as: PROVENTIL/VENTOLIN HFA  Inhale 2 puffs into the lungs every 6 (six) hours. Rescue     benztropine 0.5 MG tablet  Commonly known as: COGENTIN  Take 1 tablet (0.5 mg total) by mouth 2 (two) times daily.     famotidine 20 MG tablet  Commonly known as: PEPCID  Take 1 tablet (20 mg total) by mouth 2 (two) times daily.     furosemide 20 MG tablet  Commonly known as: LASIX  Take 1 tablet (20 mg total) by mouth once daily.     gabapentin 300 MG capsule  Commonly known as: NEURONTIN  Take 1 capsule (300 mg total) by mouth 3  (three) times daily.     hydrOXYzine pamoate 50 MG Cap  Commonly known as: VISTARIL  Take 1 capsule (50 mg total) by mouth every 8 (eight) hours as needed (anxiety, insomnia).     lisinopriL 40 MG tablet  Commonly known as: PRINIVIL,ZESTRIL  Take 1 tablet (40 mg total) by mouth once daily.     nicotine 21 mg/24 hr  Commonly known as: NICODERM CQ  Place 1 patch onto the skin once daily.     OXcarbazepine 300 MG Tab  Commonly known as: TRILEPTAL  Take 1 tablet (300 mg total) by mouth 2 (two) times daily.     propranoloL 10 MG tablet  Commonly known as: INDERAL  Take 1 tablet (10 mg total) by mouth 3 (three) times daily.     pulse oximeter device  Commonly known as: pulse oximeter  by Apply Externally route 2 (two) times a day. Use twice daily at 8 AM and 3 PM and record the value in "[employer]hart as directed.     * QUEtiapine 50 MG tablet  Commonly known as: SEROQUEL  Take 1 tablet (50 mg total) by mouth once daily.     * QUEtiapine 100 MG Tab  Commonly known as: SEROQUEL  Take 1 tablet (100 mg total) by mouth every evening.           * This list has 2 medication(s) that are the same as other medications prescribed for you. Read the directions carefully, and ask your doctor or other care provider to review them with you.                STOP taking these medications      amLODIPine 5 MG tablet  Commonly known as: NORVASC     FLUoxetine 20 MG capsule     metoprolol succinate 25 MG 24 hr tablet  Commonly known as: TOPROL-XL     risperiDONE 1 MG tablet  Commonly known as: RISPERDAL              Indwelling Lines/Drains at time of discharge:   Lines/Drains/Airways       None                   Time spent on the discharge of patient: 35 minutes         Nelly Pandey MD  Department of Hospital Medicine  [facility] - Telemetry

## 2022-09-22 NOTE — PLAN OF CARE
[facility] - Telemetry  Discharge Final Note    Primary Care Provider: No primary care provider on file.    Expected Discharge Date: 9/22/2022    Pharmacist will go over home medications and reasons for medications. VN and bedside nurse to reiterate final discharge instructions.     Cleared from CM . Bedside Nurse and VN notified.      Final Discharge Note (most recent)       Final Note - 09/22/22 0994          Final Note    Assessment Type Final Discharge Note (P)      Anticipated Discharge Disposition Home or Self Care (P)      Hospital Resources/Appts/Education Provided Appointments scheduled and added to AVS (P)         Post-Acute Status    Post-Acute Authorization Other (P)      Other Status No Post-Acute Service Needs (P)      Discharge Delays None known at this time (P)                    Future Appointments   Date Time Provider Department Center   9/28/2022  8:00 AM Erich Guevara III, NP [facility] PSYCH [address]   10/4/2022 10:30 AM Ashlee Hill MD [facility] IMPR[facility]   10/4/2022  1:00 PM Mae Wagner MD [facility] CARDIO [place]     BP (!) 127/97 (BP Location: Left arm, Patient Position: Lying)   Pulse 108   Temp 97.8 °F (36.6 °C) (Oral)   Resp 18   Ht 5' (1.524 m)   Wt 49.5 kg (109 lb 2 oz)   LMP 10/09/2019 (Within Weeks)   SpO2 99%   Breastfeeding No   BMI 21.31 kg/m²       Contact Info       [facility]   Specialty: Priority Care    [address], [address]  [place] [address]   Phone: [phone]       Next Steps: Follow up on 10/4/2022    [facility]    Phone: [phone]       Next Steps: Call    Instructions: to apply for housing assistance and additional assistance provided through the [facility]    [place] - Cardiology   Specialty: Cardiology    [address], [address]  [place] [address]   Phone: [phone]       Next Steps: Go on 10/4/2022    Instructions: FOLLOW UP WITH CARDIOLOGIST AFTER DISCHARGE    [address] - Psych [address]   Specialty: Psychiatry    [address]  [facility] [address]   Phone: [phone]       Next Steps: Go on 9/28/2022    Instructions: FOLLOW UP WITH PSYCHIATRY             Medication List        CONTINUE taking these medications      albuterol 90 mcg/actuation inhaler  Commonly known as: PROVENTIL/VENTOLIN HFA  Inhale 2 puffs into the lungs every 6 (six) hours. Rescue     benztropine 0.5 MG tablet  Commonly known as: COGENTIN  Take 1 tablet (0.5 mg total) by mouth 2 (two) times daily.     famotidine 20 MG tablet  Commonly known as: PEPCID  Take 1 tablet (20 mg total) by mouth 2 (two) times daily.     furosemide 20 MG tablet  Commonly known as: LASIX  Take 1 tablet (20 mg total) by mouth once daily.     gabapentin 300 MG capsule  Commonly known as: NEURONTIN  Take 1 capsule (300 mg total) by mouth 3 (three) times daily.     hydrOXYzine pamoate 50 MG Cap  Commonly known as: VISTARIL  Take 1 capsule (50 mg total) by mouth every 8 (eight) hours as needed (anxiety, insomnia).     lisinopriL 40 MG tablet  Commonly known as: PRINIVIL,ZESTRIL  Take 1 tablet (40 mg total) by mouth once daily.     nicotine 21 mg/24 hr  Commonly known as: NICODERM CQ  Place 1 patch onto the skin once daily.     OXcarbazepine 300 MG Tab  Commonly known as: TRILEPTAL  Take 1 tablet (300 mg total) by mouth 2 (two) times daily.     propranoloL 10 MG tablet  Commonly known as: INDERAL  Take 1 tablet (10 mg total) by mouth 3 (three) times daily.     pulse oximeter device  Commonly known as: pulse oximeter  by Apply Externally route 2 (two) times a day. Use twice daily at 8 AM and 3 PM and record the value in [employer] as directed.     * QUEtiapine 50 MG tablet  Commonly known as: SEROQUEL  Take 1 tablet (50 mg total) by mouth once daily.     * QUEtiapine 100 MG Tab  Commonly known as: SEROQUEL  Take 1 tablet (100 mg total) by mouth every evening.           * This list has 2 medication(s) that are the same as  other medications prescribed for you. Read the directions carefully, and ask your doctor or other care provider to review them with you.                STOP taking these medications      amLODIPine 5 MG tablet  Commonly known as: NORVASC     FLUoxetine 20 MG capsule     metoprolol succinate 25 MG 24 hr tablet  Commonly known as: TOPROL-XL     risperiDONE 1 MG tablet  Commonly known as: RISPERDAL

## 2022-09-23 ENCOUNTER — PATIENT OUTREACH (OUTPATIENT)
Dept: ADMINISTRATIVE | Facility: OTHER | Age: 52
End: 2022-09-23
Payer: MEDICARE

## 2022-09-23 ENCOUNTER — PATIENT OUTREACH (OUTPATIENT)
Dept: ADMINISTRATIVE | Facility: CLINIC | Age: 52
End: 2022-09-23
Payer: MEDICARE

## 2022-09-23 DIAGNOSIS — R06.02 SOB (SHORTNESS OF BREATH): Primary | ICD-10-CM

## 2022-09-23 NOTE — PROGRESS NOTES
C3 nurse attempted to contact Stephanie T Barthelemy for a TCC post hospital discharge follow up call. The patient is unable to conduct the call @ this time. The patient's sister requested a callback.    The patient has a scheduled HOSFU appointment with Ashlee Hill on 10/04/2022 @ 1030.     HOSFU past recommended 5-7 days of discharge date 09/22/2022, message sent to Physician staff.

## 2022-09-23 NOTE — PROGRESS NOTES
IP Liaison - Final Visit Note    Patient: Stephanie T Barthelemy  MRN:  [id number]  Date of Service:  9/23/2022  Completed by:  FANY Johnston    Reason for Visit   Patient presents with    IP Liaison Chart Review        Patient Summary     Discharge Date: 9/22/2022  Discharge telephone number/address: [phone] / [address] [facility] [address]  Follow up provider: Ashlee Hill MD / Mae Wagner MD  Follow up appointments: 10/4/2022 @ 10:30am / 10/4/2022 @ 1:00pm  Home Health agency & telephone number: n/a  DME ordered &  name: n/a  Assigned OPCM RN/SW: n/a  Report sent to follow up team (PCP/OPCM) via in basket message: n/a  Community Resources provided including agency name & contact info: [facility]      FANY Johnston  
none

## 2022-09-24 LAB
BACTERIA BLD CULT: NORMAL
BACTERIA BLD CULT: NORMAL

## 2022-09-26 NOTE — PROGRESS NOTES
C3 nurse attempted to contact Stephanie T Barthelemy for a TCC post hospital discharge follow up call. No answer. Left voicemail with callback information. The patient has a scheduled HOSFU appointment with Ashlee Hill on 10/04/2022 @ 1030.

## 2022-09-27 DIAGNOSIS — R60.1 ANASARCA: Primary | ICD-10-CM

## 2022-09-28 NOTE — PHYSICIAN QUERY
PT Name: Stephanie T Barthelemy  MR #: [id number]     DOCUMENTATION CLARIFICATION      CDS/: Qiana Grove RN CDIS          Contact information: [email or web address]  This form is a permanent document in the medical record.     Query Date: September 28, 2022    By submitting this query, we are merely seeking further clarification of documentation.  Please utilize your independent clinical judgment when addressing the question(s) below.     The Medical Record contains the following:    Clinical Information Location in Medical Record   #Transaminitis  - AST/ALT/Alk Phos 325/310/148, significantly elevated from 1 month ago; bilirubin wnl  - History of HepC that was treated, viral load undetectable in 2017  - would re-check viral load to evaluate for recurrence, although elevation not as significant as one would expect from acute hepatitis    SOB improved with diuresis. Hypersomnolent. No response to large dose of naloxone. Still has ARABELLA and acute liver injury but improving.  Both likely a manifestation of cardiogenic shock.  Slowly returning to baseline.  Should be able to step down.    She was noted to have anasarca from abdomen down. She was tachycardic and mildly hypoxic with O2 sat 94%. proBNP 55268. Elevated LFTs    * Acute on chronic combined systolic and diastolic congestive heart failure Pulmonology Consult                   Pulmonology Consult              progress note 9/22         note 9/22      Please document your best medical opinion regarding the etiology of _Transaminitis_?       [  x ] Transaminitis due to Acute heart failure   [   ] Transaminitis due to :_________   [   ] Other etiology (please specify):___________________   [  ] Clinically Undetermined             Please document in your progress notes daily for the duration of treatment, until resolved, and include in your discharge summary.

## 2022-09-28 NOTE — PHYSICIAN QUERY
PT Name: Stephanie T Barthelemy  MR #: [id number]    DOCUMENTATION CLARIFICATION     CDS/: Qiana Grove RN CDIS             Contact information: [email or web address]    This form is a permanent document in the medical record.     Query Date: September 28, 2022    By submitting this query, we are merely seeking further clarification of documentation. Please utilize your independent clinical judgment when addressing the question(s) below.    The Medical Record contains the following:   Indicators   Supporting Clinical Findings Location in Medical Record   x AMS, Confusion,  LOC, etc.  Acute encephalopathy  Had similar presentation in the past  ABG with hypoxia  -UA and drug screen - tox screen positive for methampethamine this admission   -Head CT- CT head negative   -Ammonia   -cont supplemental O2 as needed HM note 9/22    x Acute/Chronic Illness Acute respiratory failure with hypoxia  * Acute on chronic combined systolic and diastolic congestive heart failure HM note 9/22    Radiology Findings      Electrolyte Imbalance      Medication      Treatment              Other       The noted clinical guidelines are only system guidelines and do not replace the providers clinical judgment.    The National [place] of Neurologic Disorders and Stroke (NINDS) of the NIH describes encephalopathy as any diffuse disease of the brain that alters brain function or structure.    Provider, please specify the diagnosis or diagnoses associated with above clinical findings.  [  x ] Metabolic Encephalopathy - Due to electrolyte imbalance, metabolic derangements, or infectious processes, includes Septic Encephalopathy, Uremic Encephalopathy   [   ] Toxic Encephalopathy - Due to drugs, chemicals, or other toxic substances   [   ] Encephalopathy, unspecified      [   ] Other Encephalopathy (please specify): ____________________   [   ]  Clinically Undetermined       Please document in your progress notes daily for the duration  of treatment until resolved, and include in your discharge summary.    References:  JESSICA Reyes RN, CCDS. (2018, June 9). Notes from the Instructor: Encephalopathy tips. Retrieved October 22, 2020, from [email or web address]/articles/[email or web address]-[email or web address]-encephalopathy-tips    ICD-9-CM Coding Clinic First Quarter 2013, Effective with discharges: October 21, 2013 [facility] § Seizure with encephalopathy due to postictal state (2013).    ICD-10-CM/[employer] Integrated Codebook (Version V.20.8.10.0) [Computer software]. (2020). Retrieved October 21, 2020.    National [place] of Neurological Disorders and Stroke. (2019, March 27). Retrieved October 22, 2020, from https://www.ninds.nih.gov/Disorders/All-Disorders/Jhkwutoujdmdjx-Buxqkfavbko-Wznc    Form No. 90374

## 2022-10-17 NOTE — ASSESSMENT & PLAN NOTE
- Due to above and in combination with ongoing polysubstance abuse  - Currently sedated and was given versed last night  - Reassess after extubation   Subjective       History of Present Illness    Chief Complaint   Patient presents with   • Gynecologic Exam     AE,last pap 2020 neg/hpv neg, mg 2022       Cari Patrick is a 51 y.o. female who presents for annual exam.  Patient of   No problems or concerns  She is still having regular cycles and no vasomotor symptoms  Wishing cycles would stop!  No bowel or bladder problems    OB History    Para Term  AB Living   2 2 2     2   SAB IAB Ectopic Molar Multiple Live Births             2      # Outcome Date GA Lbr David/2nd Weight Sex Delivery Anes PTL Lv   2 Term     F Vaginal unsp   JORGE   1 Term     F Vaginal unsp   JORGE       The following portions of the patient's history were reviewed and updated as appropriate: allergies, current medications, past family history, past medical history, past social history, past surgical history and problem list.    Her menses are regular every 28-30 days, lasting 4-7 days.    Current contraception: none  History of abnormal Pap smear: yes - years ago  Received Gardasil immunization: no  Perform regular self breast exam: yes - occasional  Family history of uterine or ovarian cancer: no  Family History of colon cancer: no  Family history of breast cancer: no    Mammogram: up to date.  Colonoscopy: recommended.  DEXA: not indicated.  Last Pap: neg    Social History    Tobacco Use      Smoking status: Never      Smokeless tobacco: Never    Exercise: moderately active  Calcium/Vitamin D: uses supplements    The following portions of the patient's history were reviewed and updated as appropriate: allergies, current medications, past family history, past medical history, past social history, past surgical history and problem list.    Review of Systems   Constitutional: Negative.    HENT: Negative.    Eyes: Negative.    Respiratory: Negative.    Cardiovascular: Negative.    Gastrointestinal: Negative.    Endocrine: Negative.   "  Genitourinary: Negative.    Musculoskeletal: Negative.    Skin: Negative.    Allergic/Immunologic: Negative.    Neurological: Negative.    Hematological: Negative.    Psychiatric/Behavioral: Negative.          Objective   Physical Exam  Vitals reviewed.   Constitutional:       Appearance: She is well-developed.   Neck:      Thyroid: No thyroid mass.   Cardiovascular:      Rate and Rhythm: Normal rate and regular rhythm.      Heart sounds: Normal heart sounds.   Pulmonary:      Effort: Pulmonary effort is normal.      Breath sounds: Normal breath sounds.   Chest:   Breasts:     Right: No mass, nipple discharge, skin change or tenderness.      Left: No mass, nipple discharge, skin change or tenderness.   Abdominal:      Palpations: Abdomen is soft.      Tenderness: There is no abdominal tenderness.   Genitourinary:     Labia:         Right: No rash or lesion.         Left: No rash or lesion.       Vagina: Normal.      Cervix: No cervical motion tenderness, discharge or friability.      Adnexa:         Right: No mass or tenderness.          Left: No mass or tenderness.        Comments: Cervix low lying  Neurological:      Mental Status: She is alert and oriented to person, place, and time.   Psychiatric:         Behavior: Behavior normal.         /68   Ht 157.5 cm (62\")   Wt 60.8 kg (134 lb)   BMI 24.51 kg/m²     Assessment & Plan   Diagnoses and all orders for this visit:    1. Encounter for gynecological examination (Primary)  -     IGP, Apt HPV,rfx 16 / 18,45          Breast self exam technique reviewed and patient encouraged to perform self-exam monthly.  Discussed healthy lifestyle modifications.  Pap smear done with HPV   Recommended 30 minutes of aerobic exercise five times per week.  Discussed calcium needs to prevent osteoporosis         "

## 2022-10-19 ENCOUNTER — TELEPHONE (OUTPATIENT)
Dept: ADMINISTRATIVE | Facility: OTHER | Age: 52
End: 2022-10-19

## 2022-11-04 ENCOUNTER — HOSPITAL ENCOUNTER (OUTPATIENT)
Facility: HOSPITAL | Age: 52
Discharge: HOME | End: 2022-11-07
Attending: EMERGENCY MEDICINE | Admitting: FAMILY MEDICINE
Payer: MEDICARE

## 2022-11-04 DIAGNOSIS — R07.9 CHEST PAIN: ICD-10-CM

## 2022-11-04 DIAGNOSIS — R60.0 BILATERAL LOWER EXTREMITY EDEMA: ICD-10-CM

## 2022-11-04 DIAGNOSIS — R06.02 SHORTNESS OF BREATH: ICD-10-CM

## 2022-11-04 DIAGNOSIS — I50.9 ACUTE ON CHRONIC CONGESTIVE HEART FAILURE: ICD-10-CM

## 2022-11-04 DIAGNOSIS — I50.23 ACUTE ON CHRONIC SYSTOLIC CONGESTIVE HEART FAILURE: Primary | ICD-10-CM

## 2022-11-04 LAB
ALBUMIN SERPL BCP-MCNC: 2.8 G/DL (ref 3.5–5.2)
ALP SERPL-CCNC: 93 U/L (ref 55–135)
ALT SERPL W/O P-5'-P-CCNC: 44 U/L (ref 10–44)
ANION GAP SERPL CALC-SCNC: 13 MMOL/L (ref 8–16)
AST SERPL-CCNC: 48 U/L (ref 10–40)
BASOPHILS # BLD AUTO: 0.06 K/UL (ref 0–0.2)
BASOPHILS NFR BLD: 0.8 % (ref 0–1.9)
BILIRUB SERPL-MCNC: 2 MG/DL (ref 0.1–1)
BNP SERPL-MCNC: 2992 PG/ML (ref 0–99)
BUN SERPL-MCNC: 23 MG/DL (ref 6–20)
CALCIUM SERPL-MCNC: 9.2 MG/DL (ref 8.7–10.5)
CHLORIDE SERPL-SCNC: 93 MMOL/L (ref 95–110)
CO2 SERPL-SCNC: 24 MMOL/L (ref 23–29)
CREAT SERPL-MCNC: 1.2 MG/DL (ref 0.5–1.4)
DIFFERENTIAL METHOD: ABNORMAL
EOSINOPHIL # BLD AUTO: 0 K/UL (ref 0–0.5)
EOSINOPHIL NFR BLD: 0.4 % (ref 0–8)
ERYTHROCYTE [DISTWIDTH] IN BLOOD BY AUTOMATED COUNT: 19.2 % (ref 11.5–14.5)
EST. GFR  (NO RACE VARIABLE): 55 ML/MIN/1.73 M^2
GLUCOSE SERPL-MCNC: 87 MG/DL (ref 70–110)
HCT VFR BLD AUTO: 40.5 % (ref 37–48.5)
HGB BLD-MCNC: 12.2 G/DL (ref 12–16)
IMM GRANULOCYTES # BLD AUTO: 0.04 K/UL (ref 0–0.04)
IMM GRANULOCYTES NFR BLD AUTO: 0.5 % (ref 0–0.5)
LYMPHOCYTES # BLD AUTO: 1.8 K/UL (ref 1–4.8)
LYMPHOCYTES NFR BLD: 23.5 % (ref 18–48)
MCH RBC QN AUTO: 23 PG (ref 27–31)
MCHC RBC AUTO-ENTMCNC: 30.1 G/DL (ref 32–36)
MCV RBC AUTO: 76 FL (ref 82–98)
MONOCYTES # BLD AUTO: 0.8 K/UL (ref 0.3–1)
MONOCYTES NFR BLD: 10.2 % (ref 4–15)
NEUTROPHILS # BLD AUTO: 5 K/UL (ref 1.8–7.7)
NEUTROPHILS NFR BLD: 64.6 % (ref 38–73)
NRBC BLD-RTO: 0 /100 WBC
PLATELET # BLD AUTO: 311 K/UL (ref 150–450)
PMV BLD AUTO: 11.3 FL (ref 9.2–12.9)
POTASSIUM SERPL-SCNC: 3.9 MMOL/L (ref 3.5–5.1)
PROT SERPL-MCNC: 6.3 G/DL (ref 6–8.4)
RBC # BLD AUTO: 5.31 M/UL (ref 4–5.4)
SODIUM SERPL-SCNC: 130 MMOL/L (ref 136–145)
TROPONIN I SERPL DL<=0.01 NG/ML-MCNC: 0.04 NG/ML (ref 0–0.03)
WBC # BLD AUTO: 7.71 K/UL (ref 3.9–12.7)

## 2022-11-04 PROCEDURE — 93005 ELECTROCARDIOGRAM TRACING: CPT

## 2022-11-04 PROCEDURE — 96374 THER/PROPH/DIAG INJ IV PUSH: CPT

## 2022-11-04 PROCEDURE — 99285 EMERGENCY DEPT VISIT HI MDM: CPT | Mod: 25

## 2022-11-04 PROCEDURE — 25000003 PHARM REV CODE 250: Performed by: EMERGENCY MEDICINE

## 2022-11-04 PROCEDURE — 63600175 PHARM REV CODE 636 W HCPCS: Performed by: EMERGENCY MEDICINE

## 2022-11-04 PROCEDURE — 85025 COMPLETE CBC W/AUTO DIFF WBC: CPT | Performed by: EMERGENCY MEDICINE

## 2022-11-04 PROCEDURE — 84484 ASSAY OF TROPONIN QUANT: CPT | Performed by: EMERGENCY MEDICINE

## 2022-11-04 PROCEDURE — 80053 COMPREHEN METABOLIC PANEL: CPT | Performed by: EMERGENCY MEDICINE

## 2022-11-04 PROCEDURE — 93010 ELECTROCARDIOGRAM REPORT: CPT | Mod: ,,, | Performed by: INTERNAL MEDICINE

## 2022-11-04 PROCEDURE — 83880 ASSAY OF NATRIURETIC PEPTIDE: CPT | Performed by: EMERGENCY MEDICINE

## 2022-11-04 PROCEDURE — 93010 EKG 12-LEAD: ICD-10-PCS | Mod: ,,, | Performed by: INTERNAL MEDICINE

## 2022-11-04 RX ORDER — FUROSEMIDE 10 MG/ML
80 INJECTION INTRAMUSCULAR; INTRAVENOUS
Status: COMPLETED | OUTPATIENT
Start: 2022-11-04 | End: 2022-11-04

## 2022-11-04 RX ORDER — SODIUM CHLORIDE 0.9 % (FLUSH) 0.9 %
10 SYRINGE (ML) INJECTION
Status: DISCONTINUED | OUTPATIENT
Start: 2022-11-05 | End: 2022-11-07 | Stop reason: HOSPADM

## 2022-11-04 RX ORDER — GABAPENTIN 300 MG/1
300 CAPSULE ORAL ONCE
Status: COMPLETED | OUTPATIENT
Start: 2022-11-04 | End: 2022-11-04

## 2022-11-04 RX ORDER — FUROSEMIDE 10 MG/ML
40 INJECTION INTRAMUSCULAR; INTRAVENOUS DAILY
Status: DISCONTINUED | OUTPATIENT
Start: 2022-11-05 | End: 2022-11-05

## 2022-11-04 RX ORDER — AMOXICILLIN AND CLAVULANATE POTASSIUM 875; 125 MG/1; MG/1
1 TABLET, FILM COATED ORAL
Status: DISCONTINUED | OUTPATIENT
Start: 2022-11-04 | End: 2022-11-04

## 2022-11-04 RX ORDER — OXCARBAZEPINE 150 MG/1
300 TABLET, FILM COATED ORAL ONCE
Status: COMPLETED | OUTPATIENT
Start: 2022-11-04 | End: 2022-11-04

## 2022-11-04 RX ORDER — HEPARIN SODIUM 5000 [USP'U]/ML
5000 INJECTION, SOLUTION INTRAVENOUS; SUBCUTANEOUS EVERY 8 HOURS
Status: DISCONTINUED | OUTPATIENT
Start: 2022-11-05 | End: 2022-11-07 | Stop reason: HOSPADM

## 2022-11-04 RX ORDER — QUETIAPINE FUMARATE 100 MG/1
100 TABLET, FILM COATED ORAL ONCE
Status: COMPLETED | OUTPATIENT
Start: 2022-11-04 | End: 2022-11-04

## 2022-11-04 RX ADMIN — QUETIAPINE FUMARATE 100 MG: 100 TABLET ORAL at 10:11

## 2022-11-04 RX ADMIN — GABAPENTIN 300 MG: 300 CAPSULE ORAL at 10:11

## 2022-11-04 RX ADMIN — OXCARBAZEPINE 300 MG: 150 TABLET, FILM COATED ORAL at 10:11

## 2022-11-04 RX ADMIN — FUROSEMIDE 80 MG: 10 INJECTION INTRAMUSCULAR; INTRAVENOUS at 10:11

## 2022-11-04 NOTE — FIRST PROVIDER EVALUATION
"Medical screening examination initiated.  I have conducted a focused provider triage encounter, findings are as follows:    Brief history of present illness:  51f pw leg swelling, pain. Run out of lasix x 1d, but has been drinking a lot of water bc she is very thirsty. Denies SOB, chest pain.     Vitals:    11/04/22 1517 11/04/22 1530   BP: (!) 143/102    Pulse: 105    Resp: 20    Temp:  97.6 °F (36.4 °C)   TempSrc:  Axillary   SpO2: 100%    Weight: 54.4 kg (120 lb)    Height: 5' 1" (1.549 m)        Pertinent physical exam:  anasarca up her back, facial swelling, actively chewing ice    Brief workup plan:  cardiac/chf workup, likely admission    Preliminary workup initiated; this workup will be continued and followed by the physician or advanced practice provider that is assigned to the patient when roomed.  "

## 2022-11-04 NOTE — ED NOTES
Unable to obtain blood work on patient x 2 attempts. Another nurse to attempt for IV access and blood work

## 2022-11-05 LAB
AMPHET+METHAMPHET UR QL: ABNORMAL
ANION GAP SERPL CALC-SCNC: 14 MMOL/L (ref 8–16)
BARBITURATES UR QL SCN>200 NG/ML: NEGATIVE
BENZODIAZ UR QL SCN>200 NG/ML: NEGATIVE
BUN SERPL-MCNC: 23 MG/DL (ref 6–20)
BZE UR QL SCN: NEGATIVE
CALCIUM SERPL-MCNC: 8.8 MG/DL (ref 8.7–10.5)
CANNABINOIDS UR QL SCN: NEGATIVE
CHLORIDE SERPL-SCNC: 99 MMOL/L (ref 95–110)
CO2 SERPL-SCNC: 25 MMOL/L (ref 23–29)
CREAT SERPL-MCNC: 1.1 MG/DL (ref 0.5–1.4)
CREAT UR-MCNC: 7 MG/DL (ref 15–325)
EST. GFR  (NO RACE VARIABLE): >60 ML/MIN/1.73 M^2
ESTIMATED AVG GLUCOSE: 126 MG/DL (ref 68–131)
ETHANOL UR-MCNC: <10 MG/DL
GLUCOSE SERPL-MCNC: 111 MG/DL (ref 70–110)
HBA1C MFR BLD: 6 % (ref 4–5.6)
MAGNESIUM SERPL-MCNC: 1.7 MG/DL (ref 1.6–2.6)
METHADONE UR QL SCN>300 NG/ML: NEGATIVE
OPIATES UR QL SCN: NEGATIVE
PCP UR QL SCN>25 NG/ML: NEGATIVE
POCT GLUCOSE: 141 MG/DL (ref 70–110)
POTASSIUM SERPL-SCNC: 3.5 MMOL/L (ref 3.5–5.1)
SODIUM SERPL-SCNC: 138 MMOL/L (ref 136–145)
TOXICOLOGY INFORMATION: ABNORMAL

## 2022-11-05 PROCEDURE — 96361 HYDRATE IV INFUSION ADD-ON: CPT

## 2022-11-05 PROCEDURE — 96376 TX/PRO/DX INJ SAME DRUG ADON: CPT | Mod: 59

## 2022-11-05 PROCEDURE — 82962 GLUCOSE BLOOD TEST: CPT

## 2022-11-05 PROCEDURE — G0378 HOSPITAL OBSERVATION PER HR: HCPCS

## 2022-11-05 PROCEDURE — 83735 ASSAY OF MAGNESIUM: CPT | Performed by: NURSE PRACTITIONER

## 2022-11-05 PROCEDURE — 36415 COLL VENOUS BLD VENIPUNCTURE: CPT | Performed by: NURSE PRACTITIONER

## 2022-11-05 PROCEDURE — 80048 BASIC METABOLIC PNL TOTAL CA: CPT | Performed by: NURSE PRACTITIONER

## 2022-11-05 PROCEDURE — A4216 STERILE WATER/SALINE, 10 ML: HCPCS | Performed by: NURSE PRACTITIONER

## 2022-11-05 PROCEDURE — 94761 N-INVAS EAR/PLS OXIMETRY MLT: CPT

## 2022-11-05 PROCEDURE — 80307 DRUG TEST PRSMV CHEM ANLYZR: CPT | Performed by: NURSE PRACTITIONER

## 2022-11-05 PROCEDURE — 99900035 HC TECH TIME PER 15 MIN (STAT)

## 2022-11-05 PROCEDURE — 63600175 PHARM REV CODE 636 W HCPCS: Performed by: NURSE PRACTITIONER

## 2022-11-05 PROCEDURE — 84484 ASSAY OF TROPONIN QUANT: CPT | Performed by: NURSE PRACTITIONER

## 2022-11-05 PROCEDURE — 83036 HEMOGLOBIN GLYCOSYLATED A1C: CPT | Performed by: NURSE PRACTITIONER

## 2022-11-05 PROCEDURE — 27000221 HC OXYGEN, UP TO 24 HOURS

## 2022-11-05 PROCEDURE — 25000003 PHARM REV CODE 250: Performed by: NURSE PRACTITIONER

## 2022-11-05 PROCEDURE — 96372 THER/PROPH/DIAG INJ SC/IM: CPT | Mod: 59 | Performed by: NURSE PRACTITIONER

## 2022-11-05 RX ORDER — QUETIAPINE FUMARATE 25 MG/1
50 TABLET, FILM COATED ORAL NIGHTLY
Status: DISCONTINUED | OUTPATIENT
Start: 2022-11-05 | End: 2022-11-07 | Stop reason: HOSPADM

## 2022-11-05 RX ORDER — QUETIAPINE FUMARATE 100 MG/1
100 TABLET, FILM COATED ORAL NIGHTLY
Status: DISCONTINUED | OUTPATIENT
Start: 2022-11-05 | End: 2022-11-05

## 2022-11-05 RX ORDER — OXCARBAZEPINE 150 MG/1
300 TABLET, FILM COATED ORAL 2 TIMES DAILY
Status: DISCONTINUED | OUTPATIENT
Start: 2022-11-05 | End: 2022-11-07 | Stop reason: HOSPADM

## 2022-11-05 RX ORDER — IBUPROFEN 200 MG
24 TABLET ORAL
Status: DISCONTINUED | OUTPATIENT
Start: 2022-11-05 | End: 2022-11-07 | Stop reason: HOSPADM

## 2022-11-05 RX ORDER — IBUPROFEN 200 MG
16 TABLET ORAL
Status: DISCONTINUED | OUTPATIENT
Start: 2022-11-05 | End: 2022-11-07 | Stop reason: HOSPADM

## 2022-11-05 RX ORDER — GABAPENTIN 300 MG/1
300 CAPSULE ORAL 3 TIMES DAILY
Status: DISCONTINUED | OUTPATIENT
Start: 2022-11-05 | End: 2022-11-07 | Stop reason: HOSPADM

## 2022-11-05 RX ORDER — POLYETHYLENE GLYCOL 3350 17 G/17G
17 POWDER, FOR SOLUTION ORAL DAILY PRN
Status: DISCONTINUED | OUTPATIENT
Start: 2022-11-05 | End: 2022-11-07 | Stop reason: HOSPADM

## 2022-11-05 RX ORDER — IPRATROPIUM BROMIDE AND ALBUTEROL SULFATE 2.5; .5 MG/3ML; MG/3ML
3 SOLUTION RESPIRATORY (INHALATION) EVERY 4 HOURS PRN
Status: DISCONTINUED | OUTPATIENT
Start: 2022-11-05 | End: 2022-11-07 | Stop reason: HOSPADM

## 2022-11-05 RX ORDER — BENZTROPINE MESYLATE 0.5 MG/1
0.5 TABLET ORAL 2 TIMES DAILY
Status: DISCONTINUED | OUTPATIENT
Start: 2022-11-05 | End: 2022-11-06

## 2022-11-05 RX ORDER — ONDANSETRON 2 MG/ML
4 INJECTION INTRAMUSCULAR; INTRAVENOUS EVERY 8 HOURS PRN
Status: DISCONTINUED | OUTPATIENT
Start: 2022-11-05 | End: 2022-11-07 | Stop reason: HOSPADM

## 2022-11-05 RX ORDER — FLUOXETINE HYDROCHLORIDE 20 MG/1
20 CAPSULE ORAL DAILY
Status: ON HOLD | COMMUNITY
End: 2022-12-14 | Stop reason: HOSPADM

## 2022-11-05 RX ORDER — FUROSEMIDE 10 MG/ML
40 INJECTION INTRAMUSCULAR; INTRAVENOUS
Status: DISCONTINUED | OUTPATIENT
Start: 2022-11-05 | End: 2022-11-06

## 2022-11-05 RX ORDER — LISINOPRIL 20 MG/1
40 TABLET ORAL DAILY
Status: DISCONTINUED | OUTPATIENT
Start: 2022-11-05 | End: 2022-11-07 | Stop reason: HOSPADM

## 2022-11-05 RX ORDER — PROCHLORPERAZINE EDISYLATE 5 MG/ML
5 INJECTION INTRAMUSCULAR; INTRAVENOUS EVERY 6 HOURS PRN
Status: DISCONTINUED | OUTPATIENT
Start: 2022-11-05 | End: 2022-11-07 | Stop reason: HOSPADM

## 2022-11-05 RX ORDER — NALOXONE HCL 0.4 MG/ML
0.02 VIAL (ML) INJECTION
Status: DISCONTINUED | OUTPATIENT
Start: 2022-11-05 | End: 2022-11-07 | Stop reason: HOSPADM

## 2022-11-05 RX ORDER — NALOXONE HCL 0.4 MG/ML
VIAL (ML) INJECTION
Status: DISPENSED
Start: 2022-11-05 | End: 2022-11-05

## 2022-11-05 RX ORDER — TALC
6 POWDER (GRAM) TOPICAL NIGHTLY PRN
Status: DISCONTINUED | OUTPATIENT
Start: 2022-11-05 | End: 2022-11-07 | Stop reason: HOSPADM

## 2022-11-05 RX ORDER — IBUPROFEN 400 MG/1
400 TABLET ORAL EVERY 6 HOURS PRN
Status: DISCONTINUED | OUTPATIENT
Start: 2022-11-05 | End: 2022-11-07 | Stop reason: HOSPADM

## 2022-11-05 RX ORDER — PROPRANOLOL HYDROCHLORIDE 10 MG/1
10 TABLET ORAL 3 TIMES DAILY
Status: DISCONTINUED | OUTPATIENT
Start: 2022-11-05 | End: 2022-11-05

## 2022-11-05 RX ORDER — ACETAMINOPHEN 325 MG/1
650 TABLET ORAL EVERY 8 HOURS PRN
Status: DISCONTINUED | OUTPATIENT
Start: 2022-11-05 | End: 2022-11-07 | Stop reason: HOSPADM

## 2022-11-05 RX ORDER — GLUCAGON 1 MG
1 KIT INJECTION
Status: DISCONTINUED | OUTPATIENT
Start: 2022-11-05 | End: 2022-11-07 | Stop reason: HOSPADM

## 2022-11-05 RX ORDER — SODIUM CHLORIDE 0.9 % (FLUSH) 0.9 %
10 SYRINGE (ML) INJECTION EVERY 8 HOURS
Status: DISCONTINUED | OUTPATIENT
Start: 2022-11-05 | End: 2022-11-07 | Stop reason: HOSPADM

## 2022-11-05 RX ORDER — SODIUM CHLORIDE 9 MG/ML
INJECTION, SOLUTION INTRAVENOUS ONCE
Status: COMPLETED | OUTPATIENT
Start: 2022-11-05 | End: 2022-11-05

## 2022-11-05 RX ADMIN — BENZTROPINE MESYLATE 0.5 MG: 0.5 TABLET ORAL at 09:11

## 2022-11-05 RX ADMIN — GABAPENTIN 300 MG: 300 CAPSULE ORAL at 09:11

## 2022-11-05 RX ADMIN — ACETAMINOPHEN 650 MG: 325 TABLET ORAL at 02:11

## 2022-11-05 RX ADMIN — QUETIAPINE FUMARATE 50 MG: 25 TABLET ORAL at 09:11

## 2022-11-05 RX ADMIN — HEPARIN SODIUM 5000 UNITS: 5000 INJECTION, SOLUTION INTRAVENOUS; SUBCUTANEOUS at 06:11

## 2022-11-05 RX ADMIN — OXCARBAZEPINE 300 MG: 150 TABLET, FILM COATED ORAL at 09:11

## 2022-11-05 RX ADMIN — SODIUM CHLORIDE: 0.9 INJECTION, SOLUTION INTRAVENOUS at 01:11

## 2022-11-05 RX ADMIN — Medication 10 ML: at 06:11

## 2022-11-05 RX ADMIN — FUROSEMIDE 40 MG: 10 INJECTION, SOLUTION INTRAVENOUS at 06:11

## 2022-11-05 RX ADMIN — FUROSEMIDE 40 MG: 10 INJECTION, SOLUTION INTRAVENOUS at 05:11

## 2022-11-05 RX ADMIN — Medication 10 ML: at 02:11

## 2022-11-05 RX ADMIN — HEPARIN SODIUM 5000 UNITS: 5000 INJECTION, SOLUTION INTRAVENOUS; SUBCUTANEOUS at 02:11

## 2022-11-05 RX ADMIN — Medication 10 ML: at 09:11

## 2022-11-05 RX ADMIN — LISINOPRIL 40 MG: 20 TABLET ORAL at 09:11

## 2022-11-05 RX ADMIN — HEPARIN SODIUM 5000 UNITS: 5000 INJECTION, SOLUTION INTRAVENOUS; SUBCUTANEOUS at 09:11

## 2022-11-05 RX ADMIN — GABAPENTIN 300 MG: 300 CAPSULE ORAL at 02:11

## 2022-11-05 NOTE — ASSESSMENT & PLAN NOTE
Chronic, Latest blood pressure and vitals reviewed-   Temp:  [97.6 °F (36.4 °C)-98 °F (36.7 °C)]   Pulse:  [104-106]   Resp:  [18-20]   BP: (133-143)/()   SpO2:  [96 %-100 %] .   Home meds for hypertension were reviewed and noted below.   Hypertension Medications             furosemide (LASIX) 20 MG tablet Take 1 tablet (20 mg total) by mouth once daily.    lisinopriL (PRINIVIL,ZESTRIL) 40 MG tablet Take 1 tablet (40 mg total) by mouth once daily.    propranoloL (INDERAL) 10 MG tablet Take 1 tablet (10 mg total) by mouth 3 (three) times daily.   -Resume home BP medications  -Monitor and trend vital signs q4hr  -Will utilize p.r.n. blood pressure medication only if patient's blood pressure greater than  180/110 and she develops symptoms such as worsening chest pain or shortness of breath.

## 2022-11-05 NOTE — ASSESSMENT & PLAN NOTE
Patient presents with shortness of breath and VARGAS and peripheral edema on exam  Last ECHO results:   Results for orders placed during the hospital encounter of 07/21/22  Echo  Interpretation Summary  · The left ventricle is mildly enlarged with concentric hypertrophy and severely decreased systolic function.  · The estimated ejection fraction is 20%.  · There is severe left ventricular global hypokinesis.  · Grade III left ventricular diastolic dysfunction.  · Mild right ventricular enlargement with mildly reduced right ventricular systolic function.  · Severe left atrial enlargement.  · Moderate aortic regurgitation.  · Severe mitral regurgitation.  · Mild tricuspid regurgitation.  · Moderate pulmonic regurgitation.  · Intermediate central venous pressure (8 mmHg).  · The estimated PA systolic pressure is 33 mmHg.  · Trivial pericardial effusion.  -Consistent clinical presentation; Last BNP reviewed- and noted below   Recent Labs   Lab 11/04/22  1716   BNP 2,992*   -CHF pathway initiated  -Troponin 0.039; likely demand; will continue to monitor and trend  -CXR revealed small right pleural effusion  -Initiated lasix 80 mg in ED, will continue Lasix IV at 40 BID (suspect non-compliance)  -Continue home BB, ACEi/ARB  -Daily weights  -Strict I/Os  -Monitor on telemetry  -Monitor and trend BMP, Mg, and renal function; keep K >4, Mg >2  -Sodium restriction (<2g/d), fluid restriction (<2L)   -2D echo to assess cardiac function and valvular dysfunction.   -Monitor for signs of fluid overload: RR>30, O2 sat<92%, weight gain of >3 lbs in 24 hours, or urinary output <160ml/8hr

## 2022-11-05 NOTE — NURSING
MET called over head while patient in hallway on 5A nursing station coming from ED.  Received phone call from JAKI Portillo , that staff is putting patient in 516 and to cue in room to assist.    Cued into room via [employer].  See MET flowsheet; all info documented received per VN observation and designated MET speaker, Charlie Carlos RN at bedside.    0110- MET called overhead.  Narcan 0.4mg iv was given while patient in hallway.    0114- patient placed in bed. Lethargic; responding to painful stimuli.    0118- patient states not taking any medication that staff did not give to her and falls back asleep.    0120- narcan 0.4mg iv admin; still lethargic.    0127- Patient trying to get OOB stating she needs to go to the bathroom.  Patient educated that her blood pressure was too low and she was too tired to get OOB and that she would have to use the bedpan.  PIV to left arm pulled out accidentally by patient.  Patient adamant about getting up to the bathroom.  Patient placed on bedpan and cleaned.  Purewick applied.    0130- Patient to be transferred to room 526 for closer monitoring.

## 2022-11-05 NOTE — HPI
Stephanie T Barthelemy is a 51-year-old female who has a past medical history of attention deficit hyperactivity disorder, anemia, anxiety, bipolar disorder, history of hepatitis C, history of psychiatric hospitalization, hypertension, opioid overdose, psychosis, seizure disorder, sleep difficulties, substance abuse, therapy, and vasculitis. She presented to the ED for evaluation of bilateral leg swelling and shortness of breath on exertion that started 11/3/22. She reports taking lasix for heart failure, however she reports running out of her medication as of 11/2/22 since then she cannot bend over to pick things up without getting short of breath and also her bilateral lower extremities have been getting more swollen also associated with abdominal distention. ED workup revealed dyspnea on exertion, elevated troponin (improved from priors), elevated BNP (improved from priors). EKG with ST, no STEMI. CXR: Small right pleural effusion. She was given 80 mg lasix. Admitted to [facility] for further management and diuresing.

## 2022-11-05 NOTE — NURSING
"RAPID RESPONSE NURSE NOTE     Admit Date: 2022  LOS: 0  Code Status: Full Code   Date of Consult: 2022  : 1970  Age: 51 y.o.  Weight:   Wt Readings from Last 1 Encounters:   22 60.5 kg (133 lb 6.1 oz)     Sex: female  Race: White   [facility] A:   MRN: [id number]  Time Rapid Response Team page Received: 0113  Time Rapid Response Team at Bedside: 0115  Time Rapid Response Team left Bedside: 0220  Was the patient discharged from an ICU this admission?   no  Was the patient discharged from a PACU within last 24 hours?  no  Did the patient receive conscious sedation/general anesthesia within last 24 hours?  no  Was the patient in the ED within the past 24 hours?  yes  Was the patient started on NIPPV within the past 24 hours?  no  Did this progress into an ARC or CPA:  no  Attending Physician: Nelly Pandey*  Primary Service: Networked reference to record PCT   Consult Requested By: Nelly Pandey*     SITUATION     Reason for Call: Pt unresponsive  Called to evaluate the patient for Neuro    BACKGROUND     Why is the patient in the hospital?: Acute on chronic combined systolic and diastolic congestive heart failure    Patient has a past medical history of ADHD (attention deficit hyperactivity disorder), Anemia, Anxiety, Bipolar disorder, History of hepatitis C - s/p clearance of virus (HCV neg 2015), History of psychiatric hospitalization, History of substance abuse, psychiatric care, Hypertension, Opioid overdose, Psychiatric problem, Psychosis, Seizure disorder, Sleep difficulties, Substance abuse, Therapy, and Vasculitis.    ASSESSMENT/INTERVENTIONS     /80 (BP Location: Right arm, Patient Position: Lying)   Pulse 96   Temp 97.8 °F (36.6 °C) (Oral)   Resp 15   Ht 5' 1" (1.549 m)   Wt 60.5 kg (133 lb 6.1 oz)   LMP 10/09/2019 (Within Weeks)   SpO2 98%   Breastfeeding No   BMI 25.20 kg/m²     What did you find: MET called overhead to 5 A. Pt found in " hallway on stretcher en route to her room from ED. Responded to strernal rub briefly, then became lethargic again . VS taken and found pt hypotensive with Pox 90%. Immediately placed pt in closest room to place on monitor and  4L of oxygen. Verbal orders from KAVEH Bal NP to administer 1st dose of narcan while 500 cc bolus being administered. 2nd dose of narcan was then given. Pt became responsive requesting to use restroom. MET called by KAVEH Bal NP  RECOMMENDATIONS     We recommend: Q2H VS and call ICU Charge nurse for further concerns.     FOLLOW-UP/CONTINGENCY PLAN     Patient needs a second visit at : 0530    Call the Rapid Response Nurse, Owen Alvarez RN at x [phone] for additional questions or concerns.    PHYSICIAN ESCALATION     Orders received and case discussed with  KAVEH Bal NP.    Disposition: Remain in room 526.

## 2022-11-05 NOTE — CONSULTS
Consult received for education on fluid and salt restricted diet. Pt was asleep at visit. Left handouts at bedside. Will follow up with education.

## 2022-11-05 NOTE — ED PROVIDER NOTES
Encounter Date: 11/4/2022    SCRIBE #1 NOTE: I, Gerardo Eli Jr, am scribing for, and in the presence of,  Diana Rodriguez MD. I have scribed the following portions of the note - Other sections scribed: HPI, ROS, PE, MDM.     History     Chief Complaint   Patient presents with    Leg Swelling     Brought to Ed from  home for swelling. Pt has been out of lasix x 1 day. Denies sob.     Stephanie T Barthelemy is a 51 y.o. female who has a past medical history of attention deficit hyperactivity disorder, anemia, anxiety, bipolar disorder, history of hepatitis C, history of psychiatric hospitalization, hypertension, opioid overdose, psychosis, seizure disorder, sleep difficulties, substance abuse, therapy, and vasculitis.    The patient presents to the emergency department due to bilateral leg swelling; onset this morning. Associated symptoms include abdominal pain and chest pain.The patient reported developing bilateral leg swelling accompanied by intermittent, left-sided chest pain; symptoms stated to have worsened since onset. Patient mentioned being prescribed lasix due to congestive heart failure, but states the prescription ran out; symptom development onset shortly afterwards. She last took the prescribed medication on Wednesday. In addition to this, patient complains of diffuse abdominal discomfort that does not radiate due to retaining fluid. Patient denies cough, fever, and shortness of breath. She has no known allergies to medication.                  The history is provided by the patient. No  was used.   Review of patient's allergies indicates:  No Known Allergies  Past Medical History:   Diagnosis Date    ADHD (attention deficit hyperactivity disorder)     Anemia     Anxiety     Bipolar disorder     History of hepatitis C - s/p clearance of virus (HCV neg 6/2015) 9/26/2014    History of psychiatric hospitalization     History of substance abuse     IV heroin - last use 2013 per pt     Hx of psychiatric care     Hypertension     Opioid overdose 5/10/2016    Psychiatric problem     Psychosis     Seizure disorder 4/3/2019    Sleep difficulties     Substance abuse     Therapy     Vasculitis      Past Surgical History:   Procedure Laterality Date    HYSTERECTOMY  3/16/2011    SALPINGOOPHORECTOMY Right 3/16/2011    TUBAL LIGATION      Vaginal cuff repair  04/22/2011     Family History   Problem Relation Age of Onset    Diabetes Maternal Grandmother     Cancer Maternal Grandmother      Social History     Tobacco Use    Smoking status: Every Day     Packs/day: 1.00     Years: 36.00     Pack years: 36.00     Types: Cigarettes     Start date: 1986    Smokeless tobacco: Never    Tobacco comments:     Enrolled in [employer] on 10/23/14 ([employer] Member ID # [id number])  Ambulatory referral to Smoking Cessation clinic.   Substance Use Topics    Alcohol use: No    Drug use: Yes     Types: Heroin, Cocaine, Methamphetamines, Marijuana     Review of Systems   Constitutional:  Negative for chills and fever.   HENT:  Negative for sore throat.    Respiratory:  Negative for cough and shortness of breath.    Cardiovascular:  Positive for chest pain and leg swelling.   Gastrointestinal:  Positive for abdominal distention. Negative for nausea.   Genitourinary:  Negative for dysuria and hematuria.   Musculoskeletal:  Negative for back pain.   Skin:  Negative for rash.   Neurological:  Negative for weakness.   Hematological:  Does not bruise/bleed easily.   Psychiatric/Behavioral:  Negative for confusion.      Physical Exam     Initial Vitals   BP Pulse Resp Temp SpO2   11/04/22 1517 11/04/22 1517 11/04/22 1517 11/04/22 1530 11/04/22 1517   (!) 143/102 105 20 97.6 °F (36.4 °C) 100 %      MAP       --                Physical Exam    Nursing note and vitals reviewed.  Constitutional: She appears well-developed. She is not diaphoretic. No distress.   HENT:   Head: Normocephalic and atraumatic.   Eyes: Conjunctivae are  normal.   Neck:   Normal range of motion.  Cardiovascular:  Normal rate, regular rhythm and normal heart sounds.           Pulmonary/Chest: No respiratory distress. She has no wheezes.   Diminished breath sounds at the bases bilaterally.   Abdominal: Abdomen is soft. There is no abdominal tenderness.   Musculoskeletal:         General: Edema (2+ pitting edema up to the hips) present. Normal range of motion.      Cervical back: Normal range of motion.      Comments: Pitting edema noted to the hip.     Neurological: She is alert and oriented to person, place, and time.   Skin: Skin is warm and dry. No rash noted.   Psychiatric: She has a normal mood and affect. Her behavior is normal. Judgment and thought content normal.       ED Course   Procedures  Labs Reviewed   CBC W/ AUTO DIFFERENTIAL - Abnormal; Notable for the following components:       Result Value    MCV 76 (*)     MCH 23.0 (*)     MCHC 30.1 (*)     RDW 19.2 (*)     All other components within normal limits   COMPREHENSIVE METABOLIC PANEL - Abnormal; Notable for the following components:    Sodium 130 (*)     Chloride 93 (*)     BUN 23 (*)     Albumin 2.8 (*)     Total Bilirubin 2.0 (*)     AST 48 (*)     eGFR 55 (*)     All other components within normal limits   TROPONIN I - Abnormal; Notable for the following components:    Troponin I 0.039 (*)     All other components within normal limits   B-TYPE NATRIURETIC PEPTIDE - Abnormal; Notable for the following components:    BNP 2,992 (*)     All other components within normal limits     EKG Readings: (Independently Interpreted)   Initial Reading: No STEMI. Rhythm: Normal Sinus Rhythm. Heart Rate: 105. Conduction: LBBB.     Imaging Results              X-Ray Chest AP Portable (Final result)  Result time 11/04/22 20:11:05      Final result by Fili Galeano DO (11/04/22 20:11:05)                   Impression:      Small right pleural effusion.      Electronically signed by: Fili  Waleska  Date:    11/04/2022  Time:    20:11               Narrative:    EXAMINATION:  XR CHEST AP PORTABLE    CLINICAL HISTORY:  CHF;    TECHNIQUE:  Single frontal view of the chest was performed.    COMPARISON:  09/18/2022.    FINDINGS:  There is a small right pleural effusion.  The lungs are otherwise clear.  The cardiac silhouette is enlarged unchanged.  The visualized osseous structures are intact.                                       Medications   sodium chloride 0.9% flush 10 mL (has no administration in time range)   heparin (porcine) injection 5,000 Units (has no administration in time range)   furosemide injection 40 mg (has no administration in time range)   sodium chloride 0.9% flush 10 mL (has no administration in time range)   albuterol-ipratropium 2.5 mg-0.5 mg/3 mL nebulizer solution 3 mL (has no administration in time range)   melatonin tablet 6 mg (has no administration in time range)   ondansetron injection 4 mg (has no administration in time range)   prochlorperazine injection Soln 5 mg (has no administration in time range)   polyethylene glycol packet 17 g (has no administration in time range)   acetaminophen tablet 650 mg (has no administration in time range)   naloxone 0.4 mg/mL injection 0.02 mg (has no administration in time range)   glucose chewable tablet 16 g (has no administration in time range)   glucose chewable tablet 24 g (has no administration in time range)   glucagon (human recombinant) injection 1 mg (has no administration in time range)   dextrose 10% bolus 125 mL (has no administration in time range)   dextrose 10% bolus 250 mL (has no administration in time range)   furosemide injection 80 mg (80 mg Intravenous Given 11/4/22 2215)   gabapentin capsule 300 mg (300 mg Oral Given 11/4/22 2251)   QUEtiapine tablet 100 mg (100 mg Oral Given 11/4/22 2251)   OXcarbazepine tablet 300 mg (300 mg Oral Given 11/4/22 2252)     Medical Decision Making:   History:   Old Medical Records: I  decided to obtain old medical records.  Initial Assessment:   Stephanie T Barthelemy is a 51 y.o. female who has a past medical history of attention deficit hyperactivity disorder, anemia, anxiety, bipolar disorder, history of hepatitis C, history of psychiatric hospitalization, hypertension, opioid overdose, psychosis, seizure disorder, sleep difficulties, substance abuse, therapy, and vasculitis.The patient presents to the emergency department due to bilateral leg swelling; onset this morning. Associated symptoms include abdominal pain and chest pain  Clinical Tests:   Lab Tests: Ordered and Reviewed  Radiological Study: Ordered and Reviewed  Medical Tests: Ordered and Reviewed  ED Management:  52 yo female with past medical history as above presents with complaint of progressive anasarca in the setting of running out of her Lasix 1 day ago.  Upon arrival she is afebrile and satting comfortably on room air.  However she reports significant dyspnea on exertion, increased from baseline and that she is unable to pick anything up off the ground without becoming very short of breath.  Although she is eager to return home, patient feels that she does require admission due to her increased dyspnea from baseline.  Labs notable for demand troponinemia, improved from prior visits, and elevated BNPm Patient was given 80 IV Lasix admitted to medicine for further management.        Scribe Attestation:   Scribe #1: I performed the above scribed service and the documentation accurately describes the services I performed. I attest to the accuracy of the note.      ED Course as of 11/05/22 0028   Fri Nov 04, 2022 2211 Troponin I(!): 0.039  Improved from prior.  [AT]   2211 BNP(!): 2,992 [AT]   2211 Creatinine: 1.2 [AT]      ED Course User Index  [AT] Diana Rodriguez MD                 Clinical Impression:   Final diagnoses:  [R06.02] Shortness of breath  [R60.0] Bilateral lower extremity edema (Primary)  [I50.23] Acute on  chronic systolic congestive heart failure        ED Disposition Condition    Observation                 Diana Rodriguez MD  11/05/22 0028

## 2022-11-05 NOTE — SUBJECTIVE & OBJECTIVE
Past Medical History:   Diagnosis Date    ADHD (attention deficit hyperactivity disorder)     Anemia     Anxiety     Bipolar disorder     History of hepatitis C - s/p clearance of virus (HCV neg 6/2015) 9/26/2014    History of psychiatric hospitalization     History of substance abuse     IV heroin - last use 2013 per pt    Hx of psychiatric care     Hypertension     Opioid overdose 5/10/2016    Psychiatric problem     Psychosis     Seizure disorder 4/3/2019    Sleep difficulties     Substance abuse     Therapy     Vasculitis        Past Surgical History:   Procedure Laterality Date    HYSTERECTOMY  3/16/2011    SALPINGOOPHORECTOMY Right 3/16/2011    TUBAL LIGATION      Vaginal cuff repair  04/22/2011       Review of patient's allergies indicates:  No Known Allergies    No current facility-administered medications on file prior to encounter.     Current Outpatient Medications on File Prior to Encounter   Medication Sig    albuterol (PROVENTIL/VENTOLIN HFA) 90 mcg/actuation inhaler Inhale 2 puffs into the lungs every 6 (six) hours. Rescue    benztropine (COGENTIN) 0.5 MG tablet Take 1 tablet (0.5 mg total) by mouth 2 (two) times daily.    famotidine (PEPCID) 20 MG tablet Take 1 tablet (20 mg total) by mouth 2 (two) times daily.    furosemide (LASIX) 20 MG tablet Take 1 tablet (20 mg total) by mouth once daily.    gabapentin (NEURONTIN) 300 MG capsule Take 1 capsule (300 mg total) by mouth 3 (three) times daily.    hydrOXYzine pamoate (VISTARIL) 50 MG Cap Take 1 capsule (50 mg total) by mouth every 8 (eight) hours as needed (anxiety, insomnia).    lisinopriL (PRINIVIL,ZESTRIL) 40 MG tablet Take 1 tablet (40 mg total) by mouth once daily.    nicotine (NICODERM CQ) 21 mg/24 hr Place 1 patch onto the skin once daily.    OXcarbazepine (TRILEPTAL) 300 MG Tab Take 1 tablet (300 mg total) by mouth 2 (two) times daily.    propranoloL (INDERAL) 10 MG tablet Take 1 tablet (10 mg total) by mouth 3 (three) times daily.    pulse  oximeter (PULSE OXIMETER) device by Apply Externally route 2 (two) times a day. Use twice daily at 8 AM and 3 PM and record the value in MyChart as directed.    QUEtiapine (SEROQUEL) 100 MG Tab Take 1 tablet (100 mg total) by mouth every evening.    QUEtiapine (SEROQUEL) 50 MG tablet Take 1 tablet (50 mg total) by mouth once daily.    [DISCONTINUED] amLODIPine (NORVASC) 5 MG tablet Take 1 tablet (5 mg total) by mouth once daily.    [DISCONTINUED] FLUoxetine 20 MG capsule Take 1 capsule (20 mg total) by mouth once daily.    [DISCONTINUED] metoprolol succinate (TOPROL-XL) 25 MG 24 hr tablet Take 0.5 tablets (12.5 mg total) by mouth once daily.    [DISCONTINUED] risperiDONE (RISPERDAL) 1 MG tablet Take 1 mg by mouth 2 (two) times daily.     Family History       Problem Relation (Age of Onset)    Cancer Maternal Grandmother    Diabetes Maternal Grandmother          Tobacco Use    Smoking status: Every Day     Packs/day: 1.00     Years: 36.00     Pack years: 36.00     Types: Cigarettes     Start date: 1986    Smokeless tobacco: Never    Tobacco comments:     Enrolled in [employer] on 10/23/14 ([employer] Member ID # [id number])  Ambulatory referral to Smoking Cessation clinic.   Substance and Sexual Activity    Alcohol use: No    Drug use: Yes     Types: Heroin, Cocaine, Methamphetamines, Marijuana    Sexual activity: Not Currently     Partners: Male, Female     Birth control/protection: Other-see comments     Comment: hysterectomy     Review of Systems   Constitutional: Negative.    HENT: Negative.     Eyes: Negative.    Respiratory:  Positive for shortness of breath. Negative for cough and wheezing.    Cardiovascular:  Positive for leg swelling. Negative for chest pain and palpitations.   Gastrointestinal:  Positive for abdominal distention. Negative for abdominal pain.   Genitourinary: Negative.    Musculoskeletal: Negative.    Skin: Negative.    Neurological: Negative.    Psychiatric/Behavioral: Negative.     Objective:      Vital Signs (Most Recent):  Temp: 98 °F (36.7 °C) (11/04/22 1901)  Pulse: 104 (11/04/22 2142)  Resp: 18 (11/04/22 1901)  BP: (!) 133/96 (11/04/22 1901)  SpO2: 96 % (11/04/22 1901)   Vital Signs (24h Range):  Temp:  [97.6 °F (36.4 °C)-98 °F (36.7 °C)] 98 °F (36.7 °C)  Pulse:  [104-106] 104  Resp:  [18-20] 18  SpO2:  [96 %-100 %] 96 %  BP: (133-143)/() 133/96     Weight: 54.4 kg (120 lb)  Body mass index is 22.67 kg/m².    Physical Exam  Vitals and nursing note reviewed.   Constitutional:       Appearance: She is not ill-appearing or diaphoretic.      Comments: Discheveled    HENT:      Head: Normocephalic and atraumatic.      Mouth/Throat:      Mouth: Mucous membranes are moist.   Eyes:      Pupils: Pupils are equal, round, and reactive to light.   Cardiovascular:      Rate and Rhythm: Normal rate and regular rhythm.      Pulses: Normal pulses.   Pulmonary:      Effort: Pulmonary effort is normal. No respiratory distress.      Breath sounds: Decreased air movement present.   Abdominal:      General: There is distension.      Palpations: Abdomen is soft.      Tenderness: There is no abdominal tenderness. There is no guarding.   Musculoskeletal:         General: Swelling (noted from her abdomen to her feet) present. Normal range of motion.      Cervical back: Normal range of motion.      Right lower leg: Edema present.      Left lower leg: Edema present.   Skin:     General: Skin is warm and dry.      Capillary Refill: Capillary refill takes 2 to 3 seconds.   Neurological:      General: No focal deficit present.      Mental Status: She is alert and oriented to person, place, and time. Mental status is at baseline.   Psychiatric:         Mood and Affect: Mood normal.         Behavior: Behavior normal.         Thought Content: Thought content normal.         Judgment: Judgment normal.         CRANIAL NERVES     CN III, IV, VI   Pupils are equal, round, and reactive to light.     Significant Labs: All pertinent  labs within the past 24 hours have been reviewed.    Significant Imaging: I have reviewed all pertinent imaging results/findings within the past 24 hours.

## 2022-11-05 NOTE — PLAN OF CARE
Problem: Adult Inpatient Plan of Care  Goal: Plan of Care Review  Outcome: Ongoing, Progressing     VIRTUAL NURSE:  Patient still lethargic.  Patient resting comfortably in bed with eyes closed; respirations even and unlabored.  No distress noted.    Labs, notes, orders, and careplan reviewed.       11/05/22 0130   Patient Request   Patient Requested patient lethargic but responding to verbal and tactile stimuli and falls back to sleep.  admission info received from previous admit   Admission   Initial VN Admission Questions Incomplete   Shift   Virtual Nurse - Rounding Incomplete   Pain Management Interventions pain management plan reviewed with patient/caregiver   Virtual Nurse - Patient Verbalized Approval Of Other (See Comments)  (lethargic)   Type of Frequent Check   Type Patient Rounds   Safety/Activity   Patient Rounds bed in low position;placement of personal items at bedside;bed wheels locked;call light in patient/parent reach;visualized patient;clutter free environment maintained   Safety Promotion/Fall Prevention assistive device/personal item within reach;bed alarm set;diversional activities provided;Fall Risk reviewed with patient/family;high risk medications identified;medications reviewed;nonskid shoes/socks when out of bed;room near unit station;side rails raised x 3;supervised activity;instructed to call staff for mobility   Safety Precautions emergency equipment at bedside   Positioning   Body Position neutral body alignment;neutral head position   Head of Bed (HOB) Positioning HOB at 60 degrees   Pain/Comfort/Sleep   Preferred Pain Scale FACES (Tony-Barnes FACES Pain Rating Scale)   FACES Pain Rating: Rest 0-->no hurt   FACES Pain Rating: Activity 0-->no hurt   Sleep/Rest/Relaxation appears asleep

## 2022-11-05 NOTE — NURSING
This RN toted patient was drousy and not able to stay awake. This RN discussed with MD and MD re evaluated patient at bedside. This MD noted patients situation with this RN. This RN found patient with medications containing unmarked bottles. MD cleared patient for floor.

## 2022-11-05 NOTE — NURSING
Patient arrived to the floor on stretcher, unresponsive. B/P 81/60. MET was called. Narcan was given. Patient stayed in the floor.

## 2022-11-05 NOTE — NURSING
Patient lying in bed resting with eyes closed. Patient remains stable arouses to verbal and physical stimulation. Vital signs stable. Bed remains in low position call bell light in reach bed alarm on. Will continue to monitor.

## 2022-11-05 NOTE — NURSING
ER nurse Hanane DAVID called back to floor for updates stating patient was found with a bag of medication that was not witnessed being taken but patient condition has changed after finding patient with bag of medication. Patient is on the way up to the floor very drowsy and hard to arouse. Informed ER nurse that she need to call security regarding medication and that it shouldn't be sent to floor.

## 2022-11-05 NOTE — SIGNIFICANT EVENT
MET called for patient being lethargic and difficult to arouse associated with hypotension. Patient was assessed prior by myself in the ED and was AAOx4 asking to be discharged then decided to stay for admission. Patient was given her usual home medications per ED provider. Patient did have a bag of her home medications with her at that time. On my assessment during the MET, patient was lethargic, arousal to loud verbal and phsyical stimulation. Falls back asleep easily, hypotensive on the monitor with O2 saturations 97%. She was given a 500 ml NS bolus and narcan x2 and began asking to you the restroom attempting to get out of bed. When asked if she took any other medications, she denied. Patient is stable for now and will remain in room on telemetry and continuous O2 monitoring. Will get UDS and treat accordingly.

## 2022-11-05 NOTE — H&P
[facility] Emergency Dept  [facility]  History & Physical    Patient Name: Stephanie T Barthelemy  MRN: [id number]  Patient Class: OP- Observation  Admission Date: 11/4/2022  Attending Physician: Nelly Pandey*   Primary Care Provider: No primary care provider on file.         Patient information was obtained from patient, past medical records and ER records.     Subjective:     Principal Problem:Acute on chronic combined systolic and diastolic congestive heart failure    Chief Complaint:   Chief Complaint   Patient presents with    Leg Swelling     Brought to Ed from  home for swelling. Pt has been out of lasix x 1 day. Denies sob.        HPI: Stephanie T Barthelemy is a 51-year-old female who has a past medical history of attention deficit hyperactivity disorder, anemia, anxiety, bipolar disorder, history of hepatitis C, history of psychiatric hospitalization, hypertension, opioid overdose, psychosis, seizure disorder, sleep difficulties, substance abuse, therapy, and vasculitis. She presented to the ED for evaluation of bilateral leg swelling and shortness of breath on exertion that started 11/3/22. She reports taking lasix for heart failure, however she reports running out of her medication as of 11/2/22 since then she cannot bend over to pick things up without getting short of breath and also her bilateral lower extremities have been getting more swollen also associated with abdominal distention. ED workup revealed dyspnea on exertion, elevated troponin (improved from priors), elevated BNP (improved from priors). EKG with ST, no STEMI. CXR: Small right pleural effusion. She was given 80 mg lasix. Admitted to [facility] for further management and diuresing.       Past Medical History:   Diagnosis Date    ADHD (attention deficit hyperactivity disorder)     Anemia     Anxiety     Bipolar disorder     History of hepatitis C - s/p clearance of virus (HCV neg 6/2015) 9/26/2014     History of psychiatric hospitalization     History of substance abuse     IV heroin - last use 2013 per pt    Hx of psychiatric care     Hypertension     Opioid overdose 5/10/2016    Psychiatric problem     Psychosis     Seizure disorder 4/3/2019    Sleep difficulties     Substance abuse     Therapy     Vasculitis        Past Surgical History:   Procedure Laterality Date    HYSTERECTOMY  3/16/2011    SALPINGOOPHORECTOMY Right 3/16/2011    TUBAL LIGATION      Vaginal cuff repair  04/22/2011       Review of patient's allergies indicates:  No Known Allergies    No current facility-administered medications on file prior to encounter.     Current Outpatient Medications on File Prior to Encounter   Medication Sig    albuterol (PROVENTIL/VENTOLIN HFA) 90 mcg/actuation inhaler Inhale 2 puffs into the lungs every 6 (six) hours. Rescue    benztropine (COGENTIN) 0.5 MG tablet Take 1 tablet (0.5 mg total) by mouth 2 (two) times daily.    famotidine (PEPCID) 20 MG tablet Take 1 tablet (20 mg total) by mouth 2 (two) times daily.    furosemide (LASIX) 20 MG tablet Take 1 tablet (20 mg total) by mouth once daily.    gabapentin (NEURONTIN) 300 MG capsule Take 1 capsule (300 mg total) by mouth 3 (three) times daily.    hydrOXYzine pamoate (VISTARIL) 50 MG Cap Take 1 capsule (50 mg total) by mouth every 8 (eight) hours as needed (anxiety, insomnia).    lisinopriL (PRINIVIL,ZESTRIL) 40 MG tablet Take 1 tablet (40 mg total) by mouth once daily.    nicotine (NICODERM CQ) 21 mg/24 hr Place 1 patch onto the skin once daily.    OXcarbazepine (TRILEPTAL) 300 MG Tab Take 1 tablet (300 mg total) by mouth 2 (two) times daily.    propranoloL (INDERAL) 10 MG tablet Take 1 tablet (10 mg total) by mouth 3 (three) times daily.    pulse oximeter (PULSE OXIMETER) device by Apply Externally route 2 (two) times a day. Use twice daily at 8 AM and 3 PM and record the value in MyChart as directed.    QUEtiapine (SEROQUEL) 100 MG  Tab Take 1 tablet (100 mg total) by mouth every evening.    QUEtiapine (SEROQUEL) 50 MG tablet Take 1 tablet (50 mg total) by mouth once daily.    [DISCONTINUED] amLODIPine (NORVASC) 5 MG tablet Take 1 tablet (5 mg total) by mouth once daily.    [DISCONTINUED] FLUoxetine 20 MG capsule Take 1 capsule (20 mg total) by mouth once daily.    [DISCONTINUED] metoprolol succinate (TOPROL-XL) 25 MG 24 hr tablet Take 0.5 tablets (12.5 mg total) by mouth once daily.    [DISCONTINUED] risperiDONE (RISPERDAL) 1 MG tablet Take 1 mg by mouth 2 (two) times daily.     Family History       Problem Relation (Age of Onset)    Cancer Maternal Grandmother    Diabetes Maternal Grandmother          Tobacco Use    Smoking status: Every Day     Packs/day: 1.00     Years: 36.00     Pack years: 36.00     Types: Cigarettes     Start date: 1986    Smokeless tobacco: Never    Tobacco comments:     Enrolled in [employer] on 10/23/14 ([employer] Member ID # [id number])  Ambulatory referral to Smoking Cessation clinic.   Substance and Sexual Activity    Alcohol use: No    Drug use: Yes     Types: Heroin, Cocaine, Methamphetamines, Marijuana    Sexual activity: Not Currently     Partners: Male, Female     Birth control/protection: Other-see comments     Comment: hysterectomy     Review of Systems   Constitutional: Negative.    HENT: Negative.     Eyes: Negative.    Respiratory:  Positive for shortness of breath. Negative for cough and wheezing.    Cardiovascular:  Positive for leg swelling. Negative for chest pain and palpitations.   Gastrointestinal:  Positive for abdominal distention. Negative for abdominal pain.   Genitourinary: Negative.    Musculoskeletal: Negative.    Skin: Negative.    Neurological: Negative.    Psychiatric/Behavioral: Negative.     Objective:     Vital Signs (Most Recent):  Temp: 98 °F (36.7 °C) (11/04/22 1901)  Pulse: 104 (11/04/22 2142)  Resp: 18 (11/04/22 1901)  BP: (!) 133/96 (11/04/22 1901)  SpO2: 96 % (11/04/22  1901)   Vital Signs (24h Range):  Temp:  [97.6 °F (36.4 °C)-98 °F (36.7 °C)] 98 °F (36.7 °C)  Pulse:  [104-106] 104  Resp:  [18-20] 18  SpO2:  [96 %-100 %] 96 %  BP: (133-143)/() 133/96     Weight: 54.4 kg (120 lb)  Body mass index is 22.67 kg/m².    Physical Exam  Vitals and nursing note reviewed.   Constitutional:       Appearance: She is not ill-appearing or diaphoretic.      Comments: Discheveled    HENT:      Head: Normocephalic and atraumatic.      Mouth/Throat:      Mouth: Mucous membranes are moist.   Eyes:      Pupils: Pupils are equal, round, and reactive to light.   Cardiovascular:      Rate and Rhythm: Normal rate and regular rhythm.      Pulses: Normal pulses.   Pulmonary:      Effort: Pulmonary effort is normal. No respiratory distress.      Breath sounds: Decreased air movement present.   Abdominal:      General: There is distension.      Palpations: Abdomen is soft.      Tenderness: There is no abdominal tenderness. There is no guarding.   Musculoskeletal:         General: Swelling (noted from her abdomen to her feet) present. Normal range of motion.      Cervical back: Normal range of motion.      Right lower leg: Edema present.      Left lower leg: Edema present.   Skin:     General: Skin is warm and dry.      Capillary Refill: Capillary refill takes 2 to 3 seconds.   Neurological:      General: No focal deficit present.      Mental Status: She is alert and oriented to person, place, and time. Mental status is at baseline.   Psychiatric:         Mood and Affect: Mood normal.         Behavior: Behavior normal.         Thought Content: Thought content normal.         Judgment: Judgment normal.         CRANIAL NERVES     CN III, IV, VI   Pupils are equal, round, and reactive to light.     Significant Labs: All pertinent labs within the past 24 hours have been reviewed.    Significant Imaging: I have reviewed all pertinent imaging results/findings within the past 24 hours.    Assessment/Plan:      * Acute on chronic combined systolic and diastolic congestive heart failure  Patient presents with shortness of breath and VARGAS and peripheral edema on exam  Last ECHO results:   Results for orders placed during the hospital encounter of 07/21/22  Echo  Interpretation Summary  · The left ventricle is mildly enlarged with concentric hypertrophy and severely decreased systolic function.  · The estimated ejection fraction is 20%.  · There is severe left ventricular global hypokinesis.  · Grade III left ventricular diastolic dysfunction.  · Mild right ventricular enlargement with mildly reduced right ventricular systolic function.  · Severe left atrial enlargement.  · Moderate aortic regurgitation.  · Severe mitral regurgitation.  · Mild tricuspid regurgitation.  · Moderate pulmonic regurgitation.  · Intermediate central venous pressure (8 mmHg).  · The estimated PA systolic pressure is 33 mmHg.  · Trivial pericardial effusion.  -Consistent clinical presentation; Last BNP reviewed- and noted below   Recent Labs   Lab 11/04/22  1716   BNP 2,992*   -CHF pathway initiated  -Troponin 0.039; likely demand; will continue to monitor and trend  -CXR revealed small right pleural effusion  -Initiated lasix 80 mg in ED, will continue Lasix IV at 40 BID (suspect non-compliance)  -Continue home BB, ACEi/ARB  -Daily weights  -Strict I/Os  -Monitor on telemetry  -Monitor and trend BMP, Mg, and renal function; keep K >4, Mg >2  -Sodium restriction (<2g/d), fluid restriction (<2L)   -2D echo to assess cardiac function and valvular dysfunction.   -Monitor for signs of fluid overload: RR>30, O2 sat<92%, weight gain of >3 lbs in 24 hours, or urinary output <160ml/8hr    Bipolar 1 disorder, depressed  -Patient AAOx4, no frederic or depression noted or reported currently  -Resume home meds    Anemia  -Stable  -Monitor    Essential hypertension  Chronic, Latest blood pressure and vitals reviewed-   Temp:  [97.6 °F (36.4 °C)-98 °F (36.7 °C)]    Pulse:  [104-106]   Resp:  [18-20]   BP: (133-143)/()   SpO2:  [96 %-100 %] .   Home meds for hypertension were reviewed and noted below.   Hypertension Medications             furosemide (LASIX) 20 MG tablet Take 1 tablet (20 mg total) by mouth once daily.    lisinopriL (PRINIVIL,ZESTRIL) 40 MG tablet Take 1 tablet (40 mg total) by mouth once daily.    propranoloL (INDERAL) 10 MG tablet Take 1 tablet (10 mg total) by mouth 3 (three) times daily.   -Resume home BP medications  -Monitor and trend vital signs q4hr  -Will utilize p.r.n. blood pressure medication only if patient's blood pressure greater than  180/110 and she develops symptoms such as worsening chest pain or shortness of breath.    Tobacco abuse  -Assistance with smoking cessation was offered, including:  [x]  Medications  [x]  Counseling  []  Printed Information on Smoking Cessation  []  Referral to a Smoking Cessation Program  -Patient was counseled regarding smoking for 3-10 minutes.      VTE Risk Mitigation (From admission, onward)         Ordered     heparin (porcine) injection 5,000 Units  Every 8 hours         11/04/22 2359     IP VTE HIGH RISK PATIENT  Once         11/04/22 2359     Place sequential compression device  Until discontinued         11/04/22 2359                   Gladys Bal DNP, [facility]  Hospitalist   Department of Hospital Medicine   [facility]   Office #: [phone]

## 2022-11-05 NOTE — NURSING
MET complete patient remains stable at this time aroused to loud verbal and physical stimulation. Vital signs remains stable patient remains on oxygen at 1 liter at this time. Will continue to monitor. Patient remains visible in room. Bed in low position call bell light in reach.

## 2022-11-06 LAB
ANION GAP SERPL CALC-SCNC: 14 MMOL/L (ref 8–16)
BUN SERPL-MCNC: 27 MG/DL (ref 6–20)
CALCIUM SERPL-MCNC: 8.6 MG/DL (ref 8.7–10.5)
CHLORIDE SERPL-SCNC: 99 MMOL/L (ref 95–110)
CO2 SERPL-SCNC: 28 MMOL/L (ref 23–29)
CREAT SERPL-MCNC: 1.3 MG/DL (ref 0.5–1.4)
EST. GFR  (NO RACE VARIABLE): 50 ML/MIN/1.73 M^2
GLUCOSE SERPL-MCNC: 78 MG/DL (ref 70–110)
MAGNESIUM SERPL-MCNC: 1.7 MG/DL (ref 1.6–2.6)
POTASSIUM SERPL-SCNC: 3.6 MMOL/L (ref 3.5–5.1)
SODIUM SERPL-SCNC: 141 MMOL/L (ref 136–145)
TROPONIN I SERPL DL<=0.01 NG/ML-MCNC: 0.05 NG/ML (ref 0–0.03)
TROPONIN I SERPL DL<=0.01 NG/ML-MCNC: 0.06 NG/ML (ref 0–0.03)

## 2022-11-06 PROCEDURE — 96372 THER/PROPH/DIAG INJ SC/IM: CPT | Performed by: NURSE PRACTITIONER

## 2022-11-06 PROCEDURE — 63600175 PHARM REV CODE 636 W HCPCS: Performed by: NURSE PRACTITIONER

## 2022-11-06 PROCEDURE — A4216 STERILE WATER/SALINE, 10 ML: HCPCS | Performed by: NURSE PRACTITIONER

## 2022-11-06 PROCEDURE — 25000003 PHARM REV CODE 250: Performed by: NURSE PRACTITIONER

## 2022-11-06 PROCEDURE — 80048 BASIC METABOLIC PNL TOTAL CA: CPT | Performed by: NURSE PRACTITIONER

## 2022-11-06 PROCEDURE — 96376 TX/PRO/DX INJ SAME DRUG ADON: CPT

## 2022-11-06 PROCEDURE — 83735 ASSAY OF MAGNESIUM: CPT | Performed by: NURSE PRACTITIONER

## 2022-11-06 PROCEDURE — 25000003 PHARM REV CODE 250: Performed by: FAMILY MEDICINE

## 2022-11-06 PROCEDURE — 84484 ASSAY OF TROPONIN QUANT: CPT | Performed by: NURSE PRACTITIONER

## 2022-11-06 PROCEDURE — 27000221 HC OXYGEN, UP TO 24 HOURS

## 2022-11-06 PROCEDURE — 94761 N-INVAS EAR/PLS OXIMETRY MLT: CPT

## 2022-11-06 PROCEDURE — G0378 HOSPITAL OBSERVATION PER HR: HCPCS

## 2022-11-06 PROCEDURE — 99900035 HC TECH TIME PER 15 MIN (STAT)

## 2022-11-06 RX ORDER — FUROSEMIDE 20 MG/1
20 TABLET ORAL DAILY
Status: DISCONTINUED | OUTPATIENT
Start: 2022-11-07 | End: 2022-11-07 | Stop reason: HOSPADM

## 2022-11-06 RX ORDER — BENZTROPINE MESYLATE 0.5 MG/1
0.5 TABLET ORAL 2 TIMES DAILY
Status: DISCONTINUED | OUTPATIENT
Start: 2022-11-06 | End: 2022-11-07 | Stop reason: HOSPADM

## 2022-11-06 RX ADMIN — BENZTROPINE MESYLATE 0.5 MG: 0.5 TABLET ORAL at 09:11

## 2022-11-06 RX ADMIN — LISINOPRIL 40 MG: 20 TABLET ORAL at 09:11

## 2022-11-06 RX ADMIN — OXCARBAZEPINE 300 MG: 150 TABLET, FILM COATED ORAL at 09:11

## 2022-11-06 RX ADMIN — FUROSEMIDE 40 MG: 10 INJECTION, SOLUTION INTRAVENOUS at 05:11

## 2022-11-06 RX ADMIN — Medication 10 ML: at 02:11

## 2022-11-06 RX ADMIN — Medication 10 ML: at 09:11

## 2022-11-06 RX ADMIN — IBUPROFEN 400 MG: 400 TABLET ORAL at 10:11

## 2022-11-06 RX ADMIN — HEPARIN SODIUM 5000 UNITS: 5000 INJECTION, SOLUTION INTRAVENOUS; SUBCUTANEOUS at 02:11

## 2022-11-06 RX ADMIN — HEPARIN SODIUM 5000 UNITS: 5000 INJECTION, SOLUTION INTRAVENOUS; SUBCUTANEOUS at 09:11

## 2022-11-06 RX ADMIN — GABAPENTIN 300 MG: 300 CAPSULE ORAL at 09:11

## 2022-11-06 RX ADMIN — QUETIAPINE FUMARATE 50 MG: 25 TABLET ORAL at 09:11

## 2022-11-06 RX ADMIN — HEPARIN SODIUM 5000 UNITS: 5000 INJECTION, SOLUTION INTRAVENOUS; SUBCUTANEOUS at 05:11

## 2022-11-06 RX ADMIN — GABAPENTIN 300 MG: 300 CAPSULE ORAL at 02:11

## 2022-11-06 NOTE — PLAN OF CARE
Problem: Adult Inpatient Plan of Care  Goal: Plan of Care Review  Outcome: Ongoing, Progressing     VIRTUAL NURSE:  Cued into patient's room.  Permission received per patient to turn camera to view patient.  Patient is hard of hearing.  Introduced as VN for night shift that will be working with floor nurse and nursing assistant.  Educated patient on VN's role in patient care and  VIP model.  Plan of care reviewed with patient.  Education per flowsheet.   Informed patient that staff will round on them every 2 hours but to use call light for any other needs they may have; informed of fall risk and fall precautions.  Patient verbalized understanding.  Call light within reach; bed siderails up x3.  Opportunity given for questions and questions answered.  Admission assessment questions completed.  Patient denies complaints or any needs at this time. Instructed to call for assistance.  Will cont to monitor and intervene as needed.    Labs, notes, orders, and careplan reviewed.       11/05/22 2018   Patient Request   Patient Requested patient sitting up in bed watching TV and eating ice; no complaints or needs.  hard of hearing   Admission   Initial VN Admission Questions Complete   Communication Issues? Patient Hearing   Shift   Virtual Nurse - Rounding Complete   Pain Management Interventions pain management plan reviewed with patient/caregiver   Virtual Nurse - Patient Verbalized Approval Of Camera Use;VN Rounding   Type of Frequent Check   Type Patient Rounds   Safety/Activity   Patient Rounds placement of personal items at bedside;bed wheels locked;call light in patient/parent reach;clutter free environment maintained;visualized patient;bed in low position   Safety Promotion/Fall Prevention assistive device/personal item within reach;diversional activities provided;Fall Risk reviewed with patient/family;high risk medications identified;nonskid shoes/socks when out of bed;medications reviewed;room near unit  station;side rails raised x 3;supervised activity;instructed to call staff for mobility   Safety Precautions emergency equipment at bedside   Positioning   Body Position neutral body alignment;neutral head position   Head of Bed (HOB) Positioning HOB at 60-90 degrees   Pain/Comfort/Sleep   Preferred Pain Scale number (Numeric Rating Pain Scale)   Comfort/Acceptable Pain Level 0   Pain Rating (0-10): Rest 0   Sleep/Rest/Relaxation no problem identified;awake

## 2022-11-06 NOTE — PLAN OF CARE
Patient AOX4. Plan of care reviewed with patient but needs reinforcement. Medicated per MAR. Safety precautions maintained. Instructed to use call light for assistance. Call light in reach. Bed alarm set.

## 2022-11-06 NOTE — SUBJECTIVE & OBJECTIVE
Interval History: no reported event, on 1L oxygen this morning. Continue to wean oxygen as tolerated  Diuresing- de-escalate Lasix dosing and frequency    Review of Systems   Constitutional:  Negative for fatigue.   Respiratory:  Negative for cough, shortness of breath and wheezing.    Cardiovascular:  Positive for leg swelling. Negative for chest pain and palpitations.   Gastrointestinal:  Positive for abdominal distention. Negative for abdominal pain.   Genitourinary:  Negative for dysuria.   Skin:  Negative for color change.   Neurological:  Negative for tremors.   Psychiatric/Behavioral:  Negative for agitation.    Objective:     Vital Signs (Most Recent):  Temp: 97.8 °F (36.6 °C) (11/06/22 1223)  Pulse: 85 (11/06/22 1223)  Resp: 12 (11/06/22 1223)  BP: 96/67 (11/06/22 1223)  SpO2: (!) 93 % (11/06/22 1223)   Vital Signs (24h Range):  Temp:  [96.8 °F (36 °C)-97.9 °F (36.6 °C)] 97.8 °F (36.6 °C)  Pulse:  [] 85  Resp:  [12-20] 12  SpO2:  [93 %-98 %] 93 %  BP: ()/(57-87) 96/67     Weight: 58.7 kg (129 lb 6.6 oz)  Body mass index is 24.45 kg/m².    Intake/Output Summary (Last 24 hours) at 11/6/2022 1612  Last data filed at 11/6/2022 1200  Gross per 24 hour   Intake 1160 ml   Output 3750 ml   Net -2590 ml      Physical Exam  Vitals and nursing note reviewed.   Constitutional:       Appearance: She is not ill-appearing or diaphoretic.      Comments: Discheveled    HENT:      Head: Normocephalic and atraumatic.   Cardiovascular:      Rate and Rhythm: Normal rate and regular rhythm.      Pulses: Normal pulses.   Pulmonary:      Effort: Pulmonary effort is normal. No respiratory distress.      Breath sounds: Decreased air movement present.   Abdominal:      General: There is distension.      Palpations: Abdomen is soft.      Tenderness: There is no abdominal tenderness. There is no guarding.   Musculoskeletal:         General: Swelling (noted from her abdomen to her feet) present. Normal range of motion.       Cervical back: Normal range of motion.      Right lower leg: Edema present.      Left lower leg: Edema present.   Skin:     General: Skin is warm and dry.      Capillary Refill: Capillary refill takes 2 to 3 seconds.   Neurological:      General: No focal deficit present.      Mental Status: She is alert and oriented to person, place, and time. Mental status is at baseline.   Psychiatric:         Mood and Affect: Mood normal.         Behavior: Behavior normal.       Significant Labs: A1C:   Recent Labs   Lab 07/22/22  0718 11/05/22  0614   HGBA1C 6.1* 6.0*     ABGs: No results for input(s): PH, PCO2, HCO3, POCSATURATED, BE, TOTALHB, COHB, METHB, O2HB, POCFIO2, PO2 in the last 48 hours.  Blood Culture: No results for input(s): LABBLOO in the last 48 hours.  CBC:   Recent Labs   Lab 11/04/22 1716   WBC 7.71   HGB 12.2   HCT 40.5        CMP:   Recent Labs   Lab 11/04/22 1715 11/05/22  0614 11/06/22  0547   * 138 141   K 3.9 3.5 3.6   CL 93* 99 99   CO2 24 25 28   GLU 87 111* 78   BUN 23* 23* 27*   CREATININE 1.2 1.1 1.3   CALCIUM 9.2 8.8 8.6*   PROT 6.3  --   --    ALBUMIN 2.8*  --   --    BILITOT 2.0*  --   --    ALKPHOS 93  --   --    AST 48*  --   --    ALT 44  --   --    ANIONGAP 13 14 14     Lactic Acid: No results for input(s): LACTATE in the last 48 hours.  Lipase: No results for input(s): LIPASE in the last 48 hours.  Lipid Panel: No results for input(s): CHOL, HDL, LDLCALC, TRIG, CHOLHDL in the last 48 hours.  Troponin:   Recent Labs   Lab 11/04/22 1715 11/05/22  2358 11/06/22  0547   TROPONINI 0.039* 0.059* 0.054*     TSH:   Recent Labs   Lab 08/03/22  1730   TSH 0.780     Urine Culture: No results for input(s): LABURIN in the last 48 hours.  Urine Studies: No results for input(s): COLORU, APPEARANCEUA, PHUR, SPECGRAV, PROTEINUA, GLUCUA, KETONESU, BILIRUBINUA, OCCULTUA, NITRITE, UROBILINOGEN, LEUKOCYTESUR, RBCUA, WBCUA, BACTERIA, SQUAMEPITHEL, HYALINECASTS in the last 48 hours.    Invalid  input(s): ANA    Significant Imaging: I have reviewed all pertinent imaging results/findings within the past 24 hours.

## 2022-11-06 NOTE — PLAN OF CARE
Patient received on    1Lpm NC with SpO2    96%. Pt with no apparent distress noted. Will continue to monitor.

## 2022-11-06 NOTE — PLAN OF CARE
Pts safety maintained, bed alarm on, call bell in reach, room near nursing station. Meds given per MAR. No c/o pain, N/V, SOB, distress during night. Vitals stable.

## 2022-11-06 NOTE — ASSESSMENT & PLAN NOTE
Chronic, Latest blood pressure and vitals reviewed-   Temp:  [96.8 °F (36 °C)-97.9 °F (36.6 °C)]   Pulse:  []   Resp:  [12-20]   BP: ()/(57-87)   SpO2:  [93 %-98 %] .   Home meds for hypertension were reviewed and noted below.   Hypertension Medications             furosemide (LASIX) 20 MG tablet Take 1 tablet (20 mg total) by mouth once daily.    lisinopriL (PRINIVIL,ZESTRIL) 40 MG tablet Take 1 tablet (40 mg total) by mouth once daily.    propranoloL (INDERAL) 10 MG tablet Take 1 tablet (10 mg total) by mouth 3 (three) times daily.   -Resume home BP medications  -Monitor and trend vital signs q4hr  -Will utilize p.r.n. blood pressure medication only if patient's blood pressure greater than  180/110 and she develops symptoms such as worsening chest pain or shortness of breath.

## 2022-11-06 NOTE — PLAN OF CARE
VN note: Patient chart, labs, and vitals reviewed. VN to continue to be available as needed.     Problem: Adult Inpatient Plan of Care  Goal: Plan of Care Review  Outcome: Ongoing, Progressing  Goal: Patient-Specific Goal (Individualized)  Outcome: Ongoing, Progressing  Goal: Absence of Hospital-Acquired Illness or Injury  Outcome: Ongoing, Progressing  Goal: Optimal Comfort and Wellbeing  Outcome: Ongoing, Progressing  Goal: Readiness for Transition of Care  Outcome: Ongoing, Progressing     Problem: Adjustment to Illness (Sepsis/Septic Shock)  Goal: Optimal Coping  Outcome: Ongoing, Progressing     Problem: Bleeding (Sepsis/Septic Shock)  Goal: Absence of Bleeding  Outcome: Ongoing, Progressing     Problem: Glycemic Control Impaired (Sepsis/Septic Shock)  Goal: Blood Glucose Level Within Desired Range  Outcome: Ongoing, Progressing     Problem: Infection Progression (Sepsis/Septic Shock)  Goal: Absence of Infection Signs and Symptoms  Outcome: Ongoing, Progressing     Problem: Nutrition Impaired (Sepsis/Septic Shock)  Goal: Optimal Nutrition Intake  Outcome: Ongoing, Progressing     Problem: Fluid and Electrolyte Imbalance (Acute Kidney Injury/Impairment)  Goal: Fluid and Electrolyte Balance  Outcome: Ongoing, Progressing     Problem: Oral Intake Inadequate (Acute Kidney Injury/Impairment)  Goal: Optimal Nutrition Intake  Outcome: Ongoing, Progressing     Problem: Renal Function Impairment (Acute Kidney Injury/Impairment)  Goal: Effective Renal Function  Outcome: Ongoing, Progressing     Problem: Fluid Imbalance (Pneumonia)  Goal: Fluid Balance  Outcome: Ongoing, Progressing     Problem: Infection (Pneumonia)  Goal: Resolution of Infection Signs and Symptoms  Outcome: Ongoing, Progressing     Problem: Respiratory Compromise (Pneumonia)  Goal: Effective Oxygenation and Ventilation  Outcome: Ongoing, Progressing     Problem: Skin Injury Risk Increased  Goal: Skin Health and Integrity  Outcome: Ongoing, Progressing      Problem: Fall Injury Risk  Goal: Absence of Fall and Fall-Related Injury  Outcome: Ongoing, Progressing

## 2022-11-06 NOTE — PROGRESS NOTES
[facility] Medicine  Progress Note    Patient Name: Stephanie T Barthelemy  MRN: [id number]  Patient Class: OP- Observation   Admission Date: 11/4/2022  Length of Stay: 0 days  Attending Physician: Nelly Pandey*  Primary Care Provider: No primary care provider on file.        Subjective:     Principal Problem:Acute on chronic combined systolic and diastolic congestive heart failure        HPI:  Stephanie T Barthelemy is a 51-year-old female who has a past medical history of attention deficit hyperactivity disorder, anemia, anxiety, bipolar disorder, history of hepatitis C, history of psychiatric hospitalization, hypertension, opioid overdose, psychosis, seizure disorder, sleep difficulties, substance abuse, therapy, and vasculitis. She presented to the ED for evaluation of bilateral leg swelling and shortness of breath on exertion that started 11/3/22. She reports taking lasix for heart failure, however she reports running out of her medication as of 11/2/22 since then she cannot bend over to pick things up without getting short of breath and also her bilateral lower extremities have been getting more swollen also associated with abdominal distention. ED workup revealed dyspnea on exertion, elevated troponin (improved from priors), elevated BNP (improved from priors). EKG with ST, no STEMI. CXR: Small right pleural effusion. She was given 80 mg lasix. Admitted to [facility] for further management and diuresing.       Overview/Hospital Course:  No notes on file    Interval History: no reported event, on 1L oxygen this morning. Continue to wean oxygen as tolerated  Diuresing- de-escalate Lasix dosing and frequency    Review of Systems   Constitutional:  Negative for fatigue.   Respiratory:  Negative for cough, shortness of breath and wheezing.    Cardiovascular:  Positive for leg swelling. Negative for chest pain and palpitations.   Gastrointestinal:  Positive for abdominal  distention. Negative for abdominal pain.   Genitourinary:  Negative for dysuria.   Skin:  Negative for color change.   Neurological:  Negative for tremors.   Psychiatric/Behavioral:  Negative for agitation.    Objective:     Vital Signs (Most Recent):  Temp: 97.8 °F (36.6 °C) (11/06/22 1223)  Pulse: 85 (11/06/22 1223)  Resp: 12 (11/06/22 1223)  BP: 96/67 (11/06/22 1223)  SpO2: (!) 93 % (11/06/22 1223)   Vital Signs (24h Range):  Temp:  [96.8 °F (36 °C)-97.9 °F (36.6 °C)] 97.8 °F (36.6 °C)  Pulse:  [] 85  Resp:  [12-20] 12  SpO2:  [93 %-98 %] 93 %  BP: ()/(57-87) 96/67     Weight: 58.7 kg (129 lb 6.6 oz)  Body mass index is 24.45 kg/m².    Intake/Output Summary (Last 24 hours) at 11/6/2022 1612  Last data filed at 11/6/2022 1200  Gross per 24 hour   Intake 1160 ml   Output 3750 ml   Net -2590 ml      Physical Exam  Vitals and nursing note reviewed.   Constitutional:       Appearance: She is not ill-appearing or diaphoretic.      Comments: Discheveled    HENT:      Head: Normocephalic and atraumatic.   Cardiovascular:      Rate and Rhythm: Normal rate and regular rhythm.      Pulses: Normal pulses.   Pulmonary:      Effort: Pulmonary effort is normal. No respiratory distress.      Breath sounds: Decreased air movement present.   Abdominal:      General: There is distension.      Palpations: Abdomen is soft.      Tenderness: There is no abdominal tenderness. There is no guarding.   Musculoskeletal:         General: Swelling (noted from her abdomen to her feet) present. Normal range of motion.      Cervical back: Normal range of motion.      Right lower leg: Edema present.      Left lower leg: Edema present.   Skin:     General: Skin is warm and dry.      Capillary Refill: Capillary refill takes 2 to 3 seconds.   Neurological:      General: No focal deficit present.      Mental Status: She is alert and oriented to person, place, and time. Mental status is at baseline.   Psychiatric:         Mood and Affect:  Mood normal.         Behavior: Behavior normal.       Significant Labs: A1C:   Recent Labs   Lab 07/22/22  0718 11/05/22  0614   HGBA1C 6.1* 6.0*     ABGs: No results for input(s): PH, PCO2, HCO3, POCSATURATED, BE, TOTALHB, COHB, METHB, O2HB, POCFIO2, PO2 in the last 48 hours.  Blood Culture: No results for input(s): LABBLOO in the last 48 hours.  CBC:   Recent Labs   Lab 11/04/22 1716   WBC 7.71   HGB 12.2   HCT 40.5        CMP:   Recent Labs   Lab 11/04/22  1715 11/05/22  0614 11/06/22  0547   * 138 141   K 3.9 3.5 3.6   CL 93* 99 99   CO2 24 25 28   GLU 87 111* 78   BUN 23* 23* 27*   CREATININE 1.2 1.1 1.3   CALCIUM 9.2 8.8 8.6*   PROT 6.3  --   --    ALBUMIN 2.8*  --   --    BILITOT 2.0*  --   --    ALKPHOS 93  --   --    AST 48*  --   --    ALT 44  --   --    ANIONGAP 13 14 14     Lactic Acid: No results for input(s): LACTATE in the last 48 hours.  Lipase: No results for input(s): LIPASE in the last 48 hours.  Lipid Panel: No results for input(s): CHOL, HDL, LDLCALC, TRIG, CHOLHDL in the last 48 hours.  Troponin:   Recent Labs   Lab 11/04/22  1715 11/05/22  2358 11/06/22  0547   TROPONINI 0.039* 0.059* 0.054*     TSH:   Recent Labs   Lab 08/03/22  1730   TSH 0.780     Urine Culture: No results for input(s): LABURIN in the last 48 hours.  Urine Studies: No results for input(s): COLORU, APPEARANCEUA, PHUR, SPECGRAV, PROTEINUA, GLUCUA, KETONESU, BILIRUBINUA, OCCULTUA, NITRITE, UROBILINOGEN, LEUKOCYTESUR, RBCUA, WBCUA, BACTERIA, SQUAMEPITHEL, HYALINECASTS in the last 48 hours.    Invalid input(s): ANA    Significant Imaging: I have reviewed all pertinent imaging results/findings within the past 24 hours.      Assessment/Plan:      * Acute on chronic combined systolic and diastolic congestive heart failure  Patient presents with shortness of breath and VARGAS and peripheral edema on exam  Last ECHO results:   Results for orders placed during the hospital encounter of 07/21/22  Echo  Interpretation  Summary  · The left ventricle is mildly enlarged with concentric hypertrophy and severely decreased systolic function.  · The estimated ejection fraction is 20%.  · There is severe left ventricular global hypokinesis.  · Grade III left ventricular diastolic dysfunction.  · Mild right ventricular enlargement with mildly reduced right ventricular systolic function.  · Severe left atrial enlargement.  · Moderate aortic regurgitation.  · Severe mitral regurgitation.  · Mild tricuspid regurgitation.  · Moderate pulmonic regurgitation.  · Intermediate central venous pressure (8 mmHg).  · The estimated PA systolic pressure is 33 mmHg.  · Trivial pericardial effusion.  -Consistent clinical presentation; Last BNP reviewed- and noted below   Recent Labs   Lab 11/04/22  1716   BNP 2,992*   -CHF pathway initiated  -Troponin 0.039; likely demand; will continue to monitor and trend  -CXR revealed small right pleural effusion  -Initiated lasix 80 mg in ED, will continue Lasix IV at 40 BID (suspect non-compliance)  -Continue home BB, ACEi/ARB  -Daily weights  -Strict I/Os  -Monitor on telemetry  -Monitor and trend BMP, Mg, and renal function; keep K >4, Mg >2  -Sodium restriction (<2g/d), fluid restriction (<2L)   -2D echo to assess cardiac function and valvular dysfunction.   -Monitor for signs of fluid overload: RR>30, O2 sat<92%, weight gain of >3 lbs in 24 hours, or urinary output <160ml/8hr    Bipolar 1 disorder, depressed  -Patient AAOx4, no frederic or depression noted or reported currently  -Resume home meds    Anemia  -Stable  -Monitor    Essential hypertension  Chronic, Latest blood pressure and vitals reviewed-   Temp:  [96.8 °F (36 °C)-97.9 °F (36.6 °C)]   Pulse:  []   Resp:  [12-20]   BP: ()/(57-87)   SpO2:  [93 %-98 %] .   Home meds for hypertension were reviewed and noted below.   Hypertension Medications             furosemide (LASIX) 20 MG tablet Take 1 tablet (20 mg total) by mouth once daily.     lisinopriL (PRINIVIL,ZESTRIL) 40 MG tablet Take 1 tablet (40 mg total) by mouth once daily.    propranoloL (INDERAL) 10 MG tablet Take 1 tablet (10 mg total) by mouth 3 (three) times daily.   -Resume home BP medications  -Monitor and trend vital signs q4hr  -Will utilize p.r.n. blood pressure medication only if patient's blood pressure greater than  180/110 and she develops symptoms such as worsening chest pain or shortness of breath.    Tobacco abuse  -Assistance with smoking cessation was offered, including:  [x]  Medications  [x]  Counseling  []  Printed Information on Smoking Cessation  []  Referral to a Smoking Cessation Program  -Patient was counseled regarding smoking for 3-10 minutes.      VTE Risk Mitigation (From admission, onward)         Ordered     heparin (porcine) injection 5,000 Units  Every 8 hours         11/04/22 2359     IP VTE HIGH RISK PATIENT  Once         11/04/22 2359     Place sequential compression device  Until discontinued         11/04/22 2359                Discharge Planning   AYSHA:      Code Status: Full Code   Is the patient medically ready for discharge?:     Reason for patient still in hospital (select all that apply): Patient trending condition                     Nelly Pandey MD  Department of Hospital Medicine   [place] [facility]

## 2022-11-07 VITALS
HEART RATE: 104 BPM | OXYGEN SATURATION: 99 % | TEMPERATURE: 98 F | SYSTOLIC BLOOD PRESSURE: 132 MMHG | DIASTOLIC BLOOD PRESSURE: 90 MMHG | HEIGHT: 61 IN | RESPIRATION RATE: 19 BRPM | BODY MASS INDEX: 25.56 KG/M2 | WEIGHT: 135.38 LBS

## 2022-11-07 LAB
ANION GAP SERPL CALC-SCNC: 8 MMOL/L (ref 8–16)
AORTIC ROOT ANNULUS: 2.6 CM
AV INDEX (PROSTH): 0.53
AV MEAN GRADIENT: 8 MMHG
AV PEAK GRADIENT: 12 MMHG
AV VALVE AREA: 1.29 CM2
AV VELOCITY RATIO: 0.54
BSA FOR ECHO PROCEDURE: 1.61 M2
BUN SERPL-MCNC: 25 MG/DL (ref 6–20)
CALCIUM SERPL-MCNC: 8.5 MG/DL (ref 8.7–10.5)
CHLORIDE SERPL-SCNC: 98 MMOL/L (ref 95–110)
CO2 SERPL-SCNC: 30 MMOL/L (ref 23–29)
CREAT SERPL-MCNC: 1.2 MG/DL (ref 0.5–1.4)
CV ECHO LV RWT: 0.39 CM
DOP CALC AO PEAK VEL: 1.74 M/S
DOP CALC AO VTI: 26.4 CM
DOP CALC LVOT AREA: 2.5 CM2
DOP CALC LVOT DIAMETER: 1.77 CM
DOP CALC LVOT PEAK VEL: 0.94 M/S
DOP CALC LVOT STROKE VOLUME: 34.18 CM3
DOP CALCLVOT PEAK VEL VTI: 13.9 CM
ECHO LV POSTERIOR WALL: 1.03 CM (ref 0.6–1.1)
EJECTION FRACTION: 20 %
EST. GFR  (NO RACE VARIABLE): 55 ML/MIN/1.73 M^2
FRACTIONAL SHORTENING: 15 % (ref 28–44)
GLUCOSE SERPL-MCNC: 81 MG/DL (ref 70–110)
INTERVENTRICULAR SEPTUM: 1.17 CM (ref 0.6–1.1)
IVC DIAMETER: 1.05 CM
IVRT: 121.79 MSEC
LA MAJOR: 6.1 CM
LA MINOR: 5.7 CM
LA WIDTH: 5.5 CM
LEFT ATRIUM SIZE: 4.21 CM
LEFT ATRIUM VOLUME INDEX MOD: 43.9 ML/M2
LEFT ATRIUM VOLUME INDEX: 72.9 ML/M2
LEFT ATRIUM VOLUME MOD: 69.84 CM3
LEFT ATRIUM VOLUME: 115.99 CM3
LEFT INTERNAL DIMENSION IN SYSTOLE: 4.5 CM (ref 2.1–4)
LEFT VENTRICLE DIASTOLIC VOLUME INDEX: 85.11 ML/M2
LEFT VENTRICLE DIASTOLIC VOLUME: 135.32 ML
LEFT VENTRICLE MASS INDEX: 143 G/M2
LEFT VENTRICLE SYSTOLIC VOLUME INDEX: 67.2 ML/M2
LEFT VENTRICLE SYSTOLIC VOLUME: 106.84 ML
LEFT VENTRICULAR INTERNAL DIMENSION IN DIASTOLE: 5.3 CM (ref 3.5–6)
LEFT VENTRICULAR MASS: 227.73 G
LVOT MG: 2.24 MMHG
LVOT MV: 0.71 CM/S
MAGNESIUM SERPL-MCNC: 1.7 MG/DL (ref 1.6–2.6)
PISA MRMAX VEL: 4.64 M/S
PISA TR MAX VEL: 2.56 M/S
POTASSIUM SERPL-SCNC: 3.3 MMOL/L (ref 3.5–5.1)
PULM VEIN S/D RATIO: 0.68
PV PEAK D VEL: 0.63 M/S
PV PEAK S VEL: 0.43 M/S
RA MAJOR: 5.22 CM
RA PRESSURE: 3 MMHG
RA WIDTH: 4.1 CM
RIGHT VENTRICULAR END-DIASTOLIC DIMENSION: 2.61 CM
RIGHT VENTRICULAR LENGTH IN DIASTOLE (APICAL 4-CHAMBER VIEW): 5.7 CM
RV TISSUE DOPPLER FREE WALL SYSTOLIC VELOCITY 1 (APICAL 4 CHAMBER VIEW): 0.02 CM/S
SODIUM SERPL-SCNC: 136 MMOL/L (ref 136–145)
TR MAX PG: 26 MMHG
TRICUSPID ANNULAR PLANE SYSTOLIC EXCURSION: 1.8 CM
TV REST PULMONARY ARTERY PRESSURE: 29 MMHG

## 2022-11-07 PROCEDURE — 96372 THER/PROPH/DIAG INJ SC/IM: CPT | Performed by: NURSE PRACTITIONER

## 2022-11-07 PROCEDURE — G0378 HOSPITAL OBSERVATION PER HR: HCPCS

## 2022-11-07 PROCEDURE — 25000003 PHARM REV CODE 250: Performed by: FAMILY MEDICINE

## 2022-11-07 PROCEDURE — 25000003 PHARM REV CODE 250: Performed by: NURSE PRACTITIONER

## 2022-11-07 PROCEDURE — A4216 STERILE WATER/SALINE, 10 ML: HCPCS | Performed by: NURSE PRACTITIONER

## 2022-11-07 PROCEDURE — 94761 N-INVAS EAR/PLS OXIMETRY MLT: CPT

## 2022-11-07 PROCEDURE — 80048 BASIC METABOLIC PNL TOTAL CA: CPT | Performed by: NURSE PRACTITIONER

## 2022-11-07 PROCEDURE — 99900035 HC TECH TIME PER 15 MIN (STAT)

## 2022-11-07 PROCEDURE — 63600175 PHARM REV CODE 636 W HCPCS: Performed by: NURSE PRACTITIONER

## 2022-11-07 PROCEDURE — 83735 ASSAY OF MAGNESIUM: CPT | Performed by: NURSE PRACTITIONER

## 2022-11-07 RX ORDER — FUROSEMIDE 20 MG/1
20 TABLET ORAL 2 TIMES DAILY
Qty: 60 TABLET | Refills: 11 | Status: SHIPPED | OUTPATIENT
Start: 2022-11-07 | End: 2022-11-25 | Stop reason: SDUPTHER

## 2022-11-07 RX ORDER — IBUPROFEN 200 MG
1 TABLET ORAL DAILY
Qty: 28 PATCH | Refills: 0 | Status: ON HOLD | OUTPATIENT
Start: 2022-11-07 | End: 2022-12-14 | Stop reason: HOSPADM

## 2022-11-07 RX ORDER — QUETIAPINE FUMARATE 50 MG/1
50 TABLET, FILM COATED ORAL NIGHTLY
Qty: 30 TABLET | Refills: 11 | Status: ON HOLD | OUTPATIENT
Start: 2022-11-07 | End: 2022-12-14 | Stop reason: HOSPADM

## 2022-11-07 RX ADMIN — GABAPENTIN 300 MG: 300 CAPSULE ORAL at 08:11

## 2022-11-07 RX ADMIN — Medication 10 ML: at 02:11

## 2022-11-07 RX ADMIN — HEPARIN SODIUM 5000 UNITS: 5000 INJECTION, SOLUTION INTRAVENOUS; SUBCUTANEOUS at 06:11

## 2022-11-07 RX ADMIN — BENZTROPINE MESYLATE 0.5 MG: 0.5 TABLET ORAL at 08:11

## 2022-11-07 RX ADMIN — LISINOPRIL 40 MG: 20 TABLET ORAL at 08:11

## 2022-11-07 RX ADMIN — OXCARBAZEPINE 300 MG: 150 TABLET, FILM COATED ORAL at 08:11

## 2022-11-07 RX ADMIN — GABAPENTIN 300 MG: 300 CAPSULE ORAL at 02:11

## 2022-11-07 RX ADMIN — FUROSEMIDE 20 MG: 20 TABLET ORAL at 08:11

## 2022-11-07 RX ADMIN — HEPARIN SODIUM 5000 UNITS: 5000 INJECTION, SOLUTION INTRAVENOUS; SUBCUTANEOUS at 02:11

## 2022-11-07 NOTE — PLAN OF CARE
D/C recs noted. Pt to have follow up with PCC. SW added resources to AVS to include group home and services for the Hearing impaired. Pharmacist will go over home medications and reasons for medications. VN and bedside nurse to reiterate final discharge instructions.       At time of discharge pt will be transported home by sister    DME at discharge: none  Home Health: none     Pt to have follow up appointments added to AVS.         11/07/22 1629   Final Note   Assessment Type Final Discharge Note   Anticipated Discharge Disposition Home   What phone number can be called within the next 1-3 days to see how you are doing after discharge? [phone]   Hospital Resources/Appts/Education Provided Appointments scheduled and added to AVS   Post-Acute Status   Discharge Delays None known at this time     Future Appointments   Date Time Provider Department Center   11/23/2022  1:00 PM Ashlee Hill MD [facility] Case Management  [phone]

## 2022-11-07 NOTE — ASSESSMENT & PLAN NOTE
Chronic combined systolic and diastolic heart failure  Bilateral lower extremity edema    Patient presents with shortness of breath and VARGAS and peripheral edema on exam  Last ECHO results:   Results for orders placed during the hospital encounter of 07/21/22  Echo  Interpretation Summary  · The left ventricle is mildly enlarged with concentric hypertrophy and severely decreased systolic function.  · The estimated ejection fraction is 20%.  · There is severe left ventricular global hypokinesis.  · Grade III left ventricular diastolic dysfunction.  · Mild right ventricular enlargement with mildly reduced right ventricular systolic function.  · Severe left atrial enlargement.  · Moderate aortic regurgitation.  · Severe mitral regurgitation.  · Mild tricuspid regurgitation.  · Moderate pulmonic regurgitation.  · Intermediate central venous pressure (8 mmHg).  · The estimated PA systolic pressure is 33 mmHg.  · Trivial pericardial effusion.  -Consistent clinical presentation; Last BNP reviewed- and noted below   Recent Labs   Lab 11/04/22  1716   BNP 2,992*   -CHF pathway initiated  -Troponin 0.039; likely demand; will continue to monitor and trend  -CXR revealed small right pleural effusion  -Initiated lasix 80 mg in ED, will continue Lasix IV at 40 BID (suspect non-compliance)  -Continue home BB, ACEi/ARB  -Daily weights  -Strict I/Os  -Monitor on telemetry  -Monitor and trend BMP, Mg, and renal function; keep K >4, Mg >2  -Sodium restriction (<2g/d), fluid restriction (<2L)   -2D echo to assess cardiac function and valvular dysfunction.   -Monitor for signs of fluid overload: RR>30, O2 sat<92%, weight gain of >3 lbs in 24 hours, or urinary output <160ml/8hr

## 2022-11-07 NOTE — PROGRESS NOTES
Mother at the bedside, patient AAOx4, NAD noted, VSS.  Reviewed discharge instructions with patient and mother.  Both verbalized understanding.  IV and tele box removed. Safety maintained.

## 2022-11-07 NOTE — ASSESSMENT & PLAN NOTE
Chronic, Latest blood pressure and vitals reviewed-   Temp:  [97.3 °F (36.3 °C)-98 °F (36.7 °C)]   Pulse:  []   Resp:  [12-20]   BP: ()/(67-89)   SpO2:  [92 %-98 %] .   Home meds for hypertension were reviewed and noted below.   Hypertension Medications             furosemide (LASIX) 20 MG tablet Take 1 tablet (20 mg total) by mouth once daily.    lisinopriL (PRINIVIL,ZESTRIL) 40 MG tablet Take 1 tablet (40 mg total) by mouth once daily.    propranoloL (INDERAL) 10 MG tablet Take 1 tablet (10 mg total) by mouth 3 (three) times daily.   -Resume home BP medications  -Monitor and trend vital signs q4hr  -Will utilize p.r.n. blood pressure medication only if patient's blood pressure greater than  180/110 and she develops symptoms such as worsening chest pain or shortness of breath.

## 2022-11-07 NOTE — DISCHARGE SUMMARY
[facility] Medicine  Discharge Summary      Patient Name: Stephanie T Barthelemy  MRN: [id number]  GILBERT: [id number]  Patient Class: OP- Observation  Admission Date: 11/4/2022  Hospital Length of Stay: 0 days  Discharge Date and Time: 11/7/2022  5:57 PM  Attending Physician: Nelly Pandey*   Discharging Provider: Nelly Pandey MD  Primary Care Provider: Primary Doctor No    Primary Care Team: Networked reference to record PCT     HPI:   Stephanie T Barthelemy is a 51-year-old female who has a past medical history of attention deficit hyperactivity disorder, anemia, anxiety, bipolar disorder, history of hepatitis C, history of psychiatric hospitalization, hypertension, opioid overdose, psychosis, seizure disorder, sleep difficulties, substance abuse, therapy, and vasculitis. She presented to the ED for evaluation of bilateral leg swelling and shortness of breath on exertion that started 11/3/22. She reports taking lasix for heart failure, however she reports running out of her medication as of 11/2/22 since then she cannot bend over to pick things up without getting short of breath and also her bilateral lower extremities have been getting more swollen also associated with abdominal distention. ED workup revealed dyspnea on exertion, elevated troponin (improved from priors), elevated BNP (improved from priors). EKG with ST, no STEMI. CXR: Small right pleural effusion. She was given 80 mg lasix. Admitted to [facility] for further management and diuresing.       * No surgery found *      Hospital Course:   No notes on file     Goals of Care Treatment Preferences:  Code Status: Full Code      Consults:   Consults (From admission, onward)          Status Ordering Provider     Inpatient consult to Social Work/Case Management  Once        Provider:  (Not yet assigned)    Acknowledged RAMIRO SINGH     Inpatient consult to Registered Dietitian/Nutritionist  Once         Provider:  (Not yet assigned)    Completed RAMIRO SINGH            * Acute on chronic combined systolic and diastolic congestive heart failure  Chronic combined systolic and diastolic heart failure  Bilateral lower extremity edema    Patient presents with shortness of breath and VARGAS and peripheral edema on exam  Last ECHO results:   Results for orders placed during the hospital encounter of 07/21/22  Echo  Interpretation Summary  · The left ventricle is mildly enlarged with concentric hypertrophy and severely decreased systolic function.  · The estimated ejection fraction is 20%.  · There is severe left ventricular global hypokinesis.  · Grade III left ventricular diastolic dysfunction.  · Mild right ventricular enlargement with mildly reduced right ventricular systolic function.  · Severe left atrial enlargement.  · Moderate aortic regurgitation.  · Severe mitral regurgitation.  · Mild tricuspid regurgitation.  · Moderate pulmonic regurgitation.  · Intermediate central venous pressure (8 mmHg).  · The estimated PA systolic pressure is 33 mmHg.  · Trivial pericardial effusion.  -Consistent clinical presentation; Last BNP reviewed- and noted below   Recent Labs   Lab 11/04/22  1716   BNP 2,992*   -CHF pathway initiated  -Troponin 0.039; likely demand; will continue to monitor and trend  -CXR revealed small right pleural effusion  -Initiated lasix 80 mg in ED, will continue Lasix IV at 40 BID (suspect non-compliance)  -Continue home BB, ACEi/ARB  -Daily weights  -Strict I/Os  -Monitor on telemetry  -Monitor and trend BMP, Mg, and renal function; keep K >4, Mg >2  -Sodium restriction (<2g/d), fluid restriction (<2L)   -2D echo to assess cardiac function and valvular dysfunction.   -Monitor for signs of fluid overload: RR>30, O2 sat<92%, weight gain of >3 lbs in 24 hours, or urinary output <160ml/8hr    Bipolar 1 disorder, depressed  -Patient AAOx4, no frederic or depression noted or reported currently  -Resume  home meds    Anemia  -Stable  -Monitor    Essential hypertension  Chronic, Latest blood pressure and vitals reviewed-   Temp:  [97.3 °F (36.3 °C)-98 °F (36.7 °C)]   Pulse:  []   Resp:  [12-20]   BP: ()/(67-89)   SpO2:  [92 %-98 %] .   Home meds for hypertension were reviewed and noted below.   Hypertension Medications               furosemide (LASIX) 20 MG tablet Take 1 tablet (20 mg total) by mouth once daily.    lisinopriL (PRINIVIL,ZESTRIL) 40 MG tablet Take 1 tablet (40 mg total) by mouth once daily.    propranoloL (INDERAL) 10 MG tablet Take 1 tablet (10 mg total) by mouth 3 (three) times daily.   -Resume home BP medications  -Monitor and trend vital signs q4hr  -Will utilize p.r.n. blood pressure medication only if patient's blood pressure greater than  180/110 and she develops symptoms such as worsening chest pain or shortness of breath.    Tobacco abuse  -Assistance with smoking cessation was offered, including:  [x]  Medications  [x]  Counseling  []  Printed Information on Smoking Cessation  []  Referral to a Smoking Cessation Program  -Patient was counseled regarding smoking for 3-10 minutes.      Final Active Diagnoses:    Diagnosis Date Noted POA    PRINCIPAL PROBLEM:  Acute on chronic combined systolic and diastolic congestive heart failure [I50.43] 09/19/2022 Yes    Chronic combined systolic and diastolic heart failure [I50.42] 07/22/2022 Yes    HLD (hyperlipidemia) [E78.5] 10/16/2019 Yes    Bipolar 1 disorder, depressed [F31.9] 10/15/2019 Yes    Seizure disorder [G40.909] 04/03/2019 Yes    Anemia [D64.9]  Yes    Tobacco abuse [Z72.0] 09/24/2014 Yes    Essential hypertension [I10] 09/24/2014 Yes     Chronic    Bilateral lower extremity edema [R60.0] 09/24/2014 Yes      Problems Resolved During this Admission:       Discharged Condition: stable    Disposition: Home or Self Care    Follow Up:    Patient Instructions:      Activity as tolerated           Pending Diagnostic Studies:       None            Medications:  Reconciled Home Medications:      Medication List        CHANGE how you take these medications      furosemide 20 MG tablet  Commonly known as: LASIX  Take 1 tablet (20 mg total) by mouth 2 (two) times daily.  What changed: when to take this     QUEtiapine 50 MG tablet  Commonly known as: SEROQUEL  Take 1 tablet (50 mg total) by mouth every evening.  What changed:   medication strength  how much to take  Another medication with the same name was removed. Continue taking this medication, and follow the directions you see here.            CONTINUE taking these medications      albuterol 90 mcg/actuation inhaler  Commonly known as: PROVENTIL/VENTOLIN HFA  Inhale 2 puffs into the lungs every 6 (six) hours. Rescue     benztropine 0.5 MG tablet  Commonly known as: COGENTIN  Take 1 tablet (0.5 mg total) by mouth 2 (two) times daily.     famotidine 20 MG tablet  Commonly known as: PEPCID  Take 1 tablet (20 mg total) by mouth 2 (two) times daily.     FLUoxetine 20 MG capsule  Take 20 mg by mouth once daily.     gabapentin 300 MG capsule  Commonly known as: NEURONTIN  Take 1 capsule (300 mg total) by mouth 3 (three) times daily.     hydrOXYzine pamoate 50 MG Cap  Commonly known as: VISTARIL  Take 1 capsule (50 mg total) by mouth every 8 (eight) hours as needed (anxiety, insomnia).     lisinopriL 40 MG tablet  Commonly known as: PRINIVIL,ZESTRIL  Take 1 tablet (40 mg total) by mouth once daily.     nicotine 21 mg/24 hr  Commonly known as: NICODERM CQ  Place 1 patch onto the skin once daily.     OXcarbazepine 300 MG Tab  Commonly known as: TRILEPTAL  Take 1 tablet (300 mg total) by mouth 2 (two) times daily.     propranoloL 10 MG tablet  Commonly known as: INDERAL  Take 1 tablet (10 mg total) by mouth 3 (three) times daily.            STOP taking these medications      pulse oximeter device  Commonly known as: pulse oximeter              Indwelling Lines/Drains at time of discharge:    Lines/Drains/Airways       Drain  Duration             Female External Urinary Catheter 11/05/22 1900 1 day                    Time spent on the discharge of patient: 35 minutes         Nelly Pandey MD  Department of Hospital Medicine  [facility]   S/p 2 stents 2012   - continue home aspirin and statin  Cardiology Dr schumacher d/w -IV Lasix 40 mg daily S/p 2 stents 2012   - continue home aspirin and statin  Cardiology Dr schumacher/ Dr. Valles d/w -IV Lasix 40 mg daily STOPPED for worsening renal fxn S/p 2 stents 2012   - continue home aspirin and statin  Cardiology Dr Warren/ Dr. Valles d/w -IV Lasix 40 mg daily STOPPED for worsening renal fxn

## 2022-11-07 NOTE — SUBJECTIVE & OBJECTIVE
Interval History: awake and alert,   On RA, possible discharge on PO lasix.     Review of Systems   Constitutional:  Negative for fatigue.   Respiratory:  Negative for cough, shortness of breath and wheezing.    Cardiovascular:  Negative for chest pain, palpitations and leg swelling.   Gastrointestinal:  Negative for abdominal distention and abdominal pain.   Genitourinary:  Negative for dysuria.   Skin:  Negative for color change.   Neurological:  Negative for tremors.   Psychiatric/Behavioral:  Negative for agitation.    Objective:     Vital Signs (Most Recent):  Temp: 98 °F (36.7 °C) (11/07/22 0813)  Pulse: 96 (11/07/22 0813)  Resp: 20 (11/07/22 0813)  BP: 115/73 (11/07/22 0813)  SpO2: (!) 92 % (11/07/22 0813)   Vital Signs (24h Range):  Temp:  [97.3 °F (36.3 °C)-98 °F (36.7 °C)] 98 °F (36.7 °C)  Pulse:  [] 96  Resp:  [12-20] 20  SpO2:  [92 %-98 %] 92 %  BP: ()/(67-89) 115/73     Weight: 61.4 kg (135 lb 5.8 oz)  Body mass index is 25.58 kg/m².    Intake/Output Summary (Last 24 hours) at 11/7/2022 1019  Last data filed at 11/7/2022 0400  Gross per 24 hour   Intake 480 ml   Output 3350 ml   Net -2870 ml        Physical Exam  Vitals and nursing note reviewed.   Constitutional:       Appearance: She is not ill-appearing or diaphoretic.   HENT:      Head: Normocephalic and atraumatic.   Cardiovascular:      Rate and Rhythm: Normal rate and regular rhythm.      Pulses: Normal pulses.   Pulmonary:      Effort: Pulmonary effort is normal. No respiratory distress.      Breath sounds: Decreased air movement present.   Abdominal:      General: There is distension.      Palpations: Abdomen is soft.      Tenderness: There is no abdominal tenderness. There is no guarding.   Musculoskeletal:         General: No swelling. Normal range of motion.      Cervical back: Normal range of motion.      Right lower leg: Edema present.      Left lower leg: Edema present.   Skin:     General: Skin is warm and dry.      Capillary  Refill: Capillary refill takes 2 to 3 seconds.   Neurological:      General: No focal deficit present.      Mental Status: She is alert and oriented to person, place, and time. Mental status is at baseline.   Psychiatric:         Mood and Affect: Mood normal.         Behavior: Behavior normal.       Significant Labs: A1C:   Recent Labs   Lab 07/22/22  0718 11/05/22  0614   HGBA1C 6.1* 6.0*       ABGs: No results for input(s): PH, PCO2, HCO3, POCSATURATED, BE, TOTALHB, COHB, METHB, O2HB, POCFIO2, PO2 in the last 48 hours.  Blood Culture: No results for input(s): LABBLOO in the last 48 hours.  CBC:   No results for input(s): WBC, HGB, HCT, PLT in the last 48 hours.    CMP:   Recent Labs   Lab 11/06/22  0547 11/07/22  0645    136   K 3.6 3.3*   CL 99 98   CO2 28 30*   GLU 78 81   BUN 27* 25*   CREATININE 1.3 1.2   CALCIUM 8.6* 8.5*   ANIONGAP 14 8       Lactic Acid: No results for input(s): LACTATE in the last 48 hours.  Lipase: No results for input(s): LIPASE in the last 48 hours.  Lipid Panel: No results for input(s): CHOL, HDL, LDLCALC, TRIG, CHOLHDL in the last 48 hours.  Troponin:   Recent Labs   Lab 11/05/22  2358 11/06/22  0547   TROPONINI 0.059* 0.054*       TSH:   Recent Labs   Lab 08/03/22  1730   TSH 0.780       Urine Culture: No results for input(s): LABURIN in the last 48 hours.  Urine Studies: No results for input(s): COLORU, APPEARANCEUA, PHUR, SPECGRAV, PROTEINUA, GLUCUA, KETONESU, BILIRUBINUA, OCCULTUA, NITRITE, UROBILINOGEN, LEUKOCYTESUR, RBCUA, WBCUA, BACTERIA, SQUAMEPITHEL, HYALINECASTS in the last 48 hours.    Invalid input(s): WRIGHTSUR    Significant Imaging: I have reviewed all pertinent imaging results/findings within the past 24 hours.

## 2022-11-07 NOTE — PLAN OF CARE
NGUYỄN met with pt via [employer] to complete assessment. Pt is AxO 3 and able to verbally answer assessment questions, however, pt self reports being hard of hearing and SW could not finish assessment directly.   NGUYỄN attempted to call pt room and cell phone as well.  NGUYỄN completed assessment with pt emergency contact/Sister Betty. At time of discharge pt to be transported home by sister.  Betty reports pt to be independent of ADL's and no DME in use. Pt does not drive but has family support to get to and from appointments. Pt is reported to be looking for a new doctor after Dr. Harrington retired and pt new doctor is reported not a good fit. Pt to have PCC follow up. Pt lives at home with her sister in their dads home but Betty reports their father giving family a possible deadline to move. NGUYỄN has added resources to AVS to assist with community services. NGUYỄN has printed out Resources/service list for Hearing Impaired individuals. NGUYỄN has also printed out group home resources. NGUYỄN updated whiteboard with NGUYỄNCM name and contact information. NGUYỄN confirmed pt understanding of Observation unit and expected discharge plan. NGUYỄN will continue to follow pt throughout care and assist with any discharge needs.         11/07/22 1447   Discharge Planning   Assessment Type Discharge Planning Brief Assessment   Resource/Environmental Concerns none   Support Systems Parent;Family members   Equipment Currently Used at Home none   Current Living Arrangements home/apartment/condo   Patient/Family Anticipates Transition to home with family   Patient/Family Anticipated Services at Transition none   DME Needed Upon Discharge  none   Discharge Plan A Home with family     Future Appointments   Date Time Provider Department Center   11/23/2022  1:00 PM Ashlee Hill MD [facility]  [phone]

## 2022-11-07 NOTE — PLAN OF CARE
Pts safety maintained, bed alarm on, call bell in reach, room near nursing station. Meds given per MAR. No c/o pain, N/V, SOB, distress during night. Frequently asking for water, couple cups of ice given. Vitals stable.

## 2022-11-25 ENCOUNTER — HOSPITAL ENCOUNTER (OUTPATIENT)
Facility: HOSPITAL | Age: 52
Discharge: HOME | End: 2022-11-30
Attending: EMERGENCY MEDICINE | Admitting: EMERGENCY MEDICINE
Payer: MEDICARE

## 2022-11-25 ENCOUNTER — HOSPITAL ENCOUNTER (EMERGENCY)
Facility: HOSPITAL | Age: 52
Discharge: HOME | End: 2022-11-25
Attending: EMERGENCY MEDICINE
Payer: MEDICARE

## 2022-11-25 VITALS
WEIGHT: 120 LBS | DIASTOLIC BLOOD PRESSURE: 89 MMHG | HEIGHT: 60 IN | RESPIRATION RATE: 18 BRPM | HEART RATE: 102 BPM | BODY MASS INDEX: 23.56 KG/M2 | OXYGEN SATURATION: 99 % | TEMPERATURE: 98 F | SYSTOLIC BLOOD PRESSURE: 138 MMHG

## 2022-11-25 DIAGNOSIS — I50.9 CHF EXACERBATION: ICD-10-CM

## 2022-11-25 DIAGNOSIS — I50.9 ACUTE ON CHRONIC CONGESTIVE HEART FAILURE, UNSPECIFIED HEART FAILURE TYPE: Primary | ICD-10-CM

## 2022-11-25 DIAGNOSIS — F31.9 BIPOLAR 1 DISORDER, DEPRESSED: ICD-10-CM

## 2022-11-25 DIAGNOSIS — R06.02 SHORTNESS OF BREATH: ICD-10-CM

## 2022-11-25 DIAGNOSIS — I50.9 ACUTE CHF: ICD-10-CM

## 2022-11-25 DIAGNOSIS — I50.42 CHRONIC COMBINED SYSTOLIC AND DIASTOLIC HEART FAILURE: ICD-10-CM

## 2022-11-25 DIAGNOSIS — R94.31 ABNORMAL EKG: ICD-10-CM

## 2022-11-25 DIAGNOSIS — I50.43 ACUTE ON CHRONIC COMBINED SYSTOLIC AND DIASTOLIC CONGESTIVE HEART FAILURE: ICD-10-CM

## 2022-11-25 DIAGNOSIS — I42.9 CARDIOMYOPATHY, UNSPECIFIED TYPE: ICD-10-CM

## 2022-11-25 DIAGNOSIS — R74.01 TRANSAMINITIS: ICD-10-CM

## 2022-11-25 DIAGNOSIS — I50.9 HEART FAILURE: ICD-10-CM

## 2022-11-25 LAB
ALBUMIN SERPL BCP-MCNC: 3.1 G/DL (ref 3.5–5.2)
ALBUMIN SERPL BCP-MCNC: 3.1 G/DL (ref 3.5–5.2)
ALP SERPL-CCNC: 101 U/L (ref 55–135)
ALP SERPL-CCNC: 102 U/L (ref 55–135)
ALT SERPL W/O P-5'-P-CCNC: 391 U/L (ref 10–44)
ALT SERPL W/O P-5'-P-CCNC: 406 U/L (ref 10–44)
ANION GAP SERPL CALC-SCNC: 14 MMOL/L (ref 8–16)
ANION GAP SERPL CALC-SCNC: 15 MMOL/L (ref 8–16)
AST SERPL-CCNC: 438 U/L (ref 10–40)
AST SERPL-CCNC: 466 U/L (ref 10–40)
BASOPHILS # BLD AUTO: 0.05 K/UL (ref 0–0.2)
BASOPHILS # BLD AUTO: 0.05 K/UL (ref 0–0.2)
BASOPHILS NFR BLD: 0.6 % (ref 0–1.9)
BASOPHILS NFR BLD: 0.7 % (ref 0–1.9)
BILIRUB SERPL-MCNC: 2.4 MG/DL (ref 0.1–1)
BILIRUB SERPL-MCNC: 2.4 MG/DL (ref 0.1–1)
BNP SERPL-MCNC: 2927 PG/ML (ref 0–99)
BNP SERPL-MCNC: 3871 PG/ML (ref 0–99)
BUN SERPL-MCNC: 29 MG/DL (ref 6–20)
BUN SERPL-MCNC: 31 MG/DL (ref 6–20)
CALCIUM SERPL-MCNC: 9.6 MG/DL (ref 8.7–10.5)
CALCIUM SERPL-MCNC: 9.6 MG/DL (ref 8.7–10.5)
CHLORIDE SERPL-SCNC: 98 MMOL/L (ref 95–110)
CHLORIDE SERPL-SCNC: 99 MMOL/L (ref 95–110)
CO2 SERPL-SCNC: 21 MMOL/L (ref 23–29)
CO2 SERPL-SCNC: 23 MMOL/L (ref 23–29)
CREAT SERPL-MCNC: 1.1 MG/DL (ref 0.5–1.4)
CREAT SERPL-MCNC: 1.2 MG/DL (ref 0.5–1.4)
DIFFERENTIAL METHOD: ABNORMAL
DIFFERENTIAL METHOD: ABNORMAL
EOSINOPHIL # BLD AUTO: 0.1 K/UL (ref 0–0.5)
EOSINOPHIL # BLD AUTO: 0.1 K/UL (ref 0–0.5)
EOSINOPHIL NFR BLD: 1.3 % (ref 0–8)
EOSINOPHIL NFR BLD: 1.6 % (ref 0–8)
ERYTHROCYTE [DISTWIDTH] IN BLOOD BY AUTOMATED COUNT: 20.5 % (ref 11.5–14.5)
ERYTHROCYTE [DISTWIDTH] IN BLOOD BY AUTOMATED COUNT: 20.6 % (ref 11.5–14.5)
EST. GFR  (NO RACE VARIABLE): 55 ML/MIN/1.73 M^2
EST. GFR  (NO RACE VARIABLE): >60 ML/MIN/1.73 M^2
ESTIMATED AVG GLUCOSE: 120 MG/DL (ref 68–131)
GLUCOSE SERPL-MCNC: 100 MG/DL (ref 70–110)
GLUCOSE SERPL-MCNC: 121 MG/DL (ref 70–110)
HBA1C MFR BLD: 5.8 % (ref 4–5.6)
HCT VFR BLD AUTO: 36.1 % (ref 37–48.5)
HCT VFR BLD AUTO: 39.6 % (ref 37–48.5)
HGB BLD-MCNC: 10.7 G/DL (ref 12–16)
HGB BLD-MCNC: 11.6 G/DL (ref 12–16)
IMM GRANULOCYTES # BLD AUTO: 0.02 K/UL (ref 0–0.04)
IMM GRANULOCYTES # BLD AUTO: 0.04 K/UL (ref 0–0.04)
IMM GRANULOCYTES NFR BLD AUTO: 0.3 % (ref 0–0.5)
IMM GRANULOCYTES NFR BLD AUTO: 0.6 % (ref 0–0.5)
LYMPHOCYTES # BLD AUTO: 1.8 K/UL (ref 1–4.8)
LYMPHOCYTES # BLD AUTO: 2.1 K/UL (ref 1–4.8)
LYMPHOCYTES NFR BLD: 26.8 % (ref 18–48)
LYMPHOCYTES NFR BLD: 27.2 % (ref 18–48)
MAGNESIUM SERPL-MCNC: 1.7 MG/DL (ref 1.6–2.6)
MAGNESIUM SERPL-MCNC: 1.8 MG/DL (ref 1.6–2.6)
MCH RBC QN AUTO: 22.3 PG (ref 27–31)
MCH RBC QN AUTO: 22.5 PG (ref 27–31)
MCHC RBC AUTO-ENTMCNC: 29.3 G/DL (ref 32–36)
MCHC RBC AUTO-ENTMCNC: 29.6 G/DL (ref 32–36)
MCV RBC AUTO: 76 FL (ref 82–98)
MCV RBC AUTO: 76 FL (ref 82–98)
MONOCYTES # BLD AUTO: 0.9 K/UL (ref 0.3–1)
MONOCYTES # BLD AUTO: 0.9 K/UL (ref 0.3–1)
MONOCYTES NFR BLD: 11.8 % (ref 4–15)
MONOCYTES NFR BLD: 12.9 % (ref 4–15)
NEUTROPHILS # BLD AUTO: 3.9 K/UL (ref 1.8–7.7)
NEUTROPHILS # BLD AUTO: 4.5 K/UL (ref 1.8–7.7)
NEUTROPHILS NFR BLD: 57.7 % (ref 38–73)
NEUTROPHILS NFR BLD: 58.5 % (ref 38–73)
NRBC BLD-RTO: 0 /100 WBC
NRBC BLD-RTO: 0 /100 WBC
PLATELET # BLD AUTO: 335 K/UL (ref 150–450)
PLATELET # BLD AUTO: 338 K/UL (ref 150–450)
PMV BLD AUTO: 10.8 FL (ref 9.2–12.9)
PMV BLD AUTO: 11.4 FL (ref 9.2–12.9)
POTASSIUM SERPL-SCNC: 4 MMOL/L (ref 3.5–5.1)
POTASSIUM SERPL-SCNC: 4.3 MMOL/L (ref 3.5–5.1)
PROT SERPL-MCNC: 7 G/DL (ref 6–8.4)
PROT SERPL-MCNC: 7.1 G/DL (ref 6–8.4)
RBC # BLD AUTO: 4.75 M/UL (ref 4–5.4)
RBC # BLD AUTO: 5.2 M/UL (ref 4–5.4)
SODIUM SERPL-SCNC: 133 MMOL/L (ref 136–145)
SODIUM SERPL-SCNC: 137 MMOL/L (ref 136–145)
TROPONIN I SERPL DL<=0.01 NG/ML-MCNC: 0.05 NG/ML (ref 0–0.03)
WBC # BLD AUTO: 6.76 K/UL (ref 3.9–12.7)
WBC # BLD AUTO: 7.73 K/UL (ref 3.9–12.7)

## 2022-11-25 PROCEDURE — G0378 HOSPITAL OBSERVATION PER HR: HCPCS

## 2022-11-25 PROCEDURE — 83880 ASSAY OF NATRIURETIC PEPTIDE: CPT | Performed by: EMERGENCY MEDICINE

## 2022-11-25 PROCEDURE — 99285 EMERGENCY DEPT VISIT HI MDM: CPT | Mod: 25,27

## 2022-11-25 PROCEDURE — 93010 ELECTROCARDIOGRAM REPORT: CPT | Mod: ,,, | Performed by: INTERNAL MEDICINE

## 2022-11-25 PROCEDURE — 83036 HEMOGLOBIN GLYCOSYLATED A1C: CPT | Performed by: HOSPITALIST

## 2022-11-25 PROCEDURE — 85025 COMPLETE CBC W/AUTO DIFF WBC: CPT | Mod: 91 | Performed by: HOSPITALIST

## 2022-11-25 PROCEDURE — 63600175 PHARM REV CODE 636 W HCPCS: Performed by: EMERGENCY MEDICINE

## 2022-11-25 PROCEDURE — 80053 COMPREHEN METABOLIC PANEL: CPT | Performed by: EMERGENCY MEDICINE

## 2022-11-25 PROCEDURE — 25000003 PHARM REV CODE 250: Performed by: HOSPITALIST

## 2022-11-25 PROCEDURE — 93010 EKG 12-LEAD: ICD-10-PCS | Mod: ,,, | Performed by: INTERNAL MEDICINE

## 2022-11-25 PROCEDURE — 83735 ASSAY OF MAGNESIUM: CPT | Mod: 91 | Performed by: HOSPITALIST

## 2022-11-25 PROCEDURE — 99285 EMERGENCY DEPT VISIT HI MDM: CPT | Mod: 25

## 2022-11-25 PROCEDURE — 83735 ASSAY OF MAGNESIUM: CPT | Performed by: EMERGENCY MEDICINE

## 2022-11-25 PROCEDURE — 84484 ASSAY OF TROPONIN QUANT: CPT | Performed by: EMERGENCY MEDICINE

## 2022-11-25 PROCEDURE — 80053 COMPREHEN METABOLIC PANEL: CPT | Mod: 91 | Performed by: HOSPITALIST

## 2022-11-25 PROCEDURE — 85025 COMPLETE CBC W/AUTO DIFF WBC: CPT | Performed by: EMERGENCY MEDICINE

## 2022-11-25 PROCEDURE — 96374 THER/PROPH/DIAG INJ IV PUSH: CPT

## 2022-11-25 PROCEDURE — 36415 COLL VENOUS BLD VENIPUNCTURE: CPT | Performed by: HOSPITALIST

## 2022-11-25 PROCEDURE — 93005 ELECTROCARDIOGRAM TRACING: CPT

## 2022-11-25 PROCEDURE — 83880 ASSAY OF NATRIURETIC PEPTIDE: CPT | Mod: 91 | Performed by: HOSPITALIST

## 2022-11-25 RX ORDER — FUROSEMIDE 10 MG/ML
80 INJECTION INTRAMUSCULAR; INTRAVENOUS
Status: COMPLETED | OUTPATIENT
Start: 2022-11-25 | End: 2022-11-25

## 2022-11-25 RX ORDER — GABAPENTIN 300 MG/1
300 CAPSULE ORAL 3 TIMES DAILY
Status: DISCONTINUED | OUTPATIENT
Start: 2022-11-26 | End: 2022-11-30 | Stop reason: HOSPADM

## 2022-11-25 RX ORDER — HYDROXYZINE PAMOATE 25 MG/1
50 CAPSULE ORAL EVERY 8 HOURS PRN
Status: DISCONTINUED | OUTPATIENT
Start: 2022-11-25 | End: 2022-11-30 | Stop reason: HOSPADM

## 2022-11-25 RX ORDER — QUETIAPINE FUMARATE 25 MG/1
50 TABLET, FILM COATED ORAL NIGHTLY
Status: DISCONTINUED | OUTPATIENT
Start: 2022-11-25 | End: 2022-11-30 | Stop reason: HOSPADM

## 2022-11-25 RX ORDER — IBUPROFEN 200 MG
1 TABLET ORAL DAILY
Status: DISCONTINUED | OUTPATIENT
Start: 2022-11-26 | End: 2022-11-30 | Stop reason: HOSPADM

## 2022-11-25 RX ORDER — FLUOXETINE HYDROCHLORIDE 20 MG/1
20 CAPSULE ORAL DAILY
Status: DISCONTINUED | OUTPATIENT
Start: 2022-11-26 | End: 2022-11-30 | Stop reason: HOSPADM

## 2022-11-25 RX ORDER — LISINOPRIL 20 MG/1
40 TABLET ORAL DAILY
Status: DISCONTINUED | OUTPATIENT
Start: 2022-11-26 | End: 2022-11-29

## 2022-11-25 RX ORDER — FAMOTIDINE 20 MG/1
20 TABLET, FILM COATED ORAL DAILY
Status: DISCONTINUED | OUTPATIENT
Start: 2022-11-26 | End: 2022-11-30 | Stop reason: HOSPADM

## 2022-11-25 RX ORDER — HEPARIN SODIUM 5000 [USP'U]/ML
5000 INJECTION, SOLUTION INTRAVENOUS; SUBCUTANEOUS EVERY 8 HOURS
Status: DISCONTINUED | OUTPATIENT
Start: 2022-11-26 | End: 2022-11-30 | Stop reason: HOSPADM

## 2022-11-25 RX ORDER — FUROSEMIDE 10 MG/ML
20 INJECTION INTRAMUSCULAR; INTRAVENOUS 2 TIMES DAILY
Status: DISCONTINUED | OUTPATIENT
Start: 2022-11-26 | End: 2022-11-29

## 2022-11-25 RX ORDER — FUROSEMIDE 20 MG/1
20 TABLET ORAL 2 TIMES DAILY
Qty: 60 TABLET | Refills: 0 | Status: ON HOLD | OUTPATIENT
Start: 2022-11-25 | End: 2022-11-30 | Stop reason: SDUPTHER

## 2022-11-25 RX ORDER — OXCARBAZEPINE 150 MG/1
300 TABLET, FILM COATED ORAL 2 TIMES DAILY
Status: DISCONTINUED | OUTPATIENT
Start: 2022-11-25 | End: 2022-11-30 | Stop reason: HOSPADM

## 2022-11-25 RX ORDER — ONDANSETRON 4 MG/1
4 TABLET, ORALLY DISINTEGRATING ORAL EVERY 8 HOURS PRN
Status: DISCONTINUED | OUTPATIENT
Start: 2022-11-25 | End: 2022-11-30 | Stop reason: HOSPADM

## 2022-11-25 RX ORDER — SODIUM CHLORIDE 0.9 % (FLUSH) 0.9 %
10 SYRINGE (ML) INJECTION
Status: DISCONTINUED | OUTPATIENT
Start: 2022-11-25 | End: 2022-11-30 | Stop reason: HOSPADM

## 2022-11-25 RX ORDER — ALBUTEROL SULFATE 90 UG/1
2 AEROSOL, METERED RESPIRATORY (INHALATION) EVERY 6 HOURS
Status: DISCONTINUED | OUTPATIENT
Start: 2022-11-26 | End: 2022-11-26

## 2022-11-25 RX ORDER — PROPRANOLOL HYDROCHLORIDE 10 MG/1
10 TABLET ORAL 3 TIMES DAILY
Status: DISCONTINUED | OUTPATIENT
Start: 2022-11-26 | End: 2022-11-30 | Stop reason: HOSPADM

## 2022-11-25 RX ADMIN — HYDROXYZINE PAMOATE 50 MG: 25 CAPSULE ORAL at 11:11

## 2022-11-25 RX ADMIN — QUETIAPINE FUMARATE 50 MG: 25 TABLET ORAL at 11:11

## 2022-11-25 RX ADMIN — OXCARBAZEPINE 300 MG: 150 TABLET, FILM COATED ORAL at 11:11

## 2022-11-25 RX ADMIN — FUROSEMIDE 80 MG: 10 INJECTION, SOLUTION INTRAMUSCULAR; INTRAVENOUS at 12:11

## 2022-11-25 NOTE — ED PROVIDER NOTES
Encounter Date: 11/25/2022    SCRIBE #1 NOTE: I, Gerardo Eil Jr, am scribing for, and in the presence of,  Diana Rodriguez MD. I have scribed the following portions of the note - Other sections scribed: HPI, ROS, PE, MDM.     History     Chief Complaint   Patient presents with    Shortness of Breath     Recently admitted for SOB/CHF. States has not taken lasix for 2 days and now having peripheral edema with SOB. On arrival, awake, alert. Resp rapid but unlabored. Family reports patient drinks water all day (~ 20 'Yeti' cups).      Stephanie T Barthelemy is a 51-year-old female who has a past medical history of attention deficit hyperactivity disorder, anemia, anxiety, bipolar disorder, history of hepatitis C, history of psychiatric hospitalization, hypertension, opioid overdose, psychosis, seizure disorder, sleep difficulties, substance abuse, therapy, and vasculitis who presents to the emergency department for evaluation of abdominal bloating; onset yesterday. Associated symptoms include: abdominal pain, bilateral leg swelling, and chest pain. The patient developed abdominal bloating accompanied by intermittent episodes of mid-abdominal pain and bilateral leg swelling; symptoms stated to have worsened since onset. Patient is prescribed lasix to combat symptoms, but lost the medication two days ago. Patient requested another prescription of lasix.In addition to the chief complaint, patient developed intermittent episodes of chest pain. She denies cough. She has no known allergies to medication.      The history is provided by the patient. No  was used.   Review of patient's allergies indicates:  No Known Allergies  Past Medical History:   Diagnosis Date    ADHD (attention deficit hyperactivity disorder)     Anemia     Anxiety     Bipolar disorder     CHF (congestive heart failure)     History of hepatitis C - s/p clearance of virus (HCV neg 6/2015) 09/26/2014    History of psychiatric  hospitalization     History of substance abuse     IV heroin - last use 2013 per pt    Hx of psychiatric care     Hypertension     Opioid overdose 05/10/2016    Psychiatric problem     Psychosis     Seizure disorder 04/03/2019    Sleep difficulties     Substance abuse     Therapy     Vasculitis      Past Surgical History:   Procedure Laterality Date    HYSTERECTOMY  3/16/2011    SALPINGOOPHORECTOMY Right 3/16/2011    TUBAL LIGATION      Vaginal cuff repair  04/22/2011     Family History   Problem Relation Age of Onset    Diabetes Maternal Grandmother     Cancer Maternal Grandmother      Social History     Tobacco Use    Smoking status: Every Day     Packs/day: 1.00     Years: 36.00     Pack years: 36.00     Types: Cigarettes     Start date: 1986    Smokeless tobacco: Never    Tobacco comments:     Enrolled in [employer] on 10/23/14 ([employer] Member ID # [id number])  Ambulatory referral to Smoking Cessation clinic.   Substance Use Topics    Alcohol use: No    Drug use: Yes     Types: Heroin, Cocaine, Methamphetamines, Marijuana     Review of Systems   Constitutional:  Negative for chills and fever.   HENT:  Negative for sore throat.    Respiratory:  Negative for cough and shortness of breath.    Cardiovascular:  Positive for chest pain and leg swelling.   Gastrointestinal:  Positive for abdominal pain. Negative for nausea.        Positive abdominal bloating.   Genitourinary:  Negative for dysuria.   Musculoskeletal:  Negative for back pain.   Skin:  Negative for rash.   Neurological:  Negative for weakness and headaches.   Hematological:  Does not bruise/bleed easily.     Physical Exam     Initial Vitals [11/25/22 1056]   BP Pulse Resp Temp SpO2   (!) 145/100 105 (!) 26 98.1 °F (36.7 °C) 100 %      MAP       --         Physical Exam    Nursing note and vitals reviewed.  Constitutional: She appears well-developed. She is not diaphoretic. No distress.   HENT:   Head: Normocephalic and atraumatic.   Mouth/Throat:  Oropharynx is clear and moist. No oropharyngeal exudate.   Eyes: Conjunctivae and EOM are normal. Pupils are equal, round, and reactive to light.   Neck: Neck supple.   Normal range of motion.  Cardiovascular:  Normal rate.           Pulmonary/Chest: Breath sounds normal. No respiratory distress.   Abdominal: Abdomen is soft. She exhibits no distension. There is no abdominal tenderness.   Pitting edema noted to the abdomen, chronic   Musculoskeletal:         General: Edema present. Normal range of motion.      Cervical back: Normal range of motion and neck supple.      Comments: 2+ pitting edema noted to the bilateral lower extremities.     Neurological: She is alert and oriented to person, place, and time.   Skin: Skin is warm and dry. No rash noted.   Psychiatric: She has a normal mood and affect. Thought content normal.       ED Course   ED US Lung    Date/Time: 11/25/2022 5:22 PM  Performed by: Diana Rodriguez MD  Authorized by: Diana Rodriguez MD     Indication:  Dyspnea  Identified Structures:  The thoracic cavities and diaphragm were examined  Lung sliding:  Present  Pleural effusion:  Present (Scant)  B-lines:  Absent (3 or less)  Lung sliding:  Present  Pleural effusion:  Present (Scant)  B-lines:  Absent (3 or less)   Pleural effusion  Charge?:  Yes  Labs Reviewed   CBC W/ AUTO DIFFERENTIAL - Abnormal; Notable for the following components:       Result Value    Hemoglobin 10.7 (*)     Hematocrit 36.1 (*)     MCV 76 (*)     MCH 22.5 (*)     MCHC 29.6 (*)     RDW 20.5 (*)     Immature Granulocytes 0.6 (*)     All other components within normal limits   COMPREHENSIVE METABOLIC PANEL - Abnormal; Notable for the following components:    Sodium 133 (*)     CO2 21 (*)     BUN 31 (*)     Albumin 3.1 (*)     Total Bilirubin 2.4 (*)      (*)      (*)     All other components within normal limits   TROPONIN I - Abnormal; Notable for the following components:    Troponin I 0.049 (*)     All  "other components within normal limits   B-TYPE NATRIURETIC PEPTIDE - Abnormal; Notable for the following components:    BNP 2,927 (*)     All other components within normal limits   MAGNESIUM     EKG Readings: (Independently Interpreted)   Initial Reading: No STEMI. Previous EKG: Compared with most recent EKG Rhythm: Normal Sinus Rhythm. Heart Rate: 102. Axis: Right Axis Deviation. Clinical Impression: Normal Sinus Rhythm   ECG Results              EKG 12-lead (Final result)  Result time 11/25/22 13:00:04      Final result by Interface, Lab [facility] (11/25/22 13:00:04)                   Narrative:    Test Reason : R06.02,    Vent. Rate : 102 BPM     Atrial Rate : 102 BPM     P-R Int : 156 ms          QRS Dur : 142 ms      QT Int : 418 ms       P-R-T Axes : 099 151 008 degrees     QTc Int : 544 ms     Suspect arm lead reversal, interpretation assumes no reversal  Sinus tachycardia  Right axis deviation  Nonspecific intraventricular block  Cannot rule out Septal infarct ,age undetermined  Abnormal ECG  When compared with ECG of 04-NOV-2022 16:52,  The axis Shifted right  Confirmed by Leighton Koroma MD ([id number]) on 11/25/2022 12:59:53 PM    Referred By: [email or web address]   SELF           Confirmed By:Leighton Koroma MD                                  Imaging Results              X-Ray Chest AP Portable (Final result)  Result time 11/25/22 12:14:29      Final result by Leighton Robins MD (11/25/22 12:14:29)                   Impression:      Stable enlargement of the cardiomediastinal silhouette with probable mild interstitial edema and small to moderate bilateral pleural effusions.  Aeration has not significantly changed when compared with 11/04/2022.      Electronically signed by: Leighton Robins MD  Date:    11/25/2022  Time:    12:14               Narrative:    EXAMINATION:  XR CHEST AP PORTABLE    CLINICAL HISTORY:  Provided history is "CHF;  ".    TECHNIQUE:  One view of the " chest.    COMPARISON:  11/04/2022.    FINDINGS:  Cardiac wires overlie the chest.  Cardiomediastinal silhouette is enlarged and similar to the prior study.  Central vascular congestion with prominent perihilar interstitial lung markings.  Small to moderate bilateral pleural effusions with adjacent passive atelectasis or airspace disease in the lower lung zones.  Upper lungs are relatively clear.  No distinct pneumothorax.                                       Medications   furosemide injection 80 mg (80 mg Intravenous Given 11/25/22 1228)     Medical Decision Making:   History:   Old Medical Records: I decided to obtain old medical records.  Initial Assessment:   Stephanie T Barthelemy is a 51-year-old female who has a past medical history of attention deficit hyperactivity disorder, anemia, anxiety, bipolar disorder, congestive heart failure, history of hepatitis C - s/p clearance of virus, hypertension, opioid overdose, psychiatric problem, psychosis, seizure disorder, sleep difficulties, substance abuse, therapy, and vasculitis.The patient presents to the emergency department for evaluation of abdominal bloating; onset yesterday. Associated symptoms include: abdominal pain, bilateral leg swelling, and chest pain.  Differential Diagnosis:        Clinical Tests:   Lab Tests: Ordered and Reviewed  Radiological Study: Ordered and Reviewed  Medical Tests: Ordered and Reviewed  ED Management:  51 year-old female with past medical history including CHF presents with shortness of breath and leg swelling after losing her Lasix 2 days ago.  Patient was recently admitted for the same symptoms.  Came to the ER primarily because she had lost her lost her Lasix and therefore needed a refill.  Bedside ultrasound without significant pulmonary edema.  Chest x-ray appears similar to prior.  She has her baseline troponinemia with no acute ischemic EKG changes.  BNP at baseline.  Shared decision making with patient regarding  admission for her shortness of breath and volume overload versus discharge with refill of Lasix.  I witnessed her ambulate to the restroom without change in her breathing.  Patient prefers to be discharged at this time.   Agrees to call her physician on Monday regarding any medication adjustments that may be needed.        Scribe Attestation:   Scribe #1: I performed the above scribed service and the documentation accurately describes the services I performed. I attest to the accuracy of the note.                       Clinical Impression:   Final diagnoses:  [R06.02] Shortness of breath        ED Disposition Condition    Discharge Stable          ED Prescriptions       Medication Sig Dispense Start Date End Date Auth. Provider    furosemide (LASIX) 20 MG tablet Take 1 tablet (20 mg total) by mouth 2 (two) times daily. 60 tablet 11/25/2022 12/25/2022 Diana Rodriguez MD          Follow-up Information       Follow up With Specialties Details Why Contact [facility] - Emergency Dept Emergency Medicine  As needed, If symptoms worsen [address] [facility] [address] [phone]    your primary care physician  Schedule an appointment as soon as possible for a visit in 1 week               Diana Rodriguez MD  11/25/22 8035

## 2022-11-25 NOTE — Clinical Note
Diagnosis: CHF exacerbation [501512]   Future Attending Provider: BRANDO NASSAR [[id number]]   Admitting Provider:: MARIELA SIMS [[id number]]

## 2022-11-25 NOTE — ED PROVIDER NOTES
"Encounter Date: 11/25/2022       History     Chief Complaint   Patient presents with    Abdominal Pain     Pt. Just discharged. Pt states " She wants to check back in to get admitted, she says the doctor gave her a choice to stay or get admitted"  She decided to leave but doesn't have a ride and would like to get admitted. Complains of sharp pains in abdomen.       51-year-old female who has a past medical history of attention deficit hyperactivity disorder, anemia, anxiety, bipolar disorder, history of hepatitis C, history of psychiatric hospitalization, hypertension, opioid overdose, psychosis, seizure disorder, sleep difficulties, substance abuse, therapy, and vasculitis who presents to the emergency department for evaluation of abdominal bloating; onset yesterday. Associated symptoms include: abdominal pain, bilateral leg swelling, and chest pain. The patient developed abdominal bloating accompanied by intermittent episodes of mid-abdominal pain and bilateral leg swelling; symptoms stated to have worsened since onset. Patient is prescribed lasix to combat symptoms, but lost the medication two days ago. Patient requested another prescription of lasix.In addition to the chief complaint, patient developed intermittent episodes of chest pain. She denies cough. She has no known allergies to medication.      The history is provided by the patient. No  was used.   Review of patient's allergies indicates:  No Known Allergies  Past Medical History:   Diagnosis Date    ADHD (attention deficit hyperactivity disorder)     Anemia     Anxiety     Bipolar disorder     CHF (congestive heart failure)     History of hepatitis C - s/p clearance of virus (HCV neg 6/2015) 09/26/2014    History of psychiatric hospitalization     History of substance abuse     IV heroin - last use 2013 per pt    Hx of psychiatric care     Hypertension     Opioid overdose 05/10/2016    Psychiatric problem     Psychosis     Seizure " disorder 04/03/2019    Sleep difficulties     Substance abuse     Therapy     Vasculitis      Past Surgical History:   Procedure Laterality Date    HYSTERECTOMY  3/16/2011    SALPINGOOPHORECTOMY Right 3/16/2011    TUBAL LIGATION      Vaginal cuff repair  04/22/2011     Family History   Problem Relation Age of Onset    Diabetes Maternal Grandmother     Cancer Maternal Grandmother      Social History     Tobacco Use    Smoking status: Every Day     Packs/day: 1.00     Years: 36.00     Pack years: 36.00     Types: Cigarettes     Start date: 1986    Smokeless tobacco: Never    Tobacco comments:     Enrolled in [employer] on 10/23/14 ([employer] Member ID # [id number])  Ambulatory referral to Smoking Cessation clinic.   Substance Use Topics    Alcohol use: No    Drug use: Yes     Types: Heroin, Cocaine, Methamphetamines, Marijuana     Review of Systems   Constitutional:  Negative for chills and fever.   HENT:  Negative for sore throat.    Respiratory:  Positive for shortness of breath.    Cardiovascular:  Negative for chest pain.   Gastrointestinal:  Positive for abdominal pain. Negative for nausea.   Genitourinary:  Negative for dysuria and hematuria.   Musculoskeletal:  Negative for back pain.   Skin:  Negative for rash.   Neurological:  Negative for weakness.   Hematological:  Does not bruise/bleed easily.   Psychiatric/Behavioral:  Negative for confusion.      Physical Exam     Initial Vitals [11/25/22 1653]   BP Pulse Resp Temp SpO2   (!) 130/104 102 18 98.1 °F (36.7 °C) 96 %      MAP       --         Physical Exam    Nursing note and vitals reviewed.  Constitutional: She appears well-developed. She is not diaphoretic. No distress.   HENT:   Head: Normocephalic and atraumatic.   Eyes: EOM are normal. Pupils are equal, round, and reactive to light.   Neck:   Normal range of motion.  Cardiovascular:  Normal rate.           Pulmonary/Chest: No respiratory distress.   Abdominal: Abdomen is soft. She exhibits no distension.    1+ pitting edema.    Musculoskeletal:         General: Edema (2+ pitting edema to bilateral LE) present. Normal range of motion.      Cervical back: Normal range of motion.     Neurological: She is alert and oriented to person, place, and time.   Skin: Skin is warm and dry.   Psychiatric: She has a normal mood and affect.       ED Course   Procedures  Labs Reviewed - No data to display       Imaging Results    None          Medications - No data to display  Medical Decision Making:   ED Management:  51 year old female with past medical history including CHF returns to the ER after she was seen earlier today with complaint of ongoing shortness of breath and associated abdominal pain.  No changes in her presentation since she was seen earlier but came because she decided that she can not manage her symptoms at home at this time.  Labs done at 11:30 a.m., roughly 6 hours ago, notable for baseline BNP and troponinemia. New findings of elevated AST/ALT and T. Bili concerning for congestive hepatopathy. Admitted to medicine for further management.   Other:   I have discussed this case with another health care provider.                        Clinical Impression:   Final diagnoses:  [I50.9] CHF exacerbation  [I50.9] Acute on chronic congestive heart failure, unspecified heart failure type (Primary)  [R74.01] Transaminitis      ED Disposition Condition    Observation Stable                Diana Rodriguez MD  11/25/22 3894

## 2022-11-25 NOTE — DISCHARGE INSTRUCTIONS

## 2022-11-26 LAB
ALLENS TEST: ABNORMAL
ANION GAP SERPL CALC-SCNC: 9 MMOL/L (ref 8–16)
BASOPHILS # BLD AUTO: 0.04 K/UL (ref 0–0.2)
BASOPHILS NFR BLD: 0.7 % (ref 0–1.9)
BUN SERPL-MCNC: 31 MG/DL (ref 6–20)
CALCIUM SERPL-MCNC: 8.9 MG/DL (ref 8.7–10.5)
CHLORIDE SERPL-SCNC: 102 MMOL/L (ref 95–110)
CO2 SERPL-SCNC: 26 MMOL/L (ref 23–29)
CREAT SERPL-MCNC: 1.2 MG/DL (ref 0.5–1.4)
DELSYS: ABNORMAL
DIFFERENTIAL METHOD: ABNORMAL
EOSINOPHIL # BLD AUTO: 0.2 K/UL (ref 0–0.5)
EOSINOPHIL NFR BLD: 2.5 % (ref 0–8)
ERYTHROCYTE [DISTWIDTH] IN BLOOD BY AUTOMATED COUNT: 20.1 % (ref 11.5–14.5)
EST. GFR  (NO RACE VARIABLE): 55 ML/MIN/1.73 M^2
FIO2: 21
GLUCOSE SERPL-MCNC: 105 MG/DL (ref 70–110)
HCO3 UR-SCNC: 29.2 MMOL/L (ref 24–28)
HCT VFR BLD AUTO: 34.5 % (ref 37–48.5)
HGB BLD-MCNC: 10.3 G/DL (ref 12–16)
IMM GRANULOCYTES # BLD AUTO: 0.01 K/UL (ref 0–0.04)
IMM GRANULOCYTES NFR BLD AUTO: 0.2 % (ref 0–0.5)
LYMPHOCYTES # BLD AUTO: 1.7 K/UL (ref 1–4.8)
LYMPHOCYTES NFR BLD: 28.6 % (ref 18–48)
MCH RBC QN AUTO: 22.3 PG (ref 27–31)
MCHC RBC AUTO-ENTMCNC: 29.9 G/DL (ref 32–36)
MCV RBC AUTO: 75 FL (ref 82–98)
MODE: ABNORMAL
MONOCYTES # BLD AUTO: 0.8 K/UL (ref 0.3–1)
MONOCYTES NFR BLD: 12.7 % (ref 4–15)
NEUTROPHILS # BLD AUTO: 3.3 K/UL (ref 1.8–7.7)
NEUTROPHILS NFR BLD: 55.3 % (ref 38–73)
NRBC BLD-RTO: 0 /100 WBC
PCO2 BLDA: 44.9 MMHG (ref 35–45)
PH SMN: 7.42 [PH] (ref 7.35–7.45)
PLATELET # BLD AUTO: 301 K/UL (ref 150–450)
PMV BLD AUTO: 10.8 FL (ref 9.2–12.9)
PO2 BLDA: 70 MMHG (ref 80–100)
POC BE: 5 MMOL/L
POC SATURATED O2: 94 % (ref 95–100)
POC TCO2: 31 MMOL/L (ref 23–27)
POTASSIUM SERPL-SCNC: 3.2 MMOL/L (ref 3.5–5.1)
RBC # BLD AUTO: 4.61 M/UL (ref 4–5.4)
SAMPLE: ABNORMAL
SITE: ABNORMAL
SODIUM SERPL-SCNC: 137 MMOL/L (ref 136–145)
SP02: 95
WBC # BLD AUTO: 5.9 K/UL (ref 3.9–12.7)

## 2022-11-26 PROCEDURE — 96372 THER/PROPH/DIAG INJ SC/IM: CPT | Performed by: HOSPITALIST

## 2022-11-26 PROCEDURE — G0378 HOSPITAL OBSERVATION PER HR: HCPCS

## 2022-11-26 PROCEDURE — 94640 AIRWAY INHALATION TREATMENT: CPT | Mod: XB

## 2022-11-26 PROCEDURE — 96374 THER/PROPH/DIAG INJ IV PUSH: CPT | Mod: 59

## 2022-11-26 PROCEDURE — 27100098 HC SPACER

## 2022-11-26 PROCEDURE — 25000242 PHARM REV CODE 250 ALT 637 W/ HCPCS: Performed by: HOSPITALIST

## 2022-11-26 PROCEDURE — 82803 BLOOD GASES ANY COMBINATION: CPT

## 2022-11-26 PROCEDURE — 94761 N-INVAS EAR/PLS OXIMETRY MLT: CPT

## 2022-11-26 PROCEDURE — 36415 COLL VENOUS BLD VENIPUNCTURE: CPT | Performed by: HOSPITALIST

## 2022-11-26 PROCEDURE — 25000003 PHARM REV CODE 250: Performed by: HOSPITALIST

## 2022-11-26 PROCEDURE — 36600 WITHDRAWAL OF ARTERIAL BLOOD: CPT

## 2022-11-26 PROCEDURE — 99900035 HC TECH TIME PER 15 MIN (STAT)

## 2022-11-26 PROCEDURE — 80048 BASIC METABOLIC PNL TOTAL CA: CPT | Performed by: HOSPITALIST

## 2022-11-26 PROCEDURE — 63600175 PHARM REV CODE 636 W HCPCS: Performed by: HOSPITALIST

## 2022-11-26 PROCEDURE — 36415 COLL VENOUS BLD VENIPUNCTURE: CPT | Performed by: INTERNAL MEDICINE

## 2022-11-26 PROCEDURE — 94640 AIRWAY INHALATION TREATMENT: CPT

## 2022-11-26 PROCEDURE — 93005 ELECTROCARDIOGRAM TRACING: CPT

## 2022-11-26 PROCEDURE — 85025 COMPLETE CBC W/AUTO DIFF WBC: CPT | Performed by: INTERNAL MEDICINE

## 2022-11-26 PROCEDURE — 96376 TX/PRO/DX INJ SAME DRUG ADON: CPT

## 2022-11-26 PROCEDURE — 93010 EKG 12-LEAD: ICD-10-PCS | Mod: ,,, | Performed by: INTERNAL MEDICINE

## 2022-11-26 PROCEDURE — 93010 ELECTROCARDIOGRAM REPORT: CPT | Mod: ,,, | Performed by: INTERNAL MEDICINE

## 2022-11-26 RX ORDER — POTASSIUM CHLORIDE 20 MEQ/1
40 TABLET, EXTENDED RELEASE ORAL ONCE
Status: DISCONTINUED | OUTPATIENT
Start: 2022-11-26 | End: 2022-11-28

## 2022-11-26 RX ORDER — CLONAZEPAM 0.5 MG/1
0.5 TABLET ORAL 2 TIMES DAILY
Status: DISCONTINUED | OUTPATIENT
Start: 2022-11-26 | End: 2022-11-28

## 2022-11-26 RX ORDER — ALBUTEROL SULFATE 90 UG/1
2 AEROSOL, METERED RESPIRATORY (INHALATION) EVERY 6 HOURS
Status: DISCONTINUED | OUTPATIENT
Start: 2022-11-27 | End: 2022-11-30 | Stop reason: HOSPADM

## 2022-11-26 RX ADMIN — ALBUTEROL SULFATE 2 PUFF: 108 INHALANT RESPIRATORY (INHALATION) at 07:11

## 2022-11-26 RX ADMIN — ALBUTEROL SULFATE 2 PUFF: 108 INHALANT RESPIRATORY (INHALATION) at 12:11

## 2022-11-26 RX ADMIN — PROPRANOLOL HYDROCHLORIDE 10 MG: 10 TABLET ORAL at 02:11

## 2022-11-26 RX ADMIN — HEPARIN SODIUM 5000 UNITS: 5000 INJECTION, SOLUTION INTRAVENOUS; SUBCUTANEOUS at 05:11

## 2022-11-26 RX ADMIN — ALBUTEROL SULFATE 2 PUFF: 108 INHALANT RESPIRATORY (INHALATION) at 05:11

## 2022-11-26 RX ADMIN — FUROSEMIDE 20 MG: 10 INJECTION, SOLUTION INTRAMUSCULAR; INTRAVENOUS at 10:11

## 2022-11-26 RX ADMIN — ALBUTEROL SULFATE 2 PUFF: 108 INHALANT RESPIRATORY (INHALATION) at 11:11

## 2022-11-26 RX ADMIN — HEPARIN SODIUM 5000 UNITS: 5000 INJECTION, SOLUTION INTRAVENOUS; SUBCUTANEOUS at 09:11

## 2022-11-26 RX ADMIN — FUROSEMIDE 20 MG: 10 INJECTION, SOLUTION INTRAMUSCULAR; INTRAVENOUS at 06:11

## 2022-11-26 RX ADMIN — HEPARIN SODIUM 5000 UNITS: 5000 INJECTION, SOLUTION INTRAVENOUS; SUBCUTANEOUS at 02:11

## 2022-11-26 RX ADMIN — GABAPENTIN 300 MG: 300 CAPSULE ORAL at 02:11

## 2022-11-26 NOTE — SUBJECTIVE & OBJECTIVE
Interval History: Patient seen and examined on bed.  Patient lying comfortably in the bed.  Patient became drowsy this morning for which ABG was obtained however later drowsiness improved, she went to bathroom and engaged in reasonable conversation.  No acute event happened overnight.  Patient not in distress.       Review of Systems   Constitutional:  Positive for fatigue. Negative for fever.   HENT:  Negative for congestion.    Eyes:  Negative for visual disturbance.   Respiratory:  Positive for shortness of breath.    Cardiovascular:  Positive for leg swelling. Negative for chest pain.   Gastrointestinal:  Negative for abdominal pain.   Genitourinary:  Negative for dysuria.   Musculoskeletal:  Negative for back pain.   Neurological:  Negative for syncope.   Psychiatric/Behavioral:  Negative for agitation.    Objective:     Vital Signs (Most Recent):  Temp: 97.5 °F (36.4 °C) (11/26/22 1234)  Pulse: 99 (11/26/22 1234)  Resp: 18 (11/26/22 1234)  BP: 110/86 (11/26/22 1234)  SpO2: 95 % (11/26/22 1234) Vital Signs (24h Range):  Temp:  [97.5 °F (36.4 °C)-98.3 °F (36.8 °C)] 97.5 °F (36.4 °C)  Pulse:  [] 99  Resp:  [18-26] 18  SpO2:  [92 %-100 %] 95 %  BP: (110-174)/() 110/86     Weight: 58.9 kg (129 lb 13.6 oz)  Body mass index is 25.36 kg/m².    Intake/Output Summary (Last 24 hours) at 11/26/2022 1446  Last data filed at 11/26/2022 1253  Gross per 24 hour   Intake 360 ml   Output --   Net 360 ml      Physical Exam  Constitutional:       General: She is not in acute distress.     Appearance: She is ill-appearing.   HENT:      Head: Normocephalic.      Mouth/Throat:      Mouth: Mucous membranes are dry.   Cardiovascular:      Rate and Rhythm: Normal rate.   Pulmonary:      Effort: No respiratory distress.      Breath sounds: Rales present.   Abdominal:      Tenderness: There is no abdominal tenderness.   Musculoskeletal:         General: Swelling present.   Skin:     General: Skin is warm.      Capillary  Refill: Capillary refill takes less than 2 seconds.   Neurological:      Mental Status: She is alert and oriented to person, place, and time.   Psychiatric:         Behavior: Behavior normal.       Significant Labs: All pertinent labs within the past 24 hours have been reviewed.    Significant Imaging: I have reviewed all pertinent imaging results/findings within the past 24 hours.

## 2022-11-26 NOTE — CONSULTS
RD providing remote coverage.   Consulted for heart failure diet education on fluid and salt restriction.      On site RD to provide in-person education as warranted,   education materials to be provided with discharge instructions.        Please re-consult as needed.  Thank you!      KATHRYN Arnett, RDN, LDN

## 2022-11-26 NOTE — NURSING
Patient responds to voice, drowsy, oriented to self only. Notified MD Enriqueta. All PO medications held at this time. ABG to be ordered.

## 2022-11-26 NOTE — ED TRIAGE NOTES
"Stephanie T Barthelemy, an 51 y.o. female presents to the ED after she came back to be admitted for CHF. Pt was seen earlier today but stated she couldn't stay.        Review of patient's allergies indicates:  No Known Allergies  Chief Complaint   Patient presents with    Abdominal Pain     Pt. Just discharged. Pt states " She wants to check back in to get admitted, she says the doctor gave her a choice to stay or get admitted"  She decided to leave but doesn't have a ride and would like to get admitted. Complains of sharp pains in abdomen.       Past Medical History:   Diagnosis Date    ADHD (attention deficit hyperactivity disorder)     Anemia     Anxiety     Bipolar disorder     CHF (congestive heart failure)     History of hepatitis C - s/p clearance of virus (HCV neg 6/2015) 09/26/2014    History of psychiatric hospitalization     History of substance abuse     IV heroin - last use 2013 per pt    Hx of psychiatric care     Hypertension     Opioid overdose 05/10/2016    Psychiatric problem     Psychosis     Seizure disorder 04/03/2019    Sleep difficulties     Substance abuse     Therapy     Vasculitis        Patient identifiers verified and correct.     LOC: The patient is awake, alert and aware of environment with an appropriate affect, the patient is oriented x 3 and speaking appropriately.     APPEARANCE: Patient appears comfortable and in no acute distress, patient is clean and well groomed.    HEENT: Head symmetrical. Eyes bilateral.  Bilateral ears without drainage. Bilateral nares patent, throat clear.    SKIN: The skin is warm and dry, color consistent with ethnicity, patient has normal skin turgor and moist mucus membranes, skin intact    MUSCULOSKELETAL: Patient moving all extremities spontaneously, no swelling noted.    RESPIRATORY: Airway is open and patent, respirations are spontaneous, patient has a normal effort and rate     CARDIAC: Patient has a normal rate and regular rhythm, edema noted on " bilateral extremities, capillary refill < 3 seconds.     GASTRO: Abdomen soft    NEURO: Pt opens eyes spontaneously pupils equal, round, and reactive. behavior appropriate to situation, follows commands, facial expression symmetrical,    NEUROVASCULAR: All extremities are warm and pink.     Will continue to monitor.

## 2022-11-26 NOTE — SUBJECTIVE & OBJECTIVE
Past Medical History:   Diagnosis Date    ADHD (attention deficit hyperactivity disorder)     Anemia     Anxiety     Bipolar disorder     CHF (congestive heart failure)     History of hepatitis C - s/p clearance of virus (HCV neg 6/2015) 09/26/2014    History of psychiatric hospitalization     History of substance abuse     IV heroin - last use 2013 per pt    Hx of psychiatric care     Hypertension     Opioid overdose 05/10/2016    Psychiatric problem     Psychosis     Seizure disorder 04/03/2019    Sleep difficulties     Substance abuse     Therapy     Vasculitis        Past Surgical History:   Procedure Laterality Date    HYSTERECTOMY  3/16/2011    SALPINGOOPHORECTOMY Right 3/16/2011    TUBAL LIGATION      Vaginal cuff repair  04/22/2011       Review of patient's allergies indicates:  No Known Allergies    Current Facility-Administered Medications on File Prior to Encounter   Medication    [COMPLETED] furosemide injection 80 mg     Current Outpatient Medications on File Prior to Encounter   Medication Sig    albuterol (PROVENTIL/VENTOLIN HFA) 90 mcg/actuation inhaler Inhale 2 puffs into the lungs every 6 (six) hours. Rescue    benztropine (COGENTIN) 0.5 MG tablet Take 1 tablet (0.5 mg total) by mouth 2 (two) times daily.    famotidine (PEPCID) 20 MG tablet Take 1 tablet (20 mg total) by mouth 2 (two) times daily.    FLUoxetine 20 MG capsule Take 20 mg by mouth once daily.    furosemide (LASIX) 20 MG tablet Take 1 tablet (20 mg total) by mouth 2 (two) times daily.    gabapentin (NEURONTIN) 300 MG capsule Take 1 capsule (300 mg total) by mouth 3 (three) times daily.    hydrOXYzine pamoate (VISTARIL) 50 MG Cap Take 1 capsule (50 mg total) by mouth every 8 (eight) hours as needed (anxiety, insomnia).    lisinopriL (PRINIVIL,ZESTRIL) 40 MG tablet Take 1 tablet (40 mg total) by mouth once daily.    nicotine (NICODERM CQ) 21 mg/24 hr Place 1 patch onto the skin once daily.    OXcarbazepine (TRILEPTAL) 300 MG Tab Take 1  tablet (300 mg total) by mouth 2 (two) times daily.    propranoloL (INDERAL) 10 MG tablet Take 1 tablet (10 mg total) by mouth 3 (three) times daily.    QUEtiapine (SEROQUEL) 50 MG tablet Take 1 tablet (50 mg total) by mouth every evening.    [DISCONTINUED] amLODIPine (NORVASC) 5 MG tablet Take 1 tablet (5 mg total) by mouth once daily.    [DISCONTINUED] furosemide (LASIX) 20 MG tablet Take 1 tablet (20 mg total) by mouth 2 (two) times daily.    [DISCONTINUED] metoprolol succinate (TOPROL-XL) 25 MG 24 hr tablet Take 0.5 tablets (12.5 mg total) by mouth once daily.    [DISCONTINUED] risperiDONE (RISPERDAL) 1 MG tablet Take 1 mg by mouth 2 (two) times daily.     Family History       Problem Relation (Age of Onset)    Cancer Maternal Grandmother    Diabetes Maternal Grandmother          Tobacco Use    Smoking status: Every Day     Packs/day: 1.00     Years: 36.00     Pack years: 36.00     Types: Cigarettes     Start date: 1986    Smokeless tobacco: Never    Tobacco comments:     Enrolled in [employer] on 10/23/14 ([employer] Member ID # [id number])  Ambulatory referral to Smoking Cessation clinic.   Substance and Sexual Activity    Alcohol use: No    Drug use: Yes     Types: Heroin, Cocaine, Methamphetamines, Marijuana    Sexual activity: Not Currently     Partners: Male, Female     Birth control/protection: Other-see comments     Comment: hysterectomy     Review of Systems   Constitutional: Negative.    HENT: Negative.     Eyes: Negative.    Respiratory:  Positive for shortness of breath.    Cardiovascular:  Positive for leg swelling.   Gastrointestinal:  Positive for abdominal distention.   Endocrine: Negative.    Genitourinary: Negative.    Musculoskeletal: Negative.    Skin: Negative.    Allergic/Immunologic: Negative.    Neurological: Negative.    Hematological: Negative.    Psychiatric/Behavioral: Negative.     Objective:     Vital Signs (Most Recent):  Temp: 98.1 °F (36.7 °C) (11/25/22 1653)  Pulse: 107 (11/25/22  2143)  Resp: 18 (11/25/22 1653)  BP: (!) 146/98 (11/25/22 2113)  SpO2: 100 % (11/25/22 2147)   Vital Signs (24h Range):  Temp:  [97.6 °F (36.4 °C)-98.1 °F (36.7 °C)] 98.1 °F (36.7 °C)  Pulse:  [102-108] 107  Resp:  [18-26] 18  SpO2:  [96 %-100 %] 100 %  BP: (130-146)/() 146/98     Weight: 54.5 kg (120 lb 2.4 oz)  Body mass index is 23.47 kg/m².    Physical Exam  Constitutional:       Appearance: Normal appearance.   HENT:      Head: Normocephalic and atraumatic.   Eyes:      Pupils: Pupils are equal, round, and reactive to light.   Cardiovascular:      Rate and Rhythm: Normal rate and regular rhythm.   Pulmonary:      Breath sounds: Rales present.   Abdominal:      General: There is distension.      Palpations: Abdomen is soft.   Musculoskeletal:         General: Swelling (2+ pitting edema of le) present.      Cervical back: Normal range of motion.      Right lower leg: Edema present.      Left lower leg: Edema present.   Skin:     General: Skin is warm and dry.   Neurological:      General: No focal deficit present.      Mental Status: She is alert and oriented to person, place, and time.   Psychiatric:         Mood and Affect: Mood normal.         CRANIAL NERVES     CN III, IV, VI   Pupils are equal, round, and reactive to light.     Significant Labs: All pertinent labs within the past 24 hours have been reviewed.  CBC:   Recent Labs   Lab 11/25/22  1128 11/25/22  2243   WBC 6.76 7.73   HGB 10.7* 11.6*   HCT 36.1* 39.6    335     CMP:   Recent Labs   Lab 11/25/22  1128   *   K 4.3   CL 98   CO2 21*      BUN 31*   CREATININE 1.1   CALCIUM 9.6   PROT 7.0   ALBUMIN 3.1*   BILITOT 2.4*   ALKPHOS 101   *   *   ANIONGAP 14     Cardiac Markers:   Recent Labs   Lab 11/25/22  1128   BNP 2,927*     Troponin:   Recent Labs   Lab 11/25/22  1128   TROPONINI 0.049*       Significant Imaging: I have reviewed all pertinent imaging results/findings within the past 24 hours.

## 2022-11-26 NOTE — PROGRESS NOTES
[facility] Medicine  Progress Note    Patient Name: Stephanie T Barthelemy  MRN: [id number]  Patient Class: OP- Observation   Admission Date: 11/25/2022  Length of Stay: 0 days  Attending Physician: Itz Robison MD  Primary Care Provider: Primary Doctor No        Subjective:     Principal Problem:Acute on chronic combined systolic and diastolic congestive heart failure        HPI:  51-year-old female who has a past medical history of attention deficit hyperactivity disorder, anemia, anxiety, bipolar disorder, history of hepatitis C, history of psychiatric hospitalization, hypertension, opioid overdose, psychosis, seizure disorder, sleep difficulties, substance abuse, therapy, and vasculitis who presents to the emergency department for evaluation of abdominal bloating; onset yesterday. Associated symptoms include: abdominal pain, bilateral leg swelling, and chest pain. The patient developed abdominal bloating accompanied by intermittent episodes of mid-abdominal pain and bilateral leg swelling; symptoms stated to have worsened since onset. Patient is prescribed lasix to combat symptoms, but lost the medication two days ago. Patient requested another prescription of lasix.In addition to the chief complaint, patient developed intermittent episodes of chest pain. She denies cough. She has no known allergies to medication    Vital signs are remarkable elevated blood pressure 146/98  Lab work up reveals elevated total bili 2.4, , , Na 133, initial Trop 0.049 ( baseline chronic Trop elevation), Hgb 10.7, MCV 76, bnp 2927.  Ekg shows Sinus tachycardia, right axis deviation, nonspecific intraventricular block and criteria for Septal infarct ,age undetermined.   Chest xray shows Stable enlargement of the cardiomediastinal silhouette with probable mild interstitial edema and small to moderate bilateral pleural effusions    Patient was given one dose iv lasix in the ed.       Overview/Hospital  Course:  No notes on file    Interval History: Patient seen and examined on bed.  Patient lying comfortably in the bed.  Patient became drowsy this morning for which ABG was obtained however later drowsiness improved, she went to bathroom and engaged in reasonable conversation.  No acute event happened overnight.  Patient not in distress.       Review of Systems   Constitutional:  Positive for fatigue. Negative for fever.   HENT:  Negative for congestion.    Eyes:  Negative for visual disturbance.   Respiratory:  Positive for shortness of breath.    Cardiovascular:  Positive for leg swelling. Negative for chest pain.   Gastrointestinal:  Negative for abdominal pain.   Genitourinary:  Negative for dysuria.   Musculoskeletal:  Negative for back pain.   Neurological:  Negative for syncope.   Psychiatric/Behavioral:  Negative for agitation.    Objective:     Vital Signs (Most Recent):  Temp: 97.5 °F (36.4 °C) (11/26/22 1234)  Pulse: 99 (11/26/22 1234)  Resp: 18 (11/26/22 1234)  BP: 110/86 (11/26/22 1234)  SpO2: 95 % (11/26/22 1234) Vital Signs (24h Range):  Temp:  [97.5 °F (36.4 °C)-98.3 °F (36.8 °C)] 97.5 °F (36.4 °C)  Pulse:  [] 99  Resp:  [18-26] 18  SpO2:  [92 %-100 %] 95 %  BP: (110-174)/() 110/86     Weight: 58.9 kg (129 lb 13.6 oz)  Body mass index is 25.36 kg/m².    Intake/Output Summary (Last 24 hours) at 11/26/2022 1446  Last data filed at 11/26/2022 1253  Gross per 24 hour   Intake 360 ml   Output --   Net 360 ml      Physical Exam  Constitutional:       General: She is not in acute distress.     Appearance: She is ill-appearing.   HENT:      Head: Normocephalic.      Mouth/Throat:      Mouth: Mucous membranes are dry.   Cardiovascular:      Rate and Rhythm: Normal rate.   Pulmonary:      Effort: No respiratory distress.      Breath sounds: Rales present.   Abdominal:      Tenderness: There is no abdominal tenderness.   Musculoskeletal:         General: Swelling present.   Skin:     General: Skin  is warm.      Capillary Refill: Capillary refill takes less than 2 seconds.   Neurological:      Mental Status: She is alert and oriented to person, place, and time.   Psychiatric:         Behavior: Behavior normal.       Significant Labs: All pertinent labs within the past 24 hours have been reviewed.    Significant Imaging: I have reviewed all pertinent imaging results/findings within the past 24 hours.      Assessment/Plan:      * Acute on chronic combined systolic and diastolic congestive heart failure  Current exacerbation is likely 2/2 non compliant  Patient is identified as having Combined Systolic and Diastolic heart failure that is Acute on chronic. CHF is currently uncontrolled due to Rales/crackles on pulmonary exam. Latest ECHO performed and demonstrates- Results for orders placed during the hospital encounter of 11/04/22    Echo    Interpretation Summary  · The left ventricle is mildly enlarged with eccentric hypertrophy and severely decreased systolic function.  · The estimated ejection fraction is 20%.  · Left ventricular diastolic dysfunction.  · There is left ventricular global hypokinesis.  · There is abnormal septal wall motion.  · Mild right ventricular enlargement with mildly reduced right ventricular systolic function.  · Severe left atrial enlargement.  · Severe mitral regurgitation.  · The estimated PA systolic pressure is 29 mmHg.  · Normal central venous pressure (3 mmHg).  . Continue Furosemide and monitor clinical status closely. Monitor on telemetry. Patient is on CHF pathway.  Monitor strict Is&Os and daily weights.  Place on fluid restriction of 1.5 L. Continue to stress to patient importance of self efficacy and  on diet for CHF. Last BNP reviewed- and noted below   Recent Labs   Lab 11/25/22  2243   BNP 3,871*     Continue lasix  I&O  Daily weight  Consult to cardiology      Essential hypertension  Continue lisinopril  Bp monitoring     Elevated LFTs  Likely 2/2 congested  liver.   Diuresing   Continue to monitor lft.         VTE Risk Mitigation (From admission, onward)         Ordered     heparin (porcine) injection 5,000 Units  Every 8 hours         11/25/22 2218     IP VTE HIGH RISK PATIENT  Once         11/25/22 2218     Place sequential compression device  Until discontinued         11/25/22 2218                Discharge Planning   AYSHA:      Code Status: Full Code   Is the patient medically ready for discharge?:     Reason for patient still in hospital (select all that apply): Treatment                     Itz Robison MD  Department of Hospital Medicine   [facility]

## 2022-11-26 NOTE — ASSESSMENT & PLAN NOTE
Current exacerbation is likely 2/2 non compliant  Patient is identified as having Combined Systolic and Diastolic heart failure that is Acute on chronic. CHF is currently uncontrolled due to Rales/crackles on pulmonary exam. Latest ECHO performed and demonstrates- Results for orders placed during the hospital encounter of 11/04/22    Echo    Interpretation Summary  · The left ventricle is mildly enlarged with eccentric hypertrophy and severely decreased systolic function.  · The estimated ejection fraction is 20%.  · Left ventricular diastolic dysfunction.  · There is left ventricular global hypokinesis.  · There is abnormal septal wall motion.  · Mild right ventricular enlargement with mildly reduced right ventricular systolic function.  · Severe left atrial enlargement.  · Severe mitral regurgitation.  · The estimated PA systolic pressure is 29 mmHg.  · Normal central venous pressure (3 mmHg).  . Continue Furosemide and monitor clinical status closely. Monitor on telemetry. Patient is on CHF pathway.  Monitor strict Is&Os and daily weights.  Place on fluid restriction of 1.5 L. Continue to stress to patient importance of self efficacy and  on diet for CHF. Last BNP reviewed- and noted below   Recent Labs   Lab 11/25/22  2243   BNP 3,871*     Continue lasix  I&O  Daily weight  Consult to cardiology

## 2022-11-26 NOTE — H&P
"[facility] Medicine  History & Physical    Patient Name: Stephanie T Barthelemy  MRN: [id number]  Patient Class: OP- Observation  Admission Date: 11/25/2022  Attending Physician: Maryan Irby MD   Primary Care Provider: Primary Doctor No         Patient information was obtained from patient, past medical records and ER records.     Subjective:     Principal Problem:Acute on chronic combined systolic and diastolic congestive heart failure    Chief Complaint:   Chief Complaint   Patient presents with    Abdominal Pain     Pt. Just discharged. Pt states " She wants to check back in to get admitted, she says the doctor gave her a choice to stay or get admitted"  She decided to leave but doesn't have a ride and would like to get admitted. Complains of sharp pains in abdomen.      Shortness of Breath    Leg Swelling     Patient reports abdominal pain, distension, and leg swelling. She also reports shortness of breath.         HPI: 51-year-old female who has a past medical history of attention deficit hyperactivity disorder, anemia, anxiety, bipolar disorder, history of hepatitis C, history of psychiatric hospitalization, hypertension, opioid overdose, psychosis, seizure disorder, sleep difficulties, substance abuse, therapy, and vasculitis who presents to the emergency department for evaluation of abdominal bloating; onset yesterday. Associated symptoms include: abdominal pain, bilateral leg swelling, and chest pain. The patient developed abdominal bloating accompanied by intermittent episodes of mid-abdominal pain and bilateral leg swelling; symptoms stated to have worsened since onset. Patient is prescribed lasix to combat symptoms, but lost the medication two days ago. Patient requested another prescription of lasix.In addition to the chief complaint, patient developed intermittent episodes of chest pain. She denies cough. She has no known allergies to medication    Vital signs are remarkable elevated " blood pressure 146/98  Lab work up reveals elevated total bili 2.4, , , Na 133, initial Trop 0.049 ( baseline chronic Trop elevation), Hgb 10.7, MCV 76, bnp 2927.  Ekg shows Sinus tachycardia, right axis deviation, nonspecific intraventricular block and criteria for Septal infarct ,age undetermined.   Chest xray shows Stable enlargement of the cardiomediastinal silhouette with probable mild interstitial edema and small to moderate bilateral pleural effusions    Patient was given one dose iv lasix in the ed.       Past Medical History:   Diagnosis Date    ADHD (attention deficit hyperactivity disorder)     Anemia     Anxiety     Bipolar disorder     CHF (congestive heart failure)     History of hepatitis C - s/p clearance of virus (HCV neg 6/2015) 09/26/2014    History of psychiatric hospitalization     History of substance abuse     IV heroin - last use 2013 per pt    Hx of psychiatric care     Hypertension     Opioid overdose 05/10/2016    Psychiatric problem     Psychosis     Seizure disorder 04/03/2019    Sleep difficulties     Substance abuse     Therapy     Vasculitis        Past Surgical History:   Procedure Laterality Date    HYSTERECTOMY  3/16/2011    SALPINGOOPHORECTOMY Right 3/16/2011    TUBAL LIGATION      Vaginal cuff repair  04/22/2011       Review of patient's allergies indicates:  No Known Allergies    Current Facility-Administered Medications on File Prior to Encounter   Medication    [COMPLETED] furosemide injection 80 mg     Current Outpatient Medications on File Prior to Encounter   Medication Sig    albuterol (PROVENTIL/VENTOLIN HFA) 90 mcg/actuation inhaler Inhale 2 puffs into the lungs every 6 (six) hours. Rescue    benztropine (COGENTIN) 0.5 MG tablet Take 1 tablet (0.5 mg total) by mouth 2 (two) times daily.    famotidine (PEPCID) 20 MG tablet Take 1 tablet (20 mg total) by mouth 2 (two) times daily.    FLUoxetine 20 MG capsule Take 20 mg by mouth once daily.    furosemide  (LASIX) 20 MG tablet Take 1 tablet (20 mg total) by mouth 2 (two) times daily.    gabapentin (NEURONTIN) 300 MG capsule Take 1 capsule (300 mg total) by mouth 3 (three) times daily.    hydrOXYzine pamoate (VISTARIL) 50 MG Cap Take 1 capsule (50 mg total) by mouth every 8 (eight) hours as needed (anxiety, insomnia).    lisinopriL (PRINIVIL,ZESTRIL) 40 MG tablet Take 1 tablet (40 mg total) by mouth once daily.    nicotine (NICODERM CQ) 21 mg/24 hr Place 1 patch onto the skin once daily.    OXcarbazepine (TRILEPTAL) 300 MG Tab Take 1 tablet (300 mg total) by mouth 2 (two) times daily.    propranoloL (INDERAL) 10 MG tablet Take 1 tablet (10 mg total) by mouth 3 (three) times daily.    QUEtiapine (SEROQUEL) 50 MG tablet Take 1 tablet (50 mg total) by mouth every evening.    [DISCONTINUED] amLODIPine (NORVASC) 5 MG tablet Take 1 tablet (5 mg total) by mouth once daily.    [DISCONTINUED] furosemide (LASIX) 20 MG tablet Take 1 tablet (20 mg total) by mouth 2 (two) times daily.    [DISCONTINUED] metoprolol succinate (TOPROL-XL) 25 MG 24 hr tablet Take 0.5 tablets (12.5 mg total) by mouth once daily.    [DISCONTINUED] risperiDONE (RISPERDAL) 1 MG tablet Take 1 mg by mouth 2 (two) times daily.     Family History       Problem Relation (Age of Onset)    Cancer Maternal Grandmother    Diabetes Maternal Grandmother          Tobacco Use    Smoking status: Every Day     Packs/day: 1.00     Years: 36.00     Pack years: 36.00     Types: Cigarettes     Start date: 1986    Smokeless tobacco: Never    Tobacco comments:     Enrolled in [email or web address] on 10/23/14 ([employer] Member ID # [id number])  Ambulatory referral to Smoking Cessation clinic.   Substance and Sexual Activity    Alcohol use: No    Drug use: Yes     Types: Heroin, Cocaine, Methamphetamines, Marijuana    Sexual activity: Not Currently     Partners: Male, Female     Birth control/protection: Other-see comments     Comment: hysterectomy     Review of Systems   Constitutional:  Negative.    HENT: Negative.     Eyes: Negative.    Respiratory:  Positive for shortness of breath.    Cardiovascular:  Positive for leg swelling.   Gastrointestinal:  Positive for abdominal distention.   Endocrine: Negative.    Genitourinary: Negative.    Musculoskeletal: Negative.    Skin: Negative.    Allergic/Immunologic: Negative.    Neurological: Negative.    Hematological: Negative.    Psychiatric/Behavioral: Negative.     Objective:     Vital Signs (Most Recent):  Temp: 98.1 °F (36.7 °C) (11/25/22 1653)  Pulse: 107 (11/25/22 2143)  Resp: 18 (11/25/22 1653)  BP: (!) 146/98 (11/25/22 2113)  SpO2: 100 % (11/25/22 2147)   Vital Signs (24h Range):  Temp:  [97.6 °F (36.4 °C)-98.1 °F (36.7 °C)] 98.1 °F (36.7 °C)  Pulse:  [102-108] 107  Resp:  [18-26] 18  SpO2:  [96 %-100 %] 100 %  BP: (130-146)/() 146/98     Weight: 54.5 kg (120 lb 2.4 oz)  Body mass index is 23.47 kg/m².    Physical Exam  Constitutional:       Appearance: Normal appearance.   HENT:      Head: Normocephalic and atraumatic.   Eyes:      Pupils: Pupils are equal, round, and reactive to light.   Cardiovascular:      Rate and Rhythm: Normal rate and regular rhythm.   Pulmonary:      Breath sounds: Rales present.   Abdominal:      General: There is distension.      Palpations: Abdomen is soft.   Musculoskeletal:         General: Swelling (2+ pitting edema of le) present.      Cervical back: Normal range of motion.      Right lower leg: Edema present.      Left lower leg: Edema present.   Skin:     General: Skin is warm and dry.   Neurological:      General: No focal deficit present.      Mental Status: She is alert and oriented to person, place, and time.   Psychiatric:         Mood and Affect: Mood normal.         CRANIAL NERVES     CN III, IV, VI   Pupils are equal, round, and reactive to light.     Significant Labs: All pertinent labs within the past 24 hours have been reviewed.  CBC:   Recent Labs   Lab 11/25/22  1128 11/25/22  2243   WBC 6.76  7.73   HGB 10.7* 11.6*   HCT 36.1* 39.6    335     CMP:   Recent Labs   Lab 11/25/22  1128   *   K 4.3   CL 98   CO2 21*      BUN 31*   CREATININE 1.1   CALCIUM 9.6   PROT 7.0   ALBUMIN 3.1*   BILITOT 2.4*   ALKPHOS 101   *   *   ANIONGAP 14     Cardiac Markers:   Recent Labs   Lab 11/25/22  1128   BNP 2,927*     Troponin:   Recent Labs   Lab 11/25/22  1128   TROPONINI 0.049*       Significant Imaging: I have reviewed all pertinent imaging results/findings within the past 24 hours.    Assessment/Plan:     * Acute on chronic combined systolic and diastolic congestive heart failure  Current exacerbation is likely 2/2 non compliant  Patient is identified as having Combined Systolic and Diastolic heart failure that is Acute on chronic. CHF is currently uncontrolled due to Rales/crackles on pulmonary exam. Latest ECHO performed and demonstrates- Results for orders placed during the hospital encounter of 11/04/22    Echo    Interpretation Summary  · The left ventricle is mildly enlarged with eccentric hypertrophy and severely decreased systolic function.  · The estimated ejection fraction is 20%.  · Left ventricular diastolic dysfunction.  · There is left ventricular global hypokinesis.  · There is abnormal septal wall motion.  · Mild right ventricular enlargement with mildly reduced right ventricular systolic function.  · Severe left atrial enlargement.  · Severe mitral regurgitation.  · The estimated PA systolic pressure is 29 mmHg.  · Normal central venous pressure (3 mmHg).  . Continue Furosemide and monitor clinical status closely. Monitor on telemetry. Patient is on CHF pathway.  Monitor strict Is&Os and daily weights.  Place on fluid restriction of 1.5 L. Continue to stress to patient importance of self efficacy and  on diet for CHF. Last BNP reviewed- and noted below   Recent Labs   Lab 11/25/22  1128   BNP 2,927*   .      Elevated LFTs  Likely 2/2 congested liver.    Diuresing   Continue to monitor lft.       Essential hypertension  Resume home mediaction        VTE Risk Mitigation (From admission, onward)           Ordered     heparin (porcine) injection 5,000 Units  Every 8 hours         11/25/22 2218     IP VTE HIGH RISK PATIENT  Once         11/25/22 2218     Place sequential compression device  Until discontinued         11/25/22 2218                  Code status : full code   Observation status because patient will likely require less than 2 overnight hospital stay    Jorge Bragg MD  Department of Hospital Medicine   [facility]

## 2022-11-26 NOTE — HPI
51-year-old female who has a past medical history of attention deficit hyperactivity disorder, anemia, anxiety, bipolar disorder, history of hepatitis C, history of psychiatric hospitalization, hypertension, opioid overdose, psychosis, seizure disorder, sleep difficulties, substance abuse, therapy, and vasculitis who presents to the emergency department for evaluation of abdominal bloating; onset yesterday. Associated symptoms include: abdominal pain, bilateral leg swelling, and chest pain. The patient developed abdominal bloating accompanied by intermittent episodes of mid-abdominal pain and bilateral leg swelling; symptoms stated to have worsened since onset. Patient is prescribed lasix to combat symptoms, but lost the medication two days ago. Patient requested another prescription of lasix.In addition to the chief complaint, patient developed intermittent episodes of chest pain. She denies cough. She has no known allergies to medication    Vital signs are remarkable elevated blood pressure 146/98  Lab work up reveals elevated total bili 2.4, , , Na 133, initial Trop 0.049 ( baseline chronic Trop elevation), Hgb 10.7, MCV 76, bnp 2927.  Ekg shows Sinus tachycardia, right axis deviation, nonspecific intraventricular block and criteria for Septal infarct ,age undetermined.   Chest xray shows Stable enlargement of the cardiomediastinal silhouette with probable mild interstitial edema and small to moderate bilateral pleural effusions    Patient was given one dose iv lasix in the ed.

## 2022-11-26 NOTE — ASSESSMENT & PLAN NOTE
Current exacerbation is likely 2/2 non compliant  Patient is identified as having Combined Systolic and Diastolic heart failure that is Acute on chronic. CHF is currently uncontrolled due to Rales/crackles on pulmonary exam. Latest ECHO performed and demonstrates- Results for orders placed during the hospital encounter of 11/04/22    Echo    Interpretation Summary  · The left ventricle is mildly enlarged with eccentric hypertrophy and severely decreased systolic function.  · The estimated ejection fraction is 20%.  · Left ventricular diastolic dysfunction.  · There is left ventricular global hypokinesis.  · There is abnormal septal wall motion.  · Mild right ventricular enlargement with mildly reduced right ventricular systolic function.  · Severe left atrial enlargement.  · Severe mitral regurgitation.  · The estimated PA systolic pressure is 29 mmHg.  · Normal central venous pressure (3 mmHg).  . Continue Furosemide and monitor clinical status closely. Monitor on telemetry. Patient is on CHF pathway.  Monitor strict Is&Os and daily weights.  Place on fluid restriction of 1.5 L. Continue to stress to patient importance of self efficacy and  on diet for CHF. Last BNP reviewed- and noted below   Recent Labs   Lab 11/25/22  1128   BNP 2,927*   .

## 2022-11-26 NOTE — NURSING
MD Enriqueta, at bedside. Patient drowsy but awake and up to toilet. Patient requesting food and orange juice.

## 2022-11-26 NOTE — PLAN OF CARE
Problem: Adult Inpatient Plan of Care  Goal: Plan of Care Review  Outcome: Ongoing, Progressing  Goal: Patient-Specific Goal (Individualized)  Outcome: Ongoing, Progressing  Goal: Absence of Hospital-Acquired Illness or Injury  Outcome: Ongoing, Progressing  Goal: Optimal Comfort and Wellbeing  Outcome: Ongoing, Progressing  Goal: Readiness for Transition of Care  Outcome: Ongoing, Progressing     Problem: Cardiac Output Decreased (Heart Failure)  Goal: Optimal Cardiac Output  Outcome: Ongoing, Progressing     Problem: Fluid Imbalance (Heart Failure)  Goal: Fluid Balance  Outcome: Ongoing, Progressing     Problem: Functional Ability Impaired (Heart Failure)  Goal: Optimal Functional Ability  Outcome: Ongoing, Progressing     Problem: Respiratory Compromise (Heart Failure)  Goal: Effective Oxygenation and Ventilation  Outcome: Ongoing, Progressing     Problem: Fall Injury Risk  Goal: Absence of Fall and Fall-Related Injury  Outcome: Ongoing, Progressing     Problem: Hypertension Comorbidity  Goal: Blood Pressure in Desired Range  Outcome: Ongoing, Progressing     Problem: Seizure Disorder Comorbidity  Goal: Maintenance of Seizure Control  Outcome: Ongoing, Progressing     Problem: Pain Acute  Goal: Acceptable Pain Control and Functional Ability  Outcome: Ongoing, Progressing

## 2022-11-27 PROBLEM — I42.9 CARDIOMYOPATHY: Status: ACTIVE | Noted: 2022-11-27

## 2022-11-27 PROBLEM — R94.31 ABNORMAL EKG: Status: ACTIVE | Noted: 2022-11-27

## 2022-11-27 LAB
AMPHET+METHAMPHET UR QL: ABNORMAL
ANION GAP SERPL CALC-SCNC: 15 MMOL/L (ref 8–16)
BARBITURATES UR QL SCN>200 NG/ML: NEGATIVE
BENZODIAZ UR QL SCN>200 NG/ML: NEGATIVE
BUN SERPL-MCNC: 30 MG/DL (ref 6–20)
BZE UR QL SCN: NEGATIVE
CALCIUM SERPL-MCNC: 8.9 MG/DL (ref 8.7–10.5)
CANNABINOIDS UR QL SCN: NEGATIVE
CHLORIDE SERPL-SCNC: 100 MMOL/L (ref 95–110)
CO2 SERPL-SCNC: 23 MMOL/L (ref 23–29)
CREAT SERPL-MCNC: 1.2 MG/DL (ref 0.5–1.4)
CREAT UR-MCNC: 8 MG/DL (ref 15–325)
EST. GFR  (NO RACE VARIABLE): 55 ML/MIN/1.73 M^2
ETHANOL UR-MCNC: <10 MG/DL
ETHANOL UR-MCNC: <10 MG/DL
GLUCOSE SERPL-MCNC: 97 MG/DL (ref 70–110)
METHADONE UR QL SCN>300 NG/ML: NEGATIVE
OPIATES UR QL SCN: NEGATIVE
PCP UR QL SCN>25 NG/ML: NEGATIVE
POTASSIUM SERPL-SCNC: 3.6 MMOL/L (ref 3.5–5.1)
SODIUM SERPL-SCNC: 138 MMOL/L (ref 136–145)
TOXICOLOGY INFORMATION: ABNORMAL

## 2022-11-27 PROCEDURE — 96375 TX/PRO/DX INJ NEW DRUG ADDON: CPT

## 2022-11-27 PROCEDURE — 96367 TX/PROPH/DG ADDL SEQ IV INF: CPT

## 2022-11-27 PROCEDURE — G0378 HOSPITAL OBSERVATION PER HR: HCPCS

## 2022-11-27 PROCEDURE — 94640 AIRWAY INHALATION TREATMENT: CPT | Mod: XB

## 2022-11-27 PROCEDURE — S4991 NICOTINE PATCH NONLEGEND: HCPCS | Performed by: HOSPITALIST

## 2022-11-27 PROCEDURE — G0427 INPT/ED TELECONSULT70: HCPCS | Mod: 95,,, | Performed by: STUDENT IN AN ORGANIZED HEALTH CARE EDUCATION/TRAINING PROGRAM

## 2022-11-27 PROCEDURE — 27000221 HC OXYGEN, UP TO 24 HOURS

## 2022-11-27 PROCEDURE — G0427 PR INPT TELEHEALTH CON 70/>M: ICD-10-PCS | Mod: 95,,, | Performed by: STUDENT IN AN ORGANIZED HEALTH CARE EDUCATION/TRAINING PROGRAM

## 2022-11-27 PROCEDURE — 25000003 PHARM REV CODE 250: Performed by: STUDENT IN AN ORGANIZED HEALTH CARE EDUCATION/TRAINING PROGRAM

## 2022-11-27 PROCEDURE — 94761 N-INVAS EAR/PLS OXIMETRY MLT: CPT

## 2022-11-27 PROCEDURE — 63600175 PHARM REV CODE 636 W HCPCS: Performed by: HOSPITALIST

## 2022-11-27 PROCEDURE — 80048 BASIC METABOLIC PNL TOTAL CA: CPT | Performed by: HOSPITALIST

## 2022-11-27 PROCEDURE — 63600175 PHARM REV CODE 636 W HCPCS: Performed by: STUDENT IN AN ORGANIZED HEALTH CARE EDUCATION/TRAINING PROGRAM

## 2022-11-27 PROCEDURE — 99900035 HC TECH TIME PER 15 MIN (STAT)

## 2022-11-27 PROCEDURE — 25000003 PHARM REV CODE 250: Performed by: HOSPITALIST

## 2022-11-27 PROCEDURE — 36415 COLL VENOUS BLD VENIPUNCTURE: CPT | Performed by: HOSPITALIST

## 2022-11-27 PROCEDURE — 96372 THER/PROPH/DIAG INJ SC/IM: CPT | Mod: 59 | Performed by: HOSPITALIST

## 2022-11-27 PROCEDURE — 96365 THER/PROPH/DIAG IV INF INIT: CPT

## 2022-11-27 PROCEDURE — 80307 DRUG TEST PRSMV CHEM ANLYZR: CPT | Performed by: INTERNAL MEDICINE

## 2022-11-27 PROCEDURE — 25000003 PHARM REV CODE 250: Performed by: INTERNAL MEDICINE

## 2022-11-27 PROCEDURE — 96376 TX/PRO/DX INJ SAME DRUG ADON: CPT

## 2022-11-27 PROCEDURE — 63600175 PHARM REV CODE 636 W HCPCS: Performed by: INTERNAL MEDICINE

## 2022-11-27 RX ORDER — FOLIC ACID 1 MG/1
1 TABLET ORAL DAILY
Status: DISCONTINUED | OUTPATIENT
Start: 2022-11-27 | End: 2022-11-30 | Stop reason: HOSPADM

## 2022-11-27 RX ORDER — CLONAZEPAM 0.5 MG/1
1 TABLET ORAL ONCE
Status: COMPLETED | OUTPATIENT
Start: 2022-11-27 | End: 2022-11-27

## 2022-11-27 RX ORDER — FUROSEMIDE 10 MG/ML
40 INJECTION INTRAMUSCULAR; INTRAVENOUS ONCE
Status: COMPLETED | OUTPATIENT
Start: 2022-11-27 | End: 2022-11-27

## 2022-11-27 RX ADMIN — LISINOPRIL 40 MG: 20 TABLET ORAL at 08:11

## 2022-11-27 RX ADMIN — ALBUTEROL SULFATE 2 PUFF: 90 AEROSOL, METERED RESPIRATORY (INHALATION) at 12:11

## 2022-11-27 RX ADMIN — FUROSEMIDE 40 MG: 10 INJECTION, SOLUTION INTRAMUSCULAR; INTRAVENOUS at 05:11

## 2022-11-27 RX ADMIN — FAMOTIDINE 20 MG: 20 TABLET ORAL at 08:11

## 2022-11-27 RX ADMIN — HEPARIN SODIUM 5000 UNITS: 5000 INJECTION, SOLUTION INTRAVENOUS; SUBCUTANEOUS at 09:11

## 2022-11-27 RX ADMIN — HEPARIN SODIUM 5000 UNITS: 5000 INJECTION, SOLUTION INTRAVENOUS; SUBCUTANEOUS at 02:11

## 2022-11-27 RX ADMIN — GABAPENTIN 300 MG: 300 CAPSULE ORAL at 08:11

## 2022-11-27 RX ADMIN — FLUOXETINE HYDROCHLORIDE 20 MG: 20 CAPSULE ORAL at 08:11

## 2022-11-27 RX ADMIN — PROPRANOLOL HYDROCHLORIDE 10 MG: 10 TABLET ORAL at 08:11

## 2022-11-27 RX ADMIN — OXCARBAZEPINE 300 MG: 150 TABLET, FILM COATED ORAL at 08:11

## 2022-11-27 RX ADMIN — HYDROXYZINE PAMOATE 50 MG: 25 CAPSULE ORAL at 04:11

## 2022-11-27 RX ADMIN — CLONAZEPAM 1 MG: 0.5 TABLET ORAL at 05:11

## 2022-11-27 RX ADMIN — NICOTINE 1 PATCH: 21 PATCH, EXTENDED RELEASE TRANSDERMAL at 08:11

## 2022-11-27 RX ADMIN — FUROSEMIDE 20 MG: 10 INJECTION, SOLUTION INTRAMUSCULAR; INTRAVENOUS at 08:11

## 2022-11-27 RX ADMIN — HEPARIN SODIUM 5000 UNITS: 5000 INJECTION, SOLUTION INTRAVENOUS; SUBCUTANEOUS at 05:11

## 2022-11-27 RX ADMIN — FOLIC ACID 1 MG: 1 TABLET ORAL at 10:11

## 2022-11-27 RX ADMIN — ALBUTEROL SULFATE 2 PUFF: 90 AEROSOL, METERED RESPIRATORY (INHALATION) at 08:11

## 2022-11-27 RX ADMIN — THIAMINE HYDROCHLORIDE 100 MG: 100 INJECTION, SOLUTION INTRAMUSCULAR; INTRAVENOUS at 10:11

## 2022-11-27 RX ADMIN — FUROSEMIDE 20 MG: 10 INJECTION, SOLUTION INTRAMUSCULAR; INTRAVENOUS at 05:11

## 2022-11-27 NOTE — ASSESSMENT & PLAN NOTE
Patient has been going to deep sleep intermittently and then wakes up on her own and follows commands appropriately  I spoke to her mother in detail who said she was using drugs including marijuana til few months ago, and her sleep has been very deep throughout the day for many months   Will get UDS, ethanol level  Thiamine, folic acid  Consult to psych for eval and recommendation

## 2022-11-27 NOTE — CONSULTS
[facility] Initial Consultation Note    Reason for Consultation:  HFrEF    HPI:  51 y.o. with known severe LV dysfunction presents to hospital due to dyspnea and chest discomfort.  BNP was >3800 and she had stopped taking Lasix for at least a few days prior to admission.  She has gotten a little better with diuresis.  No suggestion for acute coronary syndrome.    Past Medical History:   Diagnosis Date    ADHD (attention deficit hyperactivity disorder)     Anemia     Anxiety     Bipolar disorder     CHF (congestive heart failure)     History of hepatitis C - s/p clearance of virus (HCV neg 6/2015) 09/26/2014    History of psychiatric hospitalization     History of substance abuse     IV heroin - last use 2013 per pt    Hx of psychiatric care     Hypertension     Opioid overdose 05/10/2016    Psychiatric problem     Psychosis     Seizure disorder 04/03/2019    Sleep difficulties     Substance abuse     Therapy     Vasculitis      Past Surgical History:   Procedure Laterality Date    HYSTERECTOMY  3/16/2011    SALPINGOOPHORECTOMY Right 3/16/2011    TUBAL LIGATION      Vaginal cuff repair  04/22/2011     Review of patient's allergies indicates:  No Known Allergies    Social History     Socioeconomic History    Marital status: Single    Number of children: 2   Tobacco Use    Smoking status: Every Day     Packs/day: 1.00     Years: 36.00     Pack years: 36.00     Types: Cigarettes     Start date: 1986    Smokeless tobacco: Never    Tobacco comments:     Enrolled in [employer] on 10/23/14 ([employer] Member ID # [id number])  Ambulatory referral to Smoking Cessation clinic.   Substance and Sexual Activity    Alcohol use: No    Drug use: Yes     Types: Heroin, Cocaine, Methamphetamines, Marijuana    Sexual activity: Not Currently     Partners: Male, Female     Birth control/protection: Other-see comments     Comment: hysterectomy   Other Topics Concern    Patient feels they ought to cut down on drinking/drug use No     Patient annoyed by others criticizing their drinking/drug use No    Patient has felt bad or guilty about drinking/drug use No    Patient has had a drink/used drugs as an eye opener in the AM No     Social Determinants of Health     Financial Resource Strain: Low Risk     Difficulty of Paying Living Expenses: Not hard at all   Food Insecurity: No Food Insecurity    Worried About Running Out of Food in the Last Year: Never true    Ran Out of Food in the Last Year: Never true   Transportation Needs: No Transportation Needs    Lack of Transportation (Medical): No    Lack of Transportation (Non-Medical): No   Stress: Stress Concern Present    Feeling of Stress : To some extent   Social Connections: Unknown    Frequency of Communication with Friends and Family: More than three times a week    Frequency of Social Gatherings with Friends and Family: More than three times a week    Active Member of Clubs or Organizations: No    Attends Club or Organization Meetings: Never   Housing Stability: Low Risk     Unable to Pay for Housing in the Last Year: No    Number of Places Lived in the Last Year: 1    Unstable Housing in the Last Year: No     Family History   Problem Relation Age of Onset    Diabetes Maternal Grandmother     Cancer Maternal Grandmother        ROS:  No joint pain.  No nausea/vomiting. +dyspnea. +chest pain.  No palpitations.  +cough.  No fever.  +palpitations.  The remainder of the review of systems was reviewed and negative.      Current Hospital Medications  Prior to Admission medications    Medication Sig Start Date End Date Taking? Authorizing Provider   albuterol (PROVENTIL/VENTOLIN HFA) 90 mcg/actuation inhaler Inhale 2 puffs into the lungs every 6 (six) hours. Rescue 8/11/22 8/11/23 Yes Nelly Pandey MD   FLUoxetine 20 MG capsule Take 20 mg by mouth once daily.   Yes Historical Provider   furosemide (LASIX) 20 MG tablet Take 1 tablet (20 mg total) by mouth 2 (two) times daily. 11/25/22  12/25/22 Yes Diana Rodriguez MD   gabapentin (NEURONTIN) 300 MG capsule Take 1 capsule (300 mg total) by mouth 3 (three) times daily. 8/11/22 8/11/23 Yes Nelly Pandey MD   hydrOXYzine pamoate (VISTARIL) 50 MG Cap Take 1 capsule (50 mg total) by mouth every 8 (eight) hours as needed (anxiety, insomnia). 8/11/22  Yes Nelly Pandey MD   lisinopriL (PRINIVIL,ZESTRIL) 40 MG tablet Take 1 tablet (40 mg total) by mouth once daily. 8/11/22 8/11/23 Yes Nlely Pandey MD   propranoloL (INDERAL) 10 MG tablet Take 1 tablet (10 mg total) by mouth 3 (three) times daily. 8/11/22 8/11/23 Yes Nelly Pandey MD   benztropine (COGENTIN) 0.5 MG tablet Take 1 tablet (0.5 mg total) by mouth 2 (two) times daily. 8/11/22 8/11/23  Nelly Pandey MD   famotidine (PEPCID) 20 MG tablet Take 1 tablet (20 mg total) by mouth 2 (two) times daily. 8/11/22 8/11/23  Nelly Pandey MD   nicotine (NICODERM CQ) 21 mg/24 hr Place 1 patch onto the skin once daily. 11/7/22   Nelly Pandey MD   OXcarbazepine (TRILEPTAL) 300 MG Tab Take 1 tablet (300 mg total) by mouth 2 (two) times daily. 7/26/22 7/26/23  Nelly Pandey MD   QUEtiapine (SEROQUEL) 50 MG tablet Take 1 tablet (50 mg total) by mouth every evening. 11/7/22 11/7/23  Nelly Pandey MD   amLODIPine (NORVASC) 5 MG tablet Take 1 tablet (5 mg total) by mouth once daily. 9/8/21 7/26/22  Petty Ibanez MD   metoprolol succinate (TOPROL-XL) 25 MG 24 hr tablet Take 0.5 tablets (12.5 mg total) by mouth once daily. 7/26/22 8/11/22  Nelly Pandey MD   risperiDONE (RISPERDAL) 1 MG tablet Take 1 mg by mouth 2 (two) times daily. 5/19/22 8/11/22  Historical Provider       Scheduled Meds:   albuterol  2 puff Inhalation Q6H    clonazePAM  0.5 mg Oral BID    famotidine  20 mg Oral Daily    FLUoxetine  20 mg Oral Daily    furosemide (LASIX) injection  20 mg Intravenous BID    gabapentin  300 mg  Oral TID    heparin (porcine)  5,000 Units Subcutaneous Q8H    lisinopriL  40 mg Oral Daily    nicotine  1 patch Transdermal Daily    OXcarbazepine  300 mg Oral BID    potassium chloride  40 mEq Oral Once    propranoloL  10 mg Oral TID    QUEtiapine  50 mg Oral QHS     Continuous Infusions:  PRN: hydrOXYzine pamoate, influenza, ondansetron, pneumoc 20-dion conj-dip cr(PF), sodium chloride 0.9%    VITAL SIGNS  BP (!) 116/90 (BP Location: Right arm)   Pulse 73   Temp 97.7 °F (36.5 °C) (Oral)   Resp 18   Ht 5' (1.524 m)   Wt 61.2 kg (134 lb 14.7 oz)   LMP 10/09/2019 (Within Weeks)   SpO2 100%   Breastfeeding No   BMI 26.35 kg/m²     Intake/Output Summary (Last 24 hours) at 11/27/2022 0830  Last data filed at 11/27/2022 0745  Gross per 24 hour   Intake 900 ml   Output 2600 ml   Net -1700 ml       OBJECTIVE    Physical Exam  Temp:  [96.9 °F (36.1 °C)-98.3 °F (36.8 °C)] 97.7 °F (36.5 °C)  Pulse:  [] 73  Resp:  [17-20] 18  SpO2:  [95 %-100 %] 100 %  BP: (110-125)/(59-93) 116/90    Gen: Patient awake and alert, in NAD  Eyes: Pupils equal and round.  Sclerae anicteric, noninjected conjunctivae.  HENT: NC/AT, nasal septum midline, MMM, OP clear and without exudates.  CV: Regular rhythm.  Normal S1, S2.  No murmurs, rub or gallop.  JVP normal.  Chest:  Symmetric chest wall expansion.  Good air movement.  No wheezes or crackles.  Abd:  Soft, Non-tender.  Non distended.  Normoactive bowel sounds, no rebound  Ext: +2 radial pulses, no C/C/E, warm to touch  Skin: intact, no lesions or rashes noted.  No decubitus ulcer.  Neuro:  Moves all extremities grossly.  Tongue midline.  Psych: Appropriate affect    Lab Results  Recent Labs   Lab 11/27/22  0619      K 3.6      CO2 23   BUN 30*   CREATININE 1.2     No results for input(s): CPK, CPKMB, TROPONINI, MB in the last 24 hours.  Recent Labs   Lab 11/26/22  1115   WBC 5.90   RBC 4.61   HGB 10.3*   HCT 34.5*      MCV 75*   MCH 22.3*   MCHC 29.9*     No  results for input(s): PT, INR, APTT in the last 24 hours.    EKG 11/26/22:    Sinus tachycardia at 110 with voltage pattern compatible with dilated cardiomyopathy, unchanged from 8/3/22 tracing.      Echo 11/5/22:  The left ventricle is mildly enlarged with eccentric hypertrophy and severely decreased systolic function.  The estimated ejection fraction is 20%.  Left ventricular diastolic dysfunction.  There is left ventricular global hypokinesis.  There is abnormal septal wall motion.  Mild right ventricular enlargement with mildly reduced right ventricular systolic function.  Severe left atrial enlargement.  Severe mitral regurgitation.  The estimated PA systolic pressure is 29 mmHg.  Normal central venous pressure (3 mmHg).       ASSESSMENT/PLAN    1.  HFrEF:  patient has known LV systolic dysfunction and has been off her medications.  This has led to acute on chronic combined systolic/diastolic heart failure.  BNP is >3800 with creatinine of 1.2 mg/dl.  Her cardiomyopathy is almost certainly non-ischemic in nature.  --  Resume diuretic therapy along with beta blocker, lisinopril 40mg and spironolactone.  --  Might prefer metoprolol XL for heart failure over propranolol.  --  There is no need for additional cardiac work up at this time.  --  Follow up with cardiology as outpatient.  --  Low salt diet and daily weights.         Krishan Araujo MD  [facility]

## 2022-11-27 NOTE — NURSING
Inpatient consult to telemedicine-psych ordered due to patients mother reporting that the patients sleep has been this way for multiple months and that patient has been using multiple drugs.

## 2022-11-27 NOTE — ASSESSMENT & PLAN NOTE
Current exacerbation is likely 2/2 non compliant  Patient is identified as having Combined Systolic and Diastolic heart failure that is Acute on chronic. CHF is currently uncontrolled due to Rales/crackles on pulmonary exam. Latest ECHO performed and demonstrates- Results for orders placed during the hospital encounter of 11/04/22    Echo    Interpretation Summary  · The left ventricle is mildly enlarged with eccentric hypertrophy and severely decreased systolic function.  · The estimated ejection fraction is 20%.  · Left ventricular diastolic dysfunction.  · There is left ventricular global hypokinesis.  · There is abnormal septal wall motion.  · Mild right ventricular enlargement with mildly reduced right ventricular systolic function.  · Severe left atrial enlargement.  · Severe mitral regurgitation.  · The estimated PA systolic pressure is 29 mmHg.  · Normal central venous pressure (3 mmHg).  . Continue Furosemide and monitor clinical status closely. Monitor on telemetry. Patient is on CHF pathway.  Monitor strict Is&Os and daily weights.  Place on fluid restriction of 1.5 L. Continue to stress to patient importance of self efficacy and  on diet for CHF. Last BNP reviewed- and noted below   Recent Labs   Lab 11/25/22  2243   BNP 3,871*     I/O last 3 completed shifts:  In: 900 [P.O.:900]  Out: 2100 [Urine:2100]  I/O this shift:  In: 600 [P.O.:600]  Out: 2200 [Urine:2200]    Patient reported that she misplaced lasix at her home and couldn't take for 2 days and started to get SOB for which came to hospital  Continue lasix, lisinopril, BB  I&O  Daily weight  Cardiology on board

## 2022-11-27 NOTE — SUBJECTIVE & OBJECTIVE
Interval History: Patient seen and examined on bed.  Patient lying comfortably in the bed.  Patient goes to deep sleep intermittently and wakes up on her own. Patient said SOB improving, denied CP.  No acute event happened overnight.  Patient not in distress.       Review of Systems   Constitutional:  Negative for fever.   HENT:  Negative for congestion.    Eyes:  Negative for visual disturbance.   Respiratory:  Negative for shortness of breath.    Cardiovascular:  Positive for leg swelling. Negative for chest pain.   Gastrointestinal:  Negative for abdominal pain.   Genitourinary:  Negative for dysuria.   Musculoskeletal:  Negative for back pain.   Neurological:  Negative for syncope.   Psychiatric/Behavioral:  Negative for agitation.    Objective:     Vital Signs (Most Recent):  Temp: 98.8 °F (37.1 °C) (11/27/22 1114)  Pulse: 69 (11/27/22 1227)  Resp: 20 (11/27/22 1114)  BP: 107/68 (11/27/22 1114)  SpO2: 100 % (11/27/22 1145) Vital Signs (24h Range):  Temp:  [96.9 °F (36.1 °C)-98.8 °F (37.1 °C)] 98.8 °F (37.1 °C)  Pulse:  [] 69  Resp:  [16-20] 20  SpO2:  [95 %-100 %] 100 %  BP: (107-125)/(59-93) 107/68     Weight: 61.2 kg (134 lb 14.7 oz)  Body mass index is 26.35 kg/m².    Intake/Output Summary (Last 24 hours) at 11/27/2022 1340  Last data filed at 11/27/2022 1253  Gross per 24 hour   Intake 1140 ml   Output 4300 ml   Net -3160 ml      Physical Exam  Constitutional:       Comments: Sleepy but wakes up on verbal commands and answers questions appropriately    HENT:      Head: Normocephalic.      Mouth/Throat:      Mouth: Mucous membranes are moist.   Cardiovascular:      Rate and Rhythm: Normal rate.   Pulmonary:      Effort: No respiratory distress.      Breath sounds: Rales present.   Abdominal:      Tenderness: There is no abdominal tenderness.   Musculoskeletal:         General: Swelling present.   Skin:     General: Skin is warm.      Capillary Refill: Capillary refill takes less than 2 seconds.    Neurological:      General: No focal deficit present.   Psychiatric:         Behavior: Behavior normal.       Significant Labs: All pertinent labs within the past 24 hours have been reviewed.    Significant Imaging: I have reviewed all pertinent imaging results/findings within the past 24 hours.

## 2022-11-27 NOTE — NURSING
Pt complaining that she can't breathe. On arrival to room, pt jumping out of bed stating she needs to go to the bathroom. Per call from telemetry, pt HR in 180s. On assessment, heart monitor lead noted to be off and was replaced. HR 90, SpO2 96%. Pt placed back in bed, placed on 1L NC for comfort. /93. Encouraged pt slow deep breathing. Pt requesting something for anxiety.

## 2022-11-27 NOTE — PLAN OF CARE
Pt AAOx2. No complaints of pain, N/V. Medications administered per MAR. On 1L NC for comfort. Cardiac monitoring in progress. Pt again yelling from room this AM that she can't breathe. Dr. Escobar called to bedside. Orders received for one-time dose klonopin and one-time dose lasix. Safety maintained with bed alarm set, side rails raised, and call light in reach. Avasys placed in room.

## 2022-11-27 NOTE — PROGRESS NOTES
[facility]  Progress Note    Patient Name: Stephanie T Barthelemy  MRN: [id number]  Patient Class: OP- Observation   Admission Date: 11/25/2022  Length of Stay: 0 days  Attending Physician: Itz Robison MD  Primary Care Provider: Primary Doctor No        Subjective:     Principal Problem:Acute on chronic combined systolic and diastolic congestive heart failure        HPI:  51-year-old female who has a past medical history of attention deficit hyperactivity disorder, anemia, anxiety, bipolar disorder, history of hepatitis C, history of psychiatric hospitalization, hypertension, opioid overdose, psychosis, seizure disorder, sleep difficulties, substance abuse, therapy, and vasculitis who presents to the emergency department for evaluation of abdominal bloating; onset yesterday. Associated symptoms include: abdominal pain, bilateral leg swelling, and chest pain. The patient developed abdominal bloating accompanied by intermittent episodes of mid-abdominal pain and bilateral leg swelling; symptoms stated to have worsened since onset. Patient is prescribed lasix to combat symptoms, but lost the medication two days ago. Patient requested another prescription of lasix.In addition to the chief complaint, patient developed intermittent episodes of chest pain. She denies cough. She has no known allergies to medication    Vital signs are remarkable elevated blood pressure 146/98  Lab work up reveals elevated total bili 2.4, , , Na 133, initial Trop 0.049 ( baseline chronic Trop elevation), Hgb 10.7, MCV 76, bnp 2927.  Ekg shows Sinus tachycardia, right axis deviation, nonspecific intraventricular block and criteria for Septal infarct ,age undetermined.   Chest xray shows Stable enlargement of the cardiomediastinal silhouette with probable mild interstitial edema and small to moderate bilateral pleural effusions    Patient was given one dose iv lasix in the ed.       Overview/Hospital  Course:    Interval History: Patient seen and examined on bed.  Patient lying comfortably in the bed.  Patient goes to deep sleep intermittently and wakes up on her own. Patient said SOB improving, denied CP.  No acute event happened overnight.  Patient not in distress.       Review of Systems   Constitutional:  Negative for fever.   HENT:  Negative for congestion.    Eyes:  Negative for visual disturbance.   Respiratory:  Negative for shortness of breath.    Cardiovascular:  Positive for leg swelling. Negative for chest pain.   Gastrointestinal:  Negative for abdominal pain.   Genitourinary:  Negative for dysuria.   Musculoskeletal:  Negative for back pain.   Neurological:  Negative for syncope.   Psychiatric/Behavioral:  Negative for agitation.    Objective:     Vital Signs (Most Recent):  Temp: 98.8 °F (37.1 °C) (11/27/22 1114)  Pulse: 69 (11/27/22 1227)  Resp: 20 (11/27/22 1114)  BP: 107/68 (11/27/22 1114)  SpO2: 100 % (11/27/22 1145) Vital Signs (24h Range):  Temp:  [96.9 °F (36.1 °C)-98.8 °F (37.1 °C)] 98.8 °F (37.1 °C)  Pulse:  [] 69  Resp:  [16-20] 20  SpO2:  [95 %-100 %] 100 %  BP: (107-125)/(59-93) 107/68     Weight: 61.2 kg (134 lb 14.7 oz)  Body mass index is 26.35 kg/m².    Intake/Output Summary (Last 24 hours) at 11/27/2022 1340  Last data filed at 11/27/2022 1253  Gross per 24 hour   Intake 1140 ml   Output 4300 ml   Net -3160 ml      Physical Exam  Constitutional:       Comments: Sleepy but wakes up on verbal commands and answers questions appropriately    HENT:      Head: Normocephalic.      Mouth/Throat:      Mouth: Mucous membranes are moist.   Cardiovascular:      Rate and Rhythm: Normal rate.   Pulmonary:      Effort: No respiratory distress.      Breath sounds: Rales present.   Abdominal:      Tenderness: There is no abdominal tenderness.   Musculoskeletal:         General: Swelling present.   Skin:     General: Skin is warm.      Capillary Refill: Capillary refill takes less than 2  seconds.   Neurological:      General: No focal deficit present.   Psychiatric:         Behavior: Behavior normal.       Significant Labs: All pertinent labs within the past 24 hours have been reviewed.    Significant Imaging: I have reviewed all pertinent imaging results/findings within the past 24 hours.      Assessment/Plan:      * Acute on chronic combined systolic and diastolic congestive heart failure  Current exacerbation is likely 2/2 non compliant  Patient is identified as having Combined Systolic and Diastolic heart failure that is Acute on chronic. CHF is currently uncontrolled due to Rales/crackles on pulmonary exam. Latest ECHO performed and demonstrates- Results for orders placed during the hospital encounter of 11/04/22    Echo    Interpretation Summary  · The left ventricle is mildly enlarged with eccentric hypertrophy and severely decreased systolic function.  · The estimated ejection fraction is 20%.  · Left ventricular diastolic dysfunction.  · There is left ventricular global hypokinesis.  · There is abnormal septal wall motion.  · Mild right ventricular enlargement with mildly reduced right ventricular systolic function.  · Severe left atrial enlargement.  · Severe mitral regurgitation.  · The estimated PA systolic pressure is 29 mmHg.  · Normal central venous pressure (3 mmHg).  . Continue Furosemide and monitor clinical status closely. Monitor on telemetry. Patient is on CHF pathway.  Monitor strict Is&Os and daily weights.  Place on fluid restriction of 1.5 L. Continue to stress to patient importance of self efficacy and  on diet for CHF. Last BNP reviewed- and noted below   Recent Labs   Lab 11/25/22  2243   BNP 3,871*     I/O last 3 completed shifts:  In: 900 [P.O.:900]  Out: 2100 [Urine:2100]  I/O this shift:  In: 600 [P.O.:600]  Out: 2200 [Urine:2200]    Patient reported that she misplaced lasix at her home and couldn't take for 2 days and started to get SOB for which came to  hospital  Continue lasix, lisinopril, BB  I&O  Daily weight  Cardiology on board         Polysubstance abuse  Patient has been going to deep sleep intermittently and then wakes up on her own and follows commands appropriately  I spoke to her mother in detail who said she was using drugs including marijuana til few months ago, and her sleep has been very deep throughout the day for many months   Will get UDS, ethanol level  Thiamine, folic acid  Consult to psych for eval and recommendation     Depression  Home med fluoxetine  Consult to psych       Essential hypertension  Continue lisinopril  Bp monitoring     Elevated LFTs  Likely 2/2 congested liver.   Diuresing   Continue to monitor lft.       Abnormal EKG        Cardiomyopathy          VTE Risk Mitigation (From admission, onward)         Ordered     heparin (porcine) injection 5,000 Units  Every 8 hours         11/25/22 2218     IP VTE HIGH RISK PATIENT  Once         11/25/22 2218     Place sequential compression device  Until discontinued         11/25/22 2218                Discharge Planning   AYSHA:      Code Status: Full Code   Is the patient medically ready for discharge?:     Reason for patient still in hospital (select all that apply): Treatment                     Itz Robison MD  Department of Hospital Medicine   [facility]

## 2022-11-28 ENCOUNTER — CLINICAL SUPPORT (OUTPATIENT)
Dept: SMOKING CESSATION | Facility: CLINIC | Age: 52
End: 2022-11-28
Payer: COMMERCIAL

## 2022-11-28 DIAGNOSIS — F17.210 CIGARETTE SMOKER: Primary | ICD-10-CM

## 2022-11-28 LAB
ALBUMIN SERPL BCP-MCNC: 2.7 G/DL (ref 3.5–5.2)
ALP SERPL-CCNC: 88 U/L (ref 55–135)
ALT SERPL W/O P-5'-P-CCNC: 212 U/L (ref 10–44)
ANION GAP SERPL CALC-SCNC: 13 MMOL/L (ref 8–16)
ANION GAP SERPL CALC-SCNC: 15 MMOL/L (ref 8–16)
AST SERPL-CCNC: 145 U/L (ref 10–40)
BASOPHILS # BLD AUTO: 0.06 K/UL (ref 0–0.2)
BASOPHILS NFR BLD: 0.9 % (ref 0–1.9)
BILIRUB SERPL-MCNC: 1 MG/DL (ref 0.1–1)
BUN SERPL-MCNC: 31 MG/DL (ref 6–20)
BUN SERPL-MCNC: 34 MG/DL (ref 6–20)
CALCIUM SERPL-MCNC: 8.8 MG/DL (ref 8.7–10.5)
CALCIUM SERPL-MCNC: 8.8 MG/DL (ref 8.7–10.5)
CHLORIDE SERPL-SCNC: 95 MMOL/L (ref 95–110)
CHLORIDE SERPL-SCNC: 96 MMOL/L (ref 95–110)
CO2 SERPL-SCNC: 28 MMOL/L (ref 23–29)
CO2 SERPL-SCNC: 30 MMOL/L (ref 23–29)
CREAT SERPL-MCNC: 1.6 MG/DL (ref 0.5–1.4)
CREAT SERPL-MCNC: 1.8 MG/DL (ref 0.5–1.4)
DIFFERENTIAL METHOD: ABNORMAL
EOSINOPHIL # BLD AUTO: 0.3 K/UL (ref 0–0.5)
EOSINOPHIL NFR BLD: 3.6 % (ref 0–8)
ERYTHROCYTE [DISTWIDTH] IN BLOOD BY AUTOMATED COUNT: 20.5 % (ref 11.5–14.5)
EST. GFR  (NO RACE VARIABLE): 34 ML/MIN/1.73 M^2
EST. GFR  (NO RACE VARIABLE): 39 ML/MIN/1.73 M^2
GLUCOSE SERPL-MCNC: 115 MG/DL (ref 70–110)
GLUCOSE SERPL-MCNC: 91 MG/DL (ref 70–110)
HCT VFR BLD AUTO: 34.8 % (ref 37–48.5)
HGB BLD-MCNC: 10.1 G/DL (ref 12–16)
IMM GRANULOCYTES # BLD AUTO: 0.01 K/UL (ref 0–0.04)
IMM GRANULOCYTES NFR BLD AUTO: 0.1 % (ref 0–0.5)
LYMPHOCYTES # BLD AUTO: 2 K/UL (ref 1–4.8)
LYMPHOCYTES NFR BLD: 29.3 % (ref 18–48)
MAGNESIUM SERPL-MCNC: 1.6 MG/DL (ref 1.6–2.6)
MAGNESIUM SERPL-MCNC: 1.6 MG/DL (ref 1.6–2.6)
MCH RBC QN AUTO: 22.1 PG (ref 27–31)
MCHC RBC AUTO-ENTMCNC: 29 G/DL (ref 32–36)
MCV RBC AUTO: 76 FL (ref 82–98)
MONOCYTES # BLD AUTO: 0.8 K/UL (ref 0.3–1)
MONOCYTES NFR BLD: 11.3 % (ref 4–15)
NEUTROPHILS # BLD AUTO: 3.8 K/UL (ref 1.8–7.7)
NEUTROPHILS NFR BLD: 54.8 % (ref 38–73)
NRBC BLD-RTO: 0 /100 WBC
PLATELET # BLD AUTO: 281 K/UL (ref 150–450)
PMV BLD AUTO: 11.6 FL (ref 9.2–12.9)
POTASSIUM SERPL-SCNC: 3.4 MMOL/L (ref 3.5–5.1)
POTASSIUM SERPL-SCNC: 3.5 MMOL/L (ref 3.5–5.1)
PROT SERPL-MCNC: 6.5 G/DL (ref 6–8.4)
RBC # BLD AUTO: 4.56 M/UL (ref 4–5.4)
SODIUM SERPL-SCNC: 138 MMOL/L (ref 136–145)
SODIUM SERPL-SCNC: 139 MMOL/L (ref 136–145)
WBC # BLD AUTO: 6.89 K/UL (ref 3.9–12.7)

## 2022-11-28 PROCEDURE — 25000003 PHARM REV CODE 250: Performed by: INTERNAL MEDICINE

## 2022-11-28 PROCEDURE — 36415 COLL VENOUS BLD VENIPUNCTURE: CPT | Performed by: INTERNAL MEDICINE

## 2022-11-28 PROCEDURE — 27000221 HC OXYGEN, UP TO 24 HOURS

## 2022-11-28 PROCEDURE — 94640 AIRWAY INHALATION TREATMENT: CPT | Mod: XB

## 2022-11-28 PROCEDURE — 25000003 PHARM REV CODE 250: Performed by: HOSPITALIST

## 2022-11-28 PROCEDURE — 94761 N-INVAS EAR/PLS OXIMETRY MLT: CPT

## 2022-11-28 PROCEDURE — 96366 THER/PROPH/DIAG IV INF ADDON: CPT

## 2022-11-28 PROCEDURE — 99900035 HC TECH TIME PER 15 MIN (STAT)

## 2022-11-28 PROCEDURE — 83735 ASSAY OF MAGNESIUM: CPT | Performed by: NURSE PRACTITIONER

## 2022-11-28 PROCEDURE — S4991 NICOTINE PATCH NONLEGEND: HCPCS | Performed by: HOSPITALIST

## 2022-11-28 PROCEDURE — 96361 HYDRATE IV INFUSION ADD-ON: CPT

## 2022-11-28 PROCEDURE — 83735 ASSAY OF MAGNESIUM: CPT | Mod: 91 | Performed by: INTERNAL MEDICINE

## 2022-11-28 PROCEDURE — 63600175 PHARM REV CODE 636 W HCPCS: Performed by: INTERNAL MEDICINE

## 2022-11-28 PROCEDURE — G0378 HOSPITAL OBSERVATION PER HR: HCPCS

## 2022-11-28 PROCEDURE — 80048 BASIC METABOLIC PNL TOTAL CA: CPT | Mod: XB | Performed by: NURSE PRACTITIONER

## 2022-11-28 PROCEDURE — 96376 TX/PRO/DX INJ SAME DRUG ADON: CPT

## 2022-11-28 PROCEDURE — 96368 THER/DIAG CONCURRENT INF: CPT

## 2022-11-28 PROCEDURE — 85025 COMPLETE CBC W/AUTO DIFF WBC: CPT | Performed by: INTERNAL MEDICINE

## 2022-11-28 PROCEDURE — 96372 THER/PROPH/DIAG INJ SC/IM: CPT | Performed by: HOSPITALIST

## 2022-11-28 PROCEDURE — 96365 THER/PROPH/DIAG IV INF INIT: CPT

## 2022-11-28 PROCEDURE — 80053 COMPREHEN METABOLIC PANEL: CPT | Performed by: INTERNAL MEDICINE

## 2022-11-28 PROCEDURE — 63600175 PHARM REV CODE 636 W HCPCS: Performed by: HOSPITALIST

## 2022-11-28 PROCEDURE — 99407 BEHAV CHNG SMOKING > 10 MIN: CPT | Mod: S$GLB,,,

## 2022-11-28 PROCEDURE — 36415 COLL VENOUS BLD VENIPUNCTURE: CPT | Performed by: NURSE PRACTITIONER

## 2022-11-28 PROCEDURE — 96374 THER/PROPH/DIAG INJ IV PUSH: CPT | Mod: 59

## 2022-11-28 PROCEDURE — 99407 PR TOBACCO USE CESSATION INTENSIVE >10 MINUTES: ICD-10-PCS | Mod: S$GLB,,,

## 2022-11-28 RX ORDER — MAGNESIUM SULFATE 1 G/100ML
1 INJECTION INTRAVENOUS ONCE
Status: COMPLETED | OUTPATIENT
Start: 2022-11-28 | End: 2022-11-28

## 2022-11-28 RX ORDER — POTASSIUM CHLORIDE 7.45 MG/ML
10 INJECTION INTRAVENOUS
Status: COMPLETED | OUTPATIENT
Start: 2022-11-28 | End: 2022-11-28

## 2022-11-28 RX ORDER — THIAMINE HCL 100 MG
100 TABLET ORAL DAILY
Status: DISCONTINUED | OUTPATIENT
Start: 2022-11-29 | End: 2022-11-30 | Stop reason: HOSPADM

## 2022-11-28 RX ADMIN — QUETIAPINE FUMARATE 50 MG: 25 TABLET ORAL at 09:11

## 2022-11-28 RX ADMIN — ALBUTEROL SULFATE 2 PUFF: 90 AEROSOL, METERED RESPIRATORY (INHALATION) at 01:11

## 2022-11-28 RX ADMIN — GABAPENTIN 300 MG: 300 CAPSULE ORAL at 03:11

## 2022-11-28 RX ADMIN — ALBUTEROL SULFATE 2 PUFF: 90 AEROSOL, METERED RESPIRATORY (INHALATION) at 12:11

## 2022-11-28 RX ADMIN — PROPRANOLOL HYDROCHLORIDE 10 MG: 10 TABLET ORAL at 09:11

## 2022-11-28 RX ADMIN — POTASSIUM CHLORIDE 10 MEQ: 7.46 INJECTION, SOLUTION INTRAVENOUS at 12:11

## 2022-11-28 RX ADMIN — ALBUTEROL SULFATE 2 PUFF: 90 AEROSOL, METERED RESPIRATORY (INHALATION) at 08:11

## 2022-11-28 RX ADMIN — FAMOTIDINE 20 MG: 20 TABLET ORAL at 10:11

## 2022-11-28 RX ADMIN — HEPARIN SODIUM 5000 UNITS: 5000 INJECTION, SOLUTION INTRAVENOUS; SUBCUTANEOUS at 05:11

## 2022-11-28 RX ADMIN — GABAPENTIN 300 MG: 300 CAPSULE ORAL at 10:11

## 2022-11-28 RX ADMIN — NICOTINE 1 PATCH: 21 PATCH, EXTENDED RELEASE TRANSDERMAL at 10:11

## 2022-11-28 RX ADMIN — HEPARIN SODIUM 5000 UNITS: 5000 INJECTION, SOLUTION INTRAVENOUS; SUBCUTANEOUS at 03:11

## 2022-11-28 RX ADMIN — MAGNESIUM SULFATE IN DEXTROSE 1 G: 10 INJECTION, SOLUTION INTRAVENOUS at 12:11

## 2022-11-28 RX ADMIN — HEPARIN SODIUM 5000 UNITS: 5000 INJECTION, SOLUTION INTRAVENOUS; SUBCUTANEOUS at 09:11

## 2022-11-28 RX ADMIN — PROPRANOLOL HYDROCHLORIDE 10 MG: 10 TABLET ORAL at 03:11

## 2022-11-28 RX ADMIN — FLUOXETINE HYDROCHLORIDE 20 MG: 20 CAPSULE ORAL at 10:11

## 2022-11-28 RX ADMIN — GABAPENTIN 300 MG: 300 CAPSULE ORAL at 09:11

## 2022-11-28 RX ADMIN — LISINOPRIL 40 MG: 20 TABLET ORAL at 10:11

## 2022-11-28 RX ADMIN — FUROSEMIDE 20 MG: 10 INJECTION, SOLUTION INTRAMUSCULAR; INTRAVENOUS at 05:11

## 2022-11-28 RX ADMIN — THIAMINE HYDROCHLORIDE 100 MG: 100 INJECTION, SOLUTION INTRAMUSCULAR; INTRAVENOUS at 10:11

## 2022-11-28 RX ADMIN — FOLIC ACID 1 MG: 1 TABLET ORAL at 10:11

## 2022-11-28 RX ADMIN — OXCARBAZEPINE 300 MG: 150 TABLET, FILM COATED ORAL at 09:11

## 2022-11-28 RX ADMIN — FUROSEMIDE 20 MG: 10 INJECTION, SOLUTION INTRAMUSCULAR; INTRAVENOUS at 10:11

## 2022-11-28 RX ADMIN — OXCARBAZEPINE 300 MG: 150 TABLET, FILM COATED ORAL at 10:11

## 2022-11-28 RX ADMIN — ONDANSETRON 4 MG: 4 TABLET, ORALLY DISINTEGRATING ORAL at 09:11

## 2022-11-28 RX ADMIN — POTASSIUM CHLORIDE 10 MEQ: 7.46 INJECTION, SOLUTION INTRAVENOUS at 10:11

## 2022-11-28 NOTE — PROGRESS NOTES
RD was consulted over the weekend for CHF diet education. Pt known to me from previous admits. Attempted to follow up with education today however pt screaming out at visit. Will continue to follow.

## 2022-11-28 NOTE — PROGRESS NOTES
Individual Follow-Up Form    11/28/2022    Quit Date: To be determined    Clinical Status of Patient: Inpatient    Length of Service: 15 minutes    Comments: Smoking cessation education note: Pt is a 1 p/day cigarette smoker x 36 yrs. 21 mg nicotine patch ordered Q day. Pt declines referral to [facility]oking Cessation clinic. Handout provided.     Diagnosis: F17.210

## 2022-11-28 NOTE — PLAN OF CARE
Pt lethargic throughout night, more alert this AM. C/o pain in legs. No complaints of N/V. Medications administered per MAR. Oral medications held during night per NP order. On 1L NC. Cardiac monitoring in progress. Safety maintained with bed alarm set, side rails raised, and call light in reach. Avasys in use. Pt getting out of bed all night, encouraged to call for assistance with toileting.

## 2022-11-28 NOTE — PLAN OF CARE
Patient on oxygen in no apparent distress, given MDI treatment, no adverse reactions will continue to monitor.    Pt stable upon departure to floor with transport.
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
## 2022-11-28 NOTE — PLAN OF CARE
SW met with pt via frintit to complete assessment. Pt is sleeping but was able to be awakened and able to verbally answer assessment questions. Pt then fell asleep throughout assessment. Pt gave approval for SW to call sister Betty if needed as well. Pt was able to confirm lives at home still with her sister and father. At time of discharge pt sister Betty to transport. Pt has no home health and no DME in use. Pt was provided hearing impaired and group home resources at last hospital visit by this SW. Pt remains no PCP, but was able to attend PCC follow up from last hospitalization. NGUYỄN updated whiteboard with [facility] name and contact information. SW confirmed pt understanding of Observation unit and expected discharge plan. SW will continue to follow pt throughout care and assist with any discharge needs.         11/28/22 1005   Discharge Planning   Assessment Type Discharge Planning Brief Assessment   Resource/Environmental Concerns none   Support Systems Parent;Family members   Equipment Currently Used at Home none   Current Living Arrangements home/apartment/condo   Patient/Family Anticipates Transition to home with family   Patient/Family Anticipated Services at Transition none   DME Needed Upon Discharge  none   Discharge Plan A Home with family     No future appointments.    BIBIANA Perez Case Management  [phone]

## 2022-11-28 NOTE — NURSING
Pt with 12-beat run of V tach. VSS. Pt c/o pain in her legs. NP Zohaib notified of all of the above. Orders received for STAT magnesium and BMP.

## 2022-11-28 NOTE — SUBJECTIVE & OBJECTIVE
Interval History: Patient seen and examined on bed.  Patient lying comfortably in the bed.  Patient was more awake today, denied CP, said SOB getting better.   No acute event happened overnight.  Patient not in distress.       Review of Systems   Constitutional:  Negative for fever.   HENT:  Negative for congestion.    Eyes:  Negative for visual disturbance.   Respiratory:  Positive for shortness of breath.    Cardiovascular:  Positive for leg swelling. Negative for chest pain.   Gastrointestinal:  Negative for abdominal pain.   Genitourinary:  Negative for dysuria.   Musculoskeletal:  Negative for back pain.   Neurological:  Negative for syncope.   Psychiatric/Behavioral:  Negative for agitation.    Objective:     Vital Signs (Most Recent):  Temp: 97.7 °F (36.5 °C) (11/28/22 1517)  Pulse: 82 (11/28/22 1608)  Resp: 20 (11/28/22 1517)  BP: (!) 125/98 (11/28/22 1517)  SpO2: 99 % (11/28/22 1606)   Vital Signs (24h Range):  Temp:  [97.1 °F (36.2 °C)-98.1 °F (36.7 °C)] 97.7 °F (36.5 °C)  Pulse:  [77-92] 82  Resp:  [16-20] 20  SpO2:  [95 %-100 %] 99 %  BP: (113-128)/(81-98) 125/98     Weight: 61.2 kg (134 lb 14.7 oz)  Body mass index is 26.35 kg/m².    Intake/Output Summary (Last 24 hours) at 11/28/2022 1655  Last data filed at 11/28/2022 1627  Gross per 24 hour   Intake 480 ml   Output 1950 ml   Net -1470 ml      Physical Exam  Constitutional:       General: She is not in acute distress.     Appearance: She is ill-appearing.   HENT:      Head: Normocephalic.      Nose: Nose normal.      Mouth/Throat:      Mouth: Mucous membranes are moist.   Cardiovascular:      Rate and Rhythm: Normal rate.   Pulmonary:      Effort: No respiratory distress.      Breath sounds: Rales present. No wheezing.   Abdominal:      Tenderness: There is no abdominal tenderness.   Musculoskeletal:         General: Swelling present.   Skin:     Capillary Refill: Capillary refill takes less than 2 seconds.   Neurological:      Mental Status: She is  oriented to person, place, and time.   Psychiatric:         Behavior: Behavior normal.       Significant Labs: All pertinent labs within the past 24 hours have been reviewed.    Significant Imaging: I have reviewed all pertinent imaging results/findings within the past 24 hours.

## 2022-11-28 NOTE — PROGRESS NOTES
[facility] Medicine  Progress Note    Patient Name: Stephanie T Barthelemy  MRN: [id number]  Patient Class: OP- Observation   Admission Date: 11/25/2022  Length of Stay: 0 days  Attending Physician: Itz Robison MD  Primary Care Provider: Primary Doctor No        Subjective:     Principal Problem:Acute on chronic combined systolic and diastolic congestive heart failure        HPI:  51-year-old female who has a past medical history of attention deficit hyperactivity disorder, anemia, anxiety, bipolar disorder, history of hepatitis C, history of psychiatric hospitalization, hypertension, opioid overdose, psychosis, seizure disorder, sleep difficulties, substance abuse, therapy, and vasculitis who presents to the emergency department for evaluation of abdominal bloating; onset yesterday. Associated symptoms include: abdominal pain, bilateral leg swelling, and chest pain. The patient developed abdominal bloating accompanied by intermittent episodes of mid-abdominal pain and bilateral leg swelling; symptoms stated to have worsened since onset. Patient is prescribed lasix to combat symptoms, but lost the medication two days ago. Patient requested another prescription of lasix.In addition to the chief complaint, patient developed intermittent episodes of chest pain. She denies cough. She has no known allergies to medication    Vital signs are remarkable elevated blood pressure 146/98  Lab work up reveals elevated total bili 2.4, , , Na 133, initial Trop 0.049 ( baseline chronic Trop elevation), Hgb 10.7, MCV 76, bnp 2927.  Ekg shows Sinus tachycardia, right axis deviation, nonspecific intraventricular block and criteria for Septal infarct ,age undetermined.   Chest xray shows Stable enlargement of the cardiomediastinal silhouette with probable mild interstitial edema and small to moderate bilateral pleural effusions    Patient was given one dose iv lasix in the ed.       Overview/Hospital  Course:    Interval History: Patient seen and examined on bed.  Patient lying comfortably in the bed.  Patient was more awake today, denied CP, said SOB getting better.   No acute event happened overnight.  Patient not in distress.       Review of Systems   Constitutional:  Negative for fever.   HENT:  Negative for congestion.    Eyes:  Negative for visual disturbance.   Respiratory:  Positive for shortness of breath.    Cardiovascular:  Positive for leg swelling. Negative for chest pain.   Gastrointestinal:  Negative for abdominal pain.   Genitourinary:  Negative for dysuria.   Musculoskeletal:  Negative for back pain.   Neurological:  Negative for syncope.   Psychiatric/Behavioral:  Negative for agitation.    Objective:     Vital Signs (Most Recent):  Temp: 97.7 °F (36.5 °C) (11/28/22 1517)  Pulse: 82 (11/28/22 1608)  Resp: 20 (11/28/22 1517)  BP: (!) 125/98 (11/28/22 1517)  SpO2: 99 % (11/28/22 1606)   Vital Signs (24h Range):  Temp:  [97.1 °F (36.2 °C)-98.1 °F (36.7 °C)] 97.7 °F (36.5 °C)  Pulse:  [77-92] 82  Resp:  [16-20] 20  SpO2:  [95 %-100 %] 99 %  BP: (113-128)/(81-98) 125/98     Weight: 61.2 kg (134 lb 14.7 oz)  Body mass index is 26.35 kg/m².    Intake/Output Summary (Last 24 hours) at 11/28/2022 1655  Last data filed at 11/28/2022 1627  Gross per 24 hour   Intake 480 ml   Output 1950 ml   Net -1470 ml      Physical Exam  Constitutional:       General: She is not in acute distress.     Appearance: She is ill-appearing.   HENT:      Head: Normocephalic.      Nose: Nose normal.      Mouth/Throat:      Mouth: Mucous membranes are moist.   Cardiovascular:      Rate and Rhythm: Normal rate.   Pulmonary:      Effort: No respiratory distress.      Breath sounds: Rales present. No wheezing.   Abdominal:      Tenderness: There is no abdominal tenderness.   Musculoskeletal:         General: Swelling present.   Skin:     Capillary Refill: Capillary refill takes less than 2 seconds.   Neurological:      Mental  Status: She is oriented to person, place, and time.   Psychiatric:         Behavior: Behavior normal.       Significant Labs: All pertinent labs within the past 24 hours have been reviewed.    Significant Imaging: I have reviewed all pertinent imaging results/findings within the past 24 hours.      Assessment/Plan:      * Acute on chronic combined systolic and diastolic congestive heart failure  Current exacerbation is likely 2/2 non compliant  Patient is identified as having Combined Systolic and Diastolic heart failure that is Acute on chronic. CHF is currently uncontrolled due to Rales/crackles on pulmonary exam. Latest ECHO performed and demonstrates- Results for orders placed during the hospital encounter of 11/04/22    Echo    Interpretation Summary  · The left ventricle is mildly enlarged with eccentric hypertrophy and severely decreased systolic function.  · The estimated ejection fraction is 20%.  · Left ventricular diastolic dysfunction.  · There is left ventricular global hypokinesis.  · There is abnormal septal wall motion.  · Mild right ventricular enlargement with mildly reduced right ventricular systolic function.  · Severe left atrial enlargement.  · Severe mitral regurgitation.  · The estimated PA systolic pressure is 29 mmHg.  · Normal central venous pressure (3 mmHg).  . Continue Furosemide and monitor clinical status closely. Monitor on telemetry. Patient is on CHF pathway.  Monitor strict Is&Os and daily weights.  Place on fluid restriction of 1.5 L. Continue to stress to patient importance of self efficacy and  on diet for CHF. Last BNP reviewed- and noted below   Recent Labs   Lab 11/25/22  2243   BNP 3,871*       I/O last 3 completed shifts:  In: 1500 [P.O.:1500]  Out: 5950 [Urine:5950]  I/O this shift:  In: -   Out: 700 [Urine:700]    Patient reported that she misplaced lasix at her home and couldn't take for 2 days and started to get SOB for which came to hospital  Continue lasix,  lisinopril, BB   I&O   Daily weight  Cardiology on board     Polysubstance abuse  Patient has been going to deep sleep intermittently and then wakes up on her own and follows commands appropriately  I spoke to her mother in detail who said she was using drugs including marijuana til few months ago, and her sleep has been very deep throughout the day for many months   UDS: amphetamine   Thiamine, folic acid  Psych evaluated and recommended discontinuing clonazepam and reassessment by psych again before discharge      Depression  Home med fluoxetine  Psych on board      Essential hypertension  Continue lisinopril  Bp monitoring     Elevated LFTs  Likely 2/2 congested liver.   Diuresing   Continue to monitor lft.       Abnormal EKG        Cardiomyopathy          VTE Risk Mitigation (From admission, onward)         Ordered     heparin (porcine) injection 5,000 Units  Every 8 hours         11/25/22 2218     IP VTE HIGH RISK PATIENT  Once         11/25/22 2218     Place sequential compression device  Until discontinued         11/25/22 2218                Discharge Planning   AYSHA:      Code Status: Full Code   Is the patient medically ready for discharge?:     Reason for patient still in hospital (select all that apply): Treatment  Discharge Plan A: Home with family                  Itz Robison MD  Department of Hospital Medicine   [facility] - Med Surg

## 2022-11-28 NOTE — PROGRESS NOTES
Pharmacist Intervention IV to PO Note    Stephanie T Barthelemy is a 51 y.o. female being treated with IV medication thiamine    Patient Data:    Vital Signs (Most Recent):  Temp: 97.7 °F (36.5 °C) (11/28/22 1133)  Pulse: 91 (11/28/22 1223)  Resp: 20 (11/28/22 1133)  BP: (!) 125/91 (11/28/22 1133)  SpO2: 99 % (11/28/22 1133)   Vital Signs (72h Range):  Temp:  [96.9 °F (36.1 °C)-98.8 °F (37.1 °C)]   Pulse:  []   Resp:  [16-26]   BP: (107-174)/()   SpO2:  [92 %-100 %]      CBC:  Recent Labs   Lab 11/25/22 2243 11/26/22  1115 11/28/22  0558   WBC 7.73 5.90 6.89   RBC 5.20 4.61 4.56   HGB 11.6* 10.3* 10.1*   HCT 39.6 34.5* 34.8*    301 281   MCV 76* 75* 76*   MCH 22.3* 22.3* 22.1*   MCHC 29.3* 29.9* 29.0*     CMP:     Recent Labs   Lab 11/25/22  1128 11/25/22 2243 11/26/22  0602 11/27/22  0619 11/28/22  0103 11/28/22  0558    121*   < > 97 91 115*   CALCIUM 9.6 9.6   < > 8.9 8.8 8.8   ALBUMIN 3.1* 3.1*  --   --   --  2.7*   PROT 7.0 7.1  --   --   --  6.5   * 137   < > 138 138 139   K 4.3 4.0   < > 3.6 3.5 3.4*   CO2 21* 23   < > 23 30* 28   CL 98 99   < > 100 95 96   BUN 31* 29*   < > 30* 31* 34*   CREATININE 1.1 1.2   < > 1.2 1.8* 1.6*   ALKPHOS 101 102  --   --   --  88   * 406*  --   --   --  212*   * 438*  --   --   --  145*   BILITOT 2.4* 2.4*  --   --   --  1.0    < > = values in this interval not displayed.       Dietary Orders:  Diet Orders            Diet Low Sodium, 2gm [facility]; Fluid - 1500mL: Low Sodium, 2gm starting at 11/25 2219            Based on the following criteria, this patient qualifies for intravenous to oral conversion:  [x] The patients gastrointestinal tract is functioning (tolerating medications via oral or enteral route for 24 hours and tolerating food or enteral feeds for 24 hours).  [x] The patient is hemodynamically stable for 24 hours (heart rate <100 beats per minute, systolic blood pressure >99 mm Hg, and respiratory rate <20  breaths per minute).  [x] The patient shows clinical improvement (afebrile for at least 24 hours and white blood cell count downtrending or normalized). Additionally, the patient must be non-neutropenic (absolute neutrophil count >500 cells/mm3).  [x] For antimicrobials, the patient has received IV therapy for at least 24 hours.    IV medication thiamine will be changed to oral medication Thiamine    Pharmacist's Name: Olga Dixon  Pharmacist's Extension: [phone]

## 2022-11-28 NOTE — ASSESSMENT & PLAN NOTE
Patient has been going to deep sleep intermittently and then wakes up on her own and follows commands appropriately  I spoke to her mother in detail who said she was using drugs including marijuana til few months ago, and her sleep has been very deep throughout the day for many months   UDS: amphetamine   Thiamine, folic acid  Psych evaluated and recommended discontinuing clonazepam and reassessment by psych again before discharge

## 2022-11-28 NOTE — PLAN OF CARE
Problem: Adult Inpatient Plan of Care  Goal: Plan of Care Review  Outcome: Ongoing, Progressing  Goal: Absence of Hospital-Acquired Illness or Injury  Outcome: Ongoing, Progressing     Problem: Respiratory Compromise (Heart Failure)  Goal: Effective Oxygenation and Ventilation  Outcome: Ongoing, Progressing     Problem: Fall Injury Risk  Goal: Absence of Fall and Fall-Related Injury  Outcome: Ongoing, Progressing     Problem: Hypertension Comorbidity  Goal: Blood Pressure in Desired Range  Outcome: Ongoing, Progressing     Problem: Seizure Disorder Comorbidity  Goal: Maintenance of Seizure Control  Outcome: Ongoing, Progressing     Problem: Pain Acute  Goal: Acceptable Pain Control and Functional Ability  Outcome: Ongoing, Progressing

## 2022-11-28 NOTE — ASSESSMENT & PLAN NOTE
Current exacerbation is likely 2/2 non compliant  Patient is identified as having Combined Systolic and Diastolic heart failure that is Acute on chronic. CHF is currently uncontrolled due to Rales/crackles on pulmonary exam. Latest ECHO performed and demonstrates- Results for orders placed during the hospital encounter of 11/04/22    Echo    Interpretation Summary  · The left ventricle is mildly enlarged with eccentric hypertrophy and severely decreased systolic function.  · The estimated ejection fraction is 20%.  · Left ventricular diastolic dysfunction.  · There is left ventricular global hypokinesis.  · There is abnormal septal wall motion.  · Mild right ventricular enlargement with mildly reduced right ventricular systolic function.  · Severe left atrial enlargement.  · Severe mitral regurgitation.  · The estimated PA systolic pressure is 29 mmHg.  · Normal central venous pressure (3 mmHg).  . Continue Furosemide and monitor clinical status closely. Monitor on telemetry. Patient is on CHF pathway.  Monitor strict Is&Os and daily weights.  Place on fluid restriction of 1.5 L. Continue to stress to patient importance of self efficacy and  on diet for CHF. Last BNP reviewed- and noted below   Recent Labs   Lab 11/25/22  2243   BNP 3,871*       I/O last 3 completed shifts:  In: 1500 [P.O.:1500]  Out: 5950 [Urine:5950]  I/O this shift:  In: -   Out: 700 [Urine:700]    Patient reported that she misplaced lasix at her home and couldn't take for 2 days and started to get SOB for which came to hospital  Continue lasix, lisinopril, BB   I&O   Daily weight  Cardiology on board

## 2022-11-29 PROCEDURE — 25000003 PHARM REV CODE 250: Performed by: HOSPITALIST

## 2022-11-29 PROCEDURE — 25000003 PHARM REV CODE 250: Performed by: INTERNAL MEDICINE

## 2022-11-29 PROCEDURE — 96372 THER/PROPH/DIAG INJ SC/IM: CPT | Performed by: HOSPITALIST

## 2022-11-29 PROCEDURE — 99900035 HC TECH TIME PER 15 MIN (STAT)

## 2022-11-29 PROCEDURE — 94640 AIRWAY INHALATION TREATMENT: CPT

## 2022-11-29 PROCEDURE — 27000221 HC OXYGEN, UP TO 24 HOURS

## 2022-11-29 PROCEDURE — G0378 HOSPITAL OBSERVATION PER HR: HCPCS

## 2022-11-29 PROCEDURE — S4991 NICOTINE PATCH NONLEGEND: HCPCS | Performed by: HOSPITALIST

## 2022-11-29 PROCEDURE — 63600175 PHARM REV CODE 636 W HCPCS: Performed by: HOSPITALIST

## 2022-11-29 PROCEDURE — 25000003 PHARM REV CODE 250: Performed by: STUDENT IN AN ORGANIZED HEALTH CARE EDUCATION/TRAINING PROGRAM

## 2022-11-29 PROCEDURE — 94761 N-INVAS EAR/PLS OXIMETRY MLT: CPT

## 2022-11-29 RX ORDER — LISINOPRIL 5 MG/1
5 TABLET ORAL DAILY
Status: DISCONTINUED | OUTPATIENT
Start: 2022-11-30 | End: 2022-11-30 | Stop reason: HOSPADM

## 2022-11-29 RX ADMIN — GABAPENTIN 300 MG: 300 CAPSULE ORAL at 09:11

## 2022-11-29 RX ADMIN — FOLIC ACID 1 MG: 1 TABLET ORAL at 09:11

## 2022-11-29 RX ADMIN — GABAPENTIN 300 MG: 300 CAPSULE ORAL at 08:11

## 2022-11-29 RX ADMIN — FLUOXETINE HYDROCHLORIDE 20 MG: 20 CAPSULE ORAL at 09:11

## 2022-11-29 RX ADMIN — PROPRANOLOL HYDROCHLORIDE 10 MG: 10 TABLET ORAL at 02:11

## 2022-11-29 RX ADMIN — HEPARIN SODIUM 5000 UNITS: 5000 INJECTION, SOLUTION INTRAVENOUS; SUBCUTANEOUS at 05:11

## 2022-11-29 RX ADMIN — FAMOTIDINE 20 MG: 20 TABLET ORAL at 09:11

## 2022-11-29 RX ADMIN — GABAPENTIN 300 MG: 300 CAPSULE ORAL at 02:11

## 2022-11-29 RX ADMIN — HEPARIN SODIUM 5000 UNITS: 5000 INJECTION, SOLUTION INTRAVENOUS; SUBCUTANEOUS at 02:11

## 2022-11-29 RX ADMIN — PROPRANOLOL HYDROCHLORIDE 10 MG: 10 TABLET ORAL at 08:11

## 2022-11-29 RX ADMIN — LISINOPRIL 40 MG: 20 TABLET ORAL at 09:11

## 2022-11-29 RX ADMIN — ALBUTEROL SULFATE 2 PUFF: 90 AEROSOL, METERED RESPIRATORY (INHALATION) at 07:11

## 2022-11-29 RX ADMIN — OXCARBAZEPINE 300 MG: 150 TABLET, FILM COATED ORAL at 09:11

## 2022-11-29 RX ADMIN — NICOTINE 1 PATCH: 21 PATCH, EXTENDED RELEASE TRANSDERMAL at 09:11

## 2022-11-29 RX ADMIN — OXCARBAZEPINE 300 MG: 150 TABLET, FILM COATED ORAL at 08:11

## 2022-11-29 RX ADMIN — HEPARIN SODIUM 5000 UNITS: 5000 INJECTION, SOLUTION INTRAVENOUS; SUBCUTANEOUS at 09:11

## 2022-11-29 RX ADMIN — Medication 100 MG: at 09:11

## 2022-11-29 RX ADMIN — POTASSIUM BICARBONATE 25 MEQ: 977.5 TABLET, EFFERVESCENT ORAL at 09:11

## 2022-11-29 RX ADMIN — QUETIAPINE FUMARATE 50 MG: 25 TABLET ORAL at 08:11

## 2022-11-29 RX ADMIN — PROPRANOLOL HYDROCHLORIDE 10 MG: 10 TABLET ORAL at 09:11

## 2022-11-29 NOTE — PLAN OF CARE
Patient on oxygen with documented flow.  Will attempt to wean per O2 order protocol. Patient would like to hold on MDI due to nausea.

## 2022-11-29 NOTE — ASSESSMENT & PLAN NOTE
?creatinine increased to 1.6 from 1.2 while on diuretics   ?prerenal    Will hold lasix and repeat BMP   Avoid nephrotoxic drugs, renally dose meds

## 2022-11-29 NOTE — ASSESSMENT & PLAN NOTE
Patient has been going to deep sleep intermittently and then wakes up on her own and follows commands appropriately    Per mother, deep sleep is common with her while using marijuana    UDS: amphetamine     Thiamine, folic acid    Psych evaluated and recommended discontinuing clonazepam and reassessment by psych again before discharge

## 2022-11-29 NOTE — PLAN OF CARE
Patient is lethargic and only responds to voice. Frequent checks maintained on patient. Medicated per MAR. Safety precautions maintained. Call light in reach.

## 2022-11-29 NOTE — SUBJECTIVE & OBJECTIVE
Interval History: no acute events overnight. She is sleepy but sitting in bed and trying to eat lunch. Requesting for clonazepam which is stronger than vistaril for anxiety. Denies dyspnea. No family at bedside.     Review of Systems   Respiratory:  Negative for cough and shortness of breath.    Cardiovascular:  Negative for chest pain and leg swelling.   Gastrointestinal:  Positive for nausea. Negative for abdominal pain, diarrhea and vomiting.   Psychiatric/Behavioral:  The patient is nervous/anxious.    Objective:     Vital Signs (Most Recent):  Temp: 98.1 °F (36.7 °C) (11/29/22 1546)  Pulse: 71 (11/29/22 1546)  Resp: 16 (11/29/22 1546)  BP: 99/69 (11/29/22 1546)  SpO2: (!) 94 % (11/29/22 1620)   Vital Signs (24h Range):  Temp:  [96.8 °F (36 °C)-98.1 °F (36.7 °C)] 98.1 °F (36.7 °C)  Pulse:  [71-86] 71  Resp:  [16-19] 16  SpO2:  [93 %-100 %] 94 %  BP: ()/(63-84) 99/69     Weight: 58 kg (127 lb 13.9 oz)  Body mass index is 24.97 kg/m².    Intake/Output Summary (Last 24 hours) at 11/29/2022 1656  Last data filed at 11/29/2022 1300  Gross per 24 hour   Intake 1063.61 ml   Output 1700 ml   Net -636.39 ml      Physical Exam  Vitals and nursing note reviewed.   Constitutional:       General: She is not in acute distress.     Comments: Somnolent, but sitting in bed trying to eat lunch   HENT:      Head: Normocephalic and atraumatic.   Eyes:      General: No scleral icterus.  Cardiovascular:      Rate and Rhythm: Normal rate and regular rhythm.      Pulses: Normal pulses.      Heart sounds: Normal heart sounds. No murmur heard.  Pulmonary:      Effort: Pulmonary effort is normal. No respiratory distress.      Breath sounds: No wheezing.      Comments: Unable to hear breath sounds adequately due to lack of cooperation  Abdominal:      Palpations: Abdomen is soft.      Tenderness: There is no abdominal tenderness.   Musculoskeletal:      Cervical back: Neck supple.      Right lower leg: No edema.      Left lower leg:  No edema.   Skin:     General: Skin is warm and dry.      Findings: No bruising or rash.   Neurological:      Mental Status: She is oriented to person, place, and time.   Psychiatric:      Comments: sleepy       Significant Labs: All pertinent labs within the past 24 hours have been reviewed.    Significant Imaging: I have reviewed all pertinent imaging results/findings within the past 24 hours.

## 2022-11-29 NOTE — PLAN OF CARE
Pt awake and alert. Medications administered per orders. 2L NC. Low Na diet, 1.5L fluid restriction. Cardiac monitor maintained. Ambulates with standby assist. Noncompliant with bed alarm; continues to get out of bed. CLEMENTE at bedside; bed alarm on. Encouraged to call with questions/concerns/assistance. Safety.

## 2022-11-29 NOTE — PROGRESS NOTES
[facility] Medicine  Progress Note    Patient Name: Stephanie T Barthelemy  MRN: [id number]  Patient Class: OP- Observation   Admission Date: 11/25/2022  Length of Stay: 0 days  Attending Physician: Maryan Irby MD  Primary Care Provider: Primary Doctor No        Subjective:     Principal Problem:Acute on chronic combined systolic and diastolic congestive heart failure      HPI:  51-year-old female who has a past medical history of attention deficit hyperactivity disorder, anemia, anxiety, bipolar disorder, history of hepatitis C, history of psychiatric hospitalization, hypertension, opioid overdose, psychosis, seizure disorder, sleep difficulties, substance abuse, therapy, and vasculitis who presents to the emergency department for evaluation of abdominal bloating; onset yesterday. Associated symptoms include: abdominal pain, bilateral leg swelling, and chest pain. The patient developed abdominal bloating accompanied by intermittent episodes of mid-abdominal pain and bilateral leg swelling; symptoms stated to have worsened since onset. Patient is prescribed lasix to combat symptoms, but lost the medication two days ago. Patient requested another prescription of lasix.In addition to the chief complaint, patient developed intermittent episodes of chest pain. She denies cough. She has no known allergies to medication    Vital signs are remarkable elevated blood pressure 146/98  Lab work up reveals elevated total bili 2.4, , , Na 133, initial Trop 0.049 ( baseline chronic Trop elevation), Hgb 10.7, MCV 76, bnp 2927.  Ekg shows Sinus tachycardia, right axis deviation, nonspecific intraventricular block and criteria for Septal infarct ,age undetermined.   Chest xray shows Stable enlargement of the cardiomediastinal silhouette with probable mild interstitial edema and small to moderate bilateral pleural effusions    Patient was given one dose iv lasix in the ed.       Overview/Hospital  Course:  No notes on file    Interval History: no acute events overnight. She is sleepy but sitting in bed and trying to eat lunch. Requesting for clonazepam which is stronger than vistaril for anxiety. Denies dyspnea. No family at bedside.     Review of Systems   Respiratory:  Negative for cough and shortness of breath.    Cardiovascular:  Negative for chest pain and leg swelling.   Gastrointestinal:  Positive for nausea. Negative for abdominal pain, diarrhea and vomiting.   Psychiatric/Behavioral:  The patient is nervous/anxious.    Objective:     Vital Signs (Most Recent):  Temp: 98.1 °F (36.7 °C) (11/29/22 1546)  Pulse: 71 (11/29/22 1546)  Resp: 16 (11/29/22 1546)  BP: 99/69 (11/29/22 1546)  SpO2: (!) 94 % (11/29/22 1620)   Vital Signs (24h Range):  Temp:  [96.8 °F (36 °C)-98.1 °F (36.7 °C)] 98.1 °F (36.7 °C)  Pulse:  [71-86] 71  Resp:  [16-19] 16  SpO2:  [93 %-100 %] 94 %  BP: ()/(63-84) 99/69     Weight: 58 kg (127 lb 13.9 oz)  Body mass index is 24.97 kg/m².    Intake/Output Summary (Last 24 hours) at 11/29/2022 1656  Last data filed at 11/29/2022 1300  Gross per 24 hour   Intake 1063.61 ml   Output 1700 ml   Net -636.39 ml      Physical Exam  Vitals and nursing note reviewed.   Constitutional:       General: She is not in acute distress.     Comments: Somnolent, but sitting in bed trying to eat lunch   HENT:      Head: Normocephalic and atraumatic.   Eyes:      General: No scleral icterus.  Cardiovascular:      Rate and Rhythm: Normal rate and regular rhythm.      Pulses: Normal pulses.      Heart sounds: Normal heart sounds. No murmur heard.  Pulmonary:      Effort: Pulmonary effort is normal. No respiratory distress.      Breath sounds: No wheezing.      Comments: Unable to hear breath sounds adequately due to lack of cooperation  Abdominal:      Palpations: Abdomen is soft.      Tenderness: There is no abdominal tenderness.   Musculoskeletal:      Cervical back: Neck supple.      Right lower leg: No  edema.      Left lower leg: No edema.   Skin:     General: Skin is warm and dry.      Findings: No bruising or rash.   Neurological:      Mental Status: She is oriented to person, place, and time.   Psychiatric:      Comments: sleepy       Significant Labs: All pertinent labs within the past 24 hours have been reviewed.    Significant Imaging: I have reviewed all pertinent imaging results/findings within the past 24 hours.      Assessment/Plan:      * Acute on chronic combined systolic and diastolic congestive heart failure  Current exacerbation is likely 2/2 non compliant, drug abuse   Patient is identified as having Combined Systolic and Diastolic heart failure that is Acute on chronic. CHF is currently uncontrolled due to Rales/crackles on pulmonary exam. Latest ECHO performed and demonstrates- Results for orders placed during the hospital encounter of 11/04/22    Echo    Interpretation Summary  · The left ventricle is mildly enlarged with eccentric hypertrophy and severely decreased systolic function.  · The estimated ejection fraction is 20%.  · Left ventricular diastolic dysfunction.  · There is left ventricular global hypokinesis.  · There is abnormal septal wall motion.  · Mild right ventricular enlargement with mildly reduced right ventricular systolic function.  · Severe left atrial enlargement.  · Severe mitral regurgitation.  · The estimated PA systolic pressure is 29 mmHg.  · Normal central venous pressure (3 mmHg).  . Continue Furosemide and monitor clinical status closely. Monitor on telemetry. Patient is on CHF pathway.  Monitor strict Is&Os and daily weights.  Place on fluid restriction of 1.5 L. Continue to stress to patient importance of self efficacy and  on diet for CHF. Last BNP reviewed- and noted below   Recent Labs   Lab 11/25/22  2243   BNP 3,871*       I/O last 3 completed shifts:  In: 583.6 [P.O.:240; I.V.:45.4; IV Piggyback:298.3]  Out: 1450 [Urine:1450]  I/O this shift:  In: 720  [P.O.:720]  Out: 2300 [Urine:2300]    I&O   Daily weight  Cardiology on board - recommend resumption of current diuresis, BB, ACEI and outpatient cardiology follow up     Held lasix today due to worsening creatinine. Patient is a poor historian and difficult to discern history but does not look floridly volume overloaded. Doubt I/O accuracy.     Decrease dose of lisinopril given ARABELLA and borderline low BP    Continue propranolol (cardiology recommended switching to metoprolol, will defer to OP provider)    Abnormal EKG        Cardiomyopathy  Non ischemic       Elevated LFTs  Likely 2/2 congested liver.   Continue to monitor lft.       Polysubstance abuse  Patient has been going to deep sleep intermittently and then wakes up on her own and follows commands appropriately    Per mother, deep sleep is common with her while using marijuana    UDS: amphetamine     Thiamine, folic acid    Psych evaluated and recommended discontinuing clonazepam and reassessment by psych again before discharge      Depression  Home med fluoxetine  Psych on board      ARABELLA (acute kidney injury)  ?creatinine increased to 1.6 from 1.2 while on diuretics   ?prerenal    Will hold lasix and repeat BMP   Avoid nephrotoxic drugs, renally dose meds      Essential hypertension  Continue lisinopril  Bp monitoring       VTE Risk Mitigation (From admission, onward)         Ordered     heparin (porcine) injection 5,000 Units  Every 8 hours         11/25/22 2218     IP VTE HIGH RISK PATIENT  Once         11/25/22 2218     Place sequential compression device  Until discontinued         11/25/22 2218                Discharge Planning   AYSHA: 11/30/2022     Code Status: Full Code   Is the patient medically ready for discharge?:     Reason for patient still in hospital (select all that apply): Patient trending condition  Discharge Plan A: Home with family          Marayn Irby MD  Department of Hospital Medicine   [facility] S[facility]

## 2022-11-29 NOTE — PROGRESS NOTES
Food & Nutrition  Education    Diet Education: heart failure  Time Spent: 15 minutes  Learners: pt      Nutrition Education provided with handouts:   Heart Failure Nutrition Therapy    Comments:  Pt eating lunch at visit. Educated pt on fluid and salt restricted diet. Discussed foods high in sodium to avoid. Encouraged her to limit her Na intake to less than 2000mg per day. Also discussed 1500ml fluid restriction. Pt voiced understanding. Handouts were provided.    All questions and concerns answered. Dietitian's contact information provided.       Follow-Up: 12/6/22    Please Re-consult as needed        Thanks!

## 2022-11-29 NOTE — ASSESSMENT & PLAN NOTE
Current exacerbation is likely 2/2 non compliant, drug abuse   Patient is identified as having Combined Systolic and Diastolic heart failure that is Acute on chronic. CHF is currently uncontrolled due to Rales/crackles on pulmonary exam. Latest ECHO performed and demonstrates- Results for orders placed during the hospital encounter of 11/04/22    Echo    Interpretation Summary  · The left ventricle is mildly enlarged with eccentric hypertrophy and severely decreased systolic function.  · The estimated ejection fraction is 20%.  · Left ventricular diastolic dysfunction.  · There is left ventricular global hypokinesis.  · There is abnormal septal wall motion.  · Mild right ventricular enlargement with mildly reduced right ventricular systolic function.  · Severe left atrial enlargement.  · Severe mitral regurgitation.  · The estimated PA systolic pressure is 29 mmHg.  · Normal central venous pressure (3 mmHg).  . Continue Furosemide and monitor clinical status closely. Monitor on telemetry. Patient is on CHF pathway.  Monitor strict Is&Os and daily weights.  Place on fluid restriction of 1.5 L. Continue to stress to patient importance of self efficacy and  on diet for CHF. Last BNP reviewed- and noted below   Recent Labs   Lab 11/25/22  2243   BNP 3,871*       I/O last 3 completed shifts:  In: 583.6 [P.O.:240; I.V.:45.4; IV Piggyback:298.3]  Out: 1450 [Urine:1450]  I/O this shift:  In: 720 [P.O.:720]  Out: 2300 [Urine:2300]    I&O   Daily weight  Cardiology on board - recommend resumption of current diuresis, BB, ACEI and outpatient cardiology follow up     Held lasix today due to worsening creatinine. Patient is a poor historian and difficult to discern history but does not look floridly volume overloaded. Doubt I/O accuracy.     Decrease dose of lisinopril given ARABELLA and borderline low BP    Continue propranolol (cardiology recommended switching to metoprolol, will defer to OP provider)

## 2022-11-30 VITALS
OXYGEN SATURATION: 99 % | RESPIRATION RATE: 18 BRPM | HEART RATE: 77 BPM | BODY MASS INDEX: 24.68 KG/M2 | SYSTOLIC BLOOD PRESSURE: 98 MMHG | HEIGHT: 60 IN | DIASTOLIC BLOOD PRESSURE: 71 MMHG | WEIGHT: 125.69 LBS | TEMPERATURE: 97 F

## 2022-11-30 PROBLEM — N17.9 AKI (ACUTE KIDNEY INJURY): Status: RESOLVED | Noted: 2018-05-23 | Resolved: 2022-11-30

## 2022-11-30 PROBLEM — I50.43 ACUTE ON CHRONIC COMBINED SYSTOLIC AND DIASTOLIC CONGESTIVE HEART FAILURE: Status: RESOLVED | Noted: 2022-09-19 | Resolved: 2022-11-30

## 2022-11-30 LAB
ANION GAP SERPL CALC-SCNC: 12 MMOL/L (ref 8–16)
BUN SERPL-MCNC: 35 MG/DL (ref 6–20)
CALCIUM SERPL-MCNC: 8.8 MG/DL (ref 8.7–10.5)
CHLORIDE SERPL-SCNC: 99 MMOL/L (ref 95–110)
CO2 SERPL-SCNC: 27 MMOL/L (ref 23–29)
CREAT SERPL-MCNC: 1.3 MG/DL (ref 0.5–1.4)
EST. GFR  (NO RACE VARIABLE): 50 ML/MIN/1.73 M^2
GLUCOSE SERPL-MCNC: 113 MG/DL (ref 70–110)
POTASSIUM SERPL-SCNC: 3.8 MMOL/L (ref 3.5–5.1)
SODIUM SERPL-SCNC: 138 MMOL/L (ref 136–145)

## 2022-11-30 PROCEDURE — 25000003 PHARM REV CODE 250: Performed by: STUDENT IN AN ORGANIZED HEALTH CARE EDUCATION/TRAINING PROGRAM

## 2022-11-30 PROCEDURE — 25000003 PHARM REV CODE 250: Performed by: INTERNAL MEDICINE

## 2022-11-30 PROCEDURE — G0378 HOSPITAL OBSERVATION PER HR: HCPCS

## 2022-11-30 PROCEDURE — 63600175 PHARM REV CODE 636 W HCPCS: Performed by: HOSPITALIST

## 2022-11-30 PROCEDURE — 80048 BASIC METABOLIC PNL TOTAL CA: CPT | Performed by: STUDENT IN AN ORGANIZED HEALTH CARE EDUCATION/TRAINING PROGRAM

## 2022-11-30 PROCEDURE — 36415 COLL VENOUS BLD VENIPUNCTURE: CPT | Performed by: STUDENT IN AN ORGANIZED HEALTH CARE EDUCATION/TRAINING PROGRAM

## 2022-11-30 PROCEDURE — 94640 AIRWAY INHALATION TREATMENT: CPT

## 2022-11-30 PROCEDURE — 96372 THER/PROPH/DIAG INJ SC/IM: CPT | Performed by: HOSPITALIST

## 2022-11-30 PROCEDURE — 94761 N-INVAS EAR/PLS OXIMETRY MLT: CPT

## 2022-11-30 PROCEDURE — S4991 NICOTINE PATCH NONLEGEND: HCPCS | Performed by: HOSPITALIST

## 2022-11-30 PROCEDURE — 25000003 PHARM REV CODE 250: Performed by: HOSPITALIST

## 2022-11-30 PROCEDURE — 99900035 HC TECH TIME PER 15 MIN (STAT)

## 2022-11-30 RX ORDER — PROPRANOLOL HYDROCHLORIDE 10 MG/1
5 TABLET ORAL 2 TIMES DAILY
Qty: 30 TABLET | Refills: 1 | Status: ON HOLD | OUTPATIENT
Start: 2022-11-30 | End: 2022-12-14 | Stop reason: HOSPADM

## 2022-11-30 RX ORDER — FUROSEMIDE 20 MG/1
20 TABLET ORAL 2 TIMES DAILY
Qty: 60 TABLET | Refills: 1 | Status: ON HOLD | OUTPATIENT
Start: 2022-11-30 | End: 2022-12-14 | Stop reason: HOSPADM

## 2022-11-30 RX ORDER — GABAPENTIN 300 MG/1
300 CAPSULE ORAL NIGHTLY
Qty: 30 CAPSULE | Refills: 1 | Status: ON HOLD | OUTPATIENT
Start: 2022-11-30 | End: 2022-12-14 | Stop reason: HOSPADM

## 2022-11-30 RX ORDER — FUROSEMIDE 20 MG/1
20 TABLET ORAL 2 TIMES DAILY
Status: DISCONTINUED | OUTPATIENT
Start: 2022-11-30 | End: 2022-11-30 | Stop reason: HOSPADM

## 2022-11-30 RX ORDER — LISINOPRIL 5 MG/1
5 TABLET ORAL DAILY
Qty: 30 TABLET | Refills: 1 | Status: ON HOLD | OUTPATIENT
Start: 2022-11-30 | End: 2022-12-14 | Stop reason: HOSPADM

## 2022-11-30 RX ADMIN — ALBUTEROL SULFATE 2 PUFF: 90 AEROSOL, METERED RESPIRATORY (INHALATION) at 08:11

## 2022-11-30 RX ADMIN — FLUOXETINE HYDROCHLORIDE 20 MG: 20 CAPSULE ORAL at 09:11

## 2022-11-30 RX ADMIN — GABAPENTIN 300 MG: 300 CAPSULE ORAL at 09:11

## 2022-11-30 RX ADMIN — FAMOTIDINE 20 MG: 20 TABLET ORAL at 09:11

## 2022-11-30 RX ADMIN — GABAPENTIN 300 MG: 300 CAPSULE ORAL at 02:11

## 2022-11-30 RX ADMIN — HEPARIN SODIUM 5000 UNITS: 5000 INJECTION, SOLUTION INTRAVENOUS; SUBCUTANEOUS at 05:11

## 2022-11-30 RX ADMIN — Medication 100 MG: at 09:11

## 2022-11-30 RX ADMIN — NICOTINE 1 PATCH: 21 PATCH, EXTENDED RELEASE TRANSDERMAL at 09:11

## 2022-11-30 RX ADMIN — FUROSEMIDE 20 MG: 20 TABLET ORAL at 09:11

## 2022-11-30 RX ADMIN — HEPARIN SODIUM 5000 UNITS: 5000 INJECTION, SOLUTION INTRAVENOUS; SUBCUTANEOUS at 02:11

## 2022-11-30 RX ADMIN — FOLIC ACID 1 MG: 1 TABLET ORAL at 09:11

## 2022-11-30 RX ADMIN — OXCARBAZEPINE 300 MG: 150 TABLET, FILM COATED ORAL at 09:11

## 2022-11-30 NOTE — NURSING
Patient awake and alert. No c/o pain or n/v. Medications administered per orders. On O2 NC. Ambulated with assist to br. Tolearting low sodium 2gm diet. Fluids restriction 1500ml. Edema noted generalized. Patient educated on fluids restriction. Paitnet verbalized understanding. Tele, vs, and labs monitored. Luli sitter in  the room for fall prevention. Bed alarm on. Fall precautions in place. Instructed to call for assistance. Safety maintained. Will continue to monitor.

## 2022-11-30 NOTE — PLAN OF CARE
VN note: VN attempted to contact patient's sisterBetty via phone #[phone] numerous times but phone rang busy. Will attempt again shortly. Notified bedside nurse. Will cont to be available.

## 2022-11-30 NOTE — PLAN OF CARE
AVS and educational attachments shared with patient's sisterBetty via phone #[phone]. Discussed thoroughly. Patient's sister verbalized clear understanding using teach back method. Notified bedside nurse of completion of education. At present no distress noted. Patient to be discharged via w/c with arranged transportation [facility]. Will cont to be available to patient and family and intervene prn.

## 2022-11-30 NOTE — PLAN OF CARE
VN note: VN completed AVS and attachments and notified bedside nurse, Trisha. Will cont to be available and intervene prn.

## 2022-11-30 NOTE — PLAN OF CARE
NGUYỄN called and spoke with pt via [employer]. Pt confirmed sister Betty to transport to appointments. Pt reported not having MH or AOD treatment currently but open to a ACT team referral. NGUYỄN spoke with ACT team 1 staff for referral process. NGUYỄN informed they do not service pt address. NGUYỄN then called [facility] [phone] who reported they can assist but SW has to call the [place] location [phone].    NGUYỄN spoke with staff at [facility] [place]  who confirmed ability to service area. NGUYỄN informed to fax pt clinical information to [phone].       11/30/22 1512   Post-Acute Status   Post-Acute Authorization Other   Other Status Community Services   Hospital Resources/Appts/Education Provided Community resources provided   Discharge Delays None known at this time   Discharge Plan   Discharge Plan A Home with family     Future Appointments   Date Time Provider Department Center   12/12/2022  2:00 PM Ashlee Hill MD [facility]  [phone]

## 2022-11-30 NOTE — PLAN OF CARE
D/C recs noted. Pt to have PCC follow up. Pharmacist will go over home medications and reasons for medications. VN and bedside nurse to reiterate final discharge instructions.  SW called in referral for [facility] in [place] [phone].      At time of discharge pt will be transported home by family  4:22pm SW UPDATE PT TO BE TRANSPORTED BY [facility].    DME at discharge: none  Home Health: none     Pt has follow up appointments added to AVS.         11/30/22 1522   Final Note   Assessment Type Final Discharge Note   Anticipated Discharge Disposition Home   What phone number can be called within the next 1-3 days to see how you are doing after discharge? [phone]   Hospital Resources/Appts/Education Provided Appointments scheduled and added to AVS   Post-Acute Status   Discharge Delays None known at this time     Future Appointments   Date Time Provider Department Center   12/12/2022  2:00 PM Ashlee Hill MD [facility]  [phone]

## 2022-12-01 ENCOUNTER — NURSE TRIAGE (OUTPATIENT)
Dept: ADMINISTRATIVE | Facility: CLINIC | Age: 52
End: 2022-12-01
Payer: MEDICARE

## 2022-12-02 NOTE — TELEPHONE ENCOUNTER
Reason for Disposition   Unable to complete triage due to phone connection issues   Second attempt to contact caller AND no contact made. Phone number verified.   Third attempt to contact caller AND no contact made. Phone number verified.    Additional Information   Caller hangs up    Protocols used: No Contact or Duplicate Contact Call-A-MANDI    Mechelle's sister Betty called.  Unable to verify any additional information or reason for call, then call was either ended or dropped.  Made 3 attempts to call Betty's number of record, but no answer and VM box full, unable to leave any message.  No triage due to unable to connect again.

## 2022-12-04 NOTE — HOSPITAL COURSE
52-year-old female with PMH of polysubstance abuse, hepatitis-C, anxiety, bipolar disorder, ADHD, seizure disorder, sleep difficulties, chronic systolic heart failure, vasculitis who presented with abdominal bloating, bilateral swelling after running out of Lasix.  Found to be in decompensated acute on chronic systolic heart failure due to medication noncompliance and substance abuse.  UDS was positive for methamphetamines.  She was diuresed with IV Lasix which was stopped given ARABELLA that improved on holding Lasix following which she was started on oral diuretics.  She was initially on 2 L oxygen via NC from which she was weaned with IV diuretics.  Per mother she intermittently goes into deep sleep and then wakes up to follow commands.  During the hospital stay she would going to deep sleep requiring constant stimulation to wake her up but when awake she would be able to answer all questions appropriately and eat an entire meal.  She was evaluated by tele psych on admission recommended discontinuing clonazepam given somnolence.  She was euvolemic on the day of discharge and denied any complaints.  Denied any SI/HI.  Rehab resources for substance abuse were offered to her.  Prescriptions for all necessary medications were sent to pharmacy.  Cardiology referral was placed.  Patient counseled on importance cessation from illicit drugs, compliance with prescription medications and close follow-up with Cardiology.    Spoke with patient's sister Betty prior to discharge who is in agreement with plan for discharge home.  States family has been trying to ensure medication compliance but patient has been non adherent.  Outpatient psych referral placed.    Recommendations for follow up :    -gabapentin dose was decreased due to excessive somnolence in the hospital though per patient's mother this is her baseline  - lisinopril and propranolol dose decreased given hypotension  - she was seen by Cardiology while inpatient who  recommended diuresis and close outpatient Cardiology follow-up.  She would benefit from switching from propranolol to an alternate beta-blocker given systolic heart failure    Physical Exam  Vitals and nursing note reviewed.   Constitutional:       General: She is not in acute distress.     Comments: Somnolent, but sitting in bed eating breakfast (almost completed entire meal)  HENT:      Head: Normocephalic and atraumatic.   Eyes:      General: No scleral icterus.  Cardiovascular:      Rate and Rhythm: Normal rate and regular rhythm.      Pulses: Normal pulses.      Heart sounds: Normal heart sounds. No murmur heard.  Pulmonary:      Effort: Pulmonary effort is normal. No respiratory distress.      Breath sounds: No wheezing.      Comments: Unable to hear breath sounds adequately due to lack of cooperation  Abdominal:      Palpations: Abdomen is soft.      Tenderness: There is no abdominal tenderness.   Musculoskeletal:      Cervical back: Neck supple.      Right lower leg: No edema.      Left lower leg: No edema.   Skin:     General: Skin is warm and dry.      Findings: No bruising or rash.   Neurological:      Mental Status: She is oriented to person, place, and time.   Psychiatric:      Comments: sleepy but awakens on multiple stimulations and able to answer questions appropriately

## 2022-12-04 NOTE — DISCHARGE SUMMARY
[facility] Medicine  Discharge Summary      Patient Name: Stephanie T Barthelemy  MRN: [id number]  GILBERT: [id number]  Patient Class: OP- Observation  Admission Date: 11/25/2022  Hospital Length of Stay: 0 days  Discharge Date and Time: 11/30/2022  7:26 PM  Attending Physician: Myrna att. providers found   Discharging Provider: Maryan Irby MD  Primary Care Provider: Primary Doctor Myrna    Primary Care Team: Networked reference to record PCT     HPI:   51-year-old female who has a past medical history of attention deficit hyperactivity disorder, anemia, anxiety, bipolar disorder, history of hepatitis C, history of psychiatric hospitalization, hypertension, opioid overdose, psychosis, seizure disorder, sleep difficulties, substance abuse, therapy, and vasculitis who presents to the emergency department for evaluation of abdominal bloating; onset yesterday. Associated symptoms include: abdominal pain, bilateral leg swelling, and chest pain. The patient developed abdominal bloating accompanied by intermittent episodes of mid-abdominal pain and bilateral leg swelling; symptoms stated to have worsened since onset. Patient is prescribed lasix to combat symptoms, but lost the medication two days ago. Patient requested another prescription of lasix.In addition to the chief complaint, patient developed intermittent episodes of chest pain. She denies cough. She has no known allergies to medication    Vital signs are remarkable elevated blood pressure 146/98  Lab work up reveals elevated total bili 2.4, , , Na 133, initial Trop 0.049 ( baseline chronic Trop elevation), Hgb 10.7, MCV 76, bnp 2927.  Ekg shows Sinus tachycardia, right axis deviation, nonspecific intraventricular block and criteria for Septal infarct ,age undetermined.   Chest xray shows Stable enlargement of the cardiomediastinal silhouette with probable mild interstitial edema and small to moderate bilateral pleural effusions    Patient  was given one dose iv lasix in the ed.       * No surgery found *      Hospital Course:   52-year-old female with PMH of polysubstance abuse, hepatitis-C, anxiety, bipolar disorder, ADHD, seizure disorder, sleep difficulties, chronic systolic heart failure, vasculitis who presented with abdominal bloating, bilateral swelling after running out of Lasix.  Found to be in decompensated acute on chronic systolic heart failure due to medication noncompliance and substance abuse.  UDS was positive for methamphetamines.  She was diuresed with IV Lasix which was stopped given ARABELLA that improved on holding Lasix following which she was started on oral diuretics.  She was initially on 2 L oxygen via NC from which she was weaned with IV diuretics.  Per mother she intermittently goes into deep sleep and then wakes up to follow commands.  During the hospital stay she would going to deep sleep requiring constant stimulation to wake her up but when awake she would be able to answer all questions appropriately and eat an entire meal.  She was evaluated by tele psych on admission recommended discontinuing clonazepam given somnolence.  She was euvolemic on the day of discharge and denied any complaints.  Denied any SI/HI.  Rehab resources for substance abuse were offered to her.  Prescriptions for all necessary medications were sent to pharmacy.  Cardiology referral was placed.  Patient counseled on importance cessation from illicit drugs, compliance with prescription medications and close follow-up with Cardiology.    Spoke with patient's sister Betty prior to discharge who is in agreement with plan for discharge home.  States family has been trying to ensure medication compliance but patient has been non adherent.  Outpatient psych referral placed.    Recommendations for follow up :    -gabapentin dose was decreased due to excessive somnolence in the hospital though per patient's mother this is her baseline  - lisinopril and  propranolol dose decreased given hypotension  - she was seen by Cardiology while inpatient who recommended diuresis and close outpatient Cardiology follow-up.  She would benefit from switching from propranolol to an alternate beta-blocker given systolic heart failure    Physical Exam  Vitals and nursing note reviewed.   Constitutional:       General: She is not in acute distress.     Comments: Somnolent, but sitting in bed eating breakfast (almost completed entire meal)  HENT:      Head: Normocephalic and atraumatic.   Eyes:      General: No scleral icterus.  Cardiovascular:      Rate and Rhythm: Normal rate and regular rhythm.      Pulses: Normal pulses.      Heart sounds: Normal heart sounds. No murmur heard.  Pulmonary:      Effort: Pulmonary effort is normal. No respiratory distress.      Breath sounds: No wheezing.      Comments: Unable to hear breath sounds adequately due to lack of cooperation  Abdominal:      Palpations: Abdomen is soft.      Tenderness: There is no abdominal tenderness.   Musculoskeletal:      Cervical back: Neck supple.      Right lower leg: No edema.      Left lower leg: No edema.   Skin:     General: Skin is warm and dry.      Findings: No bruising or rash.   Neurological:      Mental Status: She is oriented to person, place, and time.   Psychiatric:      Comments: sleepy but awakens on multiple stimulations and able to answer questions appropriately       Goals of Care Treatment Preferences:  Code Status: Full Code      Consults:   Consults (From admission, onward)        Status Ordering Provider     Inpatient consult to Cardiology-[facility]  Once        Provider:  Krishan Araujo MD    Completed YULISSA ALFARO     Inpatient consult to Registered Dietitian/Nutritionist  Once        Provider:  (Not yet assigned)    BRANDO Galvan          Final Active Diagnoses:    Diagnosis Date Noted POA    Cardiomyopathy [I42.9] 11/27/2022 Yes    Abnormal EKG [R94.31] 11/27/2022 Yes    Elevated  LFTs [R79.89] 09/19/2022 Yes    Polysubstance abuse [F19.10] 09/19/2022 Yes    Depression [F32.A] 03/14/2019 Yes    Essential hypertension [I10] 09/24/2014 Yes     Chronic      Problems Resolved During this Admission:    Diagnosis Date Noted Date Resolved POA    PRINCIPAL PROBLEM:  Acute on chronic combined systolic and diastolic congestive heart failure [I50.43] 09/19/2022 11/30/2022 Yes    ARABELLA (acute kidney injury) [N17.9] 05/23/2018 11/30/2022 Yes       Discharged Condition: fair    Disposition: Home or Self Care    Follow Up:   Follow-up Information     [facility] Follow up.    Why: Please call [facility] [place] for services [phone]  Contact information:  [address] [address]  [phone]             Long Term care Follow up.    Why: Please call for state assistance in the home or at a facility.  Contact information:  [phone]  The Office of Aging and Adult Services   or  Office for Citizens with Developmental Disabilities           [facility] Follow up.    Why: As needed  Contact information:  [facility]  [phone]  [email or web address]   [email or web address]           Bee Bolanos LMSW. Call.    Why: with any resource questions                     Patient Instructions:      Ambulatory referral/consult to Cardiology   Standing Status: Future   Referral Priority: Routine Referral Type: Consultation   Referral Reason: Specialty Services Required   Requested Specialty: Cardiology   Number of Visits Requested: 1     Ambulatory referral/consult to Psychiatry   Standing Status: Future   Referral Priority: Routine Referral Type: Psychiatric   Referral Reason: Specialty Services Required   Requested Specialty: Psychiatry   Number of Visits Requested: 1       Significant Diagnostic Studies: Labs: BMP: No results for input(s): GLU, NA, K, CL, CO2, BUN, CREATININE, CALCIUM, MG in the last 48 hours. and CBC No results for  input(s): WBC, HGB, HCT, PLT in the last 48 hours.    Pending Diagnostic Studies:     None         Medications:  Reconciled Home Medications:      Medication List      CHANGE how you take these medications    gabapentin 300 MG capsule  Commonly known as: NEURONTIN  Take 1 capsule (300 mg total) by mouth every evening.  What changed: when to take this     lisinopriL 5 MG tablet  Commonly known as: PRINIVIL,ZESTRIL  Take 1 tablet (5 mg total) by mouth once daily.  What changed:   · medication strength  · how much to take     propranoloL 10 MG tablet  Commonly known as: INDERAL  Take 0.5 tablets (5 mg total) by mouth 2 (two) times daily.  What changed:   · how much to take  · when to take this        CONTINUE taking these medications    albuterol 90 mcg/actuation inhaler  Commonly known as: PROVENTIL/VENTOLIN HFA  Inhale 2 puffs into the lungs every 6 (six) hours. Rescue     benztropine 0.5 MG tablet  Commonly known as: COGENTIN  Take 1 tablet (0.5 mg total) by mouth 2 (two) times daily.     famotidine 20 MG tablet  Commonly known as: PEPCID  Take 1 tablet (20 mg total) by mouth 2 (two) times daily.     FLUoxetine 20 MG capsule  Take 20 mg by mouth once daily.     furosemide 20 MG tablet  Commonly known as: LASIX  Take 1 tablet (20 mg total) by mouth 2 (two) times daily.     hydrOXYzine pamoate 50 MG Cap  Commonly known as: VISTARIL  Take 1 capsule (50 mg total) by mouth every 8 (eight) hours as needed (anxiety, insomnia).     nicotine 21 mg/24 hr  Commonly known as: NICODERM CQ  Place 1 patch onto the skin once daily.     OXcarbazepine 300 MG Tab  Commonly known as: TRILEPTAL  Take 1 tablet (300 mg total) by mouth 2 (two) times daily.     QUEtiapine 50 MG tablet  Commonly known as: SEROQUEL  Take 1 tablet (50 mg total) by mouth every evening.            Indwelling Lines/Drains at time of discharge:   Lines/Drains/Airways     None                 Time spent on the discharge of patient: 35 minutes         Maryan EDEN  MD Mahamed  Department of Hospital Medicine  [facility]

## 2022-12-05 ENCOUNTER — HOSPITAL ENCOUNTER (EMERGENCY)
Facility: HOSPITAL | Age: 52
Discharge: ANOTHER HOSPITAL | End: 2022-12-05
Attending: EMERGENCY MEDICINE
Payer: MEDICARE

## 2022-12-05 VITALS
HEIGHT: 60 IN | DIASTOLIC BLOOD PRESSURE: 93 MMHG | RESPIRATION RATE: 17 BRPM | BODY MASS INDEX: 23.56 KG/M2 | OXYGEN SATURATION: 98 % | HEART RATE: 101 BPM | WEIGHT: 120 LBS | TEMPERATURE: 99 F | SYSTOLIC BLOOD PRESSURE: 140 MMHG

## 2022-12-05 DIAGNOSIS — Z00.8 MEDICAL CLEARANCE FOR PSYCHIATRIC ADMISSION: ICD-10-CM

## 2022-12-05 DIAGNOSIS — I50.9 CONGESTIVE HEART FAILURE, UNSPECIFIED HF CHRONICITY, UNSPECIFIED HEART FAILURE TYPE: Primary | ICD-10-CM

## 2022-12-05 DIAGNOSIS — R44.3 HALLUCINATIONS: ICD-10-CM

## 2022-12-05 DIAGNOSIS — R00.0 TACHYCARDIA: ICD-10-CM

## 2022-12-05 PROBLEM — G93.40 ACUTE ENCEPHALOPATHY: Status: RESOLVED | Noted: 2022-08-05 | Resolved: 2022-12-05

## 2022-12-05 PROBLEM — G93.41 ENCEPHALOPATHY, METABOLIC: Status: RESOLVED | Noted: 2022-07-22 | Resolved: 2022-12-05

## 2022-12-05 LAB
ALBUMIN SERPL BCP-MCNC: 3.6 G/DL (ref 3.5–5.2)
ALP SERPL-CCNC: 119 U/L (ref 38–126)
ALT SERPL W/O P-5'-P-CCNC: 167 U/L (ref 10–44)
AMMONIA BLD-SCNC: 11 UMOL/L (ref 9–30)
AMPHET+METHAMPHET UR QL: NEGATIVE
ANION GAP SERPL CALC-SCNC: 7 MMOL/L (ref 8–16)
APAP SERPL-MCNC: <10 UG/ML (ref 10–20)
AST SERPL-CCNC: 135 U/L (ref 15–46)
BARBITURATES UR QL SCN>200 NG/ML: NEGATIVE
BASOPHILS # BLD AUTO: 0.05 K/UL (ref 0–0.2)
BASOPHILS NFR BLD: 0.6 % (ref 0–1.9)
BENZODIAZ UR QL SCN>200 NG/ML: NEGATIVE
BILIRUB SERPL-MCNC: 1.5 MG/DL (ref 0.1–1)
BILIRUB UR QL STRIP: NEGATIVE
BZE UR QL SCN: NEGATIVE
CALCIUM SERPL-MCNC: 9.1 MG/DL (ref 8.7–10.5)
CANNABINOIDS UR QL SCN: NEGATIVE
CHLORIDE SERPL-SCNC: 102 MMOL/L (ref 95–110)
CLARITY UR REFRACT.AUTO: CLEAR
CO2 SERPL-SCNC: 30 MMOL/L (ref 23–29)
COLOR UR AUTO: YELLOW
CREAT SERPL-MCNC: 0.94 MG/DL (ref 0.5–1.4)
CREAT UR-MCNC: 6.3 MG/DL (ref 15–325)
DIFFERENTIAL METHOD: ABNORMAL
EOSINOPHIL # BLD AUTO: 0 K/UL (ref 0–0.5)
EOSINOPHIL NFR BLD: 0.5 % (ref 0–8)
ERYTHROCYTE [DISTWIDTH] IN BLOOD BY AUTOMATED COUNT: 20.5 % (ref 11.5–14.5)
EST. GFR  (NO RACE VARIABLE): >60 ML/MIN/1.73 M^2
ETHANOL SERPL-MCNC: <10 MG/DL
GLUCOSE SERPL-MCNC: 96 MG/DL (ref 70–110)
GLUCOSE UR QL STRIP: NEGATIVE
HCT VFR BLD AUTO: 40.8 % (ref 37–48.5)
HGB BLD-MCNC: 11.7 G/DL (ref 12–16)
HGB UR QL STRIP: ABNORMAL
IMM GRANULOCYTES # BLD AUTO: 0.02 K/UL (ref 0–0.04)
IMM GRANULOCYTES NFR BLD AUTO: 0.3 % (ref 0–0.5)
KETONES UR QL STRIP: NEGATIVE
LEUKOCYTE ESTERASE UR QL STRIP: NEGATIVE
LYMPHOCYTES # BLD AUTO: 1.5 K/UL (ref 1–4.8)
LYMPHOCYTES NFR BLD: 19.5 % (ref 18–48)
MCH RBC QN AUTO: 21.7 PG (ref 27–31)
MCHC RBC AUTO-ENTMCNC: 28.7 G/DL (ref 32–36)
MCV RBC AUTO: 76 FL (ref 82–98)
METHADONE UR QL SCN>300 NG/ML: NEGATIVE
MICROSCOPIC COMMENT: ABNORMAL
MONOCYTES # BLD AUTO: 0.8 K/UL (ref 0.3–1)
MONOCYTES NFR BLD: 9.8 % (ref 4–15)
NEUTROPHILS # BLD AUTO: 5.4 K/UL (ref 1.8–7.7)
NEUTROPHILS NFR BLD: 69.3 % (ref 38–73)
NITRITE UR QL STRIP: NEGATIVE
NRBC BLD-RTO: 0 /100 WBC
NT-PROBNP SERPL-MCNC: ABNORMAL PG/ML (ref 5–900)
OPIATES UR QL SCN: NEGATIVE
PCP UR QL SCN>25 NG/ML: NEGATIVE
PH UR STRIP: 8 [PH] (ref 5–8)
PLATELET # BLD AUTO: 265 K/UL (ref 150–450)
PMV BLD AUTO: 11.5 FL (ref 9.2–12.9)
POTASSIUM SERPL-SCNC: 4.2 MMOL/L (ref 3.5–5.1)
PROT SERPL-MCNC: 7.3 G/DL (ref 6–8.4)
PROT UR QL STRIP: ABNORMAL
RBC # BLD AUTO: 5.39 M/UL (ref 4–5.4)
RBC #/AREA URNS AUTO: 6 /HPF (ref 0–4)
SARS-COV-2 RDRP RESP QL NAA+PROBE: NEGATIVE
SODIUM SERPL-SCNC: 139 MMOL/L (ref 136–145)
SP GR UR STRIP: 1.02 (ref 1–1.03)
TOXICOLOGY INFORMATION: ABNORMAL
URN SPEC COLLECT METH UR: ABNORMAL
UROBILINOGEN UR STRIP-ACNC: NEGATIVE EU/DL
UUN UR-MCNC: 23 MG/DL (ref 7–17)
WBC # BLD AUTO: 7.85 K/UL (ref 3.9–12.7)

## 2022-12-05 PROCEDURE — 83880 ASSAY OF NATRIURETIC PEPTIDE: CPT | Mod: ER | Performed by: EMERGENCY MEDICINE

## 2022-12-05 PROCEDURE — 25000003 PHARM REV CODE 250: Mod: ER | Performed by: EMERGENCY MEDICINE

## 2022-12-05 PROCEDURE — 82077 ASSAY SPEC XCP UR&BREATH IA: CPT | Mod: GZ,ER | Performed by: EMERGENCY MEDICINE

## 2022-12-05 PROCEDURE — 93005 ELECTROCARDIOGRAM TRACING: CPT | Mod: ER

## 2022-12-05 PROCEDURE — 93010 EKG 12-LEAD: ICD-10-PCS | Mod: 76,,, | Performed by: INTERNAL MEDICINE

## 2022-12-05 PROCEDURE — 80143 DRUG ASSAY ACETAMINOPHEN: CPT | Mod: ER | Performed by: EMERGENCY MEDICINE

## 2022-12-05 PROCEDURE — 80053 COMPREHEN METABOLIC PANEL: CPT | Mod: ER | Performed by: EMERGENCY MEDICINE

## 2022-12-05 PROCEDURE — U0002 COVID-19 LAB TEST NON-CDC: HCPCS | Mod: ER | Performed by: EMERGENCY MEDICINE

## 2022-12-05 PROCEDURE — 99900035 HC TECH TIME PER 15 MIN (STAT): Mod: ER

## 2022-12-05 PROCEDURE — 99285 EMERGENCY DEPT VISIT HI MDM: CPT | Mod: 25,ER

## 2022-12-05 PROCEDURE — 63600175 PHARM REV CODE 636 W HCPCS: Mod: ER | Performed by: EMERGENCY MEDICINE

## 2022-12-05 PROCEDURE — 93010 ELECTROCARDIOGRAM REPORT: CPT | Mod: ,,, | Performed by: INTERNAL MEDICINE

## 2022-12-05 PROCEDURE — 96374 THER/PROPH/DIAG INJ IV PUSH: CPT | Mod: ER

## 2022-12-05 PROCEDURE — 81000 URINALYSIS NONAUTO W/SCOPE: CPT | Mod: 59,ER | Performed by: EMERGENCY MEDICINE

## 2022-12-05 PROCEDURE — 82140 ASSAY OF AMMONIA: CPT | Mod: ER | Performed by: EMERGENCY MEDICINE

## 2022-12-05 PROCEDURE — 85025 COMPLETE CBC W/AUTO DIFF WBC: CPT | Mod: ER | Performed by: EMERGENCY MEDICINE

## 2022-12-05 PROCEDURE — 80307 DRUG TEST PRSMV CHEM ANLYZR: CPT | Mod: ER | Performed by: EMERGENCY MEDICINE

## 2022-12-05 RX ORDER — BENZTROPINE MESYLATE 0.5 MG/1
0.5 TABLET ORAL
Status: COMPLETED | OUTPATIENT
Start: 2022-12-05 | End: 2022-12-05

## 2022-12-05 RX ORDER — RISPERIDONE 1 MG/1
1 TABLET, ORALLY DISINTEGRATING ORAL
Status: COMPLETED | OUTPATIENT
Start: 2022-12-05 | End: 2022-12-05

## 2022-12-05 RX ORDER — FUROSEMIDE 10 MG/ML
40 INJECTION INTRAMUSCULAR; INTRAVENOUS
Status: COMPLETED | OUTPATIENT
Start: 2022-12-05 | End: 2022-12-05

## 2022-12-05 RX ADMIN — RISPERIDONE 1 MG: 1 TABLET, ORALLY DISINTEGRATING ORAL at 12:12

## 2022-12-05 RX ADMIN — BENZTROPINE MESYLATE 0.5 MG: 0.5 TABLET ORAL at 12:12

## 2022-12-05 RX ADMIN — FUROSEMIDE 40 MG: 10 INJECTION, SOLUTION INTRAMUSCULAR; INTRAVENOUS at 11:12

## 2022-12-05 NOTE — PROVIDER TRANSFER
Outside Transfer Acceptance Note / Regional Referral Center    Referring facility: [facility] ED   Referring provider: JUNAID HO  Accepting facility:  [facility]   Accepting provider: Tierney Reynolds MD  Admitting provider: Pallavi Sunkara, MD  Reason for transfer:  capacity  Transfer diagnosis: psychosis, heart failure  Transfer specialty requested:  Psychiatry  Hospital Medicine  Transfer specialty notified: yes  Transfer level: NUMBER 1-5: 2  Bed type requested: med tele  Isolation status: No active isolations   Admission class or status: I IP- Inpatient      Narrative     51yo woman with systolic CHF (EF 20%), bipolar disorder, HCV, HTN, brought in by EMS for evaluation of visual and auditory hallucinations as reported by family. Experiencing significant tardive dyskinesia - hasn't been taking her Cogentin. MD will administer her home medications.     NT-proBNP is 09890, prior 7189-8001. Chest radiograph with pulmonary edema and bilateral effusions. Appears to have volume overload. Significant peripheral edema. Hypertensive. No hypoxemia or respiratory distress. Not taking her medications. ECG with prolonged Qtc.     APAP negative, etoh negative. COVID negative. CBC WNL. LFTs elevated , , bili 1.5 - these are actually improved from prior. Does have history of HCV but had clearance and sustained viral response after therapy in 2015. Baseline LFTs always in the 100-200s, most recently , , bili 1.0, just 2 weeks ago were 438/406/3.1.     Utox pending, UA in process    Received 40mg IV furosemide at 12pm, made some urine.     Checking ammonia level.       PATIENT IS PEC    Objective     Vitals: Temp: 97.7 °F (36.5 °C) (12/05/22 0952)  Pulse: 103 (12/05/22 1212)  Resp: 19 (12/05/22 0952)  BP: (!) 146/120 (12/05/22 1202)  SpO2: 98 % (12/05/22 1212)  Recent Labs: All pertinent labs within the past 24 hours have been reviewed.    CBC:   Recent  Labs   Lab 12/05/22  1028   WBC 7.85   HGB 11.7*   HCT 40.8        CMP:   Recent Labs   Lab 12/05/22  1028      K 4.2      CO2 30*   GLU 96   BUN 23*   CREATININE 0.94   CALCIUM 9.1   PROT 7.3   ALBUMIN 3.6   BILITOT 1.5*   ALKPHOS 119   *   *   ANIONGAP 7*     LFT trends:  AST   Date Value Ref Range Status   12/05/2022 135 (H) 15 - 46 U/L Final   11/28/2022 145 (H) 10 - 40 U/L Final   11/25/2022 438 (H) 10 - 40 U/L Final   11/25/2022 466 (H) 10 - 40 U/L Final   11/04/2022 48 (H) 10 - 40 U/L Final     ALT   Date Value Ref Range Status   12/05/2022 167 (H) 10 - 44 U/L Final   11/28/2022 212 (H) 10 - 44 U/L Final   11/25/2022 406 (H) 10 - 44 U/L Final   11/25/2022 391 (H) 10 - 44 U/L Final   11/04/2022 44 10 - 44 U/L Final     Total Bilirubin   Date Value Ref Range Status   12/05/2022 1.5 (H) 0.1 - 1.0 mg/dL Final   11/28/2022 1.0 0.1 - 1.0 mg/dL Final   11/25/2022 2.4 (H) 0.1 - 1.0 mg/dL Final   11/25/2022 2.4 (H) 0.1 - 1.0 mg/dL Final   11/04/2022 2.0 (H) 0.1 - 1.0 mg/dL Final       BNP - NT Pro BNP 12/5: 65441  BNP   Date Value Ref Range Status   11/25/2022 3,871 (H) 0 - 99 pg/mL Final     Comment:     Values of less than 100 pg/ml are consistent with non-CHF populations.       Cardiac Markers: No results for input(s): CKMB, MYOGLOBIN, BNP, TROPISTAT in the last 48 hours.  Magnesium: No results for input(s): MG in the last 48 hours.  Troponin: No results for input(s): TROPONINI, TROPONINIHS in the last 48 hours.  Recent imaging:   Imaging Results              X-Ray Chest AP Portable (Final result)  Result time 12/05/22 11:22:29      Final result by ALLISON Mckee Sr., MD (12/05/22 11:22:29)                   Impression:      1. There is opacification of the base of both hemithoraces. There is a mild amount of haziness in the base of both lungs.  This is characteristic of pleural effusions with atelectasis.  An infectious process cannot be excluded.  2. The size of the heart is  prominent.  This may be secondary to magnification.  .      Electronically signed by: Pepe Mckee MD  Date:    12/05/2022  Time:    11:22               Narrative:    EXAMINATION:  XR CHEST AP PORTABLE    CLINICAL HISTORY:  leg swelling;    COMPARISON:  11/25/2022    FINDINGS:  The size of the heart is prominent.  There is opacification of the base of both hemithoraces.  There is a mild amount of haziness in the base of both lungs.  There is no pneumothorax.                                       Airway:   n/a  Vent settings:     IV access:        Peripheral IV - Single Lumen 12/05/22 1036 20 G Anterior;Left;Proximal Forearm (Active)   Line Status Blood return noted;Saline locked 12/05/22 1036     Infusions: none  Allergies: Review of patient's allergies indicates:  No Known Allergies   NPO: No    Anticoagulation:   Anticoagulants       None             Instructions      - Patient on PEC  - Consult Psychiatry  - Consider Cardiology consult if warranted, Dr. Doyle amenable to consultation    Tierney Reynolds MD, MPH, FACP  Senior Physician, Department of Hospital Medicine  [facility]  [address]  Admit to Hospital Medicine  Upon patient arrival to floor, please contact Hospital Medicine on call.

## 2022-12-05 NOTE — ED PROVIDER NOTES
"Encounter Date: 12/5/2022       History     Chief Complaint   Patient presents with    Psychiatric Evaluation     Pt presents via EMS.  EMS reports family C/O AH/VH. Pt denies SI/HI but appears to have AH/VH.  Family reports pt has "not been taking medications"      52-year-old female with history of substance abuse, bipolar disorder, CHF, seizure disorder, hepatitis-C presents from home after family called EMS because patient was having auditory and visual hallucinations.  Patient says that she "just feels bad."     History limited secondary to psychiatric illness.    Review of patient's allergies indicates:  No Known Allergies  Past Medical History:   Diagnosis Date    ADHD (attention deficit hyperactivity disorder)     Anemia     Anxiety     Bipolar disorder     CHF (congestive heart failure)     History of hepatitis C - s/p clearance of virus (HCV neg 6/2015) 09/26/2014    History of psychiatric hospitalization     History of substance abuse     IV heroin - last use 2013 per pt    Hx of psychiatric care     Hypertension     Opioid overdose 05/10/2016    Psychiatric problem     Psychosis     Seizure disorder 04/03/2019    Sleep difficulties     Substance abuse     Therapy     Vasculitis      Past Surgical History:   Procedure Laterality Date    HYSTERECTOMY  3/16/2011    SALPINGOOPHORECTOMY Right 3/16/2011    TUBAL LIGATION      Vaginal cuff repair  04/22/2011     Family History   Problem Relation Age of Onset    Diabetes Maternal Grandmother     Cancer Maternal Grandmother      Social History     Tobacco Use    Smoking status: Every Day     Packs/day: 1.00     Years: 36.00     Pack years: 36.00     Types: Cigarettes     Start date: 1986    Smokeless tobacco: Never    Tobacco comments:     Enrolled in [employer] on 10/23/14 (S[employer] Member ID # [id number]) Pt declines Ambulatory referral to Smoking Cessation clinic. Handout provided   Substance Use Topics    Alcohol use: No    Drug use: Yes     Types: Heroin, " Cocaine, Methamphetamines, Marijuana     Review of Systems   Unable to perform ROS: Psychiatric disorder     Physical Exam     Initial Vitals [12/05/22 0952]   BP Pulse Resp Temp SpO2   (!) 162/116 102 19 97.7 °F (36.5 °C) 98 %      MAP       --         Physical Exam    Nursing note and vitals reviewed.  HENT:   Head: Atraumatic.   Lip smacking with uncontrolled tongue movements   Eyes: Conjunctivae and EOM are normal.   Neck:   Normal range of motion.  Cardiovascular:      Exam reveals no gallop and no friction rub.       No murmur heard.  Pulmonary/Chest: Breath sounds normal. No respiratory distress.   Abdominal: Abdomen is soft. There is no abdominal tenderness.   Musculoskeletal:         General: Edema present.      Cervical back: Normal range of motion.     Neurological: She is alert.   Moving all extremities, oriented to person and place   Psychiatric:   Bizarre affect, unable to appropriately answer questions or say why she is here       ED Course   Procedures  Labs Reviewed   CBC W/ AUTO DIFFERENTIAL - Abnormal; Notable for the following components:       Result Value    Hemoglobin 11.7 (*)     MCV 76 (*)     MCH 21.7 (*)     MCHC 28.7 (*)     RDW 20.5 (*)     All other components within normal limits   COMPREHENSIVE METABOLIC PANEL - Abnormal; Notable for the following components:    CO2 30 (*)     BUN 23 (*)     Total Bilirubin 1.5 (*)      (*)      (*)     Anion Gap 7 (*)     All other components within normal limits   NT-PRO NATRIURETIC PEPTIDE - Abnormal; Notable for the following components:    NT-proBNP 73558 (*)     All other components within normal limits   ALCOHOL,MEDICAL (ETHANOL)   ACETAMINOPHEN LEVEL   SARS-COV-2 RNA AMPLIFICATION, QUAL    Narrative:     Is the patient symptomatic?->No   NT-PRO NATRIURETIC PEPTIDE   URINALYSIS, REFLEX TO URINE CULTURE   DRUG SCREEN PANEL, URINE EMERGENCY   AMMONIA   URINALYSIS MICROSCOPIC          Imaging Results              X-Ray Chest AP  Portable (Final result)  Result time 12/05/22 11:22:29      Final result by ALLISON Mckee Sr., MD (12/05/22 11:22:29)                   Impression:      1. There is opacification of the base of both hemithoraces. There is a mild amount of haziness in the base of both lungs.  This is characteristic of pleural effusions with atelectasis.  An infectious process cannot be excluded.  2. The size of the heart is prominent.  This may be secondary to magnification.  .      Electronically signed by: Pepe Mckee MD  Date:    12/05/2022  Time:    11:22               Narrative:    EXAMINATION:  XR CHEST AP PORTABLE    CLINICAL HISTORY:  leg swelling;    COMPARISON:  11/25/2022    FINDINGS:  The size of the heart is prominent.  There is opacification of the base of both hemithoraces.  There is a mild amount of haziness in the base of both lungs.  There is no pneumothorax.                                       Medications   risperiDONE disintegrating tablet 1 mg (has no administration in time range)   benztropine tablet 0.5 mg (has no administration in time range)   furosemide injection 40 mg (40 mg Intravenous Given 12/5/22 1148)     Medical Decision Making:   Initial Assessment:   52-year-old female with history of bipolar disorder brought in for hallucinations.  Reportedly not compliant with medications.  Patient not answering questions appropriately.  Pec for grave disability.  Patient does have evidence of volume overload with pitting edema in her legs.  Vital signs notable for hypertension.  Patient's recent admission for decompensated heart failure should injection fraction of 20%.  Patient unlikely to be medically clear for psychiatric evaluation so will initiate cardiac workup for diuresis.  EKG reviewed interpreted myself shows no evidence of acute ischemia.  There is prolonged QT.  Chest x-ray shows pleural effusion.  Labs show elevated BNP of over 10,000. Lasix given IV.  Patient will be admitted to Hospital  Medicine for further diuresis and then sent for psych evaluation.                          Clinical Impression:   Final diagnoses:  [Z00.8] Medical clearance for psychiatric admission  [I50.9] Congestive heart failure, unspecified HF chronicity, unspecified heart failure type (Primary)  [R44.3] Hallucinations        ED Disposition Condition    Transfer to Another Facility Stable                Lucho Aparicio MD  12/05/22 9258

## 2022-12-05 NOTE — ED NOTES
Pt stated she had to use the restroom , pt was unable to provide urine sample. Sitter assisted pt undressing into facility approved apparel .

## 2022-12-05 NOTE — ED NOTES
The patient is placed on cardiac monitor, pulse ox, and automatic blood pressure cuff. Patient changed into blue paper scrubs.

## 2022-12-05 NOTE — ED NOTES
PEC scanned into pts chart per registration.    [place] 's office contacted and notified of opts PEC status.   Pt medication bottles placed in Pyxis.

## 2022-12-13 PROBLEM — I42.8 NICM (NONISCHEMIC CARDIOMYOPATHY): Status: ACTIVE | Noted: 2022-11-27

## 2022-12-13 PROBLEM — E78.2 MIXED HYPERLIPIDEMIA: Status: ACTIVE | Noted: 2019-10-16

## 2022-12-13 PROBLEM — I50.42 CHRONIC COMBINED SYSTOLIC AND DIASTOLIC CONGESTIVE HEART FAILURE: Status: ACTIVE | Noted: 2022-09-19

## 2022-12-19 PROBLEM — J96.01 ACUTE RESPIRATORY FAILURE WITH HYPOXIA: Status: RESOLVED | Noted: 2022-07-22 | Resolved: 2022-12-19

## 2022-12-26 ENCOUNTER — HOSPITAL ENCOUNTER (OUTPATIENT)
Facility: HOSPITAL | Age: 52
Discharge: HOME | End: 2022-12-29
Attending: EMERGENCY MEDICINE | Admitting: FAMILY MEDICINE
Payer: MEDICARE

## 2022-12-26 DIAGNOSIS — R94.31 QT PROLONGATION: ICD-10-CM

## 2022-12-26 DIAGNOSIS — J96.01 ACUTE RESPIRATORY FAILURE WITH HYPOXIA: ICD-10-CM

## 2022-12-26 DIAGNOSIS — I50.43 ACUTE ON CHRONIC COMBINED SYSTOLIC AND DIASTOLIC CONGESTIVE HEART FAILURE: ICD-10-CM

## 2022-12-26 DIAGNOSIS — I42.7 CARDIOMYOPATHY SECONDARY TO DRUG: ICD-10-CM

## 2022-12-26 DIAGNOSIS — R06.02 SHORTNESS OF BREATH: Primary | ICD-10-CM

## 2022-12-26 DIAGNOSIS — I42.9 CARDIOMYOPATHY, UNSPECIFIED TYPE: ICD-10-CM

## 2022-12-26 DIAGNOSIS — R94.31 ABNORMAL EKG: ICD-10-CM

## 2022-12-26 DIAGNOSIS — R60.1 ANASARCA: ICD-10-CM

## 2022-12-26 DIAGNOSIS — I42.8 NICM (NONISCHEMIC CARDIOMYOPATHY): ICD-10-CM

## 2022-12-26 DIAGNOSIS — I50.42 CHRONIC COMBINED SYSTOLIC AND DIASTOLIC HEART FAILURE: ICD-10-CM

## 2022-12-26 DIAGNOSIS — R07.9 CHEST PAIN: ICD-10-CM

## 2022-12-26 DIAGNOSIS — R60.0 BILATERAL LOWER EXTREMITY EDEMA: ICD-10-CM

## 2022-12-26 DIAGNOSIS — R41.82 ALTERED MENTAL STATUS, UNSPECIFIED ALTERED MENTAL STATUS TYPE: ICD-10-CM

## 2022-12-26 LAB
ALBUMIN SERPL BCP-MCNC: 4 G/DL (ref 3.5–5.2)
ALLENS TEST: ABNORMAL
ALP SERPL-CCNC: 130 U/L (ref 38–126)
ALT SERPL W/O P-5'-P-CCNC: 149 U/L (ref 10–44)
AMMONIA BLD-SCNC: 22 UMOL/L (ref 9–30)
AMPHET+METHAMPHET UR QL: ABNORMAL
ANION GAP SERPL CALC-SCNC: 8 MMOL/L (ref 8–16)
AST SERPL-CCNC: 138 U/L (ref 15–46)
BARBITURATES UR QL SCN>200 NG/ML: NEGATIVE
BASOPHILS # BLD AUTO: 0.03 K/UL (ref 0–0.2)
BASOPHILS NFR BLD: 0.5 % (ref 0–1.9)
BENZODIAZ UR QL SCN>200 NG/ML: NEGATIVE
BILIRUB SERPL-MCNC: 1.5 MG/DL (ref 0.1–1)
BZE UR QL SCN: NEGATIVE
CALCIUM SERPL-MCNC: 9 MG/DL (ref 8.7–10.5)
CANNABINOIDS UR QL SCN: NEGATIVE
CHLORIDE SERPL-SCNC: 105 MMOL/L (ref 95–110)
CO2 SERPL-SCNC: 28 MMOL/L (ref 23–29)
CREAT SERPL-MCNC: 0.84 MG/DL (ref 0.5–1.4)
CREAT UR-MCNC: 19.1 MG/DL (ref 15–325)
DELSYS: ABNORMAL
DIFFERENTIAL METHOD: ABNORMAL
EOSINOPHIL # BLD AUTO: 0.1 K/UL (ref 0–0.5)
EOSINOPHIL NFR BLD: 0.9 % (ref 0–8)
ERYTHROCYTE [DISTWIDTH] IN BLOOD BY AUTOMATED COUNT: 21 % (ref 11.5–14.5)
EST. GFR  (NO RACE VARIABLE): >60 ML/MIN/1.73 M^2
ETHANOL SERPL-MCNC: <10 MG/DL
GLUCOSE SERPL-MCNC: 105 MG/DL (ref 70–110)
HCO3 UR-SCNC: 24.7 MMOL/L (ref 24–28)
HCT VFR BLD AUTO: 36.2 % (ref 37–48.5)
HCT VFR BLD CALC: 38 %PCV (ref 36–54)
HGB BLD-MCNC: 10.3 G/DL (ref 12–16)
HGB BLD-MCNC: 13 G/DL
IMM GRANULOCYTES # BLD AUTO: 0.02 K/UL (ref 0–0.04)
IMM GRANULOCYTES NFR BLD AUTO: 0.3 % (ref 0–0.5)
INR PPP: 1.3 (ref 0.8–1.2)
LYMPHOCYTES # BLD AUTO: 1.2 K/UL (ref 1–4.8)
LYMPHOCYTES NFR BLD: 18.1 % (ref 18–48)
MCH RBC QN AUTO: 20.7 PG (ref 27–31)
MCHC RBC AUTO-ENTMCNC: 28.5 G/DL (ref 32–36)
MCV RBC AUTO: 73 FL (ref 82–98)
METHADONE UR QL SCN>300 NG/ML: NEGATIVE
MONOCYTES # BLD AUTO: 0.5 K/UL (ref 0.3–1)
MONOCYTES NFR BLD: 7.9 % (ref 4–15)
NEUTROPHILS # BLD AUTO: 4.8 K/UL (ref 1.8–7.7)
NEUTROPHILS NFR BLD: 72.3 % (ref 38–73)
NRBC BLD-RTO: 0 /100 WBC
NT-PROBNP SERPL-MCNC: ABNORMAL PG/ML (ref 5–900)
OPIATES UR QL SCN: NEGATIVE
PCO2 BLDA: 40.7 MMHG (ref 35–45)
PCP UR QL SCN>25 NG/ML: NEGATIVE
PH SMN: 7.39 [PH] (ref 7.35–7.45)
PLATELET # BLD AUTO: 290 K/UL (ref 150–450)
PMV BLD AUTO: 10.2 FL (ref 9.2–12.9)
PO2 BLDA: 81 MMHG (ref 80–100)
POC BE: 0 MMOL/L
POC SATURATED O2: 96 % (ref 95–100)
POC TCO2: 26 MMOL/L (ref 23–27)
POTASSIUM BLD-SCNC: 3.4 MMOL/L (ref 3.5–5.1)
POTASSIUM SERPL-SCNC: 3.5 MMOL/L (ref 3.5–5.1)
PROT SERPL-MCNC: 7.7 G/DL (ref 6–8.4)
PROTHROMBIN TIME: 14 SEC (ref 9–12.5)
RBC # BLD AUTO: 4.97 M/UL (ref 4–5.4)
SAMPLE: ABNORMAL
SARS-COV-2 RDRP RESP QL NAA+PROBE: NEGATIVE
SITE: ABNORMAL
SODIUM BLD-SCNC: 141 MMOL/L (ref 136–145)
SODIUM SERPL-SCNC: 141 MMOL/L (ref 136–145)
TOXICOLOGY INFORMATION: ABNORMAL
TROPONIN I SERPL-MCNC: 0.02 NG/ML (ref 0.01–0.03)
UUN UR-MCNC: 27 MG/DL (ref 7–17)
WBC # BLD AUTO: 6.57 K/UL (ref 3.9–12.7)

## 2022-12-26 PROCEDURE — 82140 ASSAY OF AMMONIA: CPT | Mod: ER | Performed by: EMERGENCY MEDICINE

## 2022-12-26 PROCEDURE — 36600 WITHDRAWAL OF ARTERIAL BLOOD: CPT | Mod: ER

## 2022-12-26 PROCEDURE — 80307 DRUG TEST PRSMV CHEM ANLYZR: CPT | Mod: ER | Performed by: EMERGENCY MEDICINE

## 2022-12-26 PROCEDURE — 63600175 PHARM REV CODE 636 W HCPCS: Mod: ER | Performed by: EMERGENCY MEDICINE

## 2022-12-26 PROCEDURE — 83880 ASSAY OF NATRIURETIC PEPTIDE: CPT | Mod: ER | Performed by: EMERGENCY MEDICINE

## 2022-12-26 PROCEDURE — 85025 COMPLETE CBC W/AUTO DIFF WBC: CPT | Mod: ER | Performed by: EMERGENCY MEDICINE

## 2022-12-26 PROCEDURE — 99285 EMERGENCY DEPT VISIT HI MDM: CPT | Mod: 25,ER

## 2022-12-26 PROCEDURE — 85610 PROTHROMBIN TIME: CPT | Mod: ER | Performed by: EMERGENCY MEDICINE

## 2022-12-26 PROCEDURE — 93010 ELECTROCARDIOGRAM REPORT: CPT | Mod: ,,, | Performed by: INTERNAL MEDICINE

## 2022-12-26 PROCEDURE — 93005 ELECTROCARDIOGRAM TRACING: CPT | Mod: ER

## 2022-12-26 PROCEDURE — 82803 BLOOD GASES ANY COMBINATION: CPT | Mod: ER

## 2022-12-26 PROCEDURE — 99900035 HC TECH TIME PER 15 MIN (STAT): Mod: ER

## 2022-12-26 PROCEDURE — 82077 ASSAY SPEC XCP UR&BREATH IA: CPT | Mod: ER | Performed by: EMERGENCY MEDICINE

## 2022-12-26 PROCEDURE — U0002 COVID-19 LAB TEST NON-CDC: HCPCS | Mod: ER | Performed by: EMERGENCY MEDICINE

## 2022-12-26 PROCEDURE — 96374 THER/PROPH/DIAG INJ IV PUSH: CPT | Mod: ER

## 2022-12-26 PROCEDURE — 80053 COMPREHEN METABOLIC PANEL: CPT | Mod: ER | Performed by: EMERGENCY MEDICINE

## 2022-12-26 PROCEDURE — 93010 EKG 12-LEAD: ICD-10-PCS | Mod: ,,, | Performed by: INTERNAL MEDICINE

## 2022-12-26 PROCEDURE — 96375 TX/PRO/DX INJ NEW DRUG ADDON: CPT | Mod: ER

## 2022-12-26 PROCEDURE — 84484 ASSAY OF TROPONIN QUANT: CPT | Mod: ER | Performed by: EMERGENCY MEDICINE

## 2022-12-26 RX ORDER — FUROSEMIDE 10 MG/ML
80 INJECTION INTRAMUSCULAR; INTRAVENOUS
Status: COMPLETED | OUTPATIENT
Start: 2022-12-26 | End: 2022-12-26

## 2022-12-26 RX ADMIN — FUROSEMIDE 80 MG: 10 INJECTION, SOLUTION INTRAMUSCULAR; INTRAVENOUS at 04:12

## 2022-12-26 NOTE — ED PROVIDER NOTES
"Encounter Date: 12/26/2022       History     Chief Complaint   Patient presents with    Edema     Reports has not been taking Lasix and has swelling to legs and abdomen. Pt states has been "forgetting" to take medication. Per EMS- family is worried about pts mental state but reports pt is at baseline. Pt denies any pain or SOB     HPI  This is a 52 y.o. female who has a past medical history of ADHD (attention deficit hyperactivity disorder), Anemia, Anxiety, Bipolar disorder, CHF (congestive heart failure), History of hepatitis C - s/p clearance of virus (HCV neg 6/2015) (09/26/2014), History of psychiatric hospitalization, History of substance abuse, psychiatric care, Hypertension, Opioid overdose (05/10/2016), Psychiatric problem, Psychosis, Seizure disorder (04/03/2019), Sleep difficulties, Still's disease, Substance abuse, Therapy, and Vasculitis.     The patient presents to the Emergency Department with multiple issues.  History limited secondary to patient's altered mental status.  Per nurse per EMS, patient has not been taking Lasix in his swelling to legs and abdomen, forgetting to take her medicine and family's worried about patient's mental state.    Discussed with patient's sister (below), providing collateral history.  Betty Martines   Sister [phone]   She states that since patient was discharged on a new mood stabilizer (replacing Prozac), patient has been more out of it than ever.  Patient is losing balance, could not see (though at night) and sometimes confused, hallucinating.  Unclear if patient is compliant with medicine because they do not administer it.  States that patient is an insomniac and sometimes will stay up all night or barely sleep at all and then will take her medicine and crash for 2 days.         Review of patient's allergies indicates:  No Known Allergies  Past Medical History:   Diagnosis Date    ADHD (attention deficit hyperactivity disorder)     Anemia     Anxiety     " Bipolar disorder     CHF (congestive heart failure)     History of hepatitis C - s/p clearance of virus (HCV neg 6/2015) 09/26/2014    History of psychiatric hospitalization     History of substance abuse     IV heroin - last use 2013 per pt    Hx of psychiatric care     Hypertension     Opioid overdose 05/10/2016    Psychiatric problem     Psychosis     Seizure disorder 04/03/2019    Sleep difficulties     Still's disease     Substance abuse     Therapy     Vasculitis      Past Surgical History:   Procedure Laterality Date    HYSTERECTOMY  3/16/2011    SALPINGOOPHORECTOMY Right 3/16/2011    TUBAL LIGATION      Vaginal cuff repair  04/22/2011     Family History   Problem Relation Age of Onset    Diabetes Maternal Grandmother     Cancer Maternal Grandmother      Social History     Tobacco Use    Smoking status: Every Day     Packs/day: 1.00     Years: 36.00     Pack years: 36.00     Types: Cigarettes     Start date: 1986    Smokeless tobacco: Never    Tobacco comments:     Enrolled in [employer] on 10/23/14 (SCT Member ID # [id number]) Pt declines Ambulatory referral to Smoking Cessation clinic. Handout provided   Substance Use Topics    Alcohol use: No    Drug use: Yes     Types: Heroin, Cocaine, Methamphetamines, Marijuana     Review of Systems   Unable to perform ROS: Mental status change     Physical Exam     Initial Vitals   BP Pulse Resp Temp SpO2   12/26/22 1504 12/26/22 1602 12/26/22 1504 12/26/22 1507 12/26/22 1602   (!) 137/109 104 18 97.7 °F (36.5 °C) 96 %      MAP       --                Physical Exam    Nursing note and vitals reviewed.  Constitutional: She appears well-developed and well-nourished. She is not diaphoretic. She appears distressed.   HENT:   Head: Normocephalic and atraumatic.   Dry oral mucosa   Eyes: Conjunctivae are normal. Pupils are equal, round, and reactive to light.   Neck: Neck supple.   Cardiovascular:  Normal rate, regular rhythm, normal heart sounds and intact distal  pulses.           Pulmonary/Chest: No respiratory distress. She has no wheezes. She has no rhonchi. She has rales.   Abdominal: Abdomen is soft. Bowel sounds are normal. She exhibits distension. There is no abdominal tenderness. There is no guarding.   Musculoskeletal:         General: Edema (2+ bilateral lower extremities) present. No tenderness. Normal range of motion.      Cervical back: Neck supple.     Neurological:   Somnolent but easily arousable, monitoring and comprehensible speech, however follows simple commands.  Moving all extremities.  Normal core strength   Skin: Skin is warm and dry. Capillary refill takes less than 2 seconds. No rash noted.   Psychiatric: She has a normal mood and affect. Thought content normal.       ED Course   Procedures  Labs Reviewed   CBC W/ AUTO DIFFERENTIAL - Abnormal; Notable for the following components:       Result Value    Hemoglobin 10.3 (*)     Hematocrit 36.2 (*)     MCV 73 (*)     MCH 20.7 (*)     MCHC 28.5 (*)     RDW 21.0 (*)     All other components within normal limits   COMPREHENSIVE METABOLIC PANEL - Abnormal; Notable for the following components:    BUN 27 (*)     Total Bilirubin 1.5 (*)     Alkaline Phosphatase 130 (*)      (*)      (*)     All other components within normal limits   NT-PRO NATRIURETIC PEPTIDE - Abnormal; Notable for the following components:    NT-proBNP 76767 (*)     All other components within normal limits   PROTIME-INR - Abnormal; Notable for the following components:    Prothrombin Time 14.0 (*)     INR 1.3 (*)     All other components within normal limits   ISTAT PROCEDURE - Abnormal; Notable for the following components:    POC Potassium 3.4 (*)     All other components within normal limits   TROPONIN I   DRUG SCREEN PANEL, URINE EMERGENCY   ALCOHOL,MEDICAL (ETHANOL)   AMMONIA          Imaging Results              X-Ray Chest AP Portable (Final result)  Result time 12/26/22 16:28:41      Final result by Claritza SHERMAN  MD [place] (12/26/22 16:28:41)                   Impression:      Diminishing basilar opacity      Electronically signed by: Claritza Wallace  Date:    12/26/2022  Time:    16:28               Narrative:    EXAMINATION:  XR CHEST AP PORTABLE    CLINICAL HISTORY:  CHF;    COMPARISON:  12/09/2022    FINDINGS:  Numerous scattered radiodensities may be artifactual.  Left basilar hazy opacity persists.  There is improved aeration right costophrenic angle.  No convincing pneumothorax or shift of the mediastinum                                    X-Rays:   Independently Interpreted Readings:   Chest X-Ray: Cardiomegaly present.  Increased vascular markings consistent with CHF are present. Left basilar opacity   Medications   furosemide injection 80 mg (80 mg Intravenous Given 12/26/22 1655)     Medical Decision Making:   History:   Old Medical Records: I decided to obtain old medical records.  Old Records Summarized: records from previous admission(s).       <> Summary of Records:   Patient recently admitted and discharged 10 days for similar presentation.      Initial Assessment:   Emergent evaluation of a 52-year-old female with history of nonischemic cardiomyopathy, amphetamine use disorder, bipolar and psychosis who presents for worsening edema, noncompliance with medications, worsening mental status.  Unclear if patient is compliant with medications as family has not closely monitoring nor administering medicines for her.      Differential Diagnosis:   Medication noncompliance verses and efficacy of medication in controlling mental disorder.  Medication noncompliance with CHF medications secondary to the preceding issue.  Resp failure with hypoxia or hypercapnia, pleural effusion, pulm edema.      Independently Interpreted Test(s):   I have ordered and independently interpreted X-rays - see prior notes.  I have ordered and independently interpreted EKG Reading(s) - see summary below       <> Summary of EKG  Reading(s):   1600: EKG: sinus tachy at 107 bpm, right axis, NIVB, no hypertrophy, no ST-T changes as read by me (Dr. Alvarez). There are no significant changes in comparison to previous EKG on 12/10/22.      Clinical Tests:   Lab Tests: Ordered and Reviewed       <> Summary of Lab:   CBC:  Stable anemia  CMP: Mild increase in LFTs  BNP stable  Troponin negative      Radiological Study: Ordered and Reviewed  Medical Tests: Ordered and Reviewed  ED Management:  Cbc, cmp, trop, bnp, cxr, ekg, cardiac monitoring, lasix, transfer for med/psych for optimization               ED Course as of 12/26/22 1753   Mon Dec 26, 2022   1600 EKG: sinus tachy at 107 bpm, right axis, NIVB, no hypertrophy, no ST-T changes as read by me (Dr. Alvarez). There are no significant changes in comparison to previous EKG on 12/10/22.  [NP]      ED Course User Index  [NP] Chase Alvarez MD          Labs reviewed as above.  No significant changes though pt still with some pulm edema, bilat LE edema.   Unclear AMS etiology though running diagnosis includes medication effect, possible accidental overdose, +/- psychosis (amphetamine induced?).     Considering long psych hx and management issues, would best be managed by med/psych for optimization.    Transfer order placed. Pt will be turned over to oncoming MD at shift change pending re-eval, dispo and pending labs (UDS, ETOH).         Clinical Impression:   Final diagnoses:  [R06.02] Shortness of breath (Primary)  [R41.82] Altered mental status, unspecified altered mental status type  [I42.8] NICM (nonischemic cardiomyopathy)  [R60.0] Bilateral lower extremity edema        ED Disposition Condition    Transfer to Psych Facility Stable          ED Prescriptions    None       Follow-up Information    None          Chase Alvarez MD  12/26/22 1000

## 2022-12-26 NOTE — ED NOTES
Presents to ED with reports of edema as noted per EMS. Arrived after family called EMS due to found in bathroom on floor. Stated she was mentally at baseline, but they are concerned regarding her mental state. She arrives cool to touch, mottled and purple color noted to nose and lips as well as hands. Warm blankets placed with hand warmers also placed. Endorses methamphetamine use yesterday. Speech mumbled and slow. ER workup in progress.     Pt placed into gown.  Bed in lowest position with side rails up x 2. Call light is within reach. Explanation of care provided. Alarms set on monitor for heart rate, pulse ox, Blood Pressure.     LOC/NEURO: The patient is awake, alert and aware of environment with an flat affect, engages w nurse but sporadically. Oriented to person and location and loosely to time.  Patient follows commands without difficulty, speech is mumbled.Patient is moving all extremities and sensation is intact.     Psych: Patient is cooperative with poor eye contact and continuously jumpy.     APPEARANCE: Patient is disheveled.    HEENT: Denies HEENT complaint or injury, moist mucus membranes.    RESPIRATORY: Airway is open and patent. Respirations are spontaneous, even, and unlabored on room air. Patient has a normal effort and rate. Bilateral breath sounds are clear. Pink nailbeds. No accessory muscle use or retractions noted.     CARDIAC: Patient has a normal rate and rhythm, +2 peripheral edema noted begins at waist, capillary refill > 2 seconds. Patient placed on cardiac monitor, sinus rhythm w widened QRS noted. No abnormal heart sounds noted. Radial and dorsalis pedis pulses are thready but palpable at a +1 and are symmetrical.     GI: Patient denies abdominal pain/tenderness. Patient states bowel movements have been formed/soft. Bowel sounds are present and active. Soft and non tender to palpation, no distention noted.     : Patient denies any pain, dysuria, or frequency with  urination.    MUSCULOSKELETAL:  Normal active range of motion noted. Moves all extremities well, no swelling, deformity or tenderness noted.    SKIN: Skin is cool, dry and intact. Skin color is pale, mottled and purple at extremitites for ethnicity.Patient has decreased skin turgor and dry mucus membranes, no rashes or lesions. No breakdown noted.

## 2022-12-26 NOTE — ED NOTES
Bed: Exam 04  Expected date: 12/26/22  Expected time:   Means of arrival: Ambulance Service  Comments:  EMS

## 2022-12-27 ENCOUNTER — CLINICAL SUPPORT (OUTPATIENT)
Dept: SMOKING CESSATION | Facility: CLINIC | Age: 52
End: 2022-12-27
Payer: COMMERCIAL

## 2022-12-27 DIAGNOSIS — F17.210 CIGARETTE SMOKER: Primary | ICD-10-CM

## 2022-12-27 LAB
ALBUMIN SERPL BCP-MCNC: 2.9 G/DL (ref 3.5–5.2)
ALP SERPL-CCNC: 97 U/L (ref 55–135)
ALT SERPL W/O P-5'-P-CCNC: 121 U/L (ref 10–44)
ANION GAP SERPL CALC-SCNC: 11 MMOL/L (ref 8–16)
ANION GAP SERPL CALC-SCNC: 11 MMOL/L (ref 8–16)
AST SERPL-CCNC: 101 U/L (ref 10–40)
BASOPHILS # BLD AUTO: 0.04 K/UL (ref 0–0.2)
BASOPHILS NFR BLD: 0.7 % (ref 0–1.9)
BILIRUB SERPL-MCNC: 1.2 MG/DL (ref 0.1–1)
BUN SERPL-MCNC: 23 MG/DL (ref 6–20)
BUN SERPL-MCNC: 23 MG/DL (ref 6–20)
CALCIUM SERPL-MCNC: 8.7 MG/DL (ref 8.7–10.5)
CALCIUM SERPL-MCNC: 8.7 MG/DL (ref 8.7–10.5)
CHLORIDE SERPL-SCNC: 100 MMOL/L (ref 95–110)
CHLORIDE SERPL-SCNC: 100 MMOL/L (ref 95–110)
CO2 SERPL-SCNC: 27 MMOL/L (ref 23–29)
CO2 SERPL-SCNC: 27 MMOL/L (ref 23–29)
CREAT SERPL-MCNC: 1.2 MG/DL (ref 0.5–1.4)
CREAT SERPL-MCNC: 1.2 MG/DL (ref 0.5–1.4)
DIFFERENTIAL METHOD: ABNORMAL
EOSINOPHIL # BLD AUTO: 0.1 K/UL (ref 0–0.5)
EOSINOPHIL NFR BLD: 2.1 % (ref 0–8)
ERYTHROCYTE [DISTWIDTH] IN BLOOD BY AUTOMATED COUNT: 20.9 % (ref 11.5–14.5)
EST. GFR  (NO RACE VARIABLE): 55 ML/MIN/1.73 M^2
EST. GFR  (NO RACE VARIABLE): 55 ML/MIN/1.73 M^2
GLUCOSE SERPL-MCNC: 100 MG/DL (ref 70–110)
GLUCOSE SERPL-MCNC: 98 MG/DL (ref 70–110)
HCT VFR BLD AUTO: 34.4 % (ref 37–48.5)
HGB BLD-MCNC: 9.8 G/DL (ref 12–16)
IMM GRANULOCYTES # BLD AUTO: 0.01 K/UL (ref 0–0.04)
IMM GRANULOCYTES NFR BLD AUTO: 0.2 % (ref 0–0.5)
LYMPHOCYTES # BLD AUTO: 1.6 K/UL (ref 1–4.8)
LYMPHOCYTES NFR BLD: 26.6 % (ref 18–48)
MAGNESIUM SERPL-MCNC: 1.7 MG/DL (ref 1.6–2.6)
MCH RBC QN AUTO: 20.7 PG (ref 27–31)
MCHC RBC AUTO-ENTMCNC: 28.5 G/DL (ref 32–36)
MCV RBC AUTO: 73 FL (ref 82–98)
MONOCYTES # BLD AUTO: 0.7 K/UL (ref 0.3–1)
MONOCYTES NFR BLD: 11.1 % (ref 4–15)
NEUTROPHILS # BLD AUTO: 3.6 K/UL (ref 1.8–7.7)
NEUTROPHILS NFR BLD: 59.3 % (ref 38–73)
NRBC BLD-RTO: 0 /100 WBC
PHOSPHATE SERPL-MCNC: 2.6 MG/DL (ref 2.7–4.5)
PLATELET # BLD AUTO: 305 K/UL (ref 150–450)
PMV BLD AUTO: 10.3 FL (ref 9.2–12.9)
POTASSIUM SERPL-SCNC: 3.1 MMOL/L (ref 3.5–5.1)
POTASSIUM SERPL-SCNC: 3.2 MMOL/L (ref 3.5–5.1)
PROT SERPL-MCNC: 6.9 G/DL (ref 6–8.4)
RBC # BLD AUTO: 4.74 M/UL (ref 4–5.4)
SODIUM SERPL-SCNC: 138 MMOL/L (ref 136–145)
SODIUM SERPL-SCNC: 138 MMOL/L (ref 136–145)
T4 FREE SERPL-MCNC: 0.82 NG/DL (ref 0.71–1.51)
TSH SERPL DL<=0.005 MIU/L-ACNC: 7.43 UIU/ML (ref 0.4–4)
WBC # BLD AUTO: 6.06 K/UL (ref 3.9–12.7)

## 2022-12-27 PROCEDURE — 96372 THER/PROPH/DIAG INJ SC/IM: CPT

## 2022-12-27 PROCEDURE — 25000003 PHARM REV CODE 250

## 2022-12-27 PROCEDURE — 93010 EKG 12-LEAD: ICD-10-PCS | Mod: ,,, | Performed by: INTERNAL MEDICINE

## 2022-12-27 PROCEDURE — 99407 BEHAV CHNG SMOKING > 10 MIN: CPT | Mod: S$GLB,,,

## 2022-12-27 PROCEDURE — 84100 ASSAY OF PHOSPHORUS: CPT

## 2022-12-27 PROCEDURE — 80048 BASIC METABOLIC PNL TOTAL CA: CPT | Mod: XB | Performed by: INTERNAL MEDICINE

## 2022-12-27 PROCEDURE — 90833 PR PSYCHOTHERAPY W/PATIENT W/E&M, 30 MIN (ADD ON): ICD-10-PCS | Mod: ,,, | Performed by: PSYCHIATRY & NEUROLOGY

## 2022-12-27 PROCEDURE — 83735 ASSAY OF MAGNESIUM: CPT

## 2022-12-27 PROCEDURE — 90833 PSYTX W PT W E/M 30 MIN: CPT | Mod: ,,, | Performed by: PSYCHIATRY & NEUROLOGY

## 2022-12-27 PROCEDURE — 99220 PR INITIAL OBSERVATION CARE,LEVL III: ICD-10-PCS | Mod: ,,, | Performed by: PSYCHIATRY & NEUROLOGY

## 2022-12-27 PROCEDURE — 63600175 PHARM REV CODE 636 W HCPCS

## 2022-12-27 PROCEDURE — 25000003 PHARM REV CODE 250: Performed by: STUDENT IN AN ORGANIZED HEALTH CARE EDUCATION/TRAINING PROGRAM

## 2022-12-27 PROCEDURE — 93010 ELECTROCARDIOGRAM REPORT: CPT | Mod: ,,, | Performed by: INTERNAL MEDICINE

## 2022-12-27 PROCEDURE — 84443 ASSAY THYROID STIM HORMONE: CPT

## 2022-12-27 PROCEDURE — S4991 NICOTINE PATCH NONLEGEND: HCPCS | Performed by: STUDENT IN AN ORGANIZED HEALTH CARE EDUCATION/TRAINING PROGRAM

## 2022-12-27 PROCEDURE — 99900035 HC TECH TIME PER 15 MIN (STAT)

## 2022-12-27 PROCEDURE — G0378 HOSPITAL OBSERVATION PER HR: HCPCS

## 2022-12-27 PROCEDURE — 99220 PR INITIAL OBSERVATION CARE,LEVL III: CPT | Mod: ,,, | Performed by: PSYCHIATRY & NEUROLOGY

## 2022-12-27 PROCEDURE — 99407 PR TOBACCO USE CESSATION INTENSIVE >10 MINUTES: ICD-10-PCS | Mod: S$GLB,,,

## 2022-12-27 PROCEDURE — 27000221 HC OXYGEN, UP TO 24 HOURS

## 2022-12-27 PROCEDURE — 80053 COMPREHEN METABOLIC PANEL: CPT

## 2022-12-27 PROCEDURE — 93005 ELECTROCARDIOGRAM TRACING: CPT

## 2022-12-27 PROCEDURE — 51798 US URINE CAPACITY MEASURE: CPT

## 2022-12-27 PROCEDURE — 85025 COMPLETE CBC W/AUTO DIFF WBC: CPT

## 2022-12-27 PROCEDURE — 36415 COLL VENOUS BLD VENIPUNCTURE: CPT

## 2022-12-27 PROCEDURE — 84439 ASSAY OF FREE THYROXINE: CPT

## 2022-12-27 RX ORDER — LOSARTAN POTASSIUM 25 MG/1
25 TABLET ORAL DAILY
Status: DISCONTINUED | OUTPATIENT
Start: 2022-12-27 | End: 2022-12-29 | Stop reason: HOSPADM

## 2022-12-27 RX ORDER — SODIUM CHLORIDE 0.9 % (FLUSH) 0.9 %
10 SYRINGE (ML) INJECTION EVERY 12 HOURS PRN
Status: DISCONTINUED | OUTPATIENT
Start: 2022-12-27 | End: 2022-12-29 | Stop reason: HOSPADM

## 2022-12-27 RX ORDER — POTASSIUM CHLORIDE 20 MEQ/1
40 TABLET, EXTENDED RELEASE ORAL
Status: COMPLETED | OUTPATIENT
Start: 2022-12-27 | End: 2022-12-27

## 2022-12-27 RX ORDER — QUETIAPINE FUMARATE 100 MG/1
200 TABLET, FILM COATED ORAL NIGHTLY
Status: DISCONTINUED | OUTPATIENT
Start: 2022-12-27 | End: 2022-12-28

## 2022-12-27 RX ORDER — HYDROCORTISONE 1 %
CREAM (GRAM) TOPICAL 2 TIMES DAILY
Status: DISCONTINUED | OUTPATIENT
Start: 2022-12-27 | End: 2022-12-29 | Stop reason: HOSPADM

## 2022-12-27 RX ORDER — IBUPROFEN 200 MG
1 TABLET ORAL DAILY
Status: DISCONTINUED | OUTPATIENT
Start: 2022-12-27 | End: 2022-12-29 | Stop reason: HOSPADM

## 2022-12-27 RX ORDER — POTASSIUM CHLORIDE 20 MEQ/1
40 TABLET, EXTENDED RELEASE ORAL ONCE
Status: COMPLETED | OUTPATIENT
Start: 2022-12-27 | End: 2022-12-27

## 2022-12-27 RX ORDER — FUROSEMIDE 40 MG/1
40 TABLET ORAL 2 TIMES DAILY
Status: DISCONTINUED | OUTPATIENT
Start: 2022-12-27 | End: 2022-12-28

## 2022-12-27 RX ORDER — LISINOPRIL 5 MG/1
5 TABLET ORAL DAILY
Status: DISCONTINUED | OUTPATIENT
Start: 2022-12-27 | End: 2022-12-27

## 2022-12-27 RX ORDER — ENOXAPARIN SODIUM 100 MG/ML
40 INJECTION SUBCUTANEOUS EVERY 24 HOURS
Status: DISCONTINUED | OUTPATIENT
Start: 2022-12-27 | End: 2022-12-29 | Stop reason: HOSPADM

## 2022-12-27 RX ORDER — GLUCAGON 1 MG
1 KIT INJECTION
Status: DISCONTINUED | OUTPATIENT
Start: 2022-12-27 | End: 2022-12-29 | Stop reason: HOSPADM

## 2022-12-27 RX ORDER — ALBUTEROL SULFATE 90 UG/1
2 AEROSOL, METERED RESPIRATORY (INHALATION) EVERY 6 HOURS PRN
Status: DISCONTINUED | OUTPATIENT
Start: 2022-12-27 | End: 2022-12-29 | Stop reason: HOSPADM

## 2022-12-27 RX ORDER — TALC
6 POWDER (GRAM) TOPICAL NIGHTLY PRN
Status: DISCONTINUED | OUTPATIENT
Start: 2022-12-27 | End: 2022-12-29 | Stop reason: HOSPADM

## 2022-12-27 RX ORDER — POLYETHYLENE GLYCOL 3350 17 G/17G
17 POWDER, FOR SOLUTION ORAL ONCE
Status: COMPLETED | OUTPATIENT
Start: 2022-12-27 | End: 2022-12-27

## 2022-12-27 RX ORDER — OLANZAPINE 10 MG/2ML
5 INJECTION, POWDER, FOR SOLUTION INTRAMUSCULAR EVERY 6 HOURS PRN
Status: DISCONTINUED | OUTPATIENT
Start: 2022-12-27 | End: 2022-12-27

## 2022-12-27 RX ORDER — OLANZAPINE 2.5 MG/1
5 TABLET ORAL EVERY 6 HOURS PRN
Status: DISCONTINUED | OUTPATIENT
Start: 2022-12-27 | End: 2022-12-27

## 2022-12-27 RX ORDER — ACETAMINOPHEN 325 MG/1
650 TABLET ORAL EVERY 8 HOURS PRN
Status: DISCONTINUED | OUTPATIENT
Start: 2022-12-27 | End: 2022-12-29 | Stop reason: HOSPADM

## 2022-12-27 RX ORDER — ONDANSETRON 4 MG/1
4 TABLET, ORALLY DISINTEGRATING ORAL EVERY 8 HOURS PRN
Status: DISCONTINUED | OUTPATIENT
Start: 2022-12-27 | End: 2022-12-27

## 2022-12-27 RX ORDER — METOPROLOL SUCCINATE 25 MG/1
25 TABLET, EXTENDED RELEASE ORAL DAILY
Status: DISCONTINUED | OUTPATIENT
Start: 2022-12-27 | End: 2022-12-29 | Stop reason: HOSPADM

## 2022-12-27 RX ORDER — FAMOTIDINE 20 MG/1
20 TABLET, FILM COATED ORAL DAILY
Status: DISCONTINUED | OUTPATIENT
Start: 2022-12-27 | End: 2022-12-27

## 2022-12-27 RX ORDER — FOLIC ACID 1 MG/1
1 TABLET ORAL DAILY
Status: DISCONTINUED | OUTPATIENT
Start: 2022-12-27 | End: 2022-12-27

## 2022-12-27 RX ORDER — ONDANSETRON 2 MG/ML
4 INJECTION INTRAMUSCULAR; INTRAVENOUS EVERY 8 HOURS PRN
Status: DISCONTINUED | OUTPATIENT
Start: 2022-12-27 | End: 2022-12-29 | Stop reason: HOSPADM

## 2022-12-27 RX ORDER — MAG HYDROX/ALUMINUM HYD/SIMETH 200-200-20
30 SUSPENSION, ORAL (FINAL DOSE FORM) ORAL EVERY 6 HOURS PRN
Status: DISCONTINUED | OUTPATIENT
Start: 2022-12-27 | End: 2022-12-29 | Stop reason: HOSPADM

## 2022-12-27 RX ORDER — NALOXONE HCL 0.4 MG/ML
0.02 VIAL (ML) INJECTION
Status: DISCONTINUED | OUTPATIENT
Start: 2022-12-27 | End: 2022-12-29 | Stop reason: HOSPADM

## 2022-12-27 RX ORDER — BENZTROPINE MESYLATE 0.5 MG/1
0.5 TABLET ORAL 2 TIMES DAILY
Status: DISCONTINUED | OUTPATIENT
Start: 2022-12-27 | End: 2022-12-29 | Stop reason: HOSPADM

## 2022-12-27 RX ORDER — FAMOTIDINE 20 MG/1
20 TABLET, FILM COATED ORAL DAILY
Status: DISCONTINUED | OUTPATIENT
Start: 2022-12-27 | End: 2022-12-29 | Stop reason: HOSPADM

## 2022-12-27 RX ORDER — OXCARBAZEPINE 150 MG/1
600 TABLET, FILM COATED ORAL 2 TIMES DAILY
Status: DISCONTINUED | OUTPATIENT
Start: 2022-12-27 | End: 2022-12-29 | Stop reason: HOSPADM

## 2022-12-27 RX ORDER — QUETIAPINE FUMARATE 100 MG/1
200 TABLET, FILM COATED ORAL NIGHTLY
Status: DISCONTINUED | OUTPATIENT
Start: 2022-12-27 | End: 2022-12-27

## 2022-12-27 RX ORDER — HYDROXYZINE PAMOATE 25 MG/1
50 CAPSULE ORAL EVERY 8 HOURS PRN
Status: DISCONTINUED | OUTPATIENT
Start: 2022-12-27 | End: 2022-12-29 | Stop reason: HOSPADM

## 2022-12-27 RX ORDER — OXCARBAZEPINE 150 MG/1
1200 TABLET, FILM COATED ORAL NIGHTLY
Status: DISCONTINUED | OUTPATIENT
Start: 2022-12-27 | End: 2022-12-27

## 2022-12-27 RX ADMIN — FUROSEMIDE 40 MG: 40 TABLET ORAL at 10:12

## 2022-12-27 RX ADMIN — POLYETHYLENE GLYCOL 3350 17 G: 17 POWDER, FOR SOLUTION ORAL at 10:12

## 2022-12-27 RX ADMIN — FAMOTIDINE 20 MG: 20 TABLET ORAL at 09:12

## 2022-12-27 RX ADMIN — POTASSIUM CHLORIDE 40 MEQ: 1500 TABLET, EXTENDED RELEASE ORAL at 03:12

## 2022-12-27 RX ADMIN — POTASSIUM CHLORIDE 40 MEQ: 1500 TABLET, EXTENDED RELEASE ORAL at 09:12

## 2022-12-27 RX ADMIN — HYDROXYZINE PAMOATE 50 MG: 25 CAPSULE ORAL at 05:12

## 2022-12-27 RX ADMIN — OXCARBAZEPINE 600 MG: 150 TABLET, FILM COATED ORAL at 10:12

## 2022-12-27 RX ADMIN — FUROSEMIDE 40 MG: 40 TABLET ORAL at 09:12

## 2022-12-27 RX ADMIN — POTASSIUM CHLORIDE 40 MEQ: 1500 TABLET, EXTENDED RELEASE ORAL at 11:12

## 2022-12-27 RX ADMIN — NICOTINE 1 PATCH: 21 PATCH, EXTENDED RELEASE TRANSDERMAL at 09:12

## 2022-12-27 RX ADMIN — BENZTROPINE MESYLATE 0.5 MG: 0.5 TABLET ORAL at 10:12

## 2022-12-27 RX ADMIN — ENOXAPARIN SODIUM 40 MG: 40 INJECTION SUBCUTANEOUS at 05:12

## 2022-12-27 RX ADMIN — THERA TABS 1 TABLET: TAB at 09:12

## 2022-12-27 RX ADMIN — ALUMINUM HYDROXIDE, MAGNESIUM HYDROXIDE, AND SIMETHICONE 30 ML: 200; 200; 20 SUSPENSION ORAL at 11:12

## 2022-12-27 RX ADMIN — HYDROCORTISONE: 0.01 CREAM TOPICAL at 11:12

## 2022-12-27 RX ADMIN — QUETIAPINE FUMARATE 200 MG: 100 TABLET ORAL at 10:12

## 2022-12-27 RX ADMIN — LOSARTAN POTASSIUM 25 MG: 25 TABLET, FILM COATED ORAL at 09:12

## 2022-12-27 RX ADMIN — HYDROCORTISONE: 0.01 CREAM TOPICAL at 10:12

## 2022-12-27 RX ADMIN — METOPROLOL SUCCINATE 25 MG: 25 TABLET, EXTENDED RELEASE ORAL at 09:12

## 2022-12-27 RX ADMIN — BENZTROPINE MESYLATE 0.5 MG: 0.5 TABLET ORAL at 09:12

## 2022-12-27 NOTE — ASSESSMENT & PLAN NOTE
Patient recently discharged from [facility] service  Patient's oxcarbazepine increased to 1200mg QHS while continued on seroquel 200mg  Patient's fluoxetine d/c at that time  Patient noting new symptoms from medication changes however unclear on baseline medication adherence/compliance to regimen  Psychiatry consulted, jordi orjas

## 2022-12-27 NOTE — PLAN OF CARE
Problem: Adult Inpatient Plan of Care  Goal: Plan of Care Review  Outcome: Ongoing, Progressing   Patient is alert, oriented X4. Care plan explained to patient, she verbalized understanding.     On 2L of nasal cannula, O2 sat maintain 98%, no respiratory distress noted. On cardiac monitor, running sinus tachycardiac(100's-110's)    Deny nausea/vomiting/diarrhea. No pain complaint. Due medications given.     Noted patient hasn't urinate by this morning 6:10am, did bladder scan, showed 382ml. Notified MD. Dickson. He said wait for bladder scan 500ml to do straight in and out. Will notify day nurse.     Maintain fall risk precaution, bed in lowest position, bed alarm on. Call light/personal items in reach. CLEMENTE in place. Purewick in place. Instructed patient call for help as needed. Will continue to monitor.

## 2022-12-27 NOTE — PLAN OF CARE
Problem: Adult Inpatient Plan of Care  Goal: Plan of Care Review  Outcome: Ongoing, Progressing    VN phoned into room to complete admit assessment. VIP model introduced; VN working alongside bedside treatment team.  Plan of care reviewed with patient. Patient informed of fall risk, fall precautions, call light within reach, side rails x2 elevated. Patient notified to ask staff for assistance. Patient verbalized complete understanding. Time allowed for questions. Will continue to monitor and intervene as needed. Chart reviewed.

## 2022-12-27 NOTE — PLAN OF CARE
NGUYỄN met with pt at bedside to discuss dc planning. Pt asked SW to contact her sister to complete assessment. SW contacted Betty Martines (Sister) [phone] to complete assessment. Pt resides in home with her father. Pt is mostly independent in ADLs. Pts sister reports that pt must be prompted to complete most ADLs. Pts sister reported upon dc pt will need transport home. Pt has no DME/HH services at home. Pts sister was made aware to contact SW with any future questions or concerns. SW will continue to follow pt throughout her transitions of care and assist with any dc needs.       NGUYỄN sent HH referral via [employer].     NGUYỄN requested PCC FOLLOW UP     12/27/22 1211   Discharge Assessment   Assessment Type Discharge Planning Assessment   Confirmed/corrected address, phone number and insurance Yes   Confirmed Demographics Correct on Facesheet   Source of Information family   Communicated AYSHA with patient/caregiver Yes   Reason For Admission Chronic combined systolic and diastolic heart failure   People in Home parent(s)   Do you expect to return to your current living situation? Yes   Do you have help at home or someone to help you manage your care at home? Yes   Who are your caregiver(s) and their phone number(s)? Betty Martines (Sister)   [phone]   Prior to hospitilization cognitive status: Unable to Assess   Current cognitive status: Unable to Assess   Home Layout Able to live on 1st floor   Equipment Currently Used at Home none   Patient currently being followed by outpatient case management? No   Do you currently have service(s) that help you manage your care at home? No   Do you take prescription medications? Yes   Do you have prescription coverage? Yes   Coverage [employer]   Do you have any problems affording any of your prescribed medications? No   Is the patient taking medications as prescribed? no   How do you get to doctors appointments? family or friend will provide   Are you on  dialysis? No   Do you take coumadin? No   Discharge Plan A Home with family;Home Health   DME Needed Upon Discharge  none   Discharge Plan discussed with: Sibling   Discharge Barriers Identified None   Financial Resource Strain   How hard is it for you to pay for the very basics like food, housing, medical care, and heating? Not hard   Housing Stability   In the last 12 months, was there a time when you were not able to pay the mortgage or rent on time? N   In the last 12 months, was there a time when you did not have a steady place to sleep or slept in a shelter (including now)? N   Transportation Needs   In the past 12 months, has lack of transportation kept you from medical appointments or from getting medications? no   In the past 12 months, has lack of transportation kept you from meetings, work, or from getting things needed for daily living? No   Food Insecurity   Within the past 12 months, you worried that your food would run out before you got the money to buy more. Never true   Within the past 12 months, the food you bought just didn't last and you didn't have money to get more. Never true   Alcohol Use   Q1: How often do you have a drink containing alcohol? Never   Q2: How many drinks containing alcohol do you have on a typical day when you are drinking? None   Q3: How often do you have six or more drinks on one occasion? Never   OTHER   Name(s) of People in Home Betty Martines (Sister)   [phone]

## 2022-12-27 NOTE — H&P
"[facility] Medicine  History & Physical    Patient Name: Stephanie T Barthelemy  MRN: [id number]  Patient Class: OP- Observation  Admission Date: 12/26/2022  Attending Physician: Kade Lindsay III, MD   Primary Care Provider: Primary Doctor No         Patient information was obtained from patient and ER records.     Subjective:     Principal Problem:Chronic combined systolic and diastolic heart failure    Chief Complaint:   Chief Complaint   Patient presents with    Edema     Reports has not been taking Lasix and has swelling to legs and abdomen. Pt states has been "forgetting" to take medication. Per EMS- family is worried about pts mental state but reports pt is at baseline. Pt denies any pain or SOB        HPI: Patient is a 52 F with PMH adhd, amphetamine use disorder, bipolar I disorder, hypertension, opioid overdose, seizure disorder, chronic combined HF (LVEF of 20% on Echo done in 11/20), with multiple admits for psychosis and/or CHF exacerbations in the past year, who was brought [facility] for fluid overload and medication adherence. Patient history limited due to patient's tangential thought process during interview. Patient states that she has not done a good job of taking her lasix lately and states that "the holidays have made it hard to take all of her medications". She also endorsed increased salt intake lately stating "she could not refuse all of the holiday foods around her". Patient states that she has noticed some increased fluid around her body and understands that this happens when she does not take her lasix. Patient also states that she was recently discharged from [facility] and stated that they had "increased her oxcarbazepine to 1200mg and made her stop taking her fluoxetine". She stated she felt "'better" while on her fluoxetine and that the increase of her oxcarbazepine has made her feel sometimes confused, hallucinating, "and sometimes just out of it". (Of note, patient has " repeated documentation of overall medication non-compliance and misuse).    At the [facility] ED, patient was found to be positive for amphetamines in which patient did state she had recently used meth. Labs notable for [place]-pro[place] 49[address], negative troponin, elevated AST/ALT, and noted bilat LE on PE. Xray notable for no acute process and ECG with sinus tachycardia. Patient was given 80mg lasix with adequate UOP. [facility] was then consulted for admission for further observation, medication management, and potential need for psychiatric consultation.       Past Medical History:   Diagnosis Date    ADHD (attention deficit hyperactivity disorder)     Anemia     Anxiety     Bipolar disorder     CHF (congestive heart failure)     History of hepatitis C - s/p clearance of virus (HCV neg 6/2015) 09/26/2014    History of psychiatric hospitalization     History of substance abuse     IV heroin - last use 2013 per pt    Hx of psychiatric care     Hypertension     Opioid overdose 05/10/2016    Psychiatric problem     Psychosis     Seizure disorder 04/03/2019    Sleep difficulties     Still's disease     Substance abuse     Therapy     Vasculitis        Past Surgical History:   Procedure Laterality Date    HYSTERECTOMY  3/16/2011    SALPINGOOPHORECTOMY Right 3/16/2011    TUBAL LIGATION      Vaginal cuff repair  04/22/2011       Review of patient's allergies indicates:  No Known Allergies    No current facility-administered medications on file prior to encounter.     Current Outpatient Medications on File Prior to Encounter   Medication Sig    albuterol (PROVENTIL/VENTOLIN HFA) 90 mcg/actuation inhaler Inhale 2 puffs into the lungs every 6 (six) hours. Rescue    benztropine (COGENTIN) 0.5 MG tablet Take 1 tablet (0.5 mg total) by mouth 2 (two) times daily.    famotidine (PEPCID) 20 MG tablet Take 1 tablet (20 mg total) by mouth once daily.    furosemide (LASIX) 40 MG tablet Take 1 tablet (40 mg total) by mouth  2 (two) times daily.    losartan (COZAAR) 25 MG tablet Take 1 tablet (25 mg total) by mouth once daily.    metoprolol succinate (TOPROL-XL) 25 MG 24 hr tablet Take 1 tablet (25 mg total) by mouth once daily.    OXcarbazepine (TRILEPTAL) 600 MG Tab Take 2 tablets (1,200 mg total) by mouth every evening.    QUEtiapine (SEROQUEL) 200 MG Tab Take 1 tablet (200 mg total) by mouth every evening.    lisinopriL (PRINIVIL,ZESTRIL) 5 MG tablet Take 1 tablet (5 mg total) by mouth once daily.    polyethylene glycol (GLYCOLAX) 17 gram PwPk Take 17 g by mouth once daily. for 60 doses    senna (SENOKOT) 8.6 mg tablet Take 1 tablet by mouth once daily.    [DISCONTINUED] amLODIPine (NORVASC) 5 MG tablet Take 1 tablet (5 mg total) by mouth once daily.    [DISCONTINUED] risperiDONE (RISPERDAL) 1 MG tablet Take 1 mg by mouth 2 (two) times daily.     Family History       Problem Relation (Age of Onset)    Cancer Maternal Grandmother    Diabetes Maternal Grandmother          Tobacco Use    Smoking status: Every Day     Packs/day: 1.00     Years: 36.00     Pack years: 36.00     Types: Cigarettes     Start date: 1986    Smokeless tobacco: Never    Tobacco comments:     Enrolled in [employer] on 10/23/14 ([employer] Member ID # [id number]) Pt declines Ambulatory referral to Smoking Cessation clinic. Handout provided   Substance and Sexual Activity    Alcohol use: No    Drug use: Yes     Types: Heroin, Cocaine, Methamphetamines, Marijuana    Sexual activity: Not Currently     Partners: Male, Female     Birth control/protection: Other-see comments     Comment: hysterectomy     Review of Systems   Constitutional:  Negative for activity change, chills, diaphoresis, fatigue and fever.   HENT:  Negative for sinus pressure, sinus pain and sore throat.    Eyes:  Negative for photophobia and visual disturbance.   Respiratory:  Positive for cough and shortness of breath. Negative for choking, chest tightness and wheezing.     Cardiovascular:  Positive for leg swelling. Negative for chest pain and palpitations.   Gastrointestinal:  Negative for abdominal pain, diarrhea, nausea and vomiting.   Genitourinary:  Negative for difficulty urinating, dysuria and flank pain.   Musculoskeletal:  Negative for myalgias, neck pain and neck stiffness.   Skin:  Negative for color change and wound.   Neurological:  Negative for dizziness, syncope, light-headedness and headaches.   Psychiatric/Behavioral:  Positive for behavioral problems, hallucinations and sleep disturbance.    Objective:     Vital Signs (Most Recent):  Temp: 97.7 °F (36.5 °C) (12/27/22 0225)  Pulse: 104 (12/27/22 0331)  Resp: 20 (12/27/22 0225)  BP: (!) 125/97 (12/27/22 0225)  SpO2: 98 % (12/27/22 0225)   Vital Signs (24h Range):  Temp:  [97.7 °F (36.5 °C)-98.2 °F (36.8 °C)] 97.7 °F (36.5 °C)  Pulse:  [104-111] 104  Resp:  [14-27] 20  SpO2:  [83 %-100 %] 98 %  BP: (122-150)/() 125/97     Weight: 56.5 kg (124 lb 9 oz)  Body mass index is 24.33 kg/m².    Physical Exam  Constitutional:       General: She is not in acute distress.     Appearance: She is not ill-appearing or diaphoretic.   HENT:      Head: Normocephalic and atraumatic.      Right Ear: External ear normal.      Left Ear: External ear normal.      Nose: Nose normal.      Mouth/Throat:      Mouth: Mucous membranes are moist.   Eyes:      Pupils: Pupils are equal, round, and reactive to light.   Cardiovascular:      Rate and Rhythm: Regular rhythm. Tachycardia present.      Pulses: Normal pulses.      Heart sounds: Normal heart sounds. No murmur heard.  Pulmonary:      Effort: No respiratory distress.      Breath sounds: Normal breath sounds. No wheezing.   Abdominal:      General: Bowel sounds are normal. There is distension.      Palpations: Abdomen is soft.   Musculoskeletal:         General: Normal range of motion.      Cervical back: Normal range of motion and neck supple.      Right lower leg: Edema present.       Left lower leg: Edema present.   Skin:     General: Skin is warm.      Capillary Refill: Capillary refill takes less than 2 seconds.   Neurological:      Mental Status: She is oriented to person, place, and time.         CRANIAL NERVES     CN III, IV, VI   Pupils are equal, round, and reactive to light.     Significant Labs: All pertinent labs within the past 24 hours have been reviewed.  CBC:   Recent Labs   Lab 12/26/22  1550 12/26/22  1606 12/27/22  0426   WBC  --  6.57 6.06   HGB  --  10.3* 9.8*   HCT 38 36.2* 34.4*   PLT  --  290 305     CMP:   Recent Labs   Lab 12/26/22  1606 12/27/22  0426    138  138   K 3.5 3.1*  3.2*    100  100   CO2 28 27  27    98  100   BUN 27* 23*  23*   CREATININE 0.84 1.2  1.2   CALCIUM 9.0 8.7  8.7   PROT 7.7 6.9   ALBUMIN 4.0 2.9*   BILITOT 1.5* 1.2*   ALKPHOS 130* 97   * 101*   * 121*   ANIONGAP 8 11  11     Cardiac Markers: No results for input(s): CKMB, MYOGLOBIN, BNP, TROPISTAT in the last 48 hours.  POCT Glucose: No results for input(s): POCTGLUCOSE in the last 48 hours.  Troponin:   Recent Labs   Lab 12/26/22  1606   TROPONINI 0.018       Significant Imaging: I have reviewed all pertinent imaging results/findings within the past 24 hours.    Assessment/Plan:     * Chronic combined systolic and diastolic heart failure  Last ECHO (2022) EF 20%  Home diuretic dose 40mg lasix BID  NT-proBNP on admit: 70410  EKG with sinus tachycardia   Troponin negative  Likely 2/2 medication non-compliance/increased salt intake  Adequate urine output after 80mg lasix given in ED    PLAN:  - Diuresis w/ Lasix  - Daily Weights  - Strict I/O  - Fluid restriction to 1,500cc daily  - Low-sodium cardiac diet  - Reassess volume status regularly  - Will continue GDMT            Bipolar 1 disorder, depressed  Patient recently discharged from [facility] service  Patient's oxcarbazepine increased to 1200mg QHS while continued on seroquel 200mg  Patient's  fluoxetine d/c at that time  Patient noting new symptoms from medication changes however unclear on baseline medication adherence/compliance to regimen  Psychiatry consulted, appreciate recs        Essential hypertension  Continue home losartan      VTE Risk Mitigation (From admission, onward)         Ordered     enoxaparin injection 40 mg  Daily         12/27/22 0352     IP VTE HIGH RISK PATIENT  Once         12/27/22 0352     Place sequential compression device  Until discontinued         12/27/22 0352                   Randolph Walden MD  Department of Hospital Medicine   [place] [facility]

## 2022-12-27 NOTE — CONSULTS
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 Partial Remission   Methamphetamine Use Disorder, Severe, Dependence   Unspecified Anxiety Disorder   Unspecified Insomnia   (Rule out SIMD)       RECOMMENDATIONS       - Continue Cogentin 0.5 mg PO BID for possible EPS (discussed risks/benefits/alt vs no treatment with patient).     - Change Trileptal to 600 mg PO BID for Mood & Seizure Hx (patient endorses AE issues with nighttime dizziness after taking 1200 mg QHS as opposed to 600 mg PO BID) (discussed risks/benefits/alt vs no treatment with patient).     - Continue Seroquel 200 mg PO QHS for mood / sleep / anxiety (discussed risks/benefits/alt vs no treatment with patient).     - Can use Vistaril 25 mg PO TID PRN for anxiety and/or insomnia (discussed risks/benefits/alt vs no treatment with patient).     - Psychotherapy was performed with patient as noted above with a focus on improving polysubstance cessation / total sobriety, insight, mood, and anxiety.     - Patient's most recent labs, imaging, and EKG were reviewed today.  UDS positive for amphetamines.  Ethanol < 10.  Repeat EKG to assess for improvement in QTc with goal of < 500 (improving on serial EKGs from 12/26 to 12/27).       - Please have CM/SW assist patient with asap outpatient mental health & addiction f/u (patient would benefit  from ACT Team services in [place], [place]) for s/p discharge from this facility (patient again denies interest in residential or IOP rehab options) (patient was supposed to have an outpatient mental health appointment with Dr. Lucho Romo at [facility] today (12/27/2022) at 10 AM).     - Patient was instructed to call 911 and/or [phone], and return to the nearest ED if she begins feeling suicidal, homicidal, or gravely disabled (for s/p this hospitalization).       - Thank you for this consult ; will continue to follow patient while she is at [facility].          Total time spent with patient and/or managing/coordinating patient's care today (excluding the time spent on psychotherapy): 76 minutes   Time spent on psychotherapy today (as noted above): 20 minutes   Total time for encounter today including psychotherapy: 96 minutes      More than 50% of the time was spent counseling/coordinating care.     Consulting clinician was informed of the encounter and consult note.       STAFF:  Edis Hughes MD  [facility]   12/27/2022

## 2022-12-27 NOTE — ED NOTES
"Per MD, pt is requesting "breakfast." [place] breakfast meal box (blueberry parfait, fresh fruit cup, and muffin) provided to pt with a 500 ml water bottle and a cup of ice chips. Pt awake and alert while sitting up in bed eating; tolerating meal well. Call light placed in reach and pt instructed to notify staff if needs arise. Understanding verbalized.  "

## 2022-12-27 NOTE — ED NOTES
Called x2 using number charge nurse on 4th floor gave to  give report to nurse receiving patient. Busy signal after multiple rings with each attempt.

## 2022-12-27 NOTE — SUBJECTIVE & OBJECTIVE
Past Medical History:   Diagnosis Date    ADHD (attention deficit hyperactivity disorder)     Anemia     Anxiety     Bipolar disorder     CHF (congestive heart failure)     History of hepatitis C - s/p clearance of virus (HCV neg 6/2015) 09/26/2014    History of psychiatric hospitalization     History of substance abuse     IV heroin - last use 2013 per pt    Hx of psychiatric care     Hypertension     Opioid overdose 05/10/2016    Psychiatric problem     Psychosis     Seizure disorder 04/03/2019    Sleep difficulties     Still's disease     Substance abuse     Therapy     Vasculitis        Past Surgical History:   Procedure Laterality Date    HYSTERECTOMY  3/16/2011    SALPINGOOPHORECTOMY Right 3/16/2011    TUBAL LIGATION      Vaginal cuff repair  04/22/2011       Review of patient's allergies indicates:  No Known Allergies    No current facility-administered medications on file prior to encounter.     Current Outpatient Medications on File Prior to Encounter   Medication Sig    albuterol (PROVENTIL/VENTOLIN HFA) 90 mcg/actuation inhaler Inhale 2 puffs into the lungs every 6 (six) hours. Rescue    benztropine (COGENTIN) 0.5 MG tablet Take 1 tablet (0.5 mg total) by mouth 2 (two) times daily.    famotidine (PEPCID) 20 MG tablet Take 1 tablet (20 mg total) by mouth once daily.    furosemide (LASIX) 40 MG tablet Take 1 tablet (40 mg total) by mouth 2 (two) times daily.    losartan (COZAAR) 25 MG tablet Take 1 tablet (25 mg total) by mouth once daily.    metoprolol succinate (TOPROL-XL) 25 MG 24 hr tablet Take 1 tablet (25 mg total) by mouth once daily.    OXcarbazepine (TRILEPTAL) 600 MG Tab Take 2 tablets (1,200 mg total) by mouth every evening.    QUEtiapine (SEROQUEL) 200 MG Tab Take 1 tablet (200 mg total) by mouth every evening.    lisinopriL (PRINIVIL,ZESTRIL) 5 MG tablet Take 1 tablet (5 mg total) by mouth once daily.    polyethylene glycol (GLYCOLAX) 17 gram PwPk Take 17 g by mouth once daily. for 60 doses     senna (SENOKOT) 8.6 mg tablet Take 1 tablet by mouth once daily.    [DISCONTINUED] amLODIPine (NORVASC) 5 MG tablet Take 1 tablet (5 mg total) by mouth once daily.    [DISCONTINUED] risperiDONE (RISPERDAL) 1 MG tablet Take 1 mg by mouth 2 (two) times daily.     Family History       Problem Relation (Age of Onset)    Cancer Maternal Grandmother    Diabetes Maternal Grandmother          Tobacco Use    Smoking status: Every Day     Packs/day: 1.00     Years: 36.00     Pack years: 36.00     Types: Cigarettes     Start date: 1986    Smokeless tobacco: Never    Tobacco comments:     Enrolled in [employer] on 10/23/14 ([employer] Member ID # [id number]) Pt declines Ambulatory referral to Smoking Cessation clinic. Handout provided   Substance and Sexual Activity    Alcohol use: No    Drug use: Yes     Types: Heroin, Cocaine, Methamphetamines, Marijuana    Sexual activity: Not Currently     Partners: Male, Female     Birth control/protection: Other-see comments     Comment: hysterectomy     Review of Systems   Constitutional:  Negative for activity change, chills, diaphoresis, fatigue and fever.   HENT:  Negative for sinus pressure, sinus pain and sore throat.    Eyes:  Negative for photophobia and visual disturbance.   Respiratory:  Positive for cough and shortness of breath. Negative for choking, chest tightness and wheezing.    Cardiovascular:  Positive for leg swelling. Negative for chest pain and palpitations.   Gastrointestinal:  Negative for abdominal pain, diarrhea, nausea and vomiting.   Genitourinary:  Negative for difficulty urinating, dysuria and flank pain.   Musculoskeletal:  Negative for myalgias, neck pain and neck stiffness.   Skin:  Negative for color change and wound.   Neurological:  Negative for dizziness, syncope, light-headedness and headaches.   Psychiatric/Behavioral:  Positive for behavioral problems, hallucinations and sleep disturbance.    Objective:     Vital Signs (Most Recent):  Temp: 97.7 °F  (36.5 °C) (12/27/22 0225)  Pulse: 104 (12/27/22 0331)  Resp: 20 (12/27/22 0225)  BP: (!) 125/97 (12/27/22 0225)  SpO2: 98 % (12/27/22 0225)   Vital Signs (24h Range):  Temp:  [97.7 °F (36.5 °C)-98.2 °F (36.8 °C)] 97.7 °F (36.5 °C)  Pulse:  [104-111] 104  Resp:  [14-27] 20  SpO2:  [83 %-100 %] 98 %  BP: (122-150)/() 125/97     Weight: 56.5 kg (124 lb 9 oz)  Body mass index is 24.33 kg/m².    Physical Exam  Constitutional:       General: She is not in acute distress.     Appearance: She is not ill-appearing or diaphoretic.   HENT:      Head: Normocephalic and atraumatic.      Right Ear: External ear normal.      Left Ear: External ear normal.      Nose: Nose normal.      Mouth/Throat:      Mouth: Mucous membranes are moist.   Eyes:      Pupils: Pupils are equal, round, and reactive to light.   Cardiovascular:      Rate and Rhythm: Regular rhythm. Tachycardia present.      Pulses: Normal pulses.      Heart sounds: Normal heart sounds. No murmur heard.  Pulmonary:      Effort: No respiratory distress.      Breath sounds: Normal breath sounds. No wheezing.   Abdominal:      General: Bowel sounds are normal. There is distension.      Palpations: Abdomen is soft.   Musculoskeletal:         General: Normal range of motion.      Cervical back: Normal range of motion and neck supple.      Right lower leg: Edema present.      Left lower leg: Edema present.   Skin:     General: Skin is warm.      Capillary Refill: Capillary refill takes less than 2 seconds.   Neurological:      Mental Status: She is oriented to person, place, and time.         CRANIAL NERVES     CN III, IV, VI   Pupils are equal, round, and reactive to light.     Significant Labs: All pertinent labs within the past 24 hours have been reviewed.  CBC:   Recent Labs   Lab 12/26/22  1550 12/26/22  1606 12/27/22  0426   WBC  --  6.57 6.06   HGB  --  10.3* 9.8*   HCT 38 36.2* 34.4*   PLT  --  290 305     CMP:   Recent Labs   Lab 12/26/22  1606 12/27/22  0427     138  138   K 3.5 3.1*  3.2*    100  100   CO2 28 27  27    98  100   BUN 27* 23*  23*   CREATININE 0.84 1.2  1.2   CALCIUM 9.0 8.7  8.7   PROT 7.7 6.9   ALBUMIN 4.0 2.9*   BILITOT 1.5* 1.2*   ALKPHOS 130* 97   * 101*   * 121*   ANIONGAP 8 11  11     Cardiac Markers: No results for input(s): CKMB, MYOGLOBIN, BNP, TROPISTAT in the last 48 hours.  POCT Glucose: No results for input(s): POCTGLUCOSE in the last 48 hours.  Troponin:   Recent Labs   Lab 12/26/22  1606   TROPONINI 0.018       Significant Imaging: I have reviewed all pertinent imaging results/findings within the past 24 hours.

## 2022-12-27 NOTE — PROGRESS NOTES
Individual Follow-Up Form    12/27/2022    Quit Date: To be determined    Clinical Status of Patient: Inpatient    Length of Service: 30 minutes    Comments: Smoking cessation education note: Pt is a 1 p/day cigarette smoker x 36 yrs. 21 mg nicotine patch ordered Q day. Pt declines referral to [facility] Smoking Cessation clinic. Handout provided.     Diagnosis: F17.210

## 2022-12-27 NOTE — ED NOTES
Patient resting in bed at this time. Vitals remain stable. Patient opens eyes when nurse writer called name. Respirations even and unlabored. No C/O pain. Will continue to monitor patient.

## 2022-12-27 NOTE — ED NOTES
Patient report received from JOANN Tracy. PT resting quietly in bed. No complaints at this time. Vitals remain stable. Will continue to monitor patient.

## 2022-12-27 NOTE — HPI
"Patient is a 52 F with PMH adhd, amphetamine use disorder, bipolar I disorder, hypertension, opioid overdose, seizure disorder, chronic combined HF (LVEF of 20% on Echo done in 11/20), with multiple admits for psychosis and/or CHF exacerbations in the past year, who was brought [facility] for fluid overload and medication adherence. Patient history limited due to patient's tangential thought process during interview. Patient states that she has not done a good job of taking her lasix lately and states that "the holidays have made it hard to take all of her medications". She also endorsed increased salt intake lately stating "she could not refuse all of the holiday foods around her". Patient states that she has noticed some increased fluid around her body and understands that this happens when she does not take her lasix. Patient also states that she was recently discharged from [facility] and stated that they had "increased her oxcarbazepine to 1200mg and made her stop taking her fluoxetine". She stated she felt "'better" while on her fluoxetine and that the increase of her oxcarbazepine has made her feel sometimes confused, hallucinating, "and sometimes just out of it". (Of note, patient has repeated documentation of overall medication non-compliance and misuse).    At the [facility] ED, patient was found to be positive for amphetamines in which patient did state she had recently used meth. Labs notable [place] 8[address], negative troponin, elevated AST/ALT, and noted bilat LE on PE. Xray notable for no acute process and ECG with sinus tachycardia. Patient was given 80mg lasix with adequate UOP. [facility] was then consulted for admission for further observation, medication management, and potential need for psychiatric consultation.   "

## 2022-12-27 NOTE — SUBJECTIVE & OBJECTIVE
Interval History: NAEON. Resting comfortably in bed in NAD. No complaints at this time. Net I/O: -2400. Psychiatry consulted. Will fu recs    Review of Systems   Constitutional:  Negative for activity change, chills, diaphoresis, fatigue and fever.   HENT:  Negative for sinus pressure, sinus pain and sore throat.    Eyes:  Negative for photophobia and visual disturbance.   Respiratory:  Negative for cough, choking, chest tightness, shortness of breath and wheezing.    Cardiovascular:  Positive for leg swelling. Negative for chest pain and palpitations.   Gastrointestinal:  Negative for abdominal pain, diarrhea, nausea and vomiting.   Genitourinary:  Negative for difficulty urinating, dysuria and flank pain.   Musculoskeletal:  Negative for myalgias, neck pain and neck stiffness.   Skin:  Negative for color change and wound.   Neurological:  Negative for dizziness, syncope, light-headedness and headaches.   Psychiatric/Behavioral:  Positive for behavioral problems, hallucinations and sleep disturbance.    Objective:     Vital Signs (Most Recent):  Temp: 97.4 °F (36.3 °C) (12/27/22 0805)  Pulse: 100 (12/27/22 0805)  Resp: 16 (12/27/22 0805)  BP: (!) 121/100 (12/27/22 0805)  SpO2: 100 % (12/27/22 0805)   Vital Signs (24h Range):  Temp:  [97.4 °F (36.3 °C)-98.2 °F (36.8 °C)] 97.4 °F (36.3 °C)  Pulse:  [100-111] 100  Resp:  [14-27] 16  SpO2:  [83 %-100 %] 100 %  BP: (121-150)/() 121/100     Weight: 56.5 kg (124 lb 9 oz)  Body mass index is 24.33 kg/m².    Intake/Output Summary (Last 24 hours) at 12/27/2022 0830  Last data filed at 12/27/2022 0610  Gross per 24 hour   Intake 625 ml   Output 3050 ml   Net -2425 ml      Physical Exam  Constitutional:       General: She is not in acute distress.     Appearance: She is not ill-appearing or diaphoretic.   HENT:      Head: Normocephalic and atraumatic.      Right Ear: External ear normal.      Left Ear: External ear normal.      Nose: Nose normal.      Mouth/Throat:       Mouth: Mucous membranes are moist.   Eyes:      Pupils: Pupils are equal, round, and reactive to light.   Cardiovascular:      Rate and Rhythm: Regular rhythm. Tachycardia present.      Pulses: Normal pulses.      Heart sounds: Normal heart sounds. No murmur heard.  Pulmonary:      Effort: No respiratory distress.      Breath sounds: Normal breath sounds. No wheezing.   Abdominal:      General: Bowel sounds are normal.      Palpations: Abdomen is soft.   Musculoskeletal:         General: Normal range of motion.      Cervical back: Normal range of motion and neck supple.      Right lower leg: Edema present.      Left lower leg: Edema present.   Skin:     General: Skin is warm.      Capillary Refill: Capillary refill takes less than 2 seconds.   Neurological:      Mental Status: She is oriented to person, place, and time.       Significant Labs: All pertinent labs within the past 24 hours have been reviewed.    Significant Imaging: I have reviewed all pertinent imaging results/findings within the past 24 hours.

## 2022-12-27 NOTE — ASSESSMENT & PLAN NOTE
Last ECHO (2022) EF 20%  Home diuretic dose 40mg lasix BID  NT-proBNP on admit: 69879  EKG with sinus tachycardia   Troponin negative  Likely 2/2 medication non-compliance/increased salt intake  Adequate urine output after 80mg lasix given in ED    PLAN:  - Diuresis w/ Lasix  - Daily Weights  - Strict I/O  - Fluid restriction to 1,500cc daily  - Low-sodium cardiac diet  - Reassess volume status regularly  - Will continue GDMT

## 2022-12-27 NOTE — ASSESSMENT & PLAN NOTE
Last ECHO (2022) EF 20%  Home diuretic dose 40mg lasix BID  NT-proBNP on admit: 47218  EKG with sinus tachycardia   Troponin negative  Likely 2/2 medication non-compliance/increased salt intake  Adequate urine output after 80mg lasix given in ED    PLAN:  - Diuresis w/ Lasix  - Daily Weights  - Strict I/O  - Fluid restriction to 1,500cc daily  - Low-sodium cardiac diet  - Reassess volume status regularly  - Will continue GDMT

## 2022-12-28 LAB
ALBUMIN SERPL BCP-MCNC: 2.6 G/DL (ref 3.5–5.2)
ALP SERPL-CCNC: 89 U/L (ref 55–135)
ALT SERPL W/O P-5'-P-CCNC: 96 U/L (ref 10–44)
ANION GAP SERPL CALC-SCNC: 11 MMOL/L (ref 8–16)
AST SERPL-CCNC: 77 U/L (ref 10–40)
BASOPHILS # BLD AUTO: 0.04 K/UL (ref 0–0.2)
BASOPHILS NFR BLD: 0.8 % (ref 0–1.9)
BILIRUB SERPL-MCNC: 1.3 MG/DL (ref 0.1–1)
BUN SERPL-MCNC: 28 MG/DL (ref 6–20)
CALCIUM SERPL-MCNC: 8.7 MG/DL (ref 8.7–10.5)
CHLORIDE SERPL-SCNC: 101 MMOL/L (ref 95–110)
CO2 SERPL-SCNC: 24 MMOL/L (ref 23–29)
CREAT SERPL-MCNC: 1.2 MG/DL (ref 0.5–1.4)
DIFFERENTIAL METHOD: ABNORMAL
EOSINOPHIL # BLD AUTO: 0.1 K/UL (ref 0–0.5)
EOSINOPHIL NFR BLD: 1 % (ref 0–8)
ERYTHROCYTE [DISTWIDTH] IN BLOOD BY AUTOMATED COUNT: 20.9 % (ref 11.5–14.5)
EST. GFR  (NO RACE VARIABLE): 55 ML/MIN/1.73 M^2
GLUCOSE SERPL-MCNC: 95 MG/DL (ref 70–110)
HCT VFR BLD AUTO: 33.3 % (ref 37–48.5)
HGB BLD-MCNC: 9.5 G/DL (ref 12–16)
IMM GRANULOCYTES # BLD AUTO: 0.01 K/UL (ref 0–0.04)
IMM GRANULOCYTES NFR BLD AUTO: 0.2 % (ref 0–0.5)
LYMPHOCYTES # BLD AUTO: 1.6 K/UL (ref 1–4.8)
LYMPHOCYTES NFR BLD: 33 % (ref 18–48)
MAGNESIUM SERPL-MCNC: 1.7 MG/DL (ref 1.6–2.6)
MCH RBC QN AUTO: 20.4 PG (ref 27–31)
MCHC RBC AUTO-ENTMCNC: 28.5 G/DL (ref 32–36)
MCV RBC AUTO: 72 FL (ref 82–98)
MONOCYTES # BLD AUTO: 0.6 K/UL (ref 0.3–1)
MONOCYTES NFR BLD: 11.7 % (ref 4–15)
NEUTROPHILS # BLD AUTO: 2.6 K/UL (ref 1.8–7.7)
NEUTROPHILS NFR BLD: 53.3 % (ref 38–73)
NRBC BLD-RTO: 0 /100 WBC
PHOSPHATE SERPL-MCNC: 3 MG/DL (ref 2.7–4.5)
PLATELET # BLD AUTO: 234 K/UL (ref 150–450)
PMV BLD AUTO: 10.7 FL (ref 9.2–12.9)
POTASSIUM SERPL-SCNC: 4.1 MMOL/L (ref 3.5–5.1)
PROT SERPL-MCNC: 6.1 G/DL (ref 6–8.4)
RBC # BLD AUTO: 4.66 M/UL (ref 4–5.4)
SODIUM SERPL-SCNC: 136 MMOL/L (ref 136–145)
WBC # BLD AUTO: 4.88 K/UL (ref 3.9–12.7)

## 2022-12-28 PROCEDURE — 96365 THER/PROPH/DIAG IV INF INIT: CPT

## 2022-12-28 PROCEDURE — 93005 ELECTROCARDIOGRAM TRACING: CPT

## 2022-12-28 PROCEDURE — S4991 NICOTINE PATCH NONLEGEND: HCPCS | Performed by: STUDENT IN AN ORGANIZED HEALTH CARE EDUCATION/TRAINING PROGRAM

## 2022-12-28 PROCEDURE — 25000003 PHARM REV CODE 250: Performed by: STUDENT IN AN ORGANIZED HEALTH CARE EDUCATION/TRAINING PROGRAM

## 2022-12-28 PROCEDURE — 63600175 PHARM REV CODE 636 W HCPCS

## 2022-12-28 PROCEDURE — 25000003 PHARM REV CODE 250

## 2022-12-28 PROCEDURE — 94761 N-INVAS EAR/PLS OXIMETRY MLT: CPT

## 2022-12-28 PROCEDURE — 85025 COMPLETE CBC W/AUTO DIFF WBC: CPT

## 2022-12-28 PROCEDURE — 99226 PR SUBSEQUENT OBSERVATION CARE,LEVEL III: CPT | Mod: ,,, | Performed by: PSYCHIATRY & NEUROLOGY

## 2022-12-28 PROCEDURE — 90833 PR PSYCHOTHERAPY W/PATIENT W/E&M, 30 MIN (ADD ON): ICD-10-PCS | Mod: ,,, | Performed by: PSYCHIATRY & NEUROLOGY

## 2022-12-28 PROCEDURE — 90833 PSYTX W PT W E/M 30 MIN: CPT | Mod: ,,, | Performed by: PSYCHIATRY & NEUROLOGY

## 2022-12-28 PROCEDURE — 93010 ELECTROCARDIOGRAM REPORT: CPT | Mod: ,,, | Performed by: INTERNAL MEDICINE

## 2022-12-28 PROCEDURE — 84100 ASSAY OF PHOSPHORUS: CPT

## 2022-12-28 PROCEDURE — 96366 THER/PROPH/DIAG IV INF ADDON: CPT

## 2022-12-28 PROCEDURE — 93010 EKG 12-LEAD: ICD-10-PCS | Mod: ,,, | Performed by: INTERNAL MEDICINE

## 2022-12-28 PROCEDURE — G0378 HOSPITAL OBSERVATION PER HR: HCPCS

## 2022-12-28 PROCEDURE — 36415 COLL VENOUS BLD VENIPUNCTURE: CPT

## 2022-12-28 PROCEDURE — 99900035 HC TECH TIME PER 15 MIN (STAT)

## 2022-12-28 PROCEDURE — 80053 COMPREHEN METABOLIC PANEL: CPT

## 2022-12-28 PROCEDURE — 99226 PR SUBSEQUENT OBSERVATION CARE,LEVEL III: ICD-10-PCS | Mod: ,,, | Performed by: PSYCHIATRY & NEUROLOGY

## 2022-12-28 PROCEDURE — 94760 N-INVAS EAR/PLS OXIMETRY 1: CPT

## 2022-12-28 PROCEDURE — 96372 THER/PROPH/DIAG INJ SC/IM: CPT

## 2022-12-28 PROCEDURE — 83735 ASSAY OF MAGNESIUM: CPT

## 2022-12-28 PROCEDURE — 96376 TX/PRO/DX INJ SAME DRUG ADON: CPT

## 2022-12-28 PROCEDURE — 25000003 PHARM REV CODE 250: Performed by: PSYCHIATRY & NEUROLOGY

## 2022-12-28 PROCEDURE — 51798 US URINE CAPACITY MEASURE: CPT

## 2022-12-28 PROCEDURE — A4216 STERILE WATER/SALINE, 10 ML: HCPCS

## 2022-12-28 RX ORDER — FUROSEMIDE 10 MG/ML
40 INJECTION INTRAMUSCULAR; INTRAVENOUS 2 TIMES DAILY
Status: DISCONTINUED | OUTPATIENT
Start: 2022-12-28 | End: 2022-12-28

## 2022-12-28 RX ORDER — FUROSEMIDE 10 MG/ML
40 INJECTION INTRAMUSCULAR; INTRAVENOUS ONCE
Status: COMPLETED | OUTPATIENT
Start: 2022-12-28 | End: 2022-12-28

## 2022-12-28 RX ORDER — MAGNESIUM SULFATE HEPTAHYDRATE 40 MG/ML
2 INJECTION, SOLUTION INTRAVENOUS ONCE
Status: COMPLETED | OUTPATIENT
Start: 2022-12-28 | End: 2022-12-28

## 2022-12-28 RX ORDER — FUROSEMIDE 10 MG/ML
60 INJECTION INTRAMUSCULAR; INTRAVENOUS 2 TIMES DAILY
Status: DISCONTINUED | OUTPATIENT
Start: 2022-12-28 | End: 2022-12-29 | Stop reason: HOSPADM

## 2022-12-28 RX ADMIN — METOPROLOL SUCCINATE 25 MG: 25 TABLET, EXTENDED RELEASE ORAL at 09:12

## 2022-12-28 RX ADMIN — HYDROCORTISONE: 0.01 CREAM TOPICAL at 09:12

## 2022-12-28 RX ADMIN — FUROSEMIDE 40 MG: 40 TABLET ORAL at 09:12

## 2022-12-28 RX ADMIN — FUROSEMIDE 40 MG: 10 INJECTION, SOLUTION INTRAMUSCULAR; INTRAVENOUS at 10:12

## 2022-12-28 RX ADMIN — BENZTROPINE MESYLATE 0.5 MG: 0.5 TABLET ORAL at 09:12

## 2022-12-28 RX ADMIN — Medication 10 ML: at 09:12

## 2022-12-28 RX ADMIN — OXCARBAZEPINE 600 MG: 150 TABLET, FILM COATED ORAL at 09:12

## 2022-12-28 RX ADMIN — QUETIAPINE FUMARATE 150 MG: 100 TABLET ORAL at 09:12

## 2022-12-28 RX ADMIN — FAMOTIDINE 20 MG: 20 TABLET ORAL at 09:12

## 2022-12-28 RX ADMIN — NICOTINE 1 PATCH: 21 PATCH, EXTENDED RELEASE TRANSDERMAL at 09:12

## 2022-12-28 RX ADMIN — MAGNESIUM SULFATE 2 G: 2 INJECTION INTRAVENOUS at 09:12

## 2022-12-28 RX ADMIN — FUROSEMIDE 60 MG: 10 INJECTION, SOLUTION INTRAMUSCULAR; INTRAVENOUS at 09:12

## 2022-12-28 RX ADMIN — ENOXAPARIN SODIUM 40 MG: 40 INJECTION SUBCUTANEOUS at 04:12

## 2022-12-28 RX ADMIN — LOSARTAN POTASSIUM 25 MG: 25 TABLET, FILM COATED ORAL at 09:12

## 2022-12-28 RX ADMIN — THERA TABS 1 TABLET: TAB at 09:12

## 2022-12-28 NOTE — ASSESSMENT & PLAN NOTE
Last ECHO (2022) EF 20%  Home diuretic dose 40mg lasix BID  NT-proBNP on admit: 40096  EKG with sinus tachycardia   Troponin negative  Likely 2/2 medication non-compliance/increased salt intake  Adequate urine output after 80mg lasix given in ED    PLAN:  - Diuresis w/ Lasix  - Daily Weights  - Strict I/O  - Fluid restriction to 1,500cc daily  - Low-sodium cardiac diet  - Reassess volume status regularly  - Will continue GDMT

## 2022-12-28 NOTE — SUBJECTIVE & OBJECTIVE
Interval History: NAEON. Medications recs given by Dr. Hughes. Also requested SW set up with ACT team to aid in medication compliance. I/O: -3.2 L. Minimal edema today.     Review of Systems   Constitutional:  Negative for activity change, chills, diaphoresis, fatigue and fever.   HENT:  Negative for sinus pressure, sinus pain and sore throat.    Eyes:  Negative for photophobia and visual disturbance.   Respiratory:  Negative for cough, choking, chest tightness, shortness of breath and wheezing.    Cardiovascular:  Negative for chest pain, palpitations and leg swelling.   Gastrointestinal:  Negative for abdominal pain, diarrhea, nausea and vomiting.   Genitourinary:  Negative for difficulty urinating, dysuria and flank pain.   Musculoskeletal:  Negative for myalgias, neck pain and neck stiffness.   Skin:  Negative for color change and wound.   Neurological:  Negative for dizziness, syncope, light-headedness and headaches.   Psychiatric/Behavioral:  Negative for agitation. The patient is not hyperactive.    Objective:     Vital Signs (Most Recent):  Temp: 96.6 °F (35.9 °C) (12/28/22 0758)  Pulse: 84 (12/28/22 0758)  Resp: 17 (12/28/22 0758)  BP: (!) 119/91 (12/28/22 0758)  SpO2: 99 % (12/28/22 0758)   Vital Signs (24h Range):  Temp:  [96.1 °F (35.6 °C)-98.4 °F (36.9 °C)] 96.6 °F (35.9 °C)  Pulse:  [] 84  Resp:  [14-18] 17  SpO2:  [94 %-100 %] 99 %  BP: (109-123)/() 119/91     Weight: 56.5 kg (124 lb 9 oz)  Body mass index is 24.33 kg/m².    Intake/Output Summary (Last 24 hours) at 12/28/2022 0849  Last data filed at 12/28/2022 0508  Gross per 24 hour   Intake --   Output 805 ml   Net -805 ml      Physical Exam    Significant Labs: All pertinent labs within the past 24 hours have been reviewed.    Significant Imaging: I have reviewed all pertinent imaging results/findings within the past 24 hours.

## 2022-12-28 NOTE — ASSESSMENT & PLAN NOTE
Patient recently discharged from [facility] psych service  Patient's oxcarbazepine increased to 1200mg QHS while continued on seroquel 200mg  Patient's fluoxetine d/c at that time  Patient noting new symptoms from medication changes however unclear on baseline medication adherence/compliance to regimen    Plan:  Psych recs  - Placement with outpatient mental health & addiciton fu, ACT Team  -Med recs:    Continue cogentin 0.5 mg BID    Trileptal changed to 600 mg BID   Seroquel 200 mg QHS   Vistaril 25 mg PO PRN

## 2022-12-28 NOTE — NURSING
Pt AAOx4. RR wnl, pt afebrile. Pt given 30ml of Aluminum-Magnesium for indigestion and 17 g Miralax given. 1 small stool occurrence. Pt SAT at 98% room air. Bladder scan-430ml and urinated 405ml, clear yellow urine. Labs continue to monitor. Bed in low position, alarm on, and call light within reach. Will continue to monitor.

## 2022-12-28 NOTE — PROGRESS NOTES
"[facility] Medicine  Progress Note    Patient Name: Stephanie T Barthelemy  MRN: [id number]  Patient Class: OP- Observation   Admission Date: 12/26/2022  Length of Stay: 0 days  Attending Physician: Kade Lindsay III, MD  Primary Care Provider: Primary Doctor No        Subjective:     Principal Problem:Chronic combined systolic and diastolic heart failure        HPI:  Patient is a 52 F with PMH adhd, amphetamine use disorder, bipolar I disorder, hypertension, opioid overdose, seizure disorder, chronic combined HF (LVEF of 20% on Echo done in 11/20), with multiple admits for psychosis and/or CHF exacerbations in the past year, who was brought [facility] for fluid overload and medication adherence. Patient history limited due to patient's tangential thought process during interview. Patient states that she has not done a good job of taking her lasix lately and states that "the holidays have made it hard to take all of her medications". She also endorsed increased salt intake lately stating "she could not refuse all of the holiday foods around her". Patient states that she has noticed some increased fluid around her body and understands that this happens when she does not take her lasix. Patient also states that she was recently discharged from [facility] and stated that they had "increased her oxcarbazepine to 1200mg and made her stop taking her fluoxetine". She stated she felt "'better" while on her fluoxetine and that the increase of her oxcarbazepine has made her feel sometimes confused, hallucinating, "and sometimes just out of it". (Of note, patient has repeated documentation of overall medication non-compliance and misuse).    At the [facility] ED, patient was found to be positive for amphetamines in which patient did state she had recently used meth. Labs notable for [place]-pro[place] 220[address], negative troponin, elevated AST/ALT, and noted bilat LE on PE. Xray notable for no acute process and ECG with sinus tachycardia. " Patient was given 80mg lasix with adequate UOP. [facility] was then consulted for admission for further observation, medication management, and potential need for psychiatric consultation.       Overview/Hospital Course:  No notes on file    Interval History: NAEON. Medications recs given by Dr. Hughes. Also requested SW set up with ACT team to aid in medication compliance. I/O: -3.2 L. Minimal edema today.     Review of Systems   Constitutional:  Negative for activity change, chills, diaphoresis, fatigue and fever.   HENT:  Negative for sinus pressure, sinus pain and sore throat.    Eyes:  Negative for photophobia and visual disturbance.   Respiratory:  Negative for cough, choking, chest tightness, shortness of breath and wheezing.    Cardiovascular:  Negative for chest pain, palpitations and leg swelling.   Gastrointestinal:  Negative for abdominal pain, diarrhea, nausea and vomiting.   Genitourinary:  Negative for difficulty urinating, dysuria and flank pain.   Musculoskeletal:  Negative for myalgias, neck pain and neck stiffness.   Skin:  Negative for color change and wound.   Neurological:  Negative for dizziness, syncope, light-headedness and headaches.   Psychiatric/Behavioral:  Negative for agitation. The patient is not hyperactive.    Objective:     Vital Signs (Most Recent):  Temp: 96.6 °F (35.9 °C) (12/28/22 0758)  Pulse: 84 (12/28/22 0758)  Resp: 17 (12/28/22 0758)  BP: (!) 119/91 (12/28/22 0758)  SpO2: 99 % (12/28/22 0758)   Vital Signs (24h Range):  Temp:  [96.1 °F (35.6 °C)-98.4 °F (36.9 °C)] 96.6 °F (35.9 °C)  Pulse:  [] 84  Resp:  [14-18] 17  SpO2:  [94 %-100 %] 99 %  BP: (109-123)/() 119/91     Weight: 56.5 kg (124 lb 9 oz)  Body mass index is 24.33 kg/m².    Intake/Output Summary (Last 24 hours) at 12/28/2022 0833  Last data filed at 12/28/2022 0508  Gross per 24 hour   Intake --   Output 805 ml   Net -805 ml      Physical Exam  Physical Exam  Vitals and nursing note reviewed.    Constitutional:       Appearance: Normal appearance.   HENT:      Right Ear: External ear normal.      Left Ear: External ear normal.   Eyes:      Extraocular Movements: Extraocular movements intact.      Pupils: Pupils are equal, round, and reactive to light.   Cardiovascular:      Rate and Rhythm: Normal rate and regular rhythm.      Pulses: Normal pulses.      Heart sounds: Normal heart sounds.   Pulmonary:      Effort: Pulmonary effort is normal.      Breath sounds: Normal breath sounds.   Abdominal:      Palpations: Abdomen is soft.      Tenderness: There is no abdominal tenderness.   Neurological:      Mental Status: She is alert and oriented to person, place, and time.   Psychiatric:         Mood and Affect: Mood normal.         Behavior: Behavior normal.      Significant Labs: All pertinent labs within the past 24 hours have been reviewed.    Significant Imaging: I have reviewed all pertinent imaging results/findings within the past 24 hours.      Assessment/Plan:      * Chronic combined systolic and diastolic heart failure  Last ECHO (2022) EF 20%  Home diuretic dose 40mg lasix BID  NT-proBNP on admit: 56584  EKG with sinus tachycardia   Troponin negative  Likely 2/2 medication non-compliance/increased salt intake  Adequate urine output after 80mg lasix given in ED    PLAN:  - Diuresis w/ Lasix  - Daily Weights  - Strict I/O  - Fluid restriction to 1,500cc daily  - Low-sodium cardiac diet  - Reassess volume status regularly  - Will continue GDMT    Bipolar 1 disorder, depressed  Patient recently discharged from [facility] psych service  Patient's oxcarbazepine increased to 1200mg QHS while continued on seroquel 200mg  Patient's fluoxetine d/c at that time  Patient noting new symptoms from medication changes however unclear on baseline medication adherence/compliance to regimen    Plan:  Psych recs  - Placement with outpatient mental health & addiciton fu, ACT Team  -Med recs:    Continue cogentin 0.5 mg BID     Trileptal changed to 600 mg BID   Seroquel 200 mg QHS   Vistaril 25 mg PO PRN        Essential hypertension  Continue home losartan        VTE Risk Mitigation (From admission, onward)           Ordered     enoxaparin injection 40 mg  Daily         12/27/22 0352     IP VTE HIGH RISK PATIENT  Once         12/27/22 0352     Place sequential compression device  Until discontinued         12/27/22 0352                    Discharge Planning   AYSHA:      Code Status: Full Code   Is the patient medically ready for discharge?:     Reason for patient still in hospital (select all that apply): Pending disposition  Discharge Plan A: Home with family, Home Health              Phillip Borja DO  Department of Hospital Medicine   [facility] [facility]

## 2022-12-28 NOTE — PLAN OF CARE
Problem: Adult Inpatient Plan of Care  Goal: Plan of Care Review  Outcome: Ongoing, Progressing    Chart check complete. Vitals, orders, labs, and progress notes reviewed. Care plan updated. Will monitor.

## 2022-12-28 NOTE — PROGRESS NOTES
"PSYCHIATRY INPATIENT PROGRESS NOTE  SUBSEQUENT HOSPITAL VISIT      12/28/2022 7:31 AM   Stephanie T Barthelemy   1970   [id number]           DATE OF ADMISSION: 12/26/2022  2:59 PM    SITE: [facility]    CURRENT LEGAL STATUS: Patient does not currently meet PEC criteria due to not currently being an imminent threat to self/others and not being gravely disabled 2/2 mental illness at this time.     HISTORY    Per Initial History from Primary Team:   Patient is a 52 F with PMH adhd, amphetamine use disorder, bipolar I disorder, hypertension, opioid overdose, seizure disorder, chronic combined HF (LVEF of 20% on Echo done in 11/20), with multiple admits for psychosis and/or CHF exacerbations in the past year, who was brought [facility] for fluid overload and medication adherence. Patient history limited due to patient's tangential thought process during interview. Patient states that she has not done a good job of taking her lasix lately and states that "the holidays have made it hard to take all of her medications". She also endorsed increased salt intake lately stating "she could not refuse all of the holiday foods around her". Patient states that she has noticed some increased fluid around her body and understands that this happens when she does not take her lasix. Patient also states that she was recently discharged from [facility] and stated that they had "increased her oxcarbazepine to 1200mg and made her stop taking her fluoxetine". She stated she felt "'better" while on her fluoxetine and that the increase of her oxcarbazepine has made her feel sometimes confused, hallucinating, "and sometimes just out of it". (Of note, patient has repeated documentation of overall medication non-compliance and misuse).  At the [facility] ED, patient was found to be positive for amphetamines in which patient did state she had recently used meth. [place] [address], negative troponin, elevated AST/ALT, and noted bilat LE on " PE. Xray notable for no acute process and ECG with sinus tachycardia. Patient was given 80mg lasix with adequate UOP. [facility] was then consulted for admission for further observation, medication management, and potential need for psychiatric consultation.   Interval History from Primary Team on 12/28/2022:  NAEON. Medications recs given by Dr. Hughes. Also requested SW set up with ACT team to aid in medication compliance. I/O: -3.2 L. Minimal edema today.        Chief Complaint / Reason for Original Psychiatry Consult: Psychiatric Medication Management        Subjective / Interval Psychiatric History Today (12/28/2022) (with Psychiatric ROS below):  Stephanie T Barthelemy is a 52 y.o. female with a past medical history of heart failure, HCV, seizure disorder, and HTN, and a past psychiatric history of Substance Induced Mood/Psychotic Disorder, Polysubstance Abuse (most notably methamphetamines), Bipolar I Disorder, Anxiety, Depression, and ADHD, currently being treated by her inpatient primary treatment team for a principal problem of chronic combined systolic and diastolic heart failure.  Psychiatry was originally consulted as noted above.  The patient was seen and examined again this morning.  The chart was reviewed again this morning.  On examination today, the patient was somnolent but remains oriented to person, place, city, state, month, year, and situation.  She remains CAM-ICU negative for delirium.  She continues to appear at her neurocognitive baseline at this time.  She endorses sleeping about 7 hours last night.  She again endorses a fair to good appetite.  She denies any nighttime dizziness with the Trileptal being changed to 600 mg PO BID.  She endorses some intermittent improving symptoms of depression and anxiety (as noted in the detailed psych ROS below).  She again denies any current or recent s/s consistent with psychosis, frederic, PTSD, OCD, panic, or eating disorders.  She again did not objectively  appear manic or psychotic on exam today.  She again denies any current AIMs or restlessness (she again did not appear with TD / EPS / Akathisia on exam).  As noted yesterday, she historically has displayed and continues to display limited insight and poor self-responsibility / accountability regarding her polysubstance abuse.  Patient has chronically poor insight into her methamphetamine use d/o (despite multiple attempts at counseling / education / psychotherapy, she remains not interested in residential rehab, IOP treatment, AA/NA meetings, etc at this time ; patient previously completed program at [facility]).  She remains open to the possibility of having an ACT Team follow her.  She again denies AH, VH, TH, delusions, paranoia, or DIGFAST (frederic) s/s.  She again denies any current/recent passive/active SI/HI.  She was supposed to have an outpatient mental health appointment with Dr. Lucho Romo at [facility] yesterday (12/27/2022) at 10 AM.  Regarding current medical/physical complaints, she endorses fatigue and improving BLE myalgias.  Patient denies any other medical complaints at this time.  NAD was observed during the examination.  Psychotherapy was again implemented as noted below with a focus on improving polysubstance cessation / total sobriety, insight, mood, and anxiety.  See detailed psych ROS below.  See A/P below.          Psychiatric Review Of Systems - Currently, the patient is endorsing and/or denying the following:  (patient's endorsements are BOLDED below; if not BOLDED, then patient denied):     Endorses intermittent Symptoms of Depression (improving): diminished mood, low motivation, loss of interest/anhedonia, irritability, diminished energy, change in sleep, change in appetite, diminished concentration or cognition or indecisiveness, PMA/R, excessive guilt or hopelessness or worthlessness, suicidal ideations     Denies issues with Sleep: initiation, maintenance, early morning  awakening with inability to return to sleep     Denies Suicidal/Homicidal ideations: active/passive ideations, organized plans, future intentions     Denies Symptoms of psychosis: hallucinations, delusions, disorganized thinking, disorganized behavior or abnormal motor behavior, or negative symptoms (diminshed emotional expression, avolition, anhedonia, alogia, asociality)      Denies Symptoms of frederic or hypomania: elevated, expansive, or irritable mood with increased energy or activity; with inflated self-esteem or grandiosity, decreased need for sleep, increased rate of speech, FOI or racing thoughts, distractibility, increased goal directed activity or PMA, risky/disinhibited behavior     Endorses intermittent Symptoms of Anxiety (improving): excessive anxiety/worry/fear, more days than not, about numerous issues, difficult to control, with restlessness, fatigue, poor concentration, irritability, muscle tension, sleep disturbance; causes functionally impairing distress      Denies Symptoms of Panic Disorder: recurrent panic attacks, precipitated or un-precipitated, source of worry and/or behavioral changes secondary; with or without agoraphobia     Denies Symptoms of PTSD: h/o trauma; re-experiencing/intrusive symptoms, avoidant behavior, negative alterations in cognition or mood, or hyperarousal symptoms; with or without dissociative symptoms      Denies Symptoms of OCD: obsessions or compulsions      Denies Symptoms of Eating Disorders: anorexia, bulimia or binging     Endorses Substance Use (intranasal crystal meth): intoxication, withdrawal, tolerance, used in larger amounts or duration than intended, unsuccessful attempts to limit or quit, increased time engaging in or seeking out, cravings or strong desire to use, failure to fulfill obligations, negative consequences in social/interpersonal/occupational,/recreational areas, use in dangerous situations, medical or psychological consequences          PSYCHOTHERAPY ADD-ON +[id number]   30 (16-37*) minutes     Time: 21 minutes  Participants: Met with patient     Therapeutic Intervention Type: behavior modifying psychotherapy, supportive psychotherapy  Why chosen therapy is appropriate versus another modality: relevant to diagnosis, patient responds to this modality, evidence based practice     Target symptoms: polysubstance abuse / dependence (historically crystal meth and cannabis), insight, mood, and anxiety  Primary focus: improving polysubstance cessation / total sobriety, insight, mood, and anxiety  Psychotherapeutic techniques: supportive and psychodynamic techniques; psycho-education; deep breathing exercises; motivational interviewing; CBT; reality / insight orientation; problem solving techniques and managing life stressors     Outcome monitoring methods: self-report, observation     Patient's response to intervention:  The patient's response to intervention is accepting.     Progress toward goals:  The patient's progress toward goals is fair / limited.        ROS:  General ROS: negative for - chills, fever or night sweats; positive for fatigue  Ophthalmic ROS: negative for - blurry vision, double vision or eye pain  ENT ROS: negative for - sinus pain, headaches, sore throat or visual changes  Allergy and Immunology ROS: negative for - hives, itchy/watery eyes or nasal congestion  Hematological and Lymphatic ROS: negative for - bleeding problems, bruising, jaundice or pallor  Endocrine ROS: negative for - galactorrhea, hot flashes, mood swings, palpitations or temperature intolerance  Respiratory ROS: negative for - cough, hemoptysis, shortness of breath, tachypnea or wheezing  Cardiovascular ROS: negative for - chest pain, dyspnea on exertion, loss of consciousness, palpitations, rapid heart rate or shortness of breath; positive for improving BLE edema   Gastrointestinal ROS: negative for - appetite loss, nausea, abdominal pain, blood in stools, change in  bowel habits, constipation or diarrhea  Genito-Urinary ROS: negative for - incontinence, nocturia or pelvic pain  Musculoskeletal ROS: negative for - joint stiffness, joint swelling, or joint pain; positive for improving BLE myalgias   Neurological ROS: negative for - behavioral changes, confusion, dizziness, memory loss, numbness/tingling or seizures  Dermatological ROS: negative for dry skin, hair changes, pruritus or rash  Psychiatric ROS: see detailed psychiatric ROS above in subjective section         [facility] Toolkit ASQ Suicide Screening Tool:  In the past few weeks, have you wished you were dead? Denies   In the past few weeks, have you felt that you or your family would be better off if you were dead? Denies  In the past week, have you been having thoughts about killing yourself? Denies  Have you ever tried to kill yourself? Yes (remote hx)  Are you having thoughts of killing yourself right now? Denies      PAST MEDICAL & SURGICAL HISTORY   Past Medical History:   Diagnosis Date    ADHD (attention deficit hyperactivity disorder)     Anemia     Anxiety     Bipolar disorder     CHF (congestive heart failure)     History of hepatitis C - s/p clearance of virus (HCV neg 6/2015) 09/26/2014    History of psychiatric hospitalization     History of substance abuse     IV heroin - last use 2013 per pt    Hx of psychiatric care     Hypertension     Opioid overdose 05/10/2016    Psychiatric problem     Psychosis     Seizure disorder 04/03/2019    Sleep difficulties     Still's disease     Substance abuse     Therapy     Vasculitis      Past Surgical History:   Procedure Laterality Date    HYSTERECTOMY  3/16/2011    SALPINGOOPHORECTOMY Right 3/16/2011    TUBAL LIGATION      Vaginal cuff repair  04/22/2011     NEUROLOGIC HISTORY  Seizures: yes   Head trauma: denies  CVA: denies    FAMILY HISTORY   Family History   Problem Relation Age of Onset    Diabetes Maternal Grandmother     Cancer Maternal Grandmother        ALLERGIES    Review of patient's allergies indicates:  No Known Allergies    CURRENT MEDICATION REGIMEN   Home Meds:   Prior to Admission medications    Medication Sig Start Date End Date Taking? Authorizing Provider   albuterol (PROVENTIL/VENTOLIN HFA) 90 mcg/actuation inhaler Inhale 2 puffs into the lungs every 6 (six) hours. Rescue 12/14/22 1/13/23 Yes Kayla Godfrey MD   benztropine (COGENTIN) 0.5 MG tablet Take 1 tablet (0.5 mg total) by mouth 2 (two) times daily. 8/11/22 8/11/23 Yes Nelly Pandey MD   famotidine (PEPCID) 20 MG tablet Take 1 tablet (20 mg total) by mouth once daily. 12/15/22 12/15/23 Yes Kayla Godfrey MD   furosemide (LASIX) 40 MG tablet Take 1 tablet (40 mg total) by mouth 2 (two) times daily. 12/14/22 12/14/23 Yes Kayla Godfrey MD   losartan (COZAAR) 25 MG tablet Take 1 tablet (25 mg total) by mouth once daily. 12/15/22 12/15/23 Yes Kayla Godfrey MD   metoprolol succinate (TOPROL-XL) 25 MG 24 hr tablet Take 1 tablet (25 mg total) by mouth once daily. 12/15/22 12/15/23 Yes Kayla Godfrey MD   OXcarbazepine (TRILEPTAL) 600 MG Tab Take 2 tablets (1,200 mg total) by mouth every evening. 12/14/22 12/14/23 Yes Kayla Godfrey MD   QUEtiapine (SEROQUEL) 200 MG Tab Take 1 tablet (200 mg total) by mouth every evening. 12/14/22 12/14/23 Yes Kayla Godfrey MD   lisinopriL (PRINIVIL,ZESTRIL) 5 MG tablet Take 1 tablet (5 mg total) by mouth once daily. 12/14/22 12/14/23  Kayla Godfrey MD   polyethylene glycol (GLYCOLAX) 17 gram PwPk Take 17 g by mouth once daily. for 60 doses 12/14/22 2/12/23  Kayla Godfrey MD   senna (SENOKOT) 8.6 mg tablet Take 1 tablet by mouth once daily. 12/14/22 2/12/23  Kayla Godfrey MD   amLODIPine (NORVASC) 5 MG tablet Take 1 tablet (5 mg total) by mouth once daily. 9/8/21 7/26/22  Petty Ibanez MD   risperiDONE (RISPERDAL) 1 MG tablet Take 1 mg by mouth 2 (two) times daily. 5/19/22 8/11/22  Historical Provider       Scheduled Meds:    benztropine  0.5  mg Oral BID    enoxaparin  40 mg Subcutaneous Daily    famotidine  20 mg Oral Daily    furosemide (LASIX) injection  40 mg Intravenous Once    furosemide  40 mg Oral BID    hydrocortisone   Topical (Top) BID    losartan  25 mg Oral Daily    magnesium sulfate IVPB  2 g Intravenous Once    metoprolol succinate  25 mg Oral Daily    multivitamin  1 tablet Oral Daily    nicotine  1 patch Transdermal Daily    OXcarbazepine  600 mg Oral BID    QUEtiapine  200 mg Oral QHS      PRN Meds: acetaminophen, albuterol, aluminum-magnesium hydroxide-simethicone, dextrose 10%, dextrose 10%, glucagon (human recombinant), hydrOXYzine pamoate, influenza, melatonin, naloxone, ondansetron, pneumoc 20-dion conj-dip cr(PF), sodium chloride 0.9%   Psychotherapeutics (From admission, onward)      Start     Stop Route Frequency Ordered    12/27/22 2100  QUEtiapine tablet 200 mg         -- Oral Nightly 12/27/22 0352            LABORATORY DATA   Recent Results (from the past 72 hour(s))   ISTAT PROCEDURE    Collection Time: 12/26/22  3:50 PM   Result Value Ref Range    POC PH 7.391 7.35 - 7.45    POC PCO2 40.7 35 - 45 mmHg    POC PO2 81 80 - 100 mmHg    POC HCO3 24.7 24 - 28 mmol/L    POC BE 0 -2 to 2 mmol/L    POC SATURATED O2 96 95 - 100 %    POC Sodium 141 136 - 145 mmol/L    POC Potassium 3.4 (L) 3.5 - 5.1 mmol/L    POC TCO2 26 23 - 27 mmol/L    POC Hematocrit 38 36 - 54 %PCV    POC HEMOGLOBIN 13 g/dL    Sample ARTERIAL     Site RR     Allens Test Pass     DelSys Room Air    CBC auto differential    Collection Time: 12/26/22  4:06 PM   Result Value Ref Range    WBC 6.57 3.90 - 12.70 K/uL    RBC 4.97 4.00 - 5.40 M/uL    Hemoglobin 10.3 (L) 12.0 - 16.0 g/dL    Hematocrit 36.2 (L) 37.0 - 48.5 %    MCV 73 (L) 82 - 98 fL    MCH 20.7 (L) 27.0 - 31.0 pg    MCHC 28.5 (L) 32.0 - 36.0 g/dL    RDW 21.0 (H) 11.5 - 14.5 %    Platelets 290 150 - 450 K/uL    MPV 10.2 9.2 - 12.9 fL    Immature Granulocytes 0.3 0.0 - 0.5 %    Gran # (ANC) 4.8 1.8 - 7.7 K/uL     Immature Grans (Abs) 0.02 0.00 - 0.04 K/uL    Lymph # 1.2 1.0 - 4.8 K/uL    Mono # 0.5 0.3 - 1.0 K/uL    Eos # 0.1 0.0 - 0.5 K/uL    Baso # 0.03 0.00 - 0.20 K/uL    nRBC 0 0 /100 WBC    Gran % 72.3 38.0 - 73.0 %    Lymph % 18.1 18.0 - 48.0 %    Mono % 7.9 4.0 - 15.0 %    Eosinophil % 0.9 0.0 - 8.0 %    Basophil % 0.5 0.0 - 1.9 %    Differential Method Automated    Comprehensive metabolic panel    Collection Time: 12/26/22  4:06 PM   Result Value Ref Range    Sodium 141 136 - 145 mmol/L    Potassium 3.5 3.5 - 5.1 mmol/L    Chloride 105 95 - 110 mmol/L    CO2 28 23 - 29 mmol/L    Glucose 105 70 - 110 mg/dL    BUN 27 (H) 7 - 17 mg/dL    Creatinine 0.84 0.50 - 1.40 mg/dL    Calcium 9.0 8.7 - 10.5 mg/dL    Total Protein 7.7 6.0 - 8.4 g/dL    Albumin 4.0 3.5 - 5.2 g/dL    Total Bilirubin 1.5 (H) 0.1 - 1.0 mg/dL    Alkaline Phosphatase 130 (H) 38 - 126 U/L     (H) 15 - 46 U/L     (H) 10 - 44 U/L    Anion Gap 8 8 - 16 mmol/L    eGFR >60.0 >60 mL/min/1.73 m^2   Troponin I    Collection Time: 12/26/22  4:06 PM   Result Value Ref Range    Troponin I 0.018 0.012 - 0.034 ng/mL   NT-Pro Natriuretic Peptide    Collection Time: 12/26/22  4:06 PM   Result Value Ref Range    NT-proBNP 66501 (H) 5 - 900 pg/mL   Protime-INR    Collection Time: 12/26/22  4:06 PM   Result Value Ref Range    Prothrombin Time 14.0 (H) 9.0 - 12.5 sec    INR 1.3 (H) 0.8 - 1.2   Drug screen panel, emergency    Collection Time: 12/26/22  5:47 PM   Result Value Ref Range    Benzodiazepines Negative Negative    Methadone metabolites Negative Negative    Cocaine (Metab.) Negative Negative    Opiate Scrn, Ur Negative Negative    Barbiturate Screen, Ur Negative Negative    Amphetamine Screen, Ur Presumptive Positive (A) Negative    THC Negative Negative    Phencyclidine Negative Negative    Creatinine, Urine 19.1 15.0 - 325.0 mg/dL    Toxicology Information SEE COMMENT    Ethanol    Collection Time: 12/26/22  6:13 PM   Result Value Ref Range     Alcohol, Serum <10 <10 mg/dL   Ammonia    Collection Time: 12/26/22  6:13 PM   Result Value Ref Range    Ammonia 22 9 - 30 umol/L   COVID-19 Rapid Screening    Collection Time: 12/26/22  7:13 PM   Result Value Ref Range    SARS-CoV-2 RNA, Amplification, Qual Negative Negative   TSH    Collection Time: 12/27/22  4:26 AM   Result Value Ref Range    TSH 7.435 (H) 0.400 - 4.000 uIU/mL   Comprehensive Metabolic Panel (CMP)    Collection Time: 12/27/22  4:26 AM   Result Value Ref Range    Sodium 138 136 - 145 mmol/L    Potassium 3.1 (L) 3.5 - 5.1 mmol/L    Chloride 100 95 - 110 mmol/L    CO2 27 23 - 29 mmol/L    Glucose 98 70 - 110 mg/dL    BUN 23 (H) 6 - 20 mg/dL    Creatinine 1.2 0.5 - 1.4 mg/dL    Calcium 8.7 8.7 - 10.5 mg/dL    Total Protein 6.9 6.0 - 8.4 g/dL    Albumin 2.9 (L) 3.5 - 5.2 g/dL    Total Bilirubin 1.2 (H) 0.1 - 1.0 mg/dL    Alkaline Phosphatase 97 55 - 135 U/L     (H) 10 - 40 U/L     (H) 10 - 44 U/L    Anion Gap 11 8 - 16 mmol/L    eGFR 55 (A) >60 mL/min/1.73 m^2   Magnesium    Collection Time: 12/27/22  4:26 AM   Result Value Ref Range    Magnesium 1.7 1.6 - 2.6 mg/dL   Phosphorus    Collection Time: 12/27/22  4:26 AM   Result Value Ref Range    Phosphorus 2.6 (L) 2.7 - 4.5 mg/dL   CBC with Automated Differential    Collection Time: 12/27/22  4:26 AM   Result Value Ref Range    WBC 6.06 3.90 - 12.70 K/uL    RBC 4.74 4.00 - 5.40 M/uL    Hemoglobin 9.8 (L) 12.0 - 16.0 g/dL    Hematocrit 34.4 (L) 37.0 - 48.5 %    MCV 73 (L) 82 - 98 fL    MCH 20.7 (L) 27.0 - 31.0 pg    MCHC 28.5 (L) 32.0 - 36.0 g/dL    RDW 20.9 (H) 11.5 - 14.5 %    Platelets 305 150 - 450 K/uL    MPV 10.3 9.2 - 12.9 fL    Immature Granulocytes 0.2 0.0 - 0.5 %    Gran # (ANC) 3.6 1.8 - 7.7 K/uL    Immature Grans (Abs) 0.01 0.00 - 0.04 K/uL    Lymph # 1.6 1.0 - 4.8 K/uL    Mono # 0.7 0.3 - 1.0 K/uL    Eos # 0.1 0.0 - 0.5 K/uL    Baso # 0.04 0.00 - 0.20 K/uL    nRBC 0 0 /100 WBC    Gran % 59.3 38.0 - 73.0 %    Lymph % 26.6 18.0  - 48.0 %    Mono % 11.1 4.0 - 15.0 %    Eosinophil % 2.1 0.0 - 8.0 %    Basophil % 0.7 0.0 - 1.9 %    Differential Method Automated    Basic metabolic panel    Collection Time: 12/27/22  4:26 AM   Result Value Ref Range    Sodium 138 136 - 145 mmol/L    Potassium 3.2 (L) 3.5 - 5.1 mmol/L    Chloride 100 95 - 110 mmol/L    CO2 27 23 - 29 mmol/L    Glucose 100 70 - 110 mg/dL    BUN 23 (H) 6 - 20 mg/dL    Creatinine 1.2 0.5 - 1.4 mg/dL    Calcium 8.7 8.7 - 10.5 mg/dL    Anion Gap 11 8 - 16 mmol/L    eGFR 55 (A) >60 mL/min/1.73 m^2   T4, Free    Collection Time: 12/27/22  4:26 AM   Result Value Ref Range    Free T4 0.82 0.71 - 1.51 ng/dL   Comprehensive Metabolic Panel (CMP)    Collection Time: 12/28/22  3:39 AM   Result Value Ref Range    Sodium 136 136 - 145 mmol/L    Potassium 4.1 3.5 - 5.1 mmol/L    Chloride 101 95 - 110 mmol/L    CO2 24 23 - 29 mmol/L    Glucose 95 70 - 110 mg/dL    BUN 28 (H) 6 - 20 mg/dL    Creatinine 1.2 0.5 - 1.4 mg/dL    Calcium 8.7 8.7 - 10.5 mg/dL    Total Protein 6.1 6.0 - 8.4 g/dL    Albumin 2.6 (L) 3.5 - 5.2 g/dL    Total Bilirubin 1.3 (H) 0.1 - 1.0 mg/dL    Alkaline Phosphatase 89 55 - 135 U/L    AST 77 (H) 10 - 40 U/L    ALT 96 (H) 10 - 44 U/L    Anion Gap 11 8 - 16 mmol/L    eGFR 55 (A) >60 mL/min/1.73 m^2   Magnesium    Collection Time: 12/28/22  3:39 AM   Result Value Ref Range    Magnesium 1.7 1.6 - 2.6 mg/dL   Phosphorus    Collection Time: 12/28/22  3:39 AM   Result Value Ref Range    Phosphorus 3.0 2.7 - 4.5 mg/dL   CBC with Automated Differential    Collection Time: 12/28/22  3:39 AM   Result Value Ref Range    WBC 4.88 3.90 - 12.70 K/uL    RBC 4.66 4.00 - 5.40 M/uL    Hemoglobin 9.5 (L) 12.0 - 16.0 g/dL    Hematocrit 33.3 (L) 37.0 - 48.5 %    MCV 72 (L) 82 - 98 fL    MCH 20.4 (L) 27.0 - 31.0 pg    MCHC 28.5 (L) 32.0 - 36.0 g/dL    RDW 20.9 (H) 11.5 - 14.5 %    Platelets 234 150 - 450 K/uL    MPV 10.7 9.2 - 12.9 fL    Immature Granulocytes 0.2 0.0 - 0.5 %    Gran # (ANC) 2.6 1.8  "- 7.7 K/uL    Immature Grans (Abs) 0.01 0.00 - 0.04 K/uL    Lymph # 1.6 1.0 - 4.8 K/uL    Mono # 0.6 0.3 - 1.0 K/uL    Eos # 0.1 0.0 - 0.5 K/uL    Baso # 0.04 0.00 - 0.20 K/uL    nRBC 0 0 /100 WBC    Gran % 53.3 38.0 - 73.0 %    Lymph % 33.0 18.0 - 48.0 %    Mono % 11.7 4.0 - 15.0 %    Eosinophil % 1.0 0.0 - 8.0 %    Basophil % 0.8 0.0 - 1.9 %    Differential Method Automated       Lab Results   Component Value Date    VALPROATE <10.0 (L) 07/25/2019    CBMZ 2.1 (L) 04/15/2019         EXAMINATION    VITALS   Vitals:    12/28/22 0720 12/28/22 0726 12/28/22 0758 12/28/22 0759   BP:  117/88 (!) 119/91    BP Location:  Left arm Left arm    Patient Position:  Lying Lying    Pulse:  84 84 85   Resp:  18 17    Temp:  97 °F (36.1 °C) 96.6 °F (35.9 °C)    TempSrc:  Oral Oral    SpO2: 99% 97% 99%    Weight:       Height:          CONSTITUTIONAL  General Appearance: NAD, unremarkable, age appropriate, normal weight, lying in bed, calm     MUSCULOSKELETAL  Muscle Strength and Tone: WNL    Abnormal Involuntary Movements: none observed   Gait and Station: Attempted but unable to assess due to medical acuity      PSYCHIATRIC   Behavior/Cooperation:  friendly and cooperative, eye contact normal, calm  Speech:  normal tone, normal rate, normal pitch, normal volume  Language: grossly intact, able to name, able to repeat with spontaneous speech  Mood:  "doing alright"  Affect:  congruent with mood and full / reactive despite somnolence   Associations: intact; no FÉLIX  Thought Process: Linear and Future-Oriented   Thought Content: denies SI, HI, AH, VH, TH, delusions, or paranoia (no RIS observed)  Sensorium:  Awake / Somnolent   Alert and Oriented: to person, place, city, state, month, year, and situation   Memory: 3/3 immediate, 1/3 at 5 minutes               Recent: Intact; able to report recent events              Remote: Limited / Intact; Named 3/4 past presidents   Attention/concentration: Intact. Appropriate for age/education. " Able to spell [id number] & [id number].   Similarities: Intact (difference between apple and orange?)  Abstract reasoning: Limited   Fund of Knowledge: Named 3/4 past presidents   Insight: Limited (chronic) / Intact  Judgment: Limited (chronic) / Intact     CAM ICU Delirium Assessment - NEGATIVE     Is the patient aware of the biomedical complications associated with substance abuse and mental illness? yes        MEDICAL DECISION MAKING     ASSESSMENT         Bipolar I Disorder, Most Recent Episode Depressed, In Partial Remission   Methamphetamine Use Disorder, Severe, Dependence   Unspecified Anxiety Disorder   Unspecified Insomnia   (Rule out SIMD)        RECOMMENDATIONS       - Continue Cogentin 0.5 mg PO BID for possible EPS (discussed risks/benefits/alt vs no treatment with patient).     - Continue Trileptal 600 mg PO BID for Mood & Seizure Hx (discussed risks/benefits/alt vs no treatment with patient).     - Reduce Seroquel to 150 mg PO QHS for mood / sleep / anxiety (due to excessive AM somnolence and QTc prolongation on most recent EKG) (discussed risks/benefits/alt vs no treatment with patient).     - Can use Vistaril 25 mg PO TID PRN for anxiety and/or insomnia (discussed risks/benefits/alt vs no treatment with patient).     - Psychotherapy was again performed with patient as noted above with a focus on improving polysubstance cessation / total sobriety, insight, mood, and anxiety.     - Patient's most recent labs, imaging, and EKG were reviewed today.  UDS positive for amphetamines.  Ethanol < 10.  Monitor EKG for improvement in QTc with goal of < 500.       - Please have CM/SW assist patient with asap outpatient mental health & addiction f/u (patient would benefit from [facility] in [place], [place]) for s/p discharge from this facility (patient again denies interest in residential or IOP rehab options) (patient was supposed to have an outpatient mental health appointment with Dr. Lucho Romo at [facility] yesterday (12/27/2022) at 10 AM).     - Patient was again instructed to call 911 and/or [phone], and return to the nearest ED if she begins feeling suicidal, homicidal, or gravely disabled (for s/p this hospitalization).       - Thank you for this consult ; will continue to follow patient while she is at [facility].          Total time spent with patient and/or managing/coordinating patient's care today (excluding the time spent on psychotherapy): 51 minutes   Time spent on psychotherapy today (as noted above): 21 minutes   Total time for encounter today including psychotherapy: 72 minutes      More than 50% of the time was spent counseling/coordinating care.     Consulting clinician was informed of the encounter and consult note.      STAFF:  Edis Hughes MD  [facility]  12/28/2022

## 2022-12-28 NOTE — PLAN OF CARE
NGUYỄN contacted [facility] and spoke with staff. SW was made aware they only take medicaid pts. Staff expressed they will not be able to accept pt due to her insurance. SW expressed understanding. SW notified MD of stated above. SW will place behavioral health referrals on her avs.     SW will continue to follow pt throughout her transitions of care and assist with any dc needs.     Future Appointments   Date Time Provider Department Center   1/4/2023 10:00 AM Mauro Clayton PA-C [facility]        12/28/22 1453   Post-Acute Status   Post-Acute Authorization Other

## 2022-12-28 NOTE — PROGRESS NOTES
"[facility] Medicine  Progress Note    Patient Name: Stephanie T Barthelemy  MRN: [id number]  Patient Class: OP- Observation   Admission Date: 12/26/2022  Length of Stay: 0 days  Attending Physician: Kade Lindsay III, MD  Primary Care Provider: Primary Doctor No        Subjective:     Principal Problem:Chronic combined systolic and diastolic heart failure        HPI:  Patient is a 52 F with PMH adhd, amphetamine use disorder, bipolar I disorder, hypertension, opioid overdose, seizure disorder, chronic combined HF (LVEF of 20% on Echo done in 11/20), with multiple admits for psychosis and/or CHF exacerbations in the past year, who was brought [facility] for fluid overload and medication adherence. Patient history limited due to patient's tangential thought process during interview. Patient states that she has not done a good job of taking her lasix lately and states that "the holidays have made it hard to take all of her medications". She also endorsed increased salt intake lately stating "she could not refuse all of the holiday foods around her". Patient states that she has noticed some increased fluid around her body and understands that this happens when she does not take her lasix. Patient also states that she was recently discharged from [facility] and stated that they had "increased her oxcarbazepine to 1200mg and made her stop taking her fluoxetine". She stated she felt "'better" while on her fluoxetine and that the increase of her oxcarbazepine has made her feel sometimes confused, hallucinating, "and sometimes just out of it". (Of note, patient has repeated documentation of overall medication non-compliance and misuse).    At the [facility] ED, patient was found to be positive for amphetamines in which patient did state she had recently used meth. Labs notable for [place] 72[address], negative troponin, elevated AST/ALT, and noted bilat LE on PE. Xray notable for no acute process and ECG with sinus tachycardia. " Patient was given 80mg lasix with adequate UOP. [facility] was then consulted for admission for further observation, medication management, and potential need for psychiatric consultation.       Overview/Hospital Course:  No notes on file     Interval History: NAEON. Resting comfortably in bed in NAD. No complaints at this time. Net I/O: -2400. Psychiatry consulted. Will fu recs    Review of Systems   Constitutional:  Negative for activity change, chills, diaphoresis, fatigue and fever.   HENT:  Negative for sinus pressure, sinus pain and sore throat.    Eyes:  Negative for photophobia and visual disturbance.   Respiratory:  Negative for cough, choking, chest tightness, shortness of breath and wheezing.    Cardiovascular:  Positive for leg swelling. Negative for chest pain and palpitations.   Gastrointestinal:  Negative for abdominal pain, diarrhea, nausea and vomiting.   Genitourinary:  Negative for difficulty urinating, dysuria and flank pain.   Musculoskeletal:  Negative for myalgias, neck pain and neck stiffness.   Skin:  Negative for color change and wound.   Neurological:  Negative for dizziness, syncope, light-headedness and headaches.   Psychiatric/Behavioral:  Positive for behavioral problems, hallucinations and sleep disturbance.    Objective:     Vital Signs (Most Recent):  Temp: 97.4 °F (36.3 °C) (12/27/22 0805)  Pulse: 100 (12/27/22 0805)  Resp: 16 (12/27/22 0805)  BP: (!) 121/100 (12/27/22 0805)  SpO2: 100 % (12/27/22 0805)   Vital Signs (24h Range):  Temp:  [97.4 °F (36.3 °C)-98.2 °F (36.8 °C)] 97.4 °F (36.3 °C)  Pulse:  [100-111] 100  Resp:  [14-27] 16  SpO2:  [83 %-100 %] 100 %  BP: (121-150)/() 121/100     Weight: 56.5 kg (124 lb 9 oz)  Body mass index is 24.33 kg/m².    Intake/Output Summary (Last 24 hours) at 12/27/2022 0830  Last data filed at 12/27/2022 0610  Gross per 24 hour   Intake 625 ml   Output 3050 ml   Net -2425 ml      Physical Exam  Constitutional:       General: She is not in  acute distress.     Appearance: She is not ill-appearing or diaphoretic.   HENT:      Head: Normocephalic and atraumatic.      Right Ear: External ear normal.      Left Ear: External ear normal.      Nose: Nose normal.      Mouth/Throat:      Mouth: Mucous membranes are moist.   Eyes:      Pupils: Pupils are equal, round, and reactive to light.   Cardiovascular:      Rate and Rhythm: Regular rhythm. Tachycardia present.      Pulses: Normal pulses.      Heart sounds: Normal heart sounds. No murmur heard.  Pulmonary:      Effort: No respiratory distress.      Breath sounds: Normal breath sounds. No wheezing.   Abdominal:      General: Bowel sounds are normal.      Palpations: Abdomen is soft.   Musculoskeletal:         General: Normal range of motion.      Cervical back: Normal range of motion and neck supple.      Right lower leg: Edema present.      Left lower leg: Edema present.   Skin:     General: Skin is warm.      Capillary Refill: Capillary refill takes less than 2 seconds.   Neurological:      Mental Status: She is oriented to person, place, and time.       Significant Labs: All pertinent labs within the past 24 hours have been reviewed.    Significant Imaging: I have reviewed all pertinent imaging results/findings within the past 24 hours.      Assessment/Plan:      * Chronic combined systolic and diastolic heart failure  Last ECHO (2022) EF 20%  Home diuretic dose 40mg lasix BID  NT-proBNP on admit: 74775  EKG with sinus tachycardia   Troponin negative  Likely 2/2 medication non-compliance/increased salt intake  Adequate urine output after 80mg lasix given in ED    PLAN:  - Diuresis w/ Lasix  - Daily Weights  - Strict I/O  - Fluid restriction to 1,500cc daily  - Low-sodium cardiac diet  - Reassess volume status regularly  - Will continue GDMT            Bipolar 1 disorder, depressed  Patient recently discharged from [facility] psych service  Patient's oxcarbazepine increased to 1200mg QHS while continued on  seroquel 200mg  Patient's fluoxetine d/c at that time  Patient noting new symptoms from medication changes however unclear on baseline medication adherence/compliance to regimen  Psychiatry consulted, appreciate recs        Essential hypertension  Continue home losartan        VTE Risk Mitigation (From admission, onward)         Ordered     enoxaparin injection 40 mg  Daily         12/27/22 0352     IP VTE HIGH RISK PATIENT  Once         12/27/22 0352     Place sequential compression device  Until discontinued         12/27/22 0352                Discharge Planning   AYSHA:      Code Status: Full Code   Is the patient medically ready for discharge?:     Reason for patient still in hospital (select all that apply): Consult recommendations  Discharge Plan A: Home with family, Home Health                  Phillip Borja DO  Department of Hospital Medicine   [facility]

## 2022-12-28 NOTE — PLAN OF CARE
Problem: Adult Inpatient Plan of Care  Goal: Plan of Care Review  Outcome: Ongoing, Progressing  Goal: Patient-Specific Goal (Individualized)  Outcome: Ongoing, Progressing  Goal: Absence of Hospital-Acquired Illness or Injury  Outcome: Ongoing, Progressing  Goal: Optimal Comfort and Wellbeing  Outcome: Ongoing, Progressing  Goal: Readiness for Transition of Care  Outcome: Ongoing, Progressing     Problem: Fall Injury Risk  Goal: Absence of Fall and Fall-Related Injury  Outcome: Ongoing, Progressing     Problem: Heart Failure Comorbidity  Goal: Maintenance of Heart Failure Symptom Control  Outcome: Ongoing, Progressing     Problem: Hypertension Comorbidity  Goal: Blood Pressure in Desired Range  Outcome: Ongoing, Progressing     Problem: Seizure Disorder Comorbidity  Goal: Maintenance of Seizure Control  Outcome: Ongoing, Progressing     Problem: Skin Injury Risk Increased  Goal: Skin Health and Integrity  Outcome: Ongoing, Progressing     Problem: Adjustment to Illness (Heart Failure)  Goal: Optimal Coping  Outcome: Ongoing, Progressing     Problem: Cardiac Output Decreased (Heart Failure)  Goal: Optimal Cardiac Output  Outcome: Ongoing, Progressing     Problem: Fluid Imbalance (Heart Failure)  Goal: Fluid Balance  Outcome: Ongoing, Progressing     Problem: Functional Ability Impaired (Heart Failure)  Goal: Optimal Functional Ability  Outcome: Ongoing, Progressing     Problem: Oral Intake Inadequate (Heart Failure)  Goal: Optimal Nutrition Intake  Outcome: Ongoing, Progressing     Problem: Respiratory Compromise (Heart Failure)  Goal: Effective Oxygenation and Ventilation  Outcome: Ongoing, Progressing     Problem: Sleep Disordered Breathing (Heart Failure)  Goal: Effective Breathing Pattern During Sleep  Outcome: Ongoing, Progressing     Problem: Behavior Regulation Impairment (Psychotic Signs/Symptoms)  Goal: Improved Behavioral Control (Psychotic Signs/Symptoms)  Outcome: Ongoing, Progressing     Problem: Mood  Impairment (Psychotic Signs/Symptoms)  Goal: Improved Mood Symptoms (Psychotic Signs/Symptoms)  Outcome: Ongoing, Progressing     Problem: Seizure, Active Management  Goal: Absence of Seizure/Seizure-Related Injury  Outcome: Ongoing, Progressing

## 2022-12-29 VITALS
HEART RATE: 95 BPM | RESPIRATION RATE: 18 BRPM | WEIGHT: 118.19 LBS | DIASTOLIC BLOOD PRESSURE: 77 MMHG | BODY MASS INDEX: 23.2 KG/M2 | TEMPERATURE: 98 F | HEIGHT: 60 IN | OXYGEN SATURATION: 95 % | SYSTOLIC BLOOD PRESSURE: 118 MMHG

## 2022-12-29 LAB
ALBUMIN SERPL BCP-MCNC: 2.7 G/DL (ref 3.5–5.2)
ALP SERPL-CCNC: 91 U/L (ref 55–135)
ALT SERPL W/O P-5'-P-CCNC: 87 U/L (ref 10–44)
ANION GAP SERPL CALC-SCNC: 11 MMOL/L (ref 8–16)
AST SERPL-CCNC: 71 U/L (ref 10–40)
BASOPHILS # BLD AUTO: 0.04 K/UL (ref 0–0.2)
BASOPHILS NFR BLD: 0.8 % (ref 0–1.9)
BILIRUB SERPL-MCNC: 0.9 MG/DL (ref 0.1–1)
BUN SERPL-MCNC: 31 MG/DL (ref 6–20)
CALCIUM SERPL-MCNC: 8.4 MG/DL (ref 8.7–10.5)
CHLORIDE SERPL-SCNC: 100 MMOL/L (ref 95–110)
CO2 SERPL-SCNC: 28 MMOL/L (ref 23–29)
CREAT SERPL-MCNC: 1.3 MG/DL (ref 0.5–1.4)
DIFFERENTIAL METHOD: ABNORMAL
EOSINOPHIL # BLD AUTO: 0.1 K/UL (ref 0–0.5)
EOSINOPHIL NFR BLD: 2 % (ref 0–8)
ERYTHROCYTE [DISTWIDTH] IN BLOOD BY AUTOMATED COUNT: 21.2 % (ref 11.5–14.5)
EST. GFR  (NO RACE VARIABLE): 50 ML/MIN/1.73 M^2
GLUCOSE SERPL-MCNC: 93 MG/DL (ref 70–110)
HCT VFR BLD AUTO: 34.8 % (ref 37–48.5)
HGB BLD-MCNC: 10 G/DL (ref 12–16)
IMM GRANULOCYTES # BLD AUTO: 0.01 K/UL (ref 0–0.04)
IMM GRANULOCYTES NFR BLD AUTO: 0.2 % (ref 0–0.5)
LYMPHOCYTES # BLD AUTO: 1.5 K/UL (ref 1–4.8)
LYMPHOCYTES NFR BLD: 29.9 % (ref 18–48)
MAGNESIUM SERPL-MCNC: 2 MG/DL (ref 1.6–2.6)
MCH RBC QN AUTO: 20.4 PG (ref 27–31)
MCHC RBC AUTO-ENTMCNC: 28.7 G/DL (ref 32–36)
MCV RBC AUTO: 71 FL (ref 82–98)
MONOCYTES # BLD AUTO: 0.5 K/UL (ref 0.3–1)
MONOCYTES NFR BLD: 10.4 % (ref 4–15)
NEUTROPHILS # BLD AUTO: 2.8 K/UL (ref 1.8–7.7)
NEUTROPHILS NFR BLD: 56.7 % (ref 38–73)
NRBC BLD-RTO: 0 /100 WBC
PHOSPHATE SERPL-MCNC: 3.2 MG/DL (ref 2.7–4.5)
PLATELET # BLD AUTO: 225 K/UL (ref 150–450)
PMV BLD AUTO: 10.3 FL (ref 9.2–12.9)
POTASSIUM SERPL-SCNC: 4 MMOL/L (ref 3.5–5.1)
PROT SERPL-MCNC: 6.7 G/DL (ref 6–8.4)
RBC # BLD AUTO: 4.9 M/UL (ref 4–5.4)
SODIUM SERPL-SCNC: 139 MMOL/L (ref 136–145)
WBC # BLD AUTO: 4.89 K/UL (ref 3.9–12.7)

## 2022-12-29 PROCEDURE — 93010 ELECTROCARDIOGRAM REPORT: CPT | Mod: ,,, | Performed by: INTERNAL MEDICINE

## 2022-12-29 PROCEDURE — 80053 COMPREHEN METABOLIC PANEL: CPT

## 2022-12-29 PROCEDURE — 93010 EKG 12-LEAD: ICD-10-PCS | Mod: ,,, | Performed by: INTERNAL MEDICINE

## 2022-12-29 PROCEDURE — 93005 ELECTROCARDIOGRAM TRACING: CPT

## 2022-12-29 PROCEDURE — 90833 PSYTX W PT W E/M 30 MIN: CPT | Mod: ,,, | Performed by: PSYCHIATRY & NEUROLOGY

## 2022-12-29 PROCEDURE — 85025 COMPLETE CBC W/AUTO DIFF WBC: CPT

## 2022-12-29 PROCEDURE — G0378 HOSPITAL OBSERVATION PER HR: HCPCS

## 2022-12-29 PROCEDURE — 99226 PR SUBSEQUENT OBSERVATION CARE,LEVEL III: ICD-10-PCS | Mod: ,,, | Performed by: PSYCHIATRY & NEUROLOGY

## 2022-12-29 PROCEDURE — 94761 N-INVAS EAR/PLS OXIMETRY MLT: CPT

## 2022-12-29 PROCEDURE — 84100 ASSAY OF PHOSPHORUS: CPT

## 2022-12-29 PROCEDURE — 25000003 PHARM REV CODE 250: Performed by: STUDENT IN AN ORGANIZED HEALTH CARE EDUCATION/TRAINING PROGRAM

## 2022-12-29 PROCEDURE — 96376 TX/PRO/DX INJ SAME DRUG ADON: CPT

## 2022-12-29 PROCEDURE — 90833 PR PSYCHOTHERAPY W/PATIENT W/E&M, 30 MIN (ADD ON): ICD-10-PCS | Mod: ,,, | Performed by: PSYCHIATRY & NEUROLOGY

## 2022-12-29 PROCEDURE — S4991 NICOTINE PATCH NONLEGEND: HCPCS | Performed by: STUDENT IN AN ORGANIZED HEALTH CARE EDUCATION/TRAINING PROGRAM

## 2022-12-29 PROCEDURE — 99226 PR SUBSEQUENT OBSERVATION CARE,LEVEL III: CPT | Mod: ,,, | Performed by: PSYCHIATRY & NEUROLOGY

## 2022-12-29 PROCEDURE — 36415 COLL VENOUS BLD VENIPUNCTURE: CPT

## 2022-12-29 PROCEDURE — 25000003 PHARM REV CODE 250

## 2022-12-29 PROCEDURE — 63600175 PHARM REV CODE 636 W HCPCS

## 2022-12-29 PROCEDURE — 83735 ASSAY OF MAGNESIUM: CPT

## 2022-12-29 PROCEDURE — 99900035 HC TECH TIME PER 15 MIN (STAT)

## 2022-12-29 RX ORDER — BENZTROPINE MESYLATE 0.5 MG/1
0.5 TABLET ORAL 2 TIMES DAILY
Qty: 120 TABLET | Refills: 5 | Status: SHIPPED | OUTPATIENT
Start: 2022-12-29 | End: 2023-12-29

## 2022-12-29 RX ORDER — HYDROCORTISONE 1 %
CREAM (GRAM) TOPICAL 2 TIMES DAILY
Qty: 120 G | Refills: 0 | Status: SHIPPED | OUTPATIENT
Start: 2022-12-29 | End: 2023-01-24 | Stop reason: SDUPTHER

## 2022-12-29 RX ORDER — METOPROLOL SUCCINATE 25 MG/1
25 TABLET, EXTENDED RELEASE ORAL DAILY
Qty: 90 TABLET | Refills: 3 | Status: SHIPPED | OUTPATIENT
Start: 2022-12-29 | End: 2023-12-29

## 2022-12-29 RX ORDER — FUROSEMIDE 40 MG/1
40 TABLET ORAL 2 TIMES DAILY
Qty: 120 TABLET | Refills: 5 | Status: ON HOLD | OUTPATIENT
Start: 2022-12-29 | End: 2023-02-10 | Stop reason: SDUPTHER

## 2022-12-29 RX ORDER — ALBUTEROL SULFATE 90 UG/1
2 AEROSOL, METERED RESPIRATORY (INHALATION) EVERY 6 HOURS
Qty: 18 G | Refills: 0 | Status: ON HOLD | OUTPATIENT
Start: 2022-12-29 | End: 2023-02-10 | Stop reason: SDUPTHER

## 2022-12-29 RX ORDER — OXCARBAZEPINE 600 MG/1
1200 TABLET, FILM COATED ORAL 2 TIMES DAILY
Qty: 120 TABLET | Refills: 3 | Status: ON HOLD | OUTPATIENT
Start: 2022-12-29 | End: 2023-01-12 | Stop reason: SDUPTHER

## 2022-12-29 RX ORDER — HYDROCORTISONE 1 %
CREAM (GRAM) TOPICAL 2 TIMES DAILY
Status: DISCONTINUED | OUTPATIENT
Start: 2022-12-29 | End: 2022-12-29

## 2022-12-29 RX ORDER — FAMOTIDINE 20 MG/1
20 TABLET, FILM COATED ORAL DAILY
Qty: 90 TABLET | Refills: 3 | Status: SHIPPED | OUTPATIENT
Start: 2022-12-29 | End: 2023-12-29

## 2022-12-29 RX ORDER — LOSARTAN POTASSIUM 25 MG/1
25 TABLET ORAL DAILY
Qty: 90 TABLET | Refills: 3 | Status: SHIPPED | OUTPATIENT
Start: 2022-12-29 | End: 2023-12-29

## 2022-12-29 RX ADMIN — FAMOTIDINE 20 MG: 20 TABLET ORAL at 09:12

## 2022-12-29 RX ADMIN — BENZTROPINE MESYLATE 0.5 MG: 0.5 TABLET ORAL at 09:12

## 2022-12-29 RX ADMIN — LOSARTAN POTASSIUM 25 MG: 25 TABLET, FILM COATED ORAL at 09:12

## 2022-12-29 RX ADMIN — NICOTINE 1 PATCH: 21 PATCH, EXTENDED RELEASE TRANSDERMAL at 09:12

## 2022-12-29 RX ADMIN — HYDROCORTISONE: 0.01 CREAM TOPICAL at 09:12

## 2022-12-29 RX ADMIN — METOPROLOL SUCCINATE 25 MG: 25 TABLET, EXTENDED RELEASE ORAL at 09:12

## 2022-12-29 RX ADMIN — FUROSEMIDE 60 MG: 10 INJECTION, SOLUTION INTRAMUSCULAR; INTRAVENOUS at 09:12

## 2022-12-29 RX ADMIN — OXCARBAZEPINE 600 MG: 150 TABLET, FILM COATED ORAL at 09:12

## 2022-12-29 RX ADMIN — THERA TABS 1 TABLET: TAB at 09:12

## 2022-12-29 NOTE — PROGRESS NOTES
[facility]                    Pharmacy       Discharge Medication Education    Patient ACCEPTED medication education. Pharmacy has provided education on the name, indication, and possible side effects of the medication(s) prescribed, using teach-back method.     The following medications have also been discussed, during this admission.        Medication List        START taking these medications      hydrocortisone 1 % cream  Apply topically 2 (two) times daily.            CHANGE how you take these medications      OXcarbazepine 600 MG Tab  Commonly known as: TRILEPTAL  Take 2 tablets (1,200 mg total) by mouth 2 (two) times daily.  What changed: when to take this            CONTINUE taking these medications      albuterol 90 mcg/actuation inhaler  Commonly known as: PROVENTIL/VENTOLIN HFA  Inhale 2 puffs into the lungs every 6 (six) hours. Rescue     benztropine 0.5 MG tablet  Commonly known as: COGENTIN  Take 1 tablet (0.5 mg total) by mouth 2 (two) times daily.     famotidine 20 MG tablet  Commonly known as: PEPCID  Take 1 tablet (20 mg total) by mouth once daily.     furosemide 40 MG tablet  Commonly known as: LASIX  Take 1 tablet (40 mg total) by mouth 2 (two) times daily.     losartan 25 MG tablet  Commonly known as: COZAAR  Take 1 tablet (25 mg total) by mouth once daily.     metoprolol succinate 25 MG 24 hr tablet  Commonly known as: TOPROL-XL  Take 1 tablet (25 mg total) by mouth once daily.     polyethylene glycol 17 gram Pwpk  Commonly known as: GLYCOLAX  Take 17 g by mouth once daily. for 60 doses     QUEtiapine 200 MG Tab  Commonly known as: SEROQUEL  Take 1 tablet (200 mg total) by mouth every evening.     senna 8.6 mg tablet  Commonly known as: SENOKOT  Take 1 tablet by mouth once daily.            STOP taking these medications      lisinopriL 5 MG tablet  Commonly known as: PRINIVIL,ZESTRIL               Where to Get Your Medications        These medications were sent to [facility] [address], [place] [address]      Hours: Mon-Fri, 8a-5:30p Phone: [phone]   albuterol 90 mcg/actuation inhaler  benztropine 0.5 MG tablet  famotidine 20 MG tablet  furosemide 40 MG tablet  hydrocortisone 1 % cream  losartan 25 MG tablet  metoprolol succinate 25 MG 24 hr tablet  OXcarbazepine 600 MG Tab          Thank you  Zoya Alexandra, PharmD  [phone].

## 2022-12-29 NOTE — PROGRESS NOTES
"[facility] Medicine  Progress Note    Patient Name: Stephanie T Barthelemy  MRN: [id number]  Patient Class: OP- Observation   Admission Date: 12/26/2022  Length of Stay: 0 days  Attending Physician: Kade Lindsay III, MD  Primary Care Provider: Primary Doctor No        Subjective:     Principal Problem:Chronic combined systolic and diastolic heart failure        HPI:  Patient is a 52 F with PMH adhd, amphetamine use disorder, bipolar I disorder, hypertension, opioid overdose, seizure disorder, chronic combined HF (LVEF of 20% on Echo done in 11/20), with multiple admits for psychosis and/or CHF exacerbations in the past year, who was brought [facility] for fluid overload and medication adherence. Patient history limited due to patient's tangential thought process during interview. Patient states that she has not done a good job of taking her lasix lately and states that "the holidays have made it hard to take all of her medications". She also endorsed increased salt intake lately stating "she could not refuse all of the holiday foods around her". Patient states that she has noticed some increased fluid around her body and understands that this happens when she does not take her lasix. Patient also states that she was recently discharged from [facility] and stated that they had "increased her oxcarbazepine to 1200mg and made her stop taking her fluoxetine". She stated she felt "'better" while on her fluoxetine and that the increase of her oxcarbazepine has made her feel sometimes confused, hallucinating, "and sometimes just out of it". (Of note, patient has repeated documentation of overall medication non-compliance and misuse).    At the [facility] ED, patient was found to be positive for amphetamines in which patient did state she had recently used meth. Labs notable for [place]-proBNP of [address], negative troponin, elevated AST/ALT, and noted bilat LE on PE. Xray notable for no acute process and ECG with sinus tachycardia. " Patient was given 80mg lasix with adequate UOP. [facility] was then consulted for admission for further observation, medication management, and potential need for psychiatric consultation.       Overview/Hospital Course:  No notes on file    Interval History: NAEON. Edema improved. I/O: -5,814. No new complaints    Review of Systems   Constitutional:  Negative for chills and fever.   HENT:  Negative for sinus pressure, sinus pain and sore throat.    Respiratory:  Negative for cough, choking, shortness of breath and wheezing.    Cardiovascular:  Negative for chest pain and leg swelling.   Gastrointestinal:  Negative for abdominal pain, diarrhea, nausea and vomiting.   Genitourinary:  Negative for difficulty urinating, dysuria and flank pain.   Musculoskeletal:  Negative for myalgias, neck pain and neck stiffness.   Skin:  Negative for color change and wound.   Neurological:  Negative for dizziness, syncope, light-headedness and headaches.   Psychiatric/Behavioral:  Negative for agitation. The patient is not hyperactive.    Objective:     Vital Signs (Most Recent):  Temp: 98.3 °F (36.8 °C) (12/29/22 0521)  Pulse: 92 (12/29/22 0810)  Resp: 16 (12/29/22 0521)  BP: 121/82 (12/29/22 0521)  SpO2: 95 % (12/29/22 0821) Vital Signs (24h Range):  Temp:  [96.7 °F (35.9 °C)-98.5 °F (36.9 °C)] 98.3 °F (36.8 °C)  Pulse:  [88-99] 92  Resp:  [15-18] 16  SpO2:  [94 %-100 %] 95 %  BP: (112-123)/(81-90) 121/82     Weight: 53.6 kg (118 lb 2.7 oz)  Body mass index is 23.08 kg/m².    Intake/Output Summary (Last 24 hours) at 12/29/2022 0837  Last data filed at 12/29/2022 0702  Gross per 24 hour   Intake 416 ml   Output 3000 ml   Net -2584 ml      Physical Exam  Vitals and nursing note reviewed.   Constitutional:       Appearance: Normal appearance.   HENT:      Head: Normocephalic and atraumatic.      Right Ear: External ear normal.      Left Ear: External ear normal.   Eyes:      Extraocular Movements: Extraocular movements intact.    Cardiovascular:      Rate and Rhythm: Normal rate and regular rhythm.      Pulses: Normal pulses.      Heart sounds: Normal heart sounds.   Pulmonary:      Effort: Pulmonary effort is normal.      Breath sounds: Normal breath sounds. No rales.   Abdominal:      Tenderness: There is no abdominal tenderness.   Musculoskeletal:      Right lower leg: No edema.      Left lower leg: No edema.   Skin:     General: Skin is warm and dry.   Neurological:      Mental Status: She is alert. Mental status is at baseline.   Psychiatric:         Mood and Affect: Mood normal.         Behavior: Behavior normal.       Significant Labs: All pertinent labs within the past 24 hours have been reviewed.    Significant Imaging: I have reviewed all pertinent imaging results/findings within the past 24 hours.      Assessment/Plan:      * Chronic combined systolic and diastolic heart failure  Last ECHO (2022) EF 20%  Home diuretic dose 40mg lasix BID  NT-proBNP on admit: 92794  EKG with sinus tachycardia   Troponin negative  Likely 2/2 medication non-compliance/increased salt intake  Adequate urine output after 80mg lasix given in ED  12/29: I/O: -5300  PLAN:  - Diuresis w/ Lasix  - Daily Weights  - Strict I/O  - Fluid restriction to 1,500cc daily  - Low-sodium cardiac diet  - Reassess volume status regularly  - Will continue GDMT    Bipolar 1 disorder, depressed  Patient recently discharged from [facility] psych service  Patient's oxcarbazepine increased to 1200mg QHS while continued on seroquel 200mg  Patient's fluoxetine d/c at that time  Patient noting new symptoms from medication changes however unclear on baseline medication adherence/compliance to regimen    Plan:  Psych recs  - Placement with outpatient mental health & addiciton fu, [facility]  -Med recs:    Continue cogentin 0.5 mg BID    Trileptal changed to 600 mg BID   Seroquel 200 mg QHS   Vistaril 25 mg PO PRN        Essential hypertension  Continue home losartan        VTE Risk  Mitigation (From admission, onward)         Ordered     enoxaparin injection 40 mg  Daily         12/27/22 0352     IP VTE HIGH RISK PATIENT  Once         12/27/22 0352     Place sequential compression device  Until discontinued         12/27/22 0352                Discharge Planning   AYSHA:      Code Status: Full Code   Is the patient medically ready for discharge?:     Reason for patient still in hospital (select all that apply): Pending disposition  Discharge Plan A: Home with family, Home Health                  Phillip Borja DO  Department of [facility] Medicine   [facility]

## 2022-12-29 NOTE — ASSESSMENT & PLAN NOTE
Last ECHO (2022) EF 20%  Home diuretic dose 40mg lasix BID  NT-proBNP on admit: 82393  EKG with sinus tachycardia   Troponin negative  Likely 2/2 medication non-compliance/increased salt intake  Adequate urine output after 80mg lasix given in ED  12/29: I/O: -5100  PLAN:  - Diuresis w/ Lasix  - Daily Weights  - Strict I/O  - Fluid restriction to 1,500cc daily  - Low-sodium cardiac diet  - Reassess volume status regularly  - Will continue GDMT

## 2022-12-29 NOTE — NURSING
Discharge teaching given via VN. Pt demonstrated understanding utilizing the teachback method. All questions answered. Floor nurse notified.  Awaiting transport.

## 2022-12-29 NOTE — NURSING
Patient discharging home. All discharge instructions reviewed with VN. IV and telemetry removed, patient tolerated well. Awaiting transport to transfer patient home.

## 2022-12-29 NOTE — PLAN OF CARE
SW contacted Betty Martines (Sister) [phone] to discuss dc planning. SW expressed that pt will dc today. Pts sister expressed understanding. Pts sister confirmed home address. Pts sister confirmed someone will be home all today awaiting arrival of pt. SW requested wheelchair van for 3:45 pm. ETA pending at this time. Request [id number]     ETA 4:30 PM    HH agency declined by pt  due to no Pysch nurse being available.     Cleared from  . Bedside Nurse and VN notified.    SCHED PSYCH at  w/Dr Lucho Romo on 1/20 at 11:00    Future Appointments   Date Time Provider Department Center   1/4/2023 10:00 AM Mauro Clayton PA-C [facility]        12/29/22 1454   Final Note   Assessment Type Final Discharge Note   Anticipated Discharge Disposition Home   Hospital Resources/Appts/Education Provided Appointments scheduled by Navigator/Coordinator   Post-Acute Status   Discharge Delays None known at this time

## 2022-12-29 NOTE — SUBJECTIVE & OBJECTIVE
Interval History: NAEON. Edema improved. I/O: -5,814. No new complaints    Review of Systems   Constitutional:  Negative for chills and fever.   HENT:  Negative for sinus pressure, sinus pain and sore throat.    Respiratory:  Negative for cough, choking, shortness of breath and wheezing.    Cardiovascular:  Negative for chest pain and leg swelling.   Gastrointestinal:  Negative for abdominal pain, diarrhea, nausea and vomiting.   Genitourinary:  Negative for difficulty urinating, dysuria and flank pain.   Musculoskeletal:  Negative for myalgias, neck pain and neck stiffness.   Skin:  Negative for color change and wound.   Neurological:  Negative for dizziness, syncope, light-headedness and headaches.   Psychiatric/Behavioral:  Negative for agitation. The patient is not hyperactive.    Objective:     Vital Signs (Most Recent):  Temp: 98.3 °F (36.8 °C) (12/29/22 0521)  Pulse: 92 (12/29/22 0810)  Resp: 16 (12/29/22 0521)  BP: 121/82 (12/29/22 0521)  SpO2: 95 % (12/29/22 0821) Vital Signs (24h Range):  Temp:  [96.7 °F (35.9 °C)-98.5 °F (36.9 °C)] 98.3 °F (36.8 °C)  Pulse:  [88-99] 92  Resp:  [15-18] 16  SpO2:  [94 %-100 %] 95 %  BP: (112-123)/(81-90) 121/82     Weight: 53.6 kg (118 lb 2.7 oz)  Body mass index is 23.08 kg/m².    Intake/Output Summary (Last 24 hours) at 12/29/2022 0837  Last data filed at 12/29/2022 0702  Gross per 24 hour   Intake 416 ml   Output 3000 ml   Net -2584 ml      Physical Exam  Vitals and nursing note reviewed.   Constitutional:       Appearance: Normal appearance.   HENT:      Head: Normocephalic and atraumatic.      Right Ear: External ear normal.      Left Ear: External ear normal.   Eyes:      Extraocular Movements: Extraocular movements intact.   Cardiovascular:      Rate and Rhythm: Normal rate and regular rhythm.      Pulses: Normal pulses.      Heart sounds: Normal heart sounds.   Pulmonary:      Effort: Pulmonary effort is normal.      Breath sounds: Normal breath sounds. No rales.    Abdominal:      Tenderness: There is no abdominal tenderness.   Musculoskeletal:      Right lower leg: No edema.      Left lower leg: No edema.   Skin:     General: Skin is warm and dry.   Neurological:      Mental Status: She is alert. Mental status is at baseline.   Psychiatric:         Mood and Affect: Mood normal.         Behavior: Behavior normal.       Significant Labs: All pertinent labs within the past 24 hours have been reviewed.    Significant Imaging: I have reviewed all pertinent imaging results/findings within the past 24 hours.

## 2022-12-29 NOTE — PROGRESS NOTES
"PSYCHIATRY INPATIENT PROGRESS NOTE  SUBSEQUENT HOSPITAL VISIT      12/29/2022 7:00 AM   Stephanie T Barthelemy   1970   [id number]           DATE OF ADMISSION: 12/26/2022  2:59 PM    SITE: [facility]    CURRENT LEGAL STATUS: Patient does not currently meet PEC criteria due to not currently being an imminent threat to self/others and not being gravely disabled 2/2 mental illness at this time.     HISTORY    Per Initial History from Primary Team:   Patient is a 52 F with PMH adhd, amphetamine use disorder, bipolar I disorder, hypertension, opioid overdose, seizure disorder, chronic combined HF (LVEF of 20% on Echo done in 11/20), with multiple admits for psychosis and/or CHF exacerbations in the past year, who was brought [facility] for fluid overload and medication adherence. Patient history limited due to patient's tangential thought process during interview. Patient states that she has not done a good job of taking her lasix lately and states that "the holidays have made it hard to take all of her medications". She also endorsed increased salt intake lately stating "she could not refuse all of the holiday foods around her". Patient states that she has noticed some increased fluid around her body and understands that this happens when she does not take her lasix. Patient also states that she was recently discharged from [facility] and stated that they had "increased her oxcarbazepine to 1200mg and made her stop taking her fluoxetine". She stated she felt "'better" while on her fluoxetine and that the increase of her oxcarbazepine has made her feel sometimes confused, hallucinating, "and sometimes just out of it". (Of note, patient has repeated documentation of overall medication non-compliance and misuse).  At the [facility] ED, patient was found to be positive for amphetamines in which patient did state she had recently used meth. Labs notable [place] [address], negative troponin, elevated AST/ALT, and noted bilat LE on " PE. Xray notable for no acute process and ECG with sinus tachycardia. Patient was given 80mg lasix with adequate UOP. [facility] was then consulted for admission for further observation, medication management, and potential need for psychiatric consultation.   Interval History from Primary Team on 12/29/2022:  NAEON. Edema improved. I/O: -5,814. No new complaints       Chief Complaint / Reason for Original Psychiatry Consult: Psychiatric Medication Management        Subjective / Interval Psychiatric History Today (12/29/2022) (with Psychiatric ROS below):  Stephanie T Barthelemy is a 52 y.o. female with a past medical history of heart failure, HCV, seizure disorder, and HTN, and a past psychiatric history of Substance Induced Mood/Psychotic Disorder, Polysubstance Abuse (most notably methamphetamines), Bipolar I Disorder, Anxiety, Depression, and ADHD, currently being treated by her inpatient primary treatment team for a principal problem of chronic combined systolic and diastolic heart failure.  Psychiatry was originally consulted as noted above.  The patient was seen and examined again this morning.  The chart was reviewed again this morning.  On examination today, the patient was much more alert than yesterday morning (with her QHS Seroquel dose reduced to 150 mg) and remains oriented to person, place, city, state, month, year, and situation.  She remains CAM-ICU negative for delirium.  She continues to appear at her neurocognitive baseline at this time.  She endorses sleeping about 8 hours last night without issues.  She endorses a good appetite.  She endorses that her symptoms of depression and anxiety are continuing to improve (affect appears brighter and more full & reactive this AM) (as noted in the detailed psych ROS below).  She again denies any current or recent s/s consistent with psychosis, frederic, PTSD, OCD, panic, or eating disorders.  She again did not objectively appear manic or psychotic on exam today.   She again denies any current AIMs or restlessness (she again did not appear with TD / EPS / Akathisia on exam).  As noted yesterday, she historically has displayed and continues to display limited insight and poor self-responsibility / accountability regarding her polysubstance abuse.  Patient has chronically poor insight into her methamphetamine use d/o (despite multiple attempts at counseling / education / psychotherapy, she remains not interested in residential rehab, IOP treatment, AA/NA meetings, etc at this time ; patient previously completed program at [facility]).  [facility] is not available due to her insurance being medicare as opposed to medicaid, per CM/SW.  She again denies AH, VH, TH, delusions, paranoia, or DIGFAST (frederic) s/s.  She again denies any current/recent passive/active SI/HI.  She was supposed to have an outpatient mental health appointment with Dr. Lucho Romo at [facility] on (12/27/2022) at 10 AM.  Regarding current medical/physical complaints, she endorses improving fatigue and improving BLE myalgias.  Patient denies any other medical complaints at this time.  NAD was observed during the examination.  Psychotherapy was again implemented as noted below with a focus on improving polysubstance cessation / total sobriety, insight, mood, and anxiety.  See detailed psych ROS below.  See A/P below.     Per CM/SW:  NGUYỄN contacted [facility] and spoke with staff. SW was made aware they only take medicaid pts. Staff expressed they will not be able to accept pt due to her insurance. SW expressed understanding. NGUYỄN notified MD of stated above. NGUYỄN will place behavioral health referrals on her avs.   NGUYỄN will continue to follow pt throughout her transitions of care and assist with any dc needs.          Psychiatric Review Of Systems - Currently, the patient is endorsing and/or denying the following:  (patient's endorsements are BOLDED below; if not BOLDED, then patient denied):     Endorses  intermittent Symptoms of Depression (improving): diminished mood, low motivation, loss of interest/anhedonia, irritability, diminished energy, change in sleep, change in appetite, diminished concentration or cognition or indecisiveness, PMA/R, excessive guilt or hopelessness or worthlessness, suicidal ideations     Denies issues with Sleep: initiation, maintenance, early morning awakening with inability to return to sleep     Denies Suicidal/Homicidal ideations: active/passive ideations, organized plans, future intentions     Denies Symptoms of psychosis: hallucinations, delusions, disorganized thinking, disorganized behavior or abnormal motor behavior, or negative symptoms (diminshed emotional expression, avolition, anhedonia, alogia, asociality)      Denies Symptoms of frederic or hypomania: elevated, expansive, or irritable mood with increased energy or activity; with inflated self-esteem or grandiosity, decreased need for sleep, increased rate of speech, FOI or racing thoughts, distractibility, increased goal directed activity or PMA, risky/disinhibited behavior     Endorses intermittent Symptoms of Anxiety (improving): excessive anxiety/worry/fear, more days than not, about numerous issues, difficult to control, with restlessness, fatigue, poor concentration, irritability, muscle tension, sleep disturbance; causes functionally impairing distress      Denies Symptoms of Panic Disorder: recurrent panic attacks, precipitated or un-precipitated, source of worry and/or behavioral changes secondary; with or without agoraphobia     Denies Symptoms of PTSD: h/o trauma; re-experiencing/intrusive symptoms, avoidant behavior, negative alterations in cognition or mood, or hyperarousal symptoms; with or without dissociative symptoms      Denies Symptoms of OCD: obsessions or compulsions      Denies Symptoms of Eating Disorders: anorexia, bulimia or binging     Endorses Substance Use (intranasal crystal meth): intoxication,  withdrawal, tolerance, used in larger amounts or duration than intended, unsuccessful attempts to limit or quit, increased time engaging in or seeking out, cravings or strong desire to use, failure to fulfill obligations, negative consequences in social/interpersonal/occupational,/recreational areas, use in dangerous situations, medical or psychological consequences         PSYCHOTHERAPY ADD-ON +[id number]   30 (16-37*) minutes     Time: 19 minutes  Participants: Met with patient     Therapeutic Intervention Type: behavior modifying psychotherapy, supportive psychotherapy  Why chosen therapy is appropriate versus another modality: relevant to diagnosis, patient responds to this modality, evidence based practice     Target symptoms: polysubstance abuse / dependence (historically crystal meth and cannabis), insight, mood, and anxiety  Primary focus: improving polysubstance cessation / total sobriety, insight, mood, and anxiety  Psychotherapeutic techniques: supportive and psychodynamic techniques; psycho-education; deep breathing exercises; motivational interviewing; CBT; reality / insight orientation; problem solving techniques and managing life stressors     Outcome monitoring methods: self-report, observation     Patient's response to intervention:  The patient's response to intervention is accepting.     Progress toward goals:  The patient's progress toward goals is fair / limited.        ROS:  General ROS: negative for - chills, fever or night sweats; positive for improving fatigue  Ophthalmic ROS: negative for - blurry vision, double vision or eye pain  ENT ROS: negative for - sinus pain, headaches, sore throat or visual changes  Allergy and Immunology ROS: negative for - hives, itchy/watery eyes or nasal congestion  Hematological and Lymphatic ROS: negative for - bleeding problems, bruising, jaundice or pallor  Endocrine ROS: negative for - galactorrhea, hot flashes, mood swings, palpitations or temperature  intolerance  Respiratory ROS: negative for - cough, hemoptysis, shortness of breath, tachypnea or wheezing  Cardiovascular ROS: negative for - chest pain, dyspnea on exertion, loss of consciousness, palpitations, rapid heart rate or shortness of breath; positive for improving BLE edema   Gastrointestinal ROS: negative for - appetite loss, nausea, abdominal pain, blood in stools, change in bowel habits, constipation or diarrhea  Genito-Urinary ROS: negative for - incontinence, nocturia or pelvic pain  Musculoskeletal ROS: negative for - joint stiffness, joint swelling, or joint pain; positive for improving BLE myalgias   Neurological ROS: negative for - behavioral changes, confusion, dizziness, memory loss, numbness/tingling or seizures  Dermatological ROS: negative for dry skin, hair changes, pruritus or rash  Psychiatric ROS: see detailed psychiatric ROS above in subjective section         [facility] Toolkit ASQ Suicide Screening Tool:  In the past few weeks, have you wished you were dead? Denies   In the past few weeks, have you felt that you or your family would be better off if you were dead? Denies  In the past week, have you been having thoughts about killing yourself? Denies  Have you ever tried to kill yourself? Yes (remote hx)  Are you having thoughts of killing yourself right now? Denies      PAST MEDICAL & SURGICAL HISTORY   Past Medical History:   Diagnosis Date    ADHD (attention deficit hyperactivity disorder)     Anemia     Anxiety     Bipolar disorder     CHF (congestive heart failure)     History of hepatitis C - s/p clearance of virus (HCV neg 6/2015) 09/26/2014    History of psychiatric hospitalization     History of substance abuse     IV heroin - last use 2013 per pt    Hx of psychiatric care     Hypertension     Opioid overdose 05/10/2016    Psychiatric problem     Psychosis     Seizure disorder 04/03/2019    Sleep difficulties     Still's disease     Substance abuse     Therapy     Vasculitis       Past Surgical History:   Procedure Laterality Date    HYSTERECTOMY  3/16/2011    SALPINGOOPHORECTOMY Right 3/16/2011    TUBAL LIGATION      Vaginal cuff repair  04/22/2011     NEUROLOGIC HISTORY  Seizures: yes   Head trauma: denies  CVA: denies    FAMILY HISTORY   Family History   Problem Relation Age of Onset    Diabetes Maternal Grandmother     Cancer Maternal Grandmother        ALLERGIES   Review of patient's allergies indicates:  No Known Allergies    CURRENT MEDICATION REGIMEN   Home Meds:   Prior to Admission medications    Medication Sig Start Date End Date Taking? Authorizing Provider   albuterol (PROVENTIL/VENTOLIN HFA) 90 mcg/actuation inhaler Inhale 2 puffs into the lungs every 6 (six) hours. Rescue 12/14/22 1/13/23 Yes Kayla Godfrey MD   benztropine (COGENTIN) 0.5 MG tablet Take 1 tablet (0.5 mg total) by mouth 2 (two) times daily. 8/11/22 8/11/23 Yes Nelly Pandey MD   famotidine (PEPCID) 20 MG tablet Take 1 tablet (20 mg total) by mouth once daily. 12/15/22 12/15/23 Yes Kayla Godfrey MD   furosemide (LASIX) 40 MG tablet Take 1 tablet (40 mg total) by mouth 2 (two) times daily. 12/14/22 12/14/23 Yes Kayla Godfrey MD   losartan (COZAAR) 25 MG tablet Take 1 tablet (25 mg total) by mouth once daily. 12/15/22 12/15/23 Yes Kayla Godfrey MD   metoprolol succinate (TOPROL-XL) 25 MG 24 hr tablet Take 1 tablet (25 mg total) by mouth once daily. 12/15/22 12/15/23 Yes Kayla Godfrey MD   OXcarbazepine (TRILEPTAL) 600 MG Tab Take 2 tablets (1,200 mg total) by mouth every evening. 12/14/22 12/14/23 Yes Kayla Godfrey MD   QUEtiapine (SEROQUEL) 200 MG Tab Take 1 tablet (200 mg total) by mouth every evening. 12/14/22 12/14/23 Yes Kayla Godfrey MD   lisinopriL (PRINIVIL,ZESTRIL) 5 MG tablet Take 1 tablet (5 mg total) by mouth once daily. 12/14/22 12/14/23  Kayla Godfrey MD   polyethylene glycol (GLYCOLAX) 17 gram PwPk Take 17 g by mouth once daily. for 60 doses 12/14/22 2/12/23   Kayla Godfrey MD   senna (SENOKOT) 8.6 mg tablet Take 1 tablet by mouth once daily. 12/14/22 2/12/23  Kayla Godfrey MD   amLODIPine (NORVASC) 5 MG tablet Take 1 tablet (5 mg total) by mouth once daily. 9/8/21 7/26/22  Petty Ibanez MD   risperiDONE (RISPERDAL) 1 MG tablet Take 1 mg by mouth 2 (two) times daily. 5/19/22 8/11/22  Historical Provider       Scheduled Meds:    benztropine  0.5 mg Oral BID    enoxaparin  40 mg Subcutaneous Daily    famotidine  20 mg Oral Daily    furosemide (LASIX) injection  60 mg Intravenous BID    hydrocortisone   Topical (Top) BID    losartan  25 mg Oral Daily    metoprolol succinate  25 mg Oral Daily    multivitamin  1 tablet Oral Daily    nicotine  1 patch Transdermal Daily    OXcarbazepine  600 mg Oral BID    QUEtiapine  150 mg Oral QHS      PRN Meds: acetaminophen, albuterol, aluminum-magnesium hydroxide-simethicone, dextrose 10%, dextrose 10%, glucagon (human recombinant), hydrOXYzine pamoate, influenza, melatonin, naloxone, ondansetron, pneumoc 20-dion conj-dip cr(PF), sodium chloride 0.9%   Psychotherapeutics (From admission, onward)      Start     Stop Route Frequency Ordered    12/28/22 2100  QUEtiapine tablet 150 mg         -- Oral Nightly 12/28/22 1046            LABORATORY DATA   Recent Results (from the past 72 hour(s))   ISTAT PROCEDURE    Collection Time: 12/26/22  3:50 PM   Result Value Ref Range    POC PH 7.391 7.35 - 7.45    POC PCO2 40.7 35 - 45 mmHg    POC PO2 81 80 - 100 mmHg    POC HCO3 24.7 24 - 28 mmol/L    POC BE 0 -2 to 2 mmol/L    POC SATURATED O2 96 95 - 100 %    POC Sodium 141 136 - 145 mmol/L    POC Potassium 3.4 (L) 3.5 - 5.1 mmol/L    POC TCO2 26 23 - 27 mmol/L    POC Hematocrit 38 36 - 54 %PCV    POC HEMOGLOBIN 13 g/dL    Sample ARTERIAL     Site RR     Allens Test Pass     DelSys Room Air    CBC auto differential    Collection Time: 12/26/22  4:06 PM   Result Value Ref Range    WBC 6.57 3.90 - 12.70 K/uL    RBC 4.97 4.00 - 5.40 M/uL     Hemoglobin 10.3 (L) 12.0 - 16.0 g/dL    Hematocrit 36.2 (L) 37.0 - 48.5 %    MCV 73 (L) 82 - 98 fL    MCH 20.7 (L) 27.0 - 31.0 pg    MCHC 28.5 (L) 32.0 - 36.0 g/dL    RDW 21.0 (H) 11.5 - 14.5 %    Platelets 290 150 - 450 K/uL    MPV 10.2 9.2 - 12.9 fL    Immature Granulocytes 0.3 0.0 - 0.5 %    Gran # (ANC) 4.8 1.8 - 7.7 K/uL    Immature Grans (Abs) 0.02 0.00 - 0.04 K/uL    Lymph # 1.2 1.0 - 4.8 K/uL    Mono # 0.5 0.3 - 1.0 K/uL    Eos # 0.1 0.0 - 0.5 K/uL    Baso # 0.03 0.00 - 0.20 K/uL    nRBC 0 0 /100 WBC    Gran % 72.3 38.0 - 73.0 %    Lymph % 18.1 18.0 - 48.0 %    Mono % 7.9 4.0 - 15.0 %    Eosinophil % 0.9 0.0 - 8.0 %    Basophil % 0.5 0.0 - 1.9 %    Differential Method Automated    Comprehensive metabolic panel    Collection Time: 12/26/22  4:06 PM   Result Value Ref Range    Sodium 141 136 - 145 mmol/L    Potassium 3.5 3.5 - 5.1 mmol/L    Chloride 105 95 - 110 mmol/L    CO2 28 23 - 29 mmol/L    Glucose 105 70 - 110 mg/dL    BUN 27 (H) 7 - 17 mg/dL    Creatinine 0.84 0.50 - 1.40 mg/dL    Calcium 9.0 8.7 - 10.5 mg/dL    Total Protein 7.7 6.0 - 8.4 g/dL    Albumin 4.0 3.5 - 5.2 g/dL    Total Bilirubin 1.5 (H) 0.1 - 1.0 mg/dL    Alkaline Phosphatase 130 (H) 38 - 126 U/L     (H) 15 - 46 U/L     (H) 10 - 44 U/L    Anion Gap 8 8 - 16 mmol/L    eGFR >60.0 >60 mL/min/1.73 m^2   Troponin I    Collection Time: 12/26/22  4:06 PM   Result Value Ref Range    Troponin I 0.018 0.012 - 0.034 ng/mL   NT-Pro Natriuretic Peptide    Collection Time: 12/26/22  4:06 PM   Result Value Ref Range    NT-proBNP 97580 (H) 5 - 900 pg/mL   Protime-INR    Collection Time: 12/26/22  4:06 PM   Result Value Ref Range    Prothrombin Time 14.0 (H) 9.0 - 12.5 sec    INR 1.3 (H) 0.8 - 1.2   Drug screen panel, emergency    Collection Time: 12/26/22  5:47 PM   Result Value Ref Range    Benzodiazepines Negative Negative    Methadone metabolites Negative Negative    Cocaine (Metab.) Negative Negative    Opiate Scrn, Ur Negative Negative     Barbiturate Screen, Ur Negative Negative    Amphetamine Screen, Ur Presumptive Positive (A) Negative    THC Negative Negative    Phencyclidine Negative Negative    Creatinine, Urine 19.1 15.0 - 325.0 mg/dL    Toxicology Information SEE COMMENT    Ethanol    Collection Time: 12/26/22  6:13 PM   Result Value Ref Range    Alcohol, Serum <10 <10 mg/dL   Ammonia    Collection Time: 12/26/22  6:13 PM   Result Value Ref Range    Ammonia 22 9 - 30 umol/L   COVID-19 Rapid Screening    Collection Time: 12/26/22  7:13 PM   Result Value Ref Range    SARS-CoV-2 RNA, Amplification, Qual Negative Negative   TSH    Collection Time: 12/27/22  4:26 AM   Result Value Ref Range    TSH 7.435 (H) 0.400 - 4.000 uIU/mL   Comprehensive Metabolic Panel (CMP)    Collection Time: 12/27/22  4:26 AM   Result Value Ref Range    Sodium 138 136 - 145 mmol/L    Potassium 3.1 (L) 3.5 - 5.1 mmol/L    Chloride 100 95 - 110 mmol/L    CO2 27 23 - 29 mmol/L    Glucose 98 70 - 110 mg/dL    BUN 23 (H) 6 - 20 mg/dL    Creatinine 1.2 0.5 - 1.4 mg/dL    Calcium 8.7 8.7 - 10.5 mg/dL    Total Protein 6.9 6.0 - 8.4 g/dL    Albumin 2.9 (L) 3.5 - 5.2 g/dL    Total Bilirubin 1.2 (H) 0.1 - 1.0 mg/dL    Alkaline Phosphatase 97 55 - 135 U/L     (H) 10 - 40 U/L     (H) 10 - 44 U/L    Anion Gap 11 8 - 16 mmol/L    eGFR 55 (A) >60 mL/min/1.73 m^2   Magnesium    Collection Time: 12/27/22  4:26 AM   Result Value Ref Range    Magnesium 1.7 1.6 - 2.6 mg/dL   Phosphorus    Collection Time: 12/27/22  4:26 AM   Result Value Ref Range    Phosphorus 2.6 (L) 2.7 - 4.5 mg/dL   CBC with Automated Differential    Collection Time: 12/27/22  4:26 AM   Result Value Ref Range    WBC 6.06 3.90 - 12.70 K/uL    RBC 4.74 4.00 - 5.40 M/uL    Hemoglobin 9.8 (L) 12.0 - 16.0 g/dL    Hematocrit 34.4 (L) 37.0 - 48.5 %    MCV 73 (L) 82 - 98 fL    MCH 20.7 (L) 27.0 - 31.0 pg    MCHC 28.5 (L) 32.0 - 36.0 g/dL    RDW 20.9 (H) 11.5 - 14.5 %    Platelets 305 150 - 450 K/uL    MPV 10.3  9.2 - 12.9 fL    Immature Granulocytes 0.2 0.0 - 0.5 %    Gran # (ANC) 3.6 1.8 - 7.7 K/uL    Immature Grans (Abs) 0.01 0.00 - 0.04 K/uL    Lymph # 1.6 1.0 - 4.8 K/uL    Mono # 0.7 0.3 - 1.0 K/uL    Eos # 0.1 0.0 - 0.5 K/uL    Baso # 0.04 0.00 - 0.20 K/uL    nRBC 0 0 /100 WBC    Gran % 59.3 38.0 - 73.0 %    Lymph % 26.6 18.0 - 48.0 %    Mono % 11.1 4.0 - 15.0 %    Eosinophil % 2.1 0.0 - 8.0 %    Basophil % 0.7 0.0 - 1.9 %    Differential Method Automated    Basic metabolic panel    Collection Time: 12/27/22  4:26 AM   Result Value Ref Range    Sodium 138 136 - 145 mmol/L    Potassium 3.2 (L) 3.5 - 5.1 mmol/L    Chloride 100 95 - 110 mmol/L    CO2 27 23 - 29 mmol/L    Glucose 100 70 - 110 mg/dL    BUN 23 (H) 6 - 20 mg/dL    Creatinine 1.2 0.5 - 1.4 mg/dL    Calcium 8.7 8.7 - 10.5 mg/dL    Anion Gap 11 8 - 16 mmol/L    eGFR 55 (A) >60 mL/min/1.73 m^2   T4, Free    Collection Time: 12/27/22  4:26 AM   Result Value Ref Range    Free T4 0.82 0.71 - 1.51 ng/dL   Comprehensive Metabolic Panel (CMP)    Collection Time: 12/28/22  3:39 AM   Result Value Ref Range    Sodium 136 136 - 145 mmol/L    Potassium 4.1 3.5 - 5.1 mmol/L    Chloride 101 95 - 110 mmol/L    CO2 24 23 - 29 mmol/L    Glucose 95 70 - 110 mg/dL    BUN 28 (H) 6 - 20 mg/dL    Creatinine 1.2 0.5 - 1.4 mg/dL    Calcium 8.7 8.7 - 10.5 mg/dL    Total Protein 6.1 6.0 - 8.4 g/dL    Albumin 2.6 (L) 3.5 - 5.2 g/dL    Total Bilirubin 1.3 (H) 0.1 - 1.0 mg/dL    Alkaline Phosphatase 89 55 - 135 U/L    AST 77 (H) 10 - 40 U/L    ALT 96 (H) 10 - 44 U/L    Anion Gap 11 8 - 16 mmol/L    eGFR 55 (A) >60 mL/min/1.73 m^2   Magnesium    Collection Time: 12/28/22  3:39 AM   Result Value Ref Range    Magnesium 1.7 1.6 - 2.6 mg/dL   Phosphorus    Collection Time: 12/28/22  3:39 AM   Result Value Ref Range    Phosphorus 3.0 2.7 - 4.5 mg/dL   CBC with Automated Differential    Collection Time: 12/28/22  3:39 AM   Result Value Ref Range    WBC 4.88 3.90 - 12.70 K/uL    RBC 4.66 4.00 -  5.40 M/uL    Hemoglobin 9.5 (L) 12.0 - 16.0 g/dL    Hematocrit 33.3 (L) 37.0 - 48.5 %    MCV 72 (L) 82 - 98 fL    MCH 20.4 (L) 27.0 - 31.0 pg    MCHC 28.5 (L) 32.0 - 36.0 g/dL    RDW 20.9 (H) 11.5 - 14.5 %    Platelets 234 150 - 450 K/uL    MPV 10.7 9.2 - 12.9 fL    Immature Granulocytes 0.2 0.0 - 0.5 %    Gran # (ANC) 2.6 1.8 - 7.7 K/uL    Immature Grans (Abs) 0.01 0.00 - 0.04 K/uL    Lymph # 1.6 1.0 - 4.8 K/uL    Mono # 0.6 0.3 - 1.0 K/uL    Eos # 0.1 0.0 - 0.5 K/uL    Baso # 0.04 0.00 - 0.20 K/uL    nRBC 0 0 /100 WBC    Gran % 53.3 38.0 - 73.0 %    Lymph % 33.0 18.0 - 48.0 %    Mono % 11.7 4.0 - 15.0 %    Eosinophil % 1.0 0.0 - 8.0 %    Basophil % 0.8 0.0 - 1.9 %    Differential Method Automated    Comprehensive Metabolic Panel (CMP)    Collection Time: 12/29/22  3:21 AM   Result Value Ref Range    Sodium 139 136 - 145 mmol/L    Potassium 4.0 3.5 - 5.1 mmol/L    Chloride 100 95 - 110 mmol/L    CO2 28 23 - 29 mmol/L    Glucose 93 70 - 110 mg/dL    BUN 31 (H) 6 - 20 mg/dL    Creatinine 1.3 0.5 - 1.4 mg/dL    Calcium 8.4 (L) 8.7 - 10.5 mg/dL    Total Protein 6.7 6.0 - 8.4 g/dL    Albumin 2.7 (L) 3.5 - 5.2 g/dL    Total Bilirubin 0.9 0.1 - 1.0 mg/dL    Alkaline Phosphatase 91 55 - 135 U/L    AST 71 (H) 10 - 40 U/L    ALT 87 (H) 10 - 44 U/L    Anion Gap 11 8 - 16 mmol/L    eGFR 50 (A) >60 mL/min/1.73 m^2   Magnesium    Collection Time: 12/29/22  3:21 AM   Result Value Ref Range    Magnesium 2.0 1.6 - 2.6 mg/dL   Phosphorus    Collection Time: 12/29/22  3:21 AM   Result Value Ref Range    Phosphorus 3.2 2.7 - 4.5 mg/dL   CBC with Automated Differential    Collection Time: 12/29/22  3:21 AM   Result Value Ref Range    WBC 4.89 3.90 - 12.70 K/uL    RBC 4.90 4.00 - 5.40 M/uL    Hemoglobin 10.0 (L) 12.0 - 16.0 g/dL    Hematocrit 34.8 (L) 37.0 - 48.5 %    MCV 71 (L) 82 - 98 fL    MCH 20.4 (L) 27.0 - 31.0 pg    MCHC 28.7 (L) 32.0 - 36.0 g/dL    RDW 21.2 (H) 11.5 - 14.5 %    Platelets 225 150 - 450 K/uL    MPV 10.3 9.2 - 12.9  "fL    Immature Granulocytes 0.2 0.0 - 0.5 %    Gran # (ANC) 2.8 1.8 - 7.7 K/uL    Immature Grans (Abs) 0.01 0.00 - 0.04 K/uL    Lymph # 1.5 1.0 - 4.8 K/uL    Mono # 0.5 0.3 - 1.0 K/uL    Eos # 0.1 0.0 - 0.5 K/uL    Baso # 0.04 0.00 - 0.20 K/uL    nRBC 0 0 /100 WBC    Gran % 56.7 38.0 - 73.0 %    Lymph % 29.9 18.0 - 48.0 %    Mono % 10.4 4.0 - 15.0 %    Eosinophil % 2.0 0.0 - 8.0 %    Basophil % 0.8 0.0 - 1.9 %    Differential Method Automated       Lab Results   Component Value Date    VALPROATE <10.0 (L) 07/25/2019    CBMZ 2.1 (L) 04/15/2019         EXAMINATION    VITALS   Vitals:    12/29/22 0521 12/29/22 0810 12/29/22 0821 12/29/22 0844   BP: 121/82   115/86   BP Location:    Right arm   Patient Position: Lying   Lying   Pulse: 91 92  97   Resp: 16   (!) 98   Temp: 98.3 °F (36.8 °C)   98.6 °F (37 °C)   TempSrc: Axillary   Oral   SpO2: (!) 94%  95% 97%   Weight: 53.6 kg (118 lb 2.7 oz)      Height:         CONSTITUTIONAL  General Appearance: NAD, unremarkable, age appropriate, normal weight, lying in bed, calm     MUSCULOSKELETAL  Muscle Strength and Tone: WNL    Abnormal Involuntary Movements: none observed   Gait and Station: Attempted but unable to assess due to medical acuity      PSYCHIATRIC   Behavior/Cooperation:  friendly and cooperative, eye contact normal, calm  Speech:  normal tone, normal rate, normal pitch, normal volume  Language: grossly intact, able to name, able to repeat with spontaneous speech  Mood:  "better"  Affect:  congruent with mood and more full / reactive  Associations: intact; no FÉLIX  Thought Process: Linear and Future-Oriented   Thought Content: denies SI, HI, AH, VH, TH, delusions, or paranoia (no RIS observed)  Sensorium:  Awake   Alert and Oriented: to person, place, city, state, month, year, and situation   Memory: 3/3 immediate, 2/3 at 5 minutes               Recent: Intact; able to report recent events              Remote: Limited / Intact; Named 3/4 past presidents "   Attention/concentration: Intact. Appropriate for age/education. Able to spell w-o-r-l-d & d-l-r-o-w.   Similarities: Intact (difference between apple and orange?)  Abstract reasoning: Limited   Fund of Knowledge: Named 3/4 past presidents   Insight: Limited (chronic) / Intact  Judgment: Limited (chronic) / Intact     CAM ICU Delirium Assessment - NEGATIVE     Is the patient aware of the biomedical complications associated with substance abuse and mental illness? yes        MEDICAL DECISION MAKING     ASSESSMENT         Bipolar I Disorder, Most Recent Episode Depressed, In Partial Remission   Methamphetamine Use Disorder, Severe, Dependence   Unspecified Anxiety Disorder (improving)  Unspecified Insomnia (improving)  (Rule out SIMD)        RECOMMENDATIONS       - Continue Cogentin 0.5 mg PO BID for possible EPS (discussed risks/benefits/alt vs no treatment with patient).     - Continue Trileptal 600 mg PO BID for Mood & Seizure Hx (discussed risks/benefits/alt vs no treatment with patient).     - Continue Seroquel 150 mg PO QHS for mood / sleep / anxiety (discussed risks/benefits/alt vs no treatment with patient).     - Can use Vistaril 25 mg PO TID PRN for anxiety and/or insomnia (discussed risks/benefits/alt vs no treatment with patient).     - Psychotherapy was again performed with patient as noted above with a focus on improving polysubstance cessation / total sobriety, insight, mood, and anxiety.     - Patient's most recent labs, imaging, and EKG were reviewed today.  UDS positive for amphetamines.  Ethanol < 10.  Monitor EKG for improvement in QTc with goal of < 500.       - Please have CM/SW assist patient with asap outpatient mental health & addiction f/u (patient again denies interest in residential or IOP rehab options) (patient was supposed to have an outpatient mental health appointment with Dr. Lucho Romo at [facility] on (12/27/2022) at 10 AM).     - Patient was again instructed to call 911  and/or [phone], and return to the nearest ED if she begins feeling suicidal, homicidal, or gravely disabled (for s/p this hospitalization).       - Thank you for this consult ; will continue to follow patient while she is at [facility].          Total time spent with patient and/or managing/coordinating patient's care today (excluding the time spent on psychotherapy): 51 minutes   Time spent on psychotherapy today (as noted above): 19 minutes   Total time for encounter today including psychotherapy: 70 minutes      More than 50% of the time was spent counseling/coordinating care.     Consulting clinician was informed of the encounter and consult note.      STAFF:  Edis Hughes MD  [facility]  12/29/2022

## 2022-12-30 NOTE — DISCHARGE SUMMARY
"[facility] Medicine  Discharge Summary      Patient Name: Stephanie T Barthelemy  MRN: [id number]  GILBERT: [id number]  Patient Class: OP- Observation  Admission Date: 12/26/2022  Hospital Length of Stay: 0 days  Discharge Date and Time: 12/29/2022  5:39 PM  Attending Physician: Myrna att. providers found   Discharging Provider: Phillip Borja DO  Primary Care Provider: Primary Doctor Myrna    Primary Care Team: Networked reference to record PCT     HPI:   Patient is a 52 F with PMH adhd, amphetamine use disorder, bipolar I disorder, hypertension, opioid overdose, seizure disorder, chronic combined HF (LVEF of 20% on Echo done in 11/20), with multiple admits for psychosis and/or CHF exacerbations in the past year, who was brought [facility] for fluid overload and medication adherence. Patient history limited due to patient's tangential thought process during interview. Patient states that she has not done a good job of taking her lasix lately and states that "the holidays have made it hard to take all of her medications". She also endorsed increased salt intake lately stating "she could not refuse all of the holiday foods around her". Patient states that she has noticed some increased fluid around her body and understands that this happens when she does not take her lasix. Patient also states that she was recently discharged from [facility] and stated that they had "increased her oxcarbazepine to 1200mg and made her stop taking her fluoxetine". She stated she felt "'better" while on her fluoxetine and that the increase of her oxcarbazepine has made her feel sometimes confused, hallucinating, "and sometimes just out of it". (Of note, patient has repeated documentation of overall medication non-compliance and misuse).    At the [facility] ED, patient was found to be positive for amphetamines in which patient did state she had recently used meth. Labs notable [place] [address], negative troponin, elevated AST/ALT, and " noted bilat LE on PE. Xray notable for no acute process and ECG with sinus tachycardia. Patient was given 80mg lasix with adequate UOP. [facility] was then consulted for admission for further observation, medication management, and potential need for psychiatric consultation.       * No surgery found *      Hospital Course:   Patient was diuresed overnight with improvement in edema, crackles and breathing. Also seen by Dr. Hughes for psychiatric medication management. He suggested continuing cogentin, seroquel, vistaril PRN for anxiety, trileptal changed from 1200 qhs to 600 BID due to nighttime dizziness. Dr. Hughes also suggested patient be set up with ACT team to aid in outpatient psychiatric management however patient was not a candidate due to her insurance. She also did not wish to pursue inpatient rehab for methamphetamine use. Patient was kept for continued diuresis and to allow time for social work to set up outpatient psychiatric fu. On day of discharge, edema and lung crackles had resolved with net output of - 5,800. Patient was able to ambulate, eat, drink without issue. Importance of lasix adherence and keeping outpatient appointments were explained. Patient voiced understanding and acceptance of the plan.        Goals of Care Treatment Preferences:  Code Status: Full Code      Consults:   Consults (From admission, onward)        Status Ordering Provider     Inpatient consult to Psychiatry  Once        Provider:  (Not yet assigned)    ANJELICA Harris          No new Assessment & Plan notes have been filed under this hospital service since the last note was generated.  Service: Hospital Medicine    Final Active Diagnoses:    Diagnosis Date Noted POA    PRINCIPAL PROBLEM:  Chronic combined systolic and diastolic heart failure [I50.42] 07/22/2022 Yes    Bipolar 1 disorder, depressed [F31.9] 10/15/2019 Yes    Essential hypertension [I10] 09/24/2014 Yes      Problems Resolved During this Admission:        Discharged Condition: stable    Disposition: Home or Self Care    Follow Up:   Follow-up Information     [facility] Follow up.    Why: [facility]  [facility] Crisis [facility] provides 24-hour crisis intervention for [place], [place]. The interventions are made over the phone or in person and are provided by crisis workers and psychiatrists. [facility] administers [facility] Crisis Services on behalf of the [facility] Services [employer].    PROGRAM START YEAR  1997  SERVICE AREAS  Intellectual & Developmental Disabilities  Behavioral Health  Addiction Recovery  COORDINATOR(S)  Annita Fraire LPC,   CONTACT INFORMATION  [phone]  [phone]  [email or web address]  location[address] [place] [address]  Contact information:  CONTACT INFORMATION  [phone]  [phone]  [email or web address]  [id number] [place] [address]           [facility]. Call.    Why: [facility]  Address: [address] [place] [address]  Phone Number: [phone]  Hours of Operation:    Monday- 7:30AM-6:00PM  Tuesday- 7:30AM-7:00PM  Wednesday- 7:30AM-7:00PM  Thursday- 7:30AM-6:00PM  Friday- 8:00AM-4:30PM           [facility]. Call.    Why: [facility]  Address: [address] [place] [address], [place] [address]  Phone Number: [phone]  Hours of Operation:    Monday- 8:00AM-4:30PM  Tuesday-Thursday- 7:30AM-4:30PM   Friday- 8:00AM-4:30PM           [facility]. Call.    Why: [facility]  [address] [place] [address]    Hours of Operation:  Mon - Fri: 8:00AM - 4:30PM  Ph: [phone]  Fax: [phone]    For all media inquiries, please email Clifton Song at [email or web address]           Primary Doctor No .            Lucho Romo MD. Go on 1/20/2023.    Specialty: Psychiatry  Why: Patient has psychiatry follow up  at [facility] with Dr. Lucho Romo on 1/20/23 at 11:00 am.  Contact information:  [address]  [place] [address]  [phone]                       Patient Instructions:      Ambulatory referral/consult to [facility]   Standing Status: Future   Referral Priority: Routine Referral Type: Consultation   Referral Reason: Specialty Services Required   Requested Specialty: Family Medicine   Number of Visits Requested: 1     Diet Cardiac     Notify your health care provider if you experience any of the following:  persistent nausea and vomiting or diarrhea     Notify your health care provider if you experience any of the following:  persistent dizziness, light-headedness, or visual disturbances     Notify your health care provider if you experience any of the following:  difficulty breathing or increased cough     Notify your health care provider if you experience any of the following:  increased confusion or weakness     Activity as tolerated       Significant Diagnostic Studies: All reviewed    Pending Diagnostic Studies:     None         Medications:  Reconciled Home Medications:      Medication List      START taking these medications    hydrocortisone 1 % cream  Apply topically 2 (two) times daily.        CHANGE how you take these medications    OXcarbazepine 600 MG Tab  Commonly known as: TRILEPTAL  Take 2 tablets (1,200 mg total) by mouth 2 (two) times daily.  What changed: when to take this        CONTINUE taking these medications    albuterol 90 mcg/actuation inhaler  Commonly known as: PROVENTIL/VENTOLIN HFA  Inhale 2 puffs into the lungs every 6 (six) hours. Rescue     benztropine 0.5 MG tablet  Commonly known as: COGENTIN  Take 1 tablet (0.5 mg total) by mouth 2 (two) times daily.     famotidine 20 MG tablet  Commonly known as: PEPCID  Take 1 tablet (20 mg total) by mouth once daily.      furosemide 40 MG tablet  Commonly known as: LASIX  Take 1 tablet (40 mg total) by mouth 2 (two) times daily.     losartan 25 MG tablet  Commonly known as: COZAAR  Take 1 tablet (25 mg total) by mouth once daily.     metoprolol succinate 25 MG 24 hr tablet  Commonly known as: TOPROL-XL  Take 1 tablet (25 mg total) by mouth once daily.     polyethylene glycol 17 gram Pwpk  Commonly known as: GLYCOLAX  Take 17 g by mouth once daily. for 60 doses     QUEtiapine 200 MG Tab  Commonly known as: SEROQUEL  Take 1 tablet (200 mg total) by mouth every evening.     senna 8.6 mg tablet  Commonly known as: SENOKOT  Take 1 tablet by mouth once daily.        STOP taking these medications    lisinopriL 5 MG tablet  Commonly known as: PRINIVIL,ZESTRIL            Indwelling Lines/Drains at time of discharge:   Lines/Drains/Airways     None                 Time spent on the discharge of patient: 35 minutes         Phillip Borja DO  Department of Hospital Medicine  [place] - Telemetry

## 2022-12-30 NOTE — HOSPITAL COURSE
Patient was diuresed overnight with improvement in edema, crackles and breathing. Also seen by Dr. Hughes for psychiatric medication management. He suggested continuing cogentin, seroquel, vistaril PRN for anxiety, trileptal changed from 1200 qhs to 600 BID due to nighttime dizziness. Dr. Hughes also suggested patient be set up with ACT team to aid in outpatient psychiatric management however patient was not a candidate due to her insurance. She also did not wish to pursue inpatient rehab for methamphetamine use. Patient was kept for continued diuresis and to allow time for social work to set up outpatient psychiatric fu. On day of discharge, edema and lung crackles had resolved with net output of - 5,800. Patient was able to ambulate, eat, drink without issue. Importance of lasix adherence and keeping outpatient appointments were explained. Patient voiced understanding and acceptance of the plan.

## 2023-01-07 ENCOUNTER — HOSPITAL ENCOUNTER (INPATIENT)
Facility: HOSPITAL | Age: 53
LOS: 5 days | Discharge: HOME | DRG: 291 | End: 2023-01-12
Attending: EMERGENCY MEDICINE | Admitting: FAMILY MEDICINE
Payer: MEDICARE

## 2023-01-07 DIAGNOSIS — E87.20 LACTIC ACIDOSIS: ICD-10-CM

## 2023-01-07 DIAGNOSIS — F19.10 POLYSUBSTANCE ABUSE: ICD-10-CM

## 2023-01-07 DIAGNOSIS — F41.0 PANIC ATTACKS: ICD-10-CM

## 2023-01-07 DIAGNOSIS — I26.99 ACUTE PULMONARY EMBOLISM WITHOUT ACUTE COR PULMONALE, UNSPECIFIED PULMONARY EMBOLISM TYPE: ICD-10-CM

## 2023-01-07 DIAGNOSIS — R07.9 CHEST PAIN: ICD-10-CM

## 2023-01-07 DIAGNOSIS — I50.9 ACUTE ON CHRONIC CONGESTIVE HEART FAILURE, UNSPECIFIED HEART FAILURE TYPE: ICD-10-CM

## 2023-01-07 DIAGNOSIS — I50.42 CHRONIC COMBINED SYSTOLIC AND DIASTOLIC CONGESTIVE HEART FAILURE: Chronic | ICD-10-CM

## 2023-01-07 DIAGNOSIS — I50.43 ACUTE ON CHRONIC COMBINED SYSTOLIC AND DIASTOLIC CONGESTIVE HEART FAILURE: ICD-10-CM

## 2023-01-07 DIAGNOSIS — I26.99 PULMONARY EMBOLISM, UNSPECIFIED CHRONICITY, UNSPECIFIED PULMONARY EMBOLISM TYPE, UNSPECIFIED WHETHER ACUTE COR PULMONALE PRESENT: ICD-10-CM

## 2023-01-07 DIAGNOSIS — I26.99 ACUTE PULMONARY EMBOLISM, UNSPECIFIED PULMONARY EMBOLISM TYPE, UNSPECIFIED WHETHER ACUTE COR PULMONALE PRESENT: Chronic | ICD-10-CM

## 2023-01-07 DIAGNOSIS — F15.20 METHAMPHETAMINE USE DISORDER, SEVERE: ICD-10-CM

## 2023-01-07 DIAGNOSIS — R06.02 SHORTNESS OF BREATH: Primary | ICD-10-CM

## 2023-01-07 DIAGNOSIS — I26.99 PULMONARY EMBOLISM: ICD-10-CM

## 2023-01-07 LAB
ALBUMIN SERPL BCP-MCNC: 3.2 G/DL (ref 3.5–5.2)
ALP SERPL-CCNC: 111 U/L (ref 55–135)
ALT SERPL W/O P-5'-P-CCNC: 36 U/L (ref 10–44)
AMPHET+METHAMPHET UR QL: ABNORMAL
ANION GAP SERPL CALC-SCNC: 12 MMOL/L (ref 8–16)
AST SERPL-CCNC: 40 U/L (ref 10–40)
BARBITURATES UR QL SCN>200 NG/ML: NEGATIVE
BASOPHILS # BLD AUTO: 0.04 K/UL (ref 0–0.2)
BASOPHILS NFR BLD: 0.6 % (ref 0–1.9)
BENZODIAZ UR QL SCN>200 NG/ML: NEGATIVE
BILIRUB SERPL-MCNC: 1.2 MG/DL (ref 0.1–1)
BNP SERPL-MCNC: 3077 PG/ML (ref 0–99)
BUN SERPL-MCNC: 20 MG/DL (ref 6–20)
BZE UR QL SCN: NEGATIVE
CALCIUM SERPL-MCNC: 8.9 MG/DL (ref 8.7–10.5)
CANNABINOIDS UR QL SCN: NEGATIVE
CHLORIDE SERPL-SCNC: 104 MMOL/L (ref 95–110)
CO2 SERPL-SCNC: 22 MMOL/L (ref 23–29)
CREAT SERPL-MCNC: 1.1 MG/DL (ref 0.5–1.4)
CREAT UR-MCNC: 165.3 MG/DL (ref 15–325)
DELSYS: ABNORMAL
DIFFERENTIAL METHOD: ABNORMAL
EOSINOPHIL # BLD AUTO: 0.1 K/UL (ref 0–0.5)
EOSINOPHIL NFR BLD: 1.1 % (ref 0–8)
ERYTHROCYTE [DISTWIDTH] IN BLOOD BY AUTOMATED COUNT: 21.9 % (ref 11.5–14.5)
EST. GFR  (NO RACE VARIABLE): >60 ML/MIN/1.73 M^2
FERRITIN SERPL-MCNC: 88 NG/ML (ref 20–300)
FLOW: 3
GLUCOSE SERPL-MCNC: 103 MG/DL (ref 70–110)
HCO3 UR-SCNC: 25.7 MMOL/L (ref 24–28)
HCT VFR BLD AUTO: 35.3 % (ref 37–48.5)
HCT VFR BLD CALC: 39 %PCV (ref 36–54)
HGB BLD-MCNC: 10 G/DL (ref 12–16)
HGB BLD-MCNC: 13 G/DL
IMM GRANULOCYTES # BLD AUTO: 0.03 K/UL (ref 0–0.04)
IMM GRANULOCYTES NFR BLD AUTO: 0.4 % (ref 0–0.5)
IRON SERPL-MCNC: 25 UG/DL (ref 30–160)
LACTATE SERPL-SCNC: 3.5 MMOL/L (ref 0.5–2.2)
LACTATE SERPL-SCNC: 4.2 MMOL/L (ref 0.5–2.2)
LACTATE SERPL-SCNC: 4.6 MMOL/L (ref 0.5–2.2)
LYMPHOCYTES # BLD AUTO: 1.5 K/UL (ref 1–4.8)
LYMPHOCYTES NFR BLD: 20.4 % (ref 18–48)
MCH RBC QN AUTO: 21 PG (ref 27–31)
MCHC RBC AUTO-ENTMCNC: 28.3 G/DL (ref 32–36)
MCV RBC AUTO: 74 FL (ref 82–98)
METHADONE UR QL SCN>300 NG/ML: NEGATIVE
MODE: ABNORMAL
MONOCYTES # BLD AUTO: 0.6 K/UL (ref 0.3–1)
MONOCYTES NFR BLD: 7.9 % (ref 4–15)
NEUTROPHILS # BLD AUTO: 5 K/UL (ref 1.8–7.7)
NEUTROPHILS NFR BLD: 69.6 % (ref 38–73)
NRBC BLD-RTO: 0 /100 WBC
OPIATES UR QL SCN: NEGATIVE
PCO2 BLDA: 52.1 MMHG (ref 35–45)
PCP UR QL SCN>25 NG/ML: NEGATIVE
PH SMN: 7.3 [PH] (ref 7.35–7.45)
PLATELET # BLD AUTO: 191 K/UL (ref 150–450)
PMV BLD AUTO: 10.8 FL (ref 9.2–12.9)
PO2 BLDA: 32 MMHG (ref 40–60)
POC BE: -1 MMOL/L
POC SATURATED O2: 54 % (ref 95–100)
POC TCO2: 27 MMOL/L (ref 24–29)
POCT GLUCOSE: 115 MG/DL (ref 70–110)
POCT GLUCOSE: 90 MG/DL (ref 70–110)
POTASSIUM BLD-SCNC: 4.3 MMOL/L (ref 3.5–5.1)
POTASSIUM SERPL-SCNC: 3.9 MMOL/L (ref 3.5–5.1)
PROT SERPL-MCNC: 7.5 G/DL (ref 6–8.4)
RBC # BLD AUTO: 4.76 M/UL (ref 4–5.4)
SAMPLE: ABNORMAL
SATURATED IRON: 5 % (ref 20–50)
SODIUM BLD-SCNC: 140 MMOL/L (ref 136–145)
SODIUM SERPL-SCNC: 138 MMOL/L (ref 136–145)
TOTAL IRON BINDING CAPACITY: 531 UG/DL (ref 250–450)
TOXICOLOGY INFORMATION: ABNORMAL
TRANSFERRIN SERPL-MCNC: 359 MG/DL (ref 200–375)
TROPONIN I SERPL DL<=0.01 NG/ML-MCNC: 0.05 NG/ML (ref 0–0.03)
TROPONIN I SERPL DL<=0.01 NG/ML-MCNC: 0.06 NG/ML (ref 0–0.03)
WBC # BLD AUTO: 7.19 K/UL (ref 3.9–12.7)

## 2023-01-07 PROCEDURE — 63600175 PHARM REV CODE 636 W HCPCS

## 2023-01-07 PROCEDURE — 63600175 PHARM REV CODE 636 W HCPCS: Performed by: FAMILY MEDICINE

## 2023-01-07 PROCEDURE — 80307 DRUG TEST PRSMV CHEM ANLYZR: CPT | Performed by: EMERGENCY MEDICINE

## 2023-01-07 PROCEDURE — 93010 ELECTROCARDIOGRAM REPORT: CPT | Mod: ,,, | Performed by: INTERNAL MEDICINE

## 2023-01-07 PROCEDURE — 80053 COMPREHEN METABOLIC PANEL: CPT | Performed by: EMERGENCY MEDICINE

## 2023-01-07 PROCEDURE — 25000003 PHARM REV CODE 250: Performed by: FAMILY MEDICINE

## 2023-01-07 PROCEDURE — 51798 US URINE CAPACITY MEASURE: CPT

## 2023-01-07 PROCEDURE — 36415 COLL VENOUS BLD VENIPUNCTURE: CPT

## 2023-01-07 PROCEDURE — 11000001 HC ACUTE MED/SURG PRIVATE ROOM

## 2023-01-07 PROCEDURE — 84484 ASSAY OF TROPONIN QUANT: CPT | Mod: 91 | Performed by: STUDENT IN AN ORGANIZED HEALTH CARE EDUCATION/TRAINING PROGRAM

## 2023-01-07 PROCEDURE — 83605 ASSAY OF LACTIC ACID: CPT | Performed by: STUDENT IN AN ORGANIZED HEALTH CARE EDUCATION/TRAINING PROGRAM

## 2023-01-07 PROCEDURE — 25000003 PHARM REV CODE 250

## 2023-01-07 PROCEDURE — 83605 ASSAY OF LACTIC ACID: CPT | Mod: 91

## 2023-01-07 PROCEDURE — 25500020 PHARM REV CODE 255: Performed by: FAMILY MEDICINE

## 2023-01-07 PROCEDURE — 93010 ELECTROCARDIOGRAM REPORT: CPT | Mod: 76,,, | Performed by: INTERNAL MEDICINE

## 2023-01-07 PROCEDURE — 63600175 PHARM REV CODE 636 W HCPCS: Performed by: STUDENT IN AN ORGANIZED HEALTH CARE EDUCATION/TRAINING PROGRAM

## 2023-01-07 PROCEDURE — 84484 ASSAY OF TROPONIN QUANT: CPT

## 2023-01-07 PROCEDURE — 93005 ELECTROCARDIOGRAM TRACING: CPT

## 2023-01-07 PROCEDURE — 82803 BLOOD GASES ANY COMBINATION: CPT

## 2023-01-07 PROCEDURE — 84466 ASSAY OF TRANSFERRIN: CPT | Performed by: STUDENT IN AN ORGANIZED HEALTH CARE EDUCATION/TRAINING PROGRAM

## 2023-01-07 PROCEDURE — 82728 ASSAY OF FERRITIN: CPT | Performed by: STUDENT IN AN ORGANIZED HEALTH CARE EDUCATION/TRAINING PROGRAM

## 2023-01-07 PROCEDURE — 85025 COMPLETE CBC W/AUTO DIFF WBC: CPT | Performed by: EMERGENCY MEDICINE

## 2023-01-07 PROCEDURE — 99900035 HC TECH TIME PER 15 MIN (STAT)

## 2023-01-07 PROCEDURE — 25000003 PHARM REV CODE 250: Performed by: STUDENT IN AN ORGANIZED HEALTH CARE EDUCATION/TRAINING PROGRAM

## 2023-01-07 PROCEDURE — 27000221 HC OXYGEN, UP TO 24 HOURS

## 2023-01-07 PROCEDURE — 84484 ASSAY OF TROPONIN QUANT: CPT | Mod: 91 | Performed by: EMERGENCY MEDICINE

## 2023-01-07 PROCEDURE — 99291 CRITICAL CARE FIRST HOUR: CPT | Mod: 25

## 2023-01-07 PROCEDURE — 93010 EKG 12-LEAD: ICD-10-PCS | Mod: ,,, | Performed by: INTERNAL MEDICINE

## 2023-01-07 PROCEDURE — 83880 ASSAY OF NATRIURETIC PEPTIDE: CPT | Performed by: EMERGENCY MEDICINE

## 2023-01-07 RX ORDER — ENOXAPARIN SODIUM 100 MG/ML
1 INJECTION SUBCUTANEOUS EVERY 12 HOURS
Status: DISCONTINUED | OUTPATIENT
Start: 2023-01-08 | End: 2023-01-12 | Stop reason: HOSPADM

## 2023-01-07 RX ORDER — CEFEPIME HYDROCHLORIDE 1 G/50ML
1 INJECTION, SOLUTION INTRAVENOUS
Status: DISCONTINUED | OUTPATIENT
Start: 2023-01-07 | End: 2023-01-07 | Stop reason: DRUGHIGH

## 2023-01-07 RX ORDER — TALC
9 POWDER (GRAM) TOPICAL NIGHTLY PRN
Status: DISCONTINUED | OUTPATIENT
Start: 2023-01-07 | End: 2023-01-12 | Stop reason: HOSPADM

## 2023-01-07 RX ORDER — AMOXICILLIN 250 MG
1 CAPSULE ORAL 2 TIMES DAILY PRN
Status: DISCONTINUED | OUTPATIENT
Start: 2023-01-07 | End: 2023-01-12 | Stop reason: HOSPADM

## 2023-01-07 RX ORDER — LORAZEPAM 2 MG/ML
2 INJECTION INTRAMUSCULAR ONCE
Status: COMPLETED | OUTPATIENT
Start: 2023-01-07 | End: 2023-01-07

## 2023-01-07 RX ORDER — LANOLIN ALCOHOL/MO/W.PET/CERES
1 CREAM (GRAM) TOPICAL DAILY
Status: DISCONTINUED | OUTPATIENT
Start: 2023-01-08 | End: 2023-01-12 | Stop reason: HOSPADM

## 2023-01-07 RX ORDER — METRONIDAZOLE 500 MG/1
500 TABLET ORAL EVERY 8 HOURS
Status: DISCONTINUED | OUTPATIENT
Start: 2023-01-07 | End: 2023-01-09

## 2023-01-07 RX ORDER — HYDROXYZINE PAMOATE 25 MG/1
50 CAPSULE ORAL EVERY 8 HOURS PRN
Status: DISCONTINUED | OUTPATIENT
Start: 2023-01-07 | End: 2023-01-12 | Stop reason: HOSPADM

## 2023-01-07 RX ORDER — ENOXAPARIN SODIUM 100 MG/ML
40 INJECTION SUBCUTANEOUS EVERY 24 HOURS
Status: DISCONTINUED | OUTPATIENT
Start: 2023-01-07 | End: 2023-01-07

## 2023-01-07 RX ORDER — LIDOCAINE 50 MG/G
1 PATCH TOPICAL DAILY PRN
Status: DISCONTINUED | OUTPATIENT
Start: 2023-01-07 | End: 2023-01-12 | Stop reason: HOSPADM

## 2023-01-07 RX ORDER — METOPROLOL TARTRATE 1 MG/ML
5 INJECTION, SOLUTION INTRAVENOUS ONCE
Status: COMPLETED | OUTPATIENT
Start: 2023-01-07 | End: 2023-01-07

## 2023-01-07 RX ORDER — METOPROLOL TARTRATE 1 MG/ML
5 INJECTION, SOLUTION INTRAVENOUS EVERY 5 MIN PRN
Status: DISCONTINUED | OUTPATIENT
Start: 2023-01-07 | End: 2023-01-07

## 2023-01-07 RX ORDER — METOPROLOL SUCCINATE 25 MG/1
25 TABLET, EXTENDED RELEASE ORAL DAILY
Status: DISCONTINUED | OUTPATIENT
Start: 2023-01-07 | End: 2023-01-12 | Stop reason: HOSPADM

## 2023-01-07 RX ORDER — MUPIROCIN 20 MG/G
OINTMENT TOPICAL 2 TIMES DAILY
Status: COMPLETED | OUTPATIENT
Start: 2023-01-07 | End: 2023-01-12

## 2023-01-07 RX ORDER — ENOXAPARIN SODIUM 100 MG/ML
20 INJECTION SUBCUTANEOUS ONCE
Status: COMPLETED | OUTPATIENT
Start: 2023-01-08 | End: 2023-01-08

## 2023-01-07 RX ORDER — FUROSEMIDE 10 MG/ML
80 INJECTION INTRAMUSCULAR; INTRAVENOUS 2 TIMES DAILY
Status: DISCONTINUED | OUTPATIENT
Start: 2023-01-07 | End: 2023-01-10

## 2023-01-07 RX ORDER — OXCARBAZEPINE 150 MG/1
600 TABLET, FILM COATED ORAL 2 TIMES DAILY
Status: DISCONTINUED | OUTPATIENT
Start: 2023-01-07 | End: 2023-01-12 | Stop reason: HOSPADM

## 2023-01-07 RX ORDER — FUROSEMIDE 40 MG/1
80 TABLET ORAL 2 TIMES DAILY
Status: DISCONTINUED | OUTPATIENT
Start: 2023-01-07 | End: 2023-01-07

## 2023-01-07 RX ORDER — KETOROLAC TROMETHAMINE 30 MG/ML
15 INJECTION, SOLUTION INTRAMUSCULAR; INTRAVENOUS EVERY 6 HOURS PRN
Status: DISCONTINUED | OUTPATIENT
Start: 2023-01-07 | End: 2023-01-07

## 2023-01-07 RX ORDER — ACETAMINOPHEN 325 MG/1
650 TABLET ORAL EVERY 8 HOURS PRN
Status: DISCONTINUED | OUTPATIENT
Start: 2023-01-07 | End: 2023-01-12 | Stop reason: HOSPADM

## 2023-01-07 RX ORDER — FAMOTIDINE 20 MG/1
20 TABLET, FILM COATED ORAL DAILY
Status: DISCONTINUED | OUTPATIENT
Start: 2023-01-07 | End: 2023-01-12 | Stop reason: HOSPADM

## 2023-01-07 RX ORDER — ENOXAPARIN SODIUM 100 MG/ML
20 INJECTION SUBCUTANEOUS ONCE
Status: DISCONTINUED | OUTPATIENT
Start: 2023-01-08 | End: 2023-01-07

## 2023-01-07 RX ORDER — ACETAMINOPHEN 325 MG/1
650 TABLET ORAL EVERY 4 HOURS PRN
Status: DISCONTINUED | OUTPATIENT
Start: 2023-01-07 | End: 2023-01-12 | Stop reason: HOSPADM

## 2023-01-07 RX ORDER — FUROSEMIDE 10 MG/ML
INJECTION INTRAMUSCULAR; INTRAVENOUS
Status: DISPENSED
Start: 2023-01-07 | End: 2023-01-07

## 2023-01-07 RX ORDER — BENZTROPINE MESYLATE 0.5 MG/1
0.5 TABLET ORAL 2 TIMES DAILY
Status: DISCONTINUED | OUTPATIENT
Start: 2023-01-07 | End: 2023-01-12 | Stop reason: HOSPADM

## 2023-01-07 RX ORDER — FUROSEMIDE 10 MG/ML
40 INJECTION INTRAMUSCULAR; INTRAVENOUS
Status: DISCONTINUED | OUTPATIENT
Start: 2023-01-07 | End: 2023-01-07

## 2023-01-07 RX ORDER — KETOROLAC TROMETHAMINE 30 MG/ML
15 INJECTION, SOLUTION INTRAMUSCULAR; INTRAVENOUS EVERY 6 HOURS PRN
Status: DISPENSED | OUTPATIENT
Start: 2023-01-07 | End: 2023-01-10

## 2023-01-07 RX ORDER — LOSARTAN POTASSIUM 25 MG/1
25 TABLET ORAL DAILY
Status: DISCONTINUED | OUTPATIENT
Start: 2023-01-07 | End: 2023-01-12 | Stop reason: HOSPADM

## 2023-01-07 RX ORDER — MUPIROCIN 20 MG/G
OINTMENT TOPICAL 2 TIMES DAILY
Status: CANCELLED | OUTPATIENT
Start: 2023-01-07 | End: 2023-01-12

## 2023-01-07 RX ORDER — CEFEPIME HYDROCHLORIDE 1 G/50ML
1 INJECTION, SOLUTION INTRAVENOUS
Status: DISCONTINUED | OUTPATIENT
Start: 2023-01-07 | End: 2023-01-09

## 2023-01-07 RX ORDER — SODIUM CHLORIDE 0.9 % (FLUSH) 0.9 %
5 SYRINGE (ML) INJECTION
Status: DISCONTINUED | OUTPATIENT
Start: 2023-01-07 | End: 2023-01-12 | Stop reason: HOSPADM

## 2023-01-07 RX ORDER — ALBUTEROL SULFATE 90 UG/1
2 AEROSOL, METERED RESPIRATORY (INHALATION) EVERY 6 HOURS PRN
Status: DISCONTINUED | OUTPATIENT
Start: 2023-01-07 | End: 2023-01-12 | Stop reason: HOSPADM

## 2023-01-07 RX ADMIN — METOROPROLOL TARTRATE 5 MG: 5 INJECTION, SOLUTION INTRAVENOUS at 11:01

## 2023-01-07 RX ADMIN — LORAZEPAM 2 MG: 2 INJECTION INTRAMUSCULAR; INTRAVENOUS at 09:01

## 2023-01-07 RX ADMIN — HYDROXYZINE PAMOATE 50 MG: 25 CAPSULE ORAL at 03:01

## 2023-01-07 RX ADMIN — KETOROLAC TROMETHAMINE 15 MG: 30 INJECTION, SOLUTION INTRAMUSCULAR; INTRAVENOUS at 09:01

## 2023-01-07 RX ADMIN — CEFEPIME HYDROCHLORIDE 1 G: 1 INJECTION, SOLUTION INTRAVENOUS at 11:01

## 2023-01-07 RX ADMIN — ENOXAPARIN SODIUM 40 MG: 40 INJECTION SUBCUTANEOUS at 05:01

## 2023-01-07 RX ADMIN — IOHEXOL 100 ML: 350 INJECTION, SOLUTION INTRAVENOUS at 10:01

## 2023-01-07 RX ADMIN — VANCOMYCIN HYDROCHLORIDE 1250 MG: 1.25 INJECTION, POWDER, LYOPHILIZED, FOR SOLUTION INTRAVENOUS at 04:01

## 2023-01-07 RX ADMIN — MUPIROCIN: 20 OINTMENT TOPICAL at 11:01

## 2023-01-07 RX ADMIN — LORAZEPAM 2 MG: 2 INJECTION INTRAMUSCULAR; INTRAVENOUS at 07:01

## 2023-01-07 RX ADMIN — METRONIDAZOLE 500 MG: 500 TABLET ORAL at 03:01

## 2023-01-07 RX ADMIN — FUROSEMIDE 80 MG: 10 INJECTION, SOLUTION INTRAMUSCULAR; INTRAVENOUS at 11:01

## 2023-01-07 RX ADMIN — FUROSEMIDE 80 MG: 10 INJECTION, SOLUTION INTRAMUSCULAR; INTRAVENOUS at 09:01

## 2023-01-07 RX ADMIN — LORAZEPAM 2 MG: 2 INJECTION INTRAMUSCULAR; INTRAVENOUS at 05:01

## 2023-01-07 NOTE — ASSESSMENT & PLAN NOTE
Last ECHO (12/2022) EF 20-25%  Home diuretic dose Lasix 40mg BID  BNP on admit 3077  EKG sinus tachy, no STEMI, no significant change from 12/27/22   CXR revealed cardiomegaly with mild pulmonary edema   Troponin 0.058 on admission    PLAN:  - trend troponin until plateau/drops   - Diuresis w/ Lasix 80mg IV BID   - Daily Weights  - Strict I/O  - Fluid restriction to 1,200cc daily  - Low-sodium cardiac diet  - Reassess volume status regularly  - continue home beta-blocker, ACEi/ARB  - Oxygen PRN to keep O2 > 88%

## 2023-01-07 NOTE — SUBJECTIVE & OBJECTIVE
Past Medical History:   Diagnosis Date    ADHD (attention deficit hyperactivity disorder)     Anemia     Anxiety     Bipolar disorder     CHF (congestive heart failure)     History of hepatitis C - s/p clearance of virus (HCV neg 6/2015) 09/26/2014    History of psychiatric hospitalization     History of substance abuse     IV heroin - last use 2013 per pt    Hx of psychiatric care     Hypertension     Opioid overdose 05/10/2016    Psychiatric problem     Psychosis     Seizure disorder 04/03/2019    Sleep difficulties     Still's disease     Substance abuse     Therapy     Vasculitis        Past Surgical History:   Procedure Laterality Date    HYSTERECTOMY  3/16/2011    SALPINGOOPHORECTOMY Right 3/16/2011    TUBAL LIGATION      Vaginal cuff repair  04/22/2011       Review of patient's allergies indicates:  No Known Allergies    No current facility-administered medications on file prior to encounter.     Current Outpatient Medications on File Prior to Encounter   Medication Sig    albuterol (PROVENTIL/VENTOLIN HFA) 90 mcg/actuation inhaler Inhale 2 puffs into the lungs every 6 (six) hours. Rescue    benztropine (COGENTIN) 0.5 MG tablet Take 1 tablet (0.5 mg total) by mouth 2 (two) times daily.    famotidine (PEPCID) 20 MG tablet Take 1 tablet (20 mg total) by mouth once daily.    furosemide (LASIX) 40 MG tablet Take 1 tablet (40 mg total) by mouth 2 (two) times daily.    hydrocortisone 1 % cream Apply topically 2 (two) times daily.    losartan (COZAAR) 25 MG tablet Take 1 tablet (25 mg total) by mouth once daily.    metoprolol succinate (TOPROL-XL) 25 MG 24 hr tablet Take 1 tablet (25 mg total) by mouth once daily.    OXcarbazepine (TRILEPTAL) 600 MG Tab Take 2 tablets (1,200 mg total) by mouth 2 (two) times daily.    polyethylene glycol (GLYCOLAX) 17 gram PwPk Take 17 g by mouth once daily. for 60 doses    QUEtiapine (SEROQUEL) 200 MG Tab Take 1 tablet (200 mg total) by mouth every evening.    senna (SENOKOT) 8.6 mg  tablet Take 1 tablet by mouth once daily.    [DISCONTINUED] amLODIPine (NORVASC) 5 MG tablet Take 1 tablet (5 mg total) by mouth once daily.    [DISCONTINUED] risperiDONE (RISPERDAL) 1 MG tablet Take 1 mg by mouth 2 (two) times daily.     Family History       Problem Relation (Age of Onset)    Cancer Maternal Grandmother    Diabetes Maternal Grandmother          Tobacco Use    Smoking status: Every Day     Packs/day: 1.00     Years: 36.00     Pack years: 36.00     Types: Cigarettes     Start date: 1986    Smokeless tobacco: Never    Tobacco comments:     Enrolled in [employer] on 10/23/14 ([employer] Member ID # [id number]) Pt declines Ambulatory referral to Smoking Cessation clinic. Handout provided   Substance and Sexual Activity    Alcohol use: No    Drug use: Yes     Types: Heroin, Cocaine, Methamphetamines, Marijuana    Sexual activity: Not Currently     Partners: Male, Female     Birth control/protection: Other-see comments     Comment: hysterectomy     Review of Systems   Constitutional:  Negative for chills, fatigue and fever.   Respiratory:  Positive for shortness of breath. Negative for cough.    Cardiovascular:  Positive for palpitations. Negative for chest pain and leg swelling.        Speaking in full sentences   Gastrointestinal:  Negative for abdominal pain, constipation, diarrhea, nausea and vomiting.   Genitourinary:  Negative for dysuria and urgency.   Musculoskeletal:  Negative for back pain, joint swelling and myalgias.   Skin:  Negative for color change, pallor, rash and wound.   Neurological:  Negative for dizziness, light-headedness and headaches.   Psychiatric/Behavioral:  Positive for agitation and sleep disturbance. Negative for behavioral problems, confusion, decreased concentration and dysphoric mood. The patient is nervous/anxious. The patient is not hyperactive.    Objective:     Vital Signs (Most Recent):  Temp: 97.7 °F (36.5 °C) (01/07/23 0632)  Pulse: (!) 113 (01/07/23 0658)  Resp: (!)  24 (01/07/23 0658)  BP: (!) 142/109 (01/07/23 0658)  SpO2: 95 % (01/07/23 0658)   Vital Signs (24h Range):  Temp:  [97.7 °F (36.5 °C)] 97.7 °F (36.5 °C)  Pulse:  [113-128] 113  Resp:  [24-33] 24  SpO2:  [95 %-100 %] 95 %  BP: (142-154)/() 142/109     Weight: 54.4 kg (120 lb)  Body mass index is 23.44 kg/m².    Physical Exam  Constitutional:       General: She is not in acute distress.     Appearance: Normal appearance. She is not ill-appearing or diaphoretic.   HENT:      Head: Normocephalic and atraumatic.      Right Ear: External ear normal.      Left Ear: External ear normal.      Nose: Nose normal.   Eyes:      Extraocular Movements: Extraocular movements intact.   Cardiovascular:      Rate and Rhythm: Normal rate and regular rhythm.      Pulses: Normal pulses.      Heart sounds: Normal heart sounds.   Pulmonary:      Effort: Pulmonary effort is normal. No respiratory distress.      Breath sounds: Rales (b/l crackles) present.   Abdominal:      Tenderness: There is no guarding.   Musculoskeletal:         General: Swelling (+1 edema in BLE up to knees) present. Normal range of motion.   Skin:     General: Skin is warm and dry.      Capillary Refill: <3 seconds  Neurological:      Mental Status: She is alert and oriented to person, place, and time.   Psychiatric:         Mood and Affect: Mood is anxious.         Behavior: Behavior normal.           Significant Labs: All pertinent labs within the past 24 hours have been reviewed.  CBC:   Recent Labs   Lab 01/07/23 0711   WBC 7.19   HGB 10.0*   HCT 35.3*        CMP:   Recent Labs   Lab 01/07/23 0711      K 3.9      CO2 22*      BUN 20   CREATININE 1.1   CALCIUM 8.9   PROT 7.5   ALBUMIN 3.2*   BILITOT 1.2*   ALKPHOS 111   AST 40   ALT 36   ANIONGAP 12     Cardiac Markers:   Recent Labs   Lab 01/07/23 0711   BNP 3,077*     Troponin: 0.058    Significant Imaging: I have reviewed all pertinent imaging results/findings within the past  24 hours.    CXR: Persistent cardiomegaly. No focal lobar consolidation or pulmonary vascular congestion.

## 2023-01-07 NOTE — H&P
[facility] Medicine  History & Physical    Patient Name: Stephanie T Barthelemy  MRN: [id number]  Patient Class: IP- Inpatient  Admission Date: 1/7/2023  Attending Physician: Kade Lindsay III, MD   Primary Care Provider: Primary Doctor No         Patient information was obtained from patient, past medical records and ER records.     Subjective:     Principal Problem:CHF exacerbation    Chief Complaint:   Chief Complaint   Patient presents with    Shortness of Breath     Pt arrives via ems with complaints of dyspnea. Pt states she normally is a little short of breath but she woke up tonight and was much worse. Pt also complains of chest pain and bilateral leg pain. EMS states pt refused to wear nonrebreather and cpap. Pt arrives on 6LNC with an o2 sat of 100%.         HPI: 52F w ADHD, severe methamphetamine use disorder, bipolar I disorder, HTN, seizure disorder, HFrEF (LVEF of 20-25% on Echo done in 12/2022), with multiple admits for psychosis and/or CHF exacerbations in the setting of intermittent medication adherence in the past year, who was brought [facility] for fluid overload. Patient states she was taking all of her medications as prescribed up until presenting to the ED today. Patient states she began to feel SOB approx 3 days ago and that it has been worsening ever since. Patient admits to recurrent panic attacks and states she feels as if she's having one on admission. States last methamphetamine use was day PTA.     In ED, initial vital signs significant for afebrile, . /96, satting 100% on 2L NC. Initial PE reveals edema to patient's thighs b/l, lungs with bibasilar rales, Cap refill <3 seconds in b/l fingers/toes. Initial imaging CXR with persistent cardiomegaly but without focal lobar consolidation or pulmonary vascular congestion. Initial labs significant for BNP 3077, Troponin 0.058, Hb 10, MCV 74. Labs otherwise unremarkable. Initial EKG showing sinus tach, no STEMI, no  significant changes from 12/29/22. [facility] contacted for admission for CHF exacerbation in setting of medication noncompliance.        Past Medical History:   Diagnosis Date    ADHD (attention deficit hyperactivity disorder)     Anemia     Anxiety     Bipolar disorder     CHF (congestive heart failure)     History of hepatitis C - s/p clearance of virus (HCV neg 6/2015) 09/26/2014    History of psychiatric hospitalization     History of substance abuse     IV heroin - last use 2013 per pt    Hx of psychiatric care     Hypertension     Opioid overdose 05/10/2016    Psychiatric problem     Psychosis     Seizure disorder 04/03/2019    Sleep difficulties     Still's disease     Substance abuse     Therapy     Vasculitis        Past Surgical History:   Procedure Laterality Date    HYSTERECTOMY  3/16/2011    SALPINGOOPHORECTOMY Right 3/16/2011    TUBAL LIGATION      Vaginal cuff repair  04/22/2011       Review of patient's allergies indicates:  No Known Allergies    No current facility-administered medications on file prior to encounter.     Current Outpatient Medications on File Prior to Encounter   Medication Sig    albuterol (PROVENTIL/VENTOLIN HFA) 90 mcg/actuation inhaler Inhale 2 puffs into the lungs every 6 (six) hours. Rescue    benztropine (COGENTIN) 0.5 MG tablet Take 1 tablet (0.5 mg total) by mouth 2 (two) times daily.    famotidine (PEPCID) 20 MG tablet Take 1 tablet (20 mg total) by mouth once daily.    furosemide (LASIX) 40 MG tablet Take 1 tablet (40 mg total) by mouth 2 (two) times daily.    hydrocortisone 1 % cream Apply topically 2 (two) times daily.    losartan (COZAAR) 25 MG tablet Take 1 tablet (25 mg total) by mouth once daily.    metoprolol succinate (TOPROL-XL) 25 MG 24 hr tablet Take 1 tablet (25 mg total) by mouth once daily.    OXcarbazepine (TRILEPTAL) 600 MG Tab Take 2 tablets (1,200 mg total) by mouth 2 (two) times daily.    polyethylene glycol (GLYCOLAX) 17 gram PwPk Take 17 g by mouth once  daily. for 60 doses    QUEtiapine (SEROQUEL) 200 MG Tab Take 1 tablet (200 mg total) by mouth every evening.    senna (SENOKOT) 8.6 mg tablet Take 1 tablet by mouth once daily.    [DISCONTINUED] amLODIPine (NORVASC) 5 MG tablet Take 1 tablet (5 mg total) by mouth once daily.    [DISCONTINUED] risperiDONE (RISPERDAL) 1 MG tablet Take 1 mg by mouth 2 (two) times daily.     Family History       Problem Relation (Age of Onset)    Cancer Maternal Grandmother    Diabetes Maternal Grandmother          Tobacco Use    Smoking status: Every Day     Packs/day: 1.00     Years: 36.00     Pack years: 36.00     Types: Cigarettes     Start date: 1986    Smokeless tobacco: Never    Tobacco comments:     Enrolled in [employer] on 10/23/14 ([employer] Member ID # [id number]) Pt declines Ambulatory referral to Smoking Cessation clinic. Handout provided   Substance and Sexual Activity    Alcohol use: No    Drug use: Yes     Types: Heroin, Cocaine, Methamphetamines, Marijuana    Sexual activity: Not Currently     Partners: Male, Female     Birth control/protection: Other-see comments     Comment: hysterectomy     Review of Systems   Constitutional:  Negative for chills, fatigue and fever.   Respiratory:  Positive for shortness of breath. Negative for cough.    Cardiovascular:  Positive for palpitations. Negative for chest pain and leg swelling.        Speaking in full sentences   Gastrointestinal:  Negative for abdominal pain, constipation, diarrhea, nausea and vomiting.   Genitourinary:  Negative for dysuria and urgency.   Musculoskeletal:  Negative for back pain, joint swelling and myalgias.   Skin:  Negative for color change, pallor, rash and wound.   Neurological:  Negative for dizziness, light-headedness and headaches.   Psychiatric/Behavioral:  Positive for agitation and sleep disturbance. Negative for behavioral problems, confusion, decreased concentration and dysphoric mood. The patient is nervous/anxious. The patient is not  hyperactive.    Objective:     Vital Signs (Most Recent):  Temp: 97.7 °F (36.5 °C) (01/07/23 0632)  Pulse: (!) 113 (01/07/23 0658)  Resp: (!) 24 (01/07/23 0658)  BP: (!) 142/109 (01/07/23 0658)  SpO2: 95 % (01/07/23 0658)   Vital Signs (24h Range):  Temp:  [97.7 °F (36.5 °C)] 97.7 °F (36.5 °C)  Pulse:  [113-128] 113  Resp:  [24-33] 24  SpO2:  [95 %-100 %] 95 %  BP: (142-154)/() 142/109     Weight: 54.4 kg (120 lb)  Body mass index is 23.44 kg/m².    Physical Exam  Constitutional:       General: She is not in acute distress.     Appearance: Normal appearance. She is not ill-appearing or diaphoretic.   HENT:      Head: Normocephalic and atraumatic.      Right Ear: External ear normal.      Left Ear: External ear normal.      Nose: Nose normal.   Eyes:      Extraocular Movements: Extraocular movements intact.   Cardiovascular:      Rate and Rhythm: Normal rate and regular rhythm.      Pulses: Normal pulses.      Heart sounds: Normal heart sounds.   Pulmonary:      Effort: Pulmonary effort is normal. No respiratory distress.      Breath sounds: Rales (b/l crackles) present.   Abdominal:      Tenderness: There is no guarding.   Musculoskeletal:         General: Swelling (+1 edema in BLE up to knees) present. Normal range of motion.   Skin:     General: Skin is warm and dry.      Capillary Refill: <3 seconds  Neurological:      Mental Status: She is alert and oriented to person, place, and time.   Psychiatric:         Mood and Affect: Mood is anxious.         Behavior: Behavior normal.           Significant Labs: All pertinent labs within the past 24 hours have been reviewed.  CBC:   Recent Labs   Lab 01/07/23 0711   WBC 7.19   HGB 10.0*   HCT 35.3*        CMP:   Recent Labs   Lab 01/07/23 0711      K 3.9      CO2 22*      BUN 20   CREATININE 1.1   CALCIUM 8.9   PROT 7.5   ALBUMIN 3.2*   BILITOT 1.2*   ALKPHOS 111   AST 40   ALT 36   ANIONGAP 12     Cardiac Markers:   Recent Labs   Lab  "01/07/23  0711   BNP 3,077*     Troponin: 0.058    Significant Imaging: I have reviewed all pertinent imaging results/findings within the past 24 hours.    CXR: Persistent cardiomegaly. No focal lobar consolidation or pulmonary vascular congestion.    Assessment/Plan:     * CHF exacerbation  Last ECHO (12/2022) EF 20-25%  Home diuretic dose Lasix 40mg BID  BNP on admit 3077  EKG sinus tachy, no STEMI, no significant change from 12/27/22   CXR revealed cardiomegaly with mild pulmonary edema   Troponin 0.058 on admission    PLAN:  - trend troponin until plateau/drops   - Diuresis w/ Lasix 80mg IV BID   - Daily Weights  - Strict I/O  - Fluid restriction to 1,200cc daily  - Low-sodium cardiac diet  - Reassess volume status regularly  - continue home beta-blocker, ACEi/ARB  - Oxygen PRN to keep O2 > 88%    Panic attacks  Patient required one time push of ativan 2mg IV on admission due to panic attack    PLAN:  -continue home seroquel, trileptal  -monitor for additional panic attacks while in hospital   -vistaril TID PRN anxiety    Chronic combined systolic and diastolic congestive heart failure  See above plan under "CHF exacerbation"    Methamphetamine use disorder, severe  Last reported methamphetamine use day before admission    PLAN:   -monitor for s/s of methamphatamine withdrawal   -continue home seroquel, trileptal       VTE Risk Mitigation (From admission, onward)           Ordered     enoxaparin injection 40 mg  Daily         01/07/23 0835     IP VTE HIGH RISK PATIENT  Once         01/07/23 0835     Place sequential compression device  Until discontinued         01/07/23 0835                       Rip Valerio DO  Department of Hospital Medicine   [place] - Emergency Dept  "

## 2023-01-07 NOTE — ED NOTES
Encouraged pt to use purewick vs in and out cath   Pt verbalizes understanding and reports she will try

## 2023-01-07 NOTE — PROGRESS NOTES
Pharmacokinetic Initial Assessment: IV Vancomycin    Assessment/Plan:    Initiate intravenous vancomycin with loading dose of 1250 mg once followed by a maintenance dose of vancomycin 750 mg IV every 24 hours  Desired empiric serum trough concentration is 15 to 20 mcg/mL  Draw vancomycin trough level 60 min prior to third dose on 1/9 at approximately 1500  Pharmacy will continue to follow and monitor vancomycin.      Please contact pharmacy at extension [phone] with any questions regarding this assessment.     Thank you for the consult,   Paty Vega       Patient brief summary:  Stephanie T Barthelemy is a 52 y.o. female initiated on antimicrobial therapy with IV Vancomycin for treatment of suspected sepsis    Drug Allergies:   Review of patient's allergies indicates:  No Known Allergies    Actual Body Weight:   54.4 kg    Renal Function:   Estimated Creatinine Clearance: 43.5 mL/min (based on SCr of 1.1 mg/dL).,       CBC (last 72 hours):  Recent Labs   Lab Result Units 01/07/23  0711   WBC K/uL 7.19   Hemoglobin g/dL 10.0*   Hematocrit % 35.3*   Platelets K/uL 191   Gran % % 69.6   Lymph % % 20.4   Mono % % 7.9   Eosinophil % % 1.1   Basophil % % 0.6   Differential Method  Automated       Metabolic Panel (last 72 hours):  Recent Labs   Lab Result Units 01/07/23  0711 01/07/23  0947   Sodium mmol/L 138  --    Potassium mmol/L 3.9  --    Chloride mmol/L 104  --    CO2 mmol/L 22*  --    Glucose mg/dL 103  --    BUN mg/dL 20  --    Creatinine mg/dL 1.1  --    Creatinine, Urine mg/dL  --  165.3   Albumin g/dL 3.2*  --    Total Bilirubin mg/dL 1.2*  --    Alkaline Phosphatase U/L 111  --    AST U/L 40  --    ALT U/L 36  --        Drug levels (last 3 results):  No results for input(s): VANCOMYCINRA, VANCORANDOM, VANCOMYCINPE, VANCOPEAK, VANCOMYCINTR, VANCOTROUGH in the last 72 hours.    Microbiologic Results:  Microbiology Results (last 7 days)       ** No results found for the last 168 hours. **

## 2023-01-07 NOTE — ED NOTES
Pt standing on side of bed eating breakfast tray   Pulled out IV   And urinated on floor  Upon RN entering room pt began to cry   This RN and ED tech assisted pt back into stretcher.  Pt placed in brief, back on continuous cardiac monitor, BP cuff, and pulse ox  Side rails up x 2 and call light within reach   Lights left on and pt visible from nurse's station

## 2023-01-07 NOTE — ASSESSMENT & PLAN NOTE
Last ECHO (12/2022) EF 20-25%  Home diuretic dose Lasix 40mg BID  BNP on admit 3077  EKG sinus tachy, no STEMI, no significant change from 12/27/22   CXR revealed cardiomegaly with mild pulmonary edema   Troponin 0.058 --> 0.047 --> 0.061 --> 0.061, no longer trending     PLAN:  - Diuresis w/ Lasix 80mg IV BID   - Daily Weights  - Strict I/O  - Fluid restriction to 1,200cc daily  - Low-sodium cardiac diet  - Reassess volume status regularly  - continue home beta-blocker, ACEi/ARB  - Oxygen PRN to keep O2 > 88%

## 2023-01-07 NOTE — ED NOTES
Pt continues to scream and demand food   Pt has tray in front of her   Admit team informed PRN anxiety medication given at 1525 with no relief   Orders to be placed

## 2023-01-07 NOTE — PROGRESS NOTES
Pharmacist Renal Dose Adjustment Note    Stephanie T Barthelemy is a 52 y.o. female being treated with the medication cefepime    Patient Data:    Vital Signs (Most Recent):  Temp: 97.7 °F (36.5 °C) (01/07/23 0632)  Pulse: (!) 118 (01/07/23 1102)  Resp: (!) 26 (01/07/23 1102)  BP: (!) 145/103 (01/07/23 1102)  SpO2: 100 % (01/07/23 1102)   Vital Signs (72h Range):  Temp:  [97.7 °F (36.5 °C)]   Pulse:  [113-128]   Resp:  [24-33]   BP: (128-154)/()   SpO2:  [95 %-100 %]      Recent Labs   Lab 01/07/23 0711   CREATININE 1.1     Serum creatinine: 1.1 mg/dL 01/07/23 0711  Estimated creatinine clearance: 43.5 mL/min    Medication:cefepime dose: 1 gm frequency Q 8 hrs will be changed to medication:cefepime dose:1 gm frequency:Q 12 hrs     Pharmacist's Name: Paty Vega  Pharmacist's Extension: [phone]

## 2023-01-07 NOTE — ASSESSMENT & PLAN NOTE
Patient required one time push of ativan 2mg IV on admission due to panic attack    PLAN:  -continue home seroquel, trileptal  -monitor for additional panic attacks while in hospital   -vistaril TID PRN anxiety

## 2023-01-07 NOTE — ASSESSMENT & PLAN NOTE
Last reported methamphetamine use day before admission    PLAN:   -monitor for s/s of methamphatamine withdrawal   -continue home seroquel, trileptal

## 2023-01-07 NOTE — ED NOTES
"Pt screaming "give me food "   HOB elevated pillow placed behind pt's head/upper back   Provided pt with meal tray   "

## 2023-01-07 NOTE — ED NOTES
Secure chat message sent to admit team for lactic acid result  No additional urine output since administration of lasix     Advised per admit team     we're starting broad spectrum abx, we'll continue to monitor her UOP. If she doesn't have any UOP in the next hour or so, please bladder scan and if >300mL do an in and out cath

## 2023-01-07 NOTE — ED NOTES
52 y.o. female with recent diagnosis of CHF to ED with c.o. shortness of breath upon waking this morning. Patient extremely anxious and yelling out. Patient denies chest pain, denies n/v/d, denies fever/chills. Per EMS patients O2 saturation was 95 on room air but her work of breath was increased. Patient refused bipap. Patient awake, alert, and oriented x 4. No apparent distress noted. Patient tachycardiac and tachypneic.VS otherwise stable. Patient assisted onto stretcher and changed into a gown. Patient placed on cardiac monitor, continuous pulse oximetry and automatic blood pressure cuff. Bed placed in low locked position, side rails up x 2, call light is within reach of patient orientation to room and explanation of wait provided to patient, alarms set and turned on for monitor and pulse ox, awaiting MD evaluation and orders, will continue to monitor.

## 2023-01-07 NOTE — ED PROVIDER NOTES
Encounter Date: 1/7/2023    SCRIBE #1 NOTE: I, Bentley Mahan, am scribing for, and in the presence of,  Desirae Galeano MD. I have scribed the following portions of the note - Other sections scribed: HPI, ROS, Physical Exam.     History     Chief Complaint   Patient presents with    Shortness of Breath     Pt arrives via ems with complaints of dyspnea. Pt states she normally is a little short of breath but she woke up tonight and was much worse. Pt also complains of chest pain and bilateral leg pain. EMS states pt refused to wear nonrebreather and cpap. Pt arrives on 6LNC with an o2 sat of 100%.      Stephanie T Barthelemy is a 52 y.o. female with a past medical history of CHF who presents to the ED with shortness of breath. Patient reports waking up this morning severely short of breath. She also complains of leg swelling and abdominal distension. She reports that she is compliant with her Lasix. Patient states that she was seen in the ED 12/26 for similar symptoms.    The history is provided by the patient. No  was used.   Review of patient's allergies indicates:  No Known Allergies  Past Medical History:   Diagnosis Date    ADHD (attention deficit hyperactivity disorder)     Anemia     Anxiety     Bipolar disorder     CHF (congestive heart failure)     History of hepatitis C - s/p clearance of virus (HCV neg 6/2015) 09/26/2014    History of psychiatric hospitalization     History of substance abuse     IV heroin - last use 2013 per pt    Hx of psychiatric care     Hypertension     Opioid overdose 05/10/2016    Psychiatric problem     Psychosis     Seizure disorder 04/03/2019    Sleep difficulties     Still's disease     Substance abuse     Therapy     Vasculitis      Past Surgical History:   Procedure Laterality Date    HYSTERECTOMY  3/16/2011    SALPINGOOPHORECTOMY Right 3/16/2011    TUBAL LIGATION      Vaginal cuff repair  04/22/2011     Family History   Problem Relation Age of Onset     Diabetes Maternal Grandmother     Cancer Maternal Grandmother      Social History     Tobacco Use    Smoking status: Every Day     Packs/day: 1.00     Years: 36.00     Pack years: 36.00     Types: Cigarettes     Start date:     Smokeless tobacco: Never    Tobacco comments:     Enrolled in [employer] on 10/23/14 ([employer] Member ID # [id number]) Pt declines Ambulatory referral to Smoking Cessation clinic. Handout provided   Substance Use Topics    Alcohol use: No    Drug use: Yes     Types: Heroin, Cocaine, Methamphetamines, Marijuana     Review of Systems   Respiratory:  Positive for shortness of breath.    Cardiovascular:  Positive for leg swelling.   Gastrointestinal:  Positive for abdominal distention.     Physical Exam     Initial Vitals [23 0632]   BP Pulse Resp Temp SpO2   (!) 154/96 (!) 128 (!) 33 97.7 °F (36.5 °C) 100 %      MAP       --         Physical Exam    Nursing note and vitals reviewed.  Constitutional: She appears well-developed and well-nourished.   HENT:   Head: Normocephalic and atraumatic.   Mouth/Throat: Oropharynx is clear and moist.   Eyes: Conjunctivae and EOM are normal. Pupils are equal, round, and reactive to light.   Neck: Neck supple.   Normal range of motion.  Cardiovascular:  Normal rate, regular rhythm, normal heart sounds and intact distal pulses.     Exam reveals no gallop and no friction rub.       No murmur heard.  Pulmonary/Chest: She has rales (bibasilar).   Abdominal: Abdomen is soft. Bowel sounds are normal. She exhibits no distension. There is no abdominal tenderness.   +Ascites There is no rebound and no guarding.   Musculoskeletal:         General: Edema (1+ pitting edema to BLE) present. No tenderness. Normal range of motion.      Cervical back: Normal range of motion and neck supple.      Right lower le+ Pitting Edema present.      Left lower le+ Pitting Edema present.     Lymphadenopathy:     She has no cervical adenopathy.   Neurological: She is alert  and oriented to person, place, and time. She has normal strength and normal reflexes.   Skin: Skin is warm and dry.   Psychiatric: She has a normal mood and affect. Her behavior is normal. Judgment and thought content normal.       ED Course   Critical Care    Date/Time: 1/7/2023 8:25 AM  Performed by: Desirae Galeano MD  Authorized by: Desirae Galeano MD   Total critical care time (exclusive of procedural time) : 0 minutes  Critical care time was exclusive of separately billable procedures and treating other patients.  Critical care was necessary to treat or prevent imminent or life-threatening deterioration of the following conditions: cardiac failure and circulatory failure.  Critical care was time spent personally by me on the following activities: development of treatment plan with patient or surrogate, discussions with consultants, evaluation of patient's response to treatment, examination of patient, obtaining history from patient or surrogate, ordering and performing treatments and interventions, ordering and review of laboratory studies, ordering and review of radiographic studies, pulse oximetry, re-evaluation of patient's condition and review of old charts.      Labs Reviewed   TROPONIN I - Abnormal; Notable for the following components:       Result Value    Troponin I 0.058 (*)     All other components within normal limits   CBC W/ AUTO DIFFERENTIAL - Abnormal; Notable for the following components:    Hemoglobin 10.0 (*)     Hematocrit 35.3 (*)     MCV 74 (*)     MCH 21.0 (*)     MCHC 28.3 (*)     RDW 21.9 (*)     All other components within normal limits   COMPREHENSIVE METABOLIC PANEL - Abnormal; Notable for the following components:    CO2 22 (*)     Albumin 3.2 (*)     Total Bilirubin 1.2 (*)     All other components within normal limits   B-TYPE NATRIURETIC PEPTIDE - Abnormal; Notable for the following components:    BNP 3,077 (*)     All other components within normal limits   B-TYPE NATRIURETIC  PEPTIDE   DRUG SCREEN PANEL, URINE EMERGENCY     EKG Readings: (Independently Interpreted)   Initial: 0642. Heart Rate: 114. ST Segments: Normal ST Segments. T Waves: Normal. Clinical Impression: Sinus Tachycardia with LBBB     Imaging Results              X-Ray Chest AP Portable (Final result)  Result time 01/07/23 07:06:14      Final result by Srinivasa Khalil MD (01/07/23 07:06:14)                   Impression:      Persistent cardiomegaly.  No focal lobar consolidation or pulmonary vascular congestion.      Electronically signed by: Srinivasa Khalil MD  Date:    01/07/2023  Time:    07:06               Narrative:    EXAMINATION:  XR CHEST AP PORTABLE    CLINICAL HISTORY:  CHF;    TECHNIQUE:  Single frontal view of the chest was performed.    COMPARISON:  12/26/2022    FINDINGS:  The lungs are clear with normal appearance of pulmonary vasculature. No pleural effusion. No evident pneumothorax.    The cardiac silhouette is enlarged.  Cardiomyopathy, pericardial effusion and treated CHF could all have this appearance.  The hilar and mediastinal contours are unremarkable.    Bones are intact.                                       Medications   furosemide injection 40 mg (has no administration in time range)     Medical Decision Making:   Clinical Tests:   Lab Tests: Ordered and Reviewed  The following lab test(s) were unremarkable: CBC, CMP, BNP, Urinalysis and Troponin  Radiological Study: Ordered and Reviewed  Medical Tests: Ordered and Reviewed  ED Management:  The patient came to the emergency department with shortness of breath.    Old charts reviewed. She has a history of congestive heart failure, substance abuse and bipolar disorder having been noncompliant with her medications in the past.  She was admitted to the hospital on 12/26/2022 and discharged on 12/30/2022 from the family medicine service for an acute exacerbation of congestive heart failure and bipolar decompensation.  CBC ordered and reviewed.     CMP ordered and reviewed.    Troponin ordered and reviewed.  Troponin is elevated.  Her creatinine is 1.1, so this is not an issue with renal clearance.  Is likely caused by the fluid overload and strain on the heart.  She is not having any chest pain and does not have any EKG consistent with a STEMI.  BNP ordered and reviewed.  The BNP is 3000.  She will be given Lasix 40 mg IVP and admitted to the [facility] Family medicine service.    Chest x-ray ordered and reviewed.  The patient has cardiomegaly, perihilar fullness and small pleural effusions at the bilateral bases.          Scribe Attestation:   Scribe #1: I performed the above scribed service and the documentation accurately describes the services I performed. I attest to the accuracy of the note.                   Clinical Impression:   Final diagnoses:  [R06.02] Shortness of breath (Primary)  [I50.9] Acute on chronic congestive heart failure, unspecified heart failure type        ED Disposition Condition    Observation Stable        I, Desirae Galeano, personally performed the services described in this documentation. All medical record entries made by the scribe were at my direction and in my presence.  I have reviewed the chart and agree that the record reflects my personal performance and is accurate and complete. Desirae Galeano M.D. 7:47 AM01/07/2023         Desirae Galeano MD  01/07/23 0825       Desirae Galeano MD  01/07/23 1024

## 2023-01-07 NOTE — ASSESSMENT & PLAN NOTE
Lactate on admission 4.6 (resolved)  1/8: Lactate 2.0    PLAN:  -initiate BSA on admission (vanc/cef/flagyl)  -trend lactate until plateau/downtrend

## 2023-01-07 NOTE — ED NOTES
Pt resting in stretcher   Eyes closed   Respirations even and mildly labored   Pt remains on 3L o2 via NC   Side rails up x 2 and call light within reach

## 2023-01-07 NOTE — HPI
52F w ADHD, severe methamphetamine use disorder, bipolar I disorder, HTN, seizure disorder, HFrEF (LVEF of 20-25% on Echo done in 12/2022), with multiple admits for psychosis and/or CHF exacerbations in the setting of intermittent medication adherence in the past year, who was brought [facility] for fluid overload. Patient states she was taking all of her medications as prescribed up until presenting to the ED today. Patient states she began to feel SOB approx 3 days ago and that it has been worsening ever since. Patient admits to recurrent panic attacks and states she feels as if she's having one on admission. Patient states she last used methamphetamine day PTA.     In ED, initial vital signs significant for afebrile, . /96, satting 100% on 2L NC. Initial PE reveals edema to patient's thighs b/l, lungs with bibasilar rales, Cap refill <3 seconds in b/l fingers/toes. Initial imaging CXR with persistent cardiomegaly but without focal lobar consolidation or pulmonary vascular congestion. Initial labs significant for BNP 3077, Troponin 0.058, Hb 10, MCV 74. Labs otherwise unremarkable. Initial EKG showing sinus tach, no STEMI, no significant changes from 12/29/22. [facility] contacted for admission for CHF exacerbation in setting of medication noncompliance.

## 2023-01-07 NOTE — ED NOTES
Pt screaming, working hard to breath, cyanosis noted to lips and all fingertips  HOB elevated NC placed back on pt  Encouraged pt to take slow deep breaths   Pt requesting medication for anxiety  Admit team called and at bedside for assessment

## 2023-01-08 PROBLEM — I26.99 PULMONARY EMBOLUS: Status: ACTIVE | Noted: 2023-01-08

## 2023-01-08 PROBLEM — I26.99 PULMONARY EMBOLUS: Chronic | Status: ACTIVE | Noted: 2023-01-08

## 2023-01-08 LAB
ALBUMIN SERPL BCP-MCNC: 2.9 G/DL (ref 3.5–5.2)
ALLENS TEST: ABNORMAL
ALP SERPL-CCNC: 98 U/L (ref 55–135)
ALT SERPL W/O P-5'-P-CCNC: 37 U/L (ref 10–44)
AMMONIA PLAS-SCNC: 34 UMOL/L (ref 10–50)
ANION GAP SERPL CALC-SCNC: 8 MMOL/L (ref 8–16)
AST SERPL-CCNC: 43 U/L (ref 10–40)
BASOPHILS # BLD AUTO: 0.07 K/UL (ref 0–0.2)
BASOPHILS NFR BLD: 1 % (ref 0–1.9)
BILIRUB SERPL-MCNC: 2 MG/DL (ref 0.1–1)
BUN SERPL-MCNC: 22 MG/DL (ref 6–20)
CALCIUM SERPL-MCNC: 8.8 MG/DL (ref 8.7–10.5)
CHLORIDE SERPL-SCNC: 103 MMOL/L (ref 95–110)
CO2 SERPL-SCNC: 27 MMOL/L (ref 23–29)
CREAT SERPL-MCNC: 0.9 MG/DL (ref 0.5–1.4)
DELSYS: ABNORMAL
DIFFERENTIAL METHOD: ABNORMAL
EOSINOPHIL # BLD AUTO: 0 K/UL (ref 0–0.5)
EOSINOPHIL NFR BLD: 0.3 % (ref 0–8)
ERYTHROCYTE [DISTWIDTH] IN BLOOD BY AUTOMATED COUNT: 21.4 % (ref 11.5–14.5)
EST. GFR  (NO RACE VARIABLE): >60 ML/MIN/1.73 M^2
GLUCOSE SERPL-MCNC: 97 MG/DL (ref 70–110)
HCO3 UR-SCNC: 26.3 MMOL/L (ref 24–28)
HCT VFR BLD AUTO: 33.3 % (ref 37–48.5)
HGB BLD-MCNC: 9.5 G/DL (ref 12–16)
IMM GRANULOCYTES # BLD AUTO: 0.02 K/UL (ref 0–0.04)
IMM GRANULOCYTES NFR BLD AUTO: 0.3 % (ref 0–0.5)
LACTATE SERPL-SCNC: 2 MMOL/L (ref 0.5–2.2)
LYMPHOCYTES # BLD AUTO: 1.8 K/UL (ref 1–4.8)
LYMPHOCYTES NFR BLD: 24 % (ref 18–48)
MAGNESIUM SERPL-MCNC: 1.6 MG/DL (ref 1.6–2.6)
MCH RBC QN AUTO: 20.5 PG (ref 27–31)
MCHC RBC AUTO-ENTMCNC: 28.5 G/DL (ref 32–36)
MCV RBC AUTO: 72 FL (ref 82–98)
MONOCYTES # BLD AUTO: 0.6 K/UL (ref 0.3–1)
MONOCYTES NFR BLD: 7.8 % (ref 4–15)
NEUTROPHILS # BLD AUTO: 4.9 K/UL (ref 1.8–7.7)
NEUTROPHILS NFR BLD: 66.6 % (ref 38–73)
NRBC BLD-RTO: 0 /100 WBC
PCO2 BLDA: 39.5 MMHG (ref 35–45)
PH SMN: 7.43 [PH] (ref 7.35–7.45)
PHOSPHATE SERPL-MCNC: 3.6 MG/DL (ref 2.7–4.5)
PLATELET # BLD AUTO: 180 K/UL (ref 150–450)
PMV BLD AUTO: 10.9 FL (ref 9.2–12.9)
PO2 BLDA: 115 MMHG (ref 40–60)
POC BE: 2 MMOL/L
POC SATURATED O2: 99 % (ref 95–100)
POC TCO2: 27 MMOL/L (ref 24–29)
POCT GLUCOSE: 103 MG/DL (ref 70–110)
POCT GLUCOSE: 105 MG/DL (ref 70–110)
POCT GLUCOSE: 108 MG/DL (ref 70–110)
POCT GLUCOSE: 167 MG/DL (ref 70–110)
POTASSIUM SERPL-SCNC: 3.4 MMOL/L (ref 3.5–5.1)
PROT SERPL-MCNC: 7.1 G/DL (ref 6–8.4)
RBC # BLD AUTO: 4.64 M/UL (ref 4–5.4)
SAMPLE: ABNORMAL
SITE: ABNORMAL
SODIUM SERPL-SCNC: 138 MMOL/L (ref 136–145)
WBC # BLD AUTO: 7.33 K/UL (ref 3.9–12.7)

## 2023-01-08 PROCEDURE — 36415 COLL VENOUS BLD VENIPUNCTURE: CPT | Performed by: STUDENT IN AN ORGANIZED HEALTH CARE EDUCATION/TRAINING PROGRAM

## 2023-01-08 PROCEDURE — 94761 N-INVAS EAR/PLS OXIMETRY MLT: CPT

## 2023-01-08 PROCEDURE — 85025 COMPLETE CBC W/AUTO DIFF WBC: CPT

## 2023-01-08 PROCEDURE — 83735 ASSAY OF MAGNESIUM: CPT

## 2023-01-08 PROCEDURE — 84100 ASSAY OF PHOSPHORUS: CPT

## 2023-01-08 PROCEDURE — 63600175 PHARM REV CODE 636 W HCPCS

## 2023-01-08 PROCEDURE — 83605 ASSAY OF LACTIC ACID: CPT

## 2023-01-08 PROCEDURE — 82140 ASSAY OF AMMONIA: CPT | Performed by: STUDENT IN AN ORGANIZED HEALTH CARE EDUCATION/TRAINING PROGRAM

## 2023-01-08 PROCEDURE — 82803 BLOOD GASES ANY COMBINATION: CPT

## 2023-01-08 PROCEDURE — 25000003 PHARM REV CODE 250: Performed by: STUDENT IN AN ORGANIZED HEALTH CARE EDUCATION/TRAINING PROGRAM

## 2023-01-08 PROCEDURE — 25000003 PHARM REV CODE 250: Performed by: FAMILY MEDICINE

## 2023-01-08 PROCEDURE — 99900035 HC TECH TIME PER 15 MIN (STAT)

## 2023-01-08 PROCEDURE — 11000001 HC ACUTE MED/SURG PRIVATE ROOM

## 2023-01-08 PROCEDURE — 36415 COLL VENOUS BLD VENIPUNCTURE: CPT

## 2023-01-08 PROCEDURE — 63600175 PHARM REV CODE 636 W HCPCS: Performed by: FAMILY MEDICINE

## 2023-01-08 PROCEDURE — 80053 COMPREHEN METABOLIC PANEL: CPT

## 2023-01-08 PROCEDURE — 27000221 HC OXYGEN, UP TO 24 HOURS

## 2023-01-08 RX ADMIN — KETOROLAC TROMETHAMINE 15 MG: 30 INJECTION, SOLUTION INTRAMUSCULAR; INTRAVENOUS at 04:01

## 2023-01-08 RX ADMIN — FUROSEMIDE 80 MG: 10 INJECTION, SOLUTION INTRAMUSCULAR; INTRAVENOUS at 09:01

## 2023-01-08 RX ADMIN — FAMOTIDINE 20 MG: 20 TABLET ORAL at 05:01

## 2023-01-08 RX ADMIN — METOPROLOL SUCCINATE 25 MG: 25 TABLET, EXTENDED RELEASE ORAL at 04:01

## 2023-01-08 RX ADMIN — BENZTROPINE MESYLATE 0.5 MG: 0.5 TABLET ORAL at 09:01

## 2023-01-08 RX ADMIN — METRONIDAZOLE 500 MG: 500 TABLET ORAL at 10:01

## 2023-01-08 RX ADMIN — MUPIROCIN: 20 OINTMENT TOPICAL at 09:01

## 2023-01-08 RX ADMIN — CEFEPIME HYDROCHLORIDE 1 G: 1 INJECTION, SOLUTION INTRAVENOUS at 12:01

## 2023-01-08 RX ADMIN — BENZTROPINE MESYLATE 0.5 MG: 0.5 TABLET ORAL at 05:01

## 2023-01-08 RX ADMIN — MUPIROCIN: 20 OINTMENT TOPICAL at 11:01

## 2023-01-08 RX ADMIN — OXCARBAZEPINE 600 MG: 150 TABLET, FILM COATED ORAL at 04:01

## 2023-01-08 RX ADMIN — ENOXAPARIN SODIUM 20 MG: 100 INJECTION SUBCUTANEOUS at 12:01

## 2023-01-08 RX ADMIN — METRONIDAZOLE 500 MG: 500 TABLET ORAL at 01:01

## 2023-01-08 RX ADMIN — QUETIAPINE FUMARATE 150 MG: 100 TABLET ORAL at 09:01

## 2023-01-08 RX ADMIN — ENOXAPARIN SODIUM 60 MG: 100 INJECTION SUBCUTANEOUS at 09:01

## 2023-01-08 RX ADMIN — METRONIDAZOLE 500 MG: 500 TABLET ORAL at 06:01

## 2023-01-08 RX ADMIN — VANCOMYCIN HYDROCHLORIDE 750 MG: 750 INJECTION, POWDER, LYOPHILIZED, FOR SOLUTION INTRAVENOUS at 04:01

## 2023-01-08 RX ADMIN — LOSARTAN POTASSIUM 25 MG: 25 TABLET, FILM COATED ORAL at 04:01

## 2023-01-08 RX ADMIN — OXCARBAZEPINE 600 MG: 150 TABLET, FILM COATED ORAL at 09:01

## 2023-01-08 RX ADMIN — FUROSEMIDE 80 MG: 10 INJECTION, SOLUTION INTRAMUSCULAR; INTRAVENOUS at 11:01

## 2023-01-08 NOTE — NURSING
Charge nurse note- patient back from CTA. Pure wick connected back to suction. CLEMENTEPOLY RODRIGUEZS tele sitter at bedside. Safety ensured.

## 2023-01-08 NOTE — ED NOTES
Pt continues to scream   Removing gown  Gown placed back on pt   Side rails up x 2 call light within reach   Pt remains in view from nurse's station

## 2023-01-08 NOTE — PROGRESS NOTES
[facility] Medicine  Progress Note    Patient Name: Stephanie T Barthelemy  MRN: [id number]  Patient Class: IP- Inpatient   Admission Date: 1/7/2023  Length of Stay: 1 days  Attending Physician: Kade Lindsay III, MD  Primary Care Provider: Primary Doctor No        Subjective:     Principal Problem:CHF exacerbation        HPI:  52F w ADHD, severe methamphetamine use disorder, bipolar I disorder, HTN, seizure disorder, HFrEF (LVEF of 20-25% on Echo done in 12/2022), with multiple admits for psychosis and/or CHF exacerbations in the setting of intermittent medication adherence in the past year, who was brought [facility] for fluid overload. Patient states she was taking all of her medications as prescribed up until presenting to the ED today. Patient states she began to feel SOB approx 3 days ago and that it has been worsening ever since. Patient admits to recurrent panic attacks and states she feels as if she's having one on admission. Patient states she last used methamphetamine day PTA.     In ED, initial vital signs significant for afebrile, . /96, satting 100% on 2L NC. Initial PE reveals edema to patient's thighs b/l, lungs with bibasilar rales, Cap refill <3 seconds in b/l fingers/toes. Initial imaging CXR with persistent cardiomegaly but without focal lobar consolidation or pulmonary vascular congestion. Initial labs significant for BNP 3077, Troponin 0.058, Hb 10, MCV 74. Labs otherwise unremarkable. Initial EKG showing sinus tach, no STEMI, no significant changes from 12/29/22. [facility] contacted for admission for CHF exacerbation in setting of medication noncompliance.        Overview/Hospital Course:  No notes on file    Interval History: Patient was continuing to have tachypnea and tachycardia despite SpO2 98% and 6mg ativan over course of day yesterday CTA PE study showed right lower lobe segmental branches PE. No evidence of large/central pulmonary embolism and no convincing evidence of  right ventricular strain. Lovenox 1 mg/kg BID started. SOB improved today. Somnolent likely secondary to amphetamine withdrawal and ativan. Will continue to anticoagulate and diurese.     Review of Systems   Constitutional:  Positive for fatigue. Negative for diaphoresis and fever.   HENT:  Negative for congestion and sinus pressure.    Respiratory:  Positive for shortness of breath. Negative for cough and wheezing.    Cardiovascular:  Positive for leg swelling. Negative for chest pain.   Gastrointestinal:  Negative for constipation, diarrhea, nausea and vomiting.   Genitourinary:  Negative for dysuria and frequency.   Musculoskeletal:  Negative for back pain and neck pain.   Neurological:  Negative for light-headedness and headaches.   Objective:     Vital Signs (Most Recent):  Temp: 96.1 °F (35.6 °C) (01/08/23 0817)  Pulse: 105 (01/08/23 0840)  Resp: (!) 22 (01/08/23 0817)  BP: (!) 130/98 (01/08/23 0817)  SpO2: 100 % (01/08/23 0840)   Vital Signs (24h Range):  Temp:  [96.1 °F (35.6 °C)-97.8 °F (36.6 °C)] 96.1 °F (35.6 °C)  Pulse:  [] 105  Resp:  [15-44] 22  SpO2:  [96 %-100 %] 100 %  BP: (130-158)/() 130/98     Weight: 60 kg (132 lb 4.4 oz)  Body mass index is 25.83 kg/m².    Intake/Output Summary (Last 24 hours) at 1/8/2023 1024  Last data filed at 1/8/2023 0649  Gross per 24 hour   Intake 350 ml   Output 1160 ml   Net -810 ml      Physical Exam  Vitals and nursing note reviewed.   Constitutional:       General: She is not in acute distress.     Appearance: Normal appearance. She is not ill-appearing or diaphoretic.   HENT:      Head: Normocephalic and atraumatic.      Right Ear: External ear normal.      Left Ear: External ear normal.      Nose: Nose normal.   Eyes:      Extraocular Movements: Extraocular movements intact.   Cardiovascular:      Rate and Rhythm: Normal rate and regular rhythm.      Pulses: Normal pulses.      Heart sounds: Normal heart sounds.   Pulmonary:      Effort: Pulmonary  "effort is normal. No respiratory distress.      Breath sounds: Rales (b/l crackles) present.   Abdominal:      Tenderness: There is no guarding.   Musculoskeletal:         General: Swelling (+1 edema in BLE up to knees) present. Normal range of motion.   Skin:     General: Skin is warm and dry.      Capillary Refill: <3 seconds  Neurological:      Mental Status: She is alert.      Comments: Somnolent but rousable to verbal stimuli    Psychiatric:         Mood and Affect: Mood is anxious.       Significant Labs: All pertinent labs within the past 24 hours have been reviewed.    Significant Imaging: I have reviewed all pertinent imaging results/findings within the past 24 hours.      Assessment/Plan:      * CHF exacerbation  Last ECHO (12/2022) EF 20-25%  Home diuretic dose Lasix 40mg BID  BNP on admit 3077  EKG sinus tachy, no STEMI, no significant change from 12/27/22   CXR revealed cardiomegaly with mild pulmonary edema   Troponin 0.058 --> 0.047 --> 0.061 --> 0.061, no longer trending     PLAN:  - Diuresis w/ Lasix 80mg IV BID   - Daily Weights  - Strict I/O  - Fluid restriction to 1,200cc daily  - Low-sodium cardiac diet  - Reassess volume status regularly  - continue home beta-blocker, ACEi/ARB  - Oxygen PRN to keep O2 > 88%    Chronic combined systolic and diastolic congestive heart failure  See above plan under "CHF exacerbation"    Pulmonary embolus  CTA: Pulmonary embolism in the right lower lobe segmental branches as described.  No evidence of large/central pulmonary embolism and no convincing evidence of right ventricular strain. Compressive atelectasis involving the right lung base again noted.  Underlying consolidation difficult to exclude.  Mild consolidation in the left lung base versus atelectasis.  Patchy airspace ground-glass type opacities in the left lung upper lung.  Correlate for nonspecific pneumonitis.  Moderate layering right pleural effusion stable since previous exam 07/22/2022.  Trace left " pleural effusion is also now noted    Plan:  -Lovenox 1 mg/kg BID (60 mg BID)  -Echo to evaluate for R heart strain  -US DVT LE to evaluate for source of emboli      Lactic acidosis  Lactate on admission 4.6 (resolved)  1/8: Lactate 2.0    PLAN:  -initiate BSA on admission (vanc/cef/flagyl)  -trend lactate until plateau/downtrend       Panic attacks  Patient required one time push of ativan 2mg IV on admission due to panic attack    PLAN:  -continue home seroquel, trileptal  -monitor for additional panic attacks while in hospital   -vistaril TID PRN anxiety    Methamphetamine use disorder, severe  Last reported methamphetamine use day before admission    PLAN:   -monitor for s/s of methamphatamine withdrawal   -continue home seroquel, trileptal     VTE Risk Mitigation (From admission, onward)         Ordered     enoxaparin injection 60 mg  Every 12 hours         01/07/23 2335     IP VTE HIGH RISK PATIENT  Once         01/07/23 0835     Place sequential compression device  Until discontinued         01/07/23 0835                Discharge Planning   AYSHA:      Code Status: Full Code   Is the patient medically ready for discharge?:     Reason for patient still in hospital (select all that apply): Treatment               Phillip Borja DO  Department of Hospital Medicine   [facility]

## 2023-01-08 NOTE — PLAN OF CARE
Problem: Adult Inpatient Plan of Care  Goal: Plan of Care Review  Outcome: Ongoing, Progressing     VIRTUAL NURSE:  Cued into patient's room.  Patient not responding to introduction and permission inquiry.  Patient resting comfortably in bed with eyes closed; respirations even and unlabored.  No distress noted.    Per OCTamara RN, patient lethargic but responds to pain.  JOANN Espinoza ([facility] ICU) notified of patient's vitals, ABGs, and current assessment and need for Proactive round d/t MEWs 7    Labs, notes, orders, and careplan reviewed.        01/07/23 2132   Patient Request   Patient Requested patient lethargic but reponds to pain. admission assessment questions answered by using health record   Admission   Initial VN Admission Questions Incomplete   Communication Issues?   (patient lethargic)   Shift   Virtual Nurse - Rounding Incomplete   Virtual Nurse - Patient Verbalized Approval Of Other (See Comments)  (lethargic)   Type of Frequent Check   Type Patient Rounds   Safety/Activity   Patient Rounds bed in low position;placement of personal items at bedside;bed wheels locked;visualized patient;clutter free environment maintained;call light in patient/parent reach   Safety Promotion/Fall Prevention assistive device/personal item within reach;bed alarm set;room near unit station;side rails raised x 3;supervised activity   Safety Precautions emergency equipment at bedside   Positioning   Body Position neutral body alignment;neutral head position   Head of Bed (HOB) Positioning HOB at 60-90 degrees   Pain/Comfort/Sleep   Preferred Pain Scale FACES (Tony-Barnes FACES Pain Rating Scale)   FACES Pain Rating: Rest 0-->no hurt   FACES Pain Rating: Activity 0-->no hurt   Sleep/Rest/Relaxation appears asleep

## 2023-01-08 NOTE — AI DETERIORATION ALERT
Artificial Intelligence Notification  Lee      Admit Date: 2023  LOS: 0  Code Status: Full Code   Date of Consult: 2023  : 1970  Age: 52 y.o.  Weight:   Wt Readings from Last 1 Encounters:   23 54.4 kg (120 lb)     Sex: female  Bed: Replaced by [facility]/[id number] A:   MRN: [id number]  Attending Physician: Kade Lindsay III, MD  Primary Service: Networked reference to record PCT   Time AI Alert Received: ***  Time at Bedside: ***           ***      Vital Signs (Most Recent):  Temp: 97.2 °F (36.2 °C) (23)  Pulse: (Abnormal) 118 (23)  Resp: (Abnormal) 36 (23)  BP: (Abnormal) 139/99 (23)  SpO2: 100 % (23)   Vital Signs (24h Range):  Temp:  [97.2 °F (36.2 °C)-97.7 °F (36.5 °C)] 97.2 °F (36.2 °C)  Pulse:  [113-128] 118  Resp:  [24-40] 36  SpO2:  [95 %-100 %] 100 %  BP: (128-154)/() 139/99         This encounter was triggered by an Artificial Intelligence Notification.     Artificial Intelligence alert discussed with Primary team:  Name ***      Evaluation: ***    Disposition: ***

## 2023-01-08 NOTE — RESPIRATORY THERAPY
Latest Reference Range & Units 01/07/23 21:28   Sample  VENOUS   POC PH 7.35 - 7.45  7.301 (L)   POC PCO2 35 - 45 mmHg 52.1 (H)   POC PO2 40 - 60 mmHg 32 (L)   POC HCO3 24 - 28 mmol/L 25.7   POC TCO2 24 - 29 mmol/L 27   POC SATURATED O2 95 - 100 % 54 (L)   POC Sodium 136 - 145 mmol/L 140   POC Potassium 3.5 - 5.1 mmol/L 4.3   POC HEMOGLOBIN g/dL 13   POC Hematocrit 36 - 54 %PCV 39   POC BE -2 to 2 mmol/L -1   Flow  3   DelSys  Nasal Can   Mode  SPONT   (L): Data is abnormally low  (H): Data is abnormally high

## 2023-01-08 NOTE — NURSING
Patient lethargic, arouses to pain. MD informed of patients status. Team at bedside and states that lethargy is expected r/t ativan administration yesterday.

## 2023-01-08 NOTE — NURSING
Charge nurse note- patient with New PIV placed to right arm #20 g. Patient taken  downstairs for CTA

## 2023-01-08 NOTE — SUBJECTIVE & OBJECTIVE
Interval History: Patient was continuing to have tachypnea and tachycardia despite SpO2 98% and 6mg ativan over course of day yesterday CTA PE study showed right lower lobe segmental branches PE. No evidence of large/central pulmonary embolism and no convincing evidence of right ventricular strain. Lovenox 1 mg/kg BID started. SOB improved today. Somnolent likely secondary to amphetamine withdrawal and ativan. Will continue to anticoagulate and diurese.     Review of Systems   Constitutional:  Positive for fatigue. Negative for diaphoresis and fever.   HENT:  Negative for congestion and sinus pressure.    Respiratory:  Positive for shortness of breath. Negative for cough and wheezing.    Cardiovascular:  Positive for leg swelling. Negative for chest pain.   Gastrointestinal:  Negative for constipation, diarrhea, nausea and vomiting.   Genitourinary:  Negative for dysuria and frequency.   Musculoskeletal:  Negative for back pain and neck pain.   Neurological:  Negative for light-headedness and headaches.   Objective:     Vital Signs (Most Recent):  Temp: 96.1 °F (35.6 °C) (01/08/23 0817)  Pulse: 105 (01/08/23 0840)  Resp: (!) 22 (01/08/23 0817)  BP: (!) 130/98 (01/08/23 0817)  SpO2: 100 % (01/08/23 0840)   Vital Signs (24h Range):  Temp:  [96.1 °F (35.6 °C)-97.8 °F (36.6 °C)] 96.1 °F (35.6 °C)  Pulse:  [] 105  Resp:  [15-44] 22  SpO2:  [96 %-100 %] 100 %  BP: (130-158)/() 130/98     Weight: 60 kg (132 lb 4.4 oz)  Body mass index is 25.83 kg/m².    Intake/Output Summary (Last 24 hours) at 1/8/2023 1024  Last data filed at 1/8/2023 0649  Gross per 24 hour   Intake 350 ml   Output 1160 ml   Net -810 ml      Physical Exam  Vitals and nursing note reviewed.   Constitutional:       General: She is not in acute distress.     Appearance: Normal appearance. She is not ill-appearing or diaphoretic.   HENT:      Head: Normocephalic and atraumatic.      Right Ear: External ear normal.      Left Ear: External ear  normal.      Nose: Nose normal.   Eyes:      Extraocular Movements: Extraocular movements intact.   Cardiovascular:      Rate and Rhythm: Normal rate and regular rhythm.      Pulses: Normal pulses.      Heart sounds: Normal heart sounds.   Pulmonary:      Effort: Pulmonary effort is normal. No respiratory distress.      Breath sounds: Rales (b/l crackles) present.   Abdominal:      Tenderness: There is no guarding.   Musculoskeletal:         General: Swelling (+1 edema in BLE up to knees) present. Normal range of motion.   Skin:     General: Skin is warm and dry.      Capillary Refill: <3 seconds  Neurological:      Mental Status: She is alert.      Comments: Somnolent but rousable to verbal stimuli    Psychiatric:         Mood and Affect: Mood is anxious.       Significant Labs: All pertinent labs within the past 24 hours have been reviewed.    Significant Imaging: I have reviewed all pertinent imaging results/findings within the past 24 hours.

## 2023-01-08 NOTE — NURSING
Patient became more alert and is oriented x2. On O2 at  2L/NC, On continuous pulse ox. Patient c/o back pain, PRN toradol given. On Iv antibiotics. Reinforced the need to stick with fluid restriction. Call light within reach. Bed alarm on.    Complex Repair Preamble Text (Leave Blank If You Do Not Want): Extensive wide undermining was performed.

## 2023-01-08 NOTE — NURSING
01/07/23 2053   Patient Request   Patient Requested AI ALERT   Nurse Notification   Charge Nurse Notified? Yes   Name of Charge Nurse JOANN Mcdonald   Bedside Nurse Notified? Yes   Name of Bedside Nurse JOANN Rodriguez   Nurse Notfication Method Secure Chat   Nurse Notified Of Other  (AI ALERT)   Provider Notification   Name of Provider Dr. Borja   Provider Notification Method Secure Chat   Provider Notified Of AI Deterioration Alert       Temp: 97.2 °F (36.2 °C) (01/07/23 1917)  Pulse: (Abnormal) 118 (01/07/23 1952)  Resp: (Abnormal) 36 (01/07/23 1848)  BP: (Abnormal) 139/99 (01/07/23 1912)  SpO2: 100 % (01/07/23 1952)

## 2023-01-08 NOTE — ASSESSMENT & PLAN NOTE
CTA: Pulmonary embolism in the right lower lobe segmental branches as described.  No evidence of large/central pulmonary embolism and no convincing evidence of right ventricular strain. Compressive atelectasis involving the right lung base again noted.  Underlying consolidation difficult to exclude.  Mild consolidation in the left lung base versus atelectasis.  Patchy airspace ground-glass type opacities in the left lung upper lung.  Correlate for nonspecific pneumonitis.  Moderate layering right pleural effusion stable since previous exam 07/22/2022.  Trace left pleural effusion is also now noted    Plan:  -Lovenox 1 mg/kg BID (60 mg BID)  -Echo to evaluate for R heart strain  -US DVT LE to evaluate for source of emboli

## 2023-01-08 NOTE — NURSING
Charge nurse note- patient arrived to floor from ER. Somnolent , eyes closed. Responsive to sternal rub , moaning at times. AI alert received for patient and MD at bedside to eval patient. See vital signs in epic. Patient with wet diaper. Changed. Siderails covered with blankets suction at bedside.

## 2023-01-08 NOTE — NURSING
Dr dupont informed that CTA results are back    2350- Ok for ultrasound and echo to be be done. Continuous pulse ox placed in room

## 2023-01-08 NOTE — CARE UPDATE
"   01/07/23 2053   Patient Request   Patient Requested AI ALERT   Nurse Notification   Charge Nurse Notified? Yes   Name of Charge Nurse JOANN Mcdonald   Bedside Nurse Notified? Yes   Name of Bedside Nurse JOANN Rodriguez   Nurse Notfication Method Secure Chat   Nurse Notified Of Other  (AI ALERT)   Provider Notification   Name of Provider Dr. Borja   Provider Notification Method Secure Chat   Provider Notified Of AI Deterioration Alert       Temp: 97.2 °F (36.2 °C) (01/07/23 1917)  Pulse: (Abnormal) 118 (01/07/23 1952)  Resp: (Abnormal) 36 (01/07/23 1848)  BP: (Abnormal) 139/99 (01/07/23 1912)  SpO2: 100 % (01/07/23 1952)    Notified of AI alert 8:53pm and assessed patient at bedside. Patient persistently tachycardic and tachypnic, satting 100% on 3L NC. Patient moaning and not answering questions or opening eyes, though will respond to sternal rub by clearly stating "stop that, that hurts".  Patient received 6mg total Ativan in last 12 hours, last time at 1930. Patient did not get her Metoprolol PO home medication this morning. Negative Zeke sign b/l on bedside exam, though continued cold extremities unchanged from exam this morning. Calculated Wells score 4.5 (+ clinical signs/symptoms of DVT & HR >100), PE cannot be ruled out via PERC rule.     A/P  -Metoprolol IV for HR control  -will order CTA Chest to r/o PE (patient on prophylactic Lovenox for now)  "

## 2023-01-09 ENCOUNTER — CLINICAL SUPPORT (OUTPATIENT)
Dept: SMOKING CESSATION | Facility: CLINIC | Age: 53
End: 2023-01-09
Payer: MEDICARE

## 2023-01-09 DIAGNOSIS — F17.210 CIGARETTE SMOKER: Primary | ICD-10-CM

## 2023-01-09 LAB
ALBUMIN SERPL BCP-MCNC: 2.4 G/DL (ref 3.5–5.2)
ALP SERPL-CCNC: 85 U/L (ref 55–135)
ALT SERPL W/O P-5'-P-CCNC: 34 U/L (ref 10–44)
ANION GAP SERPL CALC-SCNC: 10 MMOL/L (ref 8–16)
AST SERPL-CCNC: 42 U/L (ref 10–40)
AV INDEX (PROSTH): 0.78
AV MEAN GRADIENT: 3 MMHG
AV PEAK GRADIENT: 4 MMHG
AV REGURGITATION PRESSURE HALF TIME: 448.63 MS
AV VALVE AREA: 2.04 CM2
AV VELOCITY RATIO: 0.78
BASOPHILS # BLD AUTO: 0.04 K/UL (ref 0–0.2)
BASOPHILS NFR BLD: 0.8 % (ref 0–1.9)
BILIRUB SERPL-MCNC: 1.7 MG/DL (ref 0.1–1)
BSA FOR ECHO PROCEDURE: 1.59 M2
BUN SERPL-MCNC: 30 MG/DL (ref 6–20)
CALCIUM SERPL-MCNC: 8.3 MG/DL (ref 8.7–10.5)
CHLORIDE SERPL-SCNC: 101 MMOL/L (ref 95–110)
CO2 SERPL-SCNC: 27 MMOL/L (ref 23–29)
CREAT SERPL-MCNC: 1.2 MG/DL (ref 0.5–1.4)
CV ECHO LV RWT: 0.37 CM
DIFFERENTIAL METHOD: ABNORMAL
DOP CALC AO PEAK VEL: 0.96 M/S
DOP CALC AO VTI: 12 CM
DOP CALC LVOT AREA: 2.6 CM2
DOP CALC LVOT DIAMETER: 1.82 CM
DOP CALC LVOT PEAK VEL: 0.75 M/S
DOP CALC LVOT STROKE VOLUME: 24.44 CM3
DOP CALC MV VTI: 18.1 CM
DOP CALCLVOT PEAK VEL VTI: 9.4 CM
E WAVE DECELERATION TIME: 128.46 MSEC
E/A RATIO: 1.49
E/E' RATIO: 30.57 M/S
ECHO LV POSTERIOR WALL: 1.04 CM (ref 0.6–1.1)
EJECTION FRACTION: 15 %
EOSINOPHIL # BLD AUTO: 0.1 K/UL (ref 0–0.5)
EOSINOPHIL NFR BLD: 1.2 % (ref 0–8)
ERYTHROCYTE [DISTWIDTH] IN BLOOD BY AUTOMATED COUNT: 21.4 % (ref 11.5–14.5)
EST. GFR  (NO RACE VARIABLE): 55 ML/MIN/1.73 M^2
FRACTIONAL SHORTENING: 5 % (ref 28–44)
GLUCOSE SERPL-MCNC: 114 MG/DL (ref 70–110)
HCT VFR BLD AUTO: 31.6 % (ref 37–48.5)
HGB BLD-MCNC: 9.2 G/DL (ref 12–16)
IMM GRANULOCYTES # BLD AUTO: 0.01 K/UL (ref 0–0.04)
IMM GRANULOCYTES NFR BLD AUTO: 0.2 % (ref 0–0.5)
INTERVENTRICULAR SEPTUM: 1.34 CM (ref 0.6–1.1)
IVC DIAMETER: 2.2 CM
LA MAJOR: 5.64 CM
LA MINOR: 5.63 CM
LA WIDTH: 5.6 CM
LEFT ATRIUM SIZE: 5.01 CM
LEFT ATRIUM VOLUME INDEX MOD: 63.7 ML/M2
LEFT ATRIUM VOLUME INDEX: 86.1 ML/M2
LEFT ATRIUM VOLUME MOD: 99.31 CM3
LEFT ATRIUM VOLUME: 134.38 CM3
LEFT INTERNAL DIMENSION IN SYSTOLE: 5.34 CM (ref 2.1–4)
LEFT VENTRICLE DIASTOLIC VOLUME INDEX: 100.03 ML/M2
LEFT VENTRICLE DIASTOLIC VOLUME: 156.05 ML
LEFT VENTRICLE MASS INDEX: 180 G/M2
LEFT VENTRICLE SYSTOLIC VOLUME INDEX: 88.2 ML/M2
LEFT VENTRICLE SYSTOLIC VOLUME: 137.62 ML
LEFT VENTRICULAR INTERNAL DIMENSION IN DIASTOLE: 5.64 CM (ref 3.5–6)
LEFT VENTRICULAR MASS: 280.52 G
LV LATERAL E/E' RATIO: 21.4 M/S
LV SEPTAL E/E' RATIO: 53.5 M/S
LVOT MG: 1.3 MMHG
LVOT MV: 0.54 CM/S
LYMPHOCYTES # BLD AUTO: 1.2 K/UL (ref 1–4.8)
LYMPHOCYTES NFR BLD: 22.4 % (ref 18–48)
MAGNESIUM SERPL-MCNC: 1.6 MG/DL (ref 1.6–2.6)
MCH RBC QN AUTO: 20.6 PG (ref 27–31)
MCHC RBC AUTO-ENTMCNC: 29.1 G/DL (ref 32–36)
MCV RBC AUTO: 71 FL (ref 82–98)
MONOCYTES # BLD AUTO: 0.5 K/UL (ref 0.3–1)
MONOCYTES NFR BLD: 9.5 % (ref 4–15)
MR PISA EROA: 0.41 CM2
MV A" WAVE DURATION": 105.61 MSEC
MV MEAN GRADIENT: 2 MMHG
MV PEAK A VEL: 0.72 M/S
MV PEAK E VEL: 1.07 M/S
MV PEAK GRADIENT: 5 MMHG
MV STENOSIS PRESSURE HALF TIME: 37.25 MS
MV VALVE AREA BY CONTINUITY EQUATION: 1.35 CM2
MV VALVE AREA P 1/2 METHOD: 5.91 CM2
NEUTROPHILS # BLD AUTO: 3.4 K/UL (ref 1.8–7.7)
NEUTROPHILS NFR BLD: 65.9 % (ref 38–73)
NRBC BLD-RTO: 0 /100 WBC
PHOSPHATE SERPL-MCNC: 3.7 MG/DL (ref 2.7–4.5)
PISA AR MAX VEL: 2.86 M/S
PISA MRMAX VEL: 4.16 M/S
PISA RADIUS: 0.94 CM
PISA TR MAX VEL: 2.66 M/S
PISA VN NYQUIST MS: 0.31 M/S
PISA VN NYQUIST: 0.31 M/S
PLATELET # BLD AUTO: 158 K/UL (ref 150–450)
PMV BLD AUTO: 11.5 FL (ref 9.2–12.9)
POCT GLUCOSE: 112 MG/DL (ref 70–110)
POCT GLUCOSE: 116 MG/DL (ref 70–110)
POCT GLUCOSE: 134 MG/DL (ref 70–110)
POCT GLUCOSE: 143 MG/DL (ref 70–110)
POTASSIUM SERPL-SCNC: 3.3 MMOL/L (ref 3.5–5.1)
PROT SERPL-MCNC: 6.2 G/DL (ref 6–8.4)
PV MV: 0.59 M/S
PV PEAK VELOCITY: 0.91 CM/S
RA MAJOR: 5.08 CM
RA PRESSURE: 15 MMHG
RA WIDTH: 4.37 CM
RBC # BLD AUTO: 4.47 M/UL (ref 4–5.4)
RIGHT VENTRICULAR END-DIASTOLIC DIMENSION: 3.62 CM
RV TISSUE DOPPLER FREE WALL SYSTOLIC VELOCITY 1 (APICAL 4 CHAMBER VIEW): 0.01 CM/S
SODIUM SERPL-SCNC: 138 MMOL/L (ref 136–145)
TDI LATERAL: 0.05 M/S
TDI SEPTAL: 0.02 M/S
TDI: 0.04 M/S
TR MAX PG: 28 MMHG
TRICUSPID ANNULAR PLANE SYSTOLIC EXCURSION: 1.14 CM
TV REST PULMONARY ARTERY PRESSURE: 43 MMHG
WBC # BLD AUTO: 5.14 K/UL (ref 3.9–12.7)

## 2023-01-09 PROCEDURE — 94761 N-INVAS EAR/PLS OXIMETRY MLT: CPT

## 2023-01-09 PROCEDURE — 63600175 PHARM REV CODE 636 W HCPCS: Performed by: STUDENT IN AN ORGANIZED HEALTH CARE EDUCATION/TRAINING PROGRAM

## 2023-01-09 PROCEDURE — 84100 ASSAY OF PHOSPHORUS: CPT

## 2023-01-09 PROCEDURE — 25000003 PHARM REV CODE 250: Performed by: STUDENT IN AN ORGANIZED HEALTH CARE EDUCATION/TRAINING PROGRAM

## 2023-01-09 PROCEDURE — 63600175 PHARM REV CODE 636 W HCPCS

## 2023-01-09 PROCEDURE — 27000221 HC OXYGEN, UP TO 24 HOURS

## 2023-01-09 PROCEDURE — 63600175 PHARM REV CODE 636 W HCPCS: Performed by: FAMILY MEDICINE

## 2023-01-09 PROCEDURE — 21400001 HC TELEMETRY ROOM

## 2023-01-09 PROCEDURE — 99900035 HC TECH TIME PER 15 MIN (STAT)

## 2023-01-09 PROCEDURE — 80053 COMPREHEN METABOLIC PANEL: CPT

## 2023-01-09 PROCEDURE — 36415 COLL VENOUS BLD VENIPUNCTURE: CPT

## 2023-01-09 PROCEDURE — 83735 ASSAY OF MAGNESIUM: CPT

## 2023-01-09 PROCEDURE — 99406 PT REFUSED TOBACCO CESSATION: ICD-10-PCS | Mod: S$GLB,,,

## 2023-01-09 PROCEDURE — 99406 BEHAV CHNG SMOKING 3-10 MIN: CPT | Mod: S$GLB,,,

## 2023-01-09 PROCEDURE — 85025 COMPLETE CBC W/AUTO DIFF WBC: CPT

## 2023-01-09 RX ORDER — POTASSIUM CHLORIDE 7.45 MG/ML
10 INJECTION INTRAVENOUS EVERY 4 HOURS
Status: COMPLETED | OUTPATIENT
Start: 2023-01-09 | End: 2023-01-09

## 2023-01-09 RX ORDER — QUETIAPINE FUMARATE 100 MG/1
100 TABLET, FILM COATED ORAL NIGHTLY
Status: DISCONTINUED | OUTPATIENT
Start: 2023-01-09 | End: 2023-01-12 | Stop reason: HOSPADM

## 2023-01-09 RX ADMIN — FAMOTIDINE 20 MG: 20 TABLET ORAL at 09:01

## 2023-01-09 RX ADMIN — METRONIDAZOLE 500 MG: 500 TABLET ORAL at 05:01

## 2023-01-09 RX ADMIN — MUPIROCIN: 20 OINTMENT TOPICAL at 09:01

## 2023-01-09 RX ADMIN — ENOXAPARIN SODIUM 60 MG: 100 INJECTION SUBCUTANEOUS at 09:01

## 2023-01-09 RX ADMIN — BENZTROPINE MESYLATE 0.5 MG: 0.5 TABLET ORAL at 09:01

## 2023-01-09 RX ADMIN — CEFEPIME HYDROCHLORIDE 1 G: 1 INJECTION, SOLUTION INTRAVENOUS at 02:01

## 2023-01-09 RX ADMIN — METOPROLOL SUCCINATE 25 MG: 25 TABLET, EXTENDED RELEASE ORAL at 09:01

## 2023-01-09 RX ADMIN — OXCARBAZEPINE 600 MG: 150 TABLET, FILM COATED ORAL at 09:01

## 2023-01-09 RX ADMIN — LOSARTAN POTASSIUM 25 MG: 25 TABLET, FILM COATED ORAL at 09:01

## 2023-01-09 RX ADMIN — FUROSEMIDE 80 MG: 10 INJECTION, SOLUTION INTRAMUSCULAR; INTRAVENOUS at 09:01

## 2023-01-09 RX ADMIN — POTASSIUM CHLORIDE 10 MEQ: 7.46 INJECTION, SOLUTION INTRAVENOUS at 02:01

## 2023-01-09 RX ADMIN — KETOROLAC TROMETHAMINE 15 MG: 30 INJECTION, SOLUTION INTRAMUSCULAR; INTRAVENOUS at 04:01

## 2023-01-09 RX ADMIN — POTASSIUM CHLORIDE 10 MEQ: 7.46 INJECTION, SOLUTION INTRAVENOUS at 11:01

## 2023-01-09 RX ADMIN — FERROUS SULFATE TAB 325 MG (65 MG ELEMENTAL FE) 1 EACH: 325 (65 FE) TAB at 09:01

## 2023-01-09 RX ADMIN — POTASSIUM CHLORIDE 10 MEQ: 7.46 INJECTION, SOLUTION INTRAVENOUS at 06:01

## 2023-01-09 RX ADMIN — POTASSIUM CHLORIDE 10 MEQ: 7.46 INJECTION, SOLUTION INTRAVENOUS at 09:01

## 2023-01-09 RX ADMIN — QUETIAPINE FUMARATE 100 MG: 100 TABLET ORAL at 09:01

## 2023-01-09 NOTE — PLAN OF CARE
went to meet with patient x2 to complete admission questions. Patient sleeping both times. I attempted to call her sister, no response. Will follow-up again. Patient recently admitted. Per previous admission notes, patient is independent and lives at home with her father. Patient was not accepted by any Home Health company last admission, due to no PSYCH nurse availability. Psych follow-up previously scheduled.  will continue to follow.    [facility]  PCC appointment scheduled. I attempted to call sister again, no response and mailbox full. CM/SW please contact family if unable to speak with patient.    Patient Contacts    Name Relation Home Work Mobile   Betty Martines Sister [phone]   Ezekiel Auguste Father [phone]     Ronni Samson Significant other   [phone]   Kiya Waite Mother [phone]       Future Appointments   Date Time Provider Department Center   1/18/2023 10:00 AM Mauro Clayton PA-C [facility]   1/20/2023 11:00 AM Lucho Romo MD [facility]          01/09/23 1457   Discharge Assessment   Assessment Type Discharge Planning Assessment   Confirmed/corrected address, phone number and insurance Yes   Confirmed Demographics Correct on Facesheet   Source of Information health record   People in Home parent(s)   Facility Arrived From: Home   Do you expect to return to your current living situation? Other (see comments)  (TBD)   Prior to hospitilization cognitive status: Alert/Oriented   Current cognitive status: Unable to Assess   Equipment Currently Used at Home none   Do you take prescription medications? Yes   Do you have prescription coverage? Yes   Do you have any problems affording any of your prescribed medications? TBD   How do you get to doctors appointments? family or friend will provide   Are you on dialysis? No   Do you take coumadin? No   Discharge Plan A   (TBD)   Discharge Plan B Home   DME Needed Upon Discharge   none   Discharge Barriers Identified Substance Abuse   Physical Activity   On average, how many days per week do you engage in moderate to strenuous exercise (like a brisk walk)? Patient refu   On average, how many minutes do you engage in exercise at this level? Patient refu   Financial Resource Strain   How hard is it for you to pay for the very basics like food, housing, medical care, and heating? Patient refu   Housing Stability   In the last 12 months, was there a time when you were not able to pay the mortgage or rent on time? MO   In the last 12 months, was there a time when you did not have a steady place to sleep or slept in a shelter (including now)? MO   Transportation Needs   In the past 12 months, has lack of transportation kept you from medical appointments or from getting medications? Patient refu   In the past 12 months, has lack of transportation kept you from meetings, work, or from getting things needed for daily living? Patient refu   Food Insecurity   Within the past 12 months, you worried that your food would run out before you got the money to buy more. Patient refu   Within the past 12 months, the food you bought just didn't last and you didn't have money to get more. Patient refu   Stress   Do you feel stress - tense, restless, nervous, or anxious, or unable to sleep at night because your mind is troubled all the time - these days? Patient refu   Social Connections   In a typical week, how many times do you talk on the phone with family, friends, or neighbors? Patient refu   How often do you get together with friends or relatives? Patient refu   How often do you attend Jainism or Anglican services? Patient refu   Do you belong to any clubs or organizations such as Jainism groups, unions, fraternal or athletic groups, or school groups? Patient refu   How often do you attend meetings of the clubs or organizations you belong to? Patient refu   Are you , , , , never  , or living with a partner? Patient refu   Alcohol Use   Q1: How often do you have a drink containing alcohol? Patient refu   Q2: How many drinks containing alcohol do you have on a typical day when you are drinking? Patient refu   Q3: How often do you have six or more drinks on one occasion? Patient refu     Prema Leslie RN    [phone]

## 2023-01-09 NOTE — PROGRESS NOTES
[facility] Medicine  Progress Note    Patient Name: Stephanie T Barthelemy  MRN: [id number]  Patient Class: IP- Inpatient   Admission Date: 1/7/2023  Length of Stay: 2 days  Attending Physician: Bernadette Stevenson MD  Primary Care Provider: Primary Doctor No        Subjective:     Principal Problem:CHF exacerbation        HPI:  52F w ADHD, severe methamphetamine use disorder, bipolar I disorder, HTN, seizure disorder, HFrEF (LVEF of 20-25% on Echo done in 12/2022), with multiple admits for psychosis and/or CHF exacerbations in the setting of intermittent medication adherence in the past year, who was brought [facility] for fluid overload. Patient states she was taking all of her medications as prescribed up until presenting to the ED today. Patient states she began to feel SOB approx 3 days ago and that it has been worsening ever since. Patient admits to recurrent panic attacks and states she feels as if she's having one on admission. Patient states she last used methamphetamine day PTA.     In ED, initial vital signs significant for afebrile, . /96, satting 100% on 2L NC. Initial PE reveals edema to patient's thighs b/l, lungs with bibasilar rales, Cap refill <3 seconds in b/l fingers/toes. Initial imaging CXR with persistent cardiomegaly but without focal lobar consolidation or pulmonary vascular congestion. Initial labs significant for BNP 3077, Troponin 0.058, Hb 10, MCV 74. Labs otherwise unremarkable. Initial EKG showing sinus tach, no STEMI, no significant changes from 12/29/22. [facility] contacted for admission for CHF exacerbation in setting of medication noncompliance.        Overview/Hospital Course:  No notes on file    Interval History: NAEON    Review of Systems   Unable to perform ROS: Other (Patient somnolent, minimal responses)   Constitutional:  Positive for fatigue.   Respiratory:  Negative for cough and wheezing.    Cardiovascular:  Negative for chest pain.        Improved from  admit   Gastrointestinal:  Negative for nausea and vomiting.   Genitourinary:  Negative for dysuria and frequency.   Musculoskeletal:  Negative for back pain and neck pain.   Neurological:  Negative for light-headedness and headaches.   Objective:     Vital Signs (Most Recent):  Temp: 96.1 °F (35.6 °C) (01/09/23 0805)  Pulse: 86 (01/09/23 0805)  Resp: 18 (01/09/23 0805)  BP: 137/64 (01/09/23 0805)  SpO2: 99 % (01/09/23 0805) Vital Signs (24h Range):  Temp:  [96.1 °F (35.6 °C)-98.7 °F (37.1 °C)] 96.1 °F (35.6 °C)  Pulse:  [] 86  Resp:  [18-32] 18  SpO2:  [98 %-100 %] 99 %  BP: (109-137)/() 137/64     Weight: 60 kg (132 lb 4.4 oz)  Body mass index is 25.83 kg/m².    Intake/Output Summary (Last 24 hours) at 1/9/2023 0854  Last data filed at 1/9/2023 0607  Gross per 24 hour   Intake 891.78 ml   Output 2300 ml   Net -1408.22 ml      Physical Exam  Vitals and nursing note reviewed.   Constitutional:       General: She is not in acute distress.     Appearance: Normal appearance. She is not ill-appearing or diaphoretic.      Comments: Somnolent  Roused with conversation   HENT:      Head: Normocephalic and atraumatic.      Right Ear: External ear normal.      Left Ear: External ear normal.      Nose: Nose normal.      Mouth/Throat:      Mouth: Mucous membranes are moist.   Eyes:      Extraocular Movements: Extraocular movements intact.      Pupils: Pupils are equal, round, and reactive to light.   Cardiovascular:      Rate and Rhythm: Normal rate and regular rhythm.      Pulses: Normal pulses.      Heart sounds: Normal heart sounds.   Pulmonary:      Effort: Pulmonary effort is normal. No respiratory distress.      Breath sounds: Rales: b/l crackles.   Abdominal:      General: Abdomen is flat.      Tenderness: There is no guarding.   Musculoskeletal:         General: Swelling (+1 edema in BLE midshin) present. Normal range of motion.      Cervical back: Normal range of motion.   Skin:     General: Skin is warm  and dry.   Neurological:      Mental Status: She is alert.      Comments: Somnolent but rousable to verbal stimuli    Psychiatric:         Mood and Affect: Mood is anxious.       Significant Labs: All pertinent labs within the past 24 hours have been reviewed.  BMP:   Recent Labs   Lab 01/09/23  0509   *      K 3.3*      CO2 27   BUN 30*   CREATININE 1.2   CALCIUM 8.3*   MG 1.6     CBC:   Recent Labs   Lab 01/07/23  2128 01/08/23  0625 01/09/23  0509   WBC  --  7.33 5.14   HGB  --  9.5* 9.2*   HCT 39 33.3* 31.6*   PLT  --  180 158     CMP:   Recent Labs   Lab 01/08/23  0625 01/09/23  0509    138   K 3.4* 3.3*    101   CO2 27 27   GLU 97 114*   BUN 22* 30*   CREATININE 0.9 1.2   CALCIUM 8.8 8.3*   PROT 7.1 6.2   ALBUMIN 2.9* 2.4*   BILITOT 2.0* 1.7*   ALKPHOS 98 85   AST 43* 42*   ALT 37 34   ANIONGAP 8 10       Significant Imaging: I have reviewed all pertinent imaging results/findings within the past 24 hours.      Assessment/Plan:      * CHF exacerbation  Last ECHO (12/2022) EF 20-25%  Home diuretic dose Lasix 40mg BID  BNP on admit 3077  EKG sinus tachy, no STEMI, no significant change from 12/27/22   CXR revealed cardiomegaly with mild pulmonary edema   Troponin 0.058 --> 0.047 --> 0.061 --> 0.061, no longer trending     PLAN:  - Diuresis w/ Lasix 80mg IV BID   - Daily Weights  - Strict I/O  - Fluid restriction to 1,200cc daily  - Low-sodium cardiac diet  - Reassess volume status regularly  - continue home beta-blocker, ACEi/ARB  - Oxygen PRN to keep O2 > 88%    Pulmonary embolus  CTA: Pulmonary embolism in the right lower lobe segmental branches as described.  No evidence of large/central pulmonary embolism and no convincing evidence of right ventricular strain. Compressive atelectasis involving the right lung base again noted.  Underlying consolidation difficult to exclude.  Mild consolidation in the left lung base versus atelectasis.  Patchy airspace ground-glass type opacities  "in the left lung upper lung.  Correlate for nonspecific pneumonitis.  Moderate layering right pleural effusion stable since previous exam 07/22/2022.  Trace left pleural effusion is also now noted    Plan:  -Lovenox 1 mg/kg BID (60 mg BID)  -Echo to evaluate for R heart strain  -US DVT LE to evaluate for source of emboli      Lactic acidosis  Lactate on admission 4.6 (resolved)  1/8: Lactate 2.0    PLAN:  -initiate BSA on admission (vanc/cef/flagyl)  -trend lactate until plateau/downtrend       Panic attacks  Patient required one time push of ativan 2mg IV on admission due to panic attack    PLAN:  -continue home seroquel, trileptal  -monitor for additional panic attacks while in hospital   -vistaril TID PRN anxiety    Chronic combined systolic and diastolic congestive heart failure  See above plan under "CHF exacerbation"    Methamphetamine use disorder, severe  Last reported methamphetamine use day before admission    PLAN:   -monitor for s/s of methamphatamine withdrawal   -Sluggish and somnolent, possible withdrawal  PM mentation  Check  - reduce Seroquel dose  -continue home seroquel, trileptal       VTE Risk Mitigation (From admission, onward)         Ordered     enoxaparin injection 60 mg  Every 12 hours         01/07/23 2335     IP VTE HIGH RISK PATIENT  Once         01/07/23 0835     Place sequential compression device  Until discontinued         01/07/23 0835                Discharge Planning   AYSHA:      Code Status: Full Code   Is the patient medically ready for discharge?:     Reason for patient still in hospital (select all that apply): Patient trending condition, Imaging and Consult recommendations                 Pérez Mendoza MD  [facility] PGY-2  01/09/2023      "

## 2023-01-09 NOTE — PLAN OF CARE
Patient A&Ox2. On O2 at 2L/ NC, on continuous pulse ox. On Iv antibiotics. Potassium IV replacement given. PRN pain medicine given. Ice pack placed on left upper arm. Call light within reach. Bed alarm on.

## 2023-01-09 NOTE — SUBJECTIVE & OBJECTIVE
Interval History: NAEON    Review of Systems   Unable to perform ROS: Other (Patient somnolent, minimal responses)   Constitutional:  Positive for fatigue.   Respiratory:  Negative for cough and wheezing.    Cardiovascular:  Negative for chest pain.        Improved from admit   Gastrointestinal:  Negative for nausea and vomiting.   Genitourinary:  Negative for dysuria and frequency.   Musculoskeletal:  Negative for back pain and neck pain.   Neurological:  Negative for light-headedness and headaches.   Objective:     Vital Signs (Most Recent):  Temp: 96.1 °F (35.6 °C) (01/09/23 0805)  Pulse: 86 (01/09/23 0805)  Resp: 18 (01/09/23 0805)  BP: 137/64 (01/09/23 0805)  SpO2: 99 % (01/09/23 0805) Vital Signs (24h Range):  Temp:  [96.1 °F (35.6 °C)-98.7 °F (37.1 °C)] 96.1 °F (35.6 °C)  Pulse:  [] 86  Resp:  [18-32] 18  SpO2:  [98 %-100 %] 99 %  BP: (109-137)/() 137/64     Weight: 60 kg (132 lb 4.4 oz)  Body mass index is 25.83 kg/m².    Intake/Output Summary (Last 24 hours) at 1/9/2023 0854  Last data filed at 1/9/2023 0607  Gross per 24 hour   Intake 891.78 ml   Output 2300 ml   Net -1408.22 ml      Physical Exam  Vitals and nursing note reviewed.   Constitutional:       General: She is not in acute distress.     Appearance: Normal appearance. She is not ill-appearing or diaphoretic.      Comments: Somnolent  Roused with conversation   HENT:      Head: Normocephalic and atraumatic.      Right Ear: External ear normal.      Left Ear: External ear normal.      Nose: Nose normal.      Mouth/Throat:      Mouth: Mucous membranes are moist.   Eyes:      Extraocular Movements: Extraocular movements intact.      Pupils: Pupils are equal, round, and reactive to light.   Cardiovascular:      Rate and Rhythm: Normal rate and regular rhythm.      Pulses: Normal pulses.      Heart sounds: Normal heart sounds.   Pulmonary:      Effort: Pulmonary effort is normal. No respiratory distress.      Breath sounds: Rales: b/l  crackles.   Abdominal:      General: Abdomen is flat.      Tenderness: There is no guarding.   Musculoskeletal:         General: Swelling (+1 edema in BLE midshin) present. Normal range of motion.      Cervical back: Normal range of motion.   Skin:     General: Skin is warm and dry.   Neurological:      Mental Status: She is alert.      Comments: Somnolent but rousable to verbal stimuli    Psychiatric:         Mood and Affect: Mood is anxious.       Significant Labs: All pertinent labs within the past 24 hours have been reviewed.  BMP:   Recent Labs   Lab 01/09/23  0509   *      K 3.3*      CO2 27   BUN 30*   CREATININE 1.2   CALCIUM 8.3*   MG 1.6     CBC:   Recent Labs   Lab 01/07/23 2128 01/08/23  0625 01/09/23  0509   WBC  --  7.33 5.14   HGB  --  9.5* 9.2*   HCT 39 33.3* 31.6*   PLT  --  180 158     CMP:   Recent Labs   Lab 01/08/23  0625 01/09/23  0509    138   K 3.4* 3.3*    101   CO2 27 27   GLU 97 114*   BUN 22* 30*   CREATININE 0.9 1.2   CALCIUM 8.8 8.3*   PROT 7.1 6.2   ALBUMIN 2.9* 2.4*   BILITOT 2.0* 1.7*   ALKPHOS 98 85   AST 43* 42*   ALT 37 34   ANIONGAP 8 10       Significant Imaging: I have reviewed all pertinent imaging results/findings within the past 24 hours.

## 2023-01-09 NOTE — ASSESSMENT & PLAN NOTE
Last reported methamphetamine use day before admission    PLAN:   -monitor for s/s of methamphatamine withdrawal   -Sluggish and somnolent, possible withdrawal  PM mentation  Check  - reduce Seroquel dose  -continue home seroquel, trileptal

## 2023-01-09 NOTE — PROGRESS NOTES
Smoking cessation education note: Unable to arouse pt. for smoking cessation education. Will follow up when patient condition improves

## 2023-01-10 ENCOUNTER — CLINICAL SUPPORT (OUTPATIENT)
Dept: SMOKING CESSATION | Facility: CLINIC | Age: 53
End: 2023-01-10
Payer: COMMERCIAL

## 2023-01-10 DIAGNOSIS — F17.210 CIGARETTE SMOKER: Primary | ICD-10-CM

## 2023-01-10 LAB
ALBUMIN SERPL BCP-MCNC: 2.5 G/DL (ref 3.5–5.2)
ALP SERPL-CCNC: 83 U/L (ref 55–135)
ALT SERPL W/O P-5'-P-CCNC: 34 U/L (ref 10–44)
ANION GAP SERPL CALC-SCNC: 9 MMOL/L (ref 8–16)
AST SERPL-CCNC: 45 U/L (ref 10–40)
BASOPHILS # BLD AUTO: 0.04 K/UL (ref 0–0.2)
BASOPHILS NFR BLD: 0.9 % (ref 0–1.9)
BILIRUB SERPL-MCNC: 1.3 MG/DL (ref 0.1–1)
BUN SERPL-MCNC: 30 MG/DL (ref 6–20)
CALCIUM SERPL-MCNC: 8.4 MG/DL (ref 8.7–10.5)
CHLORIDE SERPL-SCNC: 102 MMOL/L (ref 95–110)
CO2 SERPL-SCNC: 27 MMOL/L (ref 23–29)
CREAT SERPL-MCNC: 1.2 MG/DL (ref 0.5–1.4)
DIFFERENTIAL METHOD: ABNORMAL
EOSINOPHIL # BLD AUTO: 0.2 K/UL (ref 0–0.5)
EOSINOPHIL NFR BLD: 3.7 % (ref 0–8)
ERYTHROCYTE [DISTWIDTH] IN BLOOD BY AUTOMATED COUNT: 22.2 % (ref 11.5–14.5)
EST. GFR  (NO RACE VARIABLE): 55 ML/MIN/1.73 M^2
GLUCOSE SERPL-MCNC: 83 MG/DL (ref 70–110)
HCT VFR BLD AUTO: 34.1 % (ref 37–48.5)
HGB BLD-MCNC: 9.5 G/DL (ref 12–16)
IMM GRANULOCYTES # BLD AUTO: 0.02 K/UL (ref 0–0.04)
IMM GRANULOCYTES NFR BLD AUTO: 0.4 % (ref 0–0.5)
LYMPHOCYTES # BLD AUTO: 1.5 K/UL (ref 1–4.8)
LYMPHOCYTES NFR BLD: 32.3 % (ref 18–48)
MAGNESIUM SERPL-MCNC: 1.6 MG/DL (ref 1.6–2.6)
MCH RBC QN AUTO: 20.4 PG (ref 27–31)
MCHC RBC AUTO-ENTMCNC: 27.9 G/DL (ref 32–36)
MCV RBC AUTO: 73 FL (ref 82–98)
MONOCYTES # BLD AUTO: 0.6 K/UL (ref 0.3–1)
MONOCYTES NFR BLD: 13.4 % (ref 4–15)
NEUTROPHILS # BLD AUTO: 2.3 K/UL (ref 1.8–7.7)
NEUTROPHILS NFR BLD: 49.3 % (ref 38–73)
NRBC BLD-RTO: 0 /100 WBC
PHOSPHATE SERPL-MCNC: 3.2 MG/DL (ref 2.7–4.5)
PLATELET # BLD AUTO: 137 K/UL (ref 150–450)
PMV BLD AUTO: ABNORMAL FL (ref 9.2–12.9)
POCT GLUCOSE: 100 MG/DL (ref 70–110)
POCT GLUCOSE: 139 MG/DL (ref 70–110)
POCT GLUCOSE: 88 MG/DL (ref 70–110)
POCT GLUCOSE: 95 MG/DL (ref 70–110)
POTASSIUM SERPL-SCNC: 3.6 MMOL/L (ref 3.5–5.1)
PROT SERPL-MCNC: 6.3 G/DL (ref 6–8.4)
RBC # BLD AUTO: 4.66 M/UL (ref 4–5.4)
SODIUM SERPL-SCNC: 138 MMOL/L (ref 136–145)
WBC # BLD AUTO: 4.62 K/UL (ref 3.9–12.7)

## 2023-01-10 PROCEDURE — 99223 1ST HOSP IP/OBS HIGH 75: CPT | Mod: ,,, | Performed by: NURSE PRACTITIONER

## 2023-01-10 PROCEDURE — 83735 ASSAY OF MAGNESIUM: CPT

## 2023-01-10 PROCEDURE — 63600175 PHARM REV CODE 636 W HCPCS

## 2023-01-10 PROCEDURE — 36415 COLL VENOUS BLD VENIPUNCTURE: CPT

## 2023-01-10 PROCEDURE — 80053 COMPREHEN METABOLIC PANEL: CPT

## 2023-01-10 PROCEDURE — 99900035 HC TECH TIME PER 15 MIN (STAT)

## 2023-01-10 PROCEDURE — 63600175 PHARM REV CODE 636 W HCPCS: Performed by: STUDENT IN AN ORGANIZED HEALTH CARE EDUCATION/TRAINING PROGRAM

## 2023-01-10 PROCEDURE — 99407 PR TOBACCO USE CESSATION INTENSIVE >10 MINUTES: ICD-10-PCS | Mod: S$GLB,,,

## 2023-01-10 PROCEDURE — 25000003 PHARM REV CODE 250: Performed by: STUDENT IN AN ORGANIZED HEALTH CARE EDUCATION/TRAINING PROGRAM

## 2023-01-10 PROCEDURE — 99407 BEHAV CHNG SMOKING > 10 MIN: CPT | Mod: S$GLB,,,

## 2023-01-10 PROCEDURE — 99223 PR INITIAL HOSPITAL CARE,LEVL III: ICD-10-PCS | Mod: ,,, | Performed by: NURSE PRACTITIONER

## 2023-01-10 PROCEDURE — 84100 ASSAY OF PHOSPHORUS: CPT

## 2023-01-10 PROCEDURE — 85025 COMPLETE CBC W/AUTO DIFF WBC: CPT

## 2023-01-10 PROCEDURE — 21400001 HC TELEMETRY ROOM

## 2023-01-10 PROCEDURE — S4991 NICOTINE PATCH NONLEGEND: HCPCS | Performed by: STUDENT IN AN ORGANIZED HEALTH CARE EDUCATION/TRAINING PROGRAM

## 2023-01-10 PROCEDURE — 27000221 HC OXYGEN, UP TO 24 HOURS

## 2023-01-10 RX ORDER — FUROSEMIDE 10 MG/ML
40 INJECTION INTRAMUSCULAR; INTRAVENOUS 2 TIMES DAILY
Status: DISCONTINUED | OUTPATIENT
Start: 2023-01-10 | End: 2023-01-11

## 2023-01-10 RX ORDER — SODIUM CHLORIDE 9 MG/ML
INJECTION, SOLUTION INTRAVENOUS
Status: DISCONTINUED | OUTPATIENT
Start: 2023-01-10 | End: 2023-01-12 | Stop reason: HOSPADM

## 2023-01-10 RX ORDER — FUROSEMIDE 10 MG/ML
20 INJECTION INTRAMUSCULAR; INTRAVENOUS 2 TIMES DAILY
Status: DISCONTINUED | OUTPATIENT
Start: 2023-01-10 | End: 2023-01-10

## 2023-01-10 RX ORDER — IBUPROFEN 200 MG
1 TABLET ORAL DAILY
Status: DISCONTINUED | OUTPATIENT
Start: 2023-01-10 | End: 2023-01-12 | Stop reason: HOSPADM

## 2023-01-10 RX ADMIN — ACETAMINOPHEN 650 MG: 325 TABLET ORAL at 08:01

## 2023-01-10 RX ADMIN — FUROSEMIDE 80 MG: 10 INJECTION, SOLUTION INTRAMUSCULAR; INTRAVENOUS at 09:01

## 2023-01-10 RX ADMIN — MUPIROCIN: 20 OINTMENT TOPICAL at 08:01

## 2023-01-10 RX ADMIN — MUPIROCIN: 20 OINTMENT TOPICAL at 09:01

## 2023-01-10 RX ADMIN — METOPROLOL SUCCINATE 25 MG: 25 TABLET, EXTENDED RELEASE ORAL at 09:01

## 2023-01-10 RX ADMIN — OXCARBAZEPINE 600 MG: 150 TABLET, FILM COATED ORAL at 09:01

## 2023-01-10 RX ADMIN — QUETIAPINE FUMARATE 100 MG: 100 TABLET ORAL at 08:01

## 2023-01-10 RX ADMIN — BENZTROPINE MESYLATE 0.5 MG: 0.5 TABLET ORAL at 09:01

## 2023-01-10 RX ADMIN — ENOXAPARIN SODIUM 60 MG: 100 INJECTION SUBCUTANEOUS at 09:01

## 2023-01-10 RX ADMIN — NICOTINE 1 PATCH: 21 PATCH, EXTENDED RELEASE TRANSDERMAL at 11:01

## 2023-01-10 RX ADMIN — FERROUS SULFATE TAB 325 MG (65 MG ELEMENTAL FE) 1 EACH: 325 (65 FE) TAB at 09:01

## 2023-01-10 RX ADMIN — ENOXAPARIN SODIUM 60 MG: 100 INJECTION SUBCUTANEOUS at 08:01

## 2023-01-10 RX ADMIN — FUROSEMIDE 40 MG: 10 INJECTION, SOLUTION INTRAMUSCULAR; INTRAVENOUS at 08:01

## 2023-01-10 RX ADMIN — LOSARTAN POTASSIUM 25 MG: 25 TABLET, FILM COATED ORAL at 09:01

## 2023-01-10 RX ADMIN — FAMOTIDINE 20 MG: 20 TABLET ORAL at 09:01

## 2023-01-10 RX ADMIN — BENZTROPINE MESYLATE 0.5 MG: 0.5 TABLET ORAL at 08:01

## 2023-01-10 RX ADMIN — OXCARBAZEPINE 600 MG: 150 TABLET, FILM COATED ORAL at 08:01

## 2023-01-10 NOTE — PLAN OF CARE
Patient on oxygen @ documented setting. Attempt to wean FiO2 per [facility] oxygen protocol. Patient instructed on benefits of therapy.   Patient assessed with no need for PRN therapy at this time.Patient instructed for need and benefits of PRN inhaler. [facility] will continue to follow.

## 2023-01-10 NOTE — SUBJECTIVE & OBJECTIVE
Interval History: NAEON    Review of Systems   Unable to perform ROS: Other (Patient somnolent, minimal responses)   Constitutional:  Positive for fatigue.   Respiratory:  Negative for cough and wheezing.    Cardiovascular:  Negative for chest pain.        Improved from admit   Gastrointestinal:  Negative for nausea and vomiting.   Genitourinary:  Negative for dysuria and frequency.   Musculoskeletal:  Negative for back pain and neck pain.   Neurological:  Negative for light-headedness and headaches.   Objective:     Vital Signs (Most Recent):  Temp: 97.6 °F (36.4 °C) (01/10/23 1214)  Pulse: 87 (01/10/23 1219)  Resp: 18 (01/10/23 1214)  BP: 110/80 (01/10/23 1214)  SpO2: 98 % (01/10/23 1214) Vital Signs (24h Range):  Temp:  [96.5 °F (35.8 °C)-98.7 °F (37.1 °C)] 97.6 °F (36.4 °C)  Pulse:  [55-96] 87  Resp:  [16-18] 18  SpO2:  [93 %-100 %] 98 %  BP: (104-115)/(73-88) 110/80     Weight: 62.5 kg (137 lb 12.6 oz)  Body mass index is 26.91 kg/m².    Intake/Output Summary (Last 24 hours) at 1/10/2023 1517  Last data filed at 1/10/2023 1335  Gross per 24 hour   Intake 180 ml   Output 3200 ml   Net -3020 ml      Physical Exam  Vitals and nursing note reviewed.   Constitutional:       General: She is not in acute distress.     Appearance: Normal appearance. She is not ill-appearing or diaphoretic.      Comments: Somnolent  Roused with conversation   HENT:      Head: Normocephalic and atraumatic.      Right Ear: External ear normal.      Left Ear: External ear normal.      Nose: Nose normal.      Mouth/Throat:      Mouth: Mucous membranes are moist.   Eyes:      Extraocular Movements: Extraocular movements intact.      Pupils: Pupils are equal, round, and reactive to light.   Cardiovascular:      Rate and Rhythm: Normal rate and regular rhythm.      Pulses: Normal pulses.      Heart sounds: Normal heart sounds.   Pulmonary:      Effort: Pulmonary effort is normal. No respiratory distress.      Breath sounds: Rales: b/l  crackles.   Abdominal:      General: Abdomen is flat.      Tenderness: There is no guarding.   Musculoskeletal:         General: Normal range of motion.      Cervical back: Normal range of motion.   Skin:     General: Skin is warm and dry.   Neurological:      Mental Status: She is alert.      Comments: Somnolent but rousable to verbal stimuli    Psychiatric:         Mood and Affect: Mood is anxious.       Significant Labs: All pertinent labs within the past 24 hours have been reviewed.  BMP:   Recent Labs   Lab 01/10/23  0558   GLU 83      K 3.6      CO2 27   BUN 30*   CREATININE 1.2   CALCIUM 8.4*   MG 1.6     CBC:   Recent Labs   Lab 01/09/23  0509 01/10/23  0558   WBC 5.14 4.62   HGB 9.2* 9.5*   HCT 31.6* 34.1*    137*     CMP:   Recent Labs   Lab 01/09/23  0509 01/10/23  0558    138   K 3.3* 3.6    102   CO2 27 27   * 83   BUN 30* 30*   CREATININE 1.2 1.2   CALCIUM 8.3* 8.4*   PROT 6.2 6.3   ALBUMIN 2.4* 2.5*   BILITOT 1.7* 1.3*   ALKPHOS 85 83   AST 42* 45*   ALT 34 34   ANIONGAP 10 9       Significant Imaging: I have reviewed all pertinent imaging results/findings within the past 24 hours.

## 2023-01-10 NOTE — PROGRESS NOTES
[facility] Medicine  Progress Note    Patient Name: Stephanie T Barthelemy  MRN: [id number]  Patient Class: IP- Inpatient   Admission Date: 1/7/2023  Length of Stay: 3 days  Attending Physician: Bernadette Stevenson MD  Primary Care Provider: Primary Doctor No        Subjective:     Principal Problem:CHF exacerbation        HPI:  52F w ADHD, severe methamphetamine use disorder, bipolar I disorder, HTN, seizure disorder, HFrEF (LVEF of 20-25% on Echo done in 12/2022), with multiple admits for psychosis and/or CHF exacerbations in the setting of intermittent medication adherence in the past year, who was brought [facility] for fluid overload. Patient states she was taking all of her medications as prescribed up until presenting to the ED today. Patient states she began to feel SOB approx 3 days ago and that it has been worsening ever since. Patient admits to recurrent panic attacks and states she feels as if she's having one on admission. Patient states she last used methamphetamine day PTA.     In ED, initial vital signs significant for afebrile, . /96, satting 100% on 2L NC. Initial PE reveals edema to patient's thighs b/l, lungs with bibasilar rales, Cap refill <3 seconds in b/l fingers/toes. Initial imaging CXR with persistent cardiomegaly but without focal lobar consolidation or pulmonary vascular congestion. Initial labs significant for BNP 3077, Troponin 0.058, Hb 10, MCV 74. Labs otherwise unremarkable. Initial EKG showing sinus tach, no STEMI, no significant changes from 12/29/22. [facility] contacted for admission for CHF exacerbation in setting of medication noncompliance.        Overview/Hospital Course:  No notes on file    Interval History: NAEON    Review of Systems   Unable to perform ROS: Other (Patient somnolent, minimal responses)   Constitutional:  Positive for fatigue.   Respiratory:  Negative for cough and wheezing.    Cardiovascular:  Negative for chest pain.        Improved from  Tele or video can be done   admit   Gastrointestinal:  Negative for nausea and vomiting.   Genitourinary:  Negative for dysuria and frequency.   Musculoskeletal:  Negative for back pain and neck pain.   Neurological:  Negative for light-headedness and headaches.   Objective:     Vital Signs (Most Recent):  Temp: 97.6 °F (36.4 °C) (01/10/23 1214)  Pulse: 87 (01/10/23 1219)  Resp: 18 (01/10/23 1214)  BP: 110/80 (01/10/23 1214)  SpO2: 98 % (01/10/23 1214) Vital Signs (24h Range):  Temp:  [96.5 °F (35.8 °C)-98.7 °F (37.1 °C)] 97.6 °F (36.4 °C)  Pulse:  [55-96] 87  Resp:  [16-18] 18  SpO2:  [93 %-100 %] 98 %  BP: (104-115)/(73-88) 110/80     Weight: 62.5 kg (137 lb 12.6 oz)  Body mass index is 26.91 kg/m².    Intake/Output Summary (Last 24 hours) at 1/10/2023 1517  Last data filed at 1/10/2023 1335  Gross per 24 hour   Intake 180 ml   Output 3200 ml   Net -3020 ml      Physical Exam  Vitals and nursing note reviewed.   Constitutional:       General: She is not in acute distress.     Appearance: Normal appearance. She is not ill-appearing or diaphoretic.      Comments: Somnolent  Roused with conversation   HENT:      Head: Normocephalic and atraumatic.      Right Ear: External ear normal.      Left Ear: External ear normal.      Nose: Nose normal.      Mouth/Throat:      Mouth: Mucous membranes are moist.   Eyes:      Extraocular Movements: Extraocular movements intact.      Pupils: Pupils are equal, round, and reactive to light.   Cardiovascular:      Rate and Rhythm: Normal rate and regular rhythm.      Pulses: Normal pulses.      Heart sounds: Normal heart sounds.   Pulmonary:      Effort: Pulmonary effort is normal. No respiratory distress.      Breath sounds: Rales: b/l crackles.   Abdominal:      General: Abdomen is flat.      Tenderness: There is no guarding.   Musculoskeletal:         General: Normal range of motion.      Cervical back: Normal range of motion.   Skin:     General: Skin is warm and dry.   Neurological:      Mental Status: She  is alert.      Comments: Somnolent but rousable to verbal stimuli    Psychiatric:         Mood and Affect: Mood is anxious.       Significant Labs: All pertinent labs within the past 24 hours have been reviewed.  BMP:   Recent Labs   Lab 01/10/23  0558   GLU 83      K 3.6      CO2 27   BUN 30*   CREATININE 1.2   CALCIUM 8.4*   MG 1.6     CBC:   Recent Labs   Lab 01/09/23  0509 01/10/23  0558   WBC 5.14 4.62   HGB 9.2* 9.5*   HCT 31.6* 34.1*    137*     CMP:   Recent Labs   Lab 01/09/23  0509 01/10/23  0558    138   K 3.3* 3.6    102   CO2 27 27   * 83   BUN 30* 30*   CREATININE 1.2 1.2   CALCIUM 8.3* 8.4*   PROT 6.2 6.3   ALBUMIN 2.4* 2.5*   BILITOT 1.7* 1.3*   ALKPHOS 85 83   AST 42* 45*   ALT 34 34   ANIONGAP 10 9       Significant Imaging: I have reviewed all pertinent imaging results/findings within the past 24 hours.      Assessment/Plan:      * CHF exacerbation  Last ECHO (12/2022) EF 20-25%  Home diuretic dose Lasix 40mg BID  BNP on admit 3077  EKG sinus tachy, no STEMI, no significant change from 12/27/22   CXR revealed cardiomegaly with mild pulmonary edema   Troponin 0.058 --> 0.047 --> 0.061 --> 0.061, no longer trending   Echo shows worsened EF  Drug abuse and medical noncompliance persists, She is not a candidate for advanced HF options.  PLAN:  Reduced lasix to 40mg IV BID  - Daily Weights  - Strict I/O  - Fluid restriction to 1,200cc daily  - Low-sodium cardiac diet  - Reassess volume status regularly  - continue home beta-blocker, ACEi/ARB  - Oxygen PRN to keep O2 > 88%  -End stage CHF  - Consulted Palliative for goals of care discussion    Pulmonary embolus  CTA: Pulmonary embolism in the right lower lobe segmental branches as described.  No evidence of large/central pulmonary embolism and no convincing evidence of right ventricular strain. Compressive atelectasis involving the right lung base again noted.  Underlying consolidation difficult to exclude.  Mild  "consolidation in the left lung base versus atelectasis.  Patchy airspace ground-glass type opacities in the left lung upper lung.  Correlate for nonspecific pneumonitis.  Moderate layering right pleural effusion stable since previous exam 07/22/2022.  Trace left pleural effusion is also now noted    Plan:  -Lovenox 1 mg/kg BID (60 mg BID)  -Echo to evaluate for R heart strain  -US DVT LE to evaluate for source of emboli      Lactic acidosis  Lactate on admission 4.6 (resolved)  1/8: Lactate 2.0    PLAN:  -initiate BSA on admission (vanc/cef/flagyl)  -trend lactate until plateau/downtrend       Panic attacks  Patient required one time push of ativan 2mg IV on admission due to panic attack    PLAN:  -continue home seroquel, trileptal  -monitor for additional panic attacks while in hospital   -vistaril TID PRN anxiety    Chronic combined systolic and diastolic congestive heart failure  See above plan under "CHF exacerbation"    Methamphetamine use disorder, severe  Last reported methamphetamine use day before admission    PLAN:   -monitor for s/s of methamphatamine withdrawal   -Sluggish and somnolent, possible withdrawal  - reduce Seroquel dose  Mentation improved with reduced seroquel  -continue home seroquel, trileptal       VTE Risk Mitigation (From admission, onward)         Ordered     enoxaparin injection 60 mg  Every 12 hours         01/07/23 2335     IP VTE HIGH RISK PATIENT  Once         01/07/23 0835     Place sequential compression device  Until discontinued         01/07/23 0835                Discharge Planning   AYSHA:      Code Status: Full Code   Is the patient medically ready for discharge?:     Reason for patient still in hospital (select all that apply): Patient trending condition, Treatment and Pending disposition  Discharge Plan A:  (TBD)              Pérez Mendoza MD  [facility] PGY-2  01/10/2023    "

## 2023-01-10 NOTE — CONSULTS
[facility] - Med Surg  Cardiology  Consult Note    Patient Name: Stephanie T Barthelemy  MRN: [id number]  Admission Date: 1/7/2023  Hospital Length of Stay: 3 days  Code Status: Full Code   Attending Provider: Bernadette Stevenson MD   Consulting Provider: Nitish Walters NP  Primary Care Physician: Primary Doctor No  Principal Problem:CHF exacerbation    Patient information was obtained from past medical records and ER records.     Inpatient consult to [facility]  Consult performed by: Nitish Walters NP  Consult ordered by: Pérez Mendoza MD        Subjective:     Chief Complaint:  SOB     HPI:   Stephanie Barthelemy is a 52F female with ADHD, polysubstance abuse, bipolar I disorder, HTN, seizure disorder, HFrEF (LVEF of 15% BiV failure, severe MR per TTE this admission), with multiple admits for psychosis and/or CHF exacerbations in the setting of intermittent medication adherence in the past year, who was brought [facility]- for fluid overload. HPI retrieved from chart - patient confused. Patient reported that she was taking all of her medications as prescribed up until presenting to the ED. Patient began to feel SOB approx 3 days PTA. UDS positive for amphetamines. She has been admitted since 01/07/2023 and is diuresing with IV Lasix. 580 cc intake, 3200 cc output and net -4.8L from admission.       Past Medical History:   Diagnosis Date    ADHD (attention deficit hyperactivity disorder)     Anemia     Anxiety     Bipolar disorder     CHF (congestive heart failure)     History of hepatitis C - s/p clearance of virus (HCV neg 6/2015) 09/26/2014    History of psychiatric hospitalization     History of substance abuse     IV heroin - last use 2013 per pt    Hx of psychiatric care     Hypertension     Opioid overdose 05/10/2016    Psychiatric problem     Psychosis     Seizure disorder 04/03/2019    Sleep difficulties     Still's disease     Substance abuse     Therapy     Vasculitis        Past  Surgical History:   Procedure Laterality Date    HYSTERECTOMY  3/16/2011    SALPINGOOPHORECTOMY Right 3/16/2011    TUBAL LIGATION      Vaginal cuff repair  04/22/2011       Review of patient's allergies indicates:  No Known Allergies    No current facility-administered medications on file prior to encounter.     Current Outpatient Medications on File Prior to Encounter   Medication Sig    albuterol (PROVENTIL/VENTOLIN HFA) 90 mcg/actuation inhaler Inhale 2 puffs into the lungs every 6 (six) hours. Rescue    benztropine (COGENTIN) 0.5 MG tablet Take 1 tablet (0.5 mg total) by mouth 2 (two) times daily.    famotidine (PEPCID) 20 MG tablet Take 1 tablet (20 mg total) by mouth once daily.    furosemide (LASIX) 40 MG tablet Take 1 tablet (40 mg total) by mouth 2 (two) times daily.    losartan (COZAAR) 25 MG tablet Take 1 tablet (25 mg total) by mouth once daily.    metoprolol succinate (TOPROL-XL) 25 MG 24 hr tablet Take 1 tablet (25 mg total) by mouth once daily.    OXcarbazepine (TRILEPTAL) 600 MG Tab Take 2 tablets (1,200 mg total) by mouth 2 (two) times daily. (Patient taking differently: Take 1,200 mg by mouth nightly.)    QUEtiapine (SEROQUEL) 200 MG Tab Take 1 tablet (200 mg total) by mouth every evening.    hydrocortisone 1 % cream Apply topically 2 (two) times daily.    polyethylene glycol (GLYCOLAX) 17 gram PwPk Take 17 g by mouth once daily. for 60 doses    senna (SENOKOT) 8.6 mg tablet Take 1 tablet by mouth once daily.    [DISCONTINUED] amLODIPine (NORVASC) 5 MG tablet Take 1 tablet (5 mg total) by mouth once daily.    [DISCONTINUED] risperiDONE (RISPERDAL) 1 MG tablet Take 1 mg by mouth 2 (two) times daily.     Family History       Problem Relation (Age of Onset)    Cancer Maternal Grandmother    Diabetes Maternal Grandmother          Tobacco Use    Smoking status: Every Day     Packs/day: 1.00     Years: 36.00     Pack years: 36.00     Types: Cigarettes     Start date: 1986    Smokeless  tobacco: Never    Tobacco comments:     Enrolled in [employer] on 10/23/14 ([employer] Member ID # [id number]) Pt declines Ambulatory referral to Smoking Cessation clinic. Handout provided   Substance and Sexual Activity    Alcohol use: No    Drug use: Yes     Types: Heroin, Cocaine, Methamphetamines, Marijuana    Sexual activity: Not Currently     Partners: Male, Female     Birth control/protection: Other-see comments     Comment: hysterectomy     Review of Systems   Unable to perform ROS: Mental status change   Objective:     Vital Signs (Most Recent):  Temp: 97.6 °F (36.4 °C) (01/10/23 1214)  Pulse: 87 (01/10/23 1219)  Resp: 18 (01/10/23 1214)  BP: 110/80 (01/10/23 1214)  SpO2: 98 % (01/10/23 1214) Vital Signs (24h Range):  Temp:  [96.3 °F (35.7 °C)-98.7 °F (37.1 °C)] 97.6 °F (36.4 °C)  Pulse:  [] 87  Resp:  [16-18] 18  SpO2:  [93 %-100 %] 98 %  BP: (104-115)/(73-88) 110/80     Weight: 62.5 kg (137 lb 12.6 oz)  Body mass index is 26.91 kg/m².    SpO2: 98 %         Intake/Output Summary (Last 24 hours) at 1/10/2023 1238  Last data filed at 1/10/2023 1125  Gross per 24 hour   Intake 520 ml   Output 3000 ml   Net -2480 ml       Lines/Drains/Airways       Drain  Duration             Female External Urinary Catheter 01/07/23 2200 2 days              Peripheral Intravenous Line  Duration                  Peripheral IV - Single Lumen 01/07/23 1720 22 G Anterior;Distal;Right Forearm 2 days         Peripheral IV - Single Lumen 01/07/23 2226 20 G Anterior;Left Shoulder 2 days                    Physical Exam  Eyes:      General:         Right eye: No discharge.         Left eye: No discharge.   Cardiovascular:      Rate and Rhythm: Normal rate and regular rhythm.      Heart sounds: Murmur heard.   Pulmonary:      Breath sounds: Rales present.   Abdominal:      General: Bowel sounds are normal.   Musculoskeletal:      Right lower leg: Edema present.      Left lower leg: Edema present.   Skin:     General: Skin is  warm and dry.   Neurological:      Mental Status: She is lethargic.       Significant Labs: BMP:   Recent Labs   Lab 01/09/23  0509 01/10/23  0558   * 83    138   K 3.3* 3.6    102   CO2 27 27   BUN 30* 30*   CREATININE 1.2 1.2   CALCIUM 8.3* 8.4*   MG 1.6 1.6   , CMP   Recent Labs   Lab 01/09/23  0509 01/10/23  0558    138   K 3.3* 3.6    102   CO2 27 27   * 83   BUN 30* 30*   CREATININE 1.2 1.2   CALCIUM 8.3* 8.4*   PROT 6.2 6.3   ALBUMIN 2.4* 2.5*   BILITOT 1.7* 1.3*   ALKPHOS 85 83   AST 42* 45*   ALT 34 34   ANIONGAP 10 9   , CBC   Recent Labs   Lab 01/09/23  0509 01/10/23  0558   WBC 5.14 4.62   HGB 9.2* 9.5*   HCT 31.6* 34.1*    137*   , INR No results for input(s): INR, PROTIME in the last 48 hours., Lipid Panel No results for input(s): CHOL, HDL, LDLCALC, TRIG, CHOLHDL in the last 48 hours., Troponin No results for input(s): TROPONINI in the last 48 hours., and All pertinent lab results from the last 24 hours have been reviewed.    Significant Imaging: Echocardiogram: Transthoracic echo (TTE) complete (Cupid Only):   Results for orders placed or performed during the hospital encounter of 01/07/23   Echo   Result Value Ref Range    BSA 1.59 m2    TDI SEPTAL 0.02 m/s    LV LATERAL E/E' RATIO 21.40 m/s    LV SEPTAL E/E' RATIO 53.50 m/s    LA WIDTH 5.60 cm    IVC diameter 2.20 cm    Left Ventricular Outflow Tract Mean Velocity 0.54 cm/s    Left Ventricular Outflow Tract Mean Gradient 1.30 mmHg    Pulmonary Valve Mean Velocity 0.59 m/s    TDI LATERAL 0.05 m/s    PV PEAK VELOCITY 0.91 cm/s    LVIDd 5.64 3.5 - 6.0 cm    IVS 1.34 (A) 0.6 - 1.1 cm    Posterior Wall 1.04 0.6 - 1.1 cm    LVIDs 5.34 (A) 2.1 - 4.0 cm    FS 5 28 - 44 %    LA volume 134.38 cm3    LV mass 280.52 g    LA size 5.01 cm    RVDD 3.62 cm    TAPSE 1.14 cm    RV S' 0.01 cm/s    Left Ventricle Relative Wall Thickness 0.37 cm    AV regurgitation pressure 1/2 time 448.220333385010515 ms    AV mean gradient  "3 mmHg    AV valve area 2.04 cm2    AV Velocity Ratio 0.78     AV index (prosthetic) 0.78     MV mean gradient 2 mmHg    MV valve area p 1/2 method 5.91 cm2    MV valve area by continuity eq 1.35 cm2    E/A ratio 1.49     Mean e' 0.04 m/s    E wave deceleration time 128.46 msec    MV "A" wave duration 105.857334547400338 msec    LVOT diameter 1.82 cm    LVOT area 2.6 cm2    LVOT peak curt 0.75 m/s    LVOT peak VTI 9.40 cm    Ao peak curt 0.96 m/s    Ao VTI 12.00 cm    Vn Nyquist 0.31 m/s    Radius 0.94 cm    Mr max curt 4.16 m/s    LVOT stroke volume 24.44 cm3    AV peak gradient 4 mmHg    MV peak gradient 5 mmHg    E/E' ratio 30.57 m/s    Vn Nyquist MS 0.31 m/s    MV Peak E Curt 1.07 m/s    MR PISA EROA 0.41 cm2    AR Max Curt 2.86 m/s    TR Max Curt 2.66 m/s    MV VTI 18.1 cm    MV stenosis pressure 1/2 time 37.25 ms    MV Peak A Curt 0.72 m/s    LV Systolic Volume 137.62 mL    LV Systolic Volume Index 88.2 mL/m2    LV Diastolic Volume 156.05 mL    LV Diastolic Volume Index 100.03 mL/m2    LA Volume Index 86.1 mL/m2    LV Mass Index 180 g/m2    RA Major Axis 5.08 cm    Left Atrium Minor Axis 5.63 cm    Left Atrium Major Axis 5.64 cm    Triscuspid Valve Regurgitation Peak Gradient 28 mmHg    LA Volume Index (Mod) 63.7 mL/m2    LA volume (mod) 99.31 cm3    RA Width 4.37 cm    EF 15 %    Right Atrial Pressure (from IVC) 15 mmHg    TV rest pulmonary artery pressure 43 mmHg    Narrative    · The left ventricle is mildly enlarged with eccentric hypertrophy and   severely decreased systolic function.  · The estimated ejection fraction is 15%.  · Grade II left ventricular diastolic dysfunction.  · There is severe left ventricular global hypokinesis.  · With moderately reduced right ventricular systolic function.  · Severe left atrial enlargement.  · Moderate right atrial enlargement.  · Severe mitral regurgitation.  · Mild tricuspid regurgitation.  · Mild pulmonic regurgitation.  · There is pulmonary hypertension.  · The " estimated PA systolic pressure is 43 mmHg.  · Elevated central venous pressure (15 mmHg).        Assessment and Plan:     Pulmonary embolus  CTA:  Pulmonary embolism in the right lower lobe segmental branches as described.  No evidence of large/central pulmonary embolism and no convincing evidence of right ventricular strain.  New from previous CTA  Agree with anticoagulation, transition to DOAC prior to DC  Negative DVT study of BLE  No indication for thrombectomy     Chronic combined systolic and diastolic congestive heart failure   01/07/23    Echo    Interpretation Summary  · The left ventricle is mildly enlarged with eccentric hypertrophy and severely decreased systolic function.  · The estimated ejection fraction is 15%.  · Grade II left ventricular diastolic dysfunction.  · There is severe left ventricular global hypokinesis.  · With moderately reduced right ventricular systolic function.  · Severe left atrial enlargement.  · Moderate right atrial enlargement.  · Severe mitral regurgitation.  · Mild tricuspid regurgitation.  · Mild pulmonic regurgitation.  · There is pulmonary hypertension.  · The estimated PA systolic pressure is 43 mmHg.  · Elevated central venous pressure (15 mmHg).    Recent Labs   Lab 01/07/23  0711   BNP 3,077*   .  Patient with chronic BiV CHF and severe MR  Unfortunately, her drug abuse and medical noncompliance persists. She is not a candidate for advanced HF options.  Patient has end stage CHF, Palliative care consult recommended for GOC discussion with patient and family   Psyc likely needed for competency evaluation   Continue Lasix, BB, ARB- compliance to be encouraged once mentation has improved       Methamphetamine use disorder, severe  Cessation advised         VTE Risk Mitigation (From admission, onward)         Ordered     enoxaparin injection 60 mg  Every 12 hours         01/07/23 2335     IP VTE HIGH RISK PATIENT  Once         01/07/23 0835     Place sequential  compression device  Until discontinued         01/07/23 3158                Thank you for your consult. I will follow-up with patient. Please contact us if you have any additional questions.    Nitish Walters NP  Cardiology   [place] - [facility] S[facility]

## 2023-01-10 NOTE — ASSESSMENT & PLAN NOTE
01/07/23    Echo    Interpretation Summary  · The left ventricle is mildly enlarged with eccentric hypertrophy and severely decreased systolic function.  · The estimated ejection fraction is 15%.  · Grade II left ventricular diastolic dysfunction.  · There is severe left ventricular global hypokinesis.  · With moderately reduced right ventricular systolic function.  · Severe left atrial enlargement.  · Moderate right atrial enlargement.  · Severe mitral regurgitation.  · Mild tricuspid regurgitation.  · Mild pulmonic regurgitation.  · There is pulmonary hypertension.  · The estimated PA systolic pressure is 43 mmHg.  · Elevated central venous pressure (15 mmHg).    Recent Labs   Lab 01/07/23  0711   BNP 3,077*   .  Patient with chronic BiV CHF and severe MR  Unfortunately, her drug abuse and medical noncompliance persists. She is not a candidate for advanced HF options.  Patient has end stage CHF, Palliative care consult recommended for GOC discussion with patient and family   Psyc likely needed for competency evaluation   Continue Lasix, BB, ARB- compliance to be encouraged once mentation has improved

## 2023-01-10 NOTE — SUBJECTIVE & OBJECTIVE
Past Medical History:   Diagnosis Date    ADHD (attention deficit hyperactivity disorder)     Anemia     Anxiety     Bipolar disorder     CHF (congestive heart failure)     History of hepatitis C - s/p clearance of virus (HCV neg 6/2015) 09/26/2014    History of psychiatric hospitalization     History of substance abuse     IV heroin - last use 2013 per pt    Hx of psychiatric care     Hypertension     Opioid overdose 05/10/2016    Psychiatric problem     Psychosis     Seizure disorder 04/03/2019    Sleep difficulties     Still's disease     Substance abuse     Therapy     Vasculitis        Past Surgical History:   Procedure Laterality Date    HYSTERECTOMY  3/16/2011    SALPINGOOPHORECTOMY Right 3/16/2011    TUBAL LIGATION      Vaginal cuff repair  04/22/2011       Review of patient's allergies indicates:  No Known Allergies    No current facility-administered medications on file prior to encounter.     Current Outpatient Medications on File Prior to Encounter   Medication Sig    albuterol (PROVENTIL/VENTOLIN HFA) 90 mcg/actuation inhaler Inhale 2 puffs into the lungs every 6 (six) hours. Rescue    benztropine (COGENTIN) 0.5 MG tablet Take 1 tablet (0.5 mg total) by mouth 2 (two) times daily.    famotidine (PEPCID) 20 MG tablet Take 1 tablet (20 mg total) by mouth once daily.    furosemide (LASIX) 40 MG tablet Take 1 tablet (40 mg total) by mouth 2 (two) times daily.    losartan (COZAAR) 25 MG tablet Take 1 tablet (25 mg total) by mouth once daily.    metoprolol succinate (TOPROL-XL) 25 MG 24 hr tablet Take 1 tablet (25 mg total) by mouth once daily.    OXcarbazepine (TRILEPTAL) 600 MG Tab Take 2 tablets (1,200 mg total) by mouth 2 (two) times daily. (Patient taking differently: Take 1,200 mg by mouth nightly.)    QUEtiapine (SEROQUEL) 200 MG Tab Take 1 tablet (200 mg total) by mouth every evening.    hydrocortisone 1 % cream Apply topically 2 (two) times daily.    polyethylene glycol (GLYCOLAX) 17 gram PwPk Take 17  g by mouth once daily. for 60 doses    senna (SENOKOT) 8.6 mg tablet Take 1 tablet by mouth once daily.    [DISCONTINUED] amLODIPine (NORVASC) 5 MG tablet Take 1 tablet (5 mg total) by mouth once daily.    [DISCONTINUED] risperiDONE (RISPERDAL) 1 MG tablet Take 1 mg by mouth 2 (two) times daily.     Family History       Problem Relation (Age of Onset)    Cancer Maternal Grandmother    Diabetes Maternal Grandmother          Tobacco Use    Smoking status: Every Day     Packs/day: 1.00     Years: 36.00     Pack years: 36.00     Types: Cigarettes     Start date: 1986    Smokeless tobacco: Never    Tobacco comments:     Enrolled in [employer] on 10/23/14 ([employer] Member ID # [id number]) Pt declines Ambulatory referral to Smoking Cessation clinic. Handout provided   Substance and Sexual Activity    Alcohol use: No    Drug use: Yes     Types: Heroin, Cocaine, Methamphetamines, Marijuana    Sexual activity: Not Currently     Partners: Male, Female     Birth control/protection: Other-see comments     Comment: hysterectomy     Review of Systems   Unable to perform ROS: Mental status change   Objective:     Vital Signs (Most Recent):  Temp: 97.6 °F (36.4 °C) (01/10/23 1214)  Pulse: 87 (01/10/23 1219)  Resp: 18 (01/10/23 1214)  BP: 110/80 (01/10/23 1214)  SpO2: 98 % (01/10/23 1214) Vital Signs (24h Range):  Temp:  [96.3 °F (35.7 °C)-98.7 °F (37.1 °C)] 97.6 °F (36.4 °C)  Pulse:  [] 87  Resp:  [16-18] 18  SpO2:  [93 %-100 %] 98 %  BP: (104-115)/(73-88) 110/80     Weight: 62.5 kg (137 lb 12.6 oz)  Body mass index is 26.91 kg/m².    SpO2: 98 %         Intake/Output Summary (Last 24 hours) at 1/10/2023 1238  Last data filed at 1/10/2023 1125  Gross per 24 hour   Intake 520 ml   Output 3000 ml   Net -2480 ml       Lines/Drains/Airways       Drain  Duration             Female External Urinary Catheter 01/07/23 2200 2 days              Peripheral Intravenous Line  Duration                  Peripheral IV - Single Lumen 01/07/23  1720 22 G Anterior;Distal;Right Forearm 2 days         Peripheral IV - Single Lumen 01/07/23 2226 20 G Anterior;Left Shoulder 2 days                    Physical Exam  Eyes:      General:         Right eye: No discharge.         Left eye: No discharge.   Cardiovascular:      Rate and Rhythm: Normal rate and regular rhythm.      Heart sounds: Murmur heard.   Pulmonary:      Breath sounds: Rales present.   Abdominal:      General: Bowel sounds are normal.   Musculoskeletal:      Right lower leg: Edema present.      Left lower leg: Edema present.   Skin:     General: Skin is warm and dry.   Neurological:      Mental Status: She is lethargic.       Significant Labs: BMP:   Recent Labs   Lab 01/09/23  0509 01/10/23  0558   * 83    138   K 3.3* 3.6    102   CO2 27 27   BUN 30* 30*   CREATININE 1.2 1.2   CALCIUM 8.3* 8.4*   MG 1.6 1.6   , CMP   Recent Labs   Lab 01/09/23  0509 01/10/23  0558    138   K 3.3* 3.6    102   CO2 27 27   * 83   BUN 30* 30*   CREATININE 1.2 1.2   CALCIUM 8.3* 8.4*   PROT 6.2 6.3   ALBUMIN 2.4* 2.5*   BILITOT 1.7* 1.3*   ALKPHOS 85 83   AST 42* 45*   ALT 34 34   ANIONGAP 10 9   , CBC   Recent Labs   Lab 01/09/23  0509 01/10/23  0558   WBC 5.14 4.62   HGB 9.2* 9.5*   HCT 31.6* 34.1*    137*   , INR No results for input(s): INR, PROTIME in the last 48 hours., Lipid Panel No results for input(s): CHOL, HDL, LDLCALC, TRIG, CHOLHDL in the last 48 hours., Troponin No results for input(s): TROPONINI in the last 48 hours., and All pertinent lab results from the last 24 hours have been reviewed.    Significant Imaging: Echocardiogram: Transthoracic echo (TTE) complete (Cupid Only):   Results for orders placed or performed during the hospital encounter of 01/07/23   Echo   Result Value Ref Range    BSA 1.59 m2    TDI SEPTAL 0.02 m/s    LV LATERAL E/E' RATIO 21.40 m/s    LV SEPTAL E/E' RATIO 53.50 m/s    LA WIDTH 5.60 cm    IVC diameter 2.20 cm    Left Ventricular  "Outflow Tract Mean Velocity 0.54 cm/s    Left Ventricular Outflow Tract Mean Gradient 1.30 mmHg    Pulmonary Valve Mean Velocity 0.59 m/s    TDI LATERAL 0.05 m/s    PV PEAK VELOCITY 0.91 cm/s    LVIDd 5.64 3.5 - 6.0 cm    IVS 1.34 (A) 0.6 - 1.1 cm    Posterior Wall 1.04 0.6 - 1.1 cm    LVIDs 5.34 (A) 2.1 - 4.0 cm    FS 5 28 - 44 %    LA volume 134.38 cm3    LV mass 280.52 g    LA size 5.01 cm    RVDD 3.62 cm    TAPSE 1.14 cm    RV S' 0.01 cm/s    Left Ventricle Relative Wall Thickness 0.37 cm    AV regurgitation pressure 1/2 time 448.834054301983105 ms    AV mean gradient 3 mmHg    AV valve area 2.04 cm2    AV Velocity Ratio 0.78     AV index (prosthetic) 0.78     MV mean gradient 2 mmHg    MV valve area p 1/2 method 5.91 cm2    MV valve area by continuity eq 1.35 cm2    E/A ratio 1.49     Mean e' 0.04 m/s    E wave deceleration time 128.46 msec    MV "A" wave duration 105.011600550663999 msec    LVOT diameter 1.82 cm    LVOT area 2.6 cm2    LVOT peak curt 0.75 m/s    LVOT peak VTI 9.40 cm    Ao peak curt 0.96 m/s    Ao VTI 12.00 cm    Vn Nyquist 0.31 m/s    Radius 0.94 cm    Mr max curt 4.16 m/s    LVOT stroke volume 24.44 cm3    AV peak gradient 4 mmHg    MV peak gradient 5 mmHg    E/E' ratio 30.57 m/s    Vn Nyquist MS 0.31 m/s    MV Peak E Curt 1.07 m/s    MR PISA EROA 0.41 cm2    AR Max Curt 2.86 m/s    TR Max Curt 2.66 m/s    MV VTI 18.1 cm    MV stenosis pressure 1/2 time 37.25 ms    MV Peak A Curt 0.72 m/s    LV Systolic Volume 137.62 mL    LV Systolic Volume Index 88.2 mL/m2    LV Diastolic Volume 156.05 mL    LV Diastolic Volume Index 100.03 mL/m2    LA Volume Index 86.1 mL/m2    LV Mass Index 180 g/m2    RA Major Axis 5.08 cm    Left Atrium Minor Axis 5.63 cm    Left Atrium Major Axis 5.64 cm    Triscuspid Valve Regurgitation Peak Gradient 28 mmHg    LA Volume Index (Mod) 63.7 mL/m2    LA volume (mod) 99.31 cm3    RA Width 4.37 cm    EF 15 %    Right Atrial Pressure (from IVC) 15 mmHg    TV rest pulmonary artery " pressure 43 mmHg    Narrative    · The left ventricle is mildly enlarged with eccentric hypertrophy and   severely decreased systolic function.  · The estimated ejection fraction is 15%.  · Grade II left ventricular diastolic dysfunction.  · There is severe left ventricular global hypokinesis.  · With moderately reduced right ventricular systolic function.  · Severe left atrial enlargement.  · Moderate right atrial enlargement.  · Severe mitral regurgitation.  · Mild tricuspid regurgitation.  · Mild pulmonic regurgitation.  · There is pulmonary hypertension.  · The estimated PA systolic pressure is 43 mmHg.  · Elevated central venous pressure (15 mmHg).

## 2023-01-10 NOTE — ASSESSMENT & PLAN NOTE
Last ECHO (12/2022) EF 20-25%  Home diuretic dose Lasix 40mg BID  BNP on admit 3077  EKG sinus tachy, no STEMI, no significant change from 12/27/22   CXR revealed cardiomegaly with mild pulmonary edema   Troponin 0.058 --> 0.047 --> 0.061 --> 0.061, no longer trending   Echo shows worsened EF  Drug abuse and medical noncompliance persists, She is not a candidate for advanced HF options.  PLAN:  Reduced lasix to 40mg IV BID  - Daily Weights  - Strict I/O  - Fluid restriction to 1,200cc daily  - Low-sodium cardiac diet  - Reassess volume status regularly  - continue home beta-blocker, ACEi/ARB  - Oxygen PRN to keep O2 > 88%  -End stage CHF  - Consulted Palliative for goals of care discussion

## 2023-01-10 NOTE — PROGRESS NOTES
Individual Follow-Up Form    1/10/2023    Quit Date: To be determined    Clinical Status of Patient: Inpatient    Length of Service: 15 minutes    Comments: Smoking cessation education note: Pt is a 1 pk/day cigarette smoker x 36 yrs. Order requested for 21 mg nicotine patch Q day. Will follow up for additional smoking cessation education when patient condition improves.    Diagnosis: F17.210

## 2023-01-10 NOTE — HPI
Stephanie Barthelemy is a 52F female with ADHD, polysubstance abuse, bipolar I disorder, HTN, seizure disorder, HFrEF (LVEF of 15% BiV failure, severe MR per TTE this admission), with multiple admits for psychosis and/or CHF exacerbations in the setting of intermittent medication adherence in the past year, who was brought [facility] for fluid overload. HPI retrieved from chart - patient confused. Patient reported that she was taking all of her medications as prescribed up until presenting to the ED. Patient began to feel SOB approx 3 days PTA. UDS positive for amphetamines. She has been admitted since 01/07/2023 and is diuresing with IV Lasix. 580 cc intake, 3200 cc output and net -4.8L from admission.

## 2023-01-10 NOTE — PLAN OF CARE
Pt somnolent; cries out occasionally. Arouses to voice. CLEMENTE at bedside. 2L NC; continuous pulse ox maintained. 1.2L fluid restriction. Medications crushed and given with pudding; no difficulty swallowing. Cardiac monitor maintained. BG monitored. Purewick applied. Encouraged to call with questions/concerns/assistance. Safety.

## 2023-01-10 NOTE — ASSESSMENT & PLAN NOTE
Last reported methamphetamine use day before admission    PLAN:   -monitor for s/s of methamphatamine withdrawal   -Sluggish and somnolent, possible withdrawal  - reduce Seroquel dose  Mentation improved with reduced seroquel  -continue home seroquel, trileptal

## 2023-01-10 NOTE — PLAN OF CARE
Patient received on   2 Lpm NC with SpO2   97 %. Pt with no apparent distress noted. Will continue to monitor.

## 2023-01-10 NOTE — ASSESSMENT & PLAN NOTE
CTA:  Pulmonary embolism in the right lower lobe segmental branches as described.  No evidence of large/central pulmonary embolism and no convincing evidence of right ventricular strain.  New from previous CTA  Agree with anticoagulation, transition to DOAC prior to DC  Negative DVT study of BLE  No indication for thrombectomy

## 2023-01-11 PROBLEM — Z51.5 PALLIATIVE CARE ENCOUNTER: Status: ACTIVE | Noted: 2023-01-11

## 2023-01-11 LAB
ALBUMIN SERPL BCP-MCNC: 2.4 G/DL (ref 3.5–5.2)
ALP SERPL-CCNC: 87 U/L (ref 55–135)
ALT SERPL W/O P-5'-P-CCNC: 28 U/L (ref 10–44)
ANION GAP SERPL CALC-SCNC: 10 MMOL/L (ref 8–16)
AST SERPL-CCNC: 36 U/L (ref 10–40)
BASOPHILS # BLD AUTO: 0.04 K/UL (ref 0–0.2)
BASOPHILS NFR BLD: 0.8 % (ref 0–1.9)
BILIRUB SERPL-MCNC: 1 MG/DL (ref 0.1–1)
BUN SERPL-MCNC: 27 MG/DL (ref 6–20)
CALCIUM SERPL-MCNC: 8.6 MG/DL (ref 8.7–10.5)
CHLORIDE SERPL-SCNC: 97 MMOL/L (ref 95–110)
CO2 SERPL-SCNC: 33 MMOL/L (ref 23–29)
CREAT SERPL-MCNC: 1.3 MG/DL (ref 0.5–1.4)
DIFFERENTIAL METHOD: ABNORMAL
EOSINOPHIL # BLD AUTO: 0.1 K/UL (ref 0–0.5)
EOSINOPHIL NFR BLD: 2.4 % (ref 0–8)
ERYTHROCYTE [DISTWIDTH] IN BLOOD BY AUTOMATED COUNT: 22.3 % (ref 11.5–14.5)
EST. GFR  (NO RACE VARIABLE): 50 ML/MIN/1.73 M^2
GLUCOSE SERPL-MCNC: 121 MG/DL (ref 70–110)
HCT VFR BLD AUTO: 34.4 % (ref 37–48.5)
HGB BLD-MCNC: 10.1 G/DL (ref 12–16)
IMM GRANULOCYTES # BLD AUTO: 0.01 K/UL (ref 0–0.04)
IMM GRANULOCYTES NFR BLD AUTO: 0.2 % (ref 0–0.5)
LYMPHOCYTES # BLD AUTO: 1.2 K/UL (ref 1–4.8)
LYMPHOCYTES NFR BLD: 24.1 % (ref 18–48)
MAGNESIUM SERPL-MCNC: 1.7 MG/DL (ref 1.6–2.6)
MCH RBC QN AUTO: 20.8 PG (ref 27–31)
MCHC RBC AUTO-ENTMCNC: 29.4 G/DL (ref 32–36)
MCV RBC AUTO: 71 FL (ref 82–98)
MONOCYTES # BLD AUTO: 0.7 K/UL (ref 0.3–1)
MONOCYTES NFR BLD: 13.5 % (ref 4–15)
NEUTROPHILS # BLD AUTO: 2.9 K/UL (ref 1.8–7.7)
NEUTROPHILS NFR BLD: 59 % (ref 38–73)
NRBC BLD-RTO: 0 /100 WBC
PHOSPHATE SERPL-MCNC: 3.2 MG/DL (ref 2.7–4.5)
PLATELET # BLD AUTO: 178 K/UL (ref 150–450)
PMV BLD AUTO: ABNORMAL FL (ref 9.2–12.9)
POCT GLUCOSE: 105 MG/DL (ref 70–110)
POCT GLUCOSE: 128 MG/DL (ref 70–110)
POCT GLUCOSE: 147 MG/DL (ref 70–110)
POCT GLUCOSE: 163 MG/DL (ref 70–110)
POTASSIUM SERPL-SCNC: 3 MMOL/L (ref 3.5–5.1)
PROT SERPL-MCNC: 6.2 G/DL (ref 6–8.4)
RBC # BLD AUTO: 4.86 M/UL (ref 4–5.4)
SODIUM SERPL-SCNC: 140 MMOL/L (ref 136–145)
WBC # BLD AUTO: 4.98 K/UL (ref 3.9–12.7)

## 2023-01-11 PROCEDURE — A4216 STERILE WATER/SALINE, 10 ML: HCPCS | Performed by: STUDENT IN AN ORGANIZED HEALTH CARE EDUCATION/TRAINING PROGRAM

## 2023-01-11 PROCEDURE — 94761 N-INVAS EAR/PLS OXIMETRY MLT: CPT

## 2023-01-11 PROCEDURE — 63600175 PHARM REV CODE 636 W HCPCS: Performed by: STUDENT IN AN ORGANIZED HEALTH CARE EDUCATION/TRAINING PROGRAM

## 2023-01-11 PROCEDURE — 25000003 PHARM REV CODE 250: Performed by: STUDENT IN AN ORGANIZED HEALTH CARE EDUCATION/TRAINING PROGRAM

## 2023-01-11 PROCEDURE — 83735 ASSAY OF MAGNESIUM: CPT

## 2023-01-11 PROCEDURE — 85025 COMPLETE CBC W/AUTO DIFF WBC: CPT

## 2023-01-11 PROCEDURE — 63600175 PHARM REV CODE 636 W HCPCS

## 2023-01-11 PROCEDURE — 99223 1ST HOSP IP/OBS HIGH 75: CPT | Mod: ,,,

## 2023-01-11 PROCEDURE — 99223 PR INITIAL HOSPITAL CARE,LEVL III: ICD-10-PCS | Mod: ,,,

## 2023-01-11 PROCEDURE — 99497 ADVNCD CARE PLAN 30 MIN: CPT | Mod: 25,,,

## 2023-01-11 PROCEDURE — S4991 NICOTINE PATCH NONLEGEND: HCPCS | Performed by: STUDENT IN AN ORGANIZED HEALTH CARE EDUCATION/TRAINING PROGRAM

## 2023-01-11 PROCEDURE — 21400001 HC TELEMETRY ROOM

## 2023-01-11 PROCEDURE — 99497 PR ADVNCD CARE PLAN 30 MIN: ICD-10-PCS | Mod: 25,,,

## 2023-01-11 PROCEDURE — 84100 ASSAY OF PHOSPHORUS: CPT

## 2023-01-11 PROCEDURE — 36415 COLL VENOUS BLD VENIPUNCTURE: CPT

## 2023-01-11 PROCEDURE — 80053 COMPREHEN METABOLIC PANEL: CPT

## 2023-01-11 RX ORDER — FUROSEMIDE 40 MG/1
40 TABLET ORAL 2 TIMES DAILY
Status: DISCONTINUED | OUTPATIENT
Start: 2023-01-11 | End: 2023-01-12 | Stop reason: HOSPADM

## 2023-01-11 RX ORDER — POTASSIUM CHLORIDE 20 MEQ/1
40 TABLET, EXTENDED RELEASE ORAL EVERY 4 HOURS
Status: COMPLETED | OUTPATIENT
Start: 2023-01-11 | End: 2023-01-11

## 2023-01-11 RX ORDER — LANOLIN ALCOHOL/MO/W.PET/CERES
400 CREAM (GRAM) TOPICAL ONCE
Status: COMPLETED | OUTPATIENT
Start: 2023-01-11 | End: 2023-01-11

## 2023-01-11 RX ADMIN — POTASSIUM CHLORIDE 40 MEQ: 1500 TABLET, EXTENDED RELEASE ORAL at 03:01

## 2023-01-11 RX ADMIN — QUETIAPINE FUMARATE 100 MG: 100 TABLET ORAL at 08:01

## 2023-01-11 RX ADMIN — FUROSEMIDE 40 MG: 10 INJECTION, SOLUTION INTRAMUSCULAR; INTRAVENOUS at 09:01

## 2023-01-11 RX ADMIN — FUROSEMIDE 40 MG: 40 TABLET ORAL at 06:01

## 2023-01-11 RX ADMIN — POTASSIUM CHLORIDE 40 MEQ: 1500 TABLET, EXTENDED RELEASE ORAL at 09:01

## 2023-01-11 RX ADMIN — Medication 5 ML: at 08:01

## 2023-01-11 RX ADMIN — ENOXAPARIN SODIUM 60 MG: 100 INJECTION SUBCUTANEOUS at 08:01

## 2023-01-11 RX ADMIN — MUPIROCIN: 20 OINTMENT TOPICAL at 09:01

## 2023-01-11 RX ADMIN — LIDOCAINE 1 PATCH: 50 PATCH CUTANEOUS at 09:01

## 2023-01-11 RX ADMIN — ENOXAPARIN SODIUM 60 MG: 100 INJECTION SUBCUTANEOUS at 09:01

## 2023-01-11 RX ADMIN — ACETAMINOPHEN 650 MG: 325 TABLET ORAL at 09:01

## 2023-01-11 RX ADMIN — OXCARBAZEPINE 600 MG: 150 TABLET, FILM COATED ORAL at 09:01

## 2023-01-11 RX ADMIN — METOPROLOL SUCCINATE 25 MG: 25 TABLET, EXTENDED RELEASE ORAL at 09:01

## 2023-01-11 RX ADMIN — BENZTROPINE MESYLATE 0.5 MG: 0.5 TABLET ORAL at 09:01

## 2023-01-11 RX ADMIN — FAMOTIDINE 20 MG: 20 TABLET ORAL at 09:01

## 2023-01-11 RX ADMIN — BENZTROPINE MESYLATE 0.5 MG: 0.5 TABLET ORAL at 08:01

## 2023-01-11 RX ADMIN — NICOTINE 1 PATCH: 21 PATCH, EXTENDED RELEASE TRANSDERMAL at 09:01

## 2023-01-11 RX ADMIN — POTASSIUM CHLORIDE 40 MEQ: 1500 TABLET, EXTENDED RELEASE ORAL at 06:01

## 2023-01-11 RX ADMIN — Medication 400 MG: at 09:01

## 2023-01-11 RX ADMIN — LOSARTAN POTASSIUM 25 MG: 25 TABLET, FILM COATED ORAL at 09:01

## 2023-01-11 RX ADMIN — OXCARBAZEPINE 600 MG: 150 TABLET, FILM COATED ORAL at 08:01

## 2023-01-11 RX ADMIN — FERROUS SULFATE TAB 325 MG (65 MG ELEMENTAL FE) 1 EACH: 325 (65 FE) TAB at 09:01

## 2023-01-11 RX ADMIN — MUPIROCIN: 20 OINTMENT TOPICAL at 08:01

## 2023-01-11 NOTE — PROGRESS NOTES
[facility] Medicine  Progress Note    Patient Name: Stephanie T Barthelemy  MRN: [id number]  Patient Class: IP- Inpatient   Admission Date: 1/7/2023  Length of Stay: 4 days  Attending Physician: Bernadette Stevenson MD  Primary Care Provider: Primary Doctor No        Subjective:     Principal Problem:CHF exacerbation        HPI:  52F w ADHD, severe methamphetamine use disorder, bipolar I disorder, HTN, seizure disorder, HFrEF (LVEF of 20-25% on Echo done in 12/2022), with multiple admits for psychosis and/or CHF exacerbations in the setting of intermittent medication adherence in the past year, who was brought [facility] for fluid overload. Patient states she was taking all of her medications as prescribed up until presenting to the ED today. Patient states she began to feel SOB approx 3 days ago and that it has been worsening ever since. Patient admits to recurrent panic attacks and states she feels as if she's having one on admission. Patient states she last used methamphetamine day PTA.     In ED, initial vital signs significant for afebrile, . /96, satting 100% on 2L NC. Initial PE reveals edema to patient's thighs b/l, lungs with bibasilar rales, Cap refill <3 seconds in b/l fingers/toes. Initial imaging CXR with persistent cardiomegaly but without focal lobar consolidation or pulmonary vascular congestion. Initial labs significant for BNP 3077, Troponin 0.058, Hb 10, MCV 74. Labs otherwise unremarkable. Initial EKG showing sinus tach, no STEMI, no significant changes from 12/29/22. [facility] contacted for admission for CHF exacerbation in setting of medication noncompliance.        Overview/Hospital Course:  No notes on file    Interval History: NAEON    Review of Systems   Constitutional:  Negative for fever.   HENT:  Negative for congestion and sore throat.    Respiratory:  Negative for cough and wheezing.    Cardiovascular:  Negative for chest pain.        Improved from admit   Gastrointestinal:   Negative for nausea and vomiting.   Genitourinary:  Negative for dysuria and frequency.   Musculoskeletal:  Negative for back pain and neck pain.   Neurological:  Negative for light-headedness and headaches.   Psychiatric/Behavioral:  Positive for agitation. The patient is nervous/anxious.    Objective:     Vital Signs (Most Recent):  Temp: 96.2 °F (35.7 °C) (01/11/23 1653)  Pulse: 94 (01/11/23 1653)  Resp: 18 (01/11/23 1653)  BP: 119/87 (01/11/23 1653)  SpO2: 98 % (01/11/23 1146) Vital Signs (24h Range):  Temp:  [96.2 °F (35.7 °C)-98.7 °F (37.1 °C)] 96.2 °F (35.7 °C)  Pulse:  [85-99] 94  Resp:  [17-20] 18  SpO2:  [93 %-100 %] 98 %  BP: (106-121)/(65-87) 119/87     Weight: 55.9 kg (123 lb 3.8 oz)  Body mass index is 24.07 kg/m².    Intake/Output Summary (Last 24 hours) at 1/11/2023 1736  Last data filed at 1/10/2023 1800  Gross per 24 hour   Intake 20 ml   Output --   Net 20 ml      Physical Exam  Vitals and nursing note reviewed.   Constitutional:       General: She is not in acute distress.     Appearance: Normal appearance. She is not ill-appearing or diaphoretic.   HENT:      Head: Normocephalic and atraumatic.      Right Ear: External ear normal.      Left Ear: External ear normal.      Nose: Nose normal.      Mouth/Throat:      Mouth: Mucous membranes are moist.   Eyes:      Extraocular Movements: Extraocular movements intact.      Pupils: Pupils are equal, round, and reactive to light.   Cardiovascular:      Rate and Rhythm: Normal rate and regular rhythm.      Pulses: Normal pulses.      Heart sounds: Normal heart sounds.   Abdominal:      General: Abdomen is flat.      Tenderness: There is no guarding.   Musculoskeletal:         General: Normal range of motion.      Cervical back: Normal range of motion.   Skin:     General: Skin is warm and dry.      Capillary Refill: Capillary refill takes less than 2 seconds.   Neurological:      General: No focal deficit present.      Mental Status: She is alert and  oriented to person, place, and time.   Psychiatric:         Mood and Affect: Mood is anxious.       Significant Labs: All pertinent labs within the past 24 hours have been reviewed.  BMP:   Recent Labs   Lab 01/11/23  0601   *      K 3.0*   CL 97   CO2 33*   BUN 27*   CREATININE 1.3   CALCIUM 8.6*   MG 1.7     CBC:   Recent Labs   Lab 01/10/23  0558 01/11/23  0601   WBC 4.62 4.98   HGB 9.5* 10.1*   HCT 34.1* 34.4*   * 178     CMP:   Recent Labs   Lab 01/10/23  0558 01/11/23  0601    140   K 3.6 3.0*    97   CO2 27 33*   GLU 83 121*   BUN 30* 27*   CREATININE 1.2 1.3   CALCIUM 8.4* 8.6*   PROT 6.3 6.2   ALBUMIN 2.5* 2.4*   BILITOT 1.3* 1.0   ALKPHOS 83 87   AST 45* 36   ALT 34 28   ANIONGAP 9 10       Significant Imaging: I have reviewed all pertinent imaging results/findings within the past 24 hours.      Assessment/Plan:      * CHF exacerbation  Last ECHO (12/2022) EF 20-25%  Home diuretic dose Lasix 40mg BID  BNP on admit 3077  EKG sinus tachy, no STEMI, no significant change from 12/27/22   CXR revealed cardiomegaly with mild pulmonary edema   Troponin 0.058 --> 0.047 --> 0.061 --> 0.061, no longer trending   Echo shows worsened EF  Drug abuse and medical noncompliance persists, She is not a candidate for advanced HF options.  PLAN:  Reduced lasix to 40mg IV BID  - Daily Weights  - Strict I/O  - Fluid restriction to 1,200cc daily  - Low-sodium cardiac diet  - Reassess volume status regularly  - continue home beta-blocker, ACEi/ARB  - Oxygen PRN to keep O2 > 88%  -End stage CHF  - Consulted Palliative for goals of care discussion  - Patient home situation is undetermined, can not return to previous dwelling    Palliative care encounter        Pulmonary embolus  CTA: Pulmonary embolism in the right lower lobe segmental branches as described.  No evidence of large/central pulmonary embolism and no convincing evidence of right ventricular strain. Compressive atelectasis involving the  "right lung base again noted.  Underlying consolidation difficult to exclude.  Mild consolidation in the left lung base versus atelectasis.  Patchy airspace ground-glass type opacities in the left lung upper lung.  Correlate for nonspecific pneumonitis.  Moderate layering right pleural effusion stable since previous exam 07/22/2022.  Trace left pleural effusion is also now noted    Plan:  -Lovenox 1 mg/kg BID (60 mg BID)  -Echo shows worsening cardiac function  -US DVT LE negative      Lactic acidosis  Lactate on admission 4.6 (resolved)  1/8: Lactate 2.0    PLAN:  -initiate BSA on admission (vanc/cef/flagyl)  -trend lactate until plateau/downtrend       Panic attacks  Patient required one time push of ativan 2mg IV on admission due to panic attack    PLAN:  -Reduced Seroquel to 100mg, trileptal  -monitor for additional panic attacks while in hospital   -vistaril TID PRN anxiety    Chronic combined systolic and diastolic congestive heart failure  See above plan under "CHF exacerbation"    Methamphetamine use disorder, severe  Last reported methamphetamine use day before admission    PLAN:   -monitor for s/s of methamphatamine withdrawal   -Sluggish and somnolent, possible withdrawal  - reduce Seroquel dose  Mentation improved with reduced seroquel        VTE Risk Mitigation (From admission, onward)         Ordered     enoxaparin injection 60 mg  Every 12 hours         01/07/23 2335     IP VTE HIGH RISK PATIENT  Once         01/07/23 0835     Place sequential compression device  Until discontinued         01/07/23 0835                Discharge Planning   AYSHA:      Code Status: Full Code   Is the patient medically ready for discharge?:     Reason for patient still in hospital (select all that apply): Pending disposition  Discharge Plan A:  (TBD)              Pérez Mendoza MD  [facility] PGY-2  01/11/2023    "

## 2023-01-11 NOTE — CONSULTS
"[place] [facility] Surg  Palliative Medicine  Consult Note    Patient Name: Stephanie T Barthelemy  MRN: [id number]  Admission Date: 1/7/2023  Hospital Length of Stay: 4 days  Code Status: Full Code   Attending Provider: Bernadette Stevenson MD  Consulting Provider: Eve Foy NP  Primary Care Physician: Primary Doctor No  Principal Problem:CHF exacerbation    Patient information was obtained from patient, relative(s), past medical records and primary team.      Inpatient consult to Palliative Care  Consult performed by: Eve Foy NP  Consult ordered by: Cesar Paredes MD  Reason for consult: goals of care/ advanced care planning         Assessment/Plan:     Palliative care encounter  Ms. Barthelemy is a 51y/o F female with ADHD, polysubstance abuse, bipolar I disorder, HTN, seizure disorder, HFrEF (LVEF of 15% BiV failure, severe MR per TTE this admission), with multiple admits for psychosis and/or CHF exacerbations in the setting of intermittent medication adherence in the past year, who was brought [facility] for fluid overload and was found to have a PE.  Pt was admitted.  Palliative medicine was consulted for goals of care/ advanced care planning.   -Pts 11th admission within the past 6 months.  Pt lives at home with her elderly father and sister.  Pt reports that she has a tumultuous relationship with her father because "he screams and yells in my face" and requests that we do not contact him.  She expresses that her sister helps with her care when she can.  When asked who the pt would like to be her medical decision maker if she were unable to do so, she states her sister Betty Martines.  -Pt oriented to person, year, place and situation.   Pt alert and conversationally appropriate at this point in time.  Per notes, pts mentation waxes and wanes.  Pt up and ambulating to restroom with nurse assistance.  Pt was able to discuss in detail update given about her CHF status by primary team a few minutes prior to our " "visit. Dr Mendoza at bedside for both conversations and confirms content of discussion. Pt was appropriately emotional discussing that she was not a heart transplant candidate due to her drug use and that she is in end stage heart failure.  Pt expressed that she, "will die from this."  Emotional support offered.   -We discussed what we can do to offer the pt support and medical guidance at this point in her disease progression.  The topic of hospice was introduced. We discussed what they could offer the pt.  Pt was agreeable to enroll in home hospice. We discussed what this care would look like in the home setting.   -Reached out to pts sister, Betty Martines, for collateral information.  Sister reports that family is no longer able to provide care for the pt. Per sister, pt is not compliant with medications and diet as well as continues to use illegal substances.  She reports that when pt returns home following chf or psych admission she will sleep for a day or 2 and then becomes "manic and irrational" Per sister, pt is no longer allowed to return to her fathers house.  We discussed the progression of the pts CHF as well.  Sister reports that they did not fully grasp how severe her CHF was until this conversation.  Emotional support offered.  I did introduce the topic of home hospice and what this could look like for the pt should she be allowed to return home. Pts sister reports that she would discuss with pts father, but that he has been adamant following pts last time at home that she was not allowed to return home.  CM and primary team made aware of pts agreement to home hospice services as well as family stating pt could not return home.  -Will continue to follow up with pt and family      Subjective:     HPI:   Per chart, "52F w ADHD, severe methamphetamine use disorder, bipolar I disorder, HTN, seizure disorder, HFrEF (LVEF of 20-25% on Echo done in 12/2022), with multiple admits for psychosis and/or CHF " "exacerbations in the setting of intermittent medication adherence in the past year, who was brought [facility] for fluid overload. Patient states she was taking all of her medications as prescribed up until presenting to the ED today. Patient states she began to feel SOB approx 3 days ago and that it has been worsening ever since. Patient admits to recurrent panic attacks and states she feels as if she's having one on admission. Patient states she last used methamphetamine day PTA.      In ED, initial vital signs significant for afebrile, . /96, satting 100% on 2L NC. Initial PE reveals edema to patient's thighs b/l, lungs with bibasilar rales, Cap refill <3 seconds in b/l fingers/toes. Initial imaging CXR with persistent cardiomegaly but without focal lobar consolidation or pulmonary vascular congestion. Initial labs significant for BNP 3077, Troponin 0.058, Hb 10, MCV 74. Labs otherwise unremarkable. Initial EKG showing sinus tach, no STEMI, no significant changes from 12/29/22. [facility] contacted for admission for CHF exacerbation in setting of medication noncompliance."       Interval History: No acute events overnight, pt denies any pain at this time. Lasix transitioned to PO    Past Medical History:   Diagnosis Date    ADHD (attention deficit hyperactivity disorder)     Anemia     Anxiety     Bipolar disorder     CHF (congestive heart failure)     History of hepatitis C - s/p clearance of virus (HCV neg 6/2015) 09/26/2014    History of psychiatric hospitalization     History of substance abuse     IV heroin - last use 2013 per pt    Hx of psychiatric care     Hypertension     Opioid overdose 05/10/2016    Psychiatric problem     Psychosis     Seizure disorder 04/03/2019    Sleep difficulties     Still's disease     Substance abuse     Therapy     Vasculitis        Past Surgical History:   Procedure Laterality Date    HYSTERECTOMY  3/16/2011    SALPINGOOPHORECTOMY Right 3/16/2011    " TUBAL LIGATION      Vaginal cuff repair  04/22/2011       Review of patient's allergies indicates:  No Known Allergies    Medications:  Continuous Infusions:  Scheduled Meds:   benztropine  0.5 mg Oral BID    enoxaparin  1 mg/kg Subcutaneous Q12H    famotidine  20 mg Oral Daily    ferrous sulfate  1 tablet Oral Daily    furosemide  40 mg Oral BID    losartan  25 mg Oral Daily    metoprolol succinate  25 mg Oral Daily    mupirocin   Nasal BID    nicotine  1 patch Transdermal Daily    OXcarbazepine  600 mg Oral BID    potassium chloride  40 mEq Oral Q4H    QUEtiapine  100 mg Oral QHS     PRN Meds:sodium chloride 0.9%, acetaminophen, acetaminophen, albuterol, hydrOXYzine pamoate, influenza, LIDOcaine, melatonin, pneumoc 20-dion conj-dip cr(PF), senna-docusate 8.6-50 mg, sodium chloride 0.9%    Family History       Problem Relation (Age of Onset)    Cancer Maternal Grandmother    Diabetes Maternal Grandmother          Tobacco Use    Smoking status: Every Day     Packs/day: 1.00     Years: 36.00     Pack years: 36.00     Types: Cigarettes     Start date: 1986    Smokeless tobacco: Never    Tobacco comments:     Enrolled in [employer] on 10/23/14 ([employer] Member ID # [id number]) Pt declines Ambulatory referral to Smoking Cessation clinic. Handout provided   Substance and Sexual Activity    Alcohol use: No    Drug use: Yes     Types: Heroin, Cocaine, Methamphetamines, Marijuana    Sexual activity: Not Currently     Partners: Male, Female     Birth control/protection: Other-see comments     Comment: hysterectomy       Review of Systems   Constitutional:  Positive for activity change and unexpected weight change.   HENT: Negative.     Cardiovascular:  Negative for chest pain (on admission).   Gastrointestinal: Negative.    Objective:     Vital Signs (Most Recent):  Temp: 96.2 °F (35.7 °C) (01/11/23 1146)  Pulse: 97 (01/11/23 1225)  Resp: 18 (01/11/23 1146)  BP: 121/84 (01/11/23 1146)  SpO2: 98 % (01/11/23  1146) Vital Signs (24h Range):  Temp:  [96.2 °F (35.7 °C)-98.7 °F (37.1 °C)] 96.2 °F (35.7 °C)  Pulse:  [83-99] 97  Resp:  [17-20] 18  SpO2:  [93 %-100 %] 98 %  BP: (106-121)/(65-84) 121/84     Weight: 55.9 kg (123 lb 3.8 oz)  Body mass index is 24.07 kg/m².    Physical Exam  Vitals and nursing note reviewed.   Constitutional:       Appearance: Normal appearance. She is normal weight. She is ill-appearing (chronically).   HENT:      Head: Normocephalic.      Mouth/Throat:      Mouth: Mucous membranes are moist.   Cardiovascular:      Rate and Rhythm: Normal rate.      Heart sounds: Murmur heard.   Pulmonary:      Effort: Pulmonary effort is normal.      Breath sounds: Rhonchi (slight) present.   Abdominal:      General: Abdomen is flat.      Palpations: Abdomen is soft.   Skin:     General: Skin is warm and dry.      Capillary Refill: Capillary refill takes less than 2 seconds.   Neurological:      Mental Status: She is alert and oriented to person, place, and time.      Gait: Gait is intact.   Psychiatric:         Mood and Affect: Mood normal.         Speech: Speech is rapid and pressured.         Behavior: Behavior is cooperative.         Judgment: Judgment is impulsive.       Review of Symptoms      Symptom Assessment (ESAS 0-10 Scale)  Pain:  0  Dyspnea:  0  Anxiety:  2  Nausea:  0  Depression:  0  Anorexia:  0  Fatigue:  0  Insomnia:  0  Restlessness:  2  Agitation:  0         Performance Status:  80    Living Arrangements:  Lives with family and Lives in home    Psychosocial/Cultural:   See Palliative Psychosocial Note: No  Social Issues Identified: Coping deficit pt/family, Mental Health, and Substance Abuse  Bereavement Risk: Yes: Past history of depression, separation anxiety or post-traumatic stress disorder (PTSD)  and Identified: work/home, financial, intimacy, and caregiver concerns   Caregiver Needs Discussed. Caregiver Distress: Yes: Intensity of family caregiving and Caregiver Burnout Risk  Cultural:  none   **Primary  to Follow**  Palliative Care  Consult: Yes     Time-Based Charting:  Yes  Chart Review: 22 minutes  Face to Face: 20 minutes  Symptom Assessment: 15 minutes  Coordination of Care: 20 minutes  Discharge Planning: 10 minutes  Advance Care Plannin minutes  Goals of Care: 8 minutes    Total Time Spent: 103 minutes      Advance Care Planning   Advance Directives:   Living Will: No    LaPOST: No    Do Not Resuscitate Status: No      Decision Making:  Patient answered questions and Family answered questions  Goals of Care: The patient endorses that what is most important right now is to focus on remaining as independent as possible, symptom/pain control, quality of life, even if it means sacrificing a little time, improvement in condition but with limits to invasive therapies, and comfort and QOL     Accordingly, we have decided that the best plan to meet the patient's goals includes enrolling in hospice care       Significant Labs: All pertinent labs within the past 24 hours have been reviewed.  CBC:   Recent Labs   Lab 23  0601   WBC 4.98   HGB 10.1*   HCT 34.4*   MCV 71*        BMP:  Recent Labs   Lab 23  0601   *      K 3.0*   CL 97   CO2 33*   BUN 27*   CREATININE 1.3   CALCIUM 8.6*   MG 1.7     LFT:  Lab Results   Component Value Date    AST 36 2023    GGT 23 2015    ALKPHOS 87 2023    BILITOT 1.0 2023     Albumin:   Albumin   Date Value Ref Range Status   2023 2.4 (L) 3.5 - 5.2 g/dL Final     Protein:   Total Protein   Date Value Ref Range Status   2023 6.2 6.0 - 8.4 g/dL Final     Lactic acid:   Lab Results   Component Value Date    LACTATE 2.0 2023    LACTATE 4.2 (HH) 2023       Significant Imaging: I have reviewed all pertinent imaging results/findings within the past 24 hours.      Eve Foy NP  Palliative Medicine  [facility] Surg    Advance Care Planning     Date:  01/11/2023    [facility]  I engaged the patient in a conversation about advance care planning and we specifically addressed what the goals of care would be moving forward, in light of the patient's change in clinical status, specifically ens stage CHF.  We did specifically address the patient's likely prognosis, which is poor.  We explored the patient's values and preferences for future care.  The patient endorses that what is most important right now is to focus on remaining as independent as possible, symptom/pain control, quality of life, even if it means sacrificing a little time, improvement in condition but with limits to invasive therapies and comfort and QOL     Accordingly, we have decided that the best plan to meet the patient's goals includes enrolling in hospice care    I did explain the role for hospice care at this stage of the patient's illness, including its ability to help the patient live with the best quality of life possible.  We will be making a hospice referral.    I spent a total of 16 minutes engaging the patient in this advance care planning discussion.

## 2023-01-11 NOTE — PLAN OF CARE
Pt somnolent in beginning of shift, more alert in afternoon. Weaned to RA, maintaining sats >95%. Tolerating diet well. Voiding via purewick. Repositions self. CLEMENTE remains at bedside for fall prevention.     Problem: Adult Inpatient Plan of Care  Goal: Plan of Care Review  Outcome: Ongoing, Progressing  Goal: Absence of Hospital-Acquired Illness or Injury  Outcome: Ongoing, Progressing  Goal: Optimal Comfort and Wellbeing  Outcome: Ongoing, Progressing  Goal: Readiness for Transition of Care  Outcome: Ongoing, Progressing     Problem: Glycemic Control Impaired (Sepsis/Septic Shock)  Goal: Blood Glucose Level Within Desired Range  Outcome: Ongoing, Progressing     Problem: Infection Progression (Sepsis/Septic Shock)  Goal: Absence of Infection Signs and Symptoms  Outcome: Ongoing, Progressing     Problem: Fluid Imbalance (Pneumonia)  Goal: Fluid Balance  Outcome: Ongoing, Progressing

## 2023-01-11 NOTE — SUBJECTIVE & OBJECTIVE
Interval History: No acute events overnight, pt denies any pain at this time. Lasix transitioned to PO    Past Medical History:   Diagnosis Date    ADHD (attention deficit hyperactivity disorder)     Anemia     Anxiety     Bipolar disorder     CHF (congestive heart failure)     History of hepatitis C - s/p clearance of virus (HCV neg 6/2015) 09/26/2014    History of psychiatric hospitalization     History of substance abuse     IV heroin - last use 2013 per pt    Hx of psychiatric care     Hypertension     Opioid overdose 05/10/2016    Psychiatric problem     Psychosis     Seizure disorder 04/03/2019    Sleep difficulties     Still's disease     Substance abuse     Therapy     Vasculitis        Past Surgical History:   Procedure Laterality Date    HYSTERECTOMY  3/16/2011    SALPINGOOPHORECTOMY Right 3/16/2011    TUBAL LIGATION      Vaginal cuff repair  04/22/2011       Review of patient's allergies indicates:  No Known Allergies    Medications:  Continuous Infusions:  Scheduled Meds:   benztropine  0.5 mg Oral BID    enoxaparin  1 mg/kg Subcutaneous Q12H    famotidine  20 mg Oral Daily    ferrous sulfate  1 tablet Oral Daily    furosemide  40 mg Oral BID    losartan  25 mg Oral Daily    metoprolol succinate  25 mg Oral Daily    mupirocin   Nasal BID    nicotine  1 patch Transdermal Daily    OXcarbazepine  600 mg Oral BID    potassium chloride  40 mEq Oral Q4H    QUEtiapine  100 mg Oral QHS     PRN Meds:sodium chloride 0.9%, acetaminophen, acetaminophen, albuterol, hydrOXYzine pamoate, influenza, LIDOcaine, melatonin, pneumoc 20-dion conj-dip cr(PF), senna-docusate 8.6-50 mg, sodium chloride 0.9%    Family History       Problem Relation (Age of Onset)    Cancer Maternal Grandmother    Diabetes Maternal Grandmother          Tobacco Use    Smoking status: Every Day     Packs/day: 1.00     Years: 36.00     Pack years: 36.00     Types: Cigarettes     Start date: 1986    Smokeless tobacco: Never    Tobacco comments:      Enrolled in [employer] [facility] on 10/23/14 ([employer] Member ID # [id number]) Pt declines Ambulatory referral to Smoking Cessation clinic. Handout provided   Substance and Sexual Activity    Alcohol use: No    Drug use: Yes     Types: Heroin, Cocaine, Methamphetamines, Marijuana    Sexual activity: Not Currently     Partners: Male, Female     Birth control/protection: Other-see comments     Comment: hysterectomy       Review of Systems   Constitutional:  Positive for activity change and unexpected weight change.   HENT: Negative.     Cardiovascular:  Negative for chest pain (on admission).   Gastrointestinal: Negative.    Objective:     Vital Signs (Most Recent):  Temp: 96.2 °F (35.7 °C) (01/11/23 1146)  Pulse: 97 (01/11/23 1225)  Resp: 18 (01/11/23 1146)  BP: 121/84 (01/11/23 1146)  SpO2: 98 % (01/11/23 1146) Vital Signs (24h Range):  Temp:  [96.2 °F (35.7 °C)-98.7 °F (37.1 °C)] 96.2 °F (35.7 °C)  Pulse:  [83-99] 97  Resp:  [17-20] 18  SpO2:  [93 %-100 %] 98 %  BP: (106-121)/(65-84) 121/84     Weight: 55.9 kg (123 lb 3.8 oz)  Body mass index is 24.07 kg/m².    Physical Exam  Vitals and nursing note reviewed.   Constitutional:       Appearance: Normal appearance. She is normal weight. She is ill-appearing (chronically).   HENT:      Head: Normocephalic.      Mouth/Throat:      Mouth: Mucous membranes are moist.   Cardiovascular:      Rate and Rhythm: Normal rate.      Heart sounds: Murmur heard.   Pulmonary:      Effort: Pulmonary effort is normal.      Breath sounds: Rhonchi (slight) present.   Abdominal:      General: Abdomen is flat.      Palpations: Abdomen is soft.   Skin:     General: Skin is warm and dry.      Capillary Refill: Capillary refill takes less than 2 seconds.   Neurological:      Mental Status: She is alert and oriented to person, place, and time.      Gait: Gait is intact.   Psychiatric:         Mood and Affect: Mood normal.         Speech: Speech is rapid and pressured.         Behavior: Behavior is  cooperative.         Judgment: Judgment is impulsive.       Review of Symptoms      Symptom Assessment (ESAS 0-10 Scale)  Pain:  0  Dyspnea:  0  Anxiety:  2  Nausea:  0  Depression:  0  Anorexia:  0  Fatigue:  0  Insomnia:  0  Restlessness:  2  Agitation:  0         Performance Status:  80    Living Arrangements:  Lives with family and Lives in home    Psychosocial/Cultural:   See Palliative Psychosocial Note: No  Social Issues Identified: Coping deficit pt/family, Mental Health, and Substance Abuse  Bereavement Risk: Yes: Past history of depression, separation anxiety or post-traumatic stress disorder (PTSD)  and Identified: work/home, financial, intimacy, and caregiver concerns   Caregiver Needs Discussed. Caregiver Distress: Yes: Intensity of family caregiving and Caregiver Burnout Risk  Cultural: none   **Primary  to Follow**  Palliative Care  Consult: Yes     Time-Based Charting:  Yes  Chart Review: 22 minutes  Face to Face: 20 minutes  Symptom Assessment: 15 minutes  Coordination of Care: 20 minutes  Discharge Planning: 10 minutes  Advance Care Plannin minutes  Goals of Care: 8 minutes    Total Time Spent: 103 minutes      Advance Care Planning   Advance Directives:   Living Will: No    LaPOST: No    Do Not Resuscitate Status: No      Decision Making:  Patient answered questions and Family answered questions  Goals of Care: The patient endorses that what is most important right now is to focus on remaining as independent as possible, symptom/pain control, quality of life, even if it means sacrificing a little time, improvement in condition but with limits to invasive therapies, and comfort and QOL     Accordingly, we have decided that the best plan to meet the patient's goals includes enrolling in hospice care       Significant Labs: All pertinent labs within the past 24 hours have been reviewed.  CBC:   Recent Labs   Lab 23  0601   WBC 4.98   HGB 10.1*   HCT 34.4*   MCV 71*         BMP:  Recent Labs   Lab 01/11/23  0601   *      K 3.0*   CL 97   CO2 33*   BUN 27*   CREATININE 1.3   CALCIUM 8.6*   MG 1.7     LFT:  Lab Results   Component Value Date    AST 36 01/11/2023    GGT 23 02/11/2015    ALKPHOS 87 01/11/2023    BILITOT 1.0 01/11/2023     Albumin:   Albumin   Date Value Ref Range Status   01/11/2023 2.4 (L) 3.5 - 5.2 g/dL Final     Protein:   Total Protein   Date Value Ref Range Status   01/11/2023 6.2 6.0 - 8.4 g/dL Final     Lactic acid:   Lab Results   Component Value Date    LACTATE 2.0 01/08/2023    LACTATE 4.2 (HH) 01/07/2023       Significant Imaging: I have reviewed all pertinent imaging results/findings within the past 24 hours.

## 2023-01-11 NOTE — PLAN OF CARE
Pt is disoriented to place, time, and situation. Scheduled meds were given per MAR. Family is not present at the bedside. Bed in lowest position, bed alarm Is set. Call light within reach. Nurse will continue to monitor.

## 2023-01-11 NOTE — ASSESSMENT & PLAN NOTE
CTA: Pulmonary embolism in the right lower lobe segmental branches as described.  No evidence of large/central pulmonary embolism and no convincing evidence of right ventricular strain. Compressive atelectasis involving the right lung base again noted.  Underlying consolidation difficult to exclude.  Mild consolidation in the left lung base versus atelectasis.  Patchy airspace ground-glass type opacities in the left lung upper lung.  Correlate for nonspecific pneumonitis.  Moderate layering right pleural effusion stable since previous exam 07/22/2022.  Trace left pleural effusion is also now noted    Plan:  -Lovenox 1 mg/kg BID (60 mg BID)  -Echo shows worsening cardiac function  -US DVT LE negative

## 2023-01-11 NOTE — SUBJECTIVE & OBJECTIVE
Interval History: NAEON    Review of Systems   Constitutional:  Negative for fever.   HENT:  Negative for congestion and sore throat.    Respiratory:  Negative for cough and wheezing.    Cardiovascular:  Negative for chest pain.        Improved from admit   Gastrointestinal:  Negative for nausea and vomiting.   Genitourinary:  Negative for dysuria and frequency.   Musculoskeletal:  Negative for back pain and neck pain.   Neurological:  Negative for light-headedness and headaches.   Psychiatric/Behavioral:  Positive for agitation. The patient is nervous/anxious.    Objective:     Vital Signs (Most Recent):  Temp: 96.2 °F (35.7 °C) (01/11/23 1653)  Pulse: 94 (01/11/23 1653)  Resp: 18 (01/11/23 1653)  BP: 119/87 (01/11/23 1653)  SpO2: 98 % (01/11/23 1146) Vital Signs (24h Range):  Temp:  [96.2 °F (35.7 °C)-98.7 °F (37.1 °C)] 96.2 °F (35.7 °C)  Pulse:  [85-99] 94  Resp:  [17-20] 18  SpO2:  [93 %-100 %] 98 %  BP: (106-121)/(65-87) 119/87     Weight: 55.9 kg (123 lb 3.8 oz)  Body mass index is 24.07 kg/m².    Intake/Output Summary (Last 24 hours) at 1/11/2023 4906  Last data filed at 1/10/2023 1800  Gross per 24 hour   Intake 20 ml   Output --   Net 20 ml      Physical Exam  Vitals and nursing note reviewed.   Constitutional:       General: She is not in acute distress.     Appearance: Normal appearance. She is not ill-appearing or diaphoretic.   HENT:      Head: Normocephalic and atraumatic.      Right Ear: External ear normal.      Left Ear: External ear normal.      Nose: Nose normal.      Mouth/Throat:      Mouth: Mucous membranes are moist.   Eyes:      Extraocular Movements: Extraocular movements intact.      Pupils: Pupils are equal, round, and reactive to light.   Cardiovascular:      Rate and Rhythm: Normal rate and regular rhythm.      Pulses: Normal pulses.      Heart sounds: Normal heart sounds.   Abdominal:      General: Abdomen is flat.      Tenderness: There is no guarding.   Musculoskeletal:          General: Normal range of motion.      Cervical back: Normal range of motion.   Skin:     General: Skin is warm and dry.      Capillary Refill: Capillary refill takes less than 2 seconds.   Neurological:      General: No focal deficit present.      Mental Status: She is alert and oriented to person, place, and time.   Psychiatric:         Mood and Affect: Mood is anxious.       Significant Labs: All pertinent labs within the past 24 hours have been reviewed.  BMP:   Recent Labs   Lab 01/11/23  0601   *      K 3.0*   CL 97   CO2 33*   BUN 27*   CREATININE 1.3   CALCIUM 8.6*   MG 1.7     CBC:   Recent Labs   Lab 01/10/23  0558 01/11/23  0601   WBC 4.62 4.98   HGB 9.5* 10.1*   HCT 34.1* 34.4*   * 178     CMP:   Recent Labs   Lab 01/10/23  0558 01/11/23  0601    140   K 3.6 3.0*    97   CO2 27 33*   GLU 83 121*   BUN 30* 27*   CREATININE 1.2 1.3   CALCIUM 8.4* 8.6*   PROT 6.3 6.2   ALBUMIN 2.5* 2.4*   BILITOT 1.3* 1.0   ALKPHOS 83 87   AST 45* 36   ALT 34 28   ANIONGAP 9 10       Significant Imaging: I have reviewed all pertinent imaging results/findings within the past 24 hours.

## 2023-01-11 NOTE — PLAN OF CARE
I had long conversation with pt's father, mother, and sister regarding discharge plans.  Pt's mother lives in [place] and she is adamant pt cannot ever return to her home.  I called pt's father and he is adamant that pt cannot return to his home.  Pt's sister lives with their dad.  Pt is agreeable to hospice but is homeless.  She is agreeable for me to seek alternative placement for her.  I spoke with NGUYỄN Churchill.  She recommended [facility].  Referral sent and they may be able to accept pt tomorrow.     Betty Martines (Sister) [phone]     Ezekiel Auguste (Father) [phone]      RavindraKiya (Mother) [phone]         01/11/23 1549   Post-Acute Status   Post-Acute Authorization Placement   Post-Acute Placement Status Referrals Sent       Rafa Martínez, RN   Supervisor Case Management-[facility]  [phone]                                                                                                                                                                                                                                                                                                                                                                                                                                                                                                                                                                                                                                                                                                                                                                                                                                                                                                                                                                                                                                                                                                                                                                                                                                                                                                                                                                                                                                                                                                                                                                                                                                                                                                                                                                                                                                                                                                                                                                                                                                                             Preferred Pharmacies and Labs

## 2023-01-11 NOTE — ASSESSMENT & PLAN NOTE
Last reported methamphetamine use day before admission    PLAN:   -monitor for s/s of methamphatamine withdrawal   -Sluggish and somnolent, possible withdrawal  - reduce Seroquel dose  Mentation improved with reduced seroquel

## 2023-01-11 NOTE — ASSESSMENT & PLAN NOTE
"Ms. Barthelemy is a 53y/o F female with ADHD, polysubstance abuse, bipolar I disorder, HTN, seizure disorder, HFrEF (LVEF of 15% BiV failure, severe MR per TTE this admission), with multiple admits for psychosis and/or CHF exacerbations in the setting of intermittent medication adherence in the past year, who was brought [facility] for fluid overload and was found to have a PE.  Pt was admitted.  Palliative medicine was consulted for goals of care/ advanced care planning.   -Pts 11th admission within the past 6 months.  Pt lives at home with her elderly father and sister.  Pt reports that she has a tumultuous relationship with her father because "he screams and yells in my face" and requests that we do not contact him.  She expresses that her sister helps with her care when she can.  When asked who the pt would like to be her medical decision maker if she were unable to do so, she states her sister Betty Martines.  -Pt oriented to person, year, place and situation.   Pt alert and conversationally appropriate at this point in time.  Per notes, pts mentation waxes and wanes.  Pt up and ambulating to restroom with nurse assistance.  Pt was able to discuss in detail update given about her CHF status by primary team a few minutes prior to our visit. Dr Mendoza at bedside for both conversations and confirms content of discussion. Pt was appropriately emotional discussing that she was not a heart transplant candidate due to her drug use and that she is in end stage heart failure.  Pt expressed that she, "will die from this."  Emotional support offered.   -We discussed what we can do to offer the pt support and medical guidance at this point in her disease progression.  The topic of hospice was introduced. We discussed what they could offer the pt.  Pt was agreeable to enroll in home hospice. We discussed what this care would look like in the home setting.   -Reached out to pts sister, Betty Martines, for collateral " "information.  Sister reports that family is no longer able to provide care for the pt. Per sister, pt is not compliant with medications and diet as well as continues to use illegal substances.  She reports that when pt returns home following chf or psych admission she will sleep for a day or 2 and then becomes "manic and irrational" Per sister, pt is no longer allowed to return to her fathers house.  We discussed the progression of the pts CHF as well.  Sister reports that they did not fully grasp how severe her CHF was until this conversation.  Emotional support offered.  I did introduce the topic of home hospice and what this could look like for the pt should she be allowed to return home. Pts sister reports that she would discuss with pts father, but that he has been adamant following pts last time at home that she was not allowed to return home.  CM and primary team made aware of this as well.   -Will continue to follow up with pt and family  "

## 2023-01-11 NOTE — ASSESSMENT & PLAN NOTE
Patient required one time push of ativan 2mg IV on admission due to panic attack    PLAN:  -Reduced Seroquel to 100mg, trileptal  -monitor for additional panic attacks while in hospital   -vistaril TID PRN anxiety

## 2023-01-11 NOTE — PLAN OF CARE
"I spoke with pt regarding discharge plans.  I discussed shelter placement at [facility].  I spoke with a rep there that said they could accept her today and would enter her into their program tomorrow if she was agreeable.  She would just need ID.  I presented this to pt and she became tearful asking if it was like a hospital where they "watch you all the time."  I asked her if she would prefer discharging home with hospice and she is agreeable.  She also does not have her ID on her.  Her sister Betty is aware pt will discharge home with hospice as well.  Pt does not have hospice preference.  Referral sent to [facility].  Jaz is coming to meet with pt now.       01/11/23 1335   Post-Acute Status   Post-Acute Authorization Hospice   Hospice Status Referrals Sent       Rafa Martínez, RN   Supervisor Case Management-[facility]  [phone]    "

## 2023-01-11 NOTE — ASSESSMENT & PLAN NOTE
Last ECHO (12/2022) EF 20-25%  Home diuretic dose Lasix 40mg BID  BNP on admit 3077  EKG sinus tachy, no STEMI, no significant change from 12/27/22   CXR revealed cardiomegaly with mild pulmonary edema   Troponin 0.058 --> 0.047 --> 0.061 --> 0.061, no longer trending   Echo shows worsened EF  Drug abuse and medical noncompliance persists, She is not a candidate for advanced HF options.  PLAN:  Reduced lasix to 40mg IV BID  - Daily Weights  - Strict I/O  - Fluid restriction to 1,200cc daily  - Low-sodium cardiac diet  - Reassess volume status regularly  - continue home beta-blocker, ACEi/ARB  - Oxygen PRN to keep O2 > 88%  -End stage CHF  - Consulted Palliative for goals of care discussion  - Patient home situation is undetermined, can not return to previous dwelling

## 2023-01-11 NOTE — HPI
"Per chart, "52F w ADHD, severe methamphetamine use disorder, bipolar I disorder, HTN, seizure disorder, HFrEF (LVEF of 20-25% on Echo done in 12/2022), with multiple admits for psychosis and/or CHF exacerbations in the setting of intermittent medication adherence in the past year, who was brought [facility] for fluid overload. Patient states she was taking all of her medications as prescribed up until presenting to the ED today. Patient states she began to feel SOB approx 3 days ago and that it has been worsening ever since. Patient admits to recurrent panic attacks and states she feels as if she's having one on admission. Patient states she last used methamphetamine day PTA.      In ED, initial vital signs significant for afebrile, . /96, satting 100% on 2L NC. Initial PE reveals edema to patient's thighs b/l, lungs with bibasilar rales, Cap refill <3 seconds in b/l fingers/toes. Initial imaging CXR with persistent cardiomegaly but without focal lobar consolidation or pulmonary vascular congestion. Initial labs significant for BNP 3077, Troponin 0.058, Hb 10, MCV 74. Labs otherwise unremarkable. Initial EKG showing sinus tach, no STEMI, no significant changes from 12/29/22. [facility] contacted for admission for CHF exacerbation in setting of medication noncompliance."     "

## 2023-01-12 VITALS
TEMPERATURE: 98 F | BODY MASS INDEX: 22.33 KG/M2 | RESPIRATION RATE: 18 BRPM | DIASTOLIC BLOOD PRESSURE: 70 MMHG | WEIGHT: 113.75 LBS | HEIGHT: 60 IN | SYSTOLIC BLOOD PRESSURE: 101 MMHG | HEART RATE: 91 BPM | OXYGEN SATURATION: 100 %

## 2023-01-12 LAB
ALBUMIN SERPL BCP-MCNC: 2.6 G/DL (ref 3.5–5.2)
ALP SERPL-CCNC: 86 U/L (ref 55–135)
ALT SERPL W/O P-5'-P-CCNC: 30 U/L (ref 10–44)
ANION GAP SERPL CALC-SCNC: 11 MMOL/L (ref 8–16)
AST SERPL-CCNC: 36 U/L (ref 10–40)
BASOPHILS # BLD AUTO: 0.04 K/UL (ref 0–0.2)
BASOPHILS NFR BLD: 0.9 % (ref 0–1.9)
BILIRUB SERPL-MCNC: 0.9 MG/DL (ref 0.1–1)
BUN SERPL-MCNC: 29 MG/DL (ref 6–20)
CALCIUM SERPL-MCNC: 8.5 MG/DL (ref 8.7–10.5)
CHLORIDE SERPL-SCNC: 100 MMOL/L (ref 95–110)
CO2 SERPL-SCNC: 28 MMOL/L (ref 23–29)
CREAT SERPL-MCNC: 1.2 MG/DL (ref 0.5–1.4)
DIFFERENTIAL METHOD: ABNORMAL
EOSINOPHIL # BLD AUTO: 0.1 K/UL (ref 0–0.5)
EOSINOPHIL NFR BLD: 1.9 % (ref 0–8)
ERYTHROCYTE [DISTWIDTH] IN BLOOD BY AUTOMATED COUNT: 23 % (ref 11.5–14.5)
EST. GFR  (NO RACE VARIABLE): 55 ML/MIN/1.73 M^2
GLUCOSE SERPL-MCNC: 131 MG/DL (ref 70–110)
HCT VFR BLD AUTO: 34.1 % (ref 37–48.5)
HGB BLD-MCNC: 9.7 G/DL (ref 12–16)
IMM GRANULOCYTES # BLD AUTO: 0.02 K/UL (ref 0–0.04)
IMM GRANULOCYTES NFR BLD AUTO: 0.4 % (ref 0–0.5)
LYMPHOCYTES # BLD AUTO: 1.6 K/UL (ref 1–4.8)
LYMPHOCYTES NFR BLD: 33.8 % (ref 18–48)
MAGNESIUM SERPL-MCNC: 1.9 MG/DL (ref 1.6–2.6)
MCH RBC QN AUTO: 20.7 PG (ref 27–31)
MCHC RBC AUTO-ENTMCNC: 28.4 G/DL (ref 32–36)
MCV RBC AUTO: 73 FL (ref 82–98)
MONOCYTES # BLD AUTO: 0.5 K/UL (ref 0.3–1)
MONOCYTES NFR BLD: 11.5 % (ref 4–15)
NEUTROPHILS # BLD AUTO: 2.4 K/UL (ref 1.8–7.7)
NEUTROPHILS NFR BLD: 51.5 % (ref 38–73)
NRBC BLD-RTO: 0 /100 WBC
PHOSPHATE SERPL-MCNC: 2.7 MG/DL (ref 2.7–4.5)
PLATELET # BLD AUTO: 189 K/UL (ref 150–450)
PMV BLD AUTO: ABNORMAL FL (ref 9.2–12.9)
POTASSIUM SERPL-SCNC: 4.1 MMOL/L (ref 3.5–5.1)
PROT SERPL-MCNC: 6.5 G/DL (ref 6–8.4)
RBC # BLD AUTO: 4.68 M/UL (ref 4–5.4)
SODIUM SERPL-SCNC: 139 MMOL/L (ref 136–145)
WBC # BLD AUTO: 4.7 K/UL (ref 3.9–12.7)

## 2023-01-12 PROCEDURE — 94761 N-INVAS EAR/PLS OXIMETRY MLT: CPT

## 2023-01-12 PROCEDURE — 63600175 PHARM REV CODE 636 W HCPCS: Performed by: STUDENT IN AN ORGANIZED HEALTH CARE EDUCATION/TRAINING PROGRAM

## 2023-01-12 PROCEDURE — 25000003 PHARM REV CODE 250: Performed by: STUDENT IN AN ORGANIZED HEALTH CARE EDUCATION/TRAINING PROGRAM

## 2023-01-12 PROCEDURE — S4991 NICOTINE PATCH NONLEGEND: HCPCS | Performed by: STUDENT IN AN ORGANIZED HEALTH CARE EDUCATION/TRAINING PROGRAM

## 2023-01-12 PROCEDURE — S0166 INJ OLANZAPINE 2.5MG: HCPCS | Performed by: STUDENT IN AN ORGANIZED HEALTH CARE EDUCATION/TRAINING PROGRAM

## 2023-01-12 PROCEDURE — 80053 COMPREHEN METABOLIC PANEL: CPT

## 2023-01-12 PROCEDURE — 84100 ASSAY OF PHOSPHORUS: CPT

## 2023-01-12 PROCEDURE — 85025 COMPLETE CBC W/AUTO DIFF WBC: CPT

## 2023-01-12 PROCEDURE — 83735 ASSAY OF MAGNESIUM: CPT

## 2023-01-12 PROCEDURE — 63600175 PHARM REV CODE 636 W HCPCS

## 2023-01-12 PROCEDURE — 36415 COLL VENOUS BLD VENIPUNCTURE: CPT

## 2023-01-12 RX ORDER — ONDANSETRON 8 MG/1
8 TABLET, ORALLY DISINTEGRATING ORAL ONCE
Status: COMPLETED | OUTPATIENT
Start: 2023-01-12 | End: 2023-01-12

## 2023-01-12 RX ORDER — OLANZAPINE 10 MG/2ML
5 INJECTION, POWDER, FOR SOLUTION INTRAMUSCULAR ONCE AS NEEDED
Status: COMPLETED | OUTPATIENT
Start: 2023-01-12 | End: 2023-01-12

## 2023-01-12 RX ORDER — OXCARBAZEPINE 600 MG/1
1200 TABLET, FILM COATED ORAL NIGHTLY
Qty: 180 TABLET | Refills: 3 | Status: SHIPPED | OUTPATIENT
Start: 2023-01-12 | End: 2024-01-12

## 2023-01-12 RX ORDER — OLANZAPINE 10 MG/2ML
5 INJECTION, POWDER, FOR SOLUTION INTRAMUSCULAR ONCE AS NEEDED
Status: DISCONTINUED | OUTPATIENT
Start: 2023-01-12 | End: 2023-01-12

## 2023-01-12 RX ORDER — ONDANSETRON 2 MG/ML
4 INJECTION INTRAMUSCULAR; INTRAVENOUS EVERY 8 HOURS PRN
Status: DISCONTINUED | OUTPATIENT
Start: 2023-01-12 | End: 2023-01-12

## 2023-01-12 RX ORDER — ONDANSETRON 8 MG/1
8 TABLET, ORALLY DISINTEGRATING ORAL ONCE
Status: DISCONTINUED | OUTPATIENT
Start: 2023-01-12 | End: 2023-01-12

## 2023-01-12 RX ADMIN — LOSARTAN POTASSIUM 25 MG: 25 TABLET, FILM COATED ORAL at 09:01

## 2023-01-12 RX ADMIN — FAMOTIDINE 20 MG: 20 TABLET ORAL at 09:01

## 2023-01-12 RX ADMIN — BENZTROPINE MESYLATE 0.5 MG: 0.5 TABLET ORAL at 09:01

## 2023-01-12 RX ADMIN — OXCARBAZEPINE 600 MG: 150 TABLET, FILM COATED ORAL at 09:01

## 2023-01-12 RX ADMIN — ENOXAPARIN SODIUM 60 MG: 100 INJECTION SUBCUTANEOUS at 09:01

## 2023-01-12 RX ADMIN — FERROUS SULFATE TAB 325 MG (65 MG ELEMENTAL FE) 1 EACH: 325 (65 FE) TAB at 09:01

## 2023-01-12 RX ADMIN — METOPROLOL SUCCINATE 25 MG: 25 TABLET, EXTENDED RELEASE ORAL at 09:01

## 2023-01-12 RX ADMIN — ONDANSETRON HYDROCHLORIDE 4 MG: 2 SOLUTION INTRAMUSCULAR; INTRAVENOUS at 11:01

## 2023-01-12 RX ADMIN — OLANZAPINE 5 MG: 10 INJECTION, POWDER, FOR SOLUTION INTRAMUSCULAR at 02:01

## 2023-01-12 RX ADMIN — MUPIROCIN: 20 OINTMENT TOPICAL at 09:01

## 2023-01-12 RX ADMIN — FUROSEMIDE 40 MG: 40 TABLET ORAL at 09:01

## 2023-01-12 RX ADMIN — HYDROXYZINE PAMOATE 50 MG: 25 CAPSULE ORAL at 07:01

## 2023-01-12 RX ADMIN — ONDANSETRON 8 MG: 8 TABLET, ORALLY DISINTEGRATING ORAL at 01:01

## 2023-01-12 RX ADMIN — NICOTINE 1 PATCH: 21 PATCH, EXTENDED RELEASE TRANSDERMAL at 09:01

## 2023-01-12 NOTE — DISCHARGE SUMMARY
[facility] Medicine  Discharge Summary      Patient Name: Stephanie T Barthelemy  MRN: [id number]  GILBERT: [id number]  Patient Class: IP- Inpatient  Admission Date: 1/7/2023  Hospital Length of Stay: 5 days  Discharge Date and Time:  01/12/2023 12:27 PM  Attending Physician: Bernadette Stevenson MD   Discharging Provider: Pérez Mendoza MD  Primary Care Provider: Primary Doctor No    Primary Care Team: Networked reference to record PCT     HPI:   52F w ADHD, severe methamphetamine use disorder, bipolar I disorder, HTN, seizure disorder, HFrEF (LVEF of 20-25% on Echo done in 12/2022), with multiple admits for psychosis and/or CHF exacerbations in the setting of intermittent medication adherence in the past year, who was brought [facility] for fluid overload. Patient states she was taking all of her medications as prescribed up until presenting to the ED today. Patient states she began to feel SOB approx 3 days ago and that it has been worsening ever since. Patient admits to recurrent panic attacks and states she feels as if she's having one on admission. Patient states she last used methamphetamine day PTA.     In ED, initial vital signs significant for afebrile, . /96, satting 100% on 2L NC. Initial PE reveals edema to patient's thighs b/l, lungs with bibasilar rales, Cap refill <3 seconds in b/l fingers/toes. Initial imaging CXR with persistent cardiomegaly but without focal lobar consolidation or pulmonary vascular congestion. Initial labs significant for BNP 3077, Troponin 0.058, Hb 10, MCV 74. Labs otherwise unremarkable. Initial EKG showing sinus tach, no STEMI, no significant changes from 12/29/22. [facility] contacted for admission for CHF exacerbation in setting of medication noncompliance.        * No surgery found *      Hospital Course:    52F w ADHD, severe methamphetamine use disorder, bipolar I disorder, HTN, seizure disorder, HFrEF (LVEF of 20-25% on Echo done in 12/2022), with multiple  admits for psychosis and/or CHF exacerbations in the setting of intermittent medication adherence in the past year, presented for fluid overload with SOB x 3 days. Most recent meth used day prior to admission.    In ED, initial vital signs significant for afebrile, . /96, satting 100% on 2L NC. Initial PE reveals edema to patient's thighs b/l, lungs with bibasilar rales, Cap refill <3 seconds in b/l fingers/toes. Initial imaging CXR with persistent cardiomegaly but without focal lobar consolidation or pulmonary vascular congestion. Initial labs significant for BNP 3077, Troponin 0.058, Hb 10, MCV 74. Labs otherwise unremarkable. Initial EKG showing sinus tach, no STEMI, no significant changes from 12/29/22. [facility] contacted for admission for CHF exacerbation in setting of medication noncompliance.      On 1/7/23 patient determined to have subsegmental PE confirmed on CTA. Patient started on therapeutic Lovenox. DVT US did not show presence of thrombus in lower extremities. Patient to continue anticoagulation for 3 months on apixiban.   Patient diuresed well over 3 days on lasix 40mg IV BID. Peripheral and pulmonary edema resolved by 1/10/23. Patient assessed by [facility] and received a echocardiogram. New Echo showed worsening of CHF to EF of 15%. Patient diagnosis determined to by end stage CHF. Patient had goals of care discussion on 1/11/23 to determine outpatiets goals for quality of life. Patient was not allowed to return to previous living situation as her father who is the home owner will not allow it. Patient will be sent to a hospital for a short stay and then move into a trailer. Family has confirmed this plan. Patient will have home hospice set up to aid with continued CHF management to limit future hospital admissions. Patient to follow-up with cardiology outpatient for continued CHF monitoring and reassessment of needed for continued anticoagulation.        Goals of Care Treatment  Preferences:  Code Status: Full Code          What is most important right now is to focus on remaining as independent as possible, symptom/pain control, quality of life, even if it means sacrificing a little time, improvement in condition but with limits to invasive therapies, comfort and QOL .  Accordingly, we have decided that the best plan to meet the patient's goals includes enrolling in hospice care.      Consults:   Consults (From admission, onward)        Status Ordering Provider     Inpatient consult to Palliative Care  Once        Provider:  (Not yet assigned)    Completed AYDEN HAIRSTON     [facility]  Once        Provider:  (Not yet assigned)    Completed TIMO BAUER          No new Assessment & Plan notes have been filed under this hospital service since the last note was generated.  Service: Hospital Medicine    Final Active Diagnoses:    Diagnosis Date Noted POA    PRINCIPAL PROBLEM:  CHF exacerbation [I50.9] 11/25/2022 Yes    Palliative care encounter [Z51.5] 01/11/2023 Not Applicable    Pulmonary embolus [I26.99] 01/08/2023 Yes     Chronic    Panic attacks [F41.0] 01/07/2023 Yes     Chronic    Lactic acidosis [E87.20] 01/07/2023 Yes    Chronic combined systolic and diastolic congestive heart failure [I50.42] 09/19/2022 Yes     Chronic    Methamphetamine use disorder, severe [F15.20]  Yes      Problems Resolved During this Admission:       Discharged Condition: stable    Disposition: Home or Self Care    Follow Up:   Follow-up Information     [facility] CARDIOLOG[facility]. Schedule an appointment as soon as possible for a visit.    Specialty: Cardiology  Contact information:  [address] [place] [address] [id number] [phone]                     Patient Instructions:      Ambulatory referral/consult to Cardiology   Standing Status: Future   Referral Priority: Routine Referral Type: Consultation   Referral Reason: Specialty Services Required   Requested Specialty:  Cardiology   Number of Visits Requested: 1       Significant Diagnostic Studies: Labs:   BMP:   Recent Labs   Lab 01/11/23  0601 01/12/23  0513   * 131*    139   K 3.0* 4.1   CL 97 100   CO2 33* 28   BUN 27* 29*   CREATININE 1.3 1.2   CALCIUM 8.6* 8.5*   MG 1.7 1.9   , CMP   Recent Labs   Lab 01/11/23  0601 01/12/23  0513    139   K 3.0* 4.1   CL 97 100   CO2 33* 28   * 131*   BUN 27* 29*   CREATININE 1.3 1.2   CALCIUM 8.6* 8.5*   PROT 6.2 6.5   ALBUMIN 2.4* 2.6*   BILITOT 1.0 0.9   ALKPHOS 87 86   AST 36 36   ALT 28 30   ANIONGAP 10 11    and CBC   Recent Labs   Lab 01/11/23  0601 01/12/23  0513   WBC 4.98 4.70   HGB 10.1* 9.7*   HCT 34.4* 34.1*    189       Pending Diagnostic Studies:     None         Medications:  Reconciled Home Medications:      Medication List      START taking these medications    * ELIQUIS 5 mg Tab  Generic drug: apixaban  Take 2 tablets (10 mg total) by mouth 2 (two) times daily. for 7 days     * ELIQUIS 5 mg Tab  Generic drug: apixaban  Take 1 tablet (5 mg total) by mouth 2 (two) times daily.  Start taking on: January 20, 2023         * This list has 2 medication(s) that are the same as other medications prescribed for you. Read the directions carefully, and ask your doctor or other care provider to review them with you.            CONTINUE taking these medications    albuterol 90 mcg/actuation inhaler  Commonly known as: PROVENTIL/VENTOLIN HFA  Inhale 2 puffs into the lungs every 6 (six) hours. Rescue     benztropine 0.5 MG tablet  Commonly known as: COGENTIN  Take 1 tablet (0.5 mg total) by mouth 2 (two) times daily.     famotidine 20 MG tablet  Commonly known as: PEPCID  Take 1 tablet (20 mg total) by mouth once daily.     furosemide 40 MG tablet  Commonly known as: LASIX  Take 1 tablet (40 mg total) by mouth 2 (two) times daily.     hydrocortisone 1 % cream  Apply topically 2 (two) times daily.     losartan 25 MG tablet  Commonly known as: COZAAR  Take  1 tablet (25 mg total) by mouth once daily.     metoprolol succinate 25 MG 24 hr tablet  Commonly known as: TOPROL-XL  Take 1 tablet (25 mg total) by mouth once daily.     OXcarbazepine 600 MG Tab  Commonly known as: TRILEPTAL  Take 2 tablets (1,200 mg total) by mouth nightly.     polyethylene glycol 17 gram Pwpk  Commonly known as: GLYCOLAX  Take 17 g by mouth once daily. for 60 doses     QUEtiapine 200 MG Tab  Commonly known as: SEROQUEL  Take 1 tablet (200 mg total) by mouth every evening.     senna 8.6 mg tablet  Commonly known as: SENOKOT  Take 1 tablet by mouth once daily.            Indwelling Lines/Drains at time of discharge:   Lines/Drains/Airways     None                 Time spent on the discharge of patient: 30 minutes    Pérez Mendoza MD  [facility] PGY-2  01/12/2023

## 2023-01-12 NOTE — ASSESSMENT & PLAN NOTE
Last ECHO (12/2022) EF 20-25%  Home diuretic dose Lasix 40mg BID  BNP on admit 3077  EKG sinus tachy, no STEMI, no significant change from 12/27/22   CXR revealed cardiomegaly with mild pulmonary edema   Troponin 0.058 --> 0.047 --> 0.061 --> 0.061, no longer trending   Echo shows worsened EF  Drug abuse and medical noncompliance persists, She is not a candidate for advanced HF options.  PLAN:  Reduced lasix to 40mg IV BID  - Daily Weights  - Strict I/O  - Fluid restriction to 1,200cc daily  - Low-sodium cardiac diet  - Reassess volume status regularly  - continue home beta-blocker, ACEi/ARB  - Oxygen PRN to keep O2 > 88%  - Fluid status euvolemic  - No longer requiring O2  -End stage CHF  - Consulted Palliative for goals of care discussion  - Patient home situation is undetermined, can not return to previous dwelling

## 2023-01-12 NOTE — SUBJECTIVE & OBJECTIVE
Interval History: NAEON    Review of Systems   Constitutional:  Negative for fever.   HENT:  Negative for congestion and sore throat.    Respiratory:  Negative for cough and wheezing.    Cardiovascular:  Negative for chest pain.        Improved from admit   Gastrointestinal:  Negative for nausea and vomiting.   Genitourinary:  Negative for dysuria and frequency.   Musculoskeletal:  Negative for back pain and neck pain.   Neurological:  Negative for light-headedness and headaches.   Psychiatric/Behavioral:  Positive for agitation. The patient is nervous/anxious.    Objective:     Vital Signs (Most Recent):  Temp: 98.1 °F (36.7 °C) (01/12/23 0433)  Pulse: 100 (01/12/23 0433)  Resp: 16 (01/12/23 0433)  BP: 106/79 (01/12/23 0433)  SpO2: 98 % (01/12/23 0433) Vital Signs (24h Range):  Temp:  [96.2 °F (35.7 °C)-98.4 °F (36.9 °C)] 98.1 °F (36.7 °C)  Pulse:  [] 100  Resp:  [16-19] 16  SpO2:  [98 %-99 %] 98 %  BP: ()/(65-87) 106/79     Weight: 51.6 kg (113 lb 12.1 oz)  Body mass index is 22.22 kg/m².    Intake/Output Summary (Last 24 hours) at 1/12/2023 0722  Last data filed at 1/12/2023 0442  Gross per 24 hour   Intake 20 ml   Output --   Net 20 ml      Physical Exam  HENT:      Head: Normocephalic.      Right Ear: External ear normal.      Left Ear: External ear normal.      Nose: Nose normal.      Mouth/Throat:      Mouth: Mucous membranes are moist.   Eyes:      Extraocular Movements: Extraocular movements intact.      Pupils: Pupils are equal, round, and reactive to light.   Cardiovascular:      Rate and Rhythm: Normal rate.   Pulmonary:      Effort: Pulmonary effort is normal.   Abdominal:      Palpations: Abdomen is soft.   Musculoskeletal:         General: Normal range of motion.      Cervical back: Normal range of motion.   Skin:     General: Skin is warm.      Capillary Refill: Capillary refill takes less than 2 seconds.   Neurological:      General: No focal deficit present.      Mental Status: She is  alert and oriented to person, place, and time.      Gait: Gait abnormal.      Comments: Unsteady gait requiring support while walking   Psychiatric:         Mood and Affect: Mood normal.       Significant Labs: All pertinent labs within the past 24 hours have been reviewed.  BMP:   Recent Labs   Lab 01/12/23 0513   *      K 4.1      CO2 28   BUN 29*   CREATININE 1.2   CALCIUM 8.5*   MG 1.9     CBC:   Recent Labs   Lab 01/11/23  0601 01/12/23 0513   WBC 4.98 4.70   HGB 10.1* 9.7*   HCT 34.4* 34.1*    189     CMP:   Recent Labs   Lab 01/11/23  0601 01/12/23 0513    139   K 3.0* 4.1   CL 97 100   CO2 33* 28   * 131*   BUN 27* 29*   CREATININE 1.3 1.2   CALCIUM 8.6* 8.5*   PROT 6.2 6.5   ALBUMIN 2.4* 2.6*   BILITOT 1.0 0.9   ALKPHOS 87 86   AST 36 36   ALT 28 30   ANIONGAP 10 11       Significant Imaging: I have reviewed all pertinent imaging results/findings within the past 24 hours.

## 2023-01-12 NOTE — NURSING
Telemetry and PIV removed for discharge. Medications delivered to bedside. Awaiting oxygen delivery. VN notified.

## 2023-01-12 NOTE — PLAN OF CARE
01/12/23 1201   Post-Acute Status   Post-Acute Authorization Hospice   Hospice Status Set-up Complete/Auth obtained  (Orders sent via [facility]. Jaz already aware.)

## 2023-01-12 NOTE — HOSPITAL COURSE
52F w ADHD, severe methamphetamine use disorder, bipolar I disorder, HTN, seizure disorder, HFrEF (LVEF of 20-25% on Echo done in 12/2022), with multiple admits for psychosis and/or CHF exacerbations in the setting of intermittent medication adherence in the past year, presented for fluid overload with SOB x 3 days. Most recent meth used day prior to admission.    In ED, initial vital signs significant for afebrile, . /96, satting 100% on 2L NC. Initial PE reveals edema to patient's thighs b/l, lungs with bibasilar rales, Cap refill <3 seconds in b/l fingers/toes. Initial imaging CXR with persistent cardiomegaly but without focal lobar consolidation or pulmonary vascular congestion. Initial labs significant for BNP 3077, Troponin 0.058, Hb 10, MCV 74. Labs otherwise unremarkable. Initial EKG showing sinus tach, no STEMI, no significant changes from 12/29/22. [facility] contacted for admission for CHF exacerbation in setting of medication noncompliance.      On 1/7/23 patient determined to have subsegmental PE confirmed on CTA. Patient started on therapeutic Lovenox. DVT US did not show presence of thrombus in lower extremities. Patient to continue anticoagulation for 3 months on apixiban.   Patient diuresed well over 3 days on lasix 40mg IV BID. Peripheral and pulmonary edema resolved by 1/10/23. Patient assessed by [facility] and received a echocardiogram. New Echo showed worsening of CHF to EF of 15%. Patient diagnosis determined to by end stage CHF. Patient had goals of care discussion on 1/11/23 to determine outpatiets goals for quality of life. Patient was not allowed to return to previous living situation as her father who is the home owner will not allow it. Patient will be sent to a hospital for a short stay and then move into a trailer. Family has confirmed this plan. Patient will have home hospice set up to aid with continued CHF management to limit future hospital admissions. Patient to  follow-up with cardiology outpatient for continued CHF monitoring and reassessment of needed for continued anticoagulation.

## 2023-01-12 NOTE — PLAN OF CARE
I spoke with pt this morning.  She is adamantly declining rehab placement.  We called her sister Betty together.  Betty says pt has a camper she can live in.  Pt's mom is going to book pt in a hotel room for 2 nights while they prep camper and move to [facility].  Pt will move in camper on Saturday.  I spoke with Jaz with [facility].  They are delivering portable O2 to the bedside.  Betty will be here to transport pt home within 1-2 hours.  When Betty arrives, she will be able to provide hotel address and room number that pt will be staying in.  CM will continue to follow.       01/12/23 1150   Post-Acute Status   Post-Acute Authorization Hospice   Hospice Status Pending equipment/medication delivery   Discharge Delays None known at this time       Rafa Martínez, RN   Supervisor Case Management-[place]  [phone]

## 2023-01-12 NOTE — ASSESSMENT & PLAN NOTE
Patient has an end stage CHF  - Consulted Palliative for goals of care discussion  - Patient home situation is undetermined, can not return to previous dwelling  -Not a candidate for transplant due to poor medication adherence and continued methamphetamine abuse    Plan  Continue working with SW and CM for placement   Patient would benefit from hospice

## 2023-01-12 NOTE — ASSESSMENT & PLAN NOTE
Last reported methamphetamine use day before admission    PLAN:   -monitor for s/s of methamphatamine withdrawal   - reduce Seroquel dose  Mentation improved with reduced seroquel

## 2023-01-12 NOTE — PROGRESS NOTES
[facility] Medicine  Progress Note    Patient Name: Stephanie T Barthelemy  MRN: [id number]  Patient Class: IP- Inpatient   Admission Date: 1/7/2023  Length of Stay: 5 days  Attending Physician: Bernadette Stevenson MD  Primary Care Provider: Primary Doctor No        Subjective:     Principal Problem:CHF exacerbation        HPI:  52F w ADHD, severe methamphetamine use disorder, bipolar I disorder, HTN, seizure disorder, HFrEF (LVEF of 20-25% on Echo done in 12/2022), with multiple admits for psychosis and/or CHF exacerbations in the setting of intermittent medication adherence in the past year, who was brought [facility] for fluid overload. Patient states she was taking all of her medications as prescribed up until presenting to the ED today. Patient states she began to feel SOB approx 3 days ago and that it has been worsening ever since. Patient admits to recurrent panic attacks and states she feels as if she's having one on admission. Patient states she last used methamphetamine day PTA.     In ED, initial vital signs significant for afebrile, . /96, satting 100% on 2L NC. Initial PE reveals edema to patient's thighs b/l, lungs with bibasilar rales, Cap refill <3 seconds in b/l fingers/toes. Initial imaging CXR with persistent cardiomegaly but without focal lobar consolidation or pulmonary vascular congestion. Initial labs significant for BNP 3077, Troponin 0.058, Hb 10, MCV 74. Labs otherwise unremarkable. Initial EKG showing sinus tach, no STEMI, no significant changes from 12/29/22. [facility] contacted for admission for CHF exacerbation in setting of medication noncompliance.        Overview/Hospital Course:  No notes on file    Interval History: NAEON    Review of Systems   Constitutional:  Negative for fever.   HENT:  Negative for congestion and sore throat.    Respiratory:  Negative for cough and wheezing.    Cardiovascular:  Negative for chest pain.        Improved from admit   Gastrointestinal:   Negative for nausea and vomiting.   Genitourinary:  Negative for dysuria and frequency.   Musculoskeletal:  Negative for back pain and neck pain.   Neurological:  Negative for light-headedness and headaches.   Psychiatric/Behavioral:  Positive for agitation. The patient is nervous/anxious.    Objective:     Vital Signs (Most Recent):  Temp: 98.1 °F (36.7 °C) (01/12/23 0433)  Pulse: 100 (01/12/23 0433)  Resp: 16 (01/12/23 0433)  BP: 106/79 (01/12/23 0433)  SpO2: 98 % (01/12/23 0433) Vital Signs (24h Range):  Temp:  [96.2 °F (35.7 °C)-98.4 °F (36.9 °C)] 98.1 °F (36.7 °C)  Pulse:  [] 100  Resp:  [16-19] 16  SpO2:  [98 %-99 %] 98 %  BP: ()/(65-87) 106/79     Weight: 51.6 kg (113 lb 12.1 oz)  Body mass index is 22.22 kg/m².    Intake/Output Summary (Last 24 hours) at 1/12/2023 0722  Last data filed at 1/12/2023 0442  Gross per 24 hour   Intake 20 ml   Output --   Net 20 ml      Physical Exam  HENT:      Head: Normocephalic.      Right Ear: External ear normal.      Left Ear: External ear normal.      Nose: Nose normal.      Mouth/Throat:      Mouth: Mucous membranes are moist.   Eyes:      Extraocular Movements: Extraocular movements intact.      Pupils: Pupils are equal, round, and reactive to light.   Cardiovascular:      Rate and Rhythm: Normal rate.   Pulmonary:      Effort: Pulmonary effort is normal.   Abdominal:      Palpations: Abdomen is soft.   Musculoskeletal:         General: Normal range of motion.      Cervical back: Normal range of motion.   Skin:     General: Skin is warm.      Capillary Refill: Capillary refill takes less than 2 seconds.   Neurological:      General: No focal deficit present.      Mental Status: She is alert and oriented to person, place, and time.      Gait: Gait abnormal.      Comments: Unsteady gait requiring support while walking   Psychiatric:         Mood and Affect: Mood normal.       Significant Labs: All pertinent labs within the past 24 hours have been  reviewed.  BMP:   Recent Labs   Lab 01/12/23 0513   *      K 4.1      CO2 28   BUN 29*   CREATININE 1.2   CALCIUM 8.5*   MG 1.9     CBC:   Recent Labs   Lab 01/11/23 0601 01/12/23 0513   WBC 4.98 4.70   HGB 10.1* 9.7*   HCT 34.4* 34.1*    189     CMP:   Recent Labs   Lab 01/11/23 0601 01/12/23 0513    139   K 3.0* 4.1   CL 97 100   CO2 33* 28   * 131*   BUN 27* 29*   CREATININE 1.3 1.2   CALCIUM 8.6* 8.5*   PROT 6.2 6.5   ALBUMIN 2.4* 2.6*   BILITOT 1.0 0.9   ALKPHOS 87 86   AST 36 36   ALT 28 30   ANIONGAP 10 11       Significant Imaging: I have reviewed all pertinent imaging results/findings within the past 24 hours.      Assessment/Plan:      * CHF exacerbation  Last ECHO (12/2022) EF 20-25%  Home diuretic dose Lasix 40mg BID  BNP on admit 3077  EKG sinus tachy, no STEMI, no significant change from 12/27/22   CXR revealed cardiomegaly with mild pulmonary edema   Troponin 0.058 --> 0.047 --> 0.061 --> 0.061, no longer trending   Echo shows worsened EF  Drug abuse and medical noncompliance persists, She is not a candidate for advanced HF options.  PLAN:  Reduced lasix to 40mg IV BID  - Daily Weights  - Strict I/O  - Fluid restriction to 1,200cc daily  - Low-sodium cardiac diet  - Reassess volume status regularly  - continue home beta-blocker, ACEi/ARB  - Oxygen PRN to keep O2 > 88%  - Fluid status euvolemic  - No longer requiring O2  -End stage CHF  - Consulted Palliative for goals of care discussion  - Patient home situation is undetermined, can not return to previous dwelling    Palliative care encounter  Patient has an end stage CHF  - Consulted Palliative for goals of care discussion  - Patient home situation is undetermined, can not return to previous dwelling  -Not a candidate for transplant due to poor medication adherence and continued methamphetamine abuse    Plan  Continue working with SW and CM for placement   Patient would benefit from hospice        Pulmonary  "embolus  CTA: Pulmonary embolism in the right lower lobe segmental branches as described.  No evidence of large/central pulmonary embolism and no convincing evidence of right ventricular strain. Compressive atelectasis involving the right lung base again noted.  Underlying consolidation difficult to exclude.  Mild consolidation in the left lung base versus atelectasis.  Patchy airspace ground-glass type opacities in the left lung upper lung.  Correlate for nonspecific pneumonitis.  Moderate layering right pleural effusion stable since previous exam 07/22/2022.  Trace left pleural effusion is also now noted    Plan:  -Lovenox 1 mg/kg BID (60 mg BID)  -Echo shows worsening cardiac function  -US DVT LE negative      Lactic acidosis  Lactate on admission 4.6 (resolved)  1/8: Lactate 2.0    PLAN:  -initiate BSA on admission (vanc/cef/flagyl)  -trend lactate until plateau/downtrend       Panic attacks  Patient required one time push of ativan 2mg IV on admission due to panic attack    PLAN:  -Reduced Seroquel to 100mg, trileptal  -monitor for additional panic attacks while in hospital   -vistaril TID PRN anxiety    Chronic combined systolic and diastolic congestive heart failure  See above plan under "CHF exacerbation"    Methamphetamine use disorder, severe  Last reported methamphetamine use day before admission    PLAN:   -monitor for s/s of methamphatamine withdrawal   - reduce Seroquel dose  Mentation improved with reduced seroquel        VTE Risk Mitigation (From admission, onward)         Ordered     enoxaparin injection 60 mg  Every 12 hours         01/07/23 2335     IP VTE HIGH RISK PATIENT  Once         01/07/23 0835     Place sequential compression device  Until discontinued         01/07/23 0835                Discharge Planning   AYSHA:      Code Status: Full Code   Is the patient medically ready for discharge?:     Reason for patient still in hospital (select all that apply): Pending disposition  Discharge Plan " A: Rehab   Discharge Delays: (!) Post-Acute Set-up            Pérez Mendoza MD  [facility] PGY-2  01/12/2023

## 2023-01-12 NOTE — PLAN OF CARE
Referral sent to [facility].       01/12/23 0908   Post-Acute Status   Post-Acute Authorization Placement   Post-Acute Placement Status Referrals Sent       Rafa Martínez RN   Supervisor Case Management-[place]  [phone]

## 2023-01-12 NOTE — NURSING
Pt remains impulsive. Continues to get out of bed to go to bathroom without calling gor assist. All staff responds to bed alarms frequently.VSS. NSR on tele. Pt can be sleeping at one time and then crying out loud because she was informed by Case Management that her parents and sister will not allow her back home. Wails about not wanting to be Watched all the time. CLEMENTE in room . Bed alarm set. Will continue to monitor.

## 2023-01-12 NOTE — PROGRESS NOTES
01/12/23 1058   Vital Signs   Temp 97 °F (36.1 °C)   Temp src Oral   Pulse 93   Heart Rate Source Monitor   Resp 20   SpO2 100 %   Pulse Oximetry Type Intermittent   Flow (L/min) 2   Device (Oxygen Therapy) nasal cannula   /81   MAP (mmHg) 89   BP Location Right arm   BP Method Automatic   Patient Position Lying     Patient c/o nausea and dizziness. Pérez Mendoza MD, notified. Awaiting further orders.

## 2023-01-12 NOTE — NURSING
Ondansetron ordered for patient. Awaiting pharmacy verification prior to administration. No further orders at this time.

## 2023-01-12 NOTE — PLAN OF CARE
[facility]  Department of Hospital Medicine  [address] [place] [address]  [phone] after hours  [phone] fax    HOSPICE  ORDERS    01/12/2023    Admit to Hospice:  Home Service Inpatient Service: Patient's Home    Diagnoses:   Active Hospital Problems    Diagnosis  POA    *CHF exacerbation [I50.9]  Yes    Palliative care encounter [Z51.5]  Not Applicable    Pulmonary embolus [I26.99]  Yes     Chronic    Panic attacks [F41.0]  Yes     Chronic    Lactic acidosis [E87.20]  Yes    Chronic combined systolic and diastolic congestive heart failure [I50.42]  Yes     Chronic    Methamphetamine use disorder, severe [F15.20]  Yes      Resolved Hospital Problems   No resolved problems to display.       Hospice Qualifying Diagnoses: End Stage CHF          Comorbid Conditions Contributing to Decline: CKD 2, Substance use disorder, Severe anxiety, Bipolar disorder, Provoked Pulmonary embolism    Vital Signs: Routine per Hospice Protocol.    Code Status: Full    Allergies: Review of patient's allergies indicates:  No Known Allergies    Diet: Low Salt Cardiac diet    Activities: As tolerated    Goals of Care Treatment Preferences:  Code Status: Full Code          What is most important right now is to focus on remaining as independent as possible, symptom/pain control, quality of life, even if it means sacrificing a little time, improvement in condition but with limits to invasive therapies, comfort and QOL .  Accordingly, we have decided that the best plan to meet the patient's goals includes enrolling in hospice care.      Nursing: Per Hospice Routine.     Medications:        Medication List        START taking these medications      * apixaban 5 mg Tab  Commonly known as: ELIQUIS  Take 2 tablets (10 mg total) by mouth 2 (two) times daily. for 7 days     * apixaban 5 mg Tab  Commonly known as: ELIQUIS  Take 1 tablet (5 mg total) by mouth 2 (two) times daily.  Start taking  on: January 20, 2023           * This list has 2 medication(s) that are the same as other medications prescribed for you. Read the directions carefully, and ask your doctor or other care provider to review them with you.                CONTINUE taking these medications      albuterol 90 mcg/actuation inhaler  Commonly known as: PROVENTIL/VENTOLIN HFA  Inhale 2 puffs into the lungs every 6 (six) hours. Rescue     benztropine 0.5 MG tablet  Commonly known as: COGENTIN  Take 1 tablet (0.5 mg total) by mouth 2 (two) times daily.     famotidine 20 MG tablet  Commonly known as: PEPCID  Take 1 tablet (20 mg total) by mouth once daily.     furosemide 40 MG tablet  Commonly known as: LASIX  Take 1 tablet (40 mg total) by mouth 2 (two) times daily.     hydrocortisone 1 % cream  Apply topically 2 (two) times daily.     losartan 25 MG tablet  Commonly known as: COZAAR  Take 1 tablet (25 mg total) by mouth once daily.     metoprolol succinate 25 MG 24 hr tablet  Commonly known as: TOPROL-XL  Take 1 tablet (25 mg total) by mouth once daily.     OXcarbazepine 600 MG Tab  Commonly known as: TRILEPTAL  Take 2 tablets (1,200 mg total) by mouth nightly.     polyethylene glycol 17 gram Pwpk  Commonly known as: GLYCOLAX  Take 17 g by mouth once daily. for 60 doses     QUEtiapine 200 MG Tab  Commonly known as: SEROQUEL  Take 1 tablet (200 mg total) by mouth every evening.     senna 8.6 mg tablet  Commonly known as: SENOKOT  Take 1 tablet by mouth once daily.                    Future Orders:  Hospice Medical Director may dictate new orders for comfortable care measures & sign death certificate.        _________________________________  Pérez Mendoza MD  01/12/2023

## 2023-01-12 NOTE — CARE UPDATE
"Pt was restless and tearful throurghout the shift. Unable to stay asleep for more than 20-30 minutes at a time. States that she is "worried about being homeless and does not understand why her father will not let her come home". Also states that she if "afraid of going to a shelter because I don't know what to expect" and is worried "about my belongings that I left at my father's house." Pt had several episodes of crying and sadness. Consoled by actively listening to pt's concerns. Safety maintained with tele sitter in room, bed in low/locked position and call light within reach.  "

## 2023-01-24 ENCOUNTER — HOSPITAL ENCOUNTER (OUTPATIENT)
Facility: HOSPITAL | Age: 53
Discharge: HOME | End: 2023-01-24
Attending: STUDENT IN AN ORGANIZED HEALTH CARE EDUCATION/TRAINING PROGRAM | Admitting: STUDENT IN AN ORGANIZED HEALTH CARE EDUCATION/TRAINING PROGRAM
Payer: MEDICARE

## 2023-01-24 VITALS
HEART RATE: 90 BPM | TEMPERATURE: 97 F | DIASTOLIC BLOOD PRESSURE: 72 MMHG | OXYGEN SATURATION: 100 % | BODY MASS INDEX: 22.19 KG/M2 | SYSTOLIC BLOOD PRESSURE: 110 MMHG | WEIGHT: 113 LBS | RESPIRATION RATE: 20 BRPM | HEIGHT: 60 IN

## 2023-01-24 DIAGNOSIS — R60.1 ANASARCA: ICD-10-CM

## 2023-01-24 DIAGNOSIS — R06.02 SHORTNESS OF BREATH: ICD-10-CM

## 2023-01-24 DIAGNOSIS — D64.9 ANEMIA, UNSPECIFIED TYPE: ICD-10-CM

## 2023-01-24 DIAGNOSIS — R41.82 ALTERED MENTAL STATUS, UNSPECIFIED ALTERED MENTAL STATUS TYPE: Primary | ICD-10-CM

## 2023-01-24 PROBLEM — E87.70 VOLUME OVERLOAD: Status: ACTIVE | Noted: 2023-01-24

## 2023-01-24 PROBLEM — E87.70 VOLUME OVERLOAD: Status: RESOLVED | Noted: 2023-01-24 | Resolved: 2023-01-24

## 2023-01-24 LAB
ABO + RH BLD: NORMAL
ALBUMIN SERPL BCP-MCNC: 2.5 G/DL (ref 3.5–5.2)
ALBUMIN SERPL BCP-MCNC: 2.7 G/DL (ref 3.5–5.2)
ALLENS TEST: ABNORMAL
ALP SERPL-CCNC: 90 U/L (ref 55–135)
ALP SERPL-CCNC: 99 U/L (ref 55–135)
ALT SERPL W/O P-5'-P-CCNC: 39 U/L (ref 10–44)
ALT SERPL W/O P-5'-P-CCNC: 43 U/L (ref 10–44)
AMPHET+METHAMPHET UR QL: NEGATIVE
ANION GAP SERPL CALC-SCNC: 12 MMOL/L (ref 8–16)
ANION GAP SERPL CALC-SCNC: 12 MMOL/L (ref 8–16)
AST SERPL-CCNC: 61 U/L (ref 10–40)
AST SERPL-CCNC: 64 U/L (ref 10–40)
BARBITURATES UR QL SCN>200 NG/ML: NEGATIVE
BASOPHILS # BLD AUTO: 0.01 K/UL (ref 0–0.2)
BASOPHILS # BLD AUTO: 0.02 K/UL (ref 0–0.2)
BASOPHILS NFR BLD: 0.2 % (ref 0–1.9)
BASOPHILS NFR BLD: 0.3 % (ref 0–1.9)
BENZODIAZ UR QL SCN>200 NG/ML: NEGATIVE
BILIRUB SERPL-MCNC: 1.2 MG/DL (ref 0.1–1)
BILIRUB SERPL-MCNC: 1.4 MG/DL (ref 0.1–1)
BILIRUB UR QL STRIP: NEGATIVE
BLD GP AB SCN CELLS X3 SERPL QL: NORMAL
BNP SERPL-MCNC: 1653 PG/ML (ref 0–99)
BUN SERPL-MCNC: 25 MG/DL (ref 6–20)
BUN SERPL-MCNC: 26 MG/DL (ref 6–20)
BZE UR QL SCN: NEGATIVE
CALCIUM SERPL-MCNC: 7.8 MG/DL (ref 8.7–10.5)
CALCIUM SERPL-MCNC: 8.4 MG/DL (ref 8.7–10.5)
CANNABINOIDS UR QL SCN: NEGATIVE
CHLORIDE SERPL-SCNC: 102 MMOL/L (ref 95–110)
CHLORIDE SERPL-SCNC: 98 MMOL/L (ref 95–110)
CLARITY UR: CLEAR
CO2 SERPL-SCNC: 22 MMOL/L (ref 23–29)
CO2 SERPL-SCNC: 27 MMOL/L (ref 23–29)
COLOR UR: COLORLESS
CREAT SERPL-MCNC: 1 MG/DL (ref 0.5–1.4)
CREAT SERPL-MCNC: 1 MG/DL (ref 0.5–1.4)
CREAT UR-MCNC: 8.1 MG/DL (ref 15–325)
DELSYS: ABNORMAL
DIFFERENTIAL METHOD: ABNORMAL
DIFFERENTIAL METHOD: ABNORMAL
EOSINOPHIL # BLD AUTO: 0 K/UL (ref 0–0.5)
EOSINOPHIL # BLD AUTO: 0 K/UL (ref 0–0.5)
EOSINOPHIL NFR BLD: 0 % (ref 0–8)
EOSINOPHIL NFR BLD: 0.2 % (ref 0–8)
ERYTHROCYTE [DISTWIDTH] IN BLOOD BY AUTOMATED COUNT: 23.4 % (ref 11.5–14.5)
ERYTHROCYTE [DISTWIDTH] IN BLOOD BY AUTOMATED COUNT: 24.2 % (ref 11.5–14.5)
EST. GFR  (NO RACE VARIABLE): >60 ML/MIN/1.73 M^2
EST. GFR  (NO RACE VARIABLE): >60 ML/MIN/1.73 M^2
ETHANOL SERPL-MCNC: <10 MG/DL
FIO2: 21
GLUCOSE SERPL-MCNC: 103 MG/DL (ref 70–110)
GLUCOSE SERPL-MCNC: 112 MG/DL (ref 70–110)
GLUCOSE UR QL STRIP: NEGATIVE
HCO3 UR-SCNC: 24.4 MMOL/L (ref 24–28)
HCT VFR BLD AUTO: 22.7 % (ref 37–48.5)
HCT VFR BLD AUTO: 35.5 % (ref 37–48.5)
HGB BLD-MCNC: 10.4 G/DL (ref 12–16)
HGB BLD-MCNC: 6.5 G/DL (ref 12–16)
HGB UR QL STRIP: NEGATIVE
IMM GRANULOCYTES # BLD AUTO: 0.01 K/UL (ref 0–0.04)
IMM GRANULOCYTES # BLD AUTO: 0.02 K/UL (ref 0–0.04)
IMM GRANULOCYTES NFR BLD AUTO: 0.2 % (ref 0–0.5)
IMM GRANULOCYTES NFR BLD AUTO: 0.5 % (ref 0–0.5)
INR PPP: 1.6 (ref 0.8–1.2)
KETONES UR QL STRIP: NEGATIVE
LEUKOCYTE ESTERASE UR QL STRIP: NEGATIVE
LYMPHOCYTES # BLD AUTO: 0.5 K/UL (ref 1–4.8)
LYMPHOCYTES # BLD AUTO: 0.9 K/UL (ref 1–4.8)
LYMPHOCYTES NFR BLD: 12.4 % (ref 18–48)
LYMPHOCYTES NFR BLD: 16 % (ref 18–48)
MAGNESIUM SERPL-MCNC: 1.7 MG/DL (ref 1.6–2.6)
MCH RBC QN AUTO: 21.1 PG (ref 27–31)
MCH RBC QN AUTO: 21.2 PG (ref 27–31)
MCHC RBC AUTO-ENTMCNC: 28.6 G/DL (ref 32–36)
MCHC RBC AUTO-ENTMCNC: 29.3 G/DL (ref 32–36)
MCV RBC AUTO: 72 FL (ref 82–98)
MCV RBC AUTO: 74 FL (ref 82–98)
METHADONE UR QL SCN>300 NG/ML: NEGATIVE
MODE: ABNORMAL
MONOCYTES # BLD AUTO: 0.5 K/UL (ref 0.3–1)
MONOCYTES # BLD AUTO: 0.7 K/UL (ref 0.3–1)
MONOCYTES NFR BLD: 11.3 % (ref 4–15)
MONOCYTES NFR BLD: 12.1 % (ref 4–15)
NEUTROPHILS # BLD AUTO: 3.1 K/UL (ref 1.8–7.7)
NEUTROPHILS # BLD AUTO: 4.2 K/UL (ref 1.8–7.7)
NEUTROPHILS NFR BLD: 72 % (ref 38–73)
NEUTROPHILS NFR BLD: 74.8 % (ref 38–73)
NITRITE UR QL STRIP: NEGATIVE
NRBC BLD-RTO: 0 /100 WBC
NRBC BLD-RTO: 0 /100 WBC
OPIATES UR QL SCN: ABNORMAL
PCO2 BLDA: 43.2 MMHG (ref 35–45)
PCP UR QL SCN>25 NG/ML: NEGATIVE
PH SMN: 7.36 [PH] (ref 7.35–7.45)
PH UR STRIP: 6 [PH] (ref 5–8)
PHOSPHATE SERPL-MCNC: 3.4 MG/DL (ref 2.7–4.5)
PLATELET # BLD AUTO: 257 K/UL (ref 150–450)
PLATELET # BLD AUTO: 93 K/UL (ref 150–450)
PMV BLD AUTO: 11.1 FL (ref 9.2–12.9)
PMV BLD AUTO: 11.5 FL (ref 9.2–12.9)
PO2 BLDA: 26 MMHG (ref 40–60)
POC BE: -1 MMOL/L
POC SATURATED O2: 46 % (ref 95–100)
POC TCO2: 26 MMOL/L (ref 24–29)
POTASSIUM SERPL-SCNC: 3.1 MMOL/L (ref 3.5–5.1)
POTASSIUM SERPL-SCNC: 3.1 MMOL/L (ref 3.5–5.1)
PROT SERPL-MCNC: 5.9 G/DL (ref 6–8.4)
PROT SERPL-MCNC: 6.7 G/DL (ref 6–8.4)
PROT UR QL STRIP: NEGATIVE
PROTHROMBIN TIME: 16.4 SEC (ref 9–12.5)
RBC # BLD AUTO: 3.07 M/UL (ref 4–5.4)
RBC # BLD AUTO: 4.93 M/UL (ref 4–5.4)
SAMPLE: ABNORMAL
SITE: ABNORMAL
SODIUM SERPL-SCNC: 136 MMOL/L (ref 136–145)
SODIUM SERPL-SCNC: 137 MMOL/L (ref 136–145)
SP GR UR STRIP: 1 (ref 1–1.03)
SP02: 96
TOXICOLOGY INFORMATION: ABNORMAL
TROPONIN I SERPL DL<=0.01 NG/ML-MCNC: 0.01 NG/ML (ref 0–0.03)
URN SPEC COLLECT METH UR: ABNORMAL
UROBILINOGEN UR STRIP-ACNC: NEGATIVE EU/DL
WBC # BLD AUTO: 4.12 K/UL (ref 3.9–12.7)
WBC # BLD AUTO: 5.76 K/UL (ref 3.9–12.7)

## 2023-01-24 PROCEDURE — G0378 HOSPITAL OBSERVATION PER HR: HCPCS

## 2023-01-24 PROCEDURE — C9113 INJ PANTOPRAZOLE SODIUM, VIA: HCPCS | Performed by: STUDENT IN AN ORGANIZED HEALTH CARE EDUCATION/TRAINING PROGRAM

## 2023-01-24 PROCEDURE — 96375 TX/PRO/DX INJ NEW DRUG ADDON: CPT

## 2023-01-24 PROCEDURE — 80183 DRUG SCRN QUANT OXCARBAZEPIN: CPT | Performed by: STUDENT IN AN ORGANIZED HEALTH CARE EDUCATION/TRAINING PROGRAM

## 2023-01-24 PROCEDURE — 96376 TX/PRO/DX INJ SAME DRUG ADON: CPT

## 2023-01-24 PROCEDURE — 93010 ELECTROCARDIOGRAM REPORT: CPT | Mod: ,,, | Performed by: INTERNAL MEDICINE

## 2023-01-24 PROCEDURE — 81003 URINALYSIS AUTO W/O SCOPE: CPT | Mod: 59 | Performed by: STUDENT IN AN ORGANIZED HEALTH CARE EDUCATION/TRAINING PROGRAM

## 2023-01-24 PROCEDURE — 25000003 PHARM REV CODE 250: Performed by: STUDENT IN AN ORGANIZED HEALTH CARE EDUCATION/TRAINING PROGRAM

## 2023-01-24 PROCEDURE — 80307 DRUG TEST PRSMV CHEM ANLYZR: CPT | Performed by: STUDENT IN AN ORGANIZED HEALTH CARE EDUCATION/TRAINING PROGRAM

## 2023-01-24 PROCEDURE — 85610 PROTHROMBIN TIME: CPT | Performed by: STUDENT IN AN ORGANIZED HEALTH CARE EDUCATION/TRAINING PROGRAM

## 2023-01-24 PROCEDURE — 84484 ASSAY OF TROPONIN QUANT: CPT | Performed by: STUDENT IN AN ORGANIZED HEALTH CARE EDUCATION/TRAINING PROGRAM

## 2023-01-24 PROCEDURE — 93010 EKG 12-LEAD: ICD-10-PCS | Mod: ,,, | Performed by: INTERNAL MEDICINE

## 2023-01-24 PROCEDURE — 99285 EMERGENCY DEPT VISIT HI MDM: CPT | Mod: 25

## 2023-01-24 PROCEDURE — 63600175 PHARM REV CODE 636 W HCPCS: Performed by: STUDENT IN AN ORGANIZED HEALTH CARE EDUCATION/TRAINING PROGRAM

## 2023-01-24 PROCEDURE — 85025 COMPLETE CBC W/AUTO DIFF WBC: CPT | Mod: 91 | Performed by: STUDENT IN AN ORGANIZED HEALTH CARE EDUCATION/TRAINING PROGRAM

## 2023-01-24 PROCEDURE — 85025 COMPLETE CBC W/AUTO DIFF WBC: CPT | Performed by: STUDENT IN AN ORGANIZED HEALTH CARE EDUCATION/TRAINING PROGRAM

## 2023-01-24 PROCEDURE — 80053 COMPREHEN METABOLIC PANEL: CPT | Performed by: STUDENT IN AN ORGANIZED HEALTH CARE EDUCATION/TRAINING PROGRAM

## 2023-01-24 PROCEDURE — 80053 COMPREHEN METABOLIC PANEL: CPT | Mod: 91 | Performed by: STUDENT IN AN ORGANIZED HEALTH CARE EDUCATION/TRAINING PROGRAM

## 2023-01-24 PROCEDURE — 84100 ASSAY OF PHOSPHORUS: CPT | Performed by: STUDENT IN AN ORGANIZED HEALTH CARE EDUCATION/TRAINING PROGRAM

## 2023-01-24 PROCEDURE — 83880 ASSAY OF NATRIURETIC PEPTIDE: CPT | Performed by: STUDENT IN AN ORGANIZED HEALTH CARE EDUCATION/TRAINING PROGRAM

## 2023-01-24 PROCEDURE — 82272 OCCULT BLD FECES 1-3 TESTS: CPT | Performed by: STUDENT IN AN ORGANIZED HEALTH CARE EDUCATION/TRAINING PROGRAM

## 2023-01-24 PROCEDURE — 86900 BLOOD TYPING SEROLOGIC ABO: CPT | Performed by: STUDENT IN AN ORGANIZED HEALTH CARE EDUCATION/TRAINING PROGRAM

## 2023-01-24 PROCEDURE — 83735 ASSAY OF MAGNESIUM: CPT | Performed by: STUDENT IN AN ORGANIZED HEALTH CARE EDUCATION/TRAINING PROGRAM

## 2023-01-24 PROCEDURE — 96374 THER/PROPH/DIAG INJ IV PUSH: CPT

## 2023-01-24 PROCEDURE — 86920 COMPATIBILITY TEST SPIN: CPT | Performed by: STUDENT IN AN ORGANIZED HEALTH CARE EDUCATION/TRAINING PROGRAM

## 2023-01-24 PROCEDURE — 82077 ASSAY SPEC XCP UR&BREATH IA: CPT | Performed by: STUDENT IN AN ORGANIZED HEALTH CARE EDUCATION/TRAINING PROGRAM

## 2023-01-24 PROCEDURE — 93005 ELECTROCARDIOGRAM TRACING: CPT

## 2023-01-24 RX ORDER — POTASSIUM CHLORIDE 7.45 MG/ML
10 INJECTION INTRAVENOUS
Status: DISPENSED | OUTPATIENT
Start: 2023-01-24 | End: 2023-01-24

## 2023-01-24 RX ORDER — NALOXONE HCL 0.4 MG/ML
0.4 VIAL (ML) INJECTION
Status: DISCONTINUED | OUTPATIENT
Start: 2023-01-24 | End: 2023-01-24 | Stop reason: HOSPADM

## 2023-01-24 RX ORDER — METOPROLOL SUCCINATE 25 MG/1
25 TABLET, EXTENDED RELEASE ORAL DAILY
Status: DISCONTINUED | OUTPATIENT
Start: 2023-01-24 | End: 2023-01-24 | Stop reason: HOSPADM

## 2023-01-24 RX ORDER — FUROSEMIDE 10 MG/ML
40 INJECTION INTRAMUSCULAR; INTRAVENOUS
Status: DISCONTINUED | OUTPATIENT
Start: 2023-01-24 | End: 2023-01-24 | Stop reason: HOSPADM

## 2023-01-24 RX ORDER — FUROSEMIDE 10 MG/ML
40 INJECTION INTRAMUSCULAR; INTRAVENOUS
Status: DISCONTINUED | OUTPATIENT
Start: 2023-01-24 | End: 2023-01-24

## 2023-01-24 RX ORDER — HYDROCODONE BITARTRATE AND ACETAMINOPHEN 500; 5 MG/1; MG/1
TABLET ORAL
Status: DISCONTINUED | OUTPATIENT
Start: 2023-01-24 | End: 2023-01-24 | Stop reason: HOSPADM

## 2023-01-24 RX ORDER — POTASSIUM CHLORIDE 7.45 MG/ML
10 INJECTION INTRAVENOUS
Status: COMPLETED | OUTPATIENT
Start: 2023-01-24 | End: 2023-01-24

## 2023-01-24 RX ORDER — SODIUM CHLORIDE 0.9 % (FLUSH) 0.9 %
5 SYRINGE (ML) INJECTION
Status: DISCONTINUED | OUTPATIENT
Start: 2023-01-24 | End: 2023-01-24 | Stop reason: HOSPADM

## 2023-01-24 RX ORDER — PANTOPRAZOLE SODIUM 40 MG/10ML
40 INJECTION, POWDER, LYOPHILIZED, FOR SOLUTION INTRAVENOUS DAILY
Status: DISCONTINUED | OUTPATIENT
Start: 2023-01-24 | End: 2023-01-24 | Stop reason: HOSPADM

## 2023-01-24 RX ORDER — FUROSEMIDE 10 MG/ML
80 INJECTION INTRAMUSCULAR; INTRAVENOUS
Status: COMPLETED | OUTPATIENT
Start: 2023-01-24 | End: 2023-01-24

## 2023-01-24 RX ORDER — LOSARTAN POTASSIUM 25 MG/1
25 TABLET ORAL DAILY
Status: DISCONTINUED | OUTPATIENT
Start: 2023-01-24 | End: 2023-01-24 | Stop reason: HOSPADM

## 2023-01-24 RX ORDER — POTASSIUM CHLORIDE 7.45 MG/ML
10 INJECTION INTRAVENOUS
Status: DISCONTINUED | OUTPATIENT
Start: 2023-01-24 | End: 2023-01-24

## 2023-01-24 RX ORDER — AMOXICILLIN 250 MG
1 CAPSULE ORAL 2 TIMES DAILY PRN
Status: DISCONTINUED | OUTPATIENT
Start: 2023-01-24 | End: 2023-01-24 | Stop reason: HOSPADM

## 2023-01-24 RX ORDER — HYDROCORTISONE 1 %
CREAM (GRAM) TOPICAL 2 TIMES DAILY PRN
Qty: 35 G | Refills: 0 | Status: ON HOLD | OUTPATIENT
Start: 2023-01-24 | End: 2023-02-10 | Stop reason: HOSPADM

## 2023-01-24 RX ORDER — PROCHLORPERAZINE MALEATE 10 MG
10 TABLET ORAL EVERY 6 HOURS PRN
COMMUNITY
Start: 2023-01-18

## 2023-01-24 RX ADMIN — PANTOPRAZOLE SODIUM 40 MG: 40 INJECTION, POWDER, LYOPHILIZED, FOR SOLUTION INTRAVENOUS at 05:01

## 2023-01-24 RX ADMIN — POTASSIUM CHLORIDE 10 MEQ: 7.46 INJECTION, SOLUTION INTRAVENOUS at 06:01

## 2023-01-24 RX ADMIN — FUROSEMIDE 80 MG: 10 INJECTION, SOLUTION INTRAMUSCULAR; INTRAVENOUS at 01:01

## 2023-01-24 RX ADMIN — METOPROLOL SUCCINATE 25 MG: 25 TABLET, EXTENDED RELEASE ORAL at 09:01

## 2023-01-24 RX ADMIN — FUROSEMIDE 40 MG: 10 INJECTION INTRAMUSCULAR; INTRAVENOUS at 04:01

## 2023-01-24 RX ADMIN — LOSARTAN POTASSIUM 25 MG: 25 TABLET, FILM COATED ORAL at 09:01

## 2023-01-24 RX ADMIN — FUROSEMIDE 40 MG: 10 INJECTION, SOLUTION INTRAMUSCULAR; INTRAVENOUS at 09:01

## 2023-01-24 RX ADMIN — POTASSIUM CHLORIDE 10 MEQ: 7.46 INJECTION, SOLUTION INTRAVENOUS at 02:01

## 2023-01-24 RX ADMIN — NALXONE HYDROCHLORIDE 0.4 MG: 0.4 INJECTION INTRAMUSCULAR; INTRAVENOUS; SUBCUTANEOUS at 05:01

## 2023-01-24 RX ADMIN — NALXONE HYDROCHLORIDE 0.4 MG: 0.4 INJECTION INTRAMUSCULAR; INTRAVENOUS; SUBCUTANEOUS at 03:01

## 2023-01-24 RX ADMIN — POTASSIUM CHLORIDE 10 MEQ: 7.46 INJECTION, SOLUTION INTRAVENOUS at 03:01

## 2023-01-24 RX ADMIN — POTASSIUM CHLORIDE 10 MEQ: 7.46 INJECTION, SOLUTION INTRAVENOUS at 05:01

## 2023-01-24 NOTE — ASSESSMENT & PLAN NOTE
Likely from opioid overdose  UDS positive for opiates with recent prescription provided for morphine sulfate on 01/12/2023  S/p Narcan x 2 in ED with good response.  Plan for frequent Q2H neurochecks  Can use Narcan PRN  Monitor signs for respiratory depression and continue monitoring for pulmonary edema  Aspiration precautions  Maintain on continuous pulse oximetry

## 2023-01-24 NOTE — ED NOTES
Patient arrives via ems with complaints of being lethargic, weak and dizzy. Patient wakes briefly to verbal/tactile stimuli. Pt denies any chest pain and dyspnea. Normal respiratory drive noted.        Pain:  Rated 0/10.     Psychosocial:  Patient is calm and cooperative. Appears clean, well maintained, with clothing appropriate to environment.  No evidence of delusions, hallucinations, or psychosis.     Neuro:  Eyes open spontaneously.  Awake, alert, oriented to name and place only.  Speech mumbles and soft.  Tolerating saliva secretions well.  Able to follow commands, slow to answer questions that are asked. Symmetrical facial muscles.  Moving all extremities well with no noted weakness.  Adequate muscle tone present.    Movement is purposeful.       Airway:  Bilateral chest rise and fall.  RR regular and non-labored.         Circulatory:  Skin warm, dry, and pink.  Radial pulses strong and regular.  Capillary refill/skin blanching less than 3 seconds to distal of upper extremities.     Abdomen:  No related complaint.      Urinary:  No related complaints.

## 2023-01-24 NOTE — H&P
[facility] Medicine  History & Physical    Patient Name: Stephanie T Barthelemy  MRN: [id number]  Patient Class: OP- Observation  Admission Date: 1/24/2023  Attending Physician: Bernadette Stevenson MD   Primary Care Provider: Primary Doctor No    Patient information was obtained from past medical records and ER records.     Subjective:     Principal Problem:AMS (altered mental status)    Chief Complaint:   Chief Complaint   Patient presents with    Altered Mental Status     Patient presents to the ED via [facility] from home with reports of patient who was lethargic, weak, and dizzy. CBG per EMS was 171 mg/dl. Patient wakes briefly to verbal/tactile stimuli.     Weakness        HPI: Stephanie T Barthelemy is a 52-year-old female with a PMHx HFrEF (EF 15% on 01/09/2023), bipolar 1 disorder, ADHD, methamphetamine use disorder, history of opioid overdose, history of PE (on chronic Eliquis) who presented to [facility] ED via EMS due to weakness, lethargy, and altered mental status. Patient's boyfriend called EMS because concern patient was weak and difficult to arouse. From chart review, patient with recent hospitalization and discharge from [facility] on 01/12/2023 for ADHF in the setting of medication noncompliance and recent methamphetamine use. Patient was also found to have subsequent pulmonary emboli confirmed on CTA and was started on therapeutic dose Lovenox. Patient was discharged home on Eliquis 5 mg BID with plan to continue for 3 months in duration. During hospitalization, an echocardiogram was repeated which showed worsening of heart failure with reported EF of 15%.  Patient was determined to be in the end-stage of CHF and a goals of care discussion was conducted with the patient. Patient was discharged home with hospice on 01/12/2023.     In the ED, temp 97.8° F, /83, HR 82, RR 14, SpO2 96% on RA. CBC with H/H 6.5/22.7, platelet count 93. BNP 1653. CXR with enlarged cardiac  silhouette, pulmonary vascular congestion, and small or moderate underlying pleural effusions. UDS positive for opiates. Troponin 0.010.  EKG with NSR, left bundle-branch block, .  Physical exam showing patient lethargic with pinpoint pupils, anasarca throughout the abdomen, and bibasilar crackles. Patient was transfused 1 unit PRBC transfusion and provided IV Lasix 80 mg. [facility] for further management.               Past Medical History:   Diagnosis Date    ADHD (attention deficit hyperactivity disorder)     Anemia     Anxiety     Bipolar disorder     CHF (congestive heart failure)     History of hepatitis C - s/p clearance of virus (HCV neg 6/2015) 09/26/2014    History of psychiatric hospitalization     History of substance abuse     IV heroin - last use 2013 per pt    Hx of psychiatric care     Hypertension     Opioid overdose 05/10/2016    Psychiatric problem     Psychosis     Seizure disorder 04/03/2019    Sleep difficulties     Still's disease     Substance abuse     Therapy     Vasculitis        Past Surgical History:   Procedure Laterality Date    HYSTERECTOMY  3/16/2011    SALPINGOOPHORECTOMY Right 3/16/2011    TUBAL LIGATION      Vaginal cuff repair  04/22/2011       Review of patient's allergies indicates:  No Known Allergies    No current facility-administered medications on file prior to encounter.     Current Outpatient Medications on File Prior to Encounter   Medication Sig    albuterol (PROVENTIL/VENTOLIN HFA) 90 mcg/actuation inhaler Inhale 2 puffs into the lungs every 6 (six) hours. Rescue    apixaban (ELIQUIS) 5 mg Tab Take 1 tablet (5 mg total) by mouth 2 (two) times daily.    benztropine (COGENTIN) 0.5 MG tablet Take 1 tablet (0.5 mg total) by mouth 2 (two) times daily.    famotidine (PEPCID) 20 MG tablet Take 1 tablet (20 mg total) by mouth once daily.    furosemide (LASIX) 40 MG tablet Take 1 tablet (40 mg total)  by mouth 2 (two) times daily.    hydrocortisone 1 % cream Apply topically 2 (two) times daily.    losartan (COZAAR) 25 MG tablet Take 1 tablet (25 mg total) by mouth once daily.    metoprolol succinate (TOPROL-XL) 25 MG 24 hr tablet Take 1 tablet (25 mg total) by mouth once daily.    OXcarbazepine (TRILEPTAL) 600 MG Tab Take 2 tablets (1,200 mg total) by mouth nightly.    polyethylene glycol (GLYCOLAX) 17 gram PwPk Take 17 g by mouth once daily. for 60 doses    QUEtiapine (SEROQUEL) 200 MG Tab Take 1 tablet (200 mg total) by mouth every evening.    senna (SENOKOT) 8.6 mg tablet Take 1 tablet by mouth once daily.    [DISCONTINUED] amLODIPine (NORVASC) 5 MG tablet Take 1 tablet (5 mg total) by mouth once daily.    [DISCONTINUED] risperiDONE (RISPERDAL) 1 MG tablet Take 1 mg by mouth 2 (two) times daily.     Family History       Problem Relation (Age of Onset)    Cancer Maternal Grandmother    Diabetes Maternal Grandmother          Tobacco Use    Smoking status: Every Day     Packs/day: 1.00     Years: 36.00     Pack years: 36.00     Types: Cigarettes     Start date: 1986    Smokeless tobacco: Never    Tobacco comments:     Enrolled in [employer] on 10/23/14 ([employer] Member ID # [id number]) Pt declines Ambulatory referral to Smoking Cessation clinic. Handout provided   Substance and Sexual Activity    Alcohol use: No    Drug use: Yes     Types: Heroin, Cocaine, Methamphetamines, Marijuana    Sexual activity: Not Currently     Partners: Male, Female     Birth control/protection: Other-see comments     Comment: hysterectomy     Review of Systems   Unable to perform ROS: Mental status change   Objective:     Vital Signs (Most Recent):  Temp: 97.8 °F (36.6 °C) (01/24/23 0038)  Pulse: 92 (01/24/23 0503)  Resp: 10 (01/24/23 0503)  BP: 112/88 (01/24/23 0503)  SpO2: 100 % (01/24/23 0503) Vital Signs (24h Range):  Temp:  [97.8 °F (36.6 °C)] 97.8 °F (36.6 °C)  Pulse:  [82-93] 92  Resp:  [10-14] 10  SpO2:  [96  %-100 %] 100 %  BP: (112-130)/(79-88) 112/88     Weight: 51.3 kg (113 lb)  Body mass index is 22.07 kg/m².    Physical Exam  Constitutional:       General: She is not in acute distress.     Appearance: She is not ill-appearing.   Cardiovascular:      Rate and Rhythm: Normal rate and regular rhythm.      Heart sounds: Murmur (systolic) heard.     No friction rub. No gallop.   Pulmonary:      Breath sounds: Rales (Bibasilar rale) present.   Abdominal:      General: Abdomen is flat.      Tenderness: There is no abdominal tenderness. There is no guarding.      Comments: Anasarca throughout abdomen   Musculoskeletal:         General: Normal range of motion.      Right lower leg: Edema (1+) present.      Left lower leg: Edema (1+) present.   Neurological:      Mental Status: She is alert.      Comments: Lethargic  Patient does open eyes to voice     Psychiatric:         Mood and Affect: Mood normal.         Behavior: Behavior normal.           Significant Labs: All pertinent labs within the past 24 hours have been reviewed.  ABGs:   Recent Labs   Lab 01/24/23  0308   PH 7.359   PCO2 43.2   HCO3 24.4   POCSATURATED 46*   BE -1   PO2 26*     CBC:   Recent Labs   Lab 01/24/23 0131 01/24/23  0456   WBC 4.12 5.76   HGB 6.5* 10.4*   HCT 22.7* 35.5*   PLT 93* 257     CMP:   Recent Labs   Lab 01/24/23 0132 01/24/23  0456    137   K 3.1* 3.1*    98   CO2 22* 27   * 103   BUN 26* 25*   CREATININE 1.0 1.0   CALCIUM 7.8* 8.4*   PROT 5.9* 6.7   ALBUMIN 2.5* 2.7*   BILITOT 1.2* 1.4*   ALKPHOS 90 99   AST 61* 64*   ALT 39 43   ANIONGAP 12 12     Cardiac Markers:   Recent Labs   Lab 01/24/23 0132   BNP 1,653*     Troponin:   Recent Labs   Lab 01/24/23 0131   TROPONINI 0.010     Significant Imaging:     AP PORTABLE CHEST (01/24/2023): enlargement of the cardiac silhouette.  There is pulmonary vascular congestion.  There are small or moderate underlying pleural effusions.  No pneumothorax is detected. The bones are  diffusely osteopenic.    Impression: Overall findings likely represent CHF.       I have reviewed all pertinent imaging results/findings within the past 24 hours.    Assessment/Plan:     * AMS (altered mental status)  Secondary to opioid overdose  UDS positive for opiates with recent prescription provided for morphine sulfate on 01/12/2023  S/p Narcan x 2 in ED with good response and increased alertness  Plan for frequent Q2H neurochecks  Can use Narcan PRN  Monitor signs for respiratory depression and continue monitoring for pulmonary edema  Aspiration precautions  Maintain on continuous pulse oximetry      CHF exacerbation  Bibasilar rales, abdominal anasarca, and 1+ pitting edema in lower extremities  BNP 1653  CXR with cardiomegaly, pulmonary edema, and small or moderate underlying pleural effusions.  Echocardiogram from 01/09/2023 with EF 15%, severe decreased systolic function, grade 2 left ventricular diastolic dysfunction, severe left ventricular global hypokinesis, and pulmonary hypertension (PA systolic pressure 43 mmHg)  Home medications include PO Lasix 40 mg BID, losartan 25 mg daily, metoprolol succinate 25 mg daily  S/p 80 mg IV Lasix in ED    Plan:  Continue diuresis with IV Lasix 40 mg BID  Continue GDMT with metoprolol succinate and losartan  Continue monitoring urine output  Daily Weights  Strict I/O  Fluid restriction < 1.5 L daily  Sodium restriction < 2 g/day  Low-sodium cardiac diet  will reassess volume status regularly  Will need to communicate with home hospice company today    Pulmonary embolus  Recent diagnosis on CTA on 01/07/2023 with right lower lobe segmental branches pulmonary embolus.  Patient started on Eliquis 5 mg BID at hospital discharge on 01/12/2023  Will continue to hold given concern for bleed    Anemia  Denies any bloody bowel movements or melanotic stools  H/H 6.5/22.7 on admission  S/p 1 unit PRBC transfusion  Follow up post transfusion H/H  Continue trending H/H Q8H    Follow-up fecal occult, stool  Keep NPO   Start IV Protonix 40 mg BID  Hold home Eliquis  Intravascular resuscitation/support with IVFs and pRBCs as needed.  Transfuse if hemoglobin < 8.0        VTE Risk Mitigation (From admission, onward)         Ordered     Reason for No Pharmacological VTE Prophylaxis  Once        Question:  Reasons:  Answer:  Risk of Bleeding    01/24/23 0250     IP VTE HIGH RISK PATIENT  Once         01/24/23 0250     Place sequential compression device  Until discontinued         01/24/23 0250                   Brandyn Henry DO  Department of Hospital Medicine   [facility] - Emergency Dept

## 2023-01-24 NOTE — PLAN OF CARE
"CM met with pt at bedside to discuss d/c plans at time of d/c.  Pt crying stating she doesn't know what happened to her pain medication.  Fixated on discussing pain meds and being "afraid" to tell her hospice agency that she had no more pain meds.  Redirected several times towards discussion of home living arrangements.  Pt stated she did not know her address but does live with friend Lisseth, does not know Lisseth's number.  CM contacted Jaz with [facility] who confirms her agency was not contacted prior to admission but is aware that pt is no longer living with her mother and sister.  Jaz confirms address as [address] in [place], [place].  Provided with Lisseth ([phone]) and Charlie's ([phone]) numbers as point of contact.  Pt approves of CM contacting both but admits she is upset as Lisseth is now having her pay for some of her care and food.  Pt begins to cry, yell, attempt to exit bed.  Pt assisted to edge of stretcher, side rail placed in down position to avoid injury.  Pt yelling "I'm having a panic attack, I need to walk, I need pain medicine."  Nurse at bedside, attempts to calm pt with this CM present.  After nurse leaves bedside, pt proceeds to calmly tell this CM that yelling will "get me my pain medicine to calm me down."  Pt proceeds to plea to "just stay in the hospital for a while."  Explaind she has no medical need for admission and will need to d/c back home.      Contacted Charlie ([phone]) who states he initially contacted 911 last night as pt has been having episodes of "calmness and happiness" then goes to bathroom and is heard "yelling and screaming." He states pt at times becomes anxious, starts to eat a lot despite not being hungry "she eats everything in the house."  He does admit to having pt having to contribute financially for groceries (using her food stamp card despite it only covering small portion of the bill).  He states he and his partner Lisseth have been helping to " "care for pt as they are friends of the family who noticed her family was at their wits end.  Charlie states he was aware pt had been placed on hospice and often has talks with her about being upset she is not a candidate for a heart transplant.  He states "she doesn't understand someone has to die for her to get a heart."  CM educated Charlie on hospice services, pt's dx of end stage heart failure and need to call hospice for symptom management.  Charlie states he and his wife Lisseth needed to be "educated about her conditions" and would like to receive a visit from hospice nurse tomorrow to discuss expectations.  CM notified Jaz, will visit with pt tomorrow.    Charlie is aware pt is ready for d/c and is agreeable for pt to return to their home.  He attempted to find transportation home with pt's sister but father will not allow her to use the car "with it getting dark."  Informed Charlie pt can be transported home, he is appreciative and will be awaiting her arrival.    Primary team aware pt may d/c when ready. Nursing aware and will arrange transport, nurse to notify Charlie once pt leaves.    Aubree Santana RN [facility]  Supervisor Case Management-[facility]  [phone]   "

## 2023-01-24 NOTE — ASSESSMENT & PLAN NOTE
Denies any bloody bowel movements or melanotic stools  H/H 6.5/22.7 on admission  S/p 1 unit PRBC transfusion  Follow up post transfusion H/H  Continue trending H/H Q8H   Follow-up fecal occult, stool  Keep NPO   Start IV Protonix 40 mg BID  Hold home Eliquis  Intravascular resuscitation/support with IVFs and pRBCs as needed.  Transfuse if hemoglobin < 8.0

## 2023-01-24 NOTE — PROGRESS NOTES
[place] - [facility]  [facility] Medicine  Progress Note    Patient Name: Stephanie T Barthelemy  MRN: [id number]  Patient Class: OP- Observation   Admission Date: 1/24/2023  Length of Stay: 0 days  Attending Physician: Bernadette Stevenson MD  Primary Care Provider: Primary Doctor No        Subjective:     Principal Problem:AMS (altered mental status)        HPI:  Stephanie T Barthelemy is a 52-year-old female with a PMHx HFrEF (EF 15% on 01/09/2023), bipolar 1 disorder, ADHD, methamphetamine use disorder, history of opioid overdose, history of PE (on chronic Eliquis) who presented to [facility] ED via EMS due to weakness, lethargy, and altered mental status. Patient's boyfriend called EMS because concern patient was weak and difficult to arouse. From chart review, patient with recent hospitalization and discharge from [facility] on 01/12/2023 for ADHF in the setting of medication noncompliance and recent methamphetamine use. Patient was also found to have subsequent pulmonary emboli confirmed on CTA and was started on therapeutic dose Lovenox. Patient was discharged home on Eliquis 5 mg BID with plan to continue for 3 months in duration. During hospitalization, an echocardiogram was repeated which showed worsening of heart failure with reported EF of 15%.  Patient was determined to be in the end-stage of CHF and a goals of care discussion was conducted with the patient. Patient was discharged home with hospice on 01/12/2023.     In the ED, temp 97.8° F, /83, HR 82, RR 14, SpO2 96% on RA. CBC with H/H 6.5/22.7, platelet count 93. BNP 1653. CXR with enlarged cardiac silhouette, pulmonary vascular congestion, and small or moderate underlying pleural effusions. UDS positive for opiates. Troponin 0.010.  EKG with NSR, left bundle-branch block, .  Physical exam showing patient lethargic with pinpoint pupils, anasarca throughout the abdomen, and bibasilar crackles. Patient was transfused 1 unit PRBC transfusion  and provided IV Lasix 80 mg. Patient was admitted to [facility] for further management.               Overview/Hospital Course:  No notes on file    No new subjective & objective note has been filed under this hospital service since the last note was generated.      Assessment/Plan:      * AMS (altered mental status)  Likely from opioid overdose  UDS positive for opiates with recent prescription provided for morphine sulfate on 01/12/2023  S/p Narcan x 2 in ED with good response.  Patient oriented and conversant  Poor historian unclear on events leading to ED arrival  Plan   Can use Narcan PRN  Monitor signs for respiratory depression and continue monitoring for pulmonary edema  Aspiration precautions  Maintain on continuous pulse oximetry      Pulmonary embolus  Recent diagnosis on CTA on 01/07/2023 with right lower lobe segmental branches pulmonary embolus.  Patient started on Eliquis 5 mg BID at hospital discharge on 01/12/2023  Will continue to hold given concern for bleed      CHF exacerbation  Bibasilar rales, abdominal anasarca, and 1+ pitting edema in lower extremities  BNP 1653  CXR with cardiomegaly, pulmonary edema, and small or moderate underlying pleural effusions.  Echocardiogram from 01/09/2023 with EF 15%, severe decreased systolic function, grade 2 left ventricular diastolic dysfunction, severe left ventricular global hypokinesis, and pulmonary hypertension (PA systolic pressure 43 mmHg)  Home medications include PO Lasix 40 mg BID, losartan 25 mg daily, metoprolol succinate 25 mg daily  S/p 80 mg IV Lasix in ED    Plan:  Continue diuresis with IV Lasix 40 mg BID  Continue GDMT with metoprolol succinate and losartan  Continue monitoring urine output  Daily Weights  Strict I/O  Fluid restriction < 1.5 L daily  Sodium restriction < 2 g/day  Low-sodium cardiac diet  will reassess volume status regularly  Will need to communicate with home hospice company today  Patient status improved  may be candidate for discharge        Anemia  Denies any bloody bowel movements or melanotic stools  H/H 6.5/22.7 on admission  S/p 1 unit PRBC transfusion  Follow up post transfusion H/H  Continue trending H/H Q8H   Follow-up fecal occult, stool  Keep NPO   Start IV Protonix 40 mg BID  Hold home Eliquis  Intravascular resuscitation/support with IVFs and pRBCs as needed.  Transfuse if hemoglobin < 8.0        VTE Risk Mitigation (From admission, onward)         Ordered     Reason for No Pharmacological VTE Prophylaxis  Once        Question:  Reasons:  Answer:  Risk of Bleeding    01/24/23 0250     IP VTE HIGH RISK PATIENT  Once         01/24/23 0250     Place sequential compression device  Until discontinued         01/24/23 0250                Discharge Planning   AYSHA:      Code Status: Full Code   Is the patient medically ready for discharge?:     Reason for patient still in hospital (select all that apply): Pending disposition  Discharge Plan A: Hospice/home   Discharge Delays: None known at this time      Pérez Mendoza MD  [facility] Family Medicine PGY-2  01/24/2023

## 2023-01-24 NOTE — ED NOTES
Patient stating the purewick is not working. Incontinence episode in bed. Patient ambulated to bathroom steady gait.

## 2023-01-24 NOTE — HPI
Stephanie T Barthelemy is a 52-year-old female with a PMHx HFrEF (EF 15% on 01/09/2023), bipolar 1 disorder, ADHD, methamphetamine use disorder, history of opioid overdose, history of PE (on chronic Eliquis) who presented to [facility] ED via EMS due to weakness, lethargy, and altered mental status. Patient's boyfriend called EMS because concern patient was weak and difficult to arouse. From chart review, patient with recent hospitalization and discharge from [facility] on 01/12/2023 for ADHF in the setting of medication noncompliance and recent methamphetamine use. Patient was also found to have subsequent pulmonary emboli confirmed on CTA and was started on therapeutic dose Lovenox. Patient was discharged home on Eliquis 5 mg BID with plan to continue for 3 months in duration. During hospitalization, an echocardiogram was repeated which showed worsening of heart failure with reported EF of 15%.  Patient was determined to be in the end-stage of CHF and a goals of care discussion was conducted with the patient. Patient was discharged home with hospice on 01/12/2023.     In the ED, temp 97.8° F, /83, HR 82, RR 14, SpO2 96% on RA. CBC with H/H 6.5/22.7, platelet count 93. BNP 1653. CXR with enlarged cardiac silhouette, pulmonary vascular congestion, and small or moderate underlying pleural effusions. UDS positive for opiates. Troponin 0.010.  EKG with NSR, left bundle-branch block, .  Physical exam showing patient lethargic with pinpoint pupils, anasarca throughout the abdomen, and bibasilar crackles. Patient was transfused 1 unit PRBC transfusion and provided IV Lasix 80 mg. Patient was admitted to [facility] for further management.

## 2023-01-24 NOTE — ASSESSMENT & PLAN NOTE
Recent diagnosis on CTA on 01/07/2023 with right lower lobe segmental branches pulmonary embolus.  Patient started on Eliquis 5 mg BID at hospital discharge on 01/12/2023  Will continue to hold given concern for bleed

## 2023-01-24 NOTE — ASSESSMENT & PLAN NOTE
Likely from opioid overdose  UDS positive for opiates with recent prescription provided for morphine sulfate on 01/12/2023  S/p Narcan x 2 in ED with good response.  Patient oriented and conversant  Poor historian unclear on events leading to ED arrival  Plan   Can use Narcan PRN  Monitor signs for respiratory depression and continue monitoring for pulmonary edema  Aspiration precautions  Maintain on continuous pulse oximetry

## 2023-01-24 NOTE — ED NOTES
Report received from JOANN Alfaro. Patient resting in bed. Calling out for pain medication stating her legs and everything hurt and are swollen. A/o x 4. Purewick in place. Patient cold. Warm blankets given. Call light within reach. Siderails up.

## 2023-01-24 NOTE — ED NOTES
RN spoke with the admit team about patient's pain. MD stated they will review patient's home medication and will order something.

## 2023-01-24 NOTE — SUBJECTIVE & OBJECTIVE
Past Medical History:   Diagnosis Date    ADHD (attention deficit hyperactivity disorder)     Anemia     Anxiety     Bipolar disorder     CHF (congestive heart failure)     History of hepatitis C - s/p clearance of virus (HCV neg 6/2015) 09/26/2014    History of psychiatric hospitalization     History of substance abuse     IV heroin - last use 2013 per pt    Hx of psychiatric care     Hypertension     Opioid overdose 05/10/2016    Psychiatric problem     Psychosis     Seizure disorder 04/03/2019    Sleep difficulties     Still's disease     Substance abuse     Therapy     Vasculitis        Past Surgical History:   Procedure Laterality Date    HYSTERECTOMY  3/16/2011    SALPINGOOPHORECTOMY Right 3/16/2011    TUBAL LIGATION      Vaginal cuff repair  04/22/2011       Review of patient's allergies indicates:  No Known Allergies    No current facility-administered medications on file prior to encounter.     Current Outpatient Medications on File Prior to Encounter   Medication Sig    albuterol (PROVENTIL/VENTOLIN HFA) 90 mcg/actuation inhaler Inhale 2 puffs into the lungs every 6 (six) hours. Rescue    apixaban (ELIQUIS) 5 mg Tab Take 1 tablet (5 mg total) by mouth 2 (two) times daily.    benztropine (COGENTIN) 0.5 MG tablet Take 1 tablet (0.5 mg total) by mouth 2 (two) times daily.    famotidine (PEPCID) 20 MG tablet Take 1 tablet (20 mg total) by mouth once daily.    furosemide (LASIX) 40 MG tablet Take 1 tablet (40 mg total) by mouth 2 (two) times daily.    hydrocortisone 1 % cream Apply topically 2 (two) times daily.    losartan (COZAAR) 25 MG tablet Take 1 tablet (25 mg total) by mouth once daily.    metoprolol succinate (TOPROL-XL) 25 MG 24 hr tablet Take 1 tablet (25 mg total) by mouth once daily.    OXcarbazepine (TRILEPTAL) 600 MG Tab Take 2 tablets (1,200 mg total) by mouth nightly.    polyethylene glycol (GLYCOLAX) 17 gram PwPk Take 17 g by mouth once daily. for 60 doses    QUEtiapine (SEROQUEL) 200 MG Tab  Take 1 tablet (200 mg total) by mouth every evening.    senna (SENOKOT) 8.6 mg tablet Take 1 tablet by mouth once daily.    [DISCONTINUED] amLODIPine (NORVASC) 5 MG tablet Take 1 tablet (5 mg total) by mouth once daily.    [DISCONTINUED] risperiDONE (RISPERDAL) 1 MG tablet Take 1 mg by mouth 2 (two) times daily.     Family History       Problem Relation (Age of Onset)    Cancer Maternal Grandmother    Diabetes Maternal Grandmother          Tobacco Use    Smoking status: Every Day     Packs/day: 1.00     Years: 36.00     Pack years: 36.00     Types: Cigarettes     Start date: 1986    Smokeless tobacco: Never    Tobacco comments:     Enrolled in [employer] on 10/23/14 (SCT Member ID # [id number]) Pt declines Ambulatory referral to Smoking Cessation clinic. Handout provided   Substance and Sexual Activity    Alcohol use: No    Drug use: Yes     Types: Heroin, Cocaine, Methamphetamines, Marijuana    Sexual activity: Not Currently     Partners: Male, Female     Birth control/protection: Other-see comments     Comment: hysterectomy     Review of Systems   Unable to perform ROS: Mental status change   Objective:     Vital Signs (Most Recent):  Temp: 97.8 °F (36.6 °C) (01/24/23 0038)  Pulse: 92 (01/24/23 0503)  Resp: 10 (01/24/23 0503)  BP: 112/88 (01/24/23 0503)  SpO2: 100 % (01/24/23 0503) Vital Signs (24h Range):  Temp:  [97.8 °F (36.6 °C)] 97.8 °F (36.6 °C)  Pulse:  [82-93] 92  Resp:  [10-14] 10  SpO2:  [96 %-100 %] 100 %  BP: (112-130)/(79-88) 112/88     Weight: 51.3 kg (113 lb)  Body mass index is 22.07 kg/m².    Physical Exam  Constitutional:       General: She is not in acute distress.     Appearance: She is not ill-appearing.   Cardiovascular:      Rate and Rhythm: Normal rate and regular rhythm.      Heart sounds: Murmur (systolic) heard.     No friction rub. No gallop.   Pulmonary:      Breath sounds: Rales (Bibasilar rale) present.   Abdominal:      General: Abdomen is flat.      Tenderness: There is no  abdominal tenderness. There is no guarding.      Comments: Anasarca throughout abdomen   Musculoskeletal:         General: Normal range of motion.      Right lower leg: Edema (1+) present.      Left lower leg: Edema (1+) present.   Neurological:      Mental Status: She is alert.      Comments: Lethargic  Patient does open eyes to voice     Psychiatric:         Mood and Affect: Mood normal.         Behavior: Behavior normal.           Significant Labs: All pertinent labs within the past 24 hours have been reviewed.  ABGs:   Recent Labs   Lab 01/24/23  0308   PH 7.359   PCO2 43.2   HCO3 24.4   POCSATURATED 46*   BE -1   PO2 26*     CBC:   Recent Labs   Lab 01/24/23 0131 01/24/23  0456   WBC 4.12 5.76   HGB 6.5* 10.4*   HCT 22.7* 35.5*   PLT 93* 257     CMP:   Recent Labs   Lab 01/24/23 0132 01/24/23 0456    137   K 3.1* 3.1*    98   CO2 22* 27   * 103   BUN 26* 25*   CREATININE 1.0 1.0   CALCIUM 7.8* 8.4*   PROT 5.9* 6.7   ALBUMIN 2.5* 2.7*   BILITOT 1.2* 1.4*   ALKPHOS 90 99   AST 61* 64*   ALT 39 43   ANIONGAP 12 12     Cardiac Markers:   Recent Labs   Lab 01/24/23 0132   BNP 1,653*     Troponin:   Recent Labs   Lab 01/24/23 0131   TROPONINI 0.010     Significant Imaging:     AP PORTABLE CHEST (01/24/2023): enlargement of the cardiac silhouette.  There is pulmonary vascular congestion.  There are small or moderate underlying pleural effusions.  No pneumothorax is detected. The bones are diffusely osteopenic.    Impression: Overall findings likely represent CHF.       I have reviewed all pertinent imaging results/findings within the past 24 hours.

## 2023-01-24 NOTE — ASSESSMENT & PLAN NOTE
Bibasilar rales, abdominal anasarca, and 1+ pitting edema in lower extremities  BNP 1653  CXR with cardiomegaly, pulmonary edema, and small or moderate underlying pleural effusions.  Echocardiogram from 01/09/2023 with EF 15%, severe decreased systolic function, grade 2 left ventricular diastolic dysfunction, severe left ventricular global hypokinesis, and pulmonary hypertension (PA systolic pressure 43 mmHg)  Home medications include PO Lasix 40 mg BID, losartan 25 mg daily, metoprolol succinate 25 mg daily  S/p 80 mg IV Lasix in ED    Plan:  Continue diuresis with IV Lasix 40 mg BID  Continue GDMT with metoprolol succinate and losartan  Continue monitoring urine output  Daily Weights  Strict I/O  Fluid restriction < 1.5 L daily  Sodium restriction < 2 g/day  Low-sodium cardiac diet  will reassess volume status regularly  Will need to communicate with home hospice company today  Patient status improved may be candidate for discharge

## 2023-01-24 NOTE — ASSESSMENT & PLAN NOTE
Bibasilar rales, abdominal anasarca, and 1+ pitting edema in lower extremities  BNP 1653  CXR with cardiomegaly, pulmonary edema, and small or moderate underlying pleural effusions.  Echocardiogram from 01/09/2023 with EF 15%, severe decreased systolic function, grade 2 left ventricular diastolic dysfunction, severe left ventricular global hypokinesis, and pulmonary hypertension (PA systolic pressure 43 mmHg)  Home medications include PO Lasix 40 mg BID, losartan 25 mg daily, metoprolol succinate 25 mg daily  S/p 80 mg IV Lasix in ED    Plan:  Continue diuresis with IV Lasix 40 mg BID  Continue GDMT with metoprolol succinate and losartan  Continue monitoring urine output  Daily Weights  Strict I/O  Fluid restriction < 1.5 L daily  Sodium restriction < 2 g/day  Low-sodium cardiac diet  will reassess volume status regularly  Will need to communicate with home hospice company today

## 2023-01-24 NOTE — ED PROVIDER NOTES
Encounter Date: 1/24/2023    SCRIBE #1 NOTE: I, Mirela Morton, am scribing for, and in the presence of,  Graeme Purcell MD. I have scribed the following portions of the note - Other sections scribed: HPI and Physical Exam.     History     Chief Complaint   Patient presents with    Altered Mental Status     Patient presents to the ED via [facility] from home with reports of patient who was lethargic, weak, and dizzy. CBG per EMS was 171 mg/dl. Patient wakes briefly to verbal/tactile stimuli.     Weakness     Stephanie T Barthelemy is a 52 y.o. female who  has a past medical history of ADHD (attention deficit hyperactivity disorder), Anemia, Anxiety, Bipolar disorder, CHF (congestive heart failure), History of hepatitis C - s/p clearance of virus (HCV neg 6/2015) (09/26/2014), History of psychiatric hospitalization, History of substance abuse, psychiatric care, Hypertension, Opioid overdose (05/10/2016), Psychiatric problem, Psychosis, Seizure disorder (04/03/2019), Sleep difficulties, Still's disease, Substance abuse, Therapy, and Vasculitis.    The patient presents to the ED due to weakness. History limited secondary to patient's altered mental status. Per EMS, relatives called because the patient was lethargic and weak. En route, patient was found to have a CGB of 171 mg/dl. At the time of encounter, patient is noted to have abdominal distension and leg swelling. There is an extensive history of similar symptoms, and the patient was last seen at this ED on 01/7/23 for it.    The history is provided by the EMS personnel. The history is limited by the condition of the patient. No  was used.   Review of patient's allergies indicates:  No Known Allergies  Past Medical History:   Diagnosis Date    ADHD (attention deficit hyperactivity disorder)     Anemia     Anxiety     Bipolar disorder     CHF (congestive heart failure)     History of hepatitis C - s/p clearance of virus (HCV neg 6/2015) 09/26/2014     History of psychiatric hospitalization     History of substance abuse     IV heroin - last use 2013 per pt    Hx of psychiatric care     Hypertension     Opioid overdose 05/10/2016    Psychiatric problem     Psychosis     Seizure disorder 04/03/2019    Sleep difficulties     Still's disease     Substance abuse     Therapy     Vasculitis      Past Surgical History:   Procedure Laterality Date    HYSTERECTOMY  3/16/2011    SALPINGOOPHORECTOMY Right 3/16/2011    TUBAL LIGATION      Vaginal cuff repair  04/22/2011     Family History   Problem Relation Age of Onset    Diabetes Maternal Grandmother     Cancer Maternal Grandmother      Social History     Tobacco Use    Smoking status: Every Day     Packs/day: 1.00     Years: 36.00     Pack years: 36.00     Types: Cigarettes     Start date: 1986    Smokeless tobacco: Never    Tobacco comments:     Enrolled in [email or web address] on 10/23/14 (SCT Member ID # [id number]) Pt declines Ambulatory referral to Smoking Cessation clinic. Handout provided   Substance Use Topics    Alcohol use: No    Drug use: Yes     Types: Heroin, Cocaine, Methamphetamines, Marijuana     Review of Systems    Physical Exam     Initial Vitals [01/24/23 0038]   BP Pulse Resp Temp SpO2   130/83 82 14 97.8 °F (36.6 °C) 96 %      MAP       --         Physical Exam    Nursing note and vitals reviewed.  Constitutional: She appears well-developed and well-nourished. She appears lethargic.  Non-toxic appearance. No distress.   HENT:   Head: Normocephalic and atraumatic.   Mouth/Throat: Oropharynx is clear and moist.   Eyes:   Pinpoint pupils, reactive    Neck: Neck supple. No thyromegaly present.   Normal range of motion.   Full passive range of motion without pain.     Cardiovascular:  Normal rate, regular rhythm, normal heart sounds and normal pulses.           Pulmonary/Chest:   Base crackles bilaterally    Abdominal: There is no abdominal tenderness.   Anasarca through the abdomen    Genitourinary:     Rectum normal.   Rectum:      No abnormal anal tone.      Genitourinary Comments: Brown stool noted  No gross blood or melena   Performed with nurse chaperone      Musculoskeletal:         General: Normal range of motion.      Cervical back: Full passive range of motion without pain, normal range of motion and neck supple.     Neurological: She is oriented to person, place, and time. She has normal strength. She appears lethargic. No cranial nerve deficit or sensory deficit.   Skin: Skin is warm, dry and intact. No rash noted.   Psychiatric: She has a normal mood and affect. Her speech is normal and behavior is normal. Judgment and thought content normal.     ED Course   Critical Care    Date/Time: 1/24/2023 5:44 AM  Performed by: Graeme Purcell MD  Authorized by: Graeme Purcell MD   Direct patient critical care time: 8 minutes  Additional history critical care time: 7 minutes  Ordering / reviewing critical care time: 7 minutes  Documentation critical care time: 8 minutes  Consulting other physicians critical care time: 6 minutes  Total critical care time (exclusive of procedural time) : 36 minutes  Critical care time was exclusive of separately billable procedures and treating other patients and teaching time.  Critical care was necessary to treat or prevent imminent or life-threatening deterioration of the following conditions: cardiac failure and circulatory failure.  Critical care was time spent personally by me on the following activities: blood draw for specimens, discussions with consultants, interpretation of cardiac output measurements, evaluation of patient's response to treatment, examination of patient, obtaining history from patient or surrogate, ordering and review of laboratory studies, ordering and performing treatments and interventions, ordering and review of radiographic studies, pulse oximetry, re-evaluation of patient's condition and review of old charts.      Labs Reviewed   CBC W/ AUTO  DIFFERENTIAL - Abnormal; Notable for the following components:       Result Value    RBC 3.07 (*)     Hemoglobin 6.5 (*)     Hematocrit 22.7 (*)     MCV 74 (*)     MCH 21.2 (*)     MCHC 28.6 (*)     RDW 23.4 (*)     Platelets 93 (*)     Lymph # 0.5 (*)     Gran % 74.8 (*)     Lymph % 12.4 (*)     All other components within normal limits   COMPREHENSIVE METABOLIC PANEL - Abnormal; Notable for the following components:    Potassium 3.1 (*)     CO2 22 (*)     Glucose 112 (*)     BUN 26 (*)     Calcium 7.8 (*)     Total Protein 5.9 (*)     Albumin 2.5 (*)     Total Bilirubin 1.2 (*)     AST 61 (*)     All other components within normal limits   B-TYPE NATRIURETIC PEPTIDE - Abnormal; Notable for the following components:    BNP 1,653 (*)     All other components within normal limits   URINALYSIS, REFLEX TO URINE CULTURE - Abnormal; Notable for the following components:    Color, UA Colorless (*)     All other components within normal limits    Narrative:     Specimen Source->Urine   DRUG SCREEN PANEL, URINE EMERGENCY - Abnormal; Notable for the following components:    Opiate Scrn, Ur Presumptive Positive (*)     Creatinine, Urine 8.1 (*)     All other components within normal limits    Narrative:     Specimen Source->Urine   COMPREHENSIVE METABOLIC PANEL - Abnormal; Notable for the following components:    Potassium 3.1 (*)     BUN 25 (*)     Calcium 8.4 (*)     Albumin 2.7 (*)     Total Bilirubin 1.4 (*)     AST 64 (*)     All other components within normal limits   CBC W/ AUTO DIFFERENTIAL - Abnormal; Notable for the following components:    Hemoglobin 10.4 (*)     Hematocrit 35.5 (*)     MCV 72 (*)     MCH 21.1 (*)     MCHC 29.3 (*)     RDW 24.2 (*)     Lymph # 0.9 (*)     Lymph % 16.0 (*)     All other components within normal limits   PROTIME-INR - Abnormal; Notable for the following components:    Prothrombin Time 16.4 (*)     INR 1.6 (*)     All other components within normal limits   ISTAT PROCEDURE - Abnormal;  Notable for the following components:    POC PO2 26 (*)     POC SATURATED O2 46 (*)     All other components within normal limits   TROPONIN I   ALCOHOL,MEDICAL (ETHANOL)   MAGNESIUM   PHOSPHORUS   OCCULT BLOOD X 1, STOOL   URINALYSIS, REFLEX TO URINE CULTURE   OXCARBAZEPINE METABOLITE (M[facility])   TYPE & SCREEN   PREPARE RBC SOFT          Imaging Results              X-Ray Chest AP Portable (Final result)  Result time 01/24/23 01:54:04      Final result by Bruna Manuel MD (01/24/23 01:54:04)                   Impression:      Overall findings likely represent CHF.      Electronically signed by: Bruna Manuel  Date:    01/24/2023  Time:    01:54               Narrative:    EXAMINATION:  AP PORTABLE CHEST    CLINICAL HISTORY:  CHF;    TECHNIQUE:  AP portable chest radiograph was submitted.    COMPARISON:  01/07/2023    FINDINGS:  AP portable chest radiograph demonstrates enlargement of the cardiac silhouette.  There is pulmonary vascular congestion.  There are small or moderate underlying pleural effusions.  No pneumothorax is detected.  The bones are diffusely osteopenic.                                    X-Rays:   Independently Interpreted Readings:   Chest X-Ray: Increased vascular markings consistent with CHF are present.   Medications   0.9%  NaCl infusion (for blood administration) (has no administration in time range)   potassium chloride 10 mEq in 100 mL IVPB (10 mEq Intravenous New Bag 1/24/23 0542)   sodium chloride 0.9% flush 5 mL (has no administration in time range)   senna-docusate 8.6-50 mg per tablet 1 tablet (has no administration in time range)   dextrose 10% bolus 125 mL 125 mL (has no administration in time range)   dextrose 10% bolus 250 mL 250 mL (has no administration in time range)   furosemide injection 40 mg (has no administration in time range)   losartan tablet 25 mg (has no administration in time range)   metoprolol succinate (TOPROL-XL) 24 hr tablet 25 mg (has no administration in  time range)   naloxone 0.4 mg/mL injection 0.4 mg (0.4 mg Intravenous Given 1/24/23 0216)   pantoprazole injection 40 mg (40 mg Intravenous Given 1/24/23 2915)   furosemide injection 80 mg (80 mg Intravenous Given 1/24/23 0137)     Medical Decision Making:   Independently Interpreted Test(s):   I have ordered and independently interpreted X-rays - see prior notes.  Clinical Tests:   Lab Tests: Ordered and Reviewed  Radiological Study: Ordered and Reviewed  Other:   I have discussed this case with another health care provider.       <> Summary of the Discussion: Discussed case with hospitals family Medicine to continue evaluation and management.        Scribe Attestation:   Scribe #1: I performed the above scribed service and the documentation accurately describes the services I performed. I attest to the accuracy of the note.                 I, Graeme Purcell MD personally performed the services described in this documentation. All medical record entries made by the scribe were at my direction and in my presence.  I have reviewed the chart and agree that the record reflects my personal performance and is accurate and complete.   5:44 AM 01/24/2023       DISCLAIMER: This note was prepared with [employer] voice recognition transcription software. Garbled syntax, mangled pronouns, and other bizarre constructions may be attributed to that software system.    Clinical Impression:   Final diagnoses:  [R06.02] Shortness of breath  [R41.82] Altered mental status, unspecified altered mental status type (Primary)  [R60.1] Anasarca  [D64.9] Anemia, unspecified type        ED Disposition Condition    Observation Stable                Graeme Purcell MD  01/24/23 3171

## 2023-01-24 NOTE — HOSPITAL COURSE
Patient arrived to ED with swollen legs and reports of shortness of breath. Patient started on lasix and provided nasal cannula oxygen. Patient oxygenation remained in the high 90's overnight. Significant diureses throughout stay. Patient was insistent on admission to hospital despite her condition being controllable outpatient. Patient has home hospice who is able to assist with management of CHF. Patient required repeat discussions with case management and primary to team about managing her care. Patient is agreeable to return to her friend's house which has been her most recent residence. Majority of care for CHF and sequelae of this condition to be managed by [facility].

## 2023-01-24 NOTE — ASSESSMENT & PLAN NOTE
Likely from opioid overdose  UDS positive for opiates with recent prescription provided for morphine sulfate on 01/12/2023  S/p Narcan x 2 in ED with good response.  Patient oriented and conversant  Poor historian unclear on events leading to ED arrival  Plan   Can use Narcan PRN  Monitor signs for respiratory depression and continue monitoring for pulmonary edema  Aspiration precautions  Maintain on continuous pulse oximetry  Resolved

## 2023-01-24 NOTE — PLAN OF CARE
Pt is current with is Hospice:    [facility]  [address], [place] [address]  Phone: [phone]    Jaz, RN Liason is aware of pt's visit to ED. CM to follow.        01/24/23 1106   Post-Acute Status   Post-Acute Authorization Hospice   Hospice Status Set-up Complete/Auth obtained   Hospital Resources/Appts/Education Provided Community resources provided   Discharge Delays None known at this time   Discharge Plan   Discharge Plan A Hospice/home   Discharge Plan B Home with family     [place] [facility] [place]  [phone]

## 2023-01-24 NOTE — ASSESSMENT & PLAN NOTE
Bibasilar rales, abdominal anasarca, and 1+ pitting edema in lower extremities  BNP 1653  CXR with cardiomegaly, pulmonary edema, and small or moderate underlying pleural effusions.  Echocardiogram from 01/09/2023 with EF 15%, severe decreased systolic function, grade 2 left ventricular diastolic dysfunction, severe left ventricular global hypokinesis, and pulmonary hypertension (PA systolic pressure 43 mmHg)  Home medications include PO Lasix 40 mg BID, losartan 25 mg daily, metoprolol succinate 25 mg daily  S/p 80 mg IV Lasix in ED    Plan:  Continue diuresis with IV Lasix 40 mg BID  Continue GDMT with metoprolol succinate and losartan  Continue monitoring urine output  Daily Weights  Strict I/O  Fluid restriction < 1.5 L daily  Sodium restriction < 2 g/day  Low-sodium cardiac diet  will reassess volume status regularly  Will need to communicate with home hospice company today  Patient status improved may be candidate for discharge       Problem: Patient Centered Care  Goal: Patient preferences are identified and integrated in the patient's plan of care  Interventions:  - What would you like us to know as we care for you?  \" I choke on big pills sometimes\"  - Provide timely, complete, and factors for pressure ulcer development  - Assess and document skin integrity  - Monitor for areas of redness and/or skin breakdown  - Initiate interventions, skin care algorithm/standards of care as needed  Outcome: Progressing      Problem: PAIN - ADULT Stage I Diagnostics    Interventions:   -  follow md orders  -inform/update pt on poc  - See additional Care Plan goals for specific interventions   Outcome: Progressing      Comments: Patient alert and oriented and able to make her needs known.

## 2023-01-24 NOTE — PHARMACY MED REC
"  Admission Medication History     The home medication history was taken by Deanna Marie CPhT.    Medication history obtained from, Patient Verified    You may go to "Admission" then "Reconcile Home Medications" tabs to review and/or act upon these items.     The home medication list has been updated by the Pharmacy department.   Please read ALL comments highlighted in yellow.   Please address this information as you see fit.    Feel free to contact us if you have any questions or require assistance.            Deanna Marie CPhT.  Ext [phone]             .        "

## 2023-01-24 NOTE — ED NOTES
Care handoff received from Tyler DAVID. Pt asleep awakes to voice. AXO4. Pt appears to be comfortable and is resting well. In no distress.

## 2023-01-24 NOTE — Clinical Note
Diagnosis: Altered mental status, unspecified altered mental status type [[id number]]   Future Attending Provider: CHRISTIAN POSADAS [[id number]]   Admitting Provider:: CHRISTIAN POSADAS [[id number]]

## 2023-01-25 LAB — OXCARBAZEPINE METABOLITE: 42 MCG/ML (ref 10–35)

## 2023-01-25 NOTE — ED NOTES
Patient constantly on the call light demanding pain medication, jumping out of bed. Patient redirectable. RN and CM in room to speak with patient.

## 2023-01-25 NOTE — ED NOTES
Patient jumping out of bed screaming demanding pain medication.  MD, RN, CM, SS explained to patient the plan. Patient demanding pain medication stating that she is going to throw a fit until someone gives her pain medication.   MD discharging patient.

## 2023-01-26 NOTE — DISCHARGE SUMMARY
[facility] Emergency Dept  [facility] Medicine  Discharge Summary      Patient Name: Stephanie T Barthelemy  MRN: [id number]  GILBERT: [id number]  Patient Class: OP- Observation  Admission Date: 1/24/2023  Hospital Length of Stay: 0 days  Discharge Date and Time:  01/25/2023 6:56 PM  Attending Physician: Myrna att. providers found   Discharging Provider: Pérez Mendoza MD  Primary Care Provider: Primary Doctor Myrna    Primary Care Team: Networked reference to record PCT     HPI:   Stephanie T Barthelemy is a 52-year-old female with a PMHx HFrEF (EF 15% on 01/09/2023), bipolar 1 disorder, ADHD, methamphetamine use disorder, history of opioid overdose, history of PE (on chronic Eliquis) who presented to [facility] ED via EMS due to weakness, lethargy, and altered mental status. Patient's boyfriend called EMS because concern patient was weak and difficult to arouse. From chart review, patient with recent hospitalization and discharge from [facility] on 01/12/2023 for ADHF in the setting of medication noncompliance and recent methamphetamine use. Patient was also found to have subsequent pulmonary emboli confirmed on CTA and was started on therapeutic dose Lovenox. Patient was discharged home on Eliquis 5 mg BID with plan to continue for 3 months in duration. During hospitalization, an echocardiogram was repeated which showed worsening of heart failure with reported EF of 15%.  Patient was determined to be in the end-stage of CHF and a goals of care discussion was conducted with the patient. Patient was discharged home with hospice on 01/12/2023.     In the ED, temp 97.8° F, /83, HR 82, RR 14, SpO2 96% on RA. CBC with H/H 6.5/22.7, platelet count 93. BNP 1653. CXR with enlarged cardiac silhouette, pulmonary vascular congestion, and small or moderate underlying pleural effusions. UDS positive for opiates. Troponin 0.010.  EKG with NSR, left bundle-branch block, .  Physical exam showing patient lethargic with pinpoint  pupils, anasarca throughout the abdomen, and bibasilar crackles. Patient was transfused 1 unit PRBC transfusion and provided IV Lasix 80 mg. [facility] was [facility] for further management.               * No surgery found *      Hospital Course:   Patient arrived to ED with swollen legs and reports of shortness of breath. Patient started on lasix and provided nasal cannula oxygen. Patient oxygenation remained in the high 90's overnight. Significant diureses throughout stay. Patient was insistent on admission to hospital despite her condition being controllable outpatient. Patient has home hospice who is able to assist with management of CHF. Patient required repeat discussions with case management and primary to team about managing her care. Patient is agreeable to return to her friend's house which has been her most recent residence. Majority of care for CHF and sequelae of this condition to be managed by [facility].       Goals of Care Treatment Preferences:  Code Status: Full Code          What is most important right now is to focus on remaining as independent as possible, symptom/pain control, quality of life, even if it means sacrificing a little time, improvement in condition but with limits to invasive therapies, comfort and QOL .  Accordingly, we have decided that the best plan to meet the patient's goals includes enrolling in hospice care.      Consults:     * AMS (altered mental status)  Likely from opioid overdose  UDS positive for opiates with recent prescription provided for morphine sulfate on 01/12/2023  S/p Narcan x 2 in ED with good response.  Patient oriented and conversant  Poor historian unclear on events leading to ED arrival  Plan   Can use Narcan PRN  Monitor signs for respiratory depression and continue monitoring for pulmonary edema  Aspiration precautions  Maintain on continuous pulse oximetry  Resolved      CHF exacerbation  Bibasilar rales,  abdominal anasarca, and 1+ pitting edema in lower extremities  BNP 1653  CXR with cardiomegaly, pulmonary edema, and small or moderate underlying pleural effusions.  Echocardiogram from 01/09/2023 with EF 15%, severe decreased systolic function, grade 2 left ventricular diastolic dysfunction, severe left ventricular global hypokinesis, and pulmonary hypertension (PA systolic pressure 43 mmHg)  Home medications include PO Lasix 40 mg BID, losartan 25 mg daily, metoprolol succinate 25 mg daily  S/p 80 mg IV Lasix in ED    Plan:  Continue diuresis with IV Lasix 40 mg BID  Continue GDMT with metoprolol succinate and losartan  Continue monitoring urine output  Daily Weights  Strict I/O  Fluid restriction < 1.5 L daily  Sodium restriction < 2 g/day  Low-sodium cardiac diet  will reassess volume status regularly  Will need to communicate with home hospice company today  Patient status improved may be candidate for discharge          Final Active Diagnoses:    Diagnosis Date Noted POA    PRINCIPAL PROBLEM:  AMS (altered mental status) [R41.82] 01/24/2023 Yes    Pulmonary embolus [I26.99] 01/08/2023 Yes     Chronic    CHF exacerbation [I50.9] 11/25/2022 Yes    Anemia [D64.9]  Yes      Problems Resolved During this Admission:    Diagnosis Date Noted Date Resolved POA    Volume overload [E87.70] 01/24/2023 01/24/2023 Yes       Discharged Condition: stable    Disposition: Home or Self Care    Follow Up:    Patient Instructions:   No discharge procedures on file.    Significant Diagnostic Studies: Labs:   BMP:   Recent Labs   Lab 01/24/23  0132 01/24/23  0456   * 103    137   K 3.1* 3.1*    98   CO2 22* 27   BUN 26* 25*   CREATININE 1.0 1.0   CALCIUM 7.8* 8.4*   MG  --  1.7   , CMP   Recent Labs   Lab 01/24/23  0132 01/24/23  0456    137   K 3.1* 3.1*    98   CO2 22* 27   * 103   BUN 26* 25*   CREATININE 1.0 1.0   CALCIUM 7.8* 8.4*   PROT 5.9* 6.7   ALBUMIN 2.5* 2.7*   BILITOT 1.2* 1.4*    ALKPHOS 90 99   AST 61* 64*   ALT 39 43   ANIONGAP 12 12    and CBC   Recent Labs   Lab 01/24/23  0131 01/24/23  0456   WBC 4.12 5.76   HGB 6.5* 10.4*   HCT 22.7* 35.5*   PLT 93* 257       Pending Diagnostic Studies:     None         Medications:  Reconciled Home Medications:      Medication List      CONTINUE taking these medications    albuterol 90 mcg/actuation inhaler  Commonly known as: PROVENTIL/VENTOLIN HFA  Inhale 2 puffs into the lungs every 6 (six) hours. Rescue     benztropine 0.5 MG tablet  Commonly known as: COGENTIN  Take 1 tablet (0.5 mg total) by mouth 2 (two) times daily.     ELIQUIS 5 mg Tab  Generic drug: apixaban  Take 1 tablet (5 mg total) by mouth 2 (two) times daily.     famotidine 20 MG tablet  Commonly known as: PEPCID  Take 1 tablet (20 mg total) by mouth once daily.     furosemide 40 MG tablet  Commonly known as: LASIX  Take 1 tablet (40 mg total) by mouth 2 (two) times daily.     hydrocortisone 1 % cream  Apply topically 2 (two) times daily as needed.     losartan 25 MG tablet  Commonly known as: COZAAR  Take 1 tablet (25 mg total) by mouth once daily.     metoprolol succinate 25 MG 24 hr tablet  Commonly known as: TOPROL-XL  Take 1 tablet (25 mg total) by mouth once daily.     OXcarbazepine 600 MG Tab  Commonly known as: TRILEPTAL  Take 2 tablets (1,200 mg total) by mouth nightly.     polyethylene glycol 17 gram Pwpk  Commonly known as: GLYCOLAX  Take 17 g by mouth once daily. for 60 doses     prochlorperazine 10 MG tablet  Commonly known as: COMPAZINE  Take 10 mg by mouth every 6 (six) hours as needed.     QUEtiapine 200 MG Tab  Commonly known as: SEROQUEL  Take 1 tablet (200 mg total) by mouth every evening.     senna 8.6 mg tablet  Commonly known as: SENOKOT  Take 1 tablet by mouth once daily.            Indwelling Lines/Drains at time of discharge:   Lines/Drains/Airways     None                 Time spent on the discharge of patient: 30 minutes       Pérez Mendoza MD  [facility] PGY-2  01/25/2023

## 2023-01-28 LAB
BLD PROD TYP BPU: NORMAL
BLOOD UNIT EXPIRATION DATE: NORMAL
BLOOD UNIT TYPE CODE: 6200
BLOOD UNIT TYPE: NORMAL
CODING SYSTEM: NORMAL
DISPENSE STATUS: NORMAL
TRANS ERYTHROCYTES VOL PATIENT: NORMAL ML

## 2023-02-09 PROBLEM — J96.02 ACUTE RESPIRATORY FAILURE WITH HYPOXIA AND HYPERCAPNIA: Status: RESOLVED | Noted: 2021-08-29 | Resolved: 2023-02-09

## 2023-02-09 PROBLEM — J96.01 ACUTE RESPIRATORY FAILURE WITH HYPOXIA AND HYPERCAPNIA: Status: RESOLVED | Noted: 2021-08-29 | Resolved: 2023-02-09

## 2023-02-10 PROBLEM — E87.20 LACTIC ACIDOSIS: Status: RESOLVED | Noted: 2023-01-07 | Resolved: 2023-02-10

## 2023-04-03 NOTE — PROGRESS NOTES
[facility] Medicine  Progress Note    Patient Name: Stephanie T Barthelemy  MRN: [id number]  Patient Class: IP- Inpatient   Admission Date: 8/3/2022  Length of Stay: 2 days  Attending Physician: Alamz Lucio MD  Primary Care Provider: Hilda Elizabeth NP        Subjective:     Principal Problem:Acute respiratory failure with hypoxia and hypercapnia        HPI:  Stephanie T Barthelemy is a 51 y.o. female who has a past medical history of ADHD (attention deficit hyperactivity disorder), Anemia, Anxiety, Bipolar disorder, History of hepatitis C - s/p clearance of virus (HCV neg 6/2015) (9/26/2014), History of psychiatric hospitalization, History of substance abuse, psychiatric care, Hypertension, Opioid overdose (5/10/2016), Psychiatric problem, Psychosis, Seizure disorder (4/3/2019), Sleep difficulties, Substance abuse, Therapy, and Vasculitis. She presented to the ED for altered mental status. She was brought in by EMS after found down at home by home health in bath tub unresponsive. Of note patient was just admitted for pneumonia and new onset CHF.     In the ED: She was found COVID-19 positive. UDS positive for amphetamines. Latest EKG with Sinus tach, rate 131, nonspecific ST changes, no ST elevations or other signs of ischemia, normal intervals. Compared to prior, QT prolongation has improved. CXR with diffuse interstitial opacities most consistent with viral process. Patient required intubation for airway protection. Considering patient's persistent hypoxia, severe symptoms, and co-morbidities, patient is high risk for decompensation and complications including worsening hypoxia, respiratory failure, intubation, or cardiopulmonary arrest if discharged. Admitted to [facility] for further care.      Overview/Hospital Course:  Ms. Barthelemy presented with confusion and hypoxia.  Admitted with acute respiratory failure with COVID-19 complicated by encephalopathy.  Intubated  for airway protection and isolated protocol in place.  Treatment initiated with remdesivir and dexamethasone, along with possible concomitant bacterial infection with Zosyn and vancomycin.  Pulmonary/Critical Care consulted.        Interval History: No acute events, agitated overnight and patient was given Versed and ordered for PRN Precedex. Difficult to arouse this am, SBT in progress.    Review of Systems   Unable to perform ROS: Intubated   Objective:     Vital Signs (Most Recent):  Temp: 99.14 °F (37.3 °C) (08/05/22 0815)  Pulse: 93 (08/05/22 0815)  Resp: 20 (08/05/22 0815)  BP: 122/81 (08/05/22 0815)  SpO2: 100 % (08/05/22 0815) Vital Signs (24h Range):  Temp:  [96.8 °F (36 °C)-99.5 °F (37.5 °C)] 99.14 °F (37.3 °C)  Pulse:  [] 93  Resp:  [18-26] 20  SpO2:  [99 %-100 %] 100 %  BP: (102-139)/(62-97) 122/81     Weight: 52.5 kg (115 lb 11.9 oz)  Body mass index is 22.6 kg/m².    Intake/Output Summary (Last 24 hours) at 8/5/2022 1003  Last data filed at 8/4/2022 2310  Gross per 24 hour   Intake 723.04 ml   Output 575 ml   Net 148.04 ml        Physical Exam  Vitals and nursing note reviewed.   Constitutional:       Appearance: She is not toxic-appearing.      Interventions: She is intubated.      Comments: Sedated, appears older than stated age   HENT:      Head: Normocephalic and atraumatic.      Comments: ET tube in place  Eyes:      Pupils: Pupils are equal, round, and reactive to light.   Cardiovascular:      Rate and Rhythm: Normal rate and regular rhythm.      Pulses: Normal pulses.      Heart sounds: Normal heart sounds. No murmur heard.  Pulmonary:      Effort: She is intubated.      Breath sounds: No rhonchi or rales.      Comments: Ventilated breath sounds bilaterally  Abdominal:      General: Bowel sounds are normal.      Palpations: Abdomen is soft.      Tenderness: There is no abdominal tenderness. There is no guarding or rebound.   Musculoskeletal:         General: No swelling.      Cervical  back: Neck supple.   Skin:     General: Skin is warm and dry.      Capillary Refill: Capillary refill takes 2 to 3 seconds.      Comments: Extensive tatoos throughout body   Neurological:      Comments: Intubated, no response to stimuli, on sedation       Significant Labs: All pertinent labs within the past 24 hours have been reviewed.    Significant Imaging: I have reviewed all pertinent imaging results/findings within the past 24 hours.      Assessment/Plan:      * Acute respiratory failure with hypoxia and hypercapnia  Encephalopathy, metabolic  COVID +  - Recently treated for aspiration pneumonia and new onset CHF at this facility  - Found down at home by home health, patient in tub disoriented and hard to arouse. EMS arrived O2 saturations on RA 87% placed on 4L increased to 91%, +cough +tachy   - Patient with acute hypercapnic and hypoxic respiratory failure    - Intubated and vent supported  - Found to be COVID +  - On empiric Zosyn and Vancomycin  - Pulm/critical care to stop Zosyn and start Rocephin today; continue vancomycin  - Continue remdesivir and dexamethasone, day #3  - SBT in progress but mental status main barrier to extubation  - As per Pulmonary/Critical care    Chronic combined systolic and diastolic heart failure  - Last ECHO results:   - Results for orders placed during the hospital encounter of 07/21/22  · The left ventricle is mildly enlarged with concentric hypertrophy and severely decreased systolic function.  · The estimated ejection fraction is 20%.  · There is severe left ventricular global hypokinesis.  · Grade III left ventricular diastolic dysfunction.  · Mild right ventricular enlargement with mildly reduced right ventricular systolic function.  · Severe left atrial enlargement.  · Moderate aortic regurgitation.  · Severe mitral regurgitation.  · Mild tricuspid regurgitation.  · Moderate pulmonic regurgitation.  · Intermediate central venous pressure (8 mmHg).  · The estimated PA  systolic pressure is 33 mmHg.  · Trivial pericardial effusion.  - Troponin 0.034-0.052; likely demand; will continue to monitor and trend  - CXR revealed Nonspecific bilateral airspace opacities.  Findings may suggest multifocal pneumonia or edema. Recommend clinical correlation. Probable small pleural effusions.  - Does not appear clinically volume overload at this time  - Continue home BB, ACEi/ARB when stable  - Daily weights and strict I/Os  - Monitor on telemetry  - Monitor and trend BMP, Mg, and renal function; keep K >4, Mg >2  - Sodium restriction (<2g/d), fluid restriction (<2L)   - Monitor for signs of fluid overload: RR>30, O2 sat<92%, weight gain of >3 lbs in 24 hours, or urinary output <160ml/8hr    Encephalopathy, metabolic  - Due to above and in combination with ongoing polysubstance abuse  - Currently sedated and was given versed last night  - Reassess after extubation    COVID-19  - Patient is identified as Severe COVID-19 based on hypoxemia with O2 saturations <94% on room air or on ambulation   - Initiate standard COVID protocols; COVID-19 testing Collection Date: 10/1/2021 Collection Time:   1:22 AM, Infection Control notification  and isolation- respiratory, contact and droplet per protocol  - Diagnostics: (leukopenia, hyponatremia, hyperferritinemia, elevated troponin, elevated d-dimer, age, and comorbidities are significant predictors of poor clinical outcome)  CMP, CRP, BNP and Portable CXR  Management: Continuous cardiac monitoring. and Manage respiratory failure (O2 requirement >10LPM or needing NIPPV/Mechanical ventilation) and/or Pneumonia (active chest infiltrates) separately as described below.  - Monitor on Telemetry  - Initiated on dexamethasone, continue for 10d course  - Initiated on remdesivir x5d; daily CMP  - Continuous Pulse Oximetry, goal SpO2 92-96%  - MVI & ascorbic acid 500mg PO BID  - Acetaminophen Q6hr PRN fever  - VTE PPx: enoxaparin   - As discussed above    HLD  (hyperlipidemia)  - Not on medications at home    Bipolar 1 disorder, depressed  Attention deficit hyperactivity disorder (ADHD), combined type  - Hold home meds until patient is stable    Seizure disorder  - Seizure precautions  - PRN lorazepam    Hypokalemia  - Corrected to normal, monitor    Amphetamine abuse  Polysubstance abuse  - UDS positive  - Monitor for withdrawal  - May require psych consult upon extubation and stabilization    Anemia  - Stable, monitor    Essential hypertension  - Hold anti-HTN for now  - Will resume when indicated      VTE Risk Mitigation (From admission, onward)         Ordered     enoxaparin injection 40 mg  Daily         08/04/22 0941     IP VTE HIGH RISK PATIENT  Once         08/03/22 2045     Place sequential compression device  Until discontinued         08/03/22 2045                  Critical care time spent on the evaluation and treatment of severe organ dysfunction, review of pertinent labs and imaging studies, discussions with consulting providers and discussions with patient/family: 35 minutes.      Almaz Lucio MD  Department of Hospital Medicine   [place] - Intensive Care   51

## 2023-04-19 ENCOUNTER — HOSPITAL ENCOUNTER (EMERGENCY)
Facility: HOSPITAL | Age: 53
Discharge: HOME | End: 2023-04-20
Attending: EMERGENCY MEDICINE
Payer: MEDICARE

## 2023-04-19 DIAGNOSIS — R41.82 ALTERED MENTAL STATUS: ICD-10-CM

## 2023-04-19 LAB
ALBUMIN SERPL BCP-MCNC: 3.4 G/DL (ref 3.5–5.2)
ALP SERPL-CCNC: 148 U/L (ref 55–135)
ALT SERPL W/O P-5'-P-CCNC: 97 U/L (ref 10–44)
ANION GAP SERPL CALC-SCNC: 15 MMOL/L (ref 8–16)
AST SERPL-CCNC: 141 U/L (ref 10–40)
BILIRUB SERPL-MCNC: 1.5 MG/DL (ref 0.1–1)
BUN SERPL-MCNC: 31 MG/DL (ref 6–20)
CALCIUM SERPL-MCNC: 9 MG/DL (ref 8.7–10.5)
CHLORIDE SERPL-SCNC: 97 MMOL/L (ref 95–110)
CO2 SERPL-SCNC: 22 MMOL/L (ref 23–29)
CREAT SERPL-MCNC: 1.3 MG/DL (ref 0.5–1.4)
EST. GFR  (NO RACE VARIABLE): 49.8 ML/MIN/1.73 M^2
ETHANOL SERPL-MCNC: <10 MG/DL
GLUCOSE SERPL-MCNC: 137 MG/DL (ref 70–110)
POTASSIUM SERPL-SCNC: 3.5 MMOL/L (ref 3.5–5.1)
PROT SERPL-MCNC: 7.5 G/DL (ref 6–8.4)
SODIUM SERPL-SCNC: 134 MMOL/L (ref 136–145)

## 2023-04-19 PROCEDURE — 85025 COMPLETE CBC W/AUTO DIFF WBC: CPT | Mod: ER | Performed by: EMERGENCY MEDICINE

## 2023-04-19 PROCEDURE — 93010 EKG 12-LEAD: ICD-10-PCS | Mod: ,,, | Performed by: INTERNAL MEDICINE

## 2023-04-19 PROCEDURE — 81000 URINALYSIS NONAUTO W/SCOPE: CPT | Mod: ER,59 | Performed by: EMERGENCY MEDICINE

## 2023-04-19 PROCEDURE — 80307 DRUG TEST PRSMV CHEM ANLYZR: CPT | Mod: ER | Performed by: EMERGENCY MEDICINE

## 2023-04-19 PROCEDURE — 82077 ASSAY SPEC XCP UR&BREATH IA: CPT | Mod: ER | Performed by: EMERGENCY MEDICINE

## 2023-04-19 PROCEDURE — 93005 ELECTROCARDIOGRAM TRACING: CPT | Mod: ER

## 2023-04-19 PROCEDURE — 80053 COMPREHEN METABOLIC PANEL: CPT | Mod: ER | Performed by: EMERGENCY MEDICINE

## 2023-04-19 PROCEDURE — 93010 ELECTROCARDIOGRAM REPORT: CPT | Mod: ,,, | Performed by: INTERNAL MEDICINE

## 2023-04-19 PROCEDURE — 99285 EMERGENCY DEPT VISIT HI MDM: CPT | Mod: 25,ER

## 2023-04-19 RX ORDER — LORAZEPAM 2 MG/ML
CONCENTRATE ORAL
COMMUNITY
Start: 2023-02-01

## 2023-04-19 RX ORDER — POLYETHYLENE GLYCOL 3350 17 G/17G
POWDER, FOR SOLUTION ORAL
COMMUNITY
Start: 2023-01-12

## 2023-04-19 RX ORDER — MORPHINE SULFATE 20 MG/ML
SOLUTION ORAL
COMMUNITY
Start: 2023-02-01 | End: 2024-01-14

## 2023-04-19 RX ORDER — QUETIAPINE FUMARATE 300 MG/1
300 TABLET, FILM COATED ORAL NIGHTLY
COMMUNITY
Start: 2023-03-27

## 2023-04-19 RX ORDER — DOCUSATE SODIUM 50 MG AND SENNOSIDES 8.6 MG 8.6; 5 MG/1; MG/1
1 TABLET, FILM COATED ORAL
COMMUNITY
Start: 2023-01-26

## 2023-04-19 RX ORDER — FUROSEMIDE 40 MG/1
40 TABLET ORAL 2 TIMES DAILY
COMMUNITY
Start: 2023-02-24

## 2023-04-19 RX ORDER — SPIRONOLACTONE 50 MG/1
50 TABLET, FILM COATED ORAL 2 TIMES DAILY
COMMUNITY
Start: 2023-04-17

## 2023-04-20 VITALS
HEIGHT: 60 IN | SYSTOLIC BLOOD PRESSURE: 120 MMHG | BODY MASS INDEX: 23.56 KG/M2 | DIASTOLIC BLOOD PRESSURE: 89 MMHG | RESPIRATION RATE: 20 BRPM | OXYGEN SATURATION: 100 % | TEMPERATURE: 98 F | HEART RATE: 103 BPM | WEIGHT: 120 LBS

## 2023-04-20 LAB
AMPHET+METHAMPHET UR QL: NEGATIVE
ANISOCYTOSIS BLD QL SMEAR: ABNORMAL
BACTERIA #/AREA URNS AUTO: NORMAL /HPF
BARBITURATES UR QL SCN>200 NG/ML: NEGATIVE
BASOPHILS # BLD AUTO: 0.03 K/UL (ref 0–0.2)
BASOPHILS NFR BLD: 0.4 % (ref 0–1.9)
BENZODIAZ UR QL SCN>200 NG/ML: NEGATIVE
BILIRUB UR QL STRIP: ABNORMAL
BZE UR QL SCN: NEGATIVE
CANNABINOIDS UR QL SCN: NEGATIVE
CLARITY UR REFRACT.AUTO: CLEAR
COLOR UR AUTO: YELLOW
CREAT UR-MCNC: 108.2 MG/DL (ref 15–325)
DIFFERENTIAL METHOD: ABNORMAL
EOSINOPHIL # BLD AUTO: 0.1 K/UL (ref 0–0.5)
EOSINOPHIL NFR BLD: 0.6 % (ref 0–8)
ERYTHROCYTE [DISTWIDTH] IN BLOOD BY AUTOMATED COUNT: 26.3 % (ref 11.5–14.5)
GLUCOSE UR QL STRIP: NEGATIVE
HCT VFR BLD AUTO: 34.6 % (ref 37–48.5)
HGB BLD-MCNC: 10.1 G/DL (ref 12–16)
HGB UR QL STRIP: NEGATIVE
HYALINE CASTS UR QL AUTO: 1 /LPF
HYPOCHROMIA BLD QL SMEAR: ABNORMAL
IMM GRANULOCYTES # BLD AUTO: 0.04 K/UL (ref 0–0.04)
IMM GRANULOCYTES NFR BLD AUTO: 0.5 % (ref 0–0.5)
KETONES UR QL STRIP: NEGATIVE
LEUKOCYTE ESTERASE UR QL STRIP: NEGATIVE
LYMPHOCYTES # BLD AUTO: 0.9 K/UL (ref 1–4.8)
LYMPHOCYTES NFR BLD: 11.1 % (ref 18–48)
MCH RBC QN AUTO: 21.1 PG (ref 27–31)
MCHC RBC AUTO-ENTMCNC: 29.2 G/DL (ref 32–36)
MCV RBC AUTO: 72 FL (ref 82–98)
METHADONE UR QL SCN>300 NG/ML: NEGATIVE
MICROSCOPIC COMMENT: NORMAL
MONOCYTES # BLD AUTO: 0.9 K/UL (ref 0.3–1)
MONOCYTES NFR BLD: 11.4 % (ref 4–15)
NEUTROPHILS # BLD AUTO: 6.3 K/UL (ref 1.8–7.7)
NEUTROPHILS NFR BLD: 76 % (ref 38–73)
NITRITE UR QL STRIP: NEGATIVE
NRBC BLD-RTO: 0 /100 WBC
OPIATES UR QL SCN: ABNORMAL
PCP UR QL SCN>25 NG/ML: NEGATIVE
PH UR STRIP: 6 [PH] (ref 5–8)
PLATELET # BLD AUTO: 276 K/UL (ref 150–450)
PLATELET BLD QL SMEAR: ABNORMAL
PMV BLD AUTO: ABNORMAL FL (ref 9.2–12.9)
POLYCHROMASIA BLD QL SMEAR: ABNORMAL
PROT UR QL STRIP: ABNORMAL
RBC # BLD AUTO: 4.78 M/UL (ref 4–5.4)
RBC #/AREA URNS AUTO: 1 /HPF (ref 0–4)
SP GR UR STRIP: 1.02 (ref 1–1.03)
TOXICOLOGY INFORMATION: ABNORMAL
URN SPEC COLLECT METH UR: ABNORMAL
UROBILINOGEN UR STRIP-ACNC: 1 EU/DL
WBC # BLD AUTO: 8.27 K/UL (ref 3.9–12.7)
WBC #/AREA URNS AUTO: 1 /HPF (ref 0–5)

## 2023-04-20 PROCEDURE — 63600175 PHARM REV CODE 636 W HCPCS: Mod: ER | Performed by: EMERGENCY MEDICINE

## 2023-04-20 PROCEDURE — 96374 THER/PROPH/DIAG INJ IV PUSH: CPT | Mod: ER

## 2023-04-20 PROCEDURE — 94761 N-INVAS EAR/PLS OXIMETRY MLT: CPT | Mod: ER

## 2023-04-20 PROCEDURE — 99900035 HC TECH TIME PER 15 MIN (STAT): Mod: ER

## 2023-04-20 PROCEDURE — 96375 TX/PRO/DX INJ NEW DRUG ADDON: CPT | Mod: ER

## 2023-04-20 PROCEDURE — 27000221 HC OXYGEN, UP TO 24 HOURS: Mod: ER

## 2023-04-20 RX ORDER — NALOXONE HCL 0.4 MG/ML
0.4 VIAL (ML) INJECTION
Status: COMPLETED | OUTPATIENT
Start: 2023-04-20 | End: 2023-04-20

## 2023-04-20 RX ORDER — LORAZEPAM 2 MG/ML
0.5 INJECTION INTRAMUSCULAR
Status: COMPLETED | OUTPATIENT
Start: 2023-04-20 | End: 2023-04-20

## 2023-04-20 RX ADMIN — NALXONE HYDROCHLORIDE 0.4 MG: 0.4 INJECTION INTRAMUSCULAR; INTRAVENOUS; SUBCUTANEOUS at 03:04

## 2023-04-20 RX ADMIN — LORAZEPAM 0.5 MG: 2 INJECTION INTRAMUSCULAR; INTRAVENOUS at 12:04

## 2023-04-20 NOTE — DISCHARGE INSTRUCTIONS
No new abnormality discovered.  We suspect that she had too much sedating medicine such as pain medicine or anxiety medicine.  Please review her medication list with her regular prescriber and continue all previous orders.

## 2023-04-20 NOTE — ED PROVIDER NOTES
"Encounter Date: 4/19/2023       History     Chief Complaint   Patient presents with    Altered Mental Status     AMS, pt may have taken some sort of substance ( unknown ), per EMS pt was given Norcan and became more alert and began to vomit      Chief complaint:  Altered mental status    52-year-old female resident of local nursing home was found to have an altered mental status, with the thought that she may have taken some sort of "substance".  Patient has a history of bipolar disorder and prior psychiatric episodes.  Upon arrival of EMS the patient was given Narcan and historically her GCS went from 12-14.  Patient has history of substance abuse.  Also has a history of congestive heart failure with a 15% ejection fraction historically.  No issues with shortness of breath per the nursing home.  Not able to discern baseline mental state at time of exam and review of the NH record revealed history of substance abuse and psychiatric history including prior hospitalizations. Review of the MAR from there NH showed no opiates given in the last 7-8 days, Takes Lorazepam orally for agitation. Pt did express request for pain meds and ice subsequently to the RN caring for her.  Subsequent discussion with NH, patient was deemed not to show similar behavior before in the NH and this definitely being a departure from her baseline state where she ambulates and speaks clearly as her baseline.       Review of patient's allergies indicates:  No Known Allergies  Past Medical History:   Diagnosis Date    ADHD (attention deficit hyperactivity disorder)     Anemia     Anxiety     Bipolar disorder     CHF (congestive heart failure)     History of hepatitis C - s/p clearance of virus (HCV neg 6/2015) 09/26/2014    History of psychiatric hospitalization     History of substance abuse     IV heroin - last use 2013 per pt    Hx of psychiatric care     Hypertension     Opioid overdose 05/10/2016    Psychiatric problem     Psychosis     " Seizure disorder 04/03/2019    Sleep difficulties     Still's disease     Substance abuse     Therapy     Vasculitis      Past Surgical History:   Procedure Laterality Date    HYSTERECTOMY  3/16/2011    SALPINGOOPHORECTOMY Right 3/16/2011    TUBAL LIGATION      Vaginal cuff repair  04/22/2011     Family History   Problem Relation Age of Onset    Diabetes Maternal Grandmother     Cancer Maternal Grandmother      Social History     Tobacco Use    Smoking status: Every Day     Packs/day: 1.00     Years: 36.00     Pack years: 36.00     Types: Cigarettes     Start date: 1986    Smokeless tobacco: Never    Tobacco comments:     Enrolled in [employer] on 10/23/14 ([employer] Member ID # [id number]) Pt declines Ambulatory referral to Smoking Cessation clinic. Handout provided   Substance Use Topics    Alcohol use: No    Drug use: Yes     Types: Heroin, Cocaine, Methamphetamines, Marijuana     Review of Systems   Reason unable to perform ROS: Pt non-cooperative.   Constitutional:  Negative for appetite change and fever.   Skin:         Bruising to left eye   Psychiatric/Behavioral:  Positive for agitation and decreased concentration. Negative for hallucinations. The patient is nervous/anxious and is hyperactive.      Physical Exam     Initial Vitals   BP Pulse Resp Temp SpO2   04/19/23 2243 04/19/23 2243 04/19/23 2243 04/19/23 2314 04/19/23 2243   (!) 139/99 94 18 97.8 °F (36.6 °C) 95 %      MAP       --                Physical Exam    Nursing note and vitals reviewed.  Constitutional: She appears well-nourished. She appears distressed (agitated).   HENT:   Head: Normocephalic and atraumatic.   Right Ear: External ear normal.   Left Ear: External ear normal.   Nose: Nose normal.   Mouth/Throat: Oropharynx is clear and moist.   Eyes: Conjunctivae and EOM are normal. Pupils are equal, round, and reactive to light.   Periorbital swelling/bruising. No bony step-off or deformity. Anterior chamber normal.    Cardiovascular:  Normal  rate, regular rhythm and intact distal pulses.           Pulmonary/Chest: No respiratory distress. She has no wheezes. She has no rhonchi. She has no rales. She exhibits no tenderness.   Abdominal: She exhibits no distension. There is no abdominal tenderness.   Musculoskeletal:         General: Normal range of motion.     Neurological: She has normal strength. No cranial nerve deficit. GCS eye subscore is 4. GCS verbal subscore is 4. GCS motor subscore is 5.   Agitated with rhythmic gyrations of upper and lower extremities. No typical spasticity demonstrated. Appears to be associated with verbal repetitive sounds. Intermittently will ask a question or make a request and then resume activity.   Skin: Skin is warm.   Periorbital bruising   Psychiatric: Her affect is labile and inappropriate. She is agitated and hyperactive. She expresses inappropriate judgment.   Unable to assess.       ED Course   Procedures  Labs Reviewed   CBC W/ AUTO DIFFERENTIAL - Abnormal; Notable for the following components:       Result Value    Hemoglobin 10.1 (*)     Hematocrit 34.6 (*)     MCV 72 (*)     MCH 21.1 (*)     MCHC 29.2 (*)     RDW 26.3 (*)     Lymph # 0.9 (*)     Gran % 76.0 (*)     Lymph % 11.1 (*)     All other components within normal limits   COMPREHENSIVE METABOLIC PANEL - Abnormal; Notable for the following components:    Sodium 134 (*)     CO2 22 (*)     Glucose 137 (*)     BUN 31 (*)     Albumin 3.4 (*)     Total Bilirubin 1.5 (*)     Alkaline Phosphatase 148 (*)      (*)     ALT 97 (*)     eGFR 49.8 (*)     All other components within normal limits   URINALYSIS - Abnormal; Notable for the following components:    Protein, UA 1+ (*)     Bilirubin (UA) 1+ (*)     All other components within normal limits    Narrative:     Specimen Source->Urine   DRUG SCREEN PANEL, URINE EMERGENCY - Abnormal; Notable for the following components:    Opiate Scrn, Ur Presumptive Positive (*)     All other components within normal  limits    Narrative:     Specimen Source->Urine   ALCOHOL,MEDICAL (ETHANOL)   URINALYSIS MICROSCOPIC    Narrative:     Specimen Source->Urine     EKG Readings: (Independently Interpreted)   Initial Reading: No STEMI. Rhythm: Sinus Tachycardia. Heart Rate: 101. Ectopy: No Ectopy. Conduction: LBBB. Axis: Right Axis Deviation. Clinical Impression: Sinus Tachycardia with PVCs Other Impression: Left atrial enlargement/abnormal EKG     Imaging Results              CT Head Without Contrast (Final result)  Result time 04/20/23 07:07:52      Final result by Charlie Marin MD (04/20/23 07:07:52)                   Impression:      No acute abnormality.  Motion limited.    Left maxillary sinus disease.    All CT scans at this facility use dose modulation, iterative reconstruction, and/or weight based dosing when appropriate to reduce radiation dose to as low as reasonable achievable.      Electronically signed by: Charlie Marin MD  Date:    04/20/2023  Time:    07:07               Narrative:    EXAMINATION:  CT HEAD WITHOUT CONTRAST    CLINICAL HISTORY:  Mental status change, unknown cause;    TECHNIQUE:  Low dose axial CT images obtained throughout the head without intravenous contrast. Sagittal and coronal reconstructions were performed.    All CT scans at this facility use dose modulation, iterative reconstruction, and/or weight based dosing when appropriate to reduce radiation dose to as low as reasonable achievable.    COMPARISON:  09/19/2022    FINDINGS:  Intracranial compartment: Motion limited.    The brain parenchyma appears normal. No parenchymal mass, hemorrhage, edema or major vascular distribution infarct.    Ventricles and sulci are normal in size for age without evidence of hydrocephalus.    No extra-axial blood or fluid collections.    Skull/extracranial contents (limited evaluation): No fracture.  Moderate mucosal thickening left maxillary sinus measuring up to 5 mm.  14 mm polyp or cyst within the inferior  maxillary antra on the left.  Mastoid air cells and remaining paranasal sinuses are essentially clear.  Hypoplastic right frontal sinus.  No fluid levels are noted.                                       X-Ray Chest AP Portable (Final result)  Result time 04/19/23 23:57:34      Final result by Claritza Wallace MD (04/19/23 23:57:34)                   Impression:      Cardiogenic vascular congestion and scant pleural effusions      Electronically signed by: Claritza Wallace  Date:    04/19/2023  Time:    23:57               Narrative:    EXAMINATION:  XR CHEST AP PORTABLE    CLINICAL HISTORY:  Chest Pain;    TECHNIQUE:  Single frontal portable view of the chest was performed.    COMPARISON:  February 2023    FINDINGS:  Central interstitial markings mildly increased.  Cardiac silhouette enlarged similar.  Scant pleural effusions likely.                                    X-Rays:   Independently Interpreted Readings:   Chest X-Ray: No infiltrates.  No acute abnormalities. Increased vascular markings consistent with CHF are present. Radiologist's interpretation was noted   Medications   LORazepam injection 0.5 mg (0.5 mg Intravenous Given 4/20/23 0012)   naloxone 0.4 mg/mL injection 0.4 mg (0.4 mg Intravenous Given 4/20/23 0326)       Medical Decision Making:   History:   I obtained history from: someone other than patient and EMS provider.  Initial Assessment:   The patient was agitated and not cooperative with questioning.  Would constantly moan loudly but had stable vital signs upon arrival.  The nursing home and EMS were the only historians as the patient was not able to provide history due to her agitation and apparent disorientation.  Differential Diagnosis:   12:30 a.m. the patient continued to be agitated and was given a low dose of Ativan 0.25 mg IV push with good results resulting in adequate sedation and currently adequate oxygenation as well.  Urine drug screen revealed presence of opiates which was not  noted on the medical administration record of the nursing home records.  Suspicion of a possible ingestion of an opiate with the positive drug screen as well as the positive response to Narcan suggest the possibility of a opiate overdose.  Vital signs remained stable.    DDX: substance abuse/psychosis.  Independently Interpreted Test(s):   I have ordered and independently interpreted X-rays - see prior notes.  I have ordered and independently interpreted EKG Reading(s) - see prior notes  Clinical Tests:   Lab Tests: Ordered and Reviewed  Radiological Study: Ordered and Reviewed  Medical Tests: Ordered and Reviewed  ED Management:  02:30 am. VSS with good oxygenation but remains hard to arouse.  Pupils are mid range and equal and CT Head unremarkable. No clear indicator of  reason for AMS. Hospitalized at another facility for similar episode though complicated with her CHF. Her CHF does NOT appear to be the problem at this visit as she hs no respiratory distress and oxygenates well. Ativan was very helpful in easing agitation but may contribute to her somnolence.    4:20 a.m. the patient is now awake alert and asking for something to drink.  The patient drank a bottle of apple juice and was able to converse.  She continues with some flapping of her arms and legs particularly the legs intermittently.  But does respond to cease the activity and does so.  O2 saturations 95-98%.  The plan is now to observe longer to determine her ability to return to the nursing home without requiring hospitalization as she is a hospice patient with DNR status.    5:30 a.m. the patient remains somnolent enough to warrant further observation for declaration of status.  Care transferred to Dr. Morgan at 6:00 a.m. following our discussion.  Other:   I have discussed this case with another health care provider.  Additional MDM:   Differential Diagnosis:   Head trauma, medication side effect, infection, metabolic derangement.                      6:17 AM I have assumed care at 06:00 - case discussed with Dr. Valdez in detail, data reviewed, patient examined. NH pt with AMS of uncertain etiology, DNR & Hospice status. Some fluctuation in alertness in the ER, presently sleeping soundly with stable VS. Continuing to monitor closely.    Neftali Morgan MD    7:12 AM Awake, alert, eating breakfast, ambulatory, states she is ready to go back.  Appears at her usual baseline by all description, vital signs stable, oxygen saturation 100%.  Resolving left periorbital bruise noted, appears a few days old.  No distress.  Data again reviewed, no interval change.  Most likely etiology for sedated status appears to be sedating medication.  Stable for return to nursing home care, suggest primary care review medication regimen.  Stable for discharge.    Neftali Morgan MD    Medical Decision Making  Problems Addressed:  Altered mental status: acute illness or injury    Amount and/or Complexity of Data Reviewed  Labs: ordered. Decision-making details documented in ED Course.  Radiology: ordered. Decision-making details documented in ED Course.    Risk  Prescription drug management.  Decision regarding hospitalization.               Clinical Impression:       ICD-10-CM ICD-9-CM   1. Altered mental status  R41.82 780.97           ED Disposition Condition    Discharge Stable          ED Prescriptions    None       Follow-up Information       Follow up With Specialties Details Why Contact Info    [facility] Emergency Dept Emergency Medicine  As needed [address] [address] [address] [phone]             Neftali Morgan MD  04/20/23 0714

## 2023-05-01 PROBLEM — I26.99 PULMONARY EMBOLUS: Chronic | Status: RESOLVED | Noted: 2023-01-08 | Resolved: 2023-05-01

## 2023-07-21 NOTE — PLAN OF CARE
Problem: Adult Inpatient Plan of Care  Goal: Plan of Care Review  Outcome: Ongoing, Progressing  Goal: Patient-Specific Goal (Individualized)  Outcome: Ongoing, Progressing  Goal: Absence of Hospital-Acquired Illness or Injury  Outcome: Ongoing, Progressing  Goal: Optimal Comfort and Wellbeing  Outcome: Ongoing, Progressing  Goal: Readiness for Transition of Care  Outcome: Ongoing, Progressing      7/21/23: Spoke with . He is going to call Dr. Garcia Limb office to schedule appointment.

## 2023-10-02 NOTE — ED NOTES
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 The surgical margins are positive for tumor cells.

## 2023-12-11 ENCOUNTER — HOSPITAL ENCOUNTER (EMERGENCY)
Facility: HOSPITAL | Age: 53
Discharge: HOME | End: 2023-12-11
Attending: EMERGENCY MEDICINE
Payer: MEDICARE

## 2023-12-11 VITALS
TEMPERATURE: 98 F | OXYGEN SATURATION: 100 % | WEIGHT: 130 LBS | HEART RATE: 82 BPM | RESPIRATION RATE: 20 BRPM | SYSTOLIC BLOOD PRESSURE: 100 MMHG | BODY MASS INDEX: 25.39 KG/M2 | DIASTOLIC BLOOD PRESSURE: 66 MMHG

## 2023-12-11 DIAGNOSIS — R07.9 CHEST PAIN: Primary | ICD-10-CM

## 2023-12-11 DIAGNOSIS — E86.0 DEHYDRATION: ICD-10-CM

## 2023-12-11 LAB
ALBUMIN SERPL BCP-MCNC: 4.1 G/DL (ref 3.5–5.2)
ALP SERPL-CCNC: 124 U/L (ref 55–135)
ALT SERPL W/O P-5'-P-CCNC: 15 U/L (ref 10–44)
ANION GAP SERPL CALC-SCNC: 12 MMOL/L (ref 8–16)
AST SERPL-CCNC: 18 U/L (ref 10–40)
BASOPHILS # BLD AUTO: 0.03 K/UL (ref 0–0.2)
BASOPHILS NFR BLD: 0.4 % (ref 0–1.9)
BILIRUB SERPL-MCNC: 0.2 MG/DL (ref 0.1–1)
BNP SERPL-MCNC: 23 PG/ML (ref 0–99)
BUN SERPL-MCNC: 38 MG/DL (ref 6–20)
CALCIUM SERPL-MCNC: 10.3 MG/DL (ref 8.7–10.5)
CHLORIDE SERPL-SCNC: 100 MMOL/L (ref 95–110)
CK SERPL-CCNC: 70 U/L (ref 20–180)
CO2 SERPL-SCNC: 25 MMOL/L (ref 23–29)
CREAT SERPL-MCNC: 1.7 MG/DL (ref 0.5–1.4)
DIFFERENTIAL METHOD: ABNORMAL
EOSINOPHIL # BLD AUTO: 0.1 K/UL (ref 0–0.5)
EOSINOPHIL NFR BLD: 1.3 % (ref 0–8)
ERYTHROCYTE [DISTWIDTH] IN BLOOD BY AUTOMATED COUNT: 13.2 % (ref 11.5–14.5)
EST. GFR  (NO RACE VARIABLE): 35.9 ML/MIN/1.73 M^2
GLUCOSE SERPL-MCNC: 106 MG/DL (ref 70–110)
HCT VFR BLD AUTO: 46.6 % (ref 37–48.5)
HGB BLD-MCNC: 15.6 G/DL (ref 12–16)
IMM GRANULOCYTES # BLD AUTO: 0.01 K/UL (ref 0–0.04)
IMM GRANULOCYTES NFR BLD AUTO: 0.1 % (ref 0–0.5)
LYMPHOCYTES # BLD AUTO: 1.3 K/UL (ref 1–4.8)
LYMPHOCYTES NFR BLD: 17.1 % (ref 18–48)
MCH RBC QN AUTO: 31.4 PG (ref 27–31)
MCHC RBC AUTO-ENTMCNC: 33.5 G/DL (ref 32–36)
MCV RBC AUTO: 94 FL (ref 82–98)
MONOCYTES # BLD AUTO: 0.9 K/UL (ref 0.3–1)
MONOCYTES NFR BLD: 12 % (ref 4–15)
NEUTROPHILS # BLD AUTO: 5.1 K/UL (ref 1.8–7.7)
NEUTROPHILS NFR BLD: 69.1 % (ref 38–73)
NRBC BLD-RTO: 0 /100 WBC
PLATELET # BLD AUTO: 185 K/UL (ref 150–450)
PMV BLD AUTO: 11.9 FL (ref 9.2–12.9)
POTASSIUM SERPL-SCNC: 4.9 MMOL/L (ref 3.5–5.1)
PROT SERPL-MCNC: 8.8 G/DL (ref 6–8.4)
RBC # BLD AUTO: 4.97 M/UL (ref 4–5.4)
SODIUM SERPL-SCNC: 137 MMOL/L (ref 136–145)
TROPONIN I SERPL DL<=0.01 NG/ML-MCNC: 0.01 NG/ML (ref 0–0.03)
WBC # BLD AUTO: 7.44 K/UL (ref 3.9–12.7)

## 2023-12-11 PROCEDURE — 93010 EKG 12-LEAD: ICD-10-PCS | Mod: ,,, | Performed by: INTERNAL MEDICINE

## 2023-12-11 PROCEDURE — 93010 ELECTROCARDIOGRAM REPORT: CPT | Mod: ,,, | Performed by: INTERNAL MEDICINE

## 2023-12-11 PROCEDURE — 84484 ASSAY OF TROPONIN QUANT: CPT | Mod: ER | Performed by: EMERGENCY MEDICINE

## 2023-12-11 PROCEDURE — 93005 ELECTROCARDIOGRAM TRACING: CPT | Mod: ER

## 2023-12-11 PROCEDURE — 85025 COMPLETE CBC W/AUTO DIFF WBC: CPT | Mod: ER | Performed by: EMERGENCY MEDICINE

## 2023-12-11 PROCEDURE — 25000003 PHARM REV CODE 250: Mod: ER | Performed by: EMERGENCY MEDICINE

## 2023-12-11 PROCEDURE — 82550 ASSAY OF CK (CPK): CPT | Mod: ER | Performed by: EMERGENCY MEDICINE

## 2023-12-11 PROCEDURE — 80053 COMPREHEN METABOLIC PANEL: CPT | Mod: ER | Performed by: EMERGENCY MEDICINE

## 2023-12-11 PROCEDURE — 99285 EMERGENCY DEPT VISIT HI MDM: CPT | Mod: 25,ER

## 2023-12-11 PROCEDURE — 83880 ASSAY OF NATRIURETIC PEPTIDE: CPT | Mod: ER | Performed by: EMERGENCY MEDICINE

## 2023-12-11 PROCEDURE — 96360 HYDRATION IV INFUSION INIT: CPT | Mod: ER

## 2023-12-11 PROCEDURE — 87389 HIV-1 AG W/HIV-1&-2 AB AG IA: CPT | Performed by: EMERGENCY MEDICINE

## 2023-12-11 RX ORDER — HYDROCODONE BITARTRATE AND ACETAMINOPHEN 5; 325 MG/1; MG/1
1 TABLET ORAL EVERY 4 HOURS PRN
Qty: 11 TABLET | Refills: 0 | Status: SHIPPED | OUTPATIENT
Start: 2023-12-11 | End: 2023-12-16

## 2023-12-11 RX ORDER — HYDROCODONE BITARTRATE AND ACETAMINOPHEN 5; 325 MG/1; MG/1
1 TABLET ORAL
Status: COMPLETED | OUTPATIENT
Start: 2023-12-11 | End: 2023-12-11

## 2023-12-11 RX ADMIN — SODIUM CHLORIDE 1000 ML: 9 INJECTION, SOLUTION INTRAVENOUS at 03:12

## 2023-12-11 RX ADMIN — HYDROCODONE BITARTRATE AND ACETAMINOPHEN 1 TABLET: 5; 325 TABLET ORAL at 03:12

## 2023-12-11 NOTE — ED PROVIDER NOTES
Encounter Date: 12/11/2023       History     Chief Complaint   Patient presents with    Chest Pain     Pt transported to er from [facility] c/o left side lower chest pain and right arm pain x a few days. Also c/o back pain that shoots down her legs.      The history is provided by the patient.   Chest Pain  The current episode started several days ago. Chest pain occurs constantly. The chest pain is unchanged. The pain does not radiate. Pertinent negatives for primary symptoms include no fever, no shortness of breath, no cough, no wheezing, no palpitations, no nausea, no vomiting and no dizziness.   Pertinent negatives for associated symptoms include no weakness.     Review of patient's allergies indicates:  No Known Allergies  Past Medical History:   Diagnosis Date    ADHD (attention deficit hyperactivity disorder)     Anemia     Anxiety     Bipolar disorder     CHF (congestive heart failure)     History of hepatitis C - s/p clearance of virus (HCV neg 6/2015) 09/26/2014    History of psychiatric hospitalization     History of substance abuse     IV heroin - last use 2013 per pt    Hx of psychiatric care     Hypertension     Opioid overdose 05/10/2016    Psychiatric problem     Psychosis     Seizure disorder 04/03/2019    Sleep difficulties     Still's disease     Substance abuse     Therapy     Vasculitis      Past Surgical History:   Procedure Laterality Date    HYSTERECTOMY  3/16/2011    SALPINGOOPHORECTOMY Right 3/16/2011    TUBAL LIGATION      Vaginal cuff repair  04/22/2011     Family History   Problem Relation Age of Onset    Diabetes Maternal Grandmother     Cancer Maternal Grandmother      Social History     Tobacco Use    Smoking status: Every Day     Current packs/day: 1.00     Average packs/day: 1 pack/day for 37.9 years (37.9 ttl pk-yrs)     Types: Cigarettes     Start date: 1986    Smokeless tobacco: Never    Tobacco comments:     Enrolled in [employer] on 10/23/14 ([employer] Member ID # [id number]) Pt  declines Ambulatory referral to Smoking Cessation clinic. Handout provided   Substance Use Topics    Alcohol use: No    Drug use: Yes     Types: Heroin, Cocaine, Methamphetamines, Marijuana     Review of Systems   Constitutional:  Negative for fever.   HENT:  Negative for sore throat.    Respiratory:  Negative for cough, shortness of breath and wheezing.    Cardiovascular:  Positive for chest pain. Negative for palpitations.   Gastrointestinal:  Negative for nausea and vomiting.   Genitourinary:  Negative for dysuria.   Musculoskeletal:  Negative for back pain.   Skin:  Negative for rash.   Neurological:  Negative for dizziness and weakness.   Hematological:  Does not bruise/bleed easily.       Physical Exam     Initial Vitals [12/11/23 1349]   BP Pulse Resp Temp SpO2   100/66 84 18 97.8 °F (36.6 °C) 98 %      MAP       --         Physical Exam    Nursing note and vitals reviewed.  Constitutional: She appears well-developed and well-nourished. No distress.   HENT:   Head: Normocephalic and atraumatic.   Mouth/Throat: Oropharynx is clear and moist.   Eyes: Conjunctivae and EOM are normal. Pupils are equal, round, and reactive to light.   Neck: Neck supple.   Normal range of motion.  Cardiovascular:  Normal rate, regular rhythm and normal heart sounds.     Exam reveals no gallop and no friction rub.       No murmur heard.  Pulmonary/Chest: Breath sounds normal. No respiratory distress. She has no wheezes. She has no rhonchi. She has no rales.   Abdominal: Abdomen is soft. Bowel sounds are normal. She exhibits no distension and no mass. There is no abdominal tenderness. There is no rebound and no guarding.   Musculoskeletal:         General: No tenderness or edema. Normal range of motion.      Cervical back: Normal range of motion and neck supple.     Neurological: She is alert and oriented to person, place, and time. She has normal strength.   Skin: Skin is warm and dry. No rash noted.   Psychiatric: She has a normal  mood and affect. Thought content normal.         ED Course   Procedures  Labs Reviewed   CBC W/ AUTO DIFFERENTIAL - Abnormal; Notable for the following components:       Result Value    MCH 31.4 (*)     Lymph % 17.1 (*)     All other components within normal limits   COMPREHENSIVE METABOLIC PANEL - Abnormal; Notable for the following components:    BUN 38 (*)     Creatinine 1.7 (*)     Total Protein 8.8 (*)     eGFR 35.9 (*)     All other components within normal limits   B-TYPE NATRIURETIC PEPTIDE   CK   TROPONIN I   URINALYSIS, REFLEX TO URINE CULTURE   HIV 1 / 2 ANTIBODY     EKG Readings: (Independently Interpreted)   Rhythm: Normal Sinus Rhythm. Heart Rate: 81. Ectopy: No Ectopy. Conduction: Normal. ST Segments: Normal ST Segments. T Waves: Normal. Clinical Impression: Normal Sinus Rhythm     ECG Results              EKG 12-lead (In process)  Result time 12/11/23 14:21:31      In process by Interface, Lab In [facility] (12/11/23 14:21:31)                   Narrative:    Test Reason : R07.9,    Vent. Rate : 087 BPM     Atrial Rate : 087 BPM     P-R Int : 162 ms          QRS Dur : 156 ms      QT Int : 436 ms       P-R-T Axes : 049 034 -29 degrees     QTc Int : 524 ms    Normal sinus rhythm  Left bundle branch block  Abnormal ECG  When compared with ECG of 19-APR-2023 23:29,  The axis Shifted left    Referred By: [email or web address]   SELF           Confirmed By:                                   Imaging Results              X-Ray Chest AP Portable (Final result)  Result time 12/11/23 14:21:44      Final result by Claritza Wallace MD (12/11/23 14:21:44)                   Impression:      No active or adverse finding      Electronically signed by: Claritza Wallace  Date:    12/11/2023  Time:    14:21               Narrative:    EXAMINATION:  XR CHEST AP PORTABLE    CLINICAL HISTORY:  chest pain;    TECHNIQUE:  Single frontal portable view of the chest was performed.    COMPARISON:  04/19/2023    FINDINGS:  No new pulmonary  consolidation or sizable pleural effusions seen.  Mediastinal contour similar portable exam                                       Medications   sodium chloride 0.9% bolus 1,000 mL 1,000 mL (has no administration in time range)   HYDROcodone-acetaminophen 5-325 mg per tablet 1 tablet (has no administration in time range)     Medical Decision Making  Left sided chest pain for the last few days.  Denies sob  DDx: NSTEMI, CHF, chest pain    Amount and/or Complexity of Data Reviewed  Labs: ordered.  Radiology: ordered.    Risk  Prescription drug management.                                      Clinical Impression:  Final diagnoses:  [R07.9] Chest pain (Primary)  [E86.0] Dehydration          ED Disposition Condition    Discharge Stable          ED Prescriptions    None       Follow-up Information       Follow up With Specialties Details Why Contact Info    [facility] -  Call in 1 day  [id number] [facility]  [place] [address]  [phone]               Norberto Hernández MD  12/11/23 4173

## 2023-12-12 LAB — HIV 1+2 AB+HIV1 P24 AG SERPL QL IA: NEGATIVE

## 2024-01-14 ENCOUNTER — HOSPITAL ENCOUNTER (EMERGENCY)
Facility: HOSPITAL | Age: 54
Discharge: HOME | End: 2024-01-14
Attending: EMERGENCY MEDICINE
Payer: MEDICARE

## 2024-01-14 VITALS
WEIGHT: 160 LBS | RESPIRATION RATE: 20 BRPM | DIASTOLIC BLOOD PRESSURE: 74 MMHG | BODY MASS INDEX: 31.41 KG/M2 | HEIGHT: 60 IN | HEART RATE: 108 BPM | TEMPERATURE: 98 F | SYSTOLIC BLOOD PRESSURE: 115 MMHG | OXYGEN SATURATION: 97 %

## 2024-01-14 DIAGNOSIS — M54.32 LEFT SIDED SCIATICA: Primary | ICD-10-CM

## 2024-01-14 PROCEDURE — 96372 THER/PROPH/DIAG INJ SC/IM: CPT | Performed by: EMERGENCY MEDICINE

## 2024-01-14 PROCEDURE — 63600175 PHARM REV CODE 636 W HCPCS: Mod: ER | Performed by: EMERGENCY MEDICINE

## 2024-01-14 PROCEDURE — 99284 EMERGENCY DEPT VISIT MOD MDM: CPT | Mod: ER

## 2024-01-14 PROCEDURE — 25000003 PHARM REV CODE 250: Mod: ER | Performed by: EMERGENCY MEDICINE

## 2024-01-14 RX ORDER — GABAPENTIN 300 MG/1
300 CAPSULE ORAL 3 TIMES DAILY
Qty: 42 CAPSULE | Refills: 0 | Status: SHIPPED | OUTPATIENT
Start: 2024-01-14 | End: 2024-01-28

## 2024-01-14 RX ORDER — KETOROLAC TROMETHAMINE 30 MG/ML
15 INJECTION, SOLUTION INTRAMUSCULAR; INTRAVENOUS
Status: COMPLETED | OUTPATIENT
Start: 2024-01-14 | End: 2024-01-14

## 2024-01-14 RX ORDER — ACETAMINOPHEN 500 MG
1000 TABLET ORAL
Status: COMPLETED | OUTPATIENT
Start: 2024-01-14 | End: 2024-01-14

## 2024-01-14 RX ORDER — LIDOCAINE 50 MG/G
1 PATCH TOPICAL
Status: DISCONTINUED | OUTPATIENT
Start: 2024-01-14 | End: 2024-01-14 | Stop reason: HOSPADM

## 2024-01-14 RX ORDER — GABAPENTIN 100 MG/1
200 CAPSULE ORAL
Status: COMPLETED | OUTPATIENT
Start: 2024-01-14 | End: 2024-01-14

## 2024-01-14 RX ORDER — MELOXICAM 15 MG/1
15 TABLET ORAL DAILY
Qty: 14 TABLET | Refills: 0 | Status: SHIPPED | OUTPATIENT
Start: 2024-01-14 | End: 2024-01-28

## 2024-01-14 RX ADMIN — ACETAMINOPHEN 1000 MG: 500 TABLET ORAL at 11:01

## 2024-01-14 RX ADMIN — KETOROLAC TROMETHAMINE 15 MG: 30 INJECTION, SOLUTION INTRAMUSCULAR; INTRAVENOUS at 11:01

## 2024-01-14 RX ADMIN — LIDOCAINE 1 PATCH: 50 PATCH TOPICAL at 11:01

## 2024-01-14 RX ADMIN — GABAPENTIN 200 MG: 100 CAPSULE ORAL at 11:01

## 2024-01-14 NOTE — ED PROVIDER NOTES
History     Chief Complaint   Patient presents with    Sciatica     Left sided sciatic.     HPI:  Stephanie T Barthelemy is a 53 y.o. female with PMH as below who presents to the [facility] emergency department for evaluation of left lower back pain radiating with shock-like/tingling quality radiating down left leg. She has no other complaints.       PCP: No, Primary Doctor    Review of patient's allergies indicates:  No Known Allergies   Past Medical History:   Diagnosis Date    ADHD (attention deficit hyperactivity disorder)     Anemia     Anxiety     Bipolar disorder     CHF (congestive heart failure)     History of hepatitis C - s/p clearance of virus (HCV neg 6/2015) 09/26/2014    History of psychiatric hospitalization     History of substance abuse     IV heroin - last use 2013 per pt    Hx of psychiatric care     Hypertension     Opioid overdose 05/10/2016    Psychiatric problem     Psychosis     Seizure disorder 04/03/2019    Sleep difficulties     Still's disease     Substance abuse     Therapy     Vasculitis      Past Surgical History:   Procedure Laterality Date    HYSTERECTOMY  3/16/2011    SALPINGOOPHORECTOMY Right 3/16/2011    TUBAL LIGATION      Vaginal cuff repair  04/22/2011       Family History   Problem Relation Age of Onset    Diabetes Maternal Grandmother     Cancer Maternal Grandmother      Social History     Tobacco Use    Smoking status: Every Day     Current packs/day: 1.00     Average packs/day: 1 pack/day for 38.0 years (38.0 ttl pk-yrs)     Types: Cigarettes     Start date: 1986    Smokeless tobacco: Never    Tobacco comments:     Enrolled in [employer] on 10/23/14 (SCT Member ID # [id number]) Pt declines Ambulatory referral to Smoking Cessation clinic. Handout provided   Substance and Sexual Activity    Alcohol use: No    Drug use: Yes     Types: Heroin, Cocaine, Methamphetamines, Marijuana    Sexual activity: Not Currently     Partners: Male, Female     Birth  control/protection: Other-see comments     Comment: hysterectomy      Review of Systems     Review of Systems   Constitutional: Negative.    HENT: Negative.     Eyes: Negative.    Respiratory: Negative.     Cardiovascular: Negative.    Gastrointestinal: Negative.    Endocrine: Negative.    Genitourinary: Negative.    Musculoskeletal: Negative.    Skin: Negative.    Allergic/Immunologic: Negative.    Neurological: Negative.    Hematological: Negative.    Psychiatric/Behavioral: Negative.     All other systems reviewed and are negative.       Physical Exam     Initial Vitals [01/14/24 0953]   BP Pulse Resp Temp SpO2   115/74 108 20 98.2 °F (36.8 °C) 97 %      MAP       --          Nursing notes and vital signs reviewed.  Constitutional: Patient is in mild to moderate distress.   Head: Normocephalic. Atraumatic.   Eyes:  Conjunctivae are not pale. No scleral icterus.   ENT: Mucous membranes moist.   Neck: Supple.   Cardiovascular: Regular rate. Regular rhythm.   Pulmonary: No respiratory distress.   Abdominal: Non-distended.   Musculoskeletal: Moves all extremities. No obvious deformities. No midline tenderness. Left paralumbar TTP. +SLR on left.   Skin: Warm and dry.   Neurological:  Alert, awake, and appropriate. Normal speech. No acute lateralizing neurologic deficits appreciated.   Psychiatric: Normal affect.       ED Course   Procedures  Vitals:    01/14/24 0953   BP: 115/74   Pulse: 108   Resp: 20   Temp: 98.2 °F (36.8 °C)   TempSrc: Oral   SpO2: 97%   Weight: 72.6 kg (160 lb)   Height: 5' (1.524 m)     Lab Results Interpreted as Abnormal:  Labs Reviewed - No data to display   All Lab Results:  Results for orders placed or performed during the hospital encounter of 12/11/23   CBC Auto Differential   Result Value Ref Range    WBC 7.44 3.90 - 12.70 K/uL    RBC 4.97 4.00 - 5.40 M/uL    Hemoglobin 15.6 12.0 - 16.0 g/dL    Hematocrit 46.6 37.0 - 48.5 %    MCV 94 82 - 98 fL    MCH 31.4 (H) 27.0 - 31.0 pg    MCHC 33.5  32.0 - 36.0 g/dL    RDW 13.2 11.5 - 14.5 %    Platelets 185 150 - 450 K/uL    MPV 11.9 9.2 - 12.9 fL    Immature Granulocytes 0.1 0.0 - 0.5 %    Gran # (ANC) 5.1 1.8 - 7.7 K/uL    Immature Grans (Abs) 0.01 0.00 - 0.04 K/uL    Lymph # 1.3 1.0 - 4.8 K/uL    Mono # 0.9 0.3 - 1.0 K/uL    Eos # 0.1 0.0 - 0.5 K/uL    Baso # 0.03 0.00 - 0.20 K/uL    nRBC 0 0 /100 WBC    Gran % 69.1 38.0 - 73.0 %    Lymph % 17.1 (L) 18.0 - 48.0 %    Mono % 12.0 4.0 - 15.0 %    Eosinophil % 1.3 0.0 - 8.0 %    Basophil % 0.4 0.0 - 1.9 %    Differential Method Automated    Comprehensive Metabolic Panel   Result Value Ref Range    Sodium 137 136 - 145 mmol/L    Potassium 4.9 3.5 - 5.1 mmol/L    Chloride 100 95 - 110 mmol/L    CO2 25 23 - 29 mmol/L    Glucose 106 70 - 110 mg/dL    BUN 38 (H) 6 - 20 mg/dL    Creatinine 1.7 (H) 0.5 - 1.4 mg/dL    Calcium 10.3 8.7 - 10.5 mg/dL    Total Protein 8.8 (H) 6.0 - 8.4 g/dL    Albumin 4.1 3.5 - 5.2 g/dL    Total Bilirubin 0.2 0.1 - 1.0 mg/dL    Alkaline Phosphatase 124 55 - 135 U/L    AST 18 10 - 40 U/L    ALT 15 10 - 44 U/L    eGFR 35.9 (A) >60 mL/min/1.73 m^2    Anion Gap 12 8 - 16 mmol/L   BNP   Result Value Ref Range    BNP 23 0 - 99 pg/mL   CK   Result Value Ref Range    CPK 70 20 - 180 U/L   Troponin I   Result Value Ref Range    Troponin I 0.012 0.000 - 0.026 ng/mL   HIV 1/2 Ag/Ab (4th Gen)   Result Value Ref Range    HIV 1/2 Ag/Ab Negative Negative     Imaging Results    None          The emergency physician reviewed the vital signs / test results outlined above.     ED Discussion     Patient's evaluation in the ED does not suggest any emergent or life-threatening medical conditions requiring immediate intervention beyond what was provided in the ED, and I believe patient is safe for discharge. Regardless, an unremarkable evaluation in the ED does not preclude the development or presence of a serious or life-threatening condition. As such, patient was given return instructions for any change or  worsening of symptoms.                ED Medication(s) Administered:  Medications   gabapentin capsule 200 mg (has no administration in time range)   acetaminophen tablet 1,000 mg (has no administration in time range)   LIDOcaine 5 % patch 1 patch (has no administration in time range)   ketorolac injection 15 mg (has no administration in time range)       Prescription Management: I performed a review of the patient's current Rx medication list as input by nursing staff.    Patient's Medications   New Prescriptions    GABAPENTIN (NEURONTIN) 300 MG CAPSULE    Take 1 capsule (300 mg total) by mouth 3 (three) times daily. for 14 days    MELOXICAM (MOBIC) 15 MG TABLET    Take 1 tablet (15 mg total) by mouth once daily. for 14 days   Previous Medications    ALBUTEROL (PROVENTIL/VENTOLIN HFA) 90 MCG/ACTUATION INHALER    Inhale 2 puffs into the lungs every 6 (six) hours as needed for Wheezing or Shortness of Breath. Rescue    BENZTROPINE (COGENTIN) 0.5 MG TABLET    Take 1 tablet (0.5 mg total) by mouth 2 (two) times daily.    FAMOTIDINE (PEPCID) 20 MG TABLET    Take 1 tablet (20 mg total) by mouth once daily.    FUROSEMIDE (LASIX) 40 MG TABLET    Take 40 mg by mouth 2 (two) times a day.    LORAZEPAM (ATIVAN) 2 MG/ML CONC    Take by mouth.    LOSARTAN (COZAAR) 25 MG TABLET    Take 1 tablet (25 mg total) by mouth once daily.    METOPROLOL SUCCINATE (TOPROL-XL) 25 MG 24 HR TABLET    Take 1 tablet (25 mg total) by mouth once daily.    MIRALAX 17 GRAM/DOSE POWDER    Take by mouth.    OXCARBAZEPINE (TRILEPTAL) 600 MG TAB    Take 2 tablets (1,200 mg total) by mouth nightly.    POTASSIUM CHLORIDE SA (K-DUR,KLOR-CON) 20 MEQ TABLET    Take 1 tablet (20 mEq total) by mouth once daily.    PROCHLORPERAZINE (COMPAZINE) 10 MG TABLET    Take 10 mg by mouth every 6 (six) hours as needed.    QUETIAPINE (SEROQUEL) 300 MG TAB    Take 300 mg by mouth every evening.    SPIRONOLACTONE (ALDACTONE) 50 MG TABLET    Take 50 mg by mouth 2 (two) times  daily.    STIMULANT LAXATIVE PLUS 8.6-50 MG PER TABLET    Take 1 tablet by mouth.   Modified Medications    No medications on file   Discontinued Medications    MORPHINE 100 MG/5 ML (20 MG/ML) CONCENTRATED SOLUTION    Take by mouth.         Follow-up Information       your pcp. Schedule an appointment as soon as possible for a visit in 3 days.               [facility] - Emergency Dept.    Specialty: Emergency Medicine  Why: As needed, If symptoms worsen  Contact information:  [address]  [place] [address] [phone]                          Clinical Impression       ICD-10-CM ICD-9-CM   1. Left sided sciatica  M54.32 724.3      ED Disposition Condition    Discharge Stable             Ayad Garcia MD  01/14/24 5121

## 2024-02-08 NOTE — ED TRIAGE NOTES
"pt reports anxiety attack due to finding out that her 16 year old daughter is preganant and may need to come live with her and her boyfriend last night. Pt reports "we were trying to figure it out today and I had a panic attack." +Hx of anxiety.  "
No

## 2024-02-19 ENCOUNTER — TELEPHONE (OUTPATIENT)
Dept: RHEUMATOLOGY | Facility: CLINIC | Age: 54
End: 2024-02-19
Payer: MEDICARE

## 2024-04-05 ENCOUNTER — HOSPITAL ENCOUNTER (INPATIENT)
Facility: HOSPITAL | Age: 54
LOS: 2 days | Discharge: HOME | DRG: 683 | End: 2024-04-07
Attending: EMERGENCY MEDICINE | Admitting: INTERNAL MEDICINE
Payer: MEDICARE

## 2024-04-05 DIAGNOSIS — R07.9 CHEST PAIN: ICD-10-CM

## 2024-04-05 DIAGNOSIS — I50.43 ACUTE ON CHRONIC COMBINED SYSTOLIC AND DIASTOLIC CONGESTIVE HEART FAILURE: ICD-10-CM

## 2024-04-05 DIAGNOSIS — E87.6 HYPOKALEMIA: ICD-10-CM

## 2024-04-05 DIAGNOSIS — N17.9 AKI (ACUTE KIDNEY INJURY): Primary | ICD-10-CM

## 2024-04-05 DIAGNOSIS — I50.42 CHRONIC COMBINED SYSTOLIC AND DIASTOLIC HEART FAILURE: ICD-10-CM

## 2024-04-05 DIAGNOSIS — F31.9 BIPOLAR AFFECTIVE DISORDER, REMISSION STATUS UNSPECIFIED: ICD-10-CM

## 2024-04-05 DIAGNOSIS — G93.40 ACUTE ENCEPHALOPATHY: ICD-10-CM

## 2024-04-05 DIAGNOSIS — R41.82 AMS (ALTERED MENTAL STATUS): ICD-10-CM

## 2024-04-05 DIAGNOSIS — R79.89 ELEVATED LFTS: ICD-10-CM

## 2024-04-05 DIAGNOSIS — I50.9 CHF (CONGESTIVE HEART FAILURE): ICD-10-CM

## 2024-04-05 PROBLEM — Z86.711 HISTORY OF PULMONARY EMBOLISM: Status: ACTIVE | Noted: 2024-04-05

## 2024-04-05 LAB
ALBUMIN SERPL BCP-MCNC: 4.1 G/DL (ref 3.5–5.2)
ALLENS TEST: NORMAL
ALP SERPL-CCNC: 113 U/L (ref 55–135)
ALT SERPL W/O P-5'-P-CCNC: 34 U/L (ref 10–44)
AMPHET+METHAMPHET UR QL: ABNORMAL
ANION GAP SERPL CALC-SCNC: 21 MMOL/L (ref 8–16)
AST SERPL-CCNC: 75 U/L (ref 10–40)
BACTERIA #/AREA URNS AUTO: ABNORMAL /HPF
BARBITURATES UR QL SCN>200 NG/ML: NEGATIVE
BASOPHILS # BLD AUTO: 0.04 K/UL (ref 0–0.2)
BASOPHILS NFR BLD: 0.5 % (ref 0–1.9)
BENZODIAZ UR QL SCN>200 NG/ML: NEGATIVE
BILIRUB SERPL-MCNC: 0.5 MG/DL (ref 0.1–1)
BILIRUB UR QL STRIP: ABNORMAL
BNP SERPL-MCNC: 59 PG/ML (ref 0–99)
BUN SERPL-MCNC: 54 MG/DL (ref 6–20)
BZE UR QL SCN: NEGATIVE
CALCIUM SERPL-MCNC: 9.5 MG/DL (ref 8.7–10.5)
CANNABINOIDS UR QL SCN: ABNORMAL
CHLORIDE SERPL-SCNC: 90 MMOL/L (ref 95–110)
CK SERPL-CCNC: 879 U/L (ref 20–180)
CLARITY UR REFRACT.AUTO: ABNORMAL
CO2 SERPL-SCNC: 24 MMOL/L (ref 23–29)
COLOR UR AUTO: YELLOW
CREAT SERPL-MCNC: 5.1 MG/DL (ref 0.5–1.4)
CREAT UR-MCNC: 279.5 MG/DL (ref 15–325)
CTP QC/QA: YES
CTP QC/QA: YES
DELSYS: NORMAL
DIFFERENTIAL METHOD BLD: ABNORMAL
EOSINOPHIL # BLD AUTO: 0 K/UL (ref 0–0.5)
EOSINOPHIL NFR BLD: 0.5 % (ref 0–8)
ERYTHROCYTE [DISTWIDTH] IN BLOOD BY AUTOMATED COUNT: 13.3 % (ref 11.5–14.5)
EST. GFR  (NO RACE VARIABLE): 9.6 ML/MIN/1.73 M^2
ETHANOL SERPL-MCNC: <10 MG/DL
GLUCOSE SERPL-MCNC: 91 MG/DL (ref 70–110)
GLUCOSE UR QL STRIP: NEGATIVE
HCO3 UR-SCNC: 26.9 MMOL/L (ref 24–28)
HCT VFR BLD AUTO: 38.1 % (ref 37–48.5)
HGB BLD-MCNC: 12.8 G/DL (ref 12–16)
HGB UR QL STRIP: ABNORMAL
HYALINE CASTS UR QL AUTO: 0 /LPF
IMM GRANULOCYTES # BLD AUTO: 0.02 K/UL (ref 0–0.04)
IMM GRANULOCYTES NFR BLD AUTO: 0.3 % (ref 0–0.5)
KETONES UR QL STRIP: ABNORMAL
LACTATE SERPL-SCNC: 1.2 MMOL/L (ref 0.5–2.2)
LEUKOCYTE ESTERASE UR QL STRIP: ABNORMAL
LYMPHOCYTES # BLD AUTO: 0.9 K/UL (ref 1–4.8)
LYMPHOCYTES NFR BLD: 12 % (ref 18–48)
MAGNESIUM SERPL-MCNC: 2.5 MG/DL (ref 1.6–2.6)
MCH RBC QN AUTO: 30 PG (ref 27–31)
MCHC RBC AUTO-ENTMCNC: 33.6 G/DL (ref 32–36)
MCV RBC AUTO: 89 FL (ref 82–98)
METHADONE UR QL SCN>300 NG/ML: NEGATIVE
MICROSCOPIC COMMENT: ABNORMAL
MODE: NORMAL
MONOCYTES # BLD AUTO: 1.2 K/UL (ref 0.3–1)
MONOCYTES NFR BLD: 15.7 % (ref 4–15)
NEUTROPHILS # BLD AUTO: 5.5 K/UL (ref 1.8–7.7)
NEUTROPHILS NFR BLD: 71 % (ref 38–73)
NITRITE UR QL STRIP: NEGATIVE
NRBC BLD-RTO: 0 /100 WBC
OHS QRS DURATION: 178 MS
OHS QTC CALCULATION: 572 MS
OPIATES UR QL SCN: NEGATIVE
PCO2 BLDA: 41.3 MMHG (ref 35–45)
PCP UR QL SCN>25 NG/ML: NEGATIVE
PH SMN: 7.42 [PH] (ref 7.35–7.45)
PH UR STRIP: 6 [PH] (ref 5–8)
PLATELET # BLD AUTO: 179 K/UL (ref 150–450)
PMV BLD AUTO: 11.8 FL (ref 9.2–12.9)
PO2 BLDA: 81 MMHG (ref 80–100)
POC BE: 2 MMOL/L
POC MOLECULAR INFLUENZA A AGN: NEGATIVE
POC MOLECULAR INFLUENZA B AGN: NEGATIVE
POC SATURATED O2: 96 % (ref 95–100)
POTASSIUM SERPL-SCNC: 2.8 MMOL/L (ref 3.5–5.1)
PROCALCITONIN SERPL IA-MCNC: 0.21 NG/ML
PROT SERPL-MCNC: 9 G/DL (ref 6–8.4)
PROT UR QL STRIP: ABNORMAL
RBC # BLD AUTO: 4.27 M/UL (ref 4–5.4)
RBC #/AREA URNS AUTO: 0 /HPF (ref 0–4)
SAMPLE: NORMAL
SARS-COV-2 RDRP RESP QL NAA+PROBE: NEGATIVE
SITE: NORMAL
SODIUM SERPL-SCNC: 135 MMOL/L (ref 136–145)
SP GR UR STRIP: >=1.03 (ref 1–1.03)
TOXICOLOGY INFORMATION: ABNORMAL
TROPONIN I SERPL DL<=0.01 NG/ML-MCNC: 0.05 NG/ML (ref 0–0.03)
URN SPEC COLLECT METH UR: ABNORMAL
UROBILINOGEN UR STRIP-ACNC: <2 EU/DL
WBC # BLD AUTO: 7.77 K/UL (ref 3.9–12.7)
WBC #/AREA URNS AUTO: 5 /HPF (ref 0–5)

## 2024-04-05 PROCEDURE — 36600 WITHDRAWAL OF ARTERIAL BLOOD: CPT | Mod: ER

## 2024-04-05 PROCEDURE — 85025 COMPLETE CBC W/AUTO DIFF WBC: CPT | Mod: ER | Performed by: EMERGENCY MEDICINE

## 2024-04-05 PROCEDURE — 87635 SARS-COV-2 COVID-19 AMP PRB: CPT | Mod: ER | Performed by: EMERGENCY MEDICINE

## 2024-04-05 PROCEDURE — 25000003 PHARM REV CODE 250: Performed by: INTERNAL MEDICINE

## 2024-04-05 PROCEDURE — 63600175 PHARM REV CODE 636 W HCPCS: Mod: ER | Performed by: EMERGENCY MEDICINE

## 2024-04-05 PROCEDURE — 25000003 PHARM REV CODE 250: Mod: ER | Performed by: INTERNAL MEDICINE

## 2024-04-05 PROCEDURE — 87502 INFLUENZA DNA AMP PROBE: CPT | Mod: ER

## 2024-04-05 PROCEDURE — 83735 ASSAY OF MAGNESIUM: CPT | Mod: ER | Performed by: EMERGENCY MEDICINE

## 2024-04-05 PROCEDURE — 99900035 HC TECH TIME PER 15 MIN (STAT): Mod: ER

## 2024-04-05 PROCEDURE — 82077 ASSAY SPEC XCP UR&BREATH IA: CPT | Mod: ER | Performed by: EMERGENCY MEDICINE

## 2024-04-05 PROCEDURE — 25000003 PHARM REV CODE 250: Mod: ER | Performed by: EMERGENCY MEDICINE

## 2024-04-05 PROCEDURE — 84145 PROCALCITONIN (PCT): CPT | Mod: ER | Performed by: INTERNAL MEDICINE

## 2024-04-05 PROCEDURE — 93005 ELECTROCARDIOGRAM TRACING: CPT | Mod: ER

## 2024-04-05 PROCEDURE — 21400001 HC TELEMETRY ROOM

## 2024-04-05 PROCEDURE — 96374 THER/PROPH/DIAG INJ IV PUSH: CPT | Mod: ER

## 2024-04-05 PROCEDURE — 83605 ASSAY OF LACTIC ACID: CPT | Mod: ER | Performed by: EMERGENCY MEDICINE

## 2024-04-05 PROCEDURE — 84484 ASSAY OF TROPONIN QUANT: CPT | Mod: ER | Performed by: EMERGENCY MEDICINE

## 2024-04-05 PROCEDURE — 25000003 PHARM REV CODE 250: Performed by: NURSE PRACTITIONER

## 2024-04-05 PROCEDURE — 82803 BLOOD GASES ANY COMBINATION: CPT | Mod: ER

## 2024-04-05 PROCEDURE — 83880 ASSAY OF NATRIURETIC PEPTIDE: CPT | Mod: ER | Performed by: EMERGENCY MEDICINE

## 2024-04-05 PROCEDURE — 81000 URINALYSIS NONAUTO W/SCOPE: CPT | Mod: ER,59 | Performed by: EMERGENCY MEDICINE

## 2024-04-05 PROCEDURE — 80053 COMPREHEN METABOLIC PANEL: CPT | Mod: ER | Performed by: EMERGENCY MEDICINE

## 2024-04-05 PROCEDURE — 82550 ASSAY OF CK (CPK): CPT | Mod: ER | Performed by: EMERGENCY MEDICINE

## 2024-04-05 PROCEDURE — 80307 DRUG TEST PRSMV CHEM ANLYZR: CPT | Mod: ER | Performed by: EMERGENCY MEDICINE

## 2024-04-05 PROCEDURE — P9612 CATHETERIZE FOR URINE SPEC: HCPCS | Mod: ER

## 2024-04-05 PROCEDURE — 99285 EMERGENCY DEPT VISIT HI MDM: CPT | Mod: 25,ER

## 2024-04-05 PROCEDURE — 93010 ELECTROCARDIOGRAM REPORT: CPT | Mod: ,,, | Performed by: INTERNAL MEDICINE

## 2024-04-05 RX ORDER — ONDANSETRON HYDROCHLORIDE 2 MG/ML
4 INJECTION, SOLUTION INTRAVENOUS EVERY 6 HOURS PRN
Status: DISCONTINUED | OUTPATIENT
Start: 2024-04-05 | End: 2024-04-07 | Stop reason: HOSPADM

## 2024-04-05 RX ORDER — GLUCAGON 1 MG
1 KIT INJECTION
Status: DISCONTINUED | OUTPATIENT
Start: 2024-04-05 | End: 2024-04-07 | Stop reason: HOSPADM

## 2024-04-05 RX ORDER — FLUOXETINE HYDROCHLORIDE 20 MG/1
20 CAPSULE ORAL DAILY
COMMUNITY

## 2024-04-05 RX ORDER — POTASSIUM CHLORIDE 7.45 MG/ML
10 INJECTION INTRAVENOUS
Status: DISPENSED | OUTPATIENT
Start: 2024-04-05 | End: 2024-04-05

## 2024-04-05 RX ORDER — BENZTROPINE MESYLATE 0.5 MG/1
0.5 TABLET ORAL 2 TIMES DAILY
Status: DISCONTINUED | OUTPATIENT
Start: 2024-04-05 | End: 2024-04-07 | Stop reason: HOSPADM

## 2024-04-05 RX ORDER — IBUPROFEN 200 MG
16 TABLET ORAL
Status: DISCONTINUED | OUTPATIENT
Start: 2024-04-05 | End: 2024-04-07 | Stop reason: HOSPADM

## 2024-04-05 RX ORDER — PROCHLORPERAZINE EDISYLATE 5 MG/ML
5 INJECTION INTRAMUSCULAR; INTRAVENOUS EVERY 6 HOURS PRN
Status: DISCONTINUED | OUTPATIENT
Start: 2024-04-05 | End: 2024-04-07 | Stop reason: HOSPADM

## 2024-04-05 RX ORDER — OXCARBAZEPINE 150 MG/1
1200 TABLET, FILM COATED ORAL NIGHTLY
Status: DISCONTINUED | OUTPATIENT
Start: 2024-04-05 | End: 2024-04-06

## 2024-04-05 RX ORDER — METOLAZONE 2.5 MG/1
2.5 TABLET ORAL DAILY
Status: ON HOLD | COMMUNITY
End: 2024-04-07 | Stop reason: HOSPADM

## 2024-04-05 RX ORDER — SODIUM CHLORIDE 9 MG/ML
INJECTION, SOLUTION INTRAVENOUS CONTINUOUS
Status: DISCONTINUED | OUTPATIENT
Start: 2024-04-05 | End: 2024-04-06

## 2024-04-05 RX ORDER — QUETIAPINE FUMARATE 100 MG/1
300 TABLET, FILM COATED ORAL NIGHTLY
Status: DISCONTINUED | OUTPATIENT
Start: 2024-04-05 | End: 2024-04-06

## 2024-04-05 RX ORDER — METOPROLOL SUCCINATE 25 MG/1
25 TABLET, EXTENDED RELEASE ORAL DAILY
Status: DISCONTINUED | OUTPATIENT
Start: 2024-04-06 | End: 2024-04-07 | Stop reason: HOSPADM

## 2024-04-05 RX ORDER — POLYETHYLENE GLYCOL 3350 17 G/17G
17 POWDER, FOR SOLUTION ORAL 2 TIMES DAILY PRN
Status: DISCONTINUED | OUTPATIENT
Start: 2024-04-05 | End: 2024-04-07 | Stop reason: HOSPADM

## 2024-04-05 RX ORDER — IPRATROPIUM BROMIDE AND ALBUTEROL SULFATE 2.5; .5 MG/3ML; MG/3ML
3 SOLUTION RESPIRATORY (INHALATION) EVERY 6 HOURS PRN
Status: DISCONTINUED | OUTPATIENT
Start: 2024-04-05 | End: 2024-04-07 | Stop reason: HOSPADM

## 2024-04-05 RX ORDER — HEPARIN SODIUM 5000 [USP'U]/ML
5000 INJECTION, SOLUTION INTRAVENOUS; SUBCUTANEOUS EVERY 8 HOURS
Status: DISCONTINUED | OUTPATIENT
Start: 2024-04-05 | End: 2024-04-05

## 2024-04-05 RX ORDER — NALOXONE HCL 0.4 MG/ML
0.02 VIAL (ML) INJECTION
Status: DISCONTINUED | OUTPATIENT
Start: 2024-04-05 | End: 2024-04-07 | Stop reason: HOSPADM

## 2024-04-05 RX ORDER — ACETAMINOPHEN 325 MG/1
650 TABLET ORAL EVERY 4 HOURS PRN
Status: DISCONTINUED | OUTPATIENT
Start: 2024-04-05 | End: 2024-04-07 | Stop reason: HOSPADM

## 2024-04-05 RX ORDER — SIMETHICONE 80 MG
1 TABLET,CHEWABLE ORAL 4 TIMES DAILY PRN
Status: DISCONTINUED | OUTPATIENT
Start: 2024-04-05 | End: 2024-04-07 | Stop reason: HOSPADM

## 2024-04-05 RX ORDER — IBUPROFEN 200 MG
24 TABLET ORAL
Status: DISCONTINUED | OUTPATIENT
Start: 2024-04-05 | End: 2024-04-07 | Stop reason: HOSPADM

## 2024-04-05 RX ORDER — TALC
6 POWDER (GRAM) TOPICAL NIGHTLY PRN
Status: DISCONTINUED | OUTPATIENT
Start: 2024-04-05 | End: 2024-04-06

## 2024-04-05 RX ORDER — FAMOTIDINE 20 MG/1
20 TABLET, FILM COATED ORAL DAILY
Status: DISCONTINUED | OUTPATIENT
Start: 2024-04-06 | End: 2024-04-07 | Stop reason: HOSPADM

## 2024-04-05 RX ORDER — ALUMINUM HYDROXIDE, MAGNESIUM HYDROXIDE, AND SIMETHICONE 1200; 120; 1200 MG/30ML; MG/30ML; MG/30ML
30 SUSPENSION ORAL 4 TIMES DAILY PRN
Status: DISCONTINUED | OUTPATIENT
Start: 2024-04-05 | End: 2024-04-06

## 2024-04-05 RX ORDER — SODIUM CHLORIDE 0.9 % (FLUSH) 0.9 %
10 SYRINGE (ML) INJECTION EVERY 8 HOURS PRN
Status: DISCONTINUED | OUTPATIENT
Start: 2024-04-05 | End: 2024-04-07 | Stop reason: HOSPADM

## 2024-04-05 RX ORDER — MUPIROCIN 20 MG/G
OINTMENT TOPICAL 2 TIMES DAILY
Status: DISCONTINUED | OUTPATIENT
Start: 2024-04-05 | End: 2024-04-07 | Stop reason: HOSPADM

## 2024-04-05 RX ORDER — FLUOXETINE HYDROCHLORIDE 20 MG/1
20 CAPSULE ORAL DAILY
Status: DISCONTINUED | OUTPATIENT
Start: 2024-04-06 | End: 2024-04-07 | Stop reason: HOSPADM

## 2024-04-05 RX ORDER — AMOXICILLIN 250 MG
1 CAPSULE ORAL 2 TIMES DAILY
Status: DISCONTINUED | OUTPATIENT
Start: 2024-04-05 | End: 2024-04-07 | Stop reason: HOSPADM

## 2024-04-05 RX ADMIN — SODIUM CHLORIDE: 9 INJECTION, SOLUTION INTRAVENOUS at 02:04

## 2024-04-05 RX ADMIN — APIXABAN 5 MG: 2.5 TABLET, FILM COATED ORAL at 09:04

## 2024-04-05 RX ADMIN — SODIUM CHLORIDE 1000 ML: 9 INJECTION, SOLUTION INTRAVENOUS at 12:04

## 2024-04-05 RX ADMIN — POTASSIUM CHLORIDE 10 MEQ: 7.46 INJECTION, SOLUTION INTRAVENOUS at 02:04

## 2024-04-05 RX ADMIN — POTASSIUM CHLORIDE 10 MEQ: 7.46 INJECTION, SOLUTION INTRAVENOUS at 03:04

## 2024-04-05 RX ADMIN — DOCUSATE SODIUM AND SENNOSIDES 1 TABLET: 8.6; 5 TABLET ORAL at 09:04

## 2024-04-05 RX ADMIN — BENZTROPINE MESYLATE 0.5 MG: 0.5 TABLET ORAL at 09:04

## 2024-04-05 RX ADMIN — QUETIAPINE FUMARATE 300 MG: 100 TABLET ORAL at 09:04

## 2024-04-05 RX ADMIN — POTASSIUM CHLORIDE 10 MEQ: 7.46 INJECTION, SOLUTION INTRAVENOUS at 12:04

## 2024-04-05 RX ADMIN — OXCARBAZEPINE 1200 MG: 150 TABLET, FILM COATED ORAL at 09:04

## 2024-04-05 RX ADMIN — MUPIROCIN: 20 OINTMENT TOPICAL at 09:04

## 2024-04-05 NOTE — ASSESSMENT & PLAN NOTE
Check MRI brain  Likely 2/2 ARABELLA/dehydration/substance abuse   Cont gentle IV hydration   Neuro checks

## 2024-04-05 NOTE — ASSESSMENT & PLAN NOTE
Patient with acute kidney injury/acute renal failure likely due to pre-renal azotemia due to dehydration ARABELLA is currently worsening. Baseline creatinine  1.3-1.7  - Labs reviewed- Renal function/electrolytes with Estimated Creatinine Clearance: 11.5 mL/min (A) (based on SCr of 5.1 mg/dL (H)). according to latest data. Monitor urine output and serial BMP and adjust therapy as needed. Avoid nephrotoxins and renally dose meds for GFR listed above.    Hold home meds Spironolactone, Lasix, Metolazone, Losartan   Gentle IV hydration   Consult Nephrology

## 2024-04-05 NOTE — ASSESSMENT & PLAN NOTE
Patient has hypokalemia which is Acute and currently uncontrolled. Most recent potassium levels reviewed-   Lab Results   Component Value Date    K 2.8 (L) 04/05/2024   . Will continue potassium replacement per protocol and recheck repeat levels after replacement completed.

## 2024-04-05 NOTE — ED PROVIDER NOTES
"Encounter Date: 4/5/2024       History     Chief Complaint   Patient presents with    Altered Mental Status     Report from nurse at [facility] home pt home with family just back to [facility] and not acting like self - picking at face.     53-year-old female with past medical history of CHF, bipolar disorder, IV drug use, polysubstance abuse, seizure disorder, hepatitis-C, hypertension, GERD, PE, Still's disease presents to the emergency department for altered mental status.  The patient is a resident of [facility], and left to visit family for [facility]. Patient tells me she "messed up" and used drugs, suspects it was meth. Patient was noted to be altered, and had abrasions and ulcerations on her face and neck. Patient says she did fall trying to go down a slide this weekend. History is difficult due to patient intermittently responding to questions.     The history is provided by the patient, the EMS personnel, the nursing home and medical records.     Review of patient's allergies indicates:  No Known Allergies  Past Medical History:   Diagnosis Date    ADHD (attention deficit hyperactivity disorder)     Anemia     Anxiety     Bipolar disorder     CHF (congestive heart failure)     History of hepatitis C - s/p clearance of virus (HCV neg 6/2015) 09/26/2014    History of psychiatric hospitalization     History of substance abuse     IV heroin - last use 2013 per pt    Hx of psychiatric care     Hypertension     Opioid overdose 05/10/2016    Psychiatric problem     Psychosis     Seizure disorder 04/03/2019    Sleep difficulties     Still's disease     Substance abuse     Therapy     Vasculitis      Past Surgical History:   Procedure Laterality Date    HYSTERECTOMY  3/16/2011    SALPINGOOPHORECTOMY Right 3/16/2011    TUBAL LIGATION      Vaginal cuff repair  04/22/2011     Family History   Problem Relation Age of Onset    Diabetes Maternal Grandmother     Cancer Maternal Grandmother      Social History     Tobacco Use    " Smoking status: Every Day     Current packs/day: 1.00     Average packs/day: 1 pack/day for 38.3 years (38.3 ttl pk-yrs)     Types: Cigarettes     Start date: 1986    Smokeless tobacco: Never    Tobacco comments:     Enrolled in [email or web address] on 10/23/14 (SCT Member ID # [id number]) Pt declines Ambulatory referral to Smoking Cessation clinic. Handout provided   Substance Use Topics    Alcohol use: No    Drug use: Yes     Types: Heroin, Cocaine, Methamphetamines, Marijuana     Review of Systems   Unable to perform ROS: Mental status change       Physical Exam     Initial Vitals   BP Pulse Resp Temp SpO2   04/05/24 0929 04/05/24 0925 04/05/24 0929 04/05/24 0929 04/05/24 0929   (!) 110/59 83 19 98.9 °F (37.2 °C) 97 %      MAP       --                Physical Exam    Constitutional:   Disheveled. No acute distress.    HENT:   Head: Normocephalic.   There is an abrasion at the tip of the nose and over the nasal bridge. No purulence, or drainage. No facial bone deformity or apparent tenderness. Patient with poor dentition.    Eyes: EOM are normal. Pupils are equal, round, and reactive to light.   Neck: Neck supple.   There is an abrasion on the left neck, no hematoma or bruit.    Normal range of motion.  Cardiovascular:  Normal rate and regular rhythm.           Pulmonary/Chest: Breath sounds normal. No respiratory distress.   Abdominal: She exhibits no distension. There is no abdominal tenderness.   No seatbelt sign   Musculoskeletal:      Cervical back: Normal range of motion and neck supple.      Comments: No midline spinal tenderness. No hip tenderness. No obvious bony deformities.      Neurological:   Patient is sleepy, but will alert to loud verbal stimuli. Interview is interrupted frequently as patient is distracted and dozing off frequently. She knows her name, and that she is at a hospital. She is aware she is in [place]. She does not know the day of the week or the month. She is not sure why she was sent  here.    Skin: Skin is warm and dry.   Psychiatric:   Patient denies any SI/HI/AVH.          ED Course   Critical Care    Date/Time: 4/5/2024 5:33 PM    Performed by: Black Bejarano MD  Authorized by: Black Bejarano MD  Direct patient critical care time: 6 minutes  Additional history critical care time: 7 minutes  Ordering / reviewing critical care time: 8 minutes  Documentation critical care time: 5 minutes  Consulting other physicians critical care time: 4 minutes  Consult with family critical care time: 3 minutes  Other critical care time: 5 minutes  Total critical care time (exclusive of procedural time) : 38 minutes  Critical care time was exclusive of separately billable procedures and treating other patients and teaching time.  Critical care was necessary to treat or prevent imminent or life-threatening deterioration of the following conditions: renal failure.  Critical care was time spent personally by me on the following activities: blood draw for specimens, development of treatment plan with patient or surrogate, discussions with consultants, interpretation of cardiac output measurements, evaluation of patient's response to treatment, examination of patient, obtaining history from patient or surrogate, ordering and performing treatments and interventions, ordering and review of laboratory studies, ordering and review of radiographic studies, pulse oximetry, re-evaluation of patient's condition and review of old charts.        Labs Reviewed   COMPREHENSIVE METABOLIC PANEL - Abnormal; Notable for the following components:       Result Value    Sodium 135 (*)     Potassium 2.8 (*)     Chloride 90 (*)     BUN 54 (*)     Creatinine 5.1 (*)     Total Protein 9.0 (*)     AST 75 (*)     eGFR 9.6 (*)     Anion Gap 21 (*)     All other components within normal limits   DRUG SCREEN PANEL, URINE EMERGENCY - Abnormal; Notable for the following components:    Amphetamine Screen, Ur Presumptive Positive (*)     THC  Presumptive Positive (*)     All other components within normal limits    Narrative:     Specimen Source->Urine   TROPONIN I - Abnormal; Notable for the following components:    Troponin I 0.054 (*)     All other components within normal limits   URINALYSIS, REFLEX TO URINE CULTURE - Abnormal; Notable for the following components:    Appearance, UA Hazy (*)     Specific Gravity, UA >=1.030 (*)     Protein, UA 1+ (*)     Ketones, UA Trace (*)     Bilirubin (UA) 1+ (*)     Occult Blood UA 2+ (*)     Leukocytes, UA 1+ (*)     All other components within normal limits    Narrative:     Specimen Source->Urine   CBC W/ AUTO DIFFERENTIAL - Abnormal; Notable for the following components:    Lymph # 0.9 (*)     Mono # 1.2 (*)     Lymph % 12.0 (*)     Mono % 15.7 (*)     All other components within normal limits   CK - Abnormal; Notable for the following components:     (*)     All other components within normal limits   URINALYSIS MICROSCOPIC - Abnormal; Notable for the following components:    Bacteria Few (*)     All other components within normal limits    Narrative:     Specimen Source->Urine   ALCOHOL,MEDICAL (ETHANOL)   LACTIC ACID, PLASMA   MAGNESIUM   B-TYPE NATRIURETIC PEPTIDE   PROCALCITONIN   SARS-COV-2 RDRP GENE   POCT INFLUENZA A/B MOLECULAR   ISTAT PROCEDURE     EKG Readings: (Independently Interpreted)   Rate of 77 beats per minute.  Normal sinus rhythm.  Normal axis.  P.r interval is within normal limits.  Acute RS is prolonged in the left bundle-branch block is present.  QTC is prolonged at 572.  Left bundle-branch block is present prior EKG.  EKG is similar to December 11, 2023.     ECG Results              EKG 12-lead (Final result)        Collection Time Result Time QRS Duration OHS QTC Calculation    04/05/24 09:43:57 04/05/24 16:34:38 178 572                     Final result by Interface, Lab In [place] (04/05/24 16:34:45)                   Narrative:    Test Reason : R41.82,    Vent. Rate : 077  BPM     Atrial Rate : 077 BPM     P-R Int : 168 ms          QRS Dur : 178 ms      QT Int : 506 ms       P-R-T Axes : 048 004 072 degrees     QTc Int : 572 ms    Normal sinus rhythm  Possible Left atrial enlargement  Left bundle branch block  Abnormal ECG  When compared with ECG of 11-DEC-2023 14:00,  T wave inversion no longer evident in Inferior leads  Confirmed by MASHA EPPS MD ([id number]) on 4/5/2024 4:34:37 PM    Referred By: ODALIS   SELF           Confirmed By:MASHA EPPS MD                                  Imaging Results              X-Ray Chest AP Portable (Final result)  Result time 04/05/24 11:03:19      Final result by Eddie Borja MD (04/05/24 11:03:19)                   Impression:      Mild cardiomegaly.  Stable chest x-ray with no acute abnormality.      Electronically signed by: Eddie Borja MD  Date:    04/05/2024  Time:    11:03               Narrative:    EXAMINATION:  XR CHEST AP PORTABLE    CLINICAL HISTORY:  Respiratory distress., AMS;    COMPARISON:  12/11/2023 chest x-ray    FINDINGS:  The cardiac size is mildly enlarged.    The lung fields remain clear with no acute infiltrate or congestive heart failure.                                       CT Cervical Spine Without Contrast (Final result)  Result time 04/05/24 11:03:08      Final result by Randolph Suazo MD (04/05/24 11:03:08)                   Impression:      Negative for acute fracture or dislocation.    All CT scans at this facility are performed  using dose modulation techniques as appropriate to performed exam including the following:  automated exposure control; adjustment of mA and/or kV according to the patients size (this includes techniques or standardized protocols for targeted exams where dose is matched to indication/reason for exam: i.e. extremities or head);  iterative reconstruction technique.      Electronically signed by: Randolph Suazo MD  Date:    04/05/2024  Time:    11:03                Narrative:    EXAMINATION:  CT CERVICAL SPINE WITHOUT CONTRAST    CLINICAL HISTORY:  Neck trauma, intoxicated or obtunded (Age >= 16y);    TECHNIQUE:  Axial CT through the cervical spine with coronal and sagittal reformations.    COMPARISON:  None    FINDINGS:  Negative for acute fracture or dislocation.  Straightening of the spine most often from positioning and/or muscle spasm.    No advanced arthritic change.  Moderate disc space narrowing C6-7 without significant canal stenosis..                                       CT Head Without Contrast (Final result)  Result time 04/05/24 10:48:41      Final result by Charlie Marin MD (04/05/24 10:48:41)                   Impression:      No acute abnormality.    All CT scans at this facility use dose modulation, iterative reconstruction, and/or weight based dosing when appropriate to reduce radiation dose to as low as reasonable achievable.      Electronically signed by: Charlie Marin MD  Date:    04/05/2024  Time:    10:48               Narrative:    EXAMINATION:  CT HEAD WITHOUT CONTRAST    CLINICAL HISTORY:  Mental status change, unknown cause;    TECHNIQUE:  Low dose axial CT images obtained throughout the head without intravenous contrast. Sagittal and coronal reconstructions were performed.    All CT scans at this facility use dose modulation, iterative reconstruction, and/or weight based dosing when appropriate to reduce radiation dose to as low as reasonable achievable.    COMPARISON:  None.    FINDINGS:  Intracranial compartment:    The brain parenchyma appears normal. No parenchymal mass, hemorrhage, edema or major vascular distribution infarct.    Ventricles and sulci are normal in size for age without evidence of hydrocephalus.    No extra-axial blood or fluid collections.    Skull/extracranial contents (limited evaluation): No fracture. Mastoid air cells and paranasal sinuses are essentially clear.                                       Medications    potassium chloride 10 mEq in 100 mL IVPB (10 mEq Intravenous New Bag 4/5/24 1556)   0.9%  NaCl infusion ( Intravenous New Bag 4/5/24 1427)     Medical Decision Making  Amount and/or Complexity of Data Reviewed  Independent Historian: caregiver and EMS     Details: We received report from [facility] and from EMS.  We also have patient[facility] medical records from [facility].  Labs: ordered. Decision-making details documented in ED Course.  Radiology: ordered. Decision-making details documented in ED Course.  ECG/medicine tests: ordered and independent interpretation performed. Decision-making details documented in ED Course.    Risk  Prescription drug management.  Decision regarding hospitalization.               ED Course as of 04/05/24 1733 Fri Apr 05, 2024   1430 All historical, clinical, radiographic, and laboratory findings were reviewed with the patient/family in detail along with the indications for transport to the facility in [place] in order to receive hospitalist evaluation.  All remaining questions and concerns were addressed at this time and the patient/family communicates understanding and agrees to proceed accordingly.  Similarly, all pertinent details of the encounter were discussed with Dr. Bal who agrees to receive the patient at [facility] for further care as outlined above.  The patient will be transferred by [facility] ambulance services secondary to a need for ongoing cardiac monitoring en route.  Black Bejarano MD  2:30 PM       [BA]      ED Course User Index  [BA] Black Bejarano MD                           Clinical Impression:  Final diagnoses:  [R41.82] AMS (altered mental status)  [N17.9] ARABELLA (acute kidney injury) (Primary)  [E87.6] Hypokalemia          ED Disposition Condition    Admit                 Black Bejarano MD  04/05/24 1733

## 2024-04-05 NOTE — SUBJECTIVE & OBJECTIVE
Past Medical History:   Diagnosis Date    ADHD (attention deficit hyperactivity disorder)     Anemia     Anxiety     Bipolar disorder     CHF (congestive heart failure)     History of hepatitis C - s/p clearance of virus (HCV neg 6/2015) 09/26/2014    History of psychiatric hospitalization     History of substance abuse     IV heroin - last use 2013 per pt    Hx of psychiatric care     Hypertension     Opioid overdose 05/10/2016    Psychiatric problem     Psychosis     Seizure disorder 04/03/2019    Sleep difficulties     Still's disease     Substance abuse     Therapy     Vasculitis        Past Surgical History:   Procedure Laterality Date    HYSTERECTOMY  3/16/2011    SALPINGOOPHORECTOMY Right 3/16/2011    TUBAL LIGATION      Vaginal cuff repair  04/22/2011       Review of patient's allergies indicates:  No Known Allergies    No current facility-administered medications on file prior to encounter.     Current Outpatient Medications on File Prior to Encounter   Medication Sig    albuterol (PROVENTIL/VENTOLIN HFA) 90 mcg/actuation inhaler Inhale 2 puffs into the lungs every 6 (six) hours as needed for Wheezing or Shortness of Breath. Rescue    benztropine (COGENTIN) 0.5 MG tablet Take 1 tablet (0.5 mg total) by mouth 2 (two) times daily.    furosemide (LASIX) 40 MG tablet Take 40 mg by mouth 2 (two) times a day.    QUEtiapine (SEROQUEL) 300 MG Tab Take 300 mg by mouth every evening.    spironolactone (ALDACTONE) 50 MG tablet Take 50 mg by mouth 2 (two) times daily.    famotidine (PEPCID) 20 MG tablet Take 1 tablet (20 mg total) by mouth once daily.    gabapentin (NEURONTIN) 300 MG capsule Take 1 capsule (300 mg total) by mouth 3 (three) times daily. for 14 days    LORazepam (ATIVAN) 2 mg/mL Conc Take by mouth.    losartan (COZAAR) 25 MG tablet Take 1 tablet (25 mg total) by mouth once daily.    metoprolol succinate (TOPROL-XL) 25 MG 24 hr tablet Take 1 tablet (25 mg total) by mouth once daily.    MIRALAX 17  gram/dose powder Take by mouth.    OXcarbazepine (TRILEPTAL) 600 MG Tab Take 2 tablets (1,200 mg total) by mouth nightly.    potassium chloride SA (K-DUR,KLOR-CON) 20 MEQ tablet Take 1 tablet (20 mEq total) by mouth once daily.    prochlorperazine (COMPAZINE) 10 MG tablet Take 10 mg by mouth every 6 (six) hours as needed.    STIMULANT LAXATIVE PLUS 8.6-50 mg per tablet Take 1 tablet by mouth.    [DISCONTINUED] amLODIPine (NORVASC) 5 MG tablet Take 1 tablet (5 mg total) by mouth once daily.    [DISCONTINUED] risperiDONE (RISPERDAL) 1 MG tablet Take 1 mg by mouth 2 (two) times daily.     Family History       Problem Relation (Age of Onset)    Cancer Maternal Grandmother    Diabetes Maternal Grandmother          Tobacco Use    Smoking status: Every Day     Current packs/day: 1.00     Average packs/day: 1 pack/day for 38.3 years (38.3 ttl pk-yrs)     Types: Cigarettes     Start date: 1986    Smokeless tobacco: Never    Tobacco comments:     Enrolled in [employer] on 10/23/14 (SCT Member ID # [id number]) Pt declines Ambulatory referral to Smoking Cessation clinic. Handout provided   Substance and Sexual Activity    Alcohol use: No    Drug use: Yes     Types: Heroin, Cocaine, Methamphetamines, Marijuana    Sexual activity: Not Currently     Partners: Male, Female     Birth control/protection: Other-see comments     Comment: hysterectomy     Review of Systems   Unable to perform ROS: Mental status change   Skin:  Positive for wound (face/nose).   Psychiatric/Behavioral:  Positive for confusion.      Objective:     Vital Signs (Most Recent):  Temp: 98.7 °F (37.1 °C) (04/05/24 1700)  Pulse: 78 (04/05/24 1700)  Resp: 18 (04/05/24 1621)  BP: (!) 100/59 (04/05/24 1700)  SpO2: 100 % (04/05/24 1700) Vital Signs (24h Range):  Temp:  [98.7 °F (37.1 °C)-98.9 °F (37.2 °C)] 98.7 °F (37.1 °C)  Pulse:  [73-83] 78  Resp:  [16-19] 18  SpO2:  [95 %-100 %] 100 %  BP: ()/(55-64) 100/59     Weight: 72.6 kg (160 lb 0.9 oz)  Body mass  index is 31.26 kg/m².     Physical Exam  Vitals and nursing note reviewed.   Constitutional:       General: She is not in acute distress.     Appearance: She is well-developed. She is not diaphoretic.   HENT:      Head: Normocephalic and atraumatic.      Right Ear: Decreased hearing noted.      Left Ear: Decreased hearing noted.      Nose: Nose normal.   Eyes:      General: No scleral icterus.     Conjunctiva/sclera: Conjunctivae normal.   Neck:      Trachea: No tracheal deviation.   Cardiovascular:      Rate and Rhythm: Normal rate and regular rhythm.      Heart sounds: Normal heart sounds. No murmur heard.     No friction rub. No gallop.   Pulmonary:      Effort: Pulmonary effort is normal. No respiratory distress.      Breath sounds: Normal breath sounds. No stridor. No wheezing or rales.   Chest:      Chest wall: No tenderness.   Abdominal:      General: Bowel sounds are normal. There is no distension.      Palpations: Abdomen is soft. There is no mass.      Tenderness: There is no abdominal tenderness. There is no guarding or rebound.   Musculoskeletal:         General: No tenderness or deformity. Normal range of motion.      Cervical back: Normal range of motion and neck supple.   Skin:     General: Skin is warm and dry.      Coloration: Skin is not pale.      Findings: No erythema or rash.   Neurological:      Mental Status: She is alert and oriented to person, place, and time.      Cranial Nerves: No cranial nerve deficit.      Motor: No abnormal muscle tone.      Coordination: Coordination normal.   Psychiatric:         Attention and Perception: She is inattentive.         Speech: Speech is delayed.         Behavior: Behavior is slowed.         Thought Content: Thought content normal.         Cognition and Memory: Cognition is impaired.         Judgment: Judgment is impulsive.                Significant Labs: All pertinent labs within the past 24 hours have been reviewed.    Significant Imaging: I have  reviewed all pertinent imaging results/findings within the past 24 hours.

## 2024-04-05 NOTE — H&P
[facility] - Telemetry ([facility]  History & Physical    Patient Name: Stephanie T Barthelemy  MRN: [id number]  Patient Class: IP- Inpatient  Admission Date: 4/5/2024  Attending Physician: Brian Bal MD   Primary Care Provider: Myrna Primary Doctor         Patient information was obtained from patient and ER records.     Subjective:     Principal Problem:Acute encephalopathy    Chief Complaint:   Chief Complaint   Patient presents with    Altered Mental Status     Report from nurse at [facility] home pt home with family just back to [facility] home and not acting like self - picking at face.        HPI: The patient is a 52 yo female with CHF (EF 15% on 01/09/2023), bipolar 1 disorder, ADHD, methamphetamine/polysubstance abuse, history of opioid overdose, history of PE (on chronic Eliquis), multiple admits to hospitals for acute on chronic CHF sec to medication and diet non compliance who presented to [facility] ED from nursing home with AMS. Pt was noted to picking her face and was not acting herself. Pt reports she went home on [facility] and used methamphetamine-last use yesterday. She was also noted to have large scab to her nose. Pt reports she fell down a slide.    In the ED, Afebrile, WBC normal, K 2.8, Serum Cr 5.1, BNP normal. . Trop 0.054. UA-trace ketones. UDS + Amphetamines and THC. ABG-normal. CT head showed nothing acute and CT cervical spine showed no fracture. CXR- mild cardiomegaly, nothing acute..  Pt received one liter IVFs and IV KCL 30 meq.     The patient was transferred to [facility] for observation under hospital medicine.         Past Medical History:   Diagnosis Date    ADHD (attention deficit hyperactivity disorder)     Anemia     Anxiety     Bipolar disorder     CHF (congestive heart failure)     History of hepatitis C - s/p clearance of virus (HCV neg 6/2015) 09/26/2014    History of psychiatric hospitalization     History of substance abuse     IV heroin - last use 2013 per pt    Hx  of psychiatric care     Hypertension     Opioid overdose 05/10/2016    Psychiatric problem     Psychosis     Seizure disorder 04/03/2019    Sleep difficulties     Still's disease     Substance abuse     Therapy     Vasculitis        Past Surgical History:   Procedure Laterality Date    HYSTERECTOMY  3/16/2011    SALPINGOOPHORECTOMY Right 3/16/2011    TUBAL LIGATION      Vaginal cuff repair  04/22/2011       Review of patient's allergies indicates:  No Known Allergies    No current facility-administered medications on file prior to encounter.     Current Outpatient Medications on File Prior to Encounter   Medication Sig    albuterol (PROVENTIL/VENTOLIN HFA) 90 mcg/actuation inhaler Inhale 2 puffs into the lungs every 6 (six) hours as needed for Wheezing or Shortness of Breath. Rescue    benztropine (COGENTIN) 0.5 MG tablet Take 1 tablet (0.5 mg total) by mouth 2 (two) times daily.    furosemide (LASIX) 40 MG tablet Take 40 mg by mouth 2 (two) times a day.    QUEtiapine (SEROQUEL) 300 MG Tab Take 300 mg by mouth every evening.    spironolactone (ALDACTONE) 50 MG tablet Take 50 mg by mouth 2 (two) times daily.    famotidine (PEPCID) 20 MG tablet Take 1 tablet (20 mg total) by mouth once daily.    gabapentin (NEURONTIN) 300 MG capsule Take 1 capsule (300 mg total) by mouth 3 (three) times daily. for 14 days    LORazepam (ATIVAN) 2 mg/mL Conc Take by mouth.    losartan (COZAAR) 25 MG tablet Take 1 tablet (25 mg total) by mouth once daily.    metoprolol succinate (TOPROL-XL) 25 MG 24 hr tablet Take 1 tablet (25 mg total) by mouth once daily.    MIRALAX 17 gram/dose powder Take by mouth.    OXcarbazepine (TRILEPTAL) 600 MG Tab Take 2 tablets (1,200 mg total) by mouth nightly.    potassium chloride SA (K-DUR,KLOR-CON) 20 MEQ tablet Take 1 tablet (20 mEq total) by mouth once daily.    prochlorperazine (COMPAZINE) 10 MG tablet Take 10 mg by mouth every 6 (six) hours as needed.    STIMULANT LAXATIVE PLUS 8.6-50 mg per tablet  Take 1 tablet by mouth.    [DISCONTINUED] amLODIPine (NORVASC) 5 MG tablet Take 1 tablet (5 mg total) by mouth once daily.    [DISCONTINUED] risperiDONE (RISPERDAL) 1 MG tablet Take 1 mg by mouth 2 (two) times daily.     Family History       Problem Relation (Age of Onset)    Cancer Maternal Grandmother    Diabetes Maternal Grandmother          Tobacco Use    Smoking status: Every Day     Current packs/day: 1.00     Average packs/day: 1 pack/day for 38.3 years (38.3 ttl pk-yrs)     Types: Cigarettes     Start date: 1986    Smokeless tobacco: Never    Tobacco comments:     Enrolled in [employer] on 10/23/14 (SCT Member ID # [id number]) Pt declines Ambulatory referral to Smoking Cessation clinic. Handout provided   Substance and Sexual Activity    Alcohol use: No    Drug use: Yes     Types: Heroin, Cocaine, Methamphetamines, Marijuana    Sexual activity: Not Currently     Partners: Male, Female     Birth control/protection: Other-see comments     Comment: hysterectomy     Review of Systems   Unable to perform ROS: Mental status change   Skin:  Positive for wound (face/nose).   Psychiatric/Behavioral:  Positive for confusion.      Objective:     Vital Signs (Most Recent):  Temp: 98.7 °F (37.1 °C) (04/05/24 1700)  Pulse: 78 (04/05/24 1700)  Resp: 18 (04/05/24 1621)  BP: (!) 100/59 (04/05/24 1700)  SpO2: 100 % (04/05/24 1700) Vital Signs (24h Range):  Temp:  [98.7 °F (37.1 °C)-98.9 °F (37.2 °C)] 98.7 °F (37.1 °C)  Pulse:  [73-83] 78  Resp:  [16-19] 18  SpO2:  [95 %-100 %] 100 %  BP: ()/(55-64) 100/59     Weight: 72.6 kg (160 lb 0.9 oz)  Body mass index is 31.26 kg/m².     Physical Exam  Vitals and nursing note reviewed.   Constitutional:       General: She is not in acute distress.     Appearance: She is well-developed. She is not diaphoretic.   HENT:      Head: Normocephalic and atraumatic.      Right Ear: Decreased hearing noted.      Left Ear: Decreased hearing noted.      Nose: Nose normal.   Eyes:       General: No scleral icterus.     Conjunctiva/sclera: Conjunctivae normal.   Neck:      Trachea: No tracheal deviation.   Cardiovascular:      Rate and Rhythm: Normal rate and regular rhythm.      Heart sounds: Normal heart sounds. No murmur heard.     No friction rub. No gallop.   Pulmonary:      Effort: Pulmonary effort is normal. No respiratory distress.      Breath sounds: Normal breath sounds. No stridor. No wheezing or rales.   Chest:      Chest wall: No tenderness.   Abdominal:      General: Bowel sounds are normal. There is no distension.      Palpations: Abdomen is soft. There is no mass.      Tenderness: There is no abdominal tenderness. There is no guarding or rebound.   Musculoskeletal:         General: No tenderness or deformity. Normal range of motion.      Cervical back: Normal range of motion and neck supple.   Skin:     General: Skin is warm and dry.      Coloration: Skin is not pale.      Findings: No erythema or rash.   Neurological:      Mental Status: She is alert and oriented to person, place, and time.      Cranial Nerves: No cranial nerve deficit.      Motor: No abnormal muscle tone.      Coordination: Coordination normal.   Psychiatric:         Attention and Perception: She is inattentive.         Speech: Speech is delayed.         Behavior: Behavior is slowed.         Thought Content: Thought content normal.         Cognition and Memory: Cognition is impaired.         Judgment: Judgment is impulsive.                Significant Labs: All pertinent labs within the past 24 hours have been reviewed.    Significant Imaging: I have reviewed all pertinent imaging results/findings within the past 24 hours.  Assessment/Plan:     * Acute encephalopathy  Check MRI brain  Likely 2/2 ARABELLA/dehydration/substance abuse   Cont gentle IV hydration   Neuro checks         ARABELLA (acute kidney injury)  Patient with acute kidney injury/acute renal failure likely due to pre-renal azotemia due to dehydration ARABELLA is  currently worsening. Baseline creatinine  1.3-1.7  - Labs reviewed- Renal function/electrolytes with Estimated Creatinine Clearance: 11.5 mL/min (A) (based on SCr of 5.1 mg/dL (H)). according to latest data. Monitor urine output and serial BMP and adjust therapy as needed. Avoid nephrotoxins and renally dose meds for GFR listed above.    Hold home meds Spironolactone, Lasix, Metolazone, Losartan   Gentle IV hydration   Consult Nephrology     History of pulmonary embolism  Hx PE on chronic anticoagulation   Cont Eliquis       Polysubstance abuse  Reported using methamphetamines- last use 4/4/24  UDS +meth and THC         Chronic combined systolic and diastolic heart failure  Patient is identified as having Combined Systolic and Diastolic heart failure that is Chronic. CHF is currently controlled. Latest ECHO performed and demonstrates- Results for orders placed during the hospital encounter of 01/07/23    Echo    Interpretation Summary  · The left ventricle is mildly enlarged with eccentric hypertrophy and severely decreased systolic function.  · The estimated ejection fraction is 15%.  · Grade II left ventricular diastolic dysfunction.  · There is severe left ventricular global hypokinesis.  · With moderately reduced right ventricular systolic function.  · Severe left atrial enlargement.  · Moderate right atrial enlargement.  · Severe mitral regurgitation.  · Mild tricuspid regurgitation.  · Mild pulmonic regurgitation.  · There is pulmonary hypertension.  · The estimated PA systolic pressure is 43 mmHg.  · Elevated central venous pressure (15 mmHg).  . Continue Beta Blocker and monitor clinical status closely. Monitor on telemetry. Patient is off CHF pathway.  Monitor strict Is&Os and daily weights.  Place on fluid restriction of 1.5 L. Cardiology has not been consulted. Continue to stress to patient importance of self efficacy and  on diet for CHF. Last BNP reviewed- and noted below   Recent Labs    Lab 04/05/24  1136   BNP 59      Hold diuretics and Losartan due to ARABELLA    Bipolar disorder  Appears stable   Cont home meds Prozac, Cogentin, Trileptal, and Seroquel       Hypokalemia  Patient has hypokalemia which is Acute and currently uncontrolled. Most recent potassium levels reviewed-   Lab Results   Component Value Date    K 2.8 (L) 04/05/2024   . Will continue potassium replacement per protocol and recheck repeat levels after replacement completed.     Essential hypertension  Chronic, controlled. Latest blood pressure and vitals reviewed-     Temp:  [98.7 °F (37.1 °C)-98.9 °F (37.2 °C)]   Pulse:  [73-83]   Resp:  [16-19]   BP: ()/(55-64)   SpO2:  [95 %-100 %] .   Home meds for hypertension were reviewed and noted below.   Hypertension Medications               furosemide (LASIX) 40 MG tablet Take 40 mg by mouth 2 (two) times a day.    losartan (COZAAR) 25 MG tablet Take 1 tablet (25 mg total) by mouth once daily.    metOLazone (ZAROXOLYN) 2.5 MG tablet Take 2.5 mg by mouth once daily.    metoprolol succinate (TOPROL-XL) 25 MG 24 hr tablet Take 1 tablet (25 mg total) by mouth once daily.    spironolactone (ALDACTONE) 50 MG tablet Take 50 mg by mouth 2 (two) times daily.            While in the hospital, will manage blood pressure as follows; Adjust home antihypertensive regimen as follows- hold home meds except Toprol     Will utilize p.r.n. blood pressure medication only if patient's blood pressure greater than 160/100 and she develops symptoms such as worsening chest pain or shortness of breath.      VTE Risk Mitigation (From admission, onward)           Ordered     apixaban tablet 5 mg  2 times daily         04/05/24 1844     IP VTE HIGH RISK PATIENT  Once         04/05/24 1844     Place sequential compression device  Until discontinued         04/05/24 1844                               AdmissionCare    Guideline: Renal Failure (Acute) - INPT, Inpatient    Based on the indications selected for the  patient, the bed status of Inpatient was determined to be MET    The following indications were selected as present at the time of evaluation of the patient:      - Acute renal failure (stage 3 acute kidney injury), as indicated by 1 or more of the following:    - Serum creatinine greater than 4 mg/dL (354 micromoles/L) with acute rise greater than 0.5 mg/dL (44.2 micromoles/L)    AdmissionCare documentation entered by: Brian Bal    [facility], 27th edition, Copyright © 2023 [facility], [facility] All Rights Reserved.  [id number]:46:22-05:00    Vita Chilel NP  Department of Hospital Medicine  [facility] - Telemetry ([facility])

## 2024-04-05 NOTE — ASSESSMENT & PLAN NOTE
Patient is identified as having Combined Systolic and Diastolic heart failure that is Chronic. CHF is currently controlled. Latest ECHO performed and demonstrates- Results for orders placed during the hospital encounter of 01/07/23    Echo    Interpretation Summary  · The left ventricle is mildly enlarged with eccentric hypertrophy and severely decreased systolic function.  · The estimated ejection fraction is 15%.  · Grade II left ventricular diastolic dysfunction.  · There is severe left ventricular global hypokinesis.  · With moderately reduced right ventricular systolic function.  · Severe left atrial enlargement.  · Moderate right atrial enlargement.  · Severe mitral regurgitation.  · Mild tricuspid regurgitation.  · Mild pulmonic regurgitation.  · There is pulmonary hypertension.  · The estimated PA systolic pressure is 43 mmHg.  · Elevated central venous pressure (15 mmHg).  . Continue Beta Blocker and monitor clinical status closely. Monitor on telemetry. Patient is off CHF pathway.  Monitor strict Is&Os and daily weights.  Place on fluid restriction of 1.5 L. Cardiology has not been consulted. Continue to stress to patient importance of self efficacy and  on diet for CHF. Last BNP reviewed- and noted below   Recent Labs   Lab 04/05/24  1136   BNP 59      Hold diuretics and Losartan due to ARABELLA

## 2024-04-05 NOTE — HPI
The patient is a 54 yo female with CHF (EF 15% on 01/09/2023), bipolar 1 disorder, ADHD, methamphetamine/polysubstance abuse, history of opioid overdose, history of PE (on chronic Eliquis), multiple admits to hospitals for acute on chronic CHF sec to medication and diet non compliance who presented to [facility] ED from nursing home with AMS. Pt was noted to picking her face and was not acting herself. Pt reports she went home on Easter break and used methamphetamine-last use yesterday. She was also noted to have large scab to her nose. Pt reports she fell down a slide.    In the ED, Afebrile, WBC normal, K 2.8, Serum Cr 5.1, BNP normal. . Trop 0.054. UA-trace ketones. UDS + Amphetamines and THC. ABG-normal. CT head showed nothing acute and CT cervical spine showed no fracture. CXR- mild cardiomegaly, nothing acute..  Pt received one liter IVFs and IV KCL 30 meq.     The patient was transferred to [facility] for observation under hospital medicine.

## 2024-04-05 NOTE — ADMISSIONCARE
AdmissionCare    Guideline: Renal Failure (Acute) - INPT, Inpatient    Based on the indications selected for the patient, the bed status of Inpatient was determined to be MET    The following indications were selected as present at the time of evaluation of the patient:      - Acute renal failure (stage 3 acute kidney injury), as indicated by 1 or more of the following:    - Serum creatinine greater than 4 mg/dL (354 micromoles/L) with acute rise greater than 0.5 mg/dL (44.2 micromoles/L)    AdmissionCare documentation entered by: Brian Bal    [facility], 27th edition, Copyright © 2023 [facility], [facility] All Rights Reserved.  [id number]:46:22-05:00

## 2024-04-05 NOTE — ASSESSMENT & PLAN NOTE
Chronic, controlled. Latest blood pressure and vitals reviewed-     Temp:  [98.7 °F (37.1 °C)-98.9 °F (37.2 °C)]   Pulse:  [73-83]   Resp:  [16-19]   BP: ()/(55-64)   SpO2:  [95 %-100 %] .   Home meds for hypertension were reviewed and noted below.   Hypertension Medications               furosemide (LASIX) 40 MG tablet Take 40 mg by mouth 2 (two) times a day.    losartan (COZAAR) 25 MG tablet Take 1 tablet (25 mg total) by mouth once daily.    metOLazone (ZAROXOLYN) 2.5 MG tablet Take 2.5 mg by mouth once daily.    metoprolol succinate (TOPROL-XL) 25 MG 24 hr tablet Take 1 tablet (25 mg total) by mouth once daily.    spironolactone (ALDACTONE) 50 MG tablet Take 50 mg by mouth 2 (two) times daily.            While in the hospital, will manage blood pressure as follows; Adjust home antihypertensive regimen as follows- hold home meds except Toprol     Will utilize p.r.n. blood pressure medication only if patient's blood pressure greater than 160/100 and she develops symptoms such as worsening chest pain or shortness of breath.

## 2024-04-06 LAB
ALBUMIN SERPL BCP-MCNC: 3.3 G/DL (ref 3.5–5.2)
ALP SERPL-CCNC: 103 U/L (ref 55–135)
ALT SERPL W/O P-5'-P-CCNC: 26 U/L (ref 10–44)
ANION GAP SERPL CALC-SCNC: 17 MMOL/L (ref 8–16)
AST SERPL-CCNC: 42 U/L (ref 10–40)
BASOPHILS # BLD AUTO: 0.02 K/UL (ref 0–0.2)
BASOPHILS NFR BLD: 0.4 % (ref 0–1.9)
BILIRUB SERPL-MCNC: 0.6 MG/DL (ref 0.1–1)
BUN SERPL-MCNC: 49 MG/DL (ref 6–20)
CALCIUM SERPL-MCNC: 9.1 MG/DL (ref 8.7–10.5)
CHLORIDE SERPL-SCNC: 96 MMOL/L (ref 95–110)
CO2 SERPL-SCNC: 24 MMOL/L (ref 23–29)
CREAT SERPL-MCNC: 2 MG/DL (ref 0.5–1.4)
DIFFERENTIAL METHOD BLD: ABNORMAL
EOSINOPHIL # BLD AUTO: 0 K/UL (ref 0–0.5)
EOSINOPHIL NFR BLD: 0.8 % (ref 0–8)
ERYTHROCYTE [DISTWIDTH] IN BLOOD BY AUTOMATED COUNT: 12.9 % (ref 11.5–14.5)
EST. GFR  (NO RACE VARIABLE): 30 ML/MIN/1.73 M^2
GLUCOSE SERPL-MCNC: 77 MG/DL (ref 70–110)
HCT VFR BLD AUTO: 34.6 % (ref 37–48.5)
HGB BLD-MCNC: 11.7 G/DL (ref 12–16)
IMM GRANULOCYTES # BLD AUTO: 0.01 K/UL (ref 0–0.04)
IMM GRANULOCYTES NFR BLD AUTO: 0.2 % (ref 0–0.5)
LYMPHOCYTES # BLD AUTO: 0.5 K/UL (ref 1–4.8)
LYMPHOCYTES NFR BLD: 10.2 % (ref 18–48)
MAGNESIUM SERPL-MCNC: 2.3 MG/DL (ref 1.6–2.6)
MCH RBC QN AUTO: 29.8 PG (ref 27–31)
MCHC RBC AUTO-ENTMCNC: 33.8 G/DL (ref 32–36)
MCV RBC AUTO: 88 FL (ref 82–98)
MONOCYTES # BLD AUTO: 0.7 K/UL (ref 0.3–1)
MONOCYTES NFR BLD: 12.9 % (ref 4–15)
NEUTROPHILS # BLD AUTO: 3.9 K/UL (ref 1.8–7.7)
NEUTROPHILS NFR BLD: 75.5 % (ref 38–73)
NRBC BLD-RTO: 0 /100 WBC
PHOSPHATE SERPL-MCNC: 2.8 MG/DL (ref 2.7–4.5)
PLATELET # BLD AUTO: 155 K/UL (ref 150–450)
PMV BLD AUTO: 11.7 FL (ref 9.2–12.9)
POCT GLUCOSE: 106 MG/DL (ref 70–110)
POCT GLUCOSE: 126 MG/DL (ref 70–110)
POCT GLUCOSE: 80 MG/DL (ref 70–110)
POTASSIUM SERPL-SCNC: 2.2 MMOL/L (ref 3.5–5.1)
PROT SERPL-MCNC: 7.5 G/DL (ref 6–8.4)
RBC # BLD AUTO: 3.92 M/UL (ref 4–5.4)
SODIUM SERPL-SCNC: 137 MMOL/L (ref 136–145)
T4 FREE SERPL-MCNC: 1.26 NG/DL (ref 0.71–1.51)
TROPONIN I SERPL DL<=0.01 NG/ML-MCNC: 0.03 NG/ML (ref 0–0.03)
TSH SERPL DL<=0.005 MIU/L-ACNC: 0.19 UIU/ML (ref 0.4–4)
WBC # BLD AUTO: 5.2 K/UL (ref 3.9–12.7)

## 2024-04-06 PROCEDURE — 85025 COMPLETE CBC W/AUTO DIFF WBC: CPT | Performed by: NURSE PRACTITIONER

## 2024-04-06 PROCEDURE — 80053 COMPREHEN METABOLIC PANEL: CPT | Performed by: NURSE PRACTITIONER

## 2024-04-06 PROCEDURE — 97161 PT EVAL LOW COMPLEX 20 MIN: CPT

## 2024-04-06 PROCEDURE — 25000003 PHARM REV CODE 250: Performed by: INTERNAL MEDICINE

## 2024-04-06 PROCEDURE — 99223 1ST HOSP IP/OBS HIGH 75: CPT | Mod: ,,, | Performed by: INTERNAL MEDICINE

## 2024-04-06 PROCEDURE — 84439 ASSAY OF FREE THYROXINE: CPT | Performed by: NURSE PRACTITIONER

## 2024-04-06 PROCEDURE — 80183 DRUG SCRN QUANT OXCARBAZEPIN: CPT | Performed by: NURSE PRACTITIONER

## 2024-04-06 PROCEDURE — 84484 ASSAY OF TROPONIN QUANT: CPT | Performed by: NURSE PRACTITIONER

## 2024-04-06 PROCEDURE — 25000003 PHARM REV CODE 250: Performed by: NURSE PRACTITIONER

## 2024-04-06 PROCEDURE — 83735 ASSAY OF MAGNESIUM: CPT | Performed by: NURSE PRACTITIONER

## 2024-04-06 PROCEDURE — 84443 ASSAY THYROID STIM HORMONE: CPT | Performed by: NURSE PRACTITIONER

## 2024-04-06 PROCEDURE — 97530 THERAPEUTIC ACTIVITIES: CPT

## 2024-04-06 PROCEDURE — 36415 COLL VENOUS BLD VENIPUNCTURE: CPT | Performed by: NURSE PRACTITIONER

## 2024-04-06 PROCEDURE — 21400001 HC TELEMETRY ROOM

## 2024-04-06 PROCEDURE — 84100 ASSAY OF PHOSPHORUS: CPT | Performed by: NURSE PRACTITIONER

## 2024-04-06 RX ORDER — QUETIAPINE FUMARATE 100 MG/1
300 TABLET, FILM COATED ORAL NIGHTLY PRN
Status: DISCONTINUED | OUTPATIENT
Start: 2024-04-06 | End: 2024-04-06

## 2024-04-06 RX ORDER — LIDOCAINE 50 MG/G
1 PATCH TOPICAL
Status: DISCONTINUED | OUTPATIENT
Start: 2024-04-06 | End: 2024-04-07 | Stop reason: HOSPADM

## 2024-04-06 RX ORDER — QUETIAPINE FUMARATE 100 MG/1
100 TABLET, FILM COATED ORAL NIGHTLY PRN
Status: DISCONTINUED | OUTPATIENT
Start: 2024-04-06 | End: 2024-04-07 | Stop reason: HOSPADM

## 2024-04-06 RX ORDER — POTASSIUM CHLORIDE 20 MEQ/1
40 TABLET, EXTENDED RELEASE ORAL 3 TIMES DAILY
Status: COMPLETED | OUTPATIENT
Start: 2024-04-06 | End: 2024-04-06

## 2024-04-06 RX ADMIN — MUPIROCIN: 20 OINTMENT TOPICAL at 09:04

## 2024-04-06 RX ADMIN — APIXABAN 5 MG: 2.5 TABLET, FILM COATED ORAL at 09:04

## 2024-04-06 RX ADMIN — DOCUSATE SODIUM AND SENNOSIDES 1 TABLET: 8.6; 5 TABLET ORAL at 08:04

## 2024-04-06 RX ADMIN — APIXABAN 5 MG: 2.5 TABLET, FILM COATED ORAL at 08:04

## 2024-04-06 RX ADMIN — METOPROLOL SUCCINATE 25 MG: 25 TABLET, EXTENDED RELEASE ORAL at 08:04

## 2024-04-06 RX ADMIN — POTASSIUM CHLORIDE 40 MEQ: 1500 TABLET, EXTENDED RELEASE ORAL at 03:04

## 2024-04-06 RX ADMIN — FAMOTIDINE 20 MG: 20 TABLET ORAL at 08:04

## 2024-04-06 RX ADMIN — DOCUSATE SODIUM AND SENNOSIDES 1 TABLET: 8.6; 5 TABLET ORAL at 09:04

## 2024-04-06 RX ADMIN — POTASSIUM CHLORIDE 40 MEQ: 1500 TABLET, EXTENDED RELEASE ORAL at 10:04

## 2024-04-06 RX ADMIN — MUPIROCIN: 20 OINTMENT TOPICAL at 08:04

## 2024-04-06 RX ADMIN — LIDOCAINE 1 PATCH: 50 PATCH CUTANEOUS at 09:04

## 2024-04-06 RX ADMIN — POTASSIUM CHLORIDE 40 MEQ: 1500 TABLET, EXTENDED RELEASE ORAL at 09:04

## 2024-04-06 RX ADMIN — BENZTROPINE MESYLATE 0.5 MG: 0.5 TABLET ORAL at 09:04

## 2024-04-06 NOTE — ASSESSMENT & PLAN NOTE
ARABELLA. S Cr however has markedly already improved from >5 to 2.2  Reason fo the initial ARABELLA not clear  Suspect prerenal azotemia, because pt was hypotensive  Hypokalemia, did pt have GI losses?  Noted +THC, may have vomited. Would explain above  K was replaced  CKD stage 3 at baseline.

## 2024-04-06 NOTE — SUBJECTIVE & OBJECTIVE
Past Medical History:   Diagnosis Date    ADHD (attention deficit hyperactivity disorder)     Anemia     Anxiety     Bipolar disorder     CHF (congestive heart failure)     History of hepatitis C - s/p clearance of virus (HCV neg 6/2015) 09/26/2014    History of psychiatric hospitalization     History of substance abuse     IV heroin - last use 2013 per pt    Hx of psychiatric care     Hypertension     Opioid overdose 05/10/2016    Psychiatric problem     Psychosis     Seizure disorder 04/03/2019    Sleep difficulties     Still's disease     Substance abuse     Therapy     Vasculitis        Past Surgical History:   Procedure Laterality Date    HYSTERECTOMY  3/16/2011    SALPINGOOPHORECTOMY Right 3/16/2011    TUBAL LIGATION      Vaginal cuff repair  04/22/2011       Review of patient's allergies indicates:  No Known Allergies  Current Facility-Administered Medications   Medication Frequency    0.9%  NaCl infusion Continuous    acetaminophen tablet 650 mg Q4H PRN    albuterol-ipratropium 2.5 mg-0.5 mg/3 mL nebulizer solution 3 mL Q6H PRN    apixaban tablet 5 mg BID    benztropine tablet 0.5 mg BID    dextrose 10% bolus 125 mL 125 mL PRN    dextrose 10% bolus 250 mL 250 mL PRN    famotidine tablet 20 mg Daily    FLUoxetine capsule 20 mg Daily    glucagon (human recombinant) injection 1 mg PRN    glucose chewable tablet 16 g PRN    glucose chewable tablet 24 g PRN    LIDOcaine 5 % patch 1 patch Q24H    melatonin tablet 6 mg Nightly PRN    metoprolol succinate (TOPROL-XL) 24 hr tablet 25 mg Daily    mupirocin 2 % ointment BID    naloxone 0.4 mg/mL injection 0.02 mg PRN    ondansetron injection 4 mg Q6H PRN    OXcarbazepine tablet 1,200 mg Nightly    polyethylene glycol packet 17 g BID PRN    potassium chloride SA CR tablet 40 mEq TID    prochlorperazine injection Soln 5 mg Q6H PRN    QUEtiapine tablet 300 mg QHS    senna-docusate 8.6-50 mg per tablet 1 tablet BID    simethicone chewable tablet 80 mg QID PRN    sodium  chloride 0.9% flush 10 mL Q8H PRN     Family History       Problem Relation (Age of Onset)    Cancer Maternal Grandmother    Diabetes Maternal Grandmother          Tobacco Use    Smoking status: Every Day     Current packs/day: 1.00     Average packs/day: 1 pack/day for 38.3 years (38.3 ttl pk-yrs)     Types: Cigarettes     Start date: 1986    Smokeless tobacco: Never    Tobacco comments:     Enrolled in [employer] on 10/23/14 ([employer] Member ID # [id number]) Pt declines Ambulatory referral to Smoking Cessation clinic. Handout provided   Substance and Sexual Activity    Alcohol use: No    Drug use: Yes     Types: Heroin, Cocaine, Methamphetamines, Marijuana    Sexual activity: Not Currently     Partners: Male, Female     Birth control/protection: Other-see comments     Comment: hysterectomy     Review of Systems   Unable to perform ROS: Mental status change     Objective:     Vital Signs (Most Recent):  Temp: 98.6 °F (37 °C) (04/06/24 1200)  Pulse: 85 (04/06/24 1200)  Resp: 18 (04/06/24 1200)  BP: (!) 106/55 (04/06/24 1200)  SpO2: 97 % (04/06/24 1200) Vital Signs (24h Range):  Temp:  [98.2 °F (36.8 °C)-99.8 °F (37.7 °C)] 98.6 °F (37 °C)  Pulse:  [] 85  Resp:  [16-18] 18  SpO2:  [88 %-100 %] 97 %  BP: (100-120)/(55-69) 106/55     Weight: 67.3 kg (148 lb 5.9 oz) (04/05/24 2348)  Body mass index is 28.98 kg/m².  Body surface area is 1.69 meters squared.    I/O last 3 completed shifts:  In: 66.6 [IV Piggyback:66.6]  Out: -      Physical Exam  Vitals and nursing note reviewed.   Constitutional:       Appearance: Normal appearance.   Cardiovascular:      Rate and Rhythm: Normal rate and regular rhythm.      Pulses: Normal pulses.      Heart sounds: Normal heart sounds.   Pulmonary:      Effort: Pulmonary effort is normal.      Breath sounds: Normal breath sounds.   Abdominal:      General: Abdomen is flat.      Tenderness: There is no abdominal tenderness.   Musculoskeletal:      Right lower leg: No edema.      Left  lower leg: No edema.   Neurological:      Mental Status: She is alert and oriented to person, place, and time.   Psychiatric:         Behavior: Behavior normal.          Significant Labs: reviewed  BMP  Lab Results   Component Value Date     04/06/2024    K 2.2 (LL) 04/06/2024    CL 96 04/06/2024    CO2 24 04/06/2024    BUN 49 (H) 04/06/2024    CREATININE 2.0 (H) 04/06/2024    CALCIUM 9.1 04/06/2024    ANIONGAP 17 (H) 04/06/2024    EGFRNORACEVR 30 (A) 04/06/2024     Lab Results   Component Value Date    WBC 5.20 04/06/2024    HGB 11.7 (L) 04/06/2024    HCT 34.6 (L) 04/06/2024    MCV 88 04/06/2024     04/06/2024     Lab Results   Component Value Date    WBC 5.20 04/06/2024    HGB 11.7 (L) 04/06/2024    HCT 34.6 (L) 04/06/2024    MCV 88 04/06/2024     04/06/2024     Mg 2.3  PO4 2.8    TSH 0.19  Free T4 1.2      Significant Imaging: Reviewed  CXR shows cardiomegaly and mild pulmonary edema

## 2024-04-06 NOTE — PLAN OF CARE
Problem: Adult Inpatient Plan of Care  Goal: Plan of Care Review  Outcome: Ongoing, Progressing  Goal: Patient-Specific Goal (Individualized)  Outcome: Ongoing, Progressing  Goal: Absence of Hospital-Acquired Illness or Injury  Outcome: Ongoing, Progressing  Goal: Optimal Comfort and Wellbeing  Outcome: Ongoing, Progressing  Goal: Readiness for Transition of Care  Outcome: Ongoing, Progressing     Problem: Adjustment to Illness (Sepsis/Septic Shock)  Goal: Optimal Coping  Outcome: Ongoing, Progressing     Problem: Bleeding (Sepsis/Septic Shock)  Goal: Absence of Bleeding  Outcome: Ongoing, Progressing     Problem: Glycemic Control Impaired (Sepsis/Septic Shock)  Goal: Blood Glucose Level Within Desired Range  Outcome: Ongoing, Progressing     Problem: Infection Progression (Sepsis/Septic Shock)  Goal: Absence of Infection Signs and Symptoms  Outcome: Ongoing, Progressing     Problem: Nutrition Impaired (Sepsis/Septic Shock)  Goal: Optimal Nutrition Intake  Outcome: Ongoing, Progressing     Problem: Fluid and Electrolyte Imbalance (Acute Kidney Injury/Impairment)  Goal: Fluid and Electrolyte Balance  Outcome: Ongoing, Progressing     Problem: Oral Intake Inadequate (Acute Kidney Injury/Impairment)  Goal: Optimal Nutrition Intake  Outcome: Ongoing, Progressing     Problem: Renal Function Impairment (Acute Kidney Injury/Impairment)  Goal: Effective Renal Function  Outcome: Ongoing, Progressing     Problem: Infection  Goal: Absence of Infection Signs and Symptoms  Outcome: Ongoing, Progressing

## 2024-04-06 NOTE — CONSULTS
[facility] - Telemetry ([facility])  Nephrology  Consult Note      Patient Name: Stephanie T Barthelemy  MRN: [id number]  Admission Date: 4/5/2024  Hospital Length of Stay: 1 days  Attending Provider: Alexandrea Ramirez MD   Primary Care Physician: Myrna, Primary Doctor  Principal Problem:Acute encephalopathy    Reason for consult: ARABELLA    Consults  Subjective:     HPI: Thank you for referring the pt to us. H/o and chart were reviewed. Pt was seen and examined. Pt is a 54 y/o female with h/o of CKD stage 3, severe CHF with EF 15%, polysubstance abuse, ADHD, bipolar, self-harm and neglect, who was admitted for change in MS. No direct h/o can be obtained. Per chart, there seems to have been no other issues. S Cr on admit was >5, repeat is 2.2. K is slightly low.    Past Medical History:   Diagnosis Date    ADHD (attention deficit hyperactivity disorder)     Anemia     Anxiety     Bipolar disorder     CHF (congestive heart failure)     History of hepatitis C - s/p clearance of virus (HCV neg 6/2015) 09/26/2014    History of psychiatric hospitalization     History of substance abuse     IV heroin - last use 2013 per pt    Hx of psychiatric care     Hypertension     Opioid overdose 05/10/2016    Psychiatric problem     Psychosis     Seizure disorder 04/03/2019    Sleep difficulties     Still's disease     Substance abuse     Therapy     Vasculitis        Past Surgical History:   Procedure Laterality Date    HYSTERECTOMY  3/16/2011    SALPINGOOPHORECTOMY Right 3/16/2011    TUBAL LIGATION      Vaginal cuff repair  04/22/2011       Review of patient's allergies indicates:  No Known Allergies  Current Facility-Administered Medications   Medication Frequency    0.9%  NaCl infusion Continuous    acetaminophen tablet 650 mg Q4H PRN    albuterol-ipratropium 2.5 mg-0.5 mg/3 mL nebulizer solution 3 mL Q6H PRN    apixaban tablet 5 mg BID    benztropine tablet 0.5 mg BID    dextrose 10% bolus 125 mL 125 mL PRN    dextrose 10% bolus 250 mL 250 mL  PRN    famotidine tablet 20 mg Daily    FLUoxetine capsule 20 mg Daily    glucagon (human recombinant) injection 1 mg PRN    glucose chewable tablet 16 g PRN    glucose chewable tablet 24 g PRN    LIDOcaine 5 % patch 1 patch Q24H    melatonin tablet 6 mg Nightly PRN    metoprolol succinate (TOPROL-XL) 24 hr tablet 25 mg Daily    mupirocin 2 % ointment BID    naloxone 0.4 mg/mL injection 0.02 mg PRN    ondansetron injection 4 mg Q6H PRN    OXcarbazepine tablet 1,200 mg Nightly    polyethylene glycol packet 17 g BID PRN    potassium chloride SA CR tablet 40 mEq TID    prochlorperazine injection Soln 5 mg Q6H PRN    QUEtiapine tablet 300 mg QHS    senna-docusate 8.6-50 mg per tablet 1 tablet BID    simethicone chewable tablet 80 mg QID PRN    sodium chloride 0.9% flush 10 mL Q8H PRN     Family History       Problem Relation (Age of Onset)    Cancer Maternal Grandmother    Diabetes Maternal Grandmother          Tobacco Use    Smoking status: Every Day     Current packs/day: 1.00     Average packs/day: 1 pack/day for 38.3 years (38.3 ttl pk-yrs)     Types: Cigarettes     Start date: 1986    Smokeless tobacco: Never    Tobacco comments:     Enrolled in [employer] on 10/23/14 ([employer] Member ID # [id number]) Pt declines Ambulatory referral to Smoking Cessation clinic. Handout provided   Substance and Sexual Activity    Alcohol use: No    Drug use: Yes     Types: Heroin, Cocaine, Methamphetamines, Marijuana    Sexual activity: Not Currently     Partners: Male, Female     Birth control/protection: Other-see comments     Comment: hysterectomy     Review of Systems   Unable to perform ROS: Mental status change     Objective:     Vital Signs (Most Recent):  Temp: 98.6 °F (37 °C) (04/06/24 1200)  Pulse: 85 (04/06/24 1200)  Resp: 18 (04/06/24 1200)  BP: (!) 106/55 (04/06/24 1200)  SpO2: 97 % (04/06/24 1200) Vital Signs (24h Range):  Temp:  [98.2 °F (36.8 °C)-99.8 °F (37.7 °C)] 98.6 °F (37 °C)  Pulse:  [] 85  Resp:  [16-18]  18  SpO2:  [88 %-100 %] 97 %  BP: (100-120)/(55-69) 106/55     Weight: 67.3 kg (148 lb 5.9 oz) (04/05/24 2348)  Body mass index is 28.98 kg/m².  Body surface area is 1.69 meters squared.    I/O last 3 completed shifts:  In: 66.6 [IV Piggyback:66.6]  Out: -      Physical Exam  Vitals and nursing note reviewed.   Constitutional:       Appearance: Normal appearance.   Cardiovascular:      Rate and Rhythm: Normal rate and regular rhythm.      Pulses: Normal pulses.      Heart sounds: Normal heart sounds.   Pulmonary:      Effort: Pulmonary effort is normal.      Breath sounds: Normal breath sounds.   Abdominal:      General: Abdomen is flat.      Tenderness: There is no abdominal tenderness.   Musculoskeletal:      Right lower leg: No edema.      Left lower leg: No edema.   Neurological:      Mental Status: She is alert and oriented to person, place, and time.   Psychiatric:         Behavior: Behavior normal.          Significant Labs: reviewed  BMP  Lab Results   Component Value Date     04/06/2024    K 2.2 (LL) 04/06/2024    CL 96 04/06/2024    CO2 24 04/06/2024    BUN 49 (H) 04/06/2024    CREATININE 2.0 (H) 04/06/2024    CALCIUM 9.1 04/06/2024    ANIONGAP 17 (H) 04/06/2024    EGFRNORACEVR 30 (A) 04/06/2024     Lab Results   Component Value Date    WBC 5.20 04/06/2024    HGB 11.7 (L) 04/06/2024    HCT 34.6 (L) 04/06/2024    MCV 88 04/06/2024     04/06/2024     Lab Results   Component Value Date    WBC 5.20 04/06/2024    HGB 11.7 (L) 04/06/2024    HCT 34.6 (L) 04/06/2024    MCV 88 04/06/2024     04/06/2024     Mg 2.3  PO4 2.8    TSH 0.19  Free T4 1.2      Significant Imaging: Reviewed  CXR shows cardiomegaly and mild pulmonary edema      Assessment/Plan:     54 y/o female with h/o of CKD stage 3 and PSA presented with change in MS:    ARABELLA (acute kidney injury)  ARABELLA. S Cr however has markedly already improved from >5 to 2.2  Reason fo the initial ARABELLA not clear  Suspect prerenal azotemia, because pt  was hypotensive  Hypokalemia, did pt have GI losses?  Noted +THC, may have vomited. Would explain above  K was replaced  CKD stage 3 at baseline.    Acute encephalopathy  Change in mental status  May be due to acute drug use    Chronic combined systolic and diastolic heart failure  Severe CHF despite relatively young age  No h/o of DM  May have used cocaine and heroine in the past, can cause CM  Has mild fluid gain    Thank you for your consult.     Plans and recommendations:  Total time spent 70 minutes including time needed to review the records, the   patient evaluation, documentation, face-to-face discussion with the patient,   more than 50% of the time was spent on coordination of care and counseling.    Level V visit.      Araceli Corcoran MD   Nephrology  [facility] - Telemetry ([facility])

## 2024-04-06 NOTE — SUBJECTIVE & OBJECTIVE
Interval History: very [place], More drowsy today. Will hold sedating home meds (for bipolar d/o) this am due to drowsiness and mild hypoxemia requiring 2 liters NC. Cont IV hydration.     Review of Systems   Unable to perform ROS: Mental status change   Constitutional:  Positive for activity change and fatigue.   Psychiatric/Behavioral:  Positive for confusion.      Objective:     Vital Signs (Most Recent):  Temp: 98.2 °F (36.8 °C) (04/06/24 0726)  Pulse: 103 (04/06/24 0727)  Resp: 16 (04/06/24 0726)  BP: 109/69 (04/06/24 0726)  SpO2: (!) 94 % (04/06/24 0727) Vital Signs (24h Range):  Temp:  [98.2 °F (36.8 °C)-99.8 °F (37.7 °C)] 98.2 °F (36.8 °C)  Pulse:  [] 103  Resp:  [16-18] 16  SpO2:  [88 %-100 %] 94 %  BP: (100-120)/(55-69) 109/69     Weight: 67.3 kg (148 lb 5.9 oz)  Body mass index is 28.98 kg/m².    Intake/Output Summary (Last 24 hours) at 4/6/2024 1103  Last data filed at 4/6/2024 0918  Gross per 24 hour   Intake 66.6 ml   Output 200 ml   Net -133.4 ml         Physical Exam  Vitals and nursing note reviewed.   Constitutional:       General: She is not in acute distress.     Appearance: She is well-developed. She is not diaphoretic.      Comments: Drowsy   HENT:      Head: Normocephalic and atraumatic.      Right Ear: Decreased hearing noted.      Left Ear: Decreased hearing noted.      Nose: Nose normal.   Eyes:      General: No scleral icterus.     Conjunctiva/sclera: Conjunctivae normal.   Neck:      Trachea: No tracheal deviation.   Cardiovascular:      Rate and Rhythm: Normal rate and regular rhythm.      Heart sounds: Normal heart sounds. No murmur heard.     No friction rub. No gallop.   Pulmonary:      Effort: Pulmonary effort is normal. No respiratory distress.      Breath sounds: Normal breath sounds. No stridor. No wheezing or rales.   Chest:      Chest wall: No tenderness.   Abdominal:      General: Bowel sounds are normal. There is no distension.      Palpations: Abdomen is soft. There is no  mass.      Tenderness: There is no abdominal tenderness. There is no guarding or rebound.   Musculoskeletal:         General: No tenderness or deformity. Normal range of motion.      Cervical back: Normal range of motion and neck supple.   Skin:     General: Skin is warm and dry.      Coloration: Skin is not pale.      Findings: No erythema or rash.   Neurological:      Mental Status: She is oriented to person, place, and time.      Cranial Nerves: No cranial nerve deficit.      Motor: No abnormal muscle tone.      Coordination: Coordination normal.      Comments: Pt drowsy but easily arousable  Ate 100% breakfast tray.              Significant Labs: All pertinent labs within the past 24 hours have been reviewed.    Significant Imaging: I have reviewed all pertinent imaging results/findings within the past 24 hours.

## 2024-04-06 NOTE — ASSESSMENT & PLAN NOTE
Check MRI brain  Likely 2/2 ARABELLA/dehydration/substance abuse   Cont gentle IV hydration   Neuro checks     Remains drowsy- will hold bipolar meds this am until more awake

## 2024-04-06 NOTE — PLAN OF CARE
A251/A251 A. Stephanie T Barthelemy is a 53 y.o.female admitted on 4/5/2024 for Acute encephalopathy   Code Status: Full Code MRN: [id number]   Review of patient's allergies indicates:  No Known Allergies  Past Medical History:   Diagnosis Date    ADHD (attention deficit hyperactivity disorder)     Anemia     Anxiety     Bipolar disorder     CHF (congestive heart failure)     History of hepatitis C - s/p clearance of virus (HCV neg 6/2015) 09/26/2014    History of psychiatric hospitalization     History of substance abuse     IV heroin - last use 2013 per pt    Hx of psychiatric care     Hypertension     Opioid overdose 05/10/2016    Psychiatric problem     Psychosis     Seizure disorder 04/03/2019    Sleep difficulties     Still's disease     Substance abuse     Therapy     Vasculitis       PRN meds    acetaminophen, 650 mg, Q4H PRN  albuterol-ipratropium, 3 mL, Q6H PRN  dextrose 10%, 12.5 g, PRN  dextrose 10%, 25 g, PRN  glucagon (human recombinant), 1 mg, PRN  glucose, 16 g, PRN  glucose, 24 g, PRN  melatonin, 6 mg, Nightly PRN  naloxone, 0.02 mg, PRN  ondansetron, 4 mg, Q6H PRN  polyethylene glycol, 17 g, BID PRN  prochlorperazine, 5 mg, Q6H PRN  simethicone, 1 tablet, QID PRN  sodium chloride 0.9%, 10 mL, Q8H PRN      Patient expressed no concerns throughout duration of shift, no falls noted. Patient MRI was negative, patient diet changes noted and charted. Cardiac and glucose monitoring continues. Chart check completed. Will continue plan of care.      Orientation: disoriented to, place, time, situation  Trezevant Coma Scale Score: 14     Lead Monitored: Lead II Rhythm: normal sinus rhythm    Cardiac/Telemetry Box Number: [id number]    Last Bowel Movement: 04/05/24  Diet Cardiac Fluid - 800mL     Chase Score: 19  Fall Risk Score: 17  Accucheck [x]   Freq? ACHS     Lines/Drains/Airways       Peripheral Intravenous Line  Duration                  Peripheral IV - Single Lumen 04/05/24 2230 22 G Anterior;Right Hand <1 day

## 2024-04-06 NOTE — PT/OT/SLP EVAL
Physical Therapy Evaluation    Patient Name:  Stephanie T Barthelemy   MRN:  [id number]    Recommendations:     Discharge Recommendations: Moderate Intensity Therapy (VS RESTORATIVE @ NH PENDING PROGRESS)   Discharge Equipment Recommendations: to be determined by next level of care   Barriers to discharge: None    Assessment:     Stephanie T Barthelemy is a 53 y.o. female admitted with a medical diagnosis of Acute encephalopathy.  She presents with the following impairments/functional limitations: weakness, impaired endurance, impaired self care skills, impaired functional mobility, gait instability, impaired balance, decreased safety awareness, impaired skin .    Rehab Prognosis: Good; patient would benefit from acute skilled PT services to address these deficits and reach maximum level of function.    Recent Surgery: * No surgery found *      Plan:     During this hospitalization, patient to be seen 3 x/week to address the identified rehab impairments via gait training, therapeutic activities, therapeutic exercises and progress toward the following goals:    Plan of Care Expires:  04/20/24    Subjective     Chief Complaint: NOSE ITCHING WHERE SCAB IS  Patient/Family Comments/goals: NONE STATED   Pain/Comfort:  Pain Rating 1: 0/10  Pain Rating Post-Intervention 1: 0/10    Patients cultural, spiritual, [employer] conflicts given the current situation:      Living Environment:  PT IS A NH RESIDENT   Prior to admission, patients level of function was AMBUALTORY AND REPORTED NOT USING AD HOWEVER NOT ABLE TO GIVE CLEAR ANSWERS ON FUNCTION. PT LETHARGIC.  Equipment used at home: none.  DME owned (not currently used): none.  Upon discharge, patient will have assistance from NH STAFF.    Objective:     Communicated with NURSE GARDUNO AND EPIC CHART REVIEW prior to session.  Patient found supine with peripheral IV, telemetry  upon PT entry to room.    General Precautions: Standard, fall  Orthopedic Precautions:N/A   Braces:  "N/A  Respiratory Status: Room air    Exams:  Cognitive Exam:  Patient is oriented to Person, Place, and Time  SCAB ON PT NOSE  RLE ROM: WFL  RLE Strength: LIMITED  LLE ROM: WFL  LLE Strength: LIMITED    Functional Mobility:  Bed Mobility:     Rolling Right: minimum assistance  Supine to Sit: minimum assistance  Transfers:     Sit to Stand:  minimum assistance with no AD  Bed to Chair: minimum assistance with  hand-held assist  using  Stand Pivot  Gait: PT GT TRAINED 2X8' WITH MIN A ( HHA)      AM-PAC 6 CLICK MOBILITY  Total Score:16       Treatment & Education:  GT. BELT AND  SOCKS DONNED PRIOR TO OOB MOBILITY.  PT GT TRAINED TO AND FROM BATHROOM FOR TOILETING WITH MIN A. PT REFUSED OOB TO CHAIR AND RETURNED SEATED EOB WITH MIN A AND SUP IN BED WITH SBA. PT EDUCATED ON ROLE OF P.T. AND PT EDUCATED ON "CALL DON'T FALL", ENCOURAGED TO CALL FOR ASSISTANCE WITH ALL NEEDS FOR OOB MOBILITY.      Patient left supine with call button in reach, bed alarm on, and NURSE notified.    GOALS:   Multidisciplinary Problems       Physical Therapy Goals          Problem: Physical Therapy    Goal Priority Disciplines Outcome Goal Variances Interventions   Physical Therapy Goal     PT, PT/OT      Description: LT24  1. PT WILL COMPLETE BED MOBILITY IND  2. PT WILL STAND T/F TO CHAIR WITH S FOR OOB TOLERANCE  3. PT WILL GT TRAIN X 250' WITH LRAD AND SBA TO PROGRESS GT.   4. PT WILL INC AMPAC SCORE BY 2 POINTS TO PROGRESS GROSS FUNC MOBILITY.                          History:     Past Medical History:   Diagnosis Date    ADHD (attention deficit hyperactivity disorder)     Anemia     Anxiety     Bipolar disorder     CHF (congestive heart failure)     History of hepatitis C - s/p clearance of virus (HCV neg 2015) 2014    History of psychiatric hospitalization     History of substance abuse     IV heroin - last use  per pt    Hx of psychiatric care     Hypertension     Opioid overdose 05/10/2016    Psychiatric problem "     Psychosis     Seizure disorder 04/03/2019    Sleep difficulties     Still's disease     Substance abuse     Therapy     Vasculitis        Past Surgical History:   Procedure Laterality Date    HYSTERECTOMY  3/16/2011    SALPINGOOPHORECTOMY Right 3/16/2011    TUBAL LIGATION      Vaginal cuff repair  04/22/2011       Time Tracking:     PT Received On: 04/06/24  PT Start Time: 0620     PT Stop Time: 0645  PT Total Time (min): 25 min     Billable Minutes: Evaluation 15 and Therapeutic Activity 10      04/06/2024

## 2024-04-06 NOTE — ASSESSMENT & PLAN NOTE
Severe CHF despite relatively young age  No h/o of DM  May have used cocaine and heroine in the past, can cause CM

## 2024-04-06 NOTE — PT/OT/SLP PROGRESS
Occupational Therapy      Patient Name:  Stephanie T Barthelemy   MRN:  [id number]  EVAL INITIATED WITH CHART REVIEW. PT UNABLE TO AROUSE Will follow-up ON LATER DATE AND/OR TIME.    4/6/2024  Nini Cobb, OT  [id number]

## 2024-04-06 NOTE — ASSESSMENT & PLAN NOTE
Patient with acute kidney injury/acute renal failure likely due to pre-renal azotemia due to dehydration ARABELLA is currently worsening. Baseline creatinine  1.3-1.7  - Labs reviewed- Renal function/electrolytes with Estimated Creatinine Clearance: 28.2 mL/min (A) (based on SCr of 2 mg/dL (H)). according to latest data. Monitor urine output and serial BMP and adjust therapy as needed. Avoid nephrotoxins and renally dose meds for GFR listed above.    Hold home meds Spironolactone, Lasix, Metolazone, Losartan   Gentle IV hydration   Consult Nephrology     Improving -cont IV hydration

## 2024-04-06 NOTE — HPI
Thank you for referring the pt to us. H/o and chart were reviewed. Pt was seen and examined. Pt is a 52 y/o female with h/o of CKD stage 3, severe CHF with EF 15%, polysubstance abuse, ADHD, bipolar, self-harm and neglect, who was admitted for change in MS. No direct h/o can be obtained. Per chart, there seems to have been no other issues. S Cr on admit was >5, repeat is 2.2. K is slightly low.

## 2024-04-06 NOTE — ASSESSMENT & PLAN NOTE
Patient has hypokalemia which is Acute and currently uncontrolled. Most recent potassium levels reviewed-   Lab Results   Component Value Date    K 2.2 (LL) 04/06/2024   . Will continue potassium replacement per protocol and recheck repeat levels after replacement completed.

## 2024-04-06 NOTE — PROGRESS NOTES
[facility] - Telemetry ([facility] Medicine  Progress Note    Patient Name: Stephanie T Barthelemy  MRN: [id number]  Patient Class: IP- Inpatient   Admission Date: 4/5/2024  Length of Stay: 1 days  Attending Physician: Alexandrea Ramirez MD  Primary Care Provider: Myrna, Primary Doctor        Subjective:     Principal Problem:Acute encephalopathy        HPI:  The patient is a 54 yo female with CHF (EF 15% on 01/09/2023), bipolar 1 disorder, ADHD, methamphetamine/polysubstance abuse, history of opioid overdose, history of PE (on chronic Eliquis), multiple admits to hospitals for acute on chronic CHF sec to medication and diet non compliance who presented to [facility] ED from nursing home with AMS. Pt was noted to picking her face and was not acting herself. Pt reports she went home on Easter break and used methamphetamine-last use yesterday. She was also noted to have large scab to her nose. Pt reports she fell down a slide.    In the ED, Afebrile, WBC normal, K 2.8, Serum Cr 5.1, BNP normal. . Trop 0.054. UA-trace ketones. UDS + Amphetamines and THC. ABG-normal. CT head showed nothing acute and CT cervical spine showed no fracture. CXR- mild cardiomegaly, nothing acute..  Pt received one liter IVFs and IV KCL 30 meq.     The patient was transferred to [facility] for observation under hospital medicine.         Overview/Hospital Course:  The patient is a 54 yo female with CHF (EF 15% on 01/09/2023), bipolar 1 disorder, ADHD, methamphetamine/polysubstance abuse, history of opioid overdose, history of PE (on chronic Eliquis), multiple admits to hospitals for acute on chronic CHF admitted for AMS, ARABELLA, and hypokalemia on gentle IV hydration. Potassium repleted. Pt reported she went home on Easter break and used methamphetamine-Last use was 1 day PTA. She was also noted to have large scab to her nose. Pt reports she fell down a slide. UDS + Amphetamines and THC.     Serum Cr 5.1>2.0. cont IV hydration. O2 sats dropped to  88%. CXR - clear. Breath sounds clear. Pt drowsy. Pt on multiple sedating medications for Bipolar d/o - will hold Prozac, Cogentin, Trileptal, and Seroquel this am. MRI brain pending.   Potassium repletion remains in progress.       Interval History: very [place], More drowsy today. Will hold sedating home meds (for bipolar d/o) this am due to drowsiness and mild hypoxemia requiring 2 liters NC. Cont IV hydration.     Review of Systems   Unable to perform ROS: Mental status change   Constitutional:  Positive for activity change and fatigue.   Psychiatric/Behavioral:  Positive for confusion.      Objective:     Vital Signs (Most Recent):  Temp: 98.2 °F (36.8 °C) (04/06/24 0726)  Pulse: 103 (04/06/24 0727)  Resp: 16 (04/06/24 0726)  BP: 109/69 (04/06/24 0726)  SpO2: (!) 94 % (04/06/24 0727) Vital Signs (24h Range):  Temp:  [98.2 °F (36.8 °C)-99.8 °F (37.7 °C)] 98.2 °F (36.8 °C)  Pulse:  [] 103  Resp:  [16-18] 16  SpO2:  [88 %-100 %] 94 %  BP: (100-120)/(55-69) 109/69     Weight: 67.3 kg (148 lb 5.9 oz)  Body mass index is 28.98 kg/m².    Intake/Output Summary (Last 24 hours) at 4/6/2024 1103  Last data filed at 4/6/2024 0918  Gross per 24 hour   Intake 66.6 ml   Output 200 ml   Net -133.4 ml         Physical Exam  Vitals and nursing note reviewed.   Constitutional:       General: She is not in acute distress.     Appearance: She is well-developed. She is not diaphoretic.      Comments: Drowsy   HENT:      Head: Normocephalic and atraumatic.      Right Ear: Decreased hearing noted.      Left Ear: Decreased hearing noted.      Nose: Nose normal.   Eyes:      General: No scleral icterus.     Conjunctiva/sclera: Conjunctivae normal.   Neck:      Trachea: No tracheal deviation.   Cardiovascular:      Rate and Rhythm: Normal rate and regular rhythm.      Heart sounds: Normal heart sounds. No murmur heard.     No friction rub. No gallop.   Pulmonary:      Effort: Pulmonary effort is normal. No respiratory distress.       Breath sounds: Normal breath sounds. No stridor. No wheezing or rales.   Chest:      Chest wall: No tenderness.   Abdominal:      General: Bowel sounds are normal. There is no distension.      Palpations: Abdomen is soft. There is no mass.      Tenderness: There is no abdominal tenderness. There is no guarding or rebound.   Musculoskeletal:         General: No tenderness or deformity. Normal range of motion.      Cervical back: Normal range of motion and neck supple.   Skin:     General: Skin is warm and dry.      Coloration: Skin is not pale.      Findings: No erythema or rash.   Neurological:      Mental Status: She is oriented to person, place, and time.      Cranial Nerves: No cranial nerve deficit.      Motor: No abnormal muscle tone.      Coordination: Coordination normal.      Comments: Pt drowsy but easily arousable  Ate 100% breakfast tray.              Significant Labs: All pertinent labs within the past 24 hours have been reviewed.    Significant Imaging: I have reviewed all pertinent imaging results/findings within the past 24 hours.    Assessment/Plan:      * Acute encephalopathy  Check MRI brain  Likely 2/2 ARABELLA/dehydration/substance abuse   Cont gentle IV hydration   Neuro checks     Remains drowsy- will hold bipolar meds this am until more awake. MRI brain pending.         ARABELLA (acute kidney injury)  Patient with acute kidney injury/acute renal failure likely due to pre-renal azotemia due to dehydration ARABELLA is currently worsening. Baseline creatinine  1.3-1.7  - Labs reviewed- Renal function/electrolytes with Estimated Creatinine Clearance: 28.2 mL/min (A) (based on SCr of 2 mg/dL (H)). according to latest data. Monitor urine output and serial BMP and adjust therapy as needed. Avoid nephrotoxins and renally dose meds for GFR listed above.    Hold home meds Spironolactone, Lasix, Metolazone, Losartan   Gentle IV hydration   Consult Nephrology     Improving -cont IV hydration     Hypokalemia  Patient has  hypokalemia which is Acute and currently uncontrolled. Most recent potassium levels reviewed-   Lab Results   Component Value Date    K 2.2 (LL) 04/06/2024   . Will continue potassium replacement per protocol and recheck repeat levels after replacement completed.     History of pulmonary embolism  Hx PE on chronic anticoagulation   Cont Eliquis       Polysubstance abuse  Reported using methamphetamines- last use 4/4/24  UDS +meth and THC         Chronic combined systolic and diastolic heart failure  Patient is identified as having Combined Systolic and Diastolic heart failure that is Chronic. CHF is currently controlled. Latest ECHO performed and demonstrates- Results for orders placed during the hospital encounter of 01/07/23    Echo    Interpretation Summary  · The left ventricle is mildly enlarged with eccentric hypertrophy and severely decreased systolic function.  · The estimated ejection fraction is 15%.  · Grade II left ventricular diastolic dysfunction.  · There is severe left ventricular global hypokinesis.  · With moderately reduced right ventricular systolic function.  · Severe left atrial enlargement.  · Moderate right atrial enlargement.  · Severe mitral regurgitation.  · Mild tricuspid regurgitation.  · Mild pulmonic regurgitation.  · There is pulmonary hypertension.  · The estimated PA systolic pressure is 43 mmHg.  · Elevated central venous pressure (15 mmHg).  . Continue Beta Blocker and monitor clinical status closely. Monitor on telemetry. Patient is off CHF pathway.  Monitor strict Is&Os and daily weights.  Place on fluid restriction of 1.5 L. Cardiology has not been consulted. Continue to stress to patient importance of self efficacy and  on diet for CHF. Last BNP reviewed- and noted below   Recent Labs   Lab 04/05/24  1136   BNP 59      Hold diuretics and Losartan due to ARABELLA    Bipolar disorder  Appears stable   Cont home meds Prozac, Cogentin, Trileptal, and Seroquel     Hold  meds this am until more awake       Essential hypertension  Chronic, controlled. Latest blood pressure and vitals reviewed-     Temp:  [98.7 °F (37.1 °C)-98.9 °F (37.2 °C)]   Pulse:  [73-83]   Resp:  [16-19]   BP: ()/(55-64)   SpO2:  [95 %-100 %] .   Home meds for hypertension were reviewed and noted below.   Hypertension Medications               furosemide (LASIX) 40 MG tablet Take 40 mg by mouth 2 (two) times a day.    losartan (COZAAR) 25 MG tablet Take 1 tablet (25 mg total) by mouth once daily.    metOLazone (ZAROXOLYN) 2.5 MG tablet Take 2.5 mg by mouth once daily.    metoprolol succinate (TOPROL-XL) 25 MG 24 hr tablet Take 1 tablet (25 mg total) by mouth once daily.    spironolactone (ALDACTONE) 50 MG tablet Take 50 mg by mouth 2 (two) times daily.            While in the hospital, will manage blood pressure as follows; Adjust home antihypertensive regimen as follows- hold home meds except Toprol     Will utilize p.r.n. blood pressure medication only if patient's blood pressure greater than 160/100 and she develops symptoms such as worsening chest pain or shortness of breath.      VTE Risk Mitigation (From admission, onward)           Ordered     apixaban tablet 5 mg  2 times daily         04/05/24 1844     IP VTE HIGH RISK PATIENT  Once         04/05/24 1844     Place sequential compression device  Until discontinued         04/05/24 1844                    Discharge Planning   AYSHA:      Code Status: Full Code   Is the patient medically ready for discharge?:     Reason for patient still in hospital (select all that apply): Patient trending condition                     Vita Chilel NP  Department of Hospital Medicine   '[facility] - Telemetry ([facility])

## 2024-04-06 NOTE — ASSESSMENT & PLAN NOTE
Appears stable   Cont home meds Prozac, Cogentin, Trileptal, and Seroquel     Hold meds this am until more awake

## 2024-04-06 NOTE — HOSPITAL COURSE
The patient is a 52 yo female with CHF (EF 15% on 01/09/2023), bipolar 1 disorder, ADHD, methamphetamine abuse, polysubstance abuse, history of opioid overdose, history of PE (on chronic Eliquis), multiple admits to hospitals for acute on chronic CHF who was admitted to [facility] for AMS, ARABELLA, and hypokalemia on gentle IV hydration. Pt reported she went home on Easter break and used methamphetamine-last use was 1 day PTA. She was also noted to have large scab to her nose. Pt reports she fell down a slide. UDS + Amphetamines and THC.   Home meds Losartan, Spironolactone, Metolazone, and Lasix held. Potassium repleted.   Serum Cr 5.1>1.1. ARF resolved with IV hydration and holding home meds.  Initially, pt was very drowsy but oriented x 3.Home meds for Bipolar d/o held- Prozac, Cogentin, Trileptal, and Seroquel. MRI brain showed nothing acute. Slowly, pt improved. AMS resolved. Suspect that altered mentation is most likely secondary to oversedation in setting of substance use, pt's psychotropic medications and decreased clearance due to ARABELLA.   Will restart home meds Prozac, Cogentin, Trileptal, and Seroquel. Pt will f/u will need to f/u  with Psychiatry. Ambulatory referral made for Psychiatry.   For discharge, will change Lasix BID to prn and continue to hold Losartan, Spironolactone, and Metolazone. Pt will need to f/u with PCP and Cardiology to restart these medications.Ambulatory referral made for Cardiology.   The patient was seen and examined today and determined to be stable for discharge back to nursing home.

## 2024-04-07 VITALS
WEIGHT: 148.38 LBS | BODY MASS INDEX: 29.13 KG/M2 | RESPIRATION RATE: 18 BRPM | OXYGEN SATURATION: 96 % | HEART RATE: 69 BPM | TEMPERATURE: 99 F | SYSTOLIC BLOOD PRESSURE: 99 MMHG | DIASTOLIC BLOOD PRESSURE: 62 MMHG | HEIGHT: 60 IN

## 2024-04-07 LAB
ALBUMIN SERPL BCP-MCNC: 3 G/DL (ref 3.5–5.2)
ALP SERPL-CCNC: 93 U/L (ref 55–135)
ALT SERPL W/O P-5'-P-CCNC: 17 U/L (ref 10–44)
ANION GAP SERPL CALC-SCNC: 10 MMOL/L (ref 8–16)
ANION GAP SERPL CALC-SCNC: 13 MMOL/L (ref 8–16)
AORTIC ROOT ANNULUS: 2.79 CM
ASCENDING AORTA: 3.08 CM
AST SERPL-CCNC: 27 U/L (ref 10–40)
AV INDEX (PROSTH): 0.82
AV MEAN GRADIENT: 6 MMHG
AV PEAK GRADIENT: 11 MMHG
AV VALVE AREA BY VELOCITY RATIO: 2.53 CM²
AV VALVE AREA: 2.46 CM²
AV VELOCITY RATIO: 0.84
BASOPHILS # BLD AUTO: 0.01 K/UL (ref 0–0.2)
BASOPHILS NFR BLD: 0.2 % (ref 0–1.9)
BILIRUB SERPL-MCNC: 0.4 MG/DL (ref 0.1–1)
BSA FOR ECHO PROCEDURE: 1.69 M2
BUN SERPL-MCNC: 29 MG/DL (ref 6–20)
BUN SERPL-MCNC: 34 MG/DL (ref 6–20)
CALCIUM SERPL-MCNC: 8.8 MG/DL (ref 8.7–10.5)
CALCIUM SERPL-MCNC: 9 MG/DL (ref 8.7–10.5)
CHLORIDE SERPL-SCNC: 100 MMOL/L (ref 95–110)
CHLORIDE SERPL-SCNC: 102 MMOL/L (ref 95–110)
CO2 SERPL-SCNC: 23 MMOL/L (ref 23–29)
CO2 SERPL-SCNC: 27 MMOL/L (ref 23–29)
CREAT SERPL-MCNC: 1.1 MG/DL (ref 0.5–1.4)
CREAT SERPL-MCNC: 1.2 MG/DL (ref 0.5–1.4)
CV ECHO LV RWT: 0.36 CM
DIFFERENTIAL METHOD BLD: ABNORMAL
DOP CALC AO PEAK VEL: 1.66 M/S
DOP CALC AO VTI: 30 CM
DOP CALC LVOT AREA: 3 CM2
DOP CALC LVOT DIAMETER: 1.96 CM
DOP CALC LVOT PEAK VEL: 1.39 M/S
DOP CALC LVOT STROKE VOLUME: 73.88 CM3
DOP CALC RVOT PEAK VEL: 0.77 M/S
DOP CALC RVOT VTI: 11.3 CM
DOP CALCLVOT PEAK VEL VTI: 24.5 CM
E WAVE DECELERATION TIME: 162.72 MSEC
E/A RATIO: 1.03
E/E' RATIO: 14.62 M/S
ECHO LV POSTERIOR WALL: 1.08 CM (ref 0.6–1.1)
EOSINOPHIL # BLD AUTO: 0.1 K/UL (ref 0–0.5)
EOSINOPHIL NFR BLD: 1.8 % (ref 0–8)
ERYTHROCYTE [DISTWIDTH] IN BLOOD BY AUTOMATED COUNT: 13.2 % (ref 11.5–14.5)
EST. GFR  (NO RACE VARIABLE): 54 ML/MIN/1.73 M^2
EST. GFR  (NO RACE VARIABLE): >60 ML/MIN/1.73 M^2
FRACTIONAL SHORTENING: 15 % (ref 28–44)
GLUCOSE SERPL-MCNC: 135 MG/DL (ref 70–110)
GLUCOSE SERPL-MCNC: 88 MG/DL (ref 70–110)
HCT VFR BLD AUTO: 34.3 % (ref 37–48.5)
HGB BLD-MCNC: 11.7 G/DL (ref 12–16)
IMM GRANULOCYTES # BLD AUTO: 0.02 K/UL (ref 0–0.04)
IMM GRANULOCYTES NFR BLD AUTO: 0.4 % (ref 0–0.5)
INTERVENTRICULAR SEPTUM: 1.09 CM (ref 0.6–1.1)
IVC DIAMETER: 1.66 CM
IVRT: 102.76 MSEC
LA MAJOR: 5.23 CM
LA MINOR: 5.59 CM
LA WIDTH: 3.8 CM
LEFT ATRIUM SIZE: 4.33 CM
LEFT ATRIUM VOLUME INDEX: 46.1 ML/M2
LEFT ATRIUM VOLUME: 75.58 CM3
LEFT INTERNAL DIMENSION IN SYSTOLE: 5.05 CM (ref 2.1–4)
LEFT VENTRICLE DIASTOLIC VOLUME INDEX: 108.24 ML/M2
LEFT VENTRICLE DIASTOLIC VOLUME: 177.51 ML
LEFT VENTRICLE MASS INDEX: 166 G/M2
LEFT VENTRICLE SYSTOLIC VOLUME INDEX: 73.9 ML/M2
LEFT VENTRICLE SYSTOLIC VOLUME: 121.14 ML
LEFT VENTRICULAR INTERNAL DIMENSION IN DIASTOLE: 5.96 CM (ref 3.5–6)
LEFT VENTRICULAR MASS: 271.55 G
LV LATERAL E/E' RATIO: 13.57 M/S
LV SEPTAL E/E' RATIO: 15.83 M/S
LVOT MG: 4.36 MMHG
LVOT MV: 0.98 CM/S
LYMPHOCYTES # BLD AUTO: 0.9 K/UL (ref 1–4.8)
LYMPHOCYTES NFR BLD: 19.3 % (ref 18–48)
MAGNESIUM SERPL-MCNC: 2.2 MG/DL (ref 1.6–2.6)
MCH RBC QN AUTO: 30.3 PG (ref 27–31)
MCHC RBC AUTO-ENTMCNC: 34.1 G/DL (ref 32–36)
MCV RBC AUTO: 89 FL (ref 82–98)
MONOCYTES # BLD AUTO: 0.8 K/UL (ref 0.3–1)
MONOCYTES NFR BLD: 17 % (ref 4–15)
MV PEAK A VEL: 0.92 M/S
MV PEAK E VEL: 0.95 M/S
NEUTROPHILS # BLD AUTO: 3 K/UL (ref 1.8–7.7)
NEUTROPHILS NFR BLD: 61.3 % (ref 38–73)
NRBC BLD-RTO: 0 /100 WBC
OHS LV EJECTION FRACTION SIMPSONS BIPLANE MOD: 20 %
PHOSPHATE SERPL-MCNC: 1.6 MG/DL (ref 2.7–4.5)
PISA MRMAX VEL: 4.75 M/S
PISA TR MAX VEL: 2.56 M/S
PLATELET # BLD AUTO: 183 K/UL (ref 150–450)
PMV BLD AUTO: 12.1 FL (ref 9.2–12.9)
POCT GLUCOSE: 102 MG/DL (ref 70–110)
POCT GLUCOSE: 121 MG/DL (ref 70–110)
POTASSIUM SERPL-SCNC: 2.8 MMOL/L (ref 3.5–5.1)
POTASSIUM SERPL-SCNC: 3.4 MMOL/L (ref 3.5–5.1)
PROT SERPL-MCNC: 6.9 G/DL (ref 6–8.4)
PV MEAN GRADIENT: 1 MMHG
PV MV: 0.74 M/S
PV PEAK GRADIENT: 7 MMHG
PV PEAK VELOCITY: 1.31 M/S
RA MAJOR: 5.21 CM
RA PRESSURE ESTIMATED: 8 MMHG
RA WIDTH: 3.8 CM
RBC # BLD AUTO: 3.86 M/UL (ref 4–5.4)
RIGHT VENTRICULAR END-DIASTOLIC DIMENSION: 2.42 CM
RV TB RVSP: 11 MMHG
RV TISSUE DOPPLER FREE WALL SYSTOLIC VELOCITY 1 (APICAL 4 CHAMBER VIEW): 13.37 CM/S
SODIUM SERPL-SCNC: 137 MMOL/L (ref 136–145)
SODIUM SERPL-SCNC: 138 MMOL/L (ref 136–145)
STJ: 2.74 CM
TDI LATERAL: 0.07 M/S
TDI SEPTAL: 0.06 M/S
TDI: 0.07 M/S
TR MAX PG: 26 MMHG
TRICUSPID ANNULAR PLANE SYSTOLIC EXCURSION: 2.3 CM
TV REST PULMONARY ARTERY PRESSURE: 34 MMHG
WBC # BLD AUTO: 4.88 K/UL (ref 3.9–12.7)
Z-SCORE OF LEFT VENTRICULAR DIMENSION IN END DIASTOLE: 2.56
Z-SCORE OF LEFT VENTRICULAR DIMENSION IN END SYSTOLE: 4.49

## 2024-04-07 PROCEDURE — 83735 ASSAY OF MAGNESIUM: CPT | Performed by: NURSE PRACTITIONER

## 2024-04-07 PROCEDURE — 25000003 PHARM REV CODE 250: Performed by: NURSE PRACTITIONER

## 2024-04-07 PROCEDURE — 97166 OT EVAL MOD COMPLEX 45 MIN: CPT

## 2024-04-07 PROCEDURE — 84100 ASSAY OF PHOSPHORUS: CPT | Performed by: NURSE PRACTITIONER

## 2024-04-07 PROCEDURE — 36415 COLL VENOUS BLD VENIPUNCTURE: CPT | Performed by: NURSE PRACTITIONER

## 2024-04-07 PROCEDURE — 85025 COMPLETE CBC W/AUTO DIFF WBC: CPT | Performed by: NURSE PRACTITIONER

## 2024-04-07 PROCEDURE — 80048 BASIC METABOLIC PNL TOTAL CA: CPT | Performed by: NURSE PRACTITIONER

## 2024-04-07 PROCEDURE — 25500020 PHARM REV CODE 255: Performed by: INTERNAL MEDICINE

## 2024-04-07 PROCEDURE — 97530 THERAPEUTIC ACTIVITIES: CPT

## 2024-04-07 PROCEDURE — 97535 SELF CARE MNGMENT TRAINING: CPT

## 2024-04-07 PROCEDURE — 25000003 PHARM REV CODE 250: Performed by: INTERNAL MEDICINE

## 2024-04-07 PROCEDURE — 97116 GAIT TRAINING THERAPY: CPT

## 2024-04-07 PROCEDURE — 99233 SBSQ HOSP IP/OBS HIGH 50: CPT | Mod: ,,, | Performed by: INTERNAL MEDICINE

## 2024-04-07 PROCEDURE — 80053 COMPREHEN METABOLIC PANEL: CPT | Performed by: NURSE PRACTITIONER

## 2024-04-07 RX ORDER — POTASSIUM CHLORIDE 20 MEQ/1
40 TABLET, EXTENDED RELEASE ORAL EVERY 4 HOURS
Status: DISCONTINUED | OUTPATIENT
Start: 2024-04-07 | End: 2024-04-07

## 2024-04-07 RX ORDER — POTASSIUM CHLORIDE 20 MEQ/1
40 TABLET, EXTENDED RELEASE ORAL ONCE
Status: COMPLETED | OUTPATIENT
Start: 2024-04-07 | End: 2024-04-07

## 2024-04-07 RX ORDER — POTASSIUM CHLORIDE 20 MEQ/1
40 TABLET, EXTENDED RELEASE ORAL ONCE
Status: DISCONTINUED | OUTPATIENT
Start: 2024-04-07 | End: 2024-04-07

## 2024-04-07 RX ORDER — FUROSEMIDE 40 MG/1
40 TABLET ORAL 2 TIMES DAILY PRN
Start: 2024-04-07 | End: 2024-05-10 | Stop reason: SDUPTHER

## 2024-04-07 RX ADMIN — BENZTROPINE MESYLATE 0.5 MG: 0.5 TABLET ORAL at 08:04

## 2024-04-07 RX ADMIN — DOCUSATE SODIUM AND SENNOSIDES 1 TABLET: 8.6; 5 TABLET ORAL at 08:04

## 2024-04-07 RX ADMIN — POTASSIUM PHOSPHATE, MONOBASIC AND POTASSIUM PHOSPHATE, DIBASIC 30 MMOL: 224; 236 INJECTION, SOLUTION, CONCENTRATE INTRAVENOUS at 10:04

## 2024-04-07 RX ADMIN — LIDOCAINE 1 PATCH: 50 PATCH CUTANEOUS at 09:04

## 2024-04-07 RX ADMIN — MUPIROCIN: 20 OINTMENT TOPICAL at 09:04

## 2024-04-07 RX ADMIN — POTASSIUM CHLORIDE 40 MEQ: 1500 TABLET, EXTENDED RELEASE ORAL at 09:04

## 2024-04-07 RX ADMIN — METOPROLOL SUCCINATE 25 MG: 25 TABLET, EXTENDED RELEASE ORAL at 08:04

## 2024-04-07 RX ADMIN — POTASSIUM CHLORIDE 40 MEQ: 1500 TABLET, EXTENDED RELEASE ORAL at 02:04

## 2024-04-07 RX ADMIN — FLUOXETINE 20 MG: 20 CAPSULE ORAL at 08:04

## 2024-04-07 RX ADMIN — HUMAN ALBUMIN MICROSPHERES AND PERFLUTREN 0.11 MG: 10; .22 INJECTION, SOLUTION INTRAVENOUS at 11:04

## 2024-04-07 RX ADMIN — APIXABAN 5 MG: 2.5 TABLET, FILM COATED ORAL at 08:04

## 2024-04-07 RX ADMIN — FAMOTIDINE 20 MG: 20 TABLET ORAL at 08:04

## 2024-04-07 NOTE — PT/OT/SLP EVAL
Occupational Therapy Evaluation and Treatment    Name: Stephanie T Barthelemy  MRN: [id number]  Admitting Diagnosis: Acute encephalopathy  Recent Surgery: * No surgery found *      Recommendations:     Discharge Recommendations: Moderate Intensity Therapy  Level of Assistance Recommended: 24 hours light assistance  Discharge Equipment Recommendations: to be determined by next level of care  Barriers to discharge:      Assessment:     Stephanie T Barthelemy is a 53 y.o. female with a medical diagnosis of Acute encephalopathy. She presents with performance deficits affecting function including weakness, impaired endurance, decreased coordination, impaired self care skills, decreased lower extremity function, impaired functional mobility, gait instability, decreased safety awareness, pain, impaired balance.     Rehab Prognosis: Good; patient would benefit from acute OT services to address these deficits and reach maximum level of function.    Plan:     Patient to be seen 2 x/week to address the above listed problems via self-care/home management, therapeutic activities, therapeutic exercises  Plan of Care Expires:    Plan of Care Reviewed with: patient, spouse    Subjective     Chief Complaint: DEBILITY AND GENERALIZED WEAKNESS  Patient Comments/Goals: GET STRONGER  Pain/Comfort:  Pain Rating 1: 0/10    Patients cultural, spiritual, Scientologist conflicts given the current situation:      Social History:  Living Environment: Patient lives in a nursing home in a single story home with can live on 1st floor  Prior Level of Function: Prior to admission, patient was independent  Roles and Routines: Patient was not driving and not working prior to admission.  Equipment Used at Home: wheelchair  DME owned (not currently used): none  Assistance Upon Discharge: facility staff    Objective:     Communicated with nurse Luque prior to session. Patient found HOB elevated with telemetry, peripheral IV upon OT entry to room.    General  Precautions: Standard, fall   Orthopedic Precautions: N/A   Braces: N/A    Respiratory Status: Room air    Occupational Performance    Gait belt applied - Yes    Bed Mobility:   Rolling/Turning to Right with stand by assistance  Scooting anteriorly to EOB to have both feet planted on floor: stand by assistance  Supine to sit from right side of bed with stand by assistance    Functional Mobility/Transfers:  Sit <> Stand Transfer with minimum assistance and 2 persons with rolling walker  Functional Mobility:  pt ambulated  40 feet with min a x 2 / hand held assist    Activities of Daily Living:  Upper Body Dressing: minimum assistance  Lower Body Dressing: contact guard assistance  Toileting: minimum assistance    Cognitive/Visual Perceptual:  Cognitive/Psychosocial Skills:    -     Oriented to: Person, Place, Time, Situation  -     Follows Commands/attention: Follows multistep  commands  -     Communication: clear/fluent  -     Memory: No Deficits noted  -     Safety awareness/insight to disability: impaired    Physical Exam:  Upper Extremity Range of Motion:     -       Right Upper Extremity: WFL  -       Left Upper Extremity: WFL  Upper Extremity Strength:    -       Right Upper Extremity: mmt: 3/5 grossly  -       Left Upper Extremity: mmt: 3/5 grossly   Strength:    -       Right Upper Extremity: mmt: 3/5 grossly  -       Left Upper Extremity: mmt: 3/5 grossly    AMPAC 6 Click ADL:  AMPAC Total Score: 17    Treatment & Education:  Educated patient on importance of increased tolerance to upright position and direct impact on CV endurance and strength. Patient encouraged to sit up in chair/ EOB, for a minimum of 2 consecutive hours including for all meals.. Encouraged patient to perform AROM TE to BUE throughout the day within all available planes of motion. Re enforced importance of utilizing call light to meet needs in room and not attempt to get up without staff assistance. Patient verbalized understanding  and agreed to comply.       Patient clear to stand pivot transfer with RN/PCT, assist xmin a with step<pivot transfer .    Patient left up in chair with all lines intact, call button in reach, and RN notified.    GOALS:   Multidisciplinary Problems       Occupational Therapy Goals          Problem: Occupational Therapy    Goal Priority Disciplines Outcome Interventions   Occupational Therapy Goal     OT, PT/OT     Description: O.T. GOALS MET BY 4-20-24  S WITH UE DRESSING  PT WILL TOLERATE 1 SET X 20 REPS B UE ROM EXERCISE  S WITH TOILET TRANFERS  S WITH LE DRESSING                           History:     Past Medical History:   Diagnosis Date    ADHD (attention deficit hyperactivity disorder)     Anemia     Anxiety     Bipolar disorder     CHF (congestive heart failure)     History of hepatitis C - s/p clearance of virus (HCV neg 6/2015) 09/26/2014    History of psychiatric hospitalization     History of substance abuse     IV heroin - last use 2013 per pt    Hx of psychiatric care     Hypertension     Opioid overdose 05/10/2016    Psychiatric problem     Psychosis     Seizure disorder 04/03/2019    Sleep difficulties     Still's disease     Substance abuse     Therapy     Vasculitis          Past Surgical History:   Procedure Laterality Date    HYSTERECTOMY  3/16/2011    SALPINGOOPHORECTOMY Right 3/16/2011    TUBAL LIGATION      Vaginal cuff repair  04/22/2011       Time Tracking:     OT Date of Treatment: 04/07/24  OT Start Time: 0838  OT Stop Time: 0916  OT Total Time (min): 38 min    Billable Minutes: Evaluation 15 minutes, Self Care/Home Management 8 minutes, and Therapeutic Activity 15 minutes    4/7/2024

## 2024-04-07 NOTE — CONSULTS
Jorje spoke with German at [facility]. Pt will discharge back to [facility]. Report can be called to German at [phone]. Once she receives report she will arrange transportation. Jorje updated pt nurse.

## 2024-04-07 NOTE — CONSULTS
[facility] - Telemetry (Hospital)  Adult Nutrition  Consult Note    SUMMARY     Recommendations  1) Continue cardiac diet - fluid restrictions per MD    2) Encourage oral intake of meals    3) Consider commercial beverage if po intake < 50% (caution of fluid overload)   4) Monitor weights, labs, and I/Os    5) RD to monitor and follow up    Goals:   Pt will tolerate 50-75% EEN/EPN by RD follow up  Nutrition Goal Status: new  Communication of RD Recs:  (POC)    Assessment and Plan  Nutrition Problem  Inadequate oral intake    Related to (etiology):   Diagnosis related symptoms    Signs and Symptoms (as evidenced by):   AMS    Interventions  (treatment strategy):  Collaboration with other providers    Nutrition Diagnosis Status:   New      Malnutrition Assessment           MAYELIN RD providing remote coverage.                            Reason for Assessment    Reason For Assessment: consult (malnutrition)  Diagnosis:  (Acute encephalopathy)  Relevant Medical History:   Patient Active Problem List   Diagnosis    LBP (low back pain)    Tobacco abuse    Bilateral lower extremity edema    Proteinuria    Hypoalbuminemia    Essential hypertension    Anemia    History of hepatitis C - s/p clearance of virus (HCV neg 6/2015)    Intravenous drug abuse    Mixed hearing loss, bilateral    Spine metastasis    Sarcoidosis of lymph nodes    Debility    Otosclerosis of both ears    Muscle pain    Fibromyalgia    Vasculitis    Unspecified mood (affective) disorder    Genital herpes    Tingling in extremities    Leg pain, bilateral    Long term current use of opiate analgesic    Secondary hypertension    ARABELLA (acute kidney injury)    Amphetamine abuse    Attention deficit hyperactivity disorder (ADHD), combined type    Cocaine abuse    Hypokalemia    Depression    Methamphetamine use disorder, severe    Cannabis abuse    Substance abuse    Seizure disorder    Methamphetamine addiction    Bipolar disorder    Laceration of left upper  extremity    Mixed hyperlipidemia    Abnormal urinalysis    Hypertensive emergency without congestive heart failure    Methamphetamine-induced psychotic disorder    Psychosis    Psychoactive substance-induced organic hallucinosis    Chronic combined systolic and diastolic heart failure    Elevated troponin    COVID-19    Acute encephalopathy    Acute psychosis    COVID-19 virus infection    Sepsis    Agitation    Acute on chronic combined systolic and diastolic congestive heart failure    Polysubstance abuse    Elevated LFTs    Anasarca    Cardiorenal syndrome    Advance care planning    CHF exacerbation    NICM (nonischemic cardiomyopathy)    Abnormal EKG    Panic attacks    Palliative care encounter    AMS (altered mental status)    History of pulmonary embolism     Interdisciplinary Rounds: did not attend  General Information Comments: Patient admitted for acute encephalopathy. Pt currently receiving a cardiac diet with 1 L fluid restriction. Nutrition consult received for malnutrition status - unable to assess at this time d/t RD providing weekend remote coverage. No recent weight loss noted per chart review. PIV. Chase 20 - skin intact. In-house RD to follow up.  Nutrition Discharge Planning: dc on healthy oral diet post discharge    Nutrition Risk Screen    Nutrition Risk Screen: no indicators present    Nutrition/Diet History    Factors Affecting Nutritional Intake: impaired cognitive status/motor control    Anthropometrics    Temp: 97.6 °F (36.4 °C)  Height Method: Estimated  Height: 5' (152.4 cm)  Height (inches): 60 in  Weight Method: Bed Scale  Weight: 67.3 kg (148 lb 5.9 oz)  Weight (lb): 148.37 lb  Ideal Body Weight (IBW), Female: 100 lb  % Ideal Body Weight, Female (lb): 148.37 %  BMI (Calculated): 29  BMI Grade: 25 - 29.9 - overweight       Lab/Procedures/Meds    Pertinent Labs Reviewed: reviewed  CBC:  Recent Labs   Lab 04/07/24  0549   WBC 4.88   HGB 11.7*   HCT 34.3*        CMP:  Recent  Labs   Lab 04/07/24  0549   CALCIUM 9.0   ALBUMIN 3.0*   PROT 6.9      K 2.8*   CO2 23      BUN 34*   CREATININE 1.2   ALKPHOS 93   ALT 17   AST 27   BILITOT 0.4     BMP:   Recent Labs   Lab 04/07/24  0549   GLU 88      K 2.8*      CO2 23   BUN 34*   CREATININE 1.2   CALCIUM 9.0   MG 2.2       Pertinent Medications Reviewed: reviewed  Scheduled Meds:   apixaban  5 mg Oral BID    benztropine  0.5 mg Oral BID    famotidine  20 mg Oral Daily    FLUoxetine  20 mg Oral Daily    LIDOcaine  1 patch Transdermal Q24H    metoprolol succinate  25 mg Oral Daily    mupirocin   Nasal BID    potassium chloride  40 mEq Oral Q4H    potassium phosphate IVPB  30 mmol Intravenous Once    senna-docusate 8.6-50 mg  1 tablet Oral BID     Continuous Infusions:  PRN Meds:.acetaminophen, albuterol-ipratropium, dextrose 10%, dextrose 10%, glucagon (human recombinant), glucose, glucose, naloxone, ondansetron, polyethylene glycol, prochlorperazine, QUEtiapine, simethicone, sodium chloride 0.9%      Estimated/Assessed Needs    Weight Used For Calorie Calculations: 67.3 kg (148 lb 5.9 oz)  Energy Calorie Requirements (kcal): 3640-4626  Energy Need Method: Kcal/kg (25-35)  Protein Requirements: 54 - 67 g (0.8-1 g/kg)  Weight Used For Protein Calculations: 67.3 kg (148 lb 5.9 oz)  Fluid Requirements (mL): < 1000 L  Estimated Fluid Requirement Method:  (or per MD)  RDA Method (mL): 1683         Nutrition Prescription Ordered    Current Diet Order: Cardiac - 1000 mL fluid    Evaluation of Received Nutrient/Fluid Intake    I/O: - / 100  Energy Calories Required: not meeting needs  Protein Required: not meeting needs  Fluid Required: not meeting needs  Comments: LBM: 4/5/24  % Intake of Estimated Energy Needs: 0 - 25 %  % Meal Intake: 0 - 25 %    Intake/Output - Last 3 Shifts         04/05 0700 04/06 0659 04/06 0700 04/07 0659 04/07 0700 04/08 0659    I.V. (mL/kg)  0 (0)     IV Piggyback 66.6 0     Total Intake(mL/kg) 66.6  (1) 0 (0)     Urine (mL/kg/hr)  400 (0.2) 100 (0.3)    Total Output  400 100    Net +66.6 -400 -100           Urine Occurrence 2 x              Nutrition Risk    Level of Risk/Frequency of Follow-up:  (1-2 x/week)       Monitor and Evaluation    Food and Nutrient Intake: energy intake, food and beverage intake  Food and Nutrient Adminstration: diet order  Physical Activity and Function: nutrition-related ADLs and IADLs  Anthropometric Measurements: weight, weight change  Biochemical Data, Medical Tests and Procedures: electrolyte and renal panel, gastrointestinal profile, glucose/endocrine profile, inflammatory profile, lipid profile       Nutrition Follow-Up    RD Follow-up?: Yes    Bere Murray RDN, ROSHNIN

## 2024-04-07 NOTE — SUBJECTIVE & OBJECTIVE
Interval History: Pt was seen and examined. Labs and meds reviewed. No new events. No c/o's.    Review of patient's allergies indicates:  No Known Allergies  Current Facility-Administered Medications   Medication Frequency    acetaminophen tablet 650 mg Q4H PRN    albuterol-ipratropium 2.5 mg-0.5 mg/3 mL nebulizer solution 3 mL Q6H PRN    apixaban tablet 5 mg BID    benztropine tablet 0.5 mg BID    dextrose 10% bolus 125 mL 125 mL PRN    dextrose 10% bolus 250 mL 250 mL PRN    famotidine tablet 20 mg Daily    FLUoxetine capsule 20 mg Daily    glucagon (human recombinant) injection 1 mg PRN    glucose chewable tablet 16 g PRN    glucose chewable tablet 24 g PRN    LIDOcaine 5 % patch 1 patch Q24H    metoprolol succinate (TOPROL-XL) 24 hr tablet 25 mg Daily    mupirocin 2 % ointment BID    naloxone 0.4 mg/mL injection 0.02 mg PRN    ondansetron injection 4 mg Q6H PRN    polyethylene glycol packet 17 g BID PRN    potassium phosphate 30 mmol in dextrose 5 % (D5W) 500 mL infusion Once    prochlorperazine injection Soln 5 mg Q6H PRN    QUEtiapine tablet 100 mg Nightly PRN    senna-docusate 8.6-50 mg per tablet 1 tablet BID    simethicone chewable tablet 80 mg QID PRN    sodium chloride 0.9% flush 10 mL Q8H PRN       Objective:     Vital Signs (Most Recent):  Temp: 98.7 °F (37.1 °C) (04/07/24 1554)  Pulse: 69 (04/07/24 1554)  Resp: 18 (04/07/24 1554)  BP: 99/62 (04/07/24 1554)  SpO2: 96 % (04/07/24 1554) Vital Signs (24h Range):  Temp:  [96.9 °F (36.1 °C)-99.8 °F (37.7 °C)] 98.7 °F (37.1 °C)  Pulse:  [65-84] 69  Resp:  [17-18] 18  SpO2:  [91 %-99 %] 96 %  BP: ()/(51-62) 99/62     Weight: 67.3 kg (148 lb 5.9 oz) (04/07/24 1109)  Body mass index is 28.98 kg/m².  Body surface area is 1.69 meters squared.    I/O last 3 completed shifts:  In: 0   Out: 400 [Urine:400]     Physical Exam  Vitals and nursing note reviewed.   Constitutional:       Appearance: Normal appearance.   Cardiovascular:      Rate and Rhythm: Normal  rate and regular rhythm.      Pulses: Normal pulses.      Heart sounds: Normal heart sounds.   Pulmonary:      Effort: Pulmonary effort is normal.      Breath sounds: No rales.   Abdominal:      Palpations: Abdomen is soft.      Tenderness: There is no abdominal tenderness.   Musculoskeletal:      Right lower leg: No edema.      Left lower leg: No edema.   Neurological:      Mental Status: She is alert and oriented to person, place, and time.   Psychiatric:         Behavior: Behavior normal.          Significant Labs: reviewed  BMP  Lab Results   Component Value Date     04/07/2024    K 3.4 (L) 04/07/2024     04/07/2024    CO2 27 04/07/2024    BUN 29 (H) 04/07/2024    CREATININE 1.1 04/07/2024    CALCIUM 8.8 04/07/2024    ANIONGAP 10 04/07/2024    EGFRNORACEVR >60 04/07/2024     Lab Results   Component Value Date    WBC 4.88 04/07/2024    HGB 11.7 (L) 04/07/2024    HCT 34.3 (L) 04/07/2024    MCV 89 04/07/2024     04/07/2024         Significant Imaging: reviewed

## 2024-04-07 NOTE — DISCHARGE SUMMARY
[facility] - Telemetry ([facility])  [facility] Medicine  Discharge Summary      Patient Name: Stephanie T Barthelemy  MRN: [id number]  [facility]: [id number]  Patient Class: IP- Inpatient  Admission Date: 4/5/2024  Hospital Length of Stay: 2 days  Discharge Date and Time:  04/07/2024 2:11 PM  Attending Physician: Alexandrea Ramirez MD   Discharging Provider: Vita Chilel NP  Primary Care Provider: Myrna Primary Doctor    Primary Care Team: [facility] MEDICINE B    HPI:   The patient is a 52 yo female with CHF (EF 15% on 01/09/2023), bipolar 1 disorder, ADHD, methamphetamine/polysubstance abuse, history of opioid overdose, history of PE (on chronic Eliquis), multiple admits to hospitals for acute on chronic CHF sec to medication and diet non compliance who presented to [facility] ED from nursing home with AMS. Pt was noted to picking her face and was not acting herself. Pt reports she went home on Easter break and used methamphetamine-last use yesterday. She was also noted to have large scab to her nose. Pt reports she fell down a slide.    In the ED, Afebrile, WBC normal, K 2.8, Serum Cr 5.1, BNP normal. . Trop 0.054. UA-trace ketones. UDS + Amphetamines and THC. ABG-normal. CT head showed nothing acute and CT cervical spine showed no fracture. CXR- mild cardiomegaly, nothing acute..  Pt received one liter IVFs and IV KCL 30 meq.     The patient was transferred to [facility] for observation under hospital medicine.             Hospital Course:   The patient is a 52 yo female with CHF (EF 15% on 01/09/2023), bipolar 1 disorder, ADHD, methamphetamine abuse, polysubstance abuse, history of opioid overdose, history of PE (on chronic Eliquis), multiple admits to hospitals for acute on chronic CHF who was admitted to [facility] for AMS, ARABELLA, and hypokalemia on gentle IV hydration. Pt reported she went home on Easter break and used methamphetamine-last use was 1 day PTA. She was also noted to have large scab to her nose. Pt reports she fell  down a slide. UDS + Amphetamines and THC.   Home meds Losartan, Spironolactone, Metolazone, and Lasix held. Potassium repleted.   Serum Cr 5.1>1.1. ARF resolved with IV hydration and holding home meds.  Initially, pt was very drowsy but oriented x 3.Home meds for Bipolar d/o held- Prozac, Cogentin, Trileptal, and Seroquel. MRI brain showed nothing acute. Slowly, pt improved. AMS resolved. Suspect that altered mentation is most likely secondary to oversedation in setting of substance use, pt's psychotropic medications and decreased clearance due to ARABELLA.   Will restart home meds Prozac, Cogentin, Trileptal, and Seroquel. Pt will f/u will need to f/u  with Psychiatry. Ambulatory referral made for Psychiatry.   For discharge, will change Lasix BID to prn and continue to hold Losartan, Spironolactone, and Metolazone. Pt will need to f/u with PCP and Cardiology to restart these medications.Ambulatory referral made for Cardiology.   The patient was seen and examined today and determined to be stable for discharge back to nursing home.            Goals of Care Treatment Preferences:  Code Status: Full Code      Consults:   Consults (From admission, onward)          Status Ordering Provider     Inpatient consult to Nephrology  Once        Provider:  Araceli Corcoran MD    Acknowledged MERCEDES BRADLEY     Inpatient consult to Registered Dietitian/Nutritionist  Once        Provider:  (Not yet assigned)    Completed MERCEDES BRADLEY              Final Active Diagnoses:    Diagnosis Date Noted POA    PRINCIPAL PROBLEM:  Acute encephalopathy [G93.40] 08/05/2022 Yes    ARABELLA (acute kidney injury) [N17.9] 05/23/2018 Yes    Hypokalemia [E87.6] 05/25/2018 Yes    History of pulmonary embolism [Z86.711] 04/05/2024 Yes    Polysubstance abuse [F19.10] 09/19/2022 Yes    Chronic combined systolic and diastolic heart failure [I50.42] 07/22/2022 Yes    Bipolar disorder [F31.9] 10/15/2019 Yes    Essential hypertension [I10] 09/24/2014 Yes       Problems Resolved During this Admission:       Discharged Condition: stable    Disposition: long-term Nursing Facili*    Follow Up:   Follow-up Information       [facility] Follow up.    Specialty: Cardiology  Contact information:  [address] [facility] [address]  [phone]             No, Primary Doctor Follow up in 3 day(s).    Why: repeat BMP in 3 days  REcheck BP in 3 days to restart lasix, Losartan, Spirinolactone,and metolazone             PSYCHIATRY Follow up in 1 week(s).    Specialty: Psychiatry  Contact information:  [address]  [facility] [address]  [phone]                         Patient Instructions:      Ambulatory referral/consult to Cardiology   Standing Status: Future   Referral Priority: Routine Referral Type: Consultation   Referral Reason: Specialty Services Required   Requested Specialty: Cardiology   Number of Visits Requested: 1     Ambulatory referral/consult to Psychiatry   Standing Status: Future   Referral Priority: Routine Referral Type: Psychiatric   Referral Reason: Specialty Services Required   Requested Specialty: Psychiatry   Number of Visits Requested: 1     Diet Cardiac   Order Comments: 2 gm sodium,1500ml fluid     Activity as tolerated       Significant Diagnostic Studies:   Imaging Results              X-Ray Chest AP Portable (Final result)  Result time 04/05/24 11:03:19      Final result by Eddie Borja MD (04/05/24 11:03:19)                   Impression:      Mild cardiomegaly.  Stable chest x-ray with no acute abnormality.      Electronically signed by: Eddie Borja MD  Date:    04/05/2024  Time:    11:03               Narrative:    EXAMINATION:  XR CHEST AP PORTABLE    CLINICAL HISTORY:  Respiratory distress., AMS;    COMPARISON:  12/11/2023 chest x-ray    FINDINGS:  The cardiac size is mildly enlarged.    The lung fields remain clear with no acute infiltrate or congestive heart failure.                                        CT Cervical Spine Without Contrast (Final result)  Result time 04/05/24 11:03:08      Final result by Randolph Suazo MD (04/05/24 11:03:08)                   Impression:      Negative for acute fracture or dislocation.    All CT scans at this facility are performed  using dose modulation techniques as appropriate to performed exam including the following:  automated exposure control; adjustment of mA and/or kV according to the patients size (this includes techniques or standardized protocols for targeted exams where dose is matched to indication/reason for exam: i.e. extremities or head);  iterative reconstruction technique.      Electronically signed by: Randolph Suazo MD  Date:    04/05/2024  Time:    11:03               Narrative:    EXAMINATION:  CT CERVICAL SPINE WITHOUT CONTRAST    CLINICAL HISTORY:  Neck trauma, intoxicated or obtunded (Age >= 16y);    TECHNIQUE:  Axial CT through the cervical spine with coronal and sagittal reformations.    COMPARISON:  None    FINDINGS:  Negative for acute fracture or dislocation.  Straightening of the spine most often from positioning and/or muscle spasm.    No advanced arthritic change.  Moderate disc space narrowing C6-7 without significant canal stenosis..                                       CT Head Without Contrast (Final result)  Result time 04/05/24 10:48:41      Final result by Charlie Marin MD (04/05/24 10:48:41)                   Impression:      No acute abnormality.    All CT scans at this facility use dose modulation, iterative reconstruction, and/or weight based dosing when appropriate to reduce radiation dose to as low as reasonable achievable.      Electronically signed by: Charlie Marin MD  Date:    04/05/2024  Time:    10:48               Narrative:    EXAMINATION:  CT HEAD WITHOUT CONTRAST    CLINICAL HISTORY:  Mental status change, unknown cause;    TECHNIQUE:  Low dose axial CT images obtained throughout the head  without intravenous contrast. Sagittal and coronal reconstructions were performed.    All CT scans at this facility use dose modulation, iterative reconstruction, and/or weight based dosing when appropriate to reduce radiation dose to as low as reasonable achievable.    COMPARISON:  None.    FINDINGS:  Intracranial compartment:    The brain parenchyma appears normal. No parenchymal mass, hemorrhage, edema or major vascular distribution infarct.    Ventricles and sulci are normal in size for age without evidence of hydrocephalus.    No extra-axial blood or fluid collections.    Skull/extracranial contents (limited evaluation): No fracture. Mastoid air cells and paranasal sinuses are essentially clear.                                       Pending Diagnostic Studies:       Procedure Component Value Units Date/Time    Oxcarbazepine level [[id number]] Collected: 04/06/24 1839    Order Status: Sent Lab Status: In process Updated: 04/07/24 0108    Specimen: Blood            Medications:  Reconciled Home Medications:      Medication List        START taking these medications      bacitracin-neomycin-polymyxin b-hydrocortisone 1 % ointment  Apply topically 2 (two) times daily. Apply to wound to nose twice daily x 5 days            CHANGE how you take these medications      furosemide 40 MG tablet  Commonly known as: LASIX  Take 1 tablet (40 mg total) by mouth 2 (two) times daily as needed.  What changed:   when to take this  reasons to take this            CONTINUE taking these medications      albuterol 90 mcg/actuation inhaler  Commonly known as: PROVENTIL/VENTOLIN HFA  Inhale 2 puffs into the lungs every 6 (six) hours as needed for Wheezing or Shortness of Breath. Rescue     benztropine 0.5 MG tablet  Commonly known as: COGENTIN  Take 1 tablet (0.5 mg total) by mouth 2 (two) times daily.     ELIQUIS 5 mg Tab  Generic drug: apixaban  Take 5 mg by mouth 2 (two) times daily.     famotidine 20 MG tablet  Commonly known as:  PEPCID  Take 1 tablet (20 mg total) by mouth once daily.     FLUoxetine 20 MG capsule  Take 20 mg by mouth once daily.     gabapentin 300 MG capsule  Commonly known as: NEURONTIN  Take 1 capsule (300 mg total) by mouth 3 (three) times daily. for 14 days     metoprolol succinate 25 MG 24 hr tablet  Commonly known as: TOPROL-XL  Take 1 tablet (25 mg total) by mouth once daily.     OXcarbazepine 600 MG Tab  Commonly known as: TRILEPTAL  Take 2 tablets (1,200 mg total) by mouth nightly.     QUEtiapine 300 MG Tab  Commonly known as: SEROQUEL  Take 300 mg by mouth every evening.            STOP taking these medications      losartan 25 MG tablet  Commonly known as: COZAAR     metOLazone 2.5 MG tablet  Commonly known as: ZAROXOLYN     potassium chloride SA 20 MEQ tablet  Commonly known as: K-DUR,KLOR-CON     spironolactone 50 MG tablet  Commonly known as: ALDACTONE              Indwelling Lines/Drains at time of discharge:   Lines/Drains/Airways       None                   Time spent on the discharge of patient: 45 minutes         Vita Chilel NP  Department of Hospital Medicine  [facility] - Telemetry ([facility])

## 2024-04-07 NOTE — PLAN OF CARE
Patient making progress with therapy. Patient ambulated 40 feet x2, Min A with HHA. Continue per POC.

## 2024-04-07 NOTE — PLAN OF CARE
[facility] (Hospital)  Initial Discharge Assessment       Primary Care Provider: Myrna, Primary Doctor    Admission Diagnosis: Hypokalemia [E87.6]  ARABELLA (acute kidney injury) [N17.9]  AMS (altered mental status) [R41.82]    Admission Date: 4/5/2024  Expected Discharge Date:     Transition of Care Barriers: (P) None    Payor: HUMANA MANAGED MEDICARE / Plan: HU[employer]NA SNP HMO PPO SPECIAL NEEDS / Product Type: Medicare Advantage /     Extended Emergency Contact Information  Primary Emergency Contact: Betty Martines   [facility]  Home Phone: [phone]  Mobile Phone: [phone]  Relation: Sister  Secondary Emergency Contact: MolinaEzekiel   [facility]  Home Phone: [phone]  Relation: Father  Mother: Kiya Waite   [facility]  Home Phone: [phone]    Discharge Plan A: (P) Return to nursing home         [id number] [place] - [address] [facility]  [address] [facility]  [place] [address]  Phone: [phone] Fax: [phone]    [id number] - [place] - [address]  AT [facility] [address]  [address] [place] [address]  Phone: [phone] Fax: [phone]    [id number] - [place], [place] - [address] AT CORNER OF [facility]  [address] [facility]  [facility] [place] [address]  Phone: [phone] Fax: [phone]    [facility] - [place], [place] - [address]  [address]  [place] [address]  Phone: [phone] Fax: [phone]    [id number] - [place], [place] - [address]  [address]  [place] [address]  Phone: [phone] Fax: [phone]    [facility] - [place] - [address] [facility] AT Portal to Registered [facility] Sites  [facility]  [place] [address]  Phone: [phone] Fax: [phone]    [facility]  [address]  [facility] [address]  Phone: [phone] Fax:  [phone]    [facility]  [address]  [place] [address]  Phone: [phone] Fax: [phone]      Initial Assessment (most recent)       Adult Discharge Assessment - 04/07/24 1136          Discharge Assessment    Assessment Type Discharge Planning Assessment     Confirmed/corrected address, phone number and insurance No (P)    [facility]    Reason No family to provide information/patient unable to answer (P)      Source of Information family (P)      If unable to respond/provide information was family/caregiver contacted? Yes (P)      Contact Name/Number Kiya Waite [phone] (P)      Communicated AYSHA with patient/caregiver Date not available/Unable to determine     Reason For Admission Acute encephalopathy (P)      People in Home facility resident (P)      Facility Arrived From: [facility] (P)      Do you expect to return to your current living situation? Yes (P)      Do you have help at home or someone to help you manage your care at home? Yes (P)      Who are your caregiver(s) and their phone number(s)? [facility] (P)      Prior to hospitilization cognitive status: Not Oriented to Time (P)      Current cognitive status: Not Oriented to Time;Not Oriented to Place;Not Oriented to Person (P)      Walking or Climbing Stairs Difficulty yes (P)      Walking or Climbing Stairs ambulation difficulty, requires equipment;ambulation difficulty, dependent;stair climbing difficulty, requires equipment (P)      Dressing/Bathing Difficulty yes (P)      Dressing/Bathing bathing difficulty, assistance 1 person (P)      Home Accessibility wheelchair accessible (P)      Home Layout Able to live on 1st floor (P)      Equipment Currently Used at Home wheelchair (P)      Readmission within 30 days? No (P)      Patient currently being followed by outpatient case management? No (P)      Do  you currently have service(s) that help you manage your care at home? No (P)      Do you take prescription medications? Yes (P)      Do you have prescription coverage? Yes (P)      Coverage HUMANA MANAGED MEDICARE - [employer]NA [facility] [place] HMO PPO SPECIAL NEEDS (P)      Do you have any problems affording any of your prescribed medications? No (P)      Is the patient taking medications as prescribed? yes (P)      Who is going to help you get home at discharge? [facility] (P)      How do you get to doctors appointments? other (see comments) (P)    [facility]    Are you on dialysis? No (P)      Do you take coumadin? No (P)      Discharge Plan A Return to nursing home (P)      DME Needed Upon Discharge  none (P)      Discharge Plan discussed with: Patient (P)      Transition of Care Barriers None (P)         OTHER    Name(s) of People in Home [facility] (P)                             .ts

## 2024-04-07 NOTE — PLAN OF CARE
A251/A251 POLY. Stephanie T Barthelemy is a 53 y.o.female admitted on 4/5/2024 for Acute encephalopathy   Code Status: Full Code MRN: [id number]   Review of patient's allergies indicates:  No Known Allergies  Past Medical History:   Diagnosis Date    ADHD (attention deficit hyperactivity disorder)     Anemia     Anxiety     Bipolar disorder     CHF (congestive heart failure)     History of hepatitis C - s/p clearance of virus (HCV neg 6/2015) 09/26/2014    History of psychiatric hospitalization     History of substance abuse     IV heroin - last use 2013 per pt    Hx of psychiatric care     Hypertension     Opioid overdose 05/10/2016    Psychiatric problem     Psychosis     Seizure disorder 04/03/2019    Sleep difficulties     Still's disease     Substance abuse     Therapy     Vasculitis       PRN meds    acetaminophen, 650 mg, Q4H PRN  albuterol-ipratropium, 3 mL, Q6H PRN  dextrose 10%, 12.5 g, PRN  dextrose 10%, 25 g, PRN  glucagon (human recombinant), 1 mg, PRN  glucose, 16 g, PRN  glucose, 24 g, PRN  naloxone, 0.02 mg, PRN  ondansetron, 4 mg, Q6H PRN  polyethylene glycol, 17 g, BID PRN  prochlorperazine, 5 mg, Q6H PRN  QUEtiapine, 100 mg, Nightly PRN  simethicone, 1 tablet, QID PRN  sodium chloride 0.9%, 10 mL, Q8H PRN      Chart check completed. Will continue plan of care.      Orientation: oriented x 4  Dru Coma Scale Score: 15     Lead Monitored: Lead II Rhythm: normal sinus rhythm    Cardiac/Telemetry Box Number: [id number]    Last Bowel Movement: 04/05/24  Diet Cardiac Fluid - 1000mL  Diet Cardiac     Chase Score: 20  Fall Risk Score: 17  Accucheck [x]   Freq?      Lines/Drains/Airways       Peripheral Intravenous Line  Duration                  Peripheral IV - Single Lumen 20 G Left;Posterior Hand -- days

## 2024-04-07 NOTE — ASSESSMENT & PLAN NOTE
54 y/o female with h/o of CKD stage 3 and PSA presented with change in MS:     ARABELLA (acute kidney injury)  ARABELLA. S Cr however has markedly already improved from >5 to 2.2  Reason fo the initial ARABELLA not clear  Suspect prerenal azotemia, because pt was hypotensive  Hypokalemia, did pt have GI losses?  Noted +THC, may have vomited. Would explain above  K was replaced  CKD stage 3 at baseline.     Acute encephalopathy  Change in mental status  May be due to acute drug use     Chronic combined systolic and diastolic heart failure  Severe CHF despite relatively young age  No h/o of DM  May have used cocaine and heroine in the past, can cause CM  Has mild fluid gain     Thank you for your consult.      Plans and recommendations:  Total time spent 70 minutes including time needed to review the records, the   patient evaluation, documentation, face-to-face discussion with the patient,   more than 50% of the time was spent on coordination of care and counseling.

## 2024-04-07 NOTE — NURSING
Discharge instructions received and reviewed with pt and family at bedside.  Pt voiced understanding and all questions answered to satisfaction.  Stressed importance to making and keeping all follow up appointments.  Medications sent to pt pharmacy and reviewed with pt.  Tele monitor removed and brought to monitor tech.  IV d/c'd with tip intact, pressure dressing applied.  Pt transported to front of hospital via w/c by PCT to be discharged back to NH

## 2024-04-07 NOTE — PLAN OF CARE
A251/A251 POLY. Stephanie T Barthelemy is a 53 y.o.female admitted on 4/5/2024 for Acute encephalopathy   Code Status: Full Code MRN: [id number]   Review of patient's allergies indicates:  No Known Allergies  Past Medical History:   Diagnosis Date    ADHD (attention deficit hyperactivity disorder)     Anemia     Anxiety     Bipolar disorder     CHF (congestive heart failure)     History of hepatitis C - s/p clearance of virus (HCV neg 6/2015) 09/26/2014    History of psychiatric hospitalization     History of substance abuse     IV heroin - last use 2013 per pt    Hx of psychiatric care     Hypertension     Opioid overdose 05/10/2016    Psychiatric problem     Psychosis     Seizure disorder 04/03/2019    Sleep difficulties     Still's disease     Substance abuse     Therapy     Vasculitis       PRN meds    acetaminophen, 650 mg, Q4H PRN  albuterol-ipratropium, 3 mL, Q6H PRN  dextrose 10%, 12.5 g, PRN  dextrose 10%, 25 g, PRN  glucagon (human recombinant), 1 mg, PRN  glucose, 16 g, PRN  glucose, 24 g, PRN  naloxone, 0.02 mg, PRN  ondansetron, 4 mg, Q6H PRN  polyethylene glycol, 17 g, BID PRN  prochlorperazine, 5 mg, Q6H PRN  QUEtiapine, 100 mg, Nightly PRN  simethicone, 1 tablet, QID PRN  sodium chloride 0.9%, 10 mL, Q8H PRN      Chart check completed. Will continue plan of care.      Orientation: oriented x 4  Dru Coma Scale Score: 15     Lead Monitored: Lead II Rhythm: normal sinus rhythm    Cardiac/Telemetry Box Number: [id number]    Last Bowel Movement: 04/05/24  Diet Cardiac Fluid - 1000mL  Diet Cardiac     Chase Score: 20  Fall Risk Score: 17  Accucheck [x]   Freq? achs     Lines/Drains/Airways       Peripheral Intravenous Line  Duration                  Peripheral IV - Single Lumen 20 G Left;Posterior Hand -- days

## 2024-04-07 NOTE — PLAN OF CARE
[facility] - Tele[facility] (Hospital)  Discharge Final Note    Primary Care Provider: Myrna, Primary Doctor    Expected Discharge Date: 4/7/2024    Final Discharge Note (most recent)       Final Note - 04/07/24 1544          Final Note    Assessment Type Final Discharge Note     Anticipated Discharge Disposition nursing home Nursing Facility        Post-Acute Status    Discharge Delays None known at this time                     Important Message from Medicare             Contact Info       [facility]   Specialty: Cardiology    [address] [place] [address]   Phone: [phone]       Next Steps: Follow up    No, Primary Doctor   Relationship: PCP - General        Next Steps: Follow up in 3 day(s)    Instructions: repeat BMP in 3 days  REcheck BP in 3 days to restart lasix, Losartan, Spirinolactone,and metolazone    PSYCHIATRY   Specialty: Psychiatry    [address] [place] [address]   Phone: [phone]       Next Steps: Follow up in 1 week(s)

## 2024-04-07 NOTE — PLAN OF CARE
Recommendations  1) Continue cardiac diet - fluid restrictions per MD    2) Encourage oral intake of meals    3) Consider commercial beverage if po intake < 50% (caution of fluid overload)   4) Monitor weights, labs, and I/Os    5) RD to monitor and follow up    Goals:   Pt will tolerate 50-75% EEN/EPN by RD follow up  Nutrition Goal Status: new  Communication of RD Recs:  (POC)

## 2024-04-07 NOTE — PROGRESS NOTES
[facility] - Telemetry ([facility])  Nephrology  Progress Note    Patient Name: Stephanie T Barthelemy  MRN: [id number]  Admission Date: 4/5/2024  Hospital Length of Stay: 2 days  Attending Provider: Alexandrea Ramirez MD   Primary Care Physician: Myrna, Primary Doctor  Principal Problem:Acute encephalopathy    Subjective:     HPI: Thank you for referring the pt to us. H/o and chart were reviewed. Pt was seen and examined. Pt is a 52 y/o female with h/o of CKD stage 3, severe CHF with EF 15%, polysubstance abuse, ADHD, bipolar, self-harm and neglect, who was admitted for change in MS. No direct h/o can be obtained. Per chart, there seems to have been no other issues. S Cr on admit was >5, repeat is 2.2. K is slightly low.    Interval History: Pt was seen and examined. Labs and meds reviewed. No new events. No c/o's.    Review of patient's allergies indicates:  No Known Allergies  Current Facility-Administered Medications   Medication Frequency    acetaminophen tablet 650 mg Q4H PRN    albuterol-ipratropium 2.5 mg-0.5 mg/3 mL nebulizer solution 3 mL Q6H PRN    apixaban tablet 5 mg BID    benztropine tablet 0.5 mg BID    dextrose 10% bolus 125 mL 125 mL PRN    dextrose 10% bolus 250 mL 250 mL PRN    famotidine tablet 20 mg Daily    FLUoxetine capsule 20 mg Daily    glucagon (human recombinant) injection 1 mg PRN    glucose chewable tablet 16 g PRN    glucose chewable tablet 24 g PRN    LIDOcaine 5 % patch 1 patch Q24H    metoprolol succinate (TOPROL-XL) 24 hr tablet 25 mg Daily    mupirocin 2 % ointment BID    naloxone 0.4 mg/mL injection 0.02 mg PRN    ondansetron injection 4 mg Q6H PRN    polyethylene glycol packet 17 g BID PRN    potassium phosphate 30 mmol in dextrose 5 % (D5W) 500 mL infusion Once    prochlorperazine injection Soln 5 mg Q6H PRN    QUEtiapine tablet 100 mg Nightly PRN    senna-docusate 8.6-50 mg per tablet 1 tablet BID    simethicone chewable tablet 80 mg QID PRN    sodium chloride 0.9% flush 10 mL Q8H PRN        Objective:     Vital Signs (Most Recent):  Temp: 98.7 °F (37.1 °C) (04/07/24 1554)  Pulse: 69 (04/07/24 1554)  Resp: 18 (04/07/24 1554)  BP: 99/62 (04/07/24 1554)  SpO2: 96 % (04/07/24 1554) Vital Signs (24h Range):  Temp:  [96.9 °F (36.1 °C)-99.8 °F (37.7 °C)] 98.7 °F (37.1 °C)  Pulse:  [65-84] 69  Resp:  [17-18] 18  SpO2:  [91 %-99 %] 96 %  BP: ()/(51-62) 99/62     Weight: 67.3 kg (148 lb 5.9 oz) (04/07/24 1109)  Body mass index is 28.98 kg/m².  Body surface area is 1.69 meters squared.    I/O last 3 completed shifts:  In: 0   Out: 400 [Urine:400]     Physical Exam  Vitals and nursing note reviewed.   Constitutional:       Appearance: Normal appearance.   Cardiovascular:      Rate and Rhythm: Normal rate and regular rhythm.      Pulses: Normal pulses.      Heart sounds: Normal heart sounds.   Pulmonary:      Effort: Pulmonary effort is normal.      Breath sounds: No rales.   Abdominal:      Palpations: Abdomen is soft.      Tenderness: There is no abdominal tenderness.   Musculoskeletal:      Right lower leg: No edema.      Left lower leg: No edema.   Neurological:      Mental Status: She is alert and oriented to person, place, and time.   Psychiatric:         Behavior: Behavior normal.          Significant Labs: reviewed  BMP  Lab Results   Component Value Date     04/07/2024    K 3.4 (L) 04/07/2024     04/07/2024    CO2 27 04/07/2024    BUN 29 (H) 04/07/2024    CREATININE 1.1 04/07/2024    CALCIUM 8.8 04/07/2024    ANIONGAP 10 04/07/2024    EGFRNORACEVR >60 04/07/2024     Lab Results   Component Value Date    WBC 4.88 04/07/2024    HGB 11.7 (L) 04/07/2024    HCT 34.3 (L) 04/07/2024    MCV 89 04/07/2024     04/07/2024         Significant Imaging: reviewed    Assessment/Plan:     52 y/o female with ARABELLA and PSA presented with change in MS:     ARABELLA (acute kidney injury)  S Cr now normal ARABELLA has resolved  Marked improvement in s Cr since admit from >5 to 2.2 to 1.1  Reason fo the  initial ARABELLA not clear  Suspect prerenal azotemia, because pt was hypotensive  Hypokalemia, c/w recovery phase of ARABELLA  Also, noted had positive THC, may have vomited.  OK to replace K   Previously thought pt had CKD stage 3 at baseline. It appears s Cr is lower than prior baseline    Acute encephalopathy  Change in mental status  Appears somewhat improved  May have been due to acute drug use     Chronic combined systolic and diastolic heart failure  Severe CHF despite relatively young age  No h/o of DM  May have used cocaine and heroine in the past, can cause CM  Has mild fluid gain     Thank you for your consult.      Plans and recommendations:  Total time spent 40 minutes including time needed to review the records, the   patient evaluation, documentation, face-to-face discussion with the patient,   more than 50% of the time was spent on coordination of care and counseling.          Araceli Corcoran MD  Nephrology  [facility] - Tele[facility] ([facility])   spinal

## 2024-04-10 LAB — OXCARBAZEPINE METABOLITE: 25 MCG/ML (ref 10–35)

## 2024-04-11 ENCOUNTER — PATIENT OUTREACH (OUTPATIENT)
Dept: ADMINISTRATIVE | Facility: CLINIC | Age: 54
End: 2024-04-11
Payer: MEDICARE

## 2024-04-11 ENCOUNTER — TELEPHONE (OUTPATIENT)
Dept: CARDIOLOGY | Facility: CLINIC | Age: 54
End: 2024-04-11
Payer: MEDICARE

## 2024-04-11 NOTE — TELEPHONE ENCOUNTER
Ambulatory referral for [facility] received upon pts recent discharge.   I called [facility] to schedule f/u appt.   Left message with Marcy for transportation/Hilda to return my call.

## 2024-05-09 ENCOUNTER — OFFICE VISIT (OUTPATIENT)
Dept: CARDIOLOGY | Facility: CLINIC | Age: 54
End: 2024-05-09
Payer: MEDICARE

## 2024-05-09 ENCOUNTER — LAB VISIT (OUTPATIENT)
Dept: LAB | Facility: HOSPITAL | Age: 54
End: 2024-05-09
Attending: INTERNAL MEDICINE
Payer: MEDICARE

## 2024-05-09 VITALS
SYSTOLIC BLOOD PRESSURE: 118 MMHG | DIASTOLIC BLOOD PRESSURE: 80 MMHG | HEART RATE: 70 BPM | HEIGHT: 61 IN | BODY MASS INDEX: 29.89 KG/M2 | OXYGEN SATURATION: 97 % | WEIGHT: 158.31 LBS

## 2024-05-09 DIAGNOSIS — Z72.0 TOBACCO ABUSE: ICD-10-CM

## 2024-05-09 DIAGNOSIS — I50.42 CHRONIC COMBINED SYSTOLIC AND DIASTOLIC HEART FAILURE: ICD-10-CM

## 2024-05-09 DIAGNOSIS — I42.8 NICM (NONISCHEMIC CARDIOMYOPATHY): ICD-10-CM

## 2024-05-09 DIAGNOSIS — I50.84 END STAGE HEART FAILURE: ICD-10-CM

## 2024-05-09 DIAGNOSIS — F31.9 BIPOLAR AFFECTIVE DISORDER, REMISSION STATUS UNSPECIFIED: ICD-10-CM

## 2024-05-09 DIAGNOSIS — Z86.19 HISTORY OF HEPATITIS C: ICD-10-CM

## 2024-05-09 DIAGNOSIS — F14.10 COCAINE ABUSE: ICD-10-CM

## 2024-05-09 DIAGNOSIS — E78.2 MIXED HYPERLIPIDEMIA: ICD-10-CM

## 2024-05-09 DIAGNOSIS — I77.6 VASCULITIS: ICD-10-CM

## 2024-05-09 DIAGNOSIS — I50.42 CHRONIC COMBINED SYSTOLIC AND DIASTOLIC HEART FAILURE: Primary | ICD-10-CM

## 2024-05-09 DIAGNOSIS — I10 ESSENTIAL HYPERTENSION: ICD-10-CM

## 2024-05-09 PROBLEM — I50.43 ACUTE ON CHRONIC COMBINED SYSTOLIC AND DIASTOLIC CONGESTIVE HEART FAILURE: Status: RESOLVED | Noted: 2022-09-19 | Resolved: 2024-05-09

## 2024-05-09 PROBLEM — I50.9 CHF EXACERBATION: Status: RESOLVED | Noted: 2022-11-25 | Resolved: 2024-05-09

## 2024-05-09 LAB
ANION GAP SERPL CALC-SCNC: 8 MMOL/L (ref 8–16)
BNP SERPL-MCNC: 831 PG/ML (ref 0–99)
BUN SERPL-MCNC: 14 MG/DL (ref 6–20)
CALCIUM SERPL-MCNC: 9.8 MG/DL (ref 8.7–10.5)
CHLORIDE SERPL-SCNC: 104 MMOL/L (ref 95–110)
CO2 SERPL-SCNC: 26 MMOL/L (ref 23–29)
CREAT SERPL-MCNC: 0.9 MG/DL (ref 0.5–1.4)
EST. GFR  (NO RACE VARIABLE): >60 ML/MIN/1.73 M^2
GLUCOSE SERPL-MCNC: 75 MG/DL (ref 70–110)
POTASSIUM SERPL-SCNC: 3.7 MMOL/L (ref 3.5–5.1)
SODIUM SERPL-SCNC: 138 MMOL/L (ref 136–145)

## 2024-05-09 PROCEDURE — 80048 BASIC METABOLIC PNL TOTAL CA: CPT | Performed by: INTERNAL MEDICINE

## 2024-05-09 PROCEDURE — 1159F MED LIST DOCD IN RCRD: CPT | Mod: CPTII,S$GLB,, | Performed by: INTERNAL MEDICINE

## 2024-05-09 PROCEDURE — 3074F SYST BP LT 130 MM HG: CPT | Mod: CPTII,S$GLB,, | Performed by: INTERNAL MEDICINE

## 2024-05-09 PROCEDURE — 99999 PR PBB SHADOW E&M-EST. PATIENT-LVL IV: CPT | Mod: PBBFAC,,, | Performed by: INTERNAL MEDICINE

## 2024-05-09 PROCEDURE — 3079F DIAST BP 80-89 MM HG: CPT | Mod: CPTII,S$GLB,, | Performed by: INTERNAL MEDICINE

## 2024-05-09 PROCEDURE — 4010F ACE/ARB THERAPY RXD/TAKEN: CPT | Mod: CPTII,S$GLB,, | Performed by: INTERNAL MEDICINE

## 2024-05-09 PROCEDURE — 1160F RVW MEDS BY RX/DR IN RCRD: CPT | Mod: CPTII,S$GLB,, | Performed by: INTERNAL MEDICINE

## 2024-05-09 PROCEDURE — 99214 OFFICE O/P EST MOD 30 MIN: CPT | Mod: S$GLB,,, | Performed by: INTERNAL MEDICINE

## 2024-05-09 PROCEDURE — 3008F BODY MASS INDEX DOCD: CPT | Mod: CPTII,S$GLB,, | Performed by: INTERNAL MEDICINE

## 2024-05-09 PROCEDURE — 36415 COLL VENOUS BLD VENIPUNCTURE: CPT | Performed by: INTERNAL MEDICINE

## 2024-05-09 PROCEDURE — 83880 ASSAY OF NATRIURETIC PEPTIDE: CPT | Performed by: INTERNAL MEDICINE

## 2024-05-09 RX ORDER — LOSARTAN POTASSIUM 25 MG/1
12.5 TABLET ORAL DAILY
Qty: 45 TABLET | Refills: 3 | Status: SHIPPED | OUTPATIENT
Start: 2024-05-09 | End: 2025-05-09

## 2024-05-09 RX ORDER — LORAZEPAM 1 MG/1
1 TABLET ORAL EVERY 6 HOURS PRN
COMMUNITY

## 2024-05-09 NOTE — PROGRESS NOTES
Subjective:   Patient ID:  Stephanie T Barthelemy is a 53 y.o. female who presents for evaluation of No chief complaint on file.      52 yo female, came in for CHFrEF from [facility].   PMH HTN, NICM LVEF 20%, Polysubstance abuse, HCV, BPD, and Medical noncompliance   H/o PE on eliquis   Had multiple admission for CHF admission in 2023.   Occasional SOB. On inhaler, smoker  No orthopnea       Results for orders placed during the hospital encounter of 04/05/24    Echo    Interpretation Summary    Left Ventricle: The left ventricle is severely dilated. Normal wall thickness. Severe global hypokinesis present. Septal flattening in diastole consistent with right ventricular volume overload. There is severely reduced systolic function with a visually estimated ejection fraction of 15 - 20%. There is diastolic dysfunction.    Right Ventricle: Normal right ventricular cavity size. Wall thickness is normal. Right ventricle wall motion  is normal. Systolic function is normal.    Left Atrium: Left atrium is moderately dilated.    Mitral Valve: There is mild regurgitation.    Tricuspid Valve: There is mild regurgitation.    IVC/SVC: Intermediate venous pressure at 8 mmHg.     No results found for this or any previous visit.       Past Medical History:   Diagnosis Date    ADHD (attention deficit hyperactivity disorder)     Anemia     Anxiety     Bipolar disorder     CHF (congestive heart failure)     History of hepatitis C - s/p clearance of virus (HCV neg 6/2015) 09/26/2014    History of psychiatric hospitalization     History of substance abuse     IV heroin - last use 2013 per pt    Hx of psychiatric care     Hypertension     Opioid overdose 05/10/2016    Psychiatric problem     Psychosis     Seizure disorder 04/03/2019    Sleep difficulties     Still's disease     Substance abuse     Therapy     Vasculitis        Past Surgical History:   Procedure Laterality Date    HYSTERECTOMY  3/16/2011    SALPINGOOPHORECTOMY Right  3/16/2011    TUBAL LIGATION      Vaginal cuff repair  04/22/2011       Social History     Tobacco Use    Smoking status: Every Day     Current packs/day: 1.00     Average packs/day: 1 pack/day for 38.4 years (38.4 ttl pk-yrs)     Types: Cigarettes     Start date: 1986    Smokeless tobacco: Never    Tobacco comments:     Enrolled in [employer] on 10/23/14 ([employer] Member ID # [id number]) Pt declines Ambulatory referral to Smoking Cessation clinic. Handout provided   Substance Use Topics    Alcohol use: No    Drug use: Yes     Types: Heroin, Cocaine, Methamphetamines, Marijuana       Family History   Problem Relation Name Age of Onset    Diabetes Maternal Grandmother      Cancer Maternal Grandmother         Review of Systems   HENT:  Positive for hearing loss.    Musculoskeletal:  Positive for back pain and neck pain.       Objective:   Physical Exam  HENT:      Head: Normocephalic.   Eyes:      Pupils: Pupils are equal, round, and reactive to light.   Neck:      Thyroid: No thyromegaly.      Vascular: Normal carotid pulses. No carotid bruit or JVD.   Cardiovascular:      Rate and Rhythm: Normal rate and regular rhythm. No extrasystoles are present.     Chest Wall: PMI is not displaced.      Pulses: Normal pulses.      Heart sounds: Normal heart sounds. No murmur heard.     No gallop. No S3 sounds.   Pulmonary:      Effort: No respiratory distress.      Breath sounds: Normal breath sounds. No stridor.   Abdominal:      General: Bowel sounds are normal.      Palpations: Abdomen is soft.      Tenderness: There is no abdominal tenderness. There is no rebound.   Skin:     Findings: No rash.   Neurological:      Mental Status: She is alert and oriented to person, place, and time.   Psychiatric:         Behavior: Behavior normal.         Lab Results   Component Value Date    CHOL 162 07/22/2022    CHOL 190 09/01/2021    CHOL 201 (H) 02/20/2021     Lab Results   Component Value Date    HDL 22 (L) 07/22/2022    HDL 38 (L)  09/01/2021    HDL 51 02/20/2021     Lab Results   Component Value Date    LDLCALC 114.2 07/22/2022    LDLCALC 128.0 09/01/2021    LDLCALC 138.4 02/20/2021     Lab Results   Component Value Date    TRIG 129 07/22/2022    TRIG 120 09/01/2021    TRIG 58 02/20/2021     Lab Results   Component Value Date    CHOLHDL 13.6 (L) 07/22/2022    CHOLHDL 20.0 09/01/2021    CHOLHDL 25.4 02/20/2021       Chemistry        Component Value Date/Time     04/07/2024 1306    K 3.4 (L) 04/07/2024 1306     04/07/2024 1306    CO2 27 04/07/2024 1306    BUN 29 (H) 04/07/2024 1306    CREATININE 1.1 04/07/2024 1306     (H) 04/07/2024 1306        Component Value Date/Time    CALCIUM 8.8 04/07/2024 1306    ALKPHOS 93 04/07/2024 0549    AST 27 04/07/2024 0549    AST 29 05/10/2016 0019    ALT 17 04/07/2024 0549    BILITOT 0.4 04/07/2024 0549    ESTGFRAFRICA >60 07/26/2022 0414    EGFRNONAA >60 07/26/2022 0414          Lab Results   Component Value Date    HGBA1C 5.9 (H) 12/07/2022     Lab Results   Component Value Date    TSH 0.194 (L) 04/06/2024     Lab Results   Component Value Date    INR 1.6 (H) 01/24/2023    INR 1.3 (H) 12/26/2022    INR 1.1 08/04/2022     Lab Results   Component Value Date    WBC 4.88 04/07/2024    HGB 11.7 (L) 04/07/2024    HCT 34.3 (L) 04/07/2024    MCV 89 04/07/2024     04/07/2024     BNP  @LABRCNTIP(BNP,BNPTRIAGEBLO)@  CrCl cannot be calculated (Patient's most recent lab result is older than the maximum 7 days allowed.).  No results found in the last 24 hours.  No results found in the last 24 hours.  No results found in the last 24 hours.    Assessment:      1. Chronic combined systolic and diastolic heart failure    2. End stage heart failure    3. Vasculitis    4. Cocaine abuse    5. Bipolar affective disorder, remission status unspecified    6. Essential hypertension    7. Mixed hyperlipidemia    8. NICM (nonischemic cardiomyopathy)    9. History of hepatitis C - s/p clearance of virus (HCV  neg 6/2015)    10. Tobacco abuse      NICM  CHFrEF 20% compensated  H/o drug abuser  Smoker  Bipolar  NH resident    Plan:   Add Losartan 12.5 mg daily for CHFrEF  Check BNP and BMP today   Continue torpolXL and lasix 40 mg daily  Daily weight. Please call the office if gain 3 pounds in 1 day or 5 pounds in 1 week.  Fluid restriction 1.5 liters a day  Na< 2 gm      RTC in 3 M

## 2024-05-10 ENCOUNTER — TELEPHONE (OUTPATIENT)
Dept: CARDIOLOGY | Facility: CLINIC | Age: 54
End: 2024-05-10
Payer: MEDICARE

## 2024-05-10 DIAGNOSIS — I50.42 CHRONIC COMBINED SYSTOLIC AND DIASTOLIC HEART FAILURE: Primary | ICD-10-CM

## 2024-05-10 RX ORDER — FUROSEMIDE 40 MG/1
60 TABLET ORAL 2 TIMES DAILY
Qty: 60 TABLET | Refills: 5 | Status: SHIPPED | OUTPATIENT
Start: 2024-05-10

## 2024-05-10 NOTE — TELEPHONE ENCOUNTER
Attempted to contact patient; LVM for patient to call back for results.         ----- Message from César Jackson MD sent at 5/10/2024  8:49 AM CDT -----  The lab showed pt has CHF.   Increase Lasix to 60 mg bid now  Repeat BNP and BMP in 1 week

## 2024-07-04 ENCOUNTER — HOSPITAL ENCOUNTER (EMERGENCY)
Facility: HOSPITAL | Age: 54
Discharge: PSYCHIATRIC OR SUBSTANCE USE FACILITY | End: 2024-07-05
Attending: EMERGENCY MEDICINE
Payer: MEDICARE

## 2024-07-04 DIAGNOSIS — Z86.59 HISTORY OF BIPOLAR DISORDER: ICD-10-CM

## 2024-07-04 DIAGNOSIS — R45.1 AGITATION: ICD-10-CM

## 2024-07-04 DIAGNOSIS — F19.11 HISTORY OF SUBSTANCE ABUSE: ICD-10-CM

## 2024-07-04 DIAGNOSIS — F29 PSYCHOSIS, UNSPECIFIED PSYCHOSIS TYPE: Primary | ICD-10-CM

## 2024-07-04 LAB
ALBUMIN SERPL BCP-MCNC: 3.7 G/DL (ref 3.5–5.2)
ALP SERPL-CCNC: 116 U/L (ref 55–135)
ALT SERPL W/O P-5'-P-CCNC: 24 U/L (ref 10–44)
AMPHET+METHAMPHET UR QL: NEGATIVE
ANION GAP SERPL CALC-SCNC: 10 MMOL/L (ref 8–16)
AST SERPL-CCNC: 28 U/L (ref 10–40)
BARBITURATES UR QL SCN>200 NG/ML: NEGATIVE
BASOPHILS # BLD AUTO: 0.02 K/UL (ref 0–0.2)
BASOPHILS NFR BLD: 0.2 % (ref 0–1.9)
BENZODIAZ UR QL SCN>200 NG/ML: NEGATIVE
BILIRUB SERPL-MCNC: 0.3 MG/DL (ref 0.1–1)
BUN SERPL-MCNC: 17 MG/DL (ref 6–20)
BZE UR QL SCN: NEGATIVE
CALCIUM SERPL-MCNC: 10.4 MG/DL (ref 8.7–10.5)
CANNABINOIDS UR QL SCN: NEGATIVE
CHLORIDE SERPL-SCNC: 112 MMOL/L (ref 95–110)
CO2 SERPL-SCNC: 21 MMOL/L (ref 23–29)
CREAT SERPL-MCNC: 1 MG/DL (ref 0.5–1.4)
CREAT UR-MCNC: 369.3 MG/DL (ref 15–325)
DIFFERENTIAL METHOD BLD: ABNORMAL
EOSINOPHIL # BLD AUTO: 0.1 K/UL (ref 0–0.5)
EOSINOPHIL NFR BLD: 0.7 % (ref 0–8)
ERYTHROCYTE [DISTWIDTH] IN BLOOD BY AUTOMATED COUNT: 16.6 % (ref 11.5–14.5)
EST. GFR  (NO RACE VARIABLE): >60 ML/MIN/1.73 M^2
ETHANOL SERPL-MCNC: <10 MG/DL
GLUCOSE SERPL-MCNC: 100 MG/DL (ref 70–110)
HCT VFR BLD AUTO: 38.4 % (ref 37–48.5)
HGB BLD-MCNC: 12.5 G/DL (ref 12–16)
IMM GRANULOCYTES # BLD AUTO: 0.02 K/UL (ref 0–0.04)
IMM GRANULOCYTES NFR BLD AUTO: 0.2 % (ref 0–0.5)
LYMPHOCYTES # BLD AUTO: 1.4 K/UL (ref 1–4.8)
LYMPHOCYTES NFR BLD: 15.8 % (ref 18–48)
MCH RBC QN AUTO: 29.1 PG (ref 27–31)
MCHC RBC AUTO-ENTMCNC: 32.6 G/DL (ref 32–36)
MCV RBC AUTO: 89 FL (ref 82–98)
METHADONE UR QL SCN>300 NG/ML: NEGATIVE
MONOCYTES # BLD AUTO: 0.8 K/UL (ref 0.3–1)
MONOCYTES NFR BLD: 8.5 % (ref 4–15)
NEUTROPHILS # BLD AUTO: 6.6 K/UL (ref 1.8–7.7)
NEUTROPHILS NFR BLD: 74.6 % (ref 38–73)
NRBC BLD-RTO: 0 /100 WBC
OPIATES UR QL SCN: NEGATIVE
PCP UR QL SCN>25 NG/ML: NEGATIVE
PLATELET # BLD AUTO: 176 K/UL (ref 150–450)
PMV BLD AUTO: 11.3 FL (ref 9.2–12.9)
POTASSIUM SERPL-SCNC: 3.7 MMOL/L (ref 3.5–5.1)
PROT SERPL-MCNC: 7.8 G/DL (ref 6–8.4)
RBC # BLD AUTO: 4.3 M/UL (ref 4–5.4)
SODIUM SERPL-SCNC: 143 MMOL/L (ref 136–145)
TOXICOLOGY INFORMATION: ABNORMAL
TROPONIN I SERPL DL<=0.01 NG/ML-MCNC: 0.04 NG/ML (ref 0–0.03)
TROPONIN I SERPL DL<=0.01 NG/ML-MCNC: 0.04 NG/ML (ref 0–0.03)
TSH SERPL DL<=0.005 MIU/L-ACNC: 1.7 UIU/ML (ref 0.4–4)
WBC # BLD AUTO: 8.82 K/UL (ref 3.9–12.7)

## 2024-07-04 PROCEDURE — 84443 ASSAY THYROID STIM HORMONE: CPT | Mod: ER | Performed by: EMERGENCY MEDICINE

## 2024-07-04 PROCEDURE — 84484 ASSAY OF TROPONIN QUANT: CPT | Mod: ER | Performed by: EMERGENCY MEDICINE

## 2024-07-04 PROCEDURE — 87186 SC STD MICRODIL/AGAR DIL: CPT | Performed by: EMERGENCY MEDICINE

## 2024-07-04 PROCEDURE — 80053 COMPREHEN METABOLIC PANEL: CPT | Mod: ER | Performed by: EMERGENCY MEDICINE

## 2024-07-04 PROCEDURE — 93005 ELECTROCARDIOGRAM TRACING: CPT | Mod: ER

## 2024-07-04 PROCEDURE — 80179 DRUG ASSAY SALICYLATE: CPT | Mod: ER | Performed by: EMERGENCY MEDICINE

## 2024-07-04 PROCEDURE — 96374 THER/PROPH/DIAG INJ IV PUSH: CPT | Mod: ER

## 2024-07-04 PROCEDURE — 80307 DRUG TEST PRSMV CHEM ANLYZR: CPT | Mod: ER | Performed by: EMERGENCY MEDICINE

## 2024-07-04 PROCEDURE — 87086 URINE CULTURE/COLONY COUNT: CPT | Performed by: EMERGENCY MEDICINE

## 2024-07-04 PROCEDURE — 87088 URINE BACTERIA CULTURE: CPT | Performed by: EMERGENCY MEDICINE

## 2024-07-04 PROCEDURE — 93010 ELECTROCARDIOGRAM REPORT: CPT | Mod: ,,, | Performed by: INTERNAL MEDICINE

## 2024-07-04 PROCEDURE — 80143 DRUG ASSAY ACETAMINOPHEN: CPT | Mod: ER | Performed by: EMERGENCY MEDICINE

## 2024-07-04 PROCEDURE — 82077 ASSAY SPEC XCP UR&BREATH IA: CPT | Mod: ER | Performed by: EMERGENCY MEDICINE

## 2024-07-04 PROCEDURE — 63600175 PHARM REV CODE 636 W HCPCS: Mod: ER | Performed by: EMERGENCY MEDICINE

## 2024-07-04 PROCEDURE — 25000003 PHARM REV CODE 250: Mod: ER | Performed by: EMERGENCY MEDICINE

## 2024-07-04 PROCEDURE — 87077 CULTURE AEROBIC IDENTIFY: CPT | Performed by: EMERGENCY MEDICINE

## 2024-07-04 PROCEDURE — 85025 COMPLETE CBC W/AUTO DIFF WBC: CPT | Mod: ER | Performed by: EMERGENCY MEDICINE

## 2024-07-04 PROCEDURE — 96361 HYDRATE IV INFUSION ADD-ON: CPT | Mod: ER

## 2024-07-04 PROCEDURE — 81000 URINALYSIS NONAUTO W/SCOPE: CPT | Mod: 59,ER | Performed by: EMERGENCY MEDICINE

## 2024-07-04 PROCEDURE — 99285 EMERGENCY DEPT VISIT HI MDM: CPT | Mod: 25,ER

## 2024-07-04 RX ORDER — METOPROLOL TARTRATE 25 MG/1
50 TABLET, FILM COATED ORAL
Status: COMPLETED | OUTPATIENT
Start: 2024-07-04 | End: 2024-07-04

## 2024-07-04 RX ORDER — LORAZEPAM 1 MG/1
1 TABLET ORAL
Status: COMPLETED | OUTPATIENT
Start: 2024-07-04 | End: 2024-07-04

## 2024-07-04 RX ORDER — GABAPENTIN 300 MG/1
300 CAPSULE ORAL
Status: COMPLETED | OUTPATIENT
Start: 2024-07-04 | End: 2024-07-04

## 2024-07-04 RX ORDER — HALOPERIDOL 5 MG/ML
5 INJECTION INTRAMUSCULAR
Status: COMPLETED | OUTPATIENT
Start: 2024-07-04 | End: 2024-07-04

## 2024-07-04 RX ORDER — QUETIAPINE FUMARATE 100 MG/1
300 TABLET, FILM COATED ORAL
Status: COMPLETED | OUTPATIENT
Start: 2024-07-04 | End: 2024-07-04

## 2024-07-04 RX ADMIN — QUETIAPINE FUMARATE 300 MG: 100 TABLET ORAL at 10:07

## 2024-07-04 RX ADMIN — GABAPENTIN 300 MG: 300 CAPSULE ORAL at 11:07

## 2024-07-04 RX ADMIN — METOPROLOL TARTRATE 50 MG: 25 TABLET, FILM COATED ORAL at 11:07

## 2024-07-04 RX ADMIN — LORAZEPAM 1 MG: 1 TABLET ORAL at 09:07

## 2024-07-04 RX ADMIN — SODIUM CHLORIDE 1000 ML: 9 INJECTION, SOLUTION INTRAVENOUS at 09:07

## 2024-07-04 RX ADMIN — HALOPERIDOL LACTATE 5 MG: 5 INJECTION, SOLUTION INTRAMUSCULAR at 11:07

## 2024-07-05 VITALS
HEART RATE: 86 BPM | BODY MASS INDEX: 32.2 KG/M2 | TEMPERATURE: 98 F | RESPIRATION RATE: 20 BRPM | HEIGHT: 60 IN | WEIGHT: 164 LBS | OXYGEN SATURATION: 90 % | DIASTOLIC BLOOD PRESSURE: 65 MMHG | SYSTOLIC BLOOD PRESSURE: 108 MMHG

## 2024-07-05 PROBLEM — I15.9 SECONDARY HYPERTENSION: Status: RESOLVED | Noted: 2017-04-07 | Resolved: 2024-07-05

## 2024-07-05 LAB
APAP SERPL-MCNC: <3 UG/ML (ref 10–20)
BACTERIA #/AREA URNS AUTO: ABNORMAL /HPF
BILIRUB UR QL STRIP: ABNORMAL
CAOX CRY UR QL COMP ASSIST: ABNORMAL
CLARITY UR REFRACT.AUTO: ABNORMAL
COLOR UR AUTO: ABNORMAL
GLUCOSE UR QL STRIP: NEGATIVE
HGB UR QL STRIP: NEGATIVE
HYALINE CASTS UR QL AUTO: 1 /LPF
KETONES UR QL STRIP: ABNORMAL
LEUKOCYTE ESTERASE UR QL STRIP: NEGATIVE
MICROSCOPIC COMMENT: ABNORMAL
NITRITE UR QL STRIP: POSITIVE
OHS QRS DURATION: 150 MS
OHS QTC CALCULATION: 547 MS
PH UR STRIP: 7 [PH] (ref 5–8)
PROT UR QL STRIP: ABNORMAL
RBC #/AREA URNS AUTO: 1 /HPF (ref 0–4)
SALICYLATES SERPL-MCNC: <5 MG/DL (ref 15–30)
SP GR UR STRIP: 1.02 (ref 1–1.03)
SQUAMOUS #/AREA URNS AUTO: 5 /HPF
URN SPEC COLLECT METH UR: ABNORMAL
UROBILINOGEN UR STRIP-ACNC: <2 EU/DL
WBC #/AREA URNS AUTO: 20 /HPF (ref 0–5)

## 2024-07-05 PROCEDURE — 25000003 PHARM REV CODE 250: Mod: ER | Performed by: EMERGENCY MEDICINE

## 2024-07-05 RX ORDER — CEPHALEXIN 500 MG/1
500 CAPSULE ORAL
Status: COMPLETED | OUTPATIENT
Start: 2024-07-05 | End: 2024-07-05

## 2024-07-05 RX ADMIN — CEPHALEXIN 500 MG: 500 CAPSULE ORAL at 01:07

## 2024-07-05 NOTE — ED PROVIDER NOTES
ED Provider Note - 7/4/2024    History     Chief Complaint   Patient presents with    Psychiatric Evaluation     Arrived via [facility] from nursing home c/o aggitation     Patient currently presents from [facility] with concern regarding agitation.  Patient reportedly was becoming increasingly anxious and disruptive.  Patient also reportedly attempting to flee the facility.  Patient has a known history of bipolar disorder as well as anxiety and ADHD.  There is a known history of intermittent psychosis along with polysubstance abuse.  The majority of the patient's history is provided via the nursing home reports and verbal report from EMS given lack of patient insight.  Patient at present reports no complaints.        Review of patient's allergies indicates:  No Known Allergies  Past Medical History:   Diagnosis Date    ADHD (attention deficit hyperactivity disorder)     Anemia     Anxiety     Bipolar disorder     CHF (congestive heart failure)     History of hepatitis C - s/p clearance of virus (HCV neg 6/2015) 09/26/2014    History of psychiatric hospitalization     History of substance abuse     IV heroin - last use 2013 per pt    Hx of psychiatric care     Hypertension     Opioid overdose 05/10/2016    Psychiatric problem     Psychosis     Seizure disorder 04/03/2019    Sleep difficulties     Still's disease     Substance abuse     Therapy     Vasculitis      Past Surgical History:   Procedure Laterality Date    HYSTERECTOMY  3/16/2011    SALPINGOOPHORECTOMY Right 3/16/2011    TUBAL LIGATION      Vaginal cuff repair  04/22/2011     Family History   Problem Relation Name Age of Onset    Diabetes Maternal Grandmother      Cancer Maternal Grandmother       Social History     Tobacco Use    Smoking status: Every Day     Current packs/day: 1.00     Average packs/day: 1 pack/day for 38.5 years (38.5 ttl pk-yrs)     Types: Cigarettes     Start date: 1986    Smokeless tobacco: Never    Tobacco comments:     Enrolled  in [facility] on 10/23/14 (SCT Member ID # [id number]) Pt declines Ambulatory referral to Smoking Cessation clinic. Handout provided   Substance Use Topics    Alcohol use: No    Drug use: Yes     Types: Heroin, Cocaine, Methamphetamines, Marijuana     Review of Systems   Unable to perform ROS: Psychiatric disorder       Physical Exam     Initial Vitals [07/04/24 1855]   BP Pulse Resp Temp SpO2   (!) 142/89 (!) 120 20 98.2 °F (36.8 °C) 95 %      MAP       --         Vitals:    07/04/24 1855 07/04/24 1902 07/04/24 1919 07/04/24 1931   BP: (!) 142/89 (!) 144/101  (!) 135/92   Pulse: (!) 120  (!) 120    Resp: 20      Temp: 98.2 °F (36.8 °C)      TempSrc: Oral      SpO2: 95%      Weight: 74.4 kg (164 lb)      Height: 5' (1.524 m)       07/04/24 1933 07/04/24 2020 07/04/24 2035 07/04/24 2055   BP: (!) 144/90  (!) 158/100    Pulse:  (!) 117 (!) 121 (!) 119   Resp:       Temp:       TempSrc:       SpO2:  96% 97% (!) 93%   Weight:       Height:        07/04/24 2100 07/04/24 2215 07/04/24 2225 07/04/24 2320   BP: (!) 159/96 (!) 157/94  (!) 114/58   Pulse: (!) 117 (!) 120 (!) 118 (!) 143   Resp:       Temp:       TempSrc:       SpO2: (!) 93%  95%    Weight:       Height:        07/04/24 2323 07/05/24 0029 07/05/24 0035   BP: (!) 114/58 (!) 101/56 (!) 96/53   Pulse: (!) 145 89 89   Resp:  20 20   Temp:      TempSrc:      SpO2:  (!) 91% (!) 91%   Weight:      Height:        Physical Exam    Nursing note and vitals reviewed.  Constitutional: She appears well-developed and well-nourished. She is not diaphoretic. No distress.   HENT:   Head: Normocephalic and atraumatic.   Right Ear: Tympanic membrane, external ear and ear canal normal. Decreased hearing is noted.   Left Ear: Tympanic membrane, external ear and ear canal normal. Decreased hearing is noted.   Nose: Nose normal.       Mouth/Throat: Oropharynx is clear and moist.   Eyes: Conjunctivae are normal. No scleral icterus.   Cardiovascular:  Normal rate, regular rhythm,  normal heart sounds and intact distal pulses.           Pulmonary/Chest: Breath sounds normal. No respiratory distress.   Abdominal: Abdomen is soft. She exhibits no distension. There is no abdominal tenderness.   Musculoskeletal:         General: No edema. Normal range of motion.     Neurological: She is alert and oriented to person, place, and time. She has normal strength.   Skin: Skin is warm and dry.   Psychiatric: She has a normal mood and affect. Her speech is normal and behavior is normal. Thought content normal. She is not actively hallucinating. She expresses no homicidal and no suicidal ideation.   Patient calm, friendly, and cooperative She is attentive.       ED Course   Procedures           Medically cleared for psychiatry placement: 7/5/2024 12:52 AM       MDM  Differential Diagnoses   Based on available history, the working differential diagnoses considered during this evaluation include but are not limited to exacerbation of underlying mental illness, toxidrome, alcohol intoxication, malingering, conversion, metabolic derangement.      LABS     Labs Reviewed   CBC W/ AUTO DIFFERENTIAL - Abnormal; Notable for the following components:       Result Value    RDW 16.6 (*)     Gran % 74.6 (*)     Lymph % 15.8 (*)     All other components within normal limits   COMPREHENSIVE METABOLIC PANEL - Abnormal; Notable for the following components:    Chloride 112 (*)     CO2 21 (*)     All other components within normal limits   DRUG SCREEN PANEL, URINE EMERGENCY - Abnormal; Notable for the following components:    Creatinine, Urine 369.3 (*)     All other components within normal limits    Narrative:     Specimen Source->Urine   TROPONIN I - Abnormal; Notable for the following components:    Troponin I 0.038 (*)     All other components within normal limits   TROPONIN I - Abnormal; Notable for the following components:    Troponin I 0.038 (*)     All other components within normal limits   SALICYLATE LEVEL -  Abnormal; Notable for the following components:    Salicylate Lvl <5.0 (*)     All other components within normal limits   ACETAMINOPHEN LEVEL - Abnormal; Notable for the following components:    Acetaminophen (Tylenol), Serum <3.0 (*)     All other components within normal limits   URINALYSIS, REFLEX TO URINE CULTURE - Abnormal; Notable for the following components:    Appearance, UA Hazy (*)     Protein, UA 2+ (*)     Ketones, UA 1+ (*)     Bilirubin (UA) 1+ (*)     Nitrite, UA Positive (*)     All other components within normal limits    Narrative:     Specimen Source->Urine   URINALYSIS MICROSCOPIC - Abnormal; Notable for the following components:    WBC, UA 20 (*)     Bacteria Many (*)     All other components within normal limits    Narrative:     Specimen Source->Urine   CULTURE, URINE   ALCOHOL,MEDICAL (ETHANOL)   TSH           All available results from the labs ordered were independently reviewed. with findings as follows:  Chemistry unremarkable.  CBC unremarkable.  Toxicology screen unremarkable.  Troponin level elevated at 0.038.  Ethanol level undetectable.  TSH within normal limits.  Repeat troponin level stable.     Imaging     Imaging Results              X-Ray Chest AP Portable (Final result)  Result time 07/04/24 19:28:00      Final result by Nadeem Leroy MD (07/04/24 19:28:00)                   Impression:      No acute abnormality.      Electronically signed by: Nadeem Leroy  Date:    07/04/2024  Time:    19:28               Narrative:    EXAMINATION:  XR CHEST AP PORTABLE    CLINICAL HISTORY:  Agitation;    TECHNIQUE:  Single frontal view of the chest was performed.    COMPARISON:  Multiple    FINDINGS:  The lungs are clear, with normal appearance of pulmonary vasculature and no pleural effusion or pneumothorax.    The cardiac silhouette is enlarged.  The hilar and mediastinal contours are unremarkable.    Bones are intact.                                       X-Rays:   Independently  Interpreted Readings:   Chest X-Ray: Normal heart size.  No infiltrates.  No acute abnormalities.        EKG   EKG Readings: (Independently Interpreted)   Initial Reading: No STEMI. Rhythm: Sinus Bradycardia. Heart Rate: 115. Ectopy: No Ectopy. Conduction: LBBB.       ED Management/Discussion     Medications   cephALEXin capsule 500 mg (has no administration in time range)   sodium chloride 0.9% bolus 1,000 mL 1,000 mL (0 mLs Intravenous Stopped 7/4/24 2229)   LORazepam tablet 1 mg (1 mg Oral Given 7/4/24 2137)   QUEtiapine tablet 300 mg (300 mg Oral Given 7/4/24 2233)   metoprolol tartrate (LOPRESSOR) tablet 50 mg (50 mg Oral Given 7/4/24 2320)   gabapentin capsule 300 mg (300 mg Oral Given 7/4/24 2323)   haloperidol lactate injection 5 mg (5 mg Intravenous Given 7/4/24 2350)                 The patient's list of active medical problems, social history, medications, and allergies as documented per RN staff has been reviewed.           Patient noted to have an elevated troponin level here today though this appears to be chronic when compared to prior values.  I do not feel this represents any type of acute coronary process as her delta troponin level is identical.    Although the patient was initially calm and cooperative, she became progressively agitated and disruptive prompting administration of Ativan followed by her evening Seroquel.  Patient continued to escalate periodically screaming at someone that does not appear to be in the room ultimately prompting administration of Haldol.  Patient is finally resting comfortably in her heart rate has reached a normal level.     All historical, clinical, and laboratory findings were reviewed with the patient/family in detail along with the indications for transfer to an inpatient psychiatric services as we do not have those services available at this facility.  All remaining questions/concerns were addressed and the patient/family communicates understanding and agrees  to proceed accordingly.  At this time the patient has been placed under a Physician's Emergency Commitment and is medically stable for transfer.  Accordingly we have placed a request with the [facility] for disposition to an inpatient psychiatric facility.  We will continue to monitor the patient's behavior closely pending placement and transport.    CLINICAL IMPRESSION    ICD-10-CM ICD-9-CM   1. Psychosis, unspecified psychosis type  F29 298.9   2. Agitation  R45.1 307.9   3. History of bipolar disorder  Z86.59 V11.1   4. History of substance abuse  F19.11 305.93          ED Disposition Condition    Transfer to Psych Facility Stable                 Elmer Brower MD  07/05/24 0057

## 2024-07-07 LAB — BACTERIA UR CULT: ABNORMAL

## 2024-07-08 PROBLEM — N17.9 AKI (ACUTE KIDNEY INJURY): Status: RESOLVED | Noted: 2018-05-23 | Resolved: 2024-07-08

## 2024-07-12 PROBLEM — F31.13 BIPOLAR DISORDER, CURRENT EPISODE MANIC, SEVERE: Status: ACTIVE | Noted: 2024-07-12

## 2024-10-07 PROBLEM — Z13.9 ENCOUNTER FOR MEDICAL SCREENING EXAMINATION: Status: RESOLVED | Noted: 2020-07-04 | Resolved: 2024-10-07

## 2024-11-22 ENCOUNTER — OFFICE VISIT (OUTPATIENT)
Dept: CARDIOLOGY | Facility: CLINIC | Age: 54
End: 2024-11-22
Payer: MEDICARE

## 2024-11-22 VITALS
DIASTOLIC BLOOD PRESSURE: 68 MMHG | BODY MASS INDEX: 33.76 KG/M2 | HEIGHT: 60 IN | HEART RATE: 86 BPM | OXYGEN SATURATION: 95 % | WEIGHT: 171.94 LBS | SYSTOLIC BLOOD PRESSURE: 108 MMHG

## 2024-11-22 DIAGNOSIS — E78.2 MIXED HYPERLIPIDEMIA: ICD-10-CM

## 2024-11-22 DIAGNOSIS — Z72.0 TOBACCO ABUSE: ICD-10-CM

## 2024-11-22 DIAGNOSIS — F31.9 BIPOLAR AFFECTIVE DISORDER, REMISSION STATUS UNSPECIFIED: ICD-10-CM

## 2024-11-22 DIAGNOSIS — Z01.810 PREOP CARDIOVASCULAR EXAM: Primary | ICD-10-CM

## 2024-11-22 DIAGNOSIS — I48.11 LONGSTANDING PERSISTENT ATRIAL FIBRILLATION: ICD-10-CM

## 2024-11-22 DIAGNOSIS — I50.42 CHRONIC COMBINED SYSTOLIC AND DIASTOLIC HEART FAILURE: ICD-10-CM

## 2024-11-22 DIAGNOSIS — I42.8 NICM (NONISCHEMIC CARDIOMYOPATHY): ICD-10-CM

## 2024-11-22 DIAGNOSIS — I10 ESSENTIAL HYPERTENSION: ICD-10-CM

## 2024-11-22 PROCEDURE — 99999 PR PBB SHADOW E&M-EST. PATIENT-LVL III: CPT | Mod: PBBFAC,,, | Performed by: INTERNAL MEDICINE

## 2024-11-22 RX ORDER — SPIRONOLACTONE 25 MG/1
12.5 TABLET ORAL DAILY
COMMUNITY

## 2024-11-22 RX ORDER — SACUBITRIL AND VALSARTAN 24; 26 MG/1; MG/1
1 TABLET, FILM COATED ORAL 2 TIMES DAILY
COMMUNITY

## 2024-11-22 RX ORDER — TRAZODONE HYDROCHLORIDE 300 MG/1
1 TABLET ORAL NIGHTLY
COMMUNITY
Start: 2024-10-08

## 2024-11-22 RX ORDER — ARIPIPRAZOLE 20 MG/1
20 TABLET ORAL NIGHTLY PRN
COMMUNITY

## 2024-11-22 RX ORDER — HYDROXYZINE PAMOATE 25 MG/1
50 CAPSULE ORAL EVERY 6 HOURS PRN
COMMUNITY
Start: 2024-10-15

## 2024-11-22 RX ORDER — EMPAGLIFLOZIN 10 MG/1
10 TABLET, FILM COATED ORAL
COMMUNITY

## 2024-11-22 RX ORDER — METHOCARBAMOL 500 MG/1
500 TABLET, FILM COATED ORAL DAILY PRN
COMMUNITY

## 2024-11-22 NOTE — PROGRESS NOTES
Subjective:   Patient ID:  Stephanie T Barthelemy is a 53 y.o. female who presents for follow up of No chief complaint on file.      52 yo female, came in for CHFrEF from [facility].   PMH HTN, NICM LVEF 20%, Polysubstance abuse, HCV, BPD, and Medical noncompliance   H/o PE on eliquis   Had multiple admission for CHF admission in 2023.   Occasional SOB. On inhaler, smoker  No orthopnea     Interval history  Came in for preop clearance of dental work  CHF is compensated. Taking Lasix 60 mg bid.  No orthopnea PND and leg swelling    improved cr 0.9  10/24 echo EF 20% at [facility]            Past Medical History:   Diagnosis Date    ADHD (attention deficit hyperactivity disorder)     Anemia     Anxiety     Bipolar disorder     CHF (congestive heart failure)     History of hepatitis C - s/p clearance of virus (HCV neg 6/2015) 09/26/2014    History of psychiatric hospitalization     History of substance abuse     IV heroin - last use 2013 per pt    Hx of psychiatric care     Hypertension     Opioid overdose 05/10/2016    Psychiatric problem     Psychosis     Seizure disorder 04/03/2019    Sleep difficulties     Still's disease     Substance abuse     Therapy     Vasculitis        Past Surgical History:   Procedure Laterality Date    HYSTERECTOMY  3/16/2011    SALPINGOOPHORECTOMY Right 3/16/2011    TUBAL LIGATION      Vaginal cuff repair  04/22/2011       Social History     Tobacco Use    Smoking status: Every Day     Current packs/day: 1.00     Average packs/day: 1 pack/day for 38.9 years (38.9 ttl pk-yrs)     Types: Cigarettes     Start date: 1986    Smokeless tobacco: Never    Tobacco comments:     Enrolled in [email or web address] on 10/23/14 (SCT Member ID # [id number]) Pt declines Ambulatory referral to Smoking Cessation clinic. Handout provided   Substance Use Topics    Alcohol use: No    Drug use: Yes     Types: Heroin, Cocaine, Methamphetamines, Marijuana       Family History   Problem Relation Name Age of Onset     Diabetes Maternal Grandmother      Cancer Maternal Grandmother           ROS    Objective:   Physical Exam  HENT:      Head: Normocephalic.   Eyes:      Pupils: Pupils are equal, round, and reactive to light.   Neck:      Thyroid: No thyromegaly.      Vascular: Normal carotid pulses. No carotid bruit or JVD.   Cardiovascular:      Rate and Rhythm: Normal rate and regular rhythm. No extrasystoles are present.     Chest Wall: PMI is not displaced.      Pulses: Normal pulses.      Heart sounds: Normal heart sounds. No murmur heard.     No gallop. No S3 sounds.   Pulmonary:      Effort: No respiratory distress.      Breath sounds: Normal breath sounds. No stridor.   Abdominal:      General: Bowel sounds are normal.      Palpations: Abdomen is soft.      Tenderness: There is no abdominal tenderness. There is no rebound.   Skin:     Findings: No rash.   Neurological:      Mental Status: She is alert and oriented to person, place, and time.   Psychiatric:         Behavior: Behavior normal.         Lab Results   Component Value Date    CHOL 162 07/22/2022    CHOL 190 09/01/2021    CHOL 201 (H) 02/20/2021     Lab Results   Component Value Date    HDL 22 (L) 07/22/2022    HDL 38 (L) 09/01/2021    HDL 51 02/20/2021     Lab Results   Component Value Date    LDLCALC 114.2 07/22/2022    LDLCALC 128.0 09/01/2021    LDLCALC 138.4 02/20/2021     Lab Results   Component Value Date    TRIG 129 07/22/2022    TRIG 120 09/01/2021    TRIG 58 02/20/2021     Lab Results   Component Value Date    CHOLHDL 13.6 (L) 07/22/2022    CHOLHDL 20.0 09/01/2021    CHOLHDL 25.4 02/20/2021       Chemistry        Component Value Date/Time     10/06/2024 0617     07/04/2024 1927    K 4.2 10/06/2024 0617    K 3.7 07/04/2024 1927     10/06/2024 0617     (H) 07/04/2024 1927    CO2 27 10/06/2024 0617    CO2 21 (L) 07/04/2024 1927    BUN 18 10/06/2024 0617    BUN 17 07/04/2024 1927    CREATININE 0.95 10/06/2024 0617    CREATININE 1.0  07/04/2024 1927     07/04/2024 1927        Component Value Date/Time    CALCIUM 9.1 10/06/2024 0617    CALCIUM 10.4 07/04/2024 1927    ALKPHOS 116 07/04/2024 1927    AST 28 07/04/2024 1927    AST 29 05/10/2016 0019    ALT 24 07/04/2024 1927    BILITOT 0.3 07/04/2024 1927    ESTGFRAFRICA 72 10/06/2024 0617    ESTGFRAFRICA >60 07/26/2022 0414    EGFRNONAA >60 07/26/2022 0414          Lab Results   Component Value Date    HGBA1C 5.1 10/01/2024     Lab Results   Component Value Date    TSH 1.703 07/04/2024     Lab Results   Component Value Date    INR 1.6 (H) 01/24/2023    INR 1.3 (H) 12/26/2022    INR 1.1 08/04/2022     Lab Results   Component Value Date    WBC 8.82 07/04/2024    HGB 12.5 07/04/2024    HCT 38.4 07/04/2024    MCV 89 07/04/2024     07/04/2024     BMP  Sodium   Date Value Ref Range Status   10/06/2024 138 136 - 145 mmol/L Final   07/04/2024 143 136 - 145 mmol/L Final     Potassium   Date Value Ref Range Status   10/06/2024 4.2 3.5 - 5.1 mmol/L Final   07/04/2024 3.7 3.5 - 5.1 mmol/L Final     Chloride   Date Value Ref Range Status   10/06/2024 103 100 - 109 mmol/L Final   07/04/2024 112 (H) 95 - 110 mmol/L Final     CO2   Date Value Ref Range Status   07/04/2024 21 (L) 23 - 29 mmol/L Final     Carbon Dioxide   Date Value Ref Range Status   10/06/2024 27 22 - 33 mmol/L Final     BUN   Date Value Ref Range Status   07/04/2024 17 6 - 20 mg/dL Final     Blood Urea Nitrogen   Date Value Ref Range Status   10/06/2024 18 5 - 25 mg/dL Final     Creatinine   Date Value Ref Range Status   10/06/2024 0.95 0.55 - 1.02 mg/dL Final   07/04/2024 1.0 0.5 - 1.4 mg/dL Final     Calcium   Date Value Ref Range Status   10/06/2024 9.1 8.8 - 10.6 mg/dL Final   07/04/2024 10.4 8.7 - 10.5 mg/dL Final     Anion Gap   Date Value Ref Range Status   10/06/2024 8 8 - 16 mmol/L Final   07/04/2024 10 8 - 16 mmol/L Final     eGFR if    Date Value Ref Range Status   07/26/2022 >60 >60 mL/min/1.73 m^2 Final      eGFR    Date Value Ref Range Status   10/06/2024 72 mL/min/1.73mSq Final     Comment:     In accordance with NKF-ASN Task Force recommendation, calculation based on the Chronic Kidney Disease Epidemiology Collaboration (CKD-EPI) equation without adjustment for race. eGFR adjusted for gender and age and calculated in ml/min/1.73mSquared. eGFR cannot be calculated if patient is under 18 years of age.     Reference Range:   >= 60 ml/min/1.73mSquared.     eGFR if non    Date Value Ref Range Status   07/26/2022 >60 >60 mL/min/1.73 m^2 Final     Comment:     Calculation used to obtain the estimated glomerular filtration  rate (eGFR) is the CKD-EPI equation.        BNP  @LABRCNTIP(BNP,BNPTRIAGEBLO)@  @LABRCNTIP(troponini)@  CrCl cannot be calculated (Patient's most recent lab result is older than the maximum 7 days allowed.).  No results found in the last 24 hours.  No results found in the last 24 hours.  No results found in the last 24 hours.    Assessment:      1. Preop cardiovascular exam    2. Longstanding persistent atrial fibrillation    3. Bipolar affective disorder, remission status unspecified    4. Chronic combined systolic and diastolic heart failure    5. Essential hypertension    6. Mixed hyperlipidemia    7. NICM (nonischemic cardiomyopathy)    8. Tobacco abuse      CHFrEF compensated  AFIB on SR  Plan:   Elevated periop risk of CV events for non-high risk procedure.  Good functional and exercise capacity.  No chest pain, active arrhythmia and CHF symptoms.  Ok to proceed the scheduled surgery without further cardiac study.  OK to hold Eliquis 2 days before the procedure and resume ASAP postop.    Continue Lasix 60 mg bid entresto torpoXL jardiance eliquis aldactone  Rtc in 6m

## 2025-02-18 NOTE — PLAN OF CARE
PT reconnected to low-int suction via NG tube, per Porsche GARCIA CNP. Oral hygiene preformed with oral swabs and cleanser provided in pack. PT tolerated well. PT made comfortable, call light in reach, tele-sitter on, care on-going.    Pt on documented O2. No apparent respiratory distress noted. Will continue to monitor.

## 2025-06-18 DIAGNOSIS — I10 ESSENTIAL HYPERTENSION: Primary | ICD-10-CM

## 2025-06-18 DIAGNOSIS — Z00.00 ROUTINE HEALTH MAINTENANCE: ICD-10-CM

## 2025-07-21 NOTE — PT/OT/SLP PROGRESS
"Physical Therapy Treatment    Patient Name:  Stephanie T Barthelemy   MRN:  [id number]    Recommendations:     Discharge Recommendations: Moderate Intensity Therapy  Discharge Equipment Recommendations: to be determined by next level of care  Barriers to discharge: None    Assessment:     Stephanie T Barthelemy is a 53 y.o. female admitted with a medical diagnosis of Acute encephalopathy.  She presents with the following impairments/functional limitations: impaired balance, weakness, decreased safety awareness, impaired endurance, impaired self care skills, decreased coordination, decreased ROM, impaired functional mobility, decreased upper extremity function, gait instability, decreased lower extremity function.    Rehab Prognosis: Good; patient would benefit from acute skilled PT services to address these deficits and reach maximum level of function.    Recent Surgery: * No surgery found *      Plan:     During this hospitalization, patient to be seen 3 x/week to address the identified rehab impairments via therapeutic exercises, therapeutic activities, gait training and progress toward the following goals:    Plan of Care Expires:  04/20/24    Subjective     Chief Complaint: "I'm fine."  Patient/Family Comments/goals: Get better  Pain/Comfort:  Pain Rating 1: 0/10      Objective:     Communicated with JOANN Roe prior to session.  Patient found supine with telemetry, peripheral IV upon PT entry to room.     General Precautions: Standard, fall  Orthopedic Precautions: N/A  Braces: N/A  Respiratory Status: Room air     Functional Mobility:  Bed Mobility:     Supine to Sit: stand by assistance  Transfers:     Sit to Stand:  minimum assistance with hand-held assist  Bed to Chair: minimum assistance with  hand-held assist  using  Step Transfer  Toilet Transfer: contact guard assistance with  hand-held assist and grab bars  using  Step Transfer  Gait: 40 feet x2, Min A with A      AM-PAC 6 CLICK MOBILITY  Turning over " Copied from CRM #[id number]. Topic: General Inquiry - Patient Advice  >> Jul 21, 2025 12:40 PM Radha wrote:  Type:  Needs Medical Advice    Who Called: pt   Would the patient rather a call back or a response via [facility]? Call back   Best Call Back Number: [phone]    Additional Information: pt had surgery and she wants to know he can's and cants that she can due with her foot   in bed (including adjusting bedclothes, sheets and blankets)?: 4  Sitting down on and standing up from a chair with arms (e.g., wheelchair, bedside commode, etc.): 3  Moving from lying on back to sitting on the side of the bed?: 4  Moving to and from a bed to a chair (including a wheelchair)?: 3  Need to walk in hospital room?: 3  Climbing 3-5 steps with a railing?: 1 (NT)  Basic Mobility Total Score: 18       Treatment & Education:    Patient found supine in bed upon PT arrival to room. Patient agreeable to therapy session.    Patient completed:     Sup>sit: SBA and was SBA with scooting forward to place feet on the floor.    STS: Min A with HHA and cues to push from bed to assist with standing    Gait: 40 feet x2, Min A with HHA. Patient ambulated with step to gait pattern at first but progressed to reciprocal gait pattern with cues. Patient had no LOB with gait training.     Toilet tx: CGA with HHA and use of grab bars to lower body to the toilet    Chair tx: CGA with HHA     Patient left up in chair with all lines intact and call button in reach..    GOALS:   Multidisciplinary Problems       Physical Therapy Goals          Problem: Physical Therapy    Goal Priority Disciplines Outcome Goal Variances Interventions   Physical Therapy Goal     PT, PT/OT Ongoing, Progressing     Description: LT24  1. PT WILL COMPLETE BED MOBILITY IND  2. PT WILL STAND T/F TO CHAIR WITH S FOR OOB TOLERANCE  3. PT WILL GT TRAIN X 250' WITH LRAD AND SBA TO PROGRESS GT.   4. PT WILL INC AMPAC SCORE BY 2 POINTS TO PROGRESS GROSS FUNC MOBILITY.                          Time Tracking:     PT Received On: 24  PT Start Time: 0800     PT Stop Time: 823  PT Total Time (min): 23 min     Billable Minutes: Gait Training 12 and Therapeutic Activity 11    Treatment Type: Treatment  PT/PTA: PT     Number of PTA visits since last PT visit: 0     2024